# Patient Record
Sex: FEMALE | Race: WHITE | NOT HISPANIC OR LATINO | Employment: FULL TIME | URBAN - METROPOLITAN AREA
[De-identification: names, ages, dates, MRNs, and addresses within clinical notes are randomized per-mention and may not be internally consistent; named-entity substitution may affect disease eponyms.]

---

## 2017-01-04 ENCOUNTER — TELEPHONE (OUTPATIENT)
Dept: FAMILY MEDICINE | Facility: CLINIC | Age: 47
End: 2017-01-04

## 2017-01-04 DIAGNOSIS — E11.9 DIABETES MELLITUS WITHOUT COMPLICATION: Primary | ICD-10-CM

## 2017-01-04 DIAGNOSIS — Z79.4 LONG TERM CURRENT USE OF INSULIN: ICD-10-CM

## 2017-01-04 NOTE — TELEPHONE ENCOUNTER
----- Message from Maxine Milton sent at 1/4/2017  8:55 AM CST -----  Doctor put lab orders in for patient    She has lab center at her job     Needs lab orders faxed to her    Fax is   [phone]

## 2017-01-09 ENCOUNTER — OFFICE VISIT (OUTPATIENT)
Dept: FAMILY MEDICINE | Facility: CLINIC | Age: 47
End: 2017-01-09
Payer: COMMERCIAL

## 2017-01-09 VITALS
DIASTOLIC BLOOD PRESSURE: 74 MMHG | BODY MASS INDEX: 21.02 KG/M2 | WEIGHT: 114.19 LBS | HEIGHT: 62 IN | OXYGEN SATURATION: 99 % | SYSTOLIC BLOOD PRESSURE: 120 MMHG | HEART RATE: 74 BPM

## 2017-01-09 DIAGNOSIS — Z79.4 TYPE 2 DIABETES MELLITUS WITH HYPERGLYCEMIA, WITH LONG-TERM CURRENT USE OF INSULIN: Primary | ICD-10-CM

## 2017-01-09 DIAGNOSIS — R74.01 ELEVATED TRANSAMINASE LEVEL: ICD-10-CM

## 2017-01-09 DIAGNOSIS — E11.65 TYPE 2 DIABETES MELLITUS WITH HYPERGLYCEMIA, WITH LONG-TERM CURRENT USE OF INSULIN: Primary | ICD-10-CM

## 2017-01-09 PROCEDURE — 2022F DILAT RTA XM EVC RTNOPTHY: CPT | Mod: S$GLB,,,

## 2017-01-09 PROCEDURE — 1159F MED LIST DOCD IN RCRD: CPT | Mod: S$GLB,,,

## 2017-01-09 PROCEDURE — 3078F DIAST BP <80 MM HG: CPT | Mod: S$GLB,,,

## 2017-01-09 PROCEDURE — 3074F SYST BP LT 130 MM HG: CPT | Mod: S$GLB,,,

## 2017-01-09 PROCEDURE — 99214 OFFICE O/P EST MOD 30 MIN: CPT | Mod: S$GLB,,,

## 2017-01-09 PROCEDURE — 3046F HEMOGLOBIN A1C LEVEL >9.0%: CPT | Mod: S$GLB,,,

## 2017-01-09 PROCEDURE — 99999 PR PBB SHADOW E&M-EST. PATIENT-LVL III: CPT | Mod: PBBFAC,,,

## 2017-01-09 PROCEDURE — 4010F ACE/ARB THERAPY RXD/TAKEN: CPT | Mod: S$GLB,,,

## 2017-01-09 RX ORDER — INSULIN GLARGINE 100 [IU]/ML
15 INJECTION, SOLUTION SUBCUTANEOUS NIGHTLY
Qty: 1 BOX | Refills: 3 | Status: SHIPPED | OUTPATIENT
Start: 2017-01-09 | End: 2018-01-23 | Stop reason: SDUPTHER

## 2017-01-09 NOTE — PROGRESS NOTES
Subjective:       Patient ID: Leida Hoyos is a 46 y.o. female.    Chief Complaint: Diabetes    HPI The patient presents for medical management of her chronic medical problems including diabetes mellitus and autoimmune hepatitis. Her Hgb A1C is 9.0. She is not sure if she has been evaluated for hepatitis or iron toxicity. She is compliant with her diet and exercises three times a week or more. Her morning glucoses are in the 140 range.     Review of Systems   Constitutional: Negative.    Respiratory: Negative.  Negative for shortness of breath.    Cardiovascular: Negative for chest pain.   Psychiatric/Behavioral: Negative.    All other systems reviewed and are negative.      Objective:      Vitals:    01/09/17 0832   BP: 120/74   Pulse: 74     Physical Exam   Constitutional: She is oriented to person, place, and time. She appears well-developed and well-nourished. She is active.  Non-toxic appearance. She does not have a sickly appearance. She does not appear ill. No distress.   HENT:   Head: Normocephalic and atraumatic.   Right Ear: External ear normal.   Left Ear: External ear normal.   Nose: Nose normal.   Hearing normal.    Eyes: Conjunctivae are normal. Pupils are equal, round, and reactive to light.   Neck: Normal range of motion. Neck supple. No thyroid mass and no thyromegaly present.   Cardiovascular: Normal rate, regular rhythm, normal heart sounds and intact distal pulses.  Exam reveals no gallop and no friction rub.    No murmur heard.  Pulses:       Dorsalis pedis pulses are 2+ on the right side, and 2+ on the left side.        Posterior tibial pulses are 2+ on the right side, and 2+ on the left side.   Pulmonary/Chest: Effort normal and breath sounds normal. No respiratory distress. She exhibits no tenderness.   Musculoskeletal: Normal range of motion. She exhibits no edema.   Lymphadenopathy:     She has no cervical adenopathy.   Neurological: She is alert and oriented to person, place, and  time. She has normal strength. Coordination and gait normal.   Skin: Skin is warm and dry. She is not diaphoretic. No pallor.   Psychiatric: She has a normal mood and affect. Her speech is normal and behavior is normal. Judgment and thought content normal. Cognition and memory are normal.   Vitals reviewed.      Assessment:       1. Type 2 diabetes mellitus with hyperglycemia, with long-term current use of insulin    2. Elevated transaminase level        Plan:       Type 2 diabetes mellitus with hyperglycemia, with long-term current use of insulin  -     Ambulatory referral to Endocrinology    Elevated transaminase level  -     Hepatitis C antibody; Future; Expected date: 1/9/17  -     Hepatitis B surface antigen; Future; Expected date: 1/9/17  -     Iron and TIBC; Future; Expected date: 1/9/17  -     Ferritin; Future; Expected date: 1/9/17  -     Ferritin; Future; Expected date: 1/9/17  -     Iron and TIBC; Future; Expected date: 1/9/17  -     Hepatitis C antibody; Future; Expected date: 1/9/17  -     HEPATITIS B SURFACE ANTIGEN; Future; Expected date: 1/9/17    Other orders  -     insulin glargine (LANTUS SOLOSTAR) 100 unit/mL (3 mL) InPn pen; Inject 15 Units into the skin every evening.  Dispense: 1 Box; Refill: 3      Return in about 3 months (around 4/9/2017).

## 2017-01-09 NOTE — MR AVS SNAPSHOT
[place] [address] [address]  Phone: [phone]  Fax: [phone]                  Leida Hoyos   2017 8:20 AM   Office Visit    Description:  Female : 1970   Provider:  Gabriel Berry Jr., MD   Department:  [facility]           Reason for Visit     Diabetes           Diagnoses this Visit        Comments    Type 2 diabetes mellitus without complication, with long-term current use of insulin    -  Primary     Elevated transaminase level                To Do List           Goals (5 Years of Data)     None      Follow-Up and Disposition     Return in about 3 months (around 2017).    Follow-up and Disposition History       These Medications        Disp Refills Start End    insulin glargine (LANTUS SOLOSTAR) 100 unit/mL (3 mL) InPn pen 1 Box 3 2017     Inject 15 Units into the skin every evening. - Subcutaneous    Pharmacy: [facility] - [address] #: [phone]         [facility] On Call     [facility] On Call Nurse Care Line -  Assistance  Registered nurses in the [facility] On Call Center provide clinical advisement, health education, appointment booking, and other advisory services.  Call for this free service at [phone].             Medications           Message regarding Medications     Verify the changes and/or additions to your medication regime listed below are the same as discussed with your clinician today.  If any of these changes or additions are incorrect, please notify your healthcare provider.        CHANGE how you are taking these medications     Start Taking Instead of    insulin glargine (LANTUS SOLOSTAR) 100 unit/mL (3 mL) InPn pen insulin glargine (LANTUS SOLOSTAR) 100 unit/mL (3 mL) InPn pen    Dosage:  Inject 15 Units into the skin every evening. Dosage:  Inject 9 Units into the skin every evening.    Reason for Change:  Reorder            Verify  "that the below list of medications is an accurate representation of the medications you are currently taking.  If none reported, the list may be blank. If incorrect, please contact your healthcare provider. Carry this list with you in case of emergency.           Current Medications     aspirin (ECOTRIN) 81 MG EC tablet Take 81 mg by mouth once daily.    insulin glargine (LANTUS SOLOSTAR) 100 unit/mL (3 mL) InPn pen Inject 15 Units into the skin every evening.    lisinopril (PRINIVIL,ZESTRIL) 5 MG tablet Take 1 tablet (5 mg total) by mouth once daily.    mercaptopurine (PURINETHOL) 50 mg tablet Take 50 mg by mouth once daily.    metformin (GLUCOPHAGE) 1000 MG tablet Take 1 tablet (1,000 mg total) by mouth 2 (two) times daily with meals.    multivitamin (ONE DAILY MULTIVITAMIN) per tablet Take 1 tablet by mouth once daily.    pen needle, diabetic 32 gauge x 5/32" Ndle 1 each by Misc.(Non-Drug; Combo Route) route once daily.    pioglitazone (ACTOS) 30 MG tablet Take 1 tablet (30 mg total) by mouth once daily.           Clinical Reference Information           Vital Signs - Last Recorded  Most recent update: 1/9/2017  8:34 AM by Wendy Hill MA    BP Pulse Ht Wt SpO2 BMI    120/74 (BP Location: Right arm, Patient Position: Sitting, BP Method: Manual) 74 5' 2" (1.575 m) 51.8 kg (114 lb 3.2 oz) 99% 20.89 kg/m2      Blood Pressure          Most Recent Value    BP  120/74      Allergies as of 1/9/2017     No Known Allergies      Immunizations Administered on Date of Encounter - 1/9/2017     None      Orders Placed During Today's Visit      Normal Orders This Visit    Ambulatory referral to Endocrinology     Ferritin     HEPATITIS B SURFACE ANTIGEN     Hepatitis C antibody     Iron and TIBC     Future Labs/Procedures Expected by Expires    Ferritin  1/9/2017 1/9/2018    Ferritin  1/9/2017 1/9/2018    Hepatitis B surface antigen  1/9/2017 3/10/2018    HEPATITIS B SURFACE ANTIGEN  1/9/2017 3/10/2018    Hepatitis C antibody "  1/9/2017 3/10/2018    Hepatitis C antibody  1/9/2017 3/10/2018    Iron and TIBC  1/9/2017 1/9/2018    Iron and TIBC  1/9/2017 1/9/2018

## 2017-01-10 ENCOUNTER — TELEPHONE (OUTPATIENT)
Dept: ENDOCRINOLOGY | Facility: CLINIC | Age: 47
End: 2017-01-10

## 2017-01-10 ENCOUNTER — OFFICE VISIT (OUTPATIENT)
Dept: ENDOCRINOLOGY | Facility: CLINIC | Age: 47
End: 2017-01-10
Payer: COMMERCIAL

## 2017-01-10 VITALS
HEART RATE: 74 BPM | SYSTOLIC BLOOD PRESSURE: 122 MMHG | BODY MASS INDEX: 21.06 KG/M2 | WEIGHT: 114.44 LBS | DIASTOLIC BLOOD PRESSURE: 68 MMHG | HEIGHT: 62 IN

## 2017-01-10 DIAGNOSIS — E83.52 HYPERCALCEMIA: Primary | ICD-10-CM

## 2017-01-10 DIAGNOSIS — K75.4 AUTOIMMUNE HEPATITIS: ICD-10-CM

## 2017-01-10 PROCEDURE — 99999 PR PBB SHADOW E&M-EST. PATIENT-LVL III: CPT | Mod: PBBFAC,,, | Performed by: INTERNAL MEDICINE

## 2017-01-10 PROCEDURE — 99244 OFF/OP CNSLTJ NEW/EST MOD 40: CPT | Mod: S$GLB,,, | Performed by: INTERNAL MEDICINE

## 2017-01-10 NOTE — TELEPHONE ENCOUNTER
The following lab tests are abnormal:  The calcium is high normal and phos low normal  Could be early primary hyperparathyroidism  To schedule labs and urine in 3 months and will arrange for external lab

## 2017-01-10 NOTE — MR AVS SNAPSHOT
[facility] End[facility]/Diab/Metab  [address]  [facility] [address]  Phone: [phone]  Fax: [phone]                  Leida Hoyos   1/10/2017 11:00 AM   Office Visit    Description:  Female : 1970   Provider:  Guy Dean MD   Department:  [facility]/Diab/Metab           Reason for Visit     Diabetes Mellitus           Diagnoses this Visit        Comments    Uncontrolled type 2 diabetes mellitus without complication, with long-term current use of insulin    -  Primary     Autoimmune hepatitis                To Do List           Future Appointments        Provider Department Dept Phone    1/10/2017 12:45 PM LAB, APPOINTMENT [place] [facility] [phone]      Goals (5 Years of Data)     None      Follow-Up and Disposition     Follow-up and Disposition History      [facility]     [facility] Nurse Care Line -  Assistance  Registered nurses in the [facility] On Call Center provide clinical advisement, health education, appointment booking, and other advisory services.  Call for this free service at [phone].             Medications           Message regarding Medications     Verify the changes and/or additions to your medication regime listed below are the same as discussed with your clinician today.  If any of these changes or additions are incorrect, please notify your healthcare provider.             Verify that the below list of medications is an accurate representation of the medications you are currently taking.  If none reported, the list may be blank. If incorrect, please contact your healthcare provider. Carry this list with you in case of emergency.           Current Medications     aspirin (ECOTRIN) 81 MG EC tablet Take 81 mg by mouth once daily.    insulin glargine (LANTUS SOLOSTAR) 100 unit/mL (3 mL) InPn pen Inject 15 Units into the skin every evening.    lisinopril (PRINIVIL,ZESTRIL) 5 MG tablet Take 1 tablet (5 mg  "total) by mouth once daily.    mercaptopurine (PURINETHOL) 50 mg tablet Take 50 mg by mouth once daily.    metformin (GLUCOPHAGE) 1000 MG tablet Take 1 tablet (1,000 mg total) by mouth 2 (two) times daily with meals.    multivitamin (ONE DAILY MULTIVITAMIN) per tablet Take 1 tablet by mouth once daily.    pen needle, diabetic 32 gauge x 5/32" Ndle 1 each by Misc.(Non-Drug; Combo Route) route once daily.    pioglitazone (ACTOS) 30 MG tablet Take 1 tablet (30 mg total) by mouth once daily.           Clinical Reference Information           Vital Signs - Last Recorded  Most recent update: 1/10/2017 11:43 AM by Ansley Hodges RN    BP Pulse Ht Wt BMI    122/68 74 5' 2" (1.575 m) 51.9 kg (114 lb 6.7 oz) 20.93 kg/m2      Blood Pressure          Most Recent Value    BP  122/68      Allergies as of 1/10/2017     No Known Allergies      Immunizations Administered on Date of Encounter - 1/10/2017     None      Orders Placed During Today's Visit     Future Labs/Procedures Expected by Expires    C-peptide  1/10/2017 1/10/2018    GLUTAMIC ACID DECARBOXYLASE  1/10/2017 3/11/2018    Renal function panel  1/10/2017 1/10/2018      Instructions    Diabetes question if Type 1 or 2  C peptide and GAD65    If Type 1 will need insulin with meals and stop other agents    If Type 2 may benefit from DPP$    Autoimmune hepatitis    Note gestational diabetes, slim, no family hx of diabetes or autoimmune disease    Triplets 14 y/o          "

## 2017-01-10 NOTE — LETTER
January 10, 2017      Gabriel Berry Jr., MD  [address] [place] [address]           [facility] End[facility]/Diab/Metab  [address]  [facility] [address]  Phone: [phone]  Fax: [phone]          Patient: Leida Hoyos   MR Number: [id number]   YOB: 1970   Date of Visit: 1/10/2017       Dear Dr. Gabriel Berry Jr.:    Thank you for referring Leida Hoyos to me for evaluation. Attached you will find relevant portions of my assessment and plan of care.    If you have questions, please do not hesitate to call me. I look forward to following Leida Hoyos along with you.    Sincerely,    Guy Dean MD    Enclosure  CC:  No Recipients    If you would like to receive this communication electronically, please contact [email or web address] or [phone] to request more information on [employer] Link access.    For providers and/or their staff who would like to refer a patient to [facility], please contact us through our one-stop-shop provider referral line, [facility], at [phone].    If you feel you have received this communication in error or would no longer like to receive these types of communications, please e-mail [email or web address]

## 2017-01-10 NOTE — PATIENT INSTRUCTIONS
Diabetes question if Type 1 or 2  C peptide and GAD65    If Type 1 will need insulin with meals and stop other agents    If Type 2 may benefit from DPP$    Autoimmune hepatitis    Note gestational diabetes, slim, no family hx of diabetes or autoimmune disease    Triplets 14 y/o

## 2017-01-11 ENCOUNTER — PATIENT MESSAGE (OUTPATIENT)
Dept: ADMINISTRATIVE | Facility: OTHER | Age: 47
End: 2017-01-11

## 2017-01-13 ENCOUNTER — PATIENT MESSAGE (OUTPATIENT)
Dept: ENDOCRINOLOGY | Facility: CLINIC | Age: 47
End: 2017-01-13

## 2017-06-26 DIAGNOSIS — E11.9 TYPE 2 DIABETES MELLITUS WITHOUT COMPLICATION: ICD-10-CM

## 2017-06-26 RX ORDER — LISINOPRIL 5 MG/1
TABLET ORAL
Qty: 90 TABLET | Refills: 2 | Status: SHIPPED | OUTPATIENT
Start: 2017-06-26 | End: 2018-02-20 | Stop reason: SDUPTHER

## 2017-10-13 ENCOUNTER — PATIENT MESSAGE (OUTPATIENT)
Dept: ADMINISTRATIVE | Facility: HOSPITAL | Age: 47
End: 2017-10-13

## 2018-01-23 ENCOUNTER — OFFICE VISIT (OUTPATIENT)
Dept: ENDOCRINOLOGY | Facility: CLINIC | Age: 48
End: 2018-01-23
Payer: COMMERCIAL

## 2018-01-23 VITALS
WEIGHT: 106.94 LBS | BODY MASS INDEX: 19.68 KG/M2 | HEIGHT: 62 IN | DIASTOLIC BLOOD PRESSURE: 80 MMHG | HEART RATE: 94 BPM | SYSTOLIC BLOOD PRESSURE: 138 MMHG

## 2018-01-23 DIAGNOSIS — R73.9 HYPERGLYCEMIA: ICD-10-CM

## 2018-01-23 DIAGNOSIS — E13.9 LADA (LATENT AUTOIMMUNE DIABETES IN ADULTS), MANAGED AS TYPE 1: Primary | ICD-10-CM

## 2018-01-23 PROCEDURE — 99999 PR PBB SHADOW E&M-EST. PATIENT-LVL III: CPT | Mod: PBBFAC,,, | Performed by: INTERNAL MEDICINE

## 2018-01-23 PROCEDURE — 99214 OFFICE O/P EST MOD 30 MIN: CPT | Mod: S$GLB,,, | Performed by: INTERNAL MEDICINE

## 2018-01-23 RX ORDER — INSULIN PUMP SYRINGE, 3 ML
EACH MISCELLANEOUS
Qty: 1 EACH | Refills: 0 | Status: SHIPPED | OUTPATIENT
Start: 2018-01-23 | End: 2018-05-24

## 2018-01-23 RX ORDER — INSULIN LISPRO 100 [IU]/ML
4 INJECTION, SOLUTION INTRAVENOUS; SUBCUTANEOUS
Qty: 15 ML | Refills: 11 | Status: SHIPPED | OUTPATIENT
Start: 2018-01-23 | End: 2018-10-10 | Stop reason: SDUPTHER

## 2018-01-23 RX ORDER — INSULIN GLARGINE 100 [IU]/ML
12 INJECTION, SOLUTION SUBCUTANEOUS NIGHTLY
Qty: 3 BOX | Refills: 3 | Status: SHIPPED | OUTPATIENT
Start: 2018-01-23 | End: 2018-12-17 | Stop reason: SDUPTHER

## 2018-01-23 RX ORDER — PEN NEEDLE, DIABETIC 30 GX3/16"
NEEDLE, DISPOSABLE MISCELLANEOUS
Qty: 150 EACH | Refills: 3 | Status: SHIPPED | OUTPATIENT
Start: 2018-01-23 | End: 2018-08-27 | Stop reason: SDUPTHER

## 2018-01-23 RX ORDER — LANCETS
EACH MISCELLANEOUS
Qty: 150 EACH | Refills: 11 | Status: SHIPPED | OUTPATIENT
Start: 2018-01-23 | End: 2019-01-10 | Stop reason: SDUPTHER

## 2018-01-23 NOTE — PROGRESS NOTES
Subjective:     Patient ID: Leida Hoyos is a 47 y.o. female.    Chief Complaint: Diabetes    HPI:   Ms. Hoyos is a 47 y.o. female who is here for a follow-up visit for evaluation of type 1 DM that was suspected last year and recently became complicated by DKA admission.   Admitted last week for DKA received IV fluids, moderate dehydration and IV insulin. Initially suspected of having a stroke however, MRI/MRA were negative. Not discharged on any meal time insulin. Her blood sugars have remained between 180 - 200s since discharge. Stopped taking metformin.     Diagnosed with type 2 diabetes 5 years ago. Initially treated with metformin and after a month started on lantus and metformin.     Regimen:  lantus 20 units at bedtime    Denies levels less than 60. Has had a BG of 67 a few years ago. Denies symptoms of hypoglycemia. Lowest bg has been 144.     Checking blood sugars five times a day:  Before breakfast and dinner and after breakfast and dinner.   Before bedtime    Blood sugar log:   Fastin, 174, 169, 165, 158/104, 133, 238, 188  Before dinner: x, 214, x, 365, 245, 144, 275  Before bedtdime: 241, 220, 210, 281, 224, 216, 297    Reports mild lower extremity swelling since discharge from hospital. Denies chest pain, pressure or shortness of breath. Denies numbness, burning and tingling of feet. Reports worsening presbyopia since discharge from hospital. No dysuria, diarrhea or URI.     Medications:   Baby ASA  Lisinopril  Mercaptopurine  MVI    Past Medical History:   Autoimmune hepatitis     Review of Systems   Constitutional: Negative for chills and fever.   HENT: Negative for congestion and sinus pressure.    Eyes: Negative for visual disturbance.   Respiratory: Negative for chest tightness and shortness of breath.    Cardiovascular: Negative for chest pain, palpitations and leg swelling.   Gastrointestinal: Negative for abdominal pain and vomiting.   Genitourinary: Negative for dysuria.  "  Musculoskeletal: Negative for arthralgias.   Skin: Negative for rash.   Neurological: Negative for weakness.   Hematological: Does not bruise/bleed easily.   Psychiatric/Behavioral: Negative for sleep disturbance.        Objective:     Physical Exam    Vitals:    01/23/18 1026   BP: 138/80   BP Location: Left arm   Patient Position: Sitting   BP Method: Medium (Manual)   Pulse: 94   Weight: 48.5 kg (106 lb 14.8 oz)   Height: 5' 2" (1.575 m)     Results for GIORGIO SHIELDS (MRN [id number]) as of 1/23/2018 10:34   Ref. Range 1/10/2017 12:43   Sodium Latest Ref Range: 136 - 145 mmol/L 135 (L)   Potassium Latest Ref Range: 3.5 - 5.1 mmol/L 4.2   Chloride Latest Ref Range: 95 - 110 mmol/L 100   CO2 Latest Ref Range: 23 - 29 mmol/L 29   Anion Gap Latest Ref Range: 8 - 16 mmol/L 6 (L)   BUN, Bld Latest Ref Range: 6 - 20 mg/dL 12   Creatinine Latest Ref Range: 0.5 - 1.4 mg/dL 0.7   eGFR if non African American Latest Ref Range: >60 mL/min/1.73 m^2 >60.0   eGFR if African American Latest Ref Range: >60 mL/min/1.73 m^2 >60.0   Glucose Latest Ref Range: 70 - 110 mg/dL 154 (H)   Calcium Latest Ref Range: 8.7 - 10.5 mg/dL 10.3   Phosphorus Latest Ref Range: 2.7 - 4.5 mg/dL 2.9   Albumin Latest Ref Range: 3.5 - 5.2 g/dL 3.9   Results for GIORGIO SHIELDS (MRN [id number]) as of 1/23/2018 10:34   Ref. Range 1/10/2017 12:43   C-Peptide Latest Ref Range: 0.9 - 5.5 ng/mL 0.9   Glutamic Acid Decarb Ab Latest Ref Range: <=0.02 nmol/L 0.05 (H)     Assessment/Plan:     1. 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     - Ambulatory Referral to Diabetes Education  - Hemoglobin A1c; Future  - Vitamin D; Future  - Vitamin B12; Future  - Hemoglobin A1c  - Vitamin D  - Vitamin B12    2. Hyperglycemia    - Ambulatory Referral to Diabetes Education  - Hemoglobin A1c; Future  - " Hemoglobin A1c      F/u with me in six weeks.     Prescriptions sent to [facility]

## 2018-01-23 NOTE — PATIENT INSTRUCTIONS
Humalog is at [facility].    Meter and strips are at [facility].    [facility] labs are entered to be done at your convenience.     [facility]    New doses of insulin:  Lantus 12 units at bedtime  Humalog 3 units before breakfast and lunch (10 minutes)   Humalog 4 units before dinner (10 minutes)    Diabetes education.     March 6th at 7:30 AM - follow up visit

## 2018-01-25 ENCOUNTER — CLINICAL SUPPORT (OUTPATIENT)
Dept: DIABETES | Facility: CLINIC | Age: 48
End: 2018-01-25
Payer: COMMERCIAL

## 2018-01-25 DIAGNOSIS — E13.9 LADA (LATENT AUTOIMMUNE DIABETES IN ADULTS), MANAGED AS TYPE 1: ICD-10-CM

## 2018-01-25 PROCEDURE — 99999 PR PBB SHADOW E&M-EST. PATIENT-LVL II: CPT | Mod: PBBFAC,,,

## 2018-01-25 PROCEDURE — G0108 DIAB MANAGE TRN  PER INDIV: HCPCS | Mod: S$GLB,,, | Performed by: INTERNAL MEDICINE

## 2018-01-25 NOTE — PROGRESS NOTES
Diabetes Education  Author: Berta Holbrook RD, CDE  Date: 1/25/2018    Diabetes Education Visit  Diabetes Education Record Assessment/Progress: Initial    Diabetes Type  Diabetes Type : Type I (Recently diagnosed as CHEMA)    Diabetes History  Diabetes Diagnosis: 3-5 years    Nutrition  Meal Planning: 3 meals per day, snacks between meal, water, eats out often (Often on the weekends and 2 times a week may eat out)  What type of beverages do you drink?: other (see comments), water (Coffee w/ cream)    Monitoring   Self Monitoring :  (Checks BG 4 times a day)    Exercise   Exercise Type:  (Typically 4-5 days a week goes to the gym - 45 min-1 hr; not much in the last 2 weeks - recent DKA and has been sick)    Current Diabetes Treatment   Current Treatment: Insulin (Humalog AC and Lantus HS)    Social History  Preferred Learning Method: Face to Face  Primary Support: Self  Smoking Status: Never a Smoker  Alcohol Use: Rarely    Barriers to Change  Barriers to Change: None  Learning Challenges : None    Readiness to Learn   Readiness to Learn : Acceptance    Cultural Influences  Cultural Influences: No    Diabetes Education Assessment/Progress  Diabetes Disease Process (diabetes disease process and treatment options): Instructed, Discussion, Individual Session, Written Materials Provided  Nutrition (Incorporating nutritional management into one's lifestyle): Instructed, Discussion, Individual Session, Written Materials Provided (Instructed patient on CHO counting, label reading and addt'l resources to assist w/ CHO counting)  Physical Activity (incorporating physical activity into one's lifestyle): Not Covered/Deferred  Medications (states correct name, dose, onset, peak, duration, side effects & timing of meds): Instructed, Discussion, Individual Session, Written Materials Provided (Reviewed medication regimen)  Monitoring (monitoring blood glucose/other parameters & using results): Instructed, Discussion, Individual  Session, Written Materials Provided (Reviewed SMBG schedule and BG goals; discussed the use of a sensor or Freestlye Ann Marie as an option for monitoring BG)  Acute Complications (preventing, detecting, and treating acute complications): Instructed, Discussion, Individual Session, Written Materials Provided (Reviewed s/s and treatment of hypoglycemia)  Chronic Complications (preventing, detecting, and treating chronic complications): Not Covered/Deferred  Clinical (diabetes and other pertinent medical history): Instructed, Discussion, Individual Session  Cognitive (knowledge of self-management skills, functional health literacy): Instructed, Discussion, Individual Session  Psychosocial (emotional response to diabetes): Not Covered/Deferred  Diabetes Distress and Support Systems: Not Covered/Deferred  Behavioral (readiness for change, lifestyle practices, self-care behaviors): Instructed, Discussion, Individual Session    Goals  Patient has selected/evaluated goals during today's session: Yes, selected  Healthy Eating: Set (Start reading food labels and using resources to help with CHO counting)    Diabetes Care Plan/Intervention  Education Plan/Intervention: Individual Follow-Up DSMT    Diabetes Meal Plan  Carbohydrate Per Meal: 30-45g  Carbohydrate Per Snack : 7-15g    Education Units of Time   Time Spent: 60 min      Health Maintenance Topics with due status: Not Due       Topic Last Completion Date    Pap Smear with HPV Cotest 01/17/2016    TETANUS VACCINE 03/28/2016     Health Maintenance Due   Topic Date Due    Pneumococcal PPSV23 (Medium Risk) (1) 02/11/1987    Foot Exam  04/04/2017    Lipid Panel  04/12/2017    Hemoglobin A1c  07/06/2017    Influenza Vaccine  08/01/2017    Eye Exam  08/10/2017    Mammogram  10/17/2017

## 2018-01-26 ENCOUNTER — PATIENT MESSAGE (OUTPATIENT)
Dept: ENDOCRINOLOGY | Facility: CLINIC | Age: 48
End: 2018-01-26

## 2018-01-26 ENCOUNTER — TELEPHONE (OUTPATIENT)
Dept: FAMILY MEDICINE | Facility: CLINIC | Age: 48
End: 2018-01-26

## 2018-01-26 DIAGNOSIS — E13.9 LADA (LATENT AUTOIMMUNE DIABETES IN ADULTS), MANAGED AS TYPE 1: Primary | ICD-10-CM

## 2018-01-26 NOTE — TELEPHONE ENCOUNTER
----- Message from Shaniqua [place] sent at 1/26/2018  9:58 AM CST -----  Contact: [phone]/ self   Patient would like orders for lipid panel and A1C faxed to her at [phone]. Please advise.

## 2018-02-01 ENCOUNTER — PATIENT MESSAGE (OUTPATIENT)
Dept: ADMINISTRATIVE | Facility: HOSPITAL | Age: 48
End: 2018-02-01

## 2018-02-05 LAB
HDLC SERPL-MCNC: 76 MG/DL
LDLC SERPL CALC-MCNC: 99 MG/DL
LIPIDS, TOTAL CHOLESTEROL: 186
TRIGL SERPL-MCNC: 55 MG/DL
VLDLC SERPL-MCNC: 11 MG/DL

## 2018-02-07 ENCOUNTER — TELEPHONE (OUTPATIENT)
Dept: ENDOCRINOLOGY | Facility: CLINIC | Age: 48
End: 2018-02-07

## 2018-02-07 LAB
25(OH)D3+25(OH)D2 SERPL-MCNC: 25.8 NG/ML (ref 30–100)
ALBUMIN SERPL-MCNC: 4.2 G/DL (ref 3.5–5.5)
ALBUMIN/GLOB SERPL: 1.8 {RATIO} (ref 1.2–2.2)
ALP SERPL-CCNC: 178 IU/L (ref 39–117)
ALT SERPL-CCNC: 95 IU/L (ref 0–32)
AST SERPL-CCNC: 141 IU/L (ref 0–40)
BILIRUB SERPL-MCNC: 0.3 MG/DL (ref 0–1.2)
BUN SERPL-MCNC: 14 MG/DL (ref 6–24)
BUN/CREAT SERPL: 20 (ref 9–23)
CALCIUM SERPL-MCNC: 11.3 MG/DL (ref 8.7–10.2)
CHLORIDE SERPL-SCNC: 96 MMOL/L (ref 96–106)
CO2 SERPL-SCNC: 23 MMOL/L (ref 18–29)
CREAT SERPL-MCNC: 0.71 MG/DL (ref 0.57–1)
FOLATE SERPL-MCNC: >20 NG/ML
GFR SERPLBLD CREATININE-BSD FMLA CKD-EPI: 101 ML/MIN/1.73
GFR SERPLBLD CREATININE-BSD FMLA CKD-EPI: 116 ML/MIN/1.73
GLOBULIN SER CALC-MCNC: 2.4 G/DL (ref 1.5–4.5)
GLUCOSE SERPL-MCNC: 90 MG/DL (ref 65–99)
HBA1C MFR BLD: 10.9 % (ref 4.8–5.6)
POTASSIUM SERPL-SCNC: 4.5 MMOL/L (ref 3.5–5.2)
PROT SERPL-MCNC: 6.6 G/DL (ref 6–8.5)
SODIUM SERPL-SCNC: 140 MMOL/L (ref 134–144)
VIT B12 SERPL-MCNC: 536 PG/ML (ref 232–1245)

## 2018-02-07 NOTE — TELEPHONE ENCOUNTER
Reviewed labs.     [facility]imen at [facility]   humalog 3/3/4    Started insulin last visit  Dm education ordered   Has f/u appt with me in three weeks.   Have sent message. Would like to review log to adjust    Results for orders placed or performed in visit on 02/05/18   Comprehensive metabolic panel   Result Value Ref Range    Glucose 90 65 - 99 mg/dL    BUN, Bld 14 6 - 24 mg/dL    Creatinine 0.71 0.57 - 1.00 mg/dL    eGFR if non African American 101 >59 mL/min/1.73    eGFR if African American 116 >59 mL/min/1.73    BUN/Creatinine Ratio 20 9 - 23    Sodium 140 134 - 144 mmol/L    Potassium 4.5 3.5 - 5.2 mmol/L    Chloride 96 96 - 106 mmol/L    CO2 23 18 - 29 mmol/L    Calcium 11.3 (H) 8.7 - 10.2 mg/dL    Total Protein 6.6 6.0 - 8.5 g/dL    Albumin 4.2 3.5 - 5.5 g/dL    Globulin, Total 2.4 1.5 - 4.5 g/dL    Albumin/Globulin Ratio 1.8 1.2 - 2.2    Total Bilirubin 0.3 0.0 - 1.2 mg/dL    Alkaline Phosphatase 178 (H) 39 - 117 IU/L     (H) 0 - 40 IU/L    ALT 95 (H) 0 - 32 IU/L   Vitamin B12/folate, serum panel   Result Value Ref Range    Vitamin B-12 536 232 - 1,245 pg/mL    Folate >20.0 >3.0 ng/mL   Hemoglobin A1c   Result Value Ref Range    Hemoglobin A1C 10.9 (H) 4.8 - 5.6 %   Vitamin D   Result Value Ref Range    Vit D, 25-Hydroxy 25.8 (L) 30.0 - 100.0 ng/mL

## 2018-02-15 ENCOUNTER — TELEPHONE (OUTPATIENT)
Dept: FAMILY MEDICINE | Facility: CLINIC | Age: 48
End: 2018-02-15

## 2018-02-20 ENCOUNTER — OFFICE VISIT (OUTPATIENT)
Dept: FAMILY MEDICINE | Facility: CLINIC | Age: 48
End: 2018-02-20
Payer: COMMERCIAL

## 2018-02-20 ENCOUNTER — PATIENT MESSAGE (OUTPATIENT)
Dept: ENDOCRINOLOGY | Facility: CLINIC | Age: 48
End: 2018-02-20

## 2018-02-20 VITALS
WEIGHT: 107.13 LBS | SYSTOLIC BLOOD PRESSURE: 120 MMHG | BODY MASS INDEX: 19.71 KG/M2 | HEIGHT: 62 IN | OXYGEN SATURATION: 98 % | DIASTOLIC BLOOD PRESSURE: 70 MMHG | HEART RATE: 76 BPM

## 2018-02-20 DIAGNOSIS — Z00.00 PREVENTATIVE HEALTH CARE: Primary | ICD-10-CM

## 2018-02-20 DIAGNOSIS — E11.9 TYPE 2 DIABETES MELLITUS WITHOUT COMPLICATION, WITH LONG-TERM CURRENT USE OF INSULIN: ICD-10-CM

## 2018-02-20 DIAGNOSIS — K75.4 AUTOIMMUNE HEPATITIS: ICD-10-CM

## 2018-02-20 DIAGNOSIS — E13.9 LADA (LATENT AUTOIMMUNE DIABETES IN ADULTS), MANAGED AS TYPE 1: ICD-10-CM

## 2018-02-20 DIAGNOSIS — Z79.4 TYPE 2 DIABETES MELLITUS WITHOUT COMPLICATION, WITH LONG-TERM CURRENT USE OF INSULIN: ICD-10-CM

## 2018-02-20 DIAGNOSIS — Z23 NEED FOR 23-POLYVALENT PNEUMOCOCCAL POLYSACCHARIDE VACCINE: ICD-10-CM

## 2018-02-20 PROCEDURE — 99999 PR PBB SHADOW E&M-EST. PATIENT-LVL IV: CPT | Mod: PBBFAC,,,

## 2018-02-20 PROCEDURE — 99396 PREV VISIT EST AGE 40-64: CPT | Mod: 25,S$GLB,,

## 2018-02-20 PROCEDURE — 90471 IMMUNIZATION ADMIN: CPT | Mod: S$GLB,,,

## 2018-02-20 PROCEDURE — 90732 PPSV23 VACC 2 YRS+ SUBQ/IM: CPT | Mod: S$GLB,,,

## 2018-02-20 RX ORDER — CALCIUM CARBONATE 600 MG
600 TABLET ORAL ONCE
COMMUNITY
End: 2022-09-28

## 2018-02-20 RX ORDER — LISINOPRIL 5 MG/1
5 TABLET ORAL DAILY
Qty: 90 TABLET | Refills: 3 | Status: SHIPPED | OUTPATIENT
Start: 2018-02-20 | End: 2019-01-10

## 2018-02-20 RX ORDER — PRAVASTATIN SODIUM 10 MG/1
10 TABLET ORAL DAILY
Qty: 90 TABLET | Refills: 3 | Status: SHIPPED | OUTPATIENT
Start: 2018-02-20 | End: 2018-05-24 | Stop reason: SDUPTHER

## 2018-02-20 NOTE — PROGRESS NOTES
Subjective:       Patient ID: Leida Hoyos is a 48 y.o. female.    Chief Complaint: Annual Exam    HPI Data obtained from Agency for [employer] and Research Quality (AHRQ):    GRADE A -The USPSTF recommends the service. There is high certainty that the net benefit is substantial. Offer or provide this service.    GRADE B - The USPSTF recommends the service. There is high certainty that the net benefit is moderate or there is moderate certainty that the net benefit is moderate to substantial. Offer or provide this service.    View All   20 - Recommended (A, B)   Grade Title Risk Info. Details   Due  Cervical Cancer: Screening -- Women 21 to 65 (Pap Smear) or 30-65 (in combo with HPV testing)     N/A  Folic Acid for the Prevention of Neural Tube Defects: Preventive Medication --Women who are planning or capable of pregnancy     Low  HIV: Screening - Adolescents and Adults     120/70 High Blood Pressure: Screening and Home Monitoring -- Adults     Low  Syphilis: Screening --Asymptomatic, nonpregnant adults and adolescents who are at increased risk for syphilis infection     Denies  Alcohol Misuse: Screening and Behavioral Counseling Interventions in Primary Care -- Adults     Low  BRCA-Related Cancer: Risk Assessment, Genetic Counseling, & Genetic Testing -- Women at Increased Risk     Low  Breast Cancer: Preventive Medications -- Women At Increased Risk     N/A  Breastfeeding: Primary Care Preventions --Pregnant women, new mothers, and their children     Low  Chlamydia: Screening -- Sexually Active Women     Denies  Depression: Screening -- General adult population, including pregnant and postpartum women     Uncontrolled  Diabetes Mellitus (Type 2) andAbnormal Blood Glucose: Screening -- Adults aged 40 to 70 years who are overweight or obese     Low  Gonorrhea: Screening -- Sexually Active Women     Yes  Healthful Diet and Physical Activity for CVD Disease Prevention: Counseling -- Adults with CVD Risk  Factors     Low  Hepatitis B: Screening -- Nonpregnant Adolescents and Adults At High Risk     Low  Hepatitis C Virus Infection: Screening--Adults at High Risk and Adults born between 1945 and 1965     Low  Latent Tuberculosis Infection: Screening -- Asymptomatic adults at increased risk for infection     Never  Obesity: Screening for and Management of-- All Adults       Low  Sexually Transmitted Infections: Behavioral Counseling -- Sexually Active Adolescents and Adults     Candidate  Statin Use for the Primary Prevention of CVD: Preventive Medicine -- Adults age 40 to 75 years with no history of CVD, 1 or more CVD risk factors, and a calculated 10-year CVD event risk of 10% or greater.         Review of Systems   Constitutional: Positive for fatigue.   HENT: Negative.    Eyes: Positive for visual disturbance.   Respiratory: Negative.  Negative for shortness of breath.    Cardiovascular: Negative.  Negative for chest pain.   Gastrointestinal: Negative.    Endocrine: Positive for polydipsia and polyuria. Negative for polyphagia.   Genitourinary: Negative.    Musculoskeletal: Negative.    Skin: Negative.  Negative for pallor.   Allergic/Immunologic: Negative.    Neurological: Negative.  Negative for dizziness, tremors, seizures, speech difficulty, weakness and headaches.   Hematological: Negative.    Psychiatric/Behavioral: Negative.  Negative for confusion. The patient is not nervous/anxious.    All other systems reviewed and are negative.      Objective:      Vitals:    02/20/18 0828   BP: 120/70   Pulse: 76     Physical Exam   Constitutional: She is oriented to person, place, and time. She appears well-developed and well-nourished. She is active.  Non-toxic appearance. She does not have a sickly appearance. She does not appear ill. No distress.   HENT:   Head: Normocephalic and atraumatic.   Right Ear: External ear normal.   Left Ear: External ear normal.   Nose: Nose normal.   Hearing normal.    Eyes:  Conjunctivae are normal. Pupils are equal, round, and reactive to light.   Neck: Normal range of motion. Neck supple. No thyroid mass and no thyromegaly present.   Cardiovascular: Normal rate, regular rhythm, normal heart sounds and intact distal pulses.  Exam reveals no gallop and no friction rub.    No murmur heard.  Pulses:       Dorsalis pedis pulses are 2+ on the right side, and 2+ on the left side.        Posterior tibial pulses are 2+ on the right side, and 2+ on the left side.   Pulmonary/Chest: Effort normal and breath sounds normal. No respiratory distress. She exhibits no tenderness.   Musculoskeletal: Normal range of motion. She exhibits no edema.   Lymphadenopathy:     She has no cervical adenopathy.   Neurological: She is alert and oriented to person, place, and time. She has normal strength. Coordination and gait normal.   Skin: Skin is warm and dry. She is not diaphoretic. No pallor.   Psychiatric: She has a normal mood and affect. Her speech is normal and behavior is normal. Judgment and thought content normal. Cognition and memory are normal.   Vitals reviewed.      Assessment:       1. Preventative health care    2. Need for 23-polyvalent pneumococcal polysaccharide vaccine    3. 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), managed as type 1    4. Autoimmune hepatitis    5. Type 2 diabetes mellitus without complication, with long-term current use of insulin        Plan:       Preventative health care  -     (In Office Administered) Pneumococcal Polysaccharide Vaccine (23 Valent) (SQ/IM)    Need for 23-polyvalent pneumococcal polysaccharide vaccine  -     (In Office Administered) Pneumococcal Polysaccharide Vaccine (23 Valent) (SQ/IM)    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), managed as type 1  -     Ambulatory referral to Ophthalmology  -     Microalbumin/creatinine urine ratio; Future; Expected date: 02/20/2018    Autoimmune hepatitis  -     Ambulatory Referral to Hepatology    Type 2  diabetes mellitus without complication, with long-term current use of insulin  -     lisinopril (PRINIVIL,ZESTRIL) 5 MG tablet; Take 1 tablet (5 mg total) by mouth once daily.  Dispense: 90 tablet; Refill: 3    Other orders  -     pravastatin (PRAVACHOL) 10 MG tablet; Take 1 tablet (10 mg total) by mouth once daily.  Dispense: 90 tablet; Refill: 3      Follow-up in about 3 months (around 5/20/2018).

## 2018-02-21 ENCOUNTER — DOCUMENTATION ONLY (OUTPATIENT)
Dept: TRANSPLANT | Facility: CLINIC | Age: 48
End: 2018-02-21

## 2018-02-21 NOTE — LETTER
February 21, 2018    Leida Hoyos  [address] [place] [id number]      Dear Leida Hoyos:    Your doctor has referred you to the [facility]. You will be contacted by our office and an initial appointment will then be scheduled for you.    We look forward to seeing you soon. If you have any further questions, please contact us at [phone].       Sincerely,        [facility]   [address] [place] [id number]  [phone]

## 2018-02-21 NOTE — NURSING
Pt records reviewed.   Pt will be referred to Hepatology.    Initial referral received  from the workque.   Referring Provider/diagnosis   LULU COBB JR   Diagnosis: Autoimmune hepatitis           Referral letter sent to provider and patient.

## 2018-02-23 ENCOUNTER — TELEPHONE (OUTPATIENT)
Dept: TRANSPLANT | Facility: CLINIC | Age: 48
End: 2018-02-23

## 2018-02-23 NOTE — TELEPHONE ENCOUNTER
----- Message from Jarrettkath Espinoza sent at 2/23/2018 10:18 AM CST -----   Pt states that she informed Dr. Berry, that she already have Liver Specialist and will schedule with her Physician.  ----- Message -----  From: Rayne Buzz  Sent: 2/21/2018   9:00 AM  To: Hepatology Scheduling    Pt records reviewed.   Pt will be referred to Hepatology.    Initial referral received  from the workque.   Referring Provider/diagnosis   LULU BERRY JR  Diagnosis: Autoimmune hepatitis          Referral letter sent to provider and patient.

## 2018-03-06 ENCOUNTER — OFFICE VISIT (OUTPATIENT)
Dept: ENDOCRINOLOGY | Facility: CLINIC | Age: 48
End: 2018-03-06
Payer: COMMERCIAL

## 2018-03-06 VITALS
WEIGHT: 107.81 LBS | HEART RATE: 86 BPM | BODY MASS INDEX: 19.84 KG/M2 | HEIGHT: 62 IN | DIASTOLIC BLOOD PRESSURE: 70 MMHG | SYSTOLIC BLOOD PRESSURE: 130 MMHG

## 2018-03-06 DIAGNOSIS — E13.9 LADA (LATENT AUTOIMMUNE DIABETES IN ADULTS), MANAGED AS TYPE 1: Primary | ICD-10-CM

## 2018-03-06 DIAGNOSIS — E11.649 HYPOGLYCEMIA ASSOCIATED WITH DIABETES: ICD-10-CM

## 2018-03-06 PROCEDURE — 3078F DIAST BP <80 MM HG: CPT | Mod: S$GLB,,, | Performed by: INTERNAL MEDICINE

## 2018-03-06 PROCEDURE — 3075F SYST BP GE 130 - 139MM HG: CPT | Mod: S$GLB,,, | Performed by: INTERNAL MEDICINE

## 2018-03-06 PROCEDURE — 99999 PR PBB SHADOW E&M-EST. PATIENT-LVL III: CPT | Mod: PBBFAC,,, | Performed by: INTERNAL MEDICINE

## 2018-03-06 PROCEDURE — 99214 OFFICE O/P EST MOD 30 MIN: CPT | Mod: S$GLB,,, | Performed by: INTERNAL MEDICINE

## 2018-03-06 NOTE — PATIENT INSTRUCTIONS
New regimen to start with diabetes education:   Before meals:   ICHO 1:15  ISF 1:50, starting at 150  Decrease lantus 11 units at bedtime.     Labs one week before visit with Ms. Gil.

## 2018-03-06 NOTE — PROGRESS NOTES
Subjective:     Patient ID: Leida Hoyos is a 48 y.o. female.    Chief Complaint: No chief complaint on file.    HPI:   Ms. Hoyos is a 48 y.o. female who is here for a follow-up visit for evaluation CHEMA that was diagnosed one year ago.   GEETA Ab +.05 and Cpeptide 0.9 with BG of 154.   Patient remained on basal insulin for the most part until two months ago when she developed DKA.    Current regimen:  Lantus 12 units  Humalog 3/3/4  Sliding scale for BG > 200 takes additional unit    Injections: occasionally forgets and takes novolog after meals or during meals (twice a week)  Hypoglycemia: three episodes: two occurred within 1 1.5 hrs after dinner. Symptoms: profuse sweating and tremulousness.   Not seen by DM education.    Last A1C was 10.9%  Noted serum calcium was 11.3 mg/dl, vitamin D 25 ng/ml, with normal eGFR and creat (0.7 mg/dl).    Blood sugar log:  Fastings: 157, 127, 131, 80, 87, 171, 128/106, 103  Before lunch: x, 121, 129, 87, 72/119, 120, x  Before dinner: 153, 128, x, 186, 212, 165, 113  Bedtime: 57/159, 195, 167, 165, 92, 43/104    Breakfast: frozen waffle with peanut butter - 30 gms  Lunch: salad, strawberries and nuts - cottage cheese - 30   Dinner patterns: Tries to eat between 35 - 45 grams of carbs per meal, occasionally higher if going out to dinner.     Review of Systems   Constitutional: Negative for activity change, appetite change, chills and fever.   HENT: Negative for sore throat.         No recent URI   Respiratory: Negative for cough, chest tightness and shortness of breath.    Cardiovascular: Negative for chest pain, palpitations and leg swelling.   Gastrointestinal: Negative for constipation, diarrhea and nausea.   Genitourinary: Negative for dysuria.   Musculoskeletal: Negative for arthralgias.   Skin: Negative for rash.   Neurological: Negative for light-headedness.        Objective:     Physical Exam   Constitutional: She is oriented to person, place, and time. She appears  "well-developed and well-nourished. No distress.   HENT:   Head: Normocephalic and atraumatic.   Mouth/Throat: No oropharyngeal exudate.   Eyes: Conjunctivae and EOM are normal. Pupils are equal, round, and reactive to light. No scleral icterus.   Neck: Normal range of motion. Neck supple. No tracheal deviation present. No thyromegaly present.   Cardiovascular: Normal rate, regular rhythm, normal heart sounds and intact distal pulses.    Pulmonary/Chest: Effort normal and breath sounds normal.   Abdominal:   Sites of insulin administration are normal appearing.   Genitourinary: No breast discharge.   Musculoskeletal: Normal range of motion. She exhibits no edema or tenderness.   Lymphadenopathy:     She has no cervical adenopathy.   Neurological: She is alert and oriented to person, place, and time. She has normal reflexes. No cranial nerve deficit.   Skin: Skin is warm and dry.   FOOT EXAM:  Visual inspection is normal, no abrasions, bruising or calluses.   Microfilament test is intact b/l.   Vibratory sense is intact b/l.   Distal pulses are present b/l.    Psychiatric: She has a normal mood and affect.       Vitals:    03/06/18 0750   BP: 130/70   BP Location: Left arm   Patient Position: Sitting   BP Method: Medium (Manual)   Pulse: 86   Weight: 48.9 kg (107 lb 12.9 oz)   Height: 5' 2" (1.575 m)       Assessment/Plan:     Ms. Hoyos is a 48 year old woman who is here for a follow up visit for management of below diagnosis.   Would like to start using ICHO and ISF to calculate meal time dosing.   Based on current blood sugars and TDD (22 units)  ICHO 1:15  ISF 1:50, starting at 100  Decrease lantus 11 units at bedtime.     1. 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), managed as type 1    - Hemoglobin A1c; Future  - Hemoglobin A1c  - Microalbumin/creatinine urine ratio    2. Hypoglycemia associated with diabetes    - Hemoglobin A1c; Future  - Hemoglobin A1c  - Microalbumin/creatinine urine ratio    F/u in three " and in six months.

## 2018-03-14 ENCOUNTER — CLINICAL SUPPORT (OUTPATIENT)
Dept: DIABETES | Facility: CLINIC | Age: 48
End: 2018-03-14
Payer: COMMERCIAL

## 2018-03-14 DIAGNOSIS — E13.9 LADA (LATENT AUTOIMMUNE DIABETES IN ADULTS), MANAGED AS TYPE 1: ICD-10-CM

## 2018-03-14 PROCEDURE — G0108 DIAB MANAGE TRN  PER INDIV: HCPCS | Mod: S$GLB,,, | Performed by: INTERNAL MEDICINE

## 2018-03-14 PROCEDURE — 99999 PR PBB SHADOW E&M-EST. PATIENT-LVL II: CPT | Mod: PBBFAC,,,

## 2018-03-14 NOTE — PROGRESS NOTES
Diabetes Education  Author: Berta Holbrook RD, CDE  Date: 3/14/2018    Diabetes Education Visit  Diabetes Education Record Assessment/Progress: Comprehensive/Group    Diabetes Type  Diabetes Type : Type I (CHEMA)    Diabetes History  Diabetes Diagnosis: 3-5 years    Nutrition  Meal Planning: 3 meals per day, snacks between meal, water, eats out often  What type of beverages do you drink?: other (see comments) (Coffee w/ cream)    Monitoring   Self Monitoring :  (Checks BG 4 times a day)  Blood Glucose Logs: Yes  In the last month, how often have you had a low blood sugar reaction?: more than once a week (ENDO would like patient to learn advanced CHO counting to help minimize lows)  What are your symptoms of low blood sugar?:  (Shaky, sweaty)  How do you treat low blood sugar?:  (Eat/drink something w/sugar)  Can you tell when your blood sugar is too high?: no  How do you treat high blood sugar?:  (Correction scale)    Exercise   Exercise Type:  (Gym - 4-5 days a week - 1 hour)    Current Diabetes Treatment   Current Treatment: Insulin (Humalog 3-3-4 plus low dose correction scale at 150; Lantus 11 units daily)    Social History  Preferred Learning Method: Face to Face  Primary Support: Self  Smoking Status: Never a Smoker  Alcohol Use: Rarely    Barriers to Change  Barriers to Change: None  Learning Challenges : None    Readiness to Learn   Readiness to Learn : Acceptance    Cultural Influences  Cultural Influences: No    Diabetes Education Assessment/Progress  Diabetes Disease Process (diabetes disease process and treatment options): Instructed, Discussion, Individual Session, Written Materials Provided (Discussed pump/sensor therapy - reviewed different pumps/sensors on the market as well as basic pump/sensor features)  Nutrition (Incorporating nutritional management into one's lifestyle): Instructed, Discussion, Individual Session, Written Materials Provided (Reviewed CHO counting, label reading and addt'l resources  to assist w/ CHO counting; discussed the importance of measuring and weighing out foods for accuracy). Patient feels a ratio of 1:15 will not be enough. She states she only eats 1 waffle (12 grams of CHO per patient) w/ a very small amount of PB spread over it and coffee and gives 3 units and has no lows before lunch. However, patient does eat a snack between breakfast and lunch so that may impact BG readings before lunch. Also, it's possible she may require some insulin with her coffee. Patient will keep a detailed food log for 1 week and send to me for review - will estimate I:CHO ratios from the logs/compare to the ratio prescribed of 1:15/discuss w/ ENDO and determine I:CHO ratio to start with  Physical Activity (incorporating physical activity into one's lifestyle): Demonstrates Understanding/Competency (verbalizes/demonstrates)  Medications (states correct name, dose, onset, peak, duration, side effects & timing of meds): Instructed, Discussion, Individual Session, Written Materials Provided (Reviewed medication regimen; pump therapy discussed - would like to hold off at this time - would possibly be interested in a saline trial to see what it would be like to wear a pump)  Monitoring (monitoring blood glucose/other parameters & using results): Instructed, Discussion, Individual Session, Written Materials Provided (Reviewed SMBG schedule and BG goals; Dexcom and Freestyle [place] discussed - patient would like to hold off at this time)  Acute Complications (preventing, detecting, and treating acute complications): Demonstrates Understanding/Competency (verbalizes/demonstrates)  Chronic Complications (preventing, detecting, and treating chronic complications): Not Covered/Deferred  Clinical (diabetes, other pertinent medical history, and relevant comorbidities reviewed during visit): Demonstrates Understanding/Competency (verbalizes/demonstrates)  Cognitive (knowledge of self-management skills, functional health  literacy): Demonstrates Understanding/Competency (verbalizes/demonstrates)  Psychosocial (emotional response to diabetes): Not Covered/Deferred  Diabetes Distress and Support Systems: Not Covered/Deferred  Behavioral (readiness for change, lifestyle practices, self-care behaviors): Not Covered/Deferred    Goals  Patient has selected/evaluated goals during today's session: Yes, evaluated  Healthy Eating: % Met  Met Percentage : 100%    Diabetes Care Plan/Intervention  Education Plan/Intervention: Individual Follow-Up DSMT (Patient to send logs in 1 week to determine I:CHO ratios)    Education Units of Time   Time Spent: 75 min    Health Maintenance was reviewed today with patient. Discussed with patient importance of routine eye exams, foot exams/foot care, blood work (i.e.: A1c, microalbumin, and lipid), dental visits, yearly flu vaccine, and pneumonia vaccine as indicated by PCP. Patient verbalized understanding.     Health Maintenance Topics with due status: Not Due       Topic Last Completion Date    Pap Smear with HPV Cotest 01/17/2016    TETANUS VACCINE 03/28/2016    Hemoglobin A1c 02/05/2018    Pneumococcal PPSV23 (Medium Risk) 02/20/2018    Foot Exam 02/20/2018    Low Dose Statin 03/06/2018     Health Maintenance Due   Topic Date Due    Lipid Panel  04/12/2017    Eye Exam  08/10/2017

## 2018-03-23 ENCOUNTER — PATIENT MESSAGE (OUTPATIENT)
Dept: DIABETES | Facility: CLINIC | Age: 48
End: 2018-03-23

## 2018-03-28 ENCOUNTER — PATIENT MESSAGE (OUTPATIENT)
Dept: ENDOCRINOLOGY | Facility: CLINIC | Age: 48
End: 2018-03-28

## 2018-03-28 ENCOUNTER — TELEPHONE (OUTPATIENT)
Dept: ENDOCRINOLOGY | Facility: CLINIC | Age: 48
End: 2018-03-28

## 2018-03-28 NOTE — TELEPHONE ENCOUNTER
Left message.   Reviewed blood sugar log.     PLAN:  Continue lantus 12 units at bedtime for now, looks reasonable.  Currently using ICHO of 1:5.6  Has a few times when she drops after breakfast (148 --> 104, 136 --> 97)   Would recommend ICHO 1:8   Send a log in a few weeks.   Has appt with Ms. Morgan in a few weeks.

## 2018-04-04 ENCOUNTER — CLINICAL SUPPORT (OUTPATIENT)
Dept: DIABETES | Facility: CLINIC | Age: 48
End: 2018-04-04
Payer: COMMERCIAL

## 2018-04-04 DIAGNOSIS — E13.9 LADA (LATENT AUTOIMMUNE DIABETES IN ADULTS), MANAGED AS TYPE 1: ICD-10-CM

## 2018-04-04 PROCEDURE — 99999 PR PBB SHADOW E&M-EST. PATIENT-LVL II: CPT | Mod: PBBFAC,,,

## 2018-04-04 PROCEDURE — G0108 DIAB MANAGE TRN  PER INDIV: HCPCS | Mod: S$GLB,,, | Performed by: INTERNAL MEDICINE

## 2018-04-04 NOTE — PROGRESS NOTES
Diabetes Education  Author: Berta Holbrook RD, CDE  Date: 4/4/2018    Diabetes Education Visit  Diabetes Education Record Assessment/Progress: Comprehensive/Group    Diabetes Type  Diabetes Type : Type I    Diabetes Education Assessment/Progress  Diabetes Disease Process (diabetes disease process and treatment options): Instructed, Discussion, Individual Session  Nutrition (Incorporating nutritional management into one's lifestyle): Instructed, Discussion, Individual Session  Medications (states correct name, dose, onset, peak, duration, side effects & timing of meds): Instructed, Discussion, Individual Session  Monitoring (monitoring blood glucose/other parameters & using results): Instructed, Discussion, Individual Session    Patient returns for review of BG/Food log to help determine I:CHO ratios. Dr. Kurtz had reviewed the logs earlier and is recommending to start with a ratio of 1:8. Based on log review, patient was using ~ a ratio of 1:5 to 1:8. She will start with the 1:8 ratio and will send me readings in a few days for possible adjustment. Patient is also interested in the Dexcom and would like to begin the paperwork process.    Diabetes Care Plan/Intervention  Education Plan/Intervention: Individual Follow-Up DSMT - Patient to call for Dexcom start once she receives the Dexcom.    Education Units of Time   Time Spent: 45 min    Health Maintenance was reviewed today with patient. Discussed with patient importance of routine eye exams, foot exams/foot care, blood work (i.e.: A1c, microalbumin, and lipid), dental visits, yearly flu vaccine, and pneumonia vaccine as indicated by PCP. Patient verbalized understanding.     Health Maintenance Topics with due status: Not Due       Topic Last Completion Date    Pap Smear with HPV Cotest 01/17/2016    TETANUS VACCINE 03/28/2016    Hemoglobin A1c 02/05/2018    Pneumococcal PPSV23 (Medium Risk) 02/20/2018    Foot Exam 02/20/2018    Low Dose Statin 03/06/2018     Health  Maintenance Due   Topic Date Due    Lipid Panel  04/12/2017    Eye Exam  08/10/2017

## 2018-04-10 ENCOUNTER — TELEPHONE (OUTPATIENT)
Dept: ADMINISTRATIVE | Facility: HOSPITAL | Age: 48
End: 2018-04-10

## 2018-04-10 ENCOUNTER — PATIENT MESSAGE (OUTPATIENT)
Dept: ADMINISTRATIVE | Facility: HOSPITAL | Age: 48
End: 2018-04-10

## 2018-05-16 ENCOUNTER — PATIENT MESSAGE (OUTPATIENT)
Dept: DIABETES | Facility: CLINIC | Age: 48
End: 2018-05-16

## 2018-05-17 ENCOUNTER — TELEPHONE (OUTPATIENT)
Dept: ADMINISTRATIVE | Facility: HOSPITAL | Age: 48
End: 2018-05-17

## 2018-05-22 ENCOUNTER — CLINICAL SUPPORT (OUTPATIENT)
Dept: DIABETES | Facility: CLINIC | Age: 48
End: 2018-05-22
Payer: COMMERCIAL

## 2018-05-22 DIAGNOSIS — E13.9 LADA (LATENT AUTOIMMUNE DIABETES IN ADULTS), MANAGED AS TYPE 1: Primary | ICD-10-CM

## 2018-05-22 DIAGNOSIS — E13.9 LADA (LATENT AUTOIMMUNE DIABETES IN ADULTS), MANAGED AS TYPE 1: ICD-10-CM

## 2018-05-22 PROCEDURE — G0108 DIAB MANAGE TRN  PER INDIV: HCPCS | Mod: S$GLB,,, | Performed by: INTERNAL MEDICINE

## 2018-05-22 PROCEDURE — 99999 PR PBB SHADOW E&M-EST. PATIENT-LVL II: CPT | Mod: PBBFAC,,,

## 2018-05-22 NOTE — PROGRESS NOTES
Diabetes Education  Author: Berta Holbrook RD, CDE  Date: 5/22/2018    Diabetes Education Visit  Diabetes Education Record Assessment/Progress: Comprehensive/Group (Patient is here for Dexcom Start)    Diabetes Type  Diabetes Type : Type I    Diabetes Education Assessment/Progress  Monitoring (monitoring blood glucose/other parameters & using results): Instructed, Discussion, Demonstration, Return Demonstration, Individual Session  Patient is here in clinic today for initial start of Dexcom continuous glucose monitoring system (CGMS). Reviewed with patient how to enter two blood sugars on phone in two hours. Patient inserted sensor on right abdomen and inserted transmitter. Hypoglycemia trigger set for 85 and hyperglycemia set for 220. Patient aware that blood glucose must be entered every 12 hours for calibration. Questions addressed. Initialization finished. No futher questions.    Diabetes Distress and Support Systems:  (Distress Scale Score  - 1.94)    Diabetes Care Plan/Intervention  Education Plan/Intervention: Individual Follow-Up DSMT    Education Units of Time   Time Spent: 60 min    Health Maintenance was reviewed today with patient. Discussed with patient importance of routine eye exams, foot exams/foot care, blood work (i.e.: A1c, microalbumin, and lipid), dental visits, yearly flu vaccine, and pneumonia vaccine as indicated by PCP. Patient verbalized understanding.     Health Maintenance Topics with due status: Not Due       Topic Last Completion Date    Pap Smear with HPV Cotest 01/17/2016    TETANUS VACCINE 03/28/2016    Influenza Vaccine 10/18/2017    Lipid Panel 02/05/2018    Hemoglobin A1c 02/05/2018    Pneumococcal PPSV23 (Medium Risk) 02/20/2018    Foot Exam 02/20/2018    Low Dose Statin 03/06/2018    Eye Exam 04/09/2018     There are no preventive care reminders to display for this patient.

## 2018-05-24 ENCOUNTER — OFFICE VISIT (OUTPATIENT)
Dept: FAMILY MEDICINE | Facility: CLINIC | Age: 48
End: 2018-05-24
Payer: COMMERCIAL

## 2018-05-24 VITALS
RESPIRATION RATE: 18 BRPM | SYSTOLIC BLOOD PRESSURE: 128 MMHG | OXYGEN SATURATION: 99 % | BODY MASS INDEX: 20.71 KG/M2 | HEIGHT: 62 IN | TEMPERATURE: 98 F | HEART RATE: 73 BPM | WEIGHT: 112.56 LBS | DIASTOLIC BLOOD PRESSURE: 68 MMHG

## 2018-05-24 DIAGNOSIS — K75.4 AUTOIMMUNE HEPATITIS: ICD-10-CM

## 2018-05-24 DIAGNOSIS — E13.9 LADA (LATENT AUTOIMMUNE DIABETES IN ADULTS), MANAGED AS TYPE 1: Primary | ICD-10-CM

## 2018-05-24 PROCEDURE — 99214 OFFICE O/P EST MOD 30 MIN: CPT | Mod: S$GLB,,, | Performed by: FAMILY MEDICINE

## 2018-05-24 PROCEDURE — 3046F HEMOGLOBIN A1C LEVEL >9.0%: CPT | Mod: CPTII,S$GLB,, | Performed by: FAMILY MEDICINE

## 2018-05-24 PROCEDURE — 3074F SYST BP LT 130 MM HG: CPT | Mod: CPTII,S$GLB,, | Performed by: FAMILY MEDICINE

## 2018-05-24 PROCEDURE — 3008F BODY MASS INDEX DOCD: CPT | Mod: CPTII,S$GLB,, | Performed by: FAMILY MEDICINE

## 2018-05-24 PROCEDURE — 99999 PR PBB SHADOW E&M-EST. PATIENT-LVL III: CPT | Mod: PBBFAC,,, | Performed by: FAMILY MEDICINE

## 2018-05-24 PROCEDURE — 3078F DIAST BP <80 MM HG: CPT | Mod: CPTII,S$GLB,, | Performed by: FAMILY MEDICINE

## 2018-05-24 RX ORDER — PRAVASTATIN SODIUM 10 MG/1
TABLET ORAL
COMMUNITY
End: 2018-05-24

## 2018-05-24 RX ORDER — LISINOPRIL 5 MG/1
TABLET ORAL
COMMUNITY
End: 2018-05-24

## 2018-05-24 RX ORDER — INSULIN LISPRO 100 [IU]/ML
INJECTION, SOLUTION INTRAVENOUS; SUBCUTANEOUS
COMMUNITY
End: 2018-05-24

## 2018-05-24 RX ORDER — BLOOD-GLUCOSE CONTROL, NORMAL
EACH MISCELLANEOUS
COMMUNITY
End: 2018-05-24

## 2018-05-24 RX ORDER — INSULIN GLARGINE 100 [IU]/ML
INJECTION, SOLUTION SUBCUTANEOUS
COMMUNITY
End: 2018-05-24

## 2018-05-24 RX ORDER — PREDNISOLONE ACETATE 10 MG/ML
SUSPENSION/ DROPS OPHTHALMIC
COMMUNITY
End: 2018-05-24

## 2018-05-24 RX ORDER — PRAVASTATIN SODIUM 10 MG/1
10 TABLET ORAL DAILY
Qty: 90 TABLET | Refills: 3 | Status: SHIPPED | OUTPATIENT
Start: 2018-05-24 | End: 2019-01-10 | Stop reason: SDUPTHER

## 2018-05-24 RX ORDER — MERCAPTOPURINE 50 MG/1
TABLET ORAL
COMMUNITY
End: 2018-05-24

## 2018-05-24 RX ORDER — ONDANSETRON 4 MG/1
TABLET, ORALLY DISINTEGRATING ORAL
COMMUNITY
End: 2018-05-24

## 2018-05-24 NOTE — PROGRESS NOTES
HPI:  Leida Hoyos is a 48 y.o. year old female that  Presents to become established as a patient . She was a previous pt of Dr. Berry. She has recently had an continous blood glucose monitor placed. She is seeing her Endocrinologist and will f/u in September.   Chief Complaint   Patient presents with    Follow-up     last labs done in February    Establish Care   .     HPI      Past Medical History:   Diagnosis Date    Autoimmune hepatitis     Diabetes mellitus type II     Hypertension      Social History     Social History    Marital status:      Spouse name: N/A    Number of children: 3    Years of education: N/A     Occupational History    Not on file.     Social History Main Topics    Smoking status: Never Smoker    Smokeless tobacco: Never Used    Alcohol use Yes      Comment: social    Drug use: No    Sexual activity: Yes     Partners: Male     Other Topics Concern    Not on file     Social History Narrative    Lives in [place] with her  and triplets. They are 15. 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 Grew up in [place].      Past Surgical History:   Procedure Laterality Date     SECTION, CLASSIC      FRACTURE SURGERY Left     leg     MOUTH SURGERY       Family History   Problem Relation Age of Onset    Arthritis Mother     Ulcerative colitis Mother     Hypertension Father     Hyperlipidemia Father     Hypertension Sister     No Known Problems Daughter     No Known Problems Son     No Known Problems Sister     No Known Problems Daughter     Diabetes Maternal Grandmother     Cancer Neg Hx            Review of Systems  General ROS: negative for chills, fever or weight loss  ENT ROS: negative for epistaxis, sore throat or rhinorrhea  Respiratory ROS: no cough, shortness of breath, or wheezing  Cardiovascular ROS: no chest pain or dyspnea on exertion  Gastrointestinal ROS: no abdominal pain, change in bowel habits, or black/ bloody stools    Physical  "Exam:  /68 (BP Location: Right arm, Patient Position: Sitting, BP Method: Medium (Manual))   Pulse 73   Temp 98.2 °F (36.8 °C) (Oral)   Resp 18   Ht 5' 2" (1.575 m)   Wt 51.1 kg (112 lb 8.7 oz)   LMP 04/25/2018 (Exact Date)   SpO2 99%   BMI 20.58 kg/m²   General appearance: alert, cooperative, no distress  Constitutional:Oriented to person, place, and time.appears well-developed and well-nourished.  HEENT: Normocephalic, atraumatic, neck symmetrical, no nasal discharge, TM- clear bilaterally  Lungs: clear to auscultation bilaterally, no dullness to percussion bilaterally  Heart: regular rate and rhythm without rub; no displacement of the PMI , S1&S2 present  Abdomen: soft, non-tender; bowel sounds normoactive; no organomegaly  Physical Exam    LABS:    Complete Blood Count  Lab Results   Component Value Date    RBC 3.60 (L) 04/30/2013    HGB 10.7 (L) 04/30/2013    HCT 33.6 (L) 04/30/2013    MCV 93.3 04/30/2013    MCH 29.7 04/30/2013    MCHC 31.8 (L) 04/30/2013    RDW 13.3 04/30/2013     04/30/2013    MPV 8.5 (L) 04/30/2013    GRAN 7.5 04/30/2013    GRAN 73.7 (H) 04/30/2013    LYMPH 13.4 (L) 04/30/2013    LYMPH 1.4 04/30/2013    MONO 11.6 (H) 04/30/2013    MONO 1.2 (H) 04/30/2013    EOS 0.1 04/30/2013    BASO 0.0 04/30/2013    EOSINOPHIL 1.1 04/30/2013    BASOPHIL 0.2 04/30/2013       Comprehensive Metabolic Panel  Lab Results   Component Value Date    GLU 90 02/05/2018    BUN 14 02/05/2018    CREATININE 0.71 02/05/2018     02/05/2018    K 4.5 02/05/2018    CL 96 02/05/2018    PROT 6.6 02/05/2018    ALBUMIN 4.2 02/05/2018    BILITOT 0.3 02/05/2018     (H) 02/05/2018    ALKPHOS 178 (H) 02/05/2018    CO2 23 02/05/2018    ALT 95 (H) 02/05/2018    ANIONGAP 6 (L) 01/10/2017    EGFRNONAA 101 02/05/2018    ESTGFRAFRICA >60.0 01/10/2017       LIPID  Lab Results   Component Value Date    HDL 76 02/05/2018         TSH  No results found for: TSH    Current Outpatient Prescriptions   Medication " "Sig Dispense Refill    aspirin (ECOTRIN) 81 MG EC tablet Take 81 mg by mouth once daily.      blood-glucose meter (ONETOUCH VERIO IQ METER MISC) OneTouch Verio IQ Meter      calcium carbonate (OS-EUSEBIA) 600 mg calcium (1,500 mg) Tab Take 600 mg by mouth once.      insulin glargine (LANTUS SOLOSTAR) 100 unit/mL (3 mL) InPn pen Inject 12 Units into the skin every evening. 3 Box 3    lancets Misc To check BG 4 times daily, to use with insurance preferred meter 150 each 11    lisinopril (PRINIVIL,ZESTRIL) 5 MG tablet Take 1 tablet (5 mg total) by mouth once daily. 90 tablet 3    multivitamin (ONE DAILY MULTIVITAMIN) per tablet Take 1 tablet by mouth once daily.      pen needle, diabetic (BD ULTRA-FINE NICK PEN NEEDLES) 32 gauge x 5/32" Ndle For four times daily insulin injections. 150 each 3    pravastatin (PRAVACHOL) 10 MG tablet Take 1 tablet (10 mg total) by mouth once daily. 90 tablet 3    blood sugar diagnostic Strp To check BG 4 times daily, to use with insurance preferred meter 150 each 11    insulin lispro (HUMALOG KWIKPEN INSULIN) 100 unit/mL InPn pen Inject 4 Units into the skin 3 (three) times daily before meals. 15 mL 11     No current facility-administered medications for this visit.        Assessment:    ICD-10-CM ICD-9-CM    1. 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 Autoimmune hepatitis K75.4 571.42          Plan:  Pt will fill out a release of information form  And we will get the most recent labs drawn from her ENDO.  Follow-up in 3 months (on 8/24/2018).          America Gant MD  Answers for HPI/ROS submitted by the patient on 5/21/2018   activity change: Yes  unexpected weight change: No  neck pain: No  hearing loss: No  rhinorrhea: No  trouble swallowing: No  eye discharge: No  visual disturbance: No  chest tightness: No  wheezing: No  chest pain: No  palpitations: No  blood in stool: No  constipation: No  vomiting: " No  diarrhea: No  polydipsia: No  polyuria: No  difficulty urinating: No  hematuria: No  menstrual problem: No  dysuria: No  joint swelling: No  arthralgias: No  headaches: No  weakness: No  confusion: No  dysphoric mood: No

## 2018-05-28 ENCOUNTER — OFFICE VISIT (OUTPATIENT)
Dept: PODIATRY | Facility: CLINIC | Age: 48
End: 2018-05-28
Payer: COMMERCIAL

## 2018-05-28 VITALS
BODY MASS INDEX: 20.43 KG/M2 | WEIGHT: 111 LBS | SYSTOLIC BLOOD PRESSURE: 128 MMHG | DIASTOLIC BLOOD PRESSURE: 74 MMHG | HEIGHT: 62 IN | HEART RATE: 84 BPM

## 2018-05-28 DIAGNOSIS — E13.9 LADA (LATENT AUTOIMMUNE DIABETES IN ADULTS), MANAGED AS TYPE 1: Primary | ICD-10-CM

## 2018-05-28 DIAGNOSIS — L85.1 PLANTAR POROKERATOSIS, ACQUIRED: ICD-10-CM

## 2018-05-28 PROCEDURE — 3074F SYST BP LT 130 MM HG: CPT | Mod: CPTII,S$GLB,, | Performed by: PODIATRIST

## 2018-05-28 PROCEDURE — 3008F BODY MASS INDEX DOCD: CPT | Mod: CPTII,S$GLB,, | Performed by: PODIATRIST

## 2018-05-28 PROCEDURE — 3078F DIAST BP <80 MM HG: CPT | Mod: CPTII,S$GLB,, | Performed by: PODIATRIST

## 2018-05-28 PROCEDURE — 99204 OFFICE O/P NEW MOD 45 MIN: CPT | Mod: S$GLB,,, | Performed by: PODIATRIST

## 2018-05-28 PROCEDURE — 99999 PR PBB SHADOW E&M-EST. PATIENT-LVL III: CPT | Mod: PBBFAC,,, | Performed by: PODIATRIST

## 2018-05-28 PROCEDURE — 3046F HEMOGLOBIN A1C LEVEL >9.0%: CPT | Mod: CPTII,S$GLB,, | Performed by: PODIATRIST

## 2018-05-28 NOTE — LETTER
May 28, 2018      Nini Kurtz MD  [address]  [facility] [address]           [facility]  [address]  [facility] [address]  Phone: [phone]          Patient: Leida Hoyos   MR Number: [id number]   YOB: 1970   Date of Visit: 5/28/2018       Dear Dr. Nini Kurtz:    Thank you for referring Leida Hoyos to me for evaluation. Attached you will find relevant portions of my assessment and plan of care.    If you have questions, please do not hesitate to call me. I look forward to following Leida Hoyos along with you.    Sincerely,    Erika Sanderson DPM    Enclosure  CC:  No Recipients    If you would like to receive this communication electronically, please contact [email or web address] or [phone] to request more information on [employer] access.    For providers and/or their staff who would like to refer a patient to [facility], please contact us through our one-stop-shop provider referral line, [facility], at [phone].    If you feel you have received this communication in error or would no longer like to receive these types of communications, please e-mail [email or web address]

## 2018-05-28 NOTE — PATIENT INSTRUCTIONS
Your A1c:    Hemoglobin A1C   Date Value Ref Range Status   02/05/2018 10.9 (H) 4.8 - 5.6 % Final     Comment:              Pre-diabetes: 5.7 - 6.4           Diabetes: >6.4           Glycemic control for adults with diabetes: <7.0         How to Check Your Feet    Below are tips to help you look for foot problems. Try to check your feet at the same time each day, such as when you get out of bed in the morning.    · Check the top of each foot. The tops of toes, back of the heel, and outer edge of the foot can get a lot of rubbing from poor-fitting shoes.    · Check the bottom of each foot. Daily wear and tear often leads to problems at pressure spots.    · Check the toes and nails. Fungal infections often occur between toes. Toenail problems can also be a sign of fungal infections or lead to breaks in the skin.    · Check your shoes, too. Loose objects inside a shoe can injure the foot. Use your hand to feel inside your shoes for things like nikhil, loose stitching, or rough areas that could irritate your skin.        Diabetic Foot Care    Diabetes can lead to a number of different foot complications. Fortunately, most of these complications can be prevented with a little extra foot care. If diabetes is not well controlled, the high blood sugar can cause damage to blood vessels and result in poor circulation to the foot. When the skin does not get enough blood flow, it becomes prone to pressure sores and ulcers, which heal slowly.  High blood sugar can also damage nerves, interfering with the ability to feel pain and pressure. When you cant feel your foot normally, it is easy to injure your skin, bones and joints without knowing it. For these reasons diabetes increases the risk of fungal infections, bunions and ulcers. Deep ulcers can lead to bone infection. Gangrene is the most serious foot complication of diabetes. It usually occurs on the tips of the toes as blacked areas of skin. The black area is dead tissue.  In severe cases, gangrene spreads to involve the entire toe, other toes and the entire foot. Foot or toe amputation may be required. Good foot care and blood sugar control can prevent this.    Home Care  1. Wear comfortable, proper fitting shoes.  2. Wash your feet daily with warm water and mild soap.  3. After drying, apply a moisturizing cream or lotion.  4. Check your feet daily for skin breaks, blisters, swelling, or redness. Look between your toes also.  5. Wear cotton socks and change them every day.  6. Trim toe nails carefully and do not cut your cuticles.  7. Strive to keep your blood sugar under control with a combination of medicines, diet and activity.  8. If you smoke and have diabetes, it is very important that you stop. Smoking reduces blood flow to your foot.  9. Avoid activities that increase your risk of foot injury:  · Do not walk barefoot.  · Do not use heating pads or hot water bottles on your feet.  · Do not put your foot in a hot tub without first checking the temperature with your hand.  10) Schedule yearly foot exams.    Follow Up  with your doctor or as advised by our staff. Report any cut, puncture, scrape, other injury, blister, ingrown toenail or ulcer on your foot.    Get Prompt Medical Attention  if any of the following occur:  -- Open ulcer with pus draining from the wound  -- Increasing foot or leg pain  -- New areas of redness or swelling or tender areas of the foot    © 9602-7875 [employer]. [address] [place] [address], [place] [address]. All rights reserved. This information is not intended as a substitute for professional medical care. Always follow your healthcare professional's instructions.

## 2018-05-28 NOTE — PROGRESS NOTES
"Chief Complaint   Patient presents with    Diabetes Mellitus     bilateral pcp Dr Gant 5/24/18    Diabetic Foot Exam    Toe Pain     no pain sore on bottom of the rt great toe              HPI:   The patient is a 48 y.o.  female  who presents for a diabetic foot exam.     Patient reports no presence of abnormal sensation to the feet .    History of diabetic foot ulcers: none   History of foot surgery: none.     Shoes worn today:  Sandals.  complains of "sore" on the bottom of the right great toe for at least a month. No acute trauma noted.  No open wounds.   She gets routine pedicures.          Primary care doctor is: America Gant MD  Chief Complaint   Patient presents with    Diabetes Mellitus     bilateral pcp Dr Gant 5/24/18    Diabetic Foot Exam    Toe Pain     no pain sore on bottom of the rt great toe          Past Medical History:   Diagnosis Date    Autoimmune hepatitis     Diabetes mellitus type II     Hypertension            Current Outpatient Prescriptions on File Prior to Visit   Medication Sig Dispense Refill    aspirin (ECOTRIN) 81 MG EC tablet Take 81 mg by mouth once daily.      blood sugar diagnostic Strp To check BG 4 times daily, to use with insurance preferred meter 150 each 11    blood-glucose meter (ONETOUCH VERIO IQ METER MISC) OneTouch Verio IQ Meter      calcium carbonate (OS-EUSEBIA) 600 mg calcium (1,500 mg) Tab Take 600 mg by mouth once.      insulin glargine (LANTUS SOLOSTAR) 100 unit/mL (3 mL) InPn pen Inject 12 Units into the skin every evening. 3 Box 3    insulin lispro (HUMALOG KWIKPEN INSULIN) 100 unit/mL InPn pen Inject 4 Units into the skin 3 (three) times daily before meals. 15 mL 11    lancets Misc To check BG 4 times daily, to use with insurance preferred meter 150 each 11    lisinopril (PRINIVIL,ZESTRIL) 5 MG tablet Take 1 tablet (5 mg total) by mouth once daily. 90 tablet 3    multivitamin (ONE DAILY MULTIVITAMIN) per tablet Take 1 tablet by " "mouth once daily.      pen needle, diabetic (BD ULTRA-FINE NICK PEN NEEDLES) 32 gauge x 5/32" Ndle For four times daily insulin injections. 150 each 3    pravastatin (PRAVACHOL) 10 MG tablet Take 1 tablet (10 mg total) by mouth once daily. 90 tablet 3     No current facility-administered medications on file prior to visit.            Review of patient's allergies indicates:  No Known Allergies        Social History     Social History    Marital status:      Spouse name: N/A    Number of children: 3    Years of education: N/A     Occupational History    Not on file.     Social History Main Topics    Smoking status: Never Smoker    Smokeless tobacco: Never Used    Alcohol use Yes      Comment: social    Drug use: No    Sexual activity: Yes     Partners: Male     Other Topics Concern    Not on file     Social History Narrative    Lives in [place] with her  and triplets. They are 15. 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            ROS:  General ROS: negative  Respiratory ROS: no cough, shortness of breath, or wheezing  Cardiovascular ROS: no chest pain or dyspnea on exertion  Musculoskeletal ROS: negative  Neurological ROS:   negative for - impaired coordination/balance or numbness/tingling  Dermatological ROS: negative      LAST HbA1c:   Hemoglobin A1C   Date Value Ref Range Status   02/05/2018 10.9 (H) 4.8 - 5.6 % Final     Comment:              Pre-diabetes: 5.7 - 6.4           Diabetes: >6.4           Glycemic control for adults with diabetes: <7.0             EXAM:   Vitals:    05/28/18 1430   BP: 128/74   Pulse: 84   Weight: 50.3 kg (111 lb)   Height: 5' 2" (1.575 m)       General: alert, no distress, cooperative      Vasc:  PT and DP pulses:  2/4.   Capillary refill 3 secs.   Edema none    Neurological:  Light touch, proprioception, and sharp/dull sensation are all intact bilaterally. Protective threshold with the [place]-Wienstein monofilament is intact bilaterally.  "     Musculoskeletal:  Muscle strength is 5/5 in all groups bilaterally.  Metatarsophalangeal, subtalar, and ankle range of motion are within normal limits without crepitus bilaterally.    There is no gross foot deformity     Dermatological:  There is intact skin tone, turgor, and temperature bilaterally.  The toenails are normal appearing.   There is a porokeratosis sub right hallux.  No underlying wound.  No redness or drainage.  Does not appear verrucous, no petechiae.             ASSESSMENT/PLAN:      I counseled the patient on the patient's conditions, their implications and medical management.       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 Diabetic Foot Education. Patient reminded of the importance of good nutrition and blood sugar control to help prevent podiatric complications of diabetes. Patient instructed on proper foot hygiene. We discussed wearing proper shoe gear, daily foot inspections, never walking without protective shoe gear, never putting sharp instruments to feet.    Porokeratosis trimmed as a courtesy.  Apply urea 10% cream daily to the area.     Annual follow up foot exam.           Erika Sanderson DPM  NPI: [id number]

## 2018-05-30 ENCOUNTER — CLINICAL SUPPORT (OUTPATIENT)
Dept: DIABETES | Facility: CLINIC | Age: 48
End: 2018-05-30
Payer: COMMERCIAL

## 2018-05-30 DIAGNOSIS — E13.9 LADA (LATENT AUTOIMMUNE DIABETES IN ADULTS), MANAGED AS TYPE 1: ICD-10-CM

## 2018-05-30 PROCEDURE — G0108 DIAB MANAGE TRN  PER INDIV: HCPCS | Mod: S$GLB,,, | Performed by: INTERNAL MEDICINE

## 2018-05-30 PROCEDURE — 99999 PR PBB SHADOW E&M-EST. PATIENT-LVL II: CPT | Mod: PBBFAC,,,

## 2018-05-30 NOTE — PROGRESS NOTES
Diabetes Education  Author: Berta Holbrook RD, CDE  Date: 5/30/2018    Diabetes Education Visit  Diabetes Education Record Assessment/Progress: Comprehensive/Group (Patient is here for Dexcom follow up/sensor change)    Diabetes Type  Diabetes Type : Type I    Diabetes Education Assessment/Progress  Monitoring (monitoring blood glucose/other parameters & using results): Instructed, Discussion, Individual Session (Patient changed sensor - inserted new sensor on left abdomen and 2 hour warm up initiated)    Goals  Patient has selected/evaluated goals during today's session: No    Diabetes Care Plan/Intervention  Education Plan/Intervention: Individual Follow-Up DSMT    Education Units of Time   Time Spent: 30 min    Health Maintenance was reviewed today with patient. Discussed with patient importance of routine eye exams, foot exams/foot care, blood work (i.e.: A1c, microalbumin, and lipid), dental visits, yearly flu vaccine, and pneumonia vaccine as indicated by PCP. Patient verbalized understanding.     Health Maintenance Topics with due status: Not Due       Topic Last Completion Date    Pap Smear with HPV Cotest 01/17/2016    TETANUS VACCINE 03/28/2016    Influenza Vaccine 10/18/2017    Lipid Panel 02/05/2018    Hemoglobin A1c 02/05/2018    Pneumococcal PPSV23 (Medium Risk) 02/20/2018    Foot Exam 02/20/2018    Eye Exam 04/09/2018    Low Dose Statin 05/28/2018     There are no preventive care reminders to display for this patient.

## 2018-06-22 LAB — HBA1C MFR BLD: 6.7 % (ref 4.8–5.6)

## 2018-06-27 ENCOUNTER — OFFICE VISIT (OUTPATIENT)
Dept: ENDOCRINOLOGY | Facility: CLINIC | Age: 48
End: 2018-06-27
Payer: COMMERCIAL

## 2018-06-27 VITALS
DIASTOLIC BLOOD PRESSURE: 74 MMHG | BODY MASS INDEX: 21.3 KG/M2 | SYSTOLIC BLOOD PRESSURE: 122 MMHG | HEART RATE: 64 BPM | WEIGHT: 115.75 LBS | RESPIRATION RATE: 16 BRPM | HEIGHT: 62 IN

## 2018-06-27 DIAGNOSIS — E11.649 HYPOGLYCEMIA ASSOCIATED WITH DIABETES: ICD-10-CM

## 2018-06-27 DIAGNOSIS — E13.9 LADA (LATENT AUTOIMMUNE DIABETES IN ADULTS), MANAGED AS TYPE 1: Primary | ICD-10-CM

## 2018-06-27 DIAGNOSIS — Z71.9 HEALTH COUNSELING: ICD-10-CM

## 2018-06-27 PROCEDURE — 99999 PR PBB SHADOW E&M-EST. PATIENT-LVL IV: CPT | Mod: PBBFAC,,, | Performed by: NURSE PRACTITIONER

## 2018-06-27 PROCEDURE — 99215 OFFICE O/P EST HI 40 MIN: CPT | Mod: 25,S$GLB,, | Performed by: NURSE PRACTITIONER

## 2018-06-27 PROCEDURE — 95251 CONT GLUC MNTR ANALYSIS I&R: CPT | Mod: S$GLB,,, | Performed by: NURSE PRACTITIONER

## 2018-06-27 NOTE — PROGRESS NOTES
Subjective:     Patient ID: Leida Hoyos is a 48 y.o. female.    Chief Complaint: Diabetes    HPI:   Ms. Hoyos is a 48 y.o. female who is here for a follow-up visit for evaluation CHEMA that was diagnosed one year ago.   GEETA Ab +.05 and Cpeptide 0.9 with BG of 154.   Patient remained on basal insulin until 1/2018 when she developed DKA.  Seen by DM Education by ROSEANNE Holbrook RD 1/2018.   Seen by Dr. Kurtz 3/2018 who started Humalog with meals.   Converted to personal CGM, Dexcom G5 5/2018, seen by DM Education 5/2018 for sensor training.  She is new to me today.  Had labs done at [facility]. A1c pending.     Current regimen:  Lantus 12 units PM  Humalog 1:8 ICR     Injections: occasionally forgets and takes novolog after meals or during meals   Hypoglycemia: yes but less often with personal CGM    Feels s/s of hypoglycemia when BG <70, tx with glucose tabs since DM edu appt    Dexcom downloaded for past 2 weeks - see attached reports.   Alerts set 80/330    Diet:3 meals  Breakfast: frozen waffle with peanut butter - 30 gms  Lunch: salad, strawberries and nuts - cottage cheese - 30   Dinner patterns: Tries to eat between 35 - 45 grams of carbs per meal, occasionally higher if going out to dinner.     Review of Systems   Constitutional: Negative for activity change, appetite change, chills and fever.   HENT: Negative for sore throat.    Respiratory: Negative for cough, chest tightness and shortness of breath.    Cardiovascular: Negative for chest pain, palpitations and leg swelling.   Gastrointestinal: Negative for constipation, diarrhea and nausea.   Genitourinary: Negative for dysuria.   Musculoskeletal: Negative for arthralgias.   Skin: Negative for rash.        C/o scalp itching   Neurological: Negative for light-headedness.        Objective:     Physical Exam   Constitutional: She is oriented to person, place, and time. She appears well-developed and well-nourished. No distress.   HENT:   Head: Normocephalic and  "atraumatic.   Mouth/Throat: No oropharyngeal exudate.   Eyes: Conjunctivae and EOM are normal. Pupils are equal, round, and reactive to light. No scleral icterus.   Neck: Normal range of motion. Neck supple. No tracheal deviation present. No thyromegaly present.   Cardiovascular: Normal rate, regular rhythm, normal heart sounds and intact distal pulses.    Pulmonary/Chest: Effort normal and breath sounds normal.   Abdominal:   Sites of insulin administration are normal appearing.   Genitourinary: No breast discharge.   Musculoskeletal: Normal range of motion. She exhibits no edema or tenderness.   Lymphadenopathy:     She has no cervical adenopathy.   Neurological: She is alert and oriented to person, place, and time. She has normal reflexes. No cranial nerve deficit.   Skin: Skin is warm and dry.   3/2018 FOOT EXAM:  Visual inspection is normal, no abrasions, bruising or calluses.   Microfilament test is intact b/l.   Vibratory sense is intact b/l.   Distal pulses are present b/l.    Psychiatric: She has a normal mood and affect.       Vitals:    06/27/18 0857   BP: 122/74   BP Location: Left arm   Patient Position: Sitting   BP Method: Medium (Manual)   Pulse: 64   Resp: 16   Weight: 52.5 kg (115 lb 11.9 oz)   Height: 5' 2" (1.575 m)       Assessment/Plan:     1. 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  Reviewed A1c and BG goals.   Recalculated based on weight.   Add ISF 1:65, BG goal 100  Change ICR 1:10 - instructed to bolus BEFORE meals  - Discussed insulins'onset, peak, duration, dosing, administration, site rotation.   - Reviewed hypoglycemia, s/s and appropriate tx options.   - Instructed to monitor BG to calibrate sensor and verify hypoglycemia.   - Discussed pain, stress, infection, medications, diet and exercise and impact on DM control. " Discussed DM diet, counting carbs, portion size.   - bring logs/ meter to clinic visits.   - takes ASA, ACEi statin for protection  - eye exam done 5/18    2. Hypoglycemia associated with diabetes -   Wearing personal CGM, plans to upgrade to G6 when available, discussed clarity reports, share lynn feature with . Monitor BG closely during upcoming vacation.       Orders Placed This Encounter   Procedures    Hemoglobin A1c   outside labs    RTC in 3 months    Time spent : 45 minutes >50% in counseling as above

## 2018-06-27 NOTE — Clinical Note
Last seen by you 3/2018. I saw first time last week. A1c pending.  Now on dexcom, appears to be doing better.  She is inquiring why her scalp itches.  Any ideas? I asked about changes in shampoo/ soap/ detergent - nope.   Thanks, Janine

## 2018-06-29 ENCOUNTER — PATIENT MESSAGE (OUTPATIENT)
Dept: ENDOCRINOLOGY | Facility: CLINIC | Age: 48
End: 2018-06-29

## 2018-07-03 ENCOUNTER — PATIENT MESSAGE (OUTPATIENT)
Dept: ENDOCRINOLOGY | Facility: CLINIC | Age: 48
End: 2018-07-03

## 2018-08-20 NOTE — TELEPHONE ENCOUNTER
Copy:    Chief Complaint   Patient presents with   • Physical       Preferred method of results communication:   Home Phone: [phone] (home)  Okay to leave a message containing results? Yes    HISTORY OF PRESENT ILLNESS:  Rickey Dove, is a 58 year old male, who is here for a complete physical exam.  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, without long-term current use of insulin (CMS/[facility])    • Diabetic retinopathy (CMS/[facility])     mild background retinopathy   • Diverticulosis    • History of colonic polyps    • History of kidney stones    • Hyperlipidemia    • Hypertension    • Morbid obesity (CMS/[facility])    • Obstructive sleep apnea    • Primary osteoarthritis of both knees 1/18/2018   • Tubular adenoma      Medications  Current Outpatient Prescriptions   Medication Sig   • metformin (GLUCOPHAGE-XR) 500 MG 24 hr tablet TAKE 2 TABLETS BY MOUTH EVERY MORNING WITH BREAKFAST   • atorvastatin (LIPITOR) 20 MG tablet TAKE 1 TABLET BY MOUTH DAILY   • fexofenadine-pseudoephedrine (ALLEGRA-D ALLERGY & CONGESTION) 180-240 MG per 24 hr tablet Take 1 tablet by mouth daily.   • mometasone (NASONEX) 50 MCG/ACT nasal spray Spray 2 sprays in each nostril daily.   • amLODIPine (NORVASC) 10 MG tablet Take 1 tablet by mouth daily.   • FREESTYLE LITE strip TEST2 TO 3 TIMES DAILY AS DIRECTED     No current facility-administered medications for this visit.      Past Surgical History  Past Surgical History:   Procedure Laterality Date   • Anes arthroscopy knee surg; synovectomy  02/25/2010   • Colonoscopy  10/24/2008   • Colonoscopy  09/15/2017    Repeat in 3 years   • Colonoscopy w/ polypectomy  06/06/2011    Dr. Lynch-repeat q 5 years-due in 2016   •  Your cbc was normal. Your chemistry profile was normal. Your Hgb A1C or diabetes test of 11.0 is way above the goal of less than 7.0. Your lipid profile was normal. Your liver test and calcium levels were elevated.    Colonoscopy w/ polypectomy  06/12/2014    Dr. Keyes-repeat in 3 years-due 2017   • Cystourethroscopy  07/03/2012   • Knee surgery      left knee arthroscopy   • Lithotripsy - gen'l class  07/2012    right ureteral stones     Allergies  ALLERGIES:  No Known Allergies  Family history    Family History   Problem Relation Age of Onset   • Hypertension Father    • Cancer Father 78   • Diabetes Mother      Social history  Social History     Social History   • Marital status:      Spouse name: N/A   • Number of children: N/A   • Years of education: N/A     Social History Main Topics   • Smoking status: Never Smoker   • Smokeless tobacco: Never Used   • Alcohol use None   • Drug use: Unknown   • Sexual activity: Not Asked     Other Topics Concern   • None     Social History Narrative   • None     REVIEW OF SYSTEMS  All systems were reviewed and were found to be negative except for pertinent positives and negatives as documented in the history of present illness.  Physical Examination  Visit Vitals  /86   Pulse 76   Resp 16   Ht 5' 7\" (1.702 m)   Wt 135.6 kg   BMI 46.83 kg/m²     General:  Well developed, well nourished male. In no apparent distress.    Eyes:  PERRL, EOMI (Pupils equal, round, reactive to light, extraocular movements intact). Conjunctivae pink. Sclerae anicteric.    HENT:  Normocephalic, atraumatic. Bilateral external ears are normal. Mucosal membranes moist. External nose is normal. Oropharynx is clear.    Neck:  Supple. Nontender. Normal range of motion. No masses. No thyromegaly.  Trachea midline, no lymphadenopathy.  Respiratory:  Normal respiratory effort. No chest wall tenderness. Lungs clear to auscultation bilaterally. Symmetrical chest expansion.    Cardiovascular:  Regular rate and rhythm. No murmurs, rubs, or gallops. Normal S1 and S2. No S3 or S4. No JVD (jugular venous distention). No carotid bruits. Good dorsalis pedis pulses bilaterally. No peripheral edema.  Abdomen:  Soft.  Nontender. Non-distended. Normal bowel sounds. No pulsatile or other abdominal masses. No hepatosplenomegaly.No abdominal wall defects.      Extremities:  No clubbing or cyanosis. Full range of motion in all 4 extremities proximal and distal. No joint swelling or tenderness in right and left shoulders, elbows, wrists and fingers. No joint swelling or tenderness in left and right knees, ankles and toes.    Neurologic:  Alert and oriented times 3. Gait is normal. Normal sensory function. No motor deficits in all 4 extremities. Cranial nerves II-XII intact. Symmetrical bilateral knee DTR’s (deep tendon reflexes).  Negative Babinski.    Integumentary:  Warm. Dry. Pink. No rashes or lesions. No wounds.    Lymphatic:  No lymphadenopathy in submental, submandibular or cervical chain. No supraclavicular or infraclavicular lymphadenopathy. No axillary or inguinal/groin lymphadenopathy.    Psychiatric: Mood and affect normal. Memory intact.    Assessment and Plan  · 58-year-old male physical exam done  · Obstructive sleep apnea. Continues to use CPAP successfully  · Type 2 diabetes. A1c 7.9. Patient will restart his diabetic diet and activity level and recheck A1c in 3 months. If the A1c does not improve we will initiate workup and treatment plan  · Hyperlipidemia. Well controlled with Lipitor. Continue present management  · Patient is no longer taking his Diovan. Blood pressure is stable. If the A1c does not improve we will reinitiate Atacand. He is currently taking Norvasc  · Recent CBC revealed platelets of 124,000. Patient states that he donates platelets once or twice a month  · Counseling and risk reduction and  weight loss all discussed    The ASCVD Risk score (Plover AYLIN Jr., et al., 2013) failed to calculate for the following reasons:    The valid total cholesterol range is 130 to 320 mg/dL    Orders Placed This Encounter   • Glycohemoglobin

## 2018-08-27 ENCOUNTER — PATIENT MESSAGE (OUTPATIENT)
Dept: ENDOCRINOLOGY | Facility: CLINIC | Age: 48
End: 2018-08-27

## 2018-08-27 DIAGNOSIS — E13.9 LADA (LATENT AUTOIMMUNE DIABETES IN ADULTS), MANAGED AS TYPE 1: Primary | ICD-10-CM

## 2018-08-27 RX ORDER — PEN NEEDLE, DIABETIC 31 GX5/16"
NEEDLE, DISPOSABLE MISCELLANEOUS
Refills: 3 | Status: CANCELLED | OUTPATIENT
Start: 2018-08-27

## 2018-08-28 RX ORDER — PEN NEEDLE, DIABETIC 30 GX3/16"
NEEDLE, DISPOSABLE MISCELLANEOUS
Qty: 450 EACH | Refills: 3 | Status: SHIPPED | OUTPATIENT
Start: 2018-08-28 | End: 2019-12-11 | Stop reason: SDUPTHER

## 2018-10-01 ENCOUNTER — PATIENT MESSAGE (OUTPATIENT)
Dept: ENDOCRINOLOGY | Facility: CLINIC | Age: 48
End: 2018-10-01

## 2018-10-10 ENCOUNTER — OFFICE VISIT (OUTPATIENT)
Dept: ENDOCRINOLOGY | Facility: CLINIC | Age: 48
End: 2018-10-10
Payer: COMMERCIAL

## 2018-10-10 VITALS
HEIGHT: 62 IN | HEART RATE: 76 BPM | SYSTOLIC BLOOD PRESSURE: 122 MMHG | WEIGHT: 116.63 LBS | DIASTOLIC BLOOD PRESSURE: 68 MMHG | BODY MASS INDEX: 21.46 KG/M2

## 2018-10-10 DIAGNOSIS — E11.649 HYPOGLYCEMIA ASSOCIATED WITH DIABETES: ICD-10-CM

## 2018-10-10 DIAGNOSIS — E13.9 LADA (LATENT AUTOIMMUNE DIABETES IN ADULTS), MANAGED AS TYPE 1: Primary | ICD-10-CM

## 2018-10-10 PROCEDURE — 99215 OFFICE O/P EST HI 40 MIN: CPT | Mod: 25,S$GLB,, | Performed by: NURSE PRACTITIONER

## 2018-10-10 PROCEDURE — 99999 PR PBB SHADOW E&M-EST. PATIENT-LVL IV: CPT | Mod: PBBFAC,,, | Performed by: NURSE PRACTITIONER

## 2018-10-10 PROCEDURE — 95251 CONT GLUC MNTR ANALYSIS I&R: CPT | Mod: S$GLB,,, | Performed by: NURSE PRACTITIONER

## 2018-10-10 RX ORDER — INSULIN LISPRO 100 [IU]/ML
4 INJECTION, SOLUTION INTRAVENOUS; SUBCUTANEOUS
Qty: 15 ML | Refills: 11 | Status: SHIPPED | OUTPATIENT
Start: 2018-10-10 | End: 2019-04-24 | Stop reason: SDUPTHER

## 2018-10-10 NOTE — PROGRESS NOTES
Subjective:     Patient ID: Leida Hoyos is a 48 y.o. female.    Chief Complaint: Diabetes    HPI:   Ms. Hoyos is a 48 y.o. female who is here for a follow-up visit for evaluation CHEMA that was diagnosed one year ago. Has cousin who is T1DM since age 17 on insulin pump.   GEETA Ab +.05 and Cpeptide 0.9 with BG of 154.   Patient remained on basal insulin until 1/2018 when she developed DKA.  Seen by DM Education by ROSEANNE Holbrook, KINGSLEY 1/2018.   Seen by Dr. Kurtz 3/2018 who started Humalog with meals.   Converted to personal CGM, Dexcom G5 5/2018, seen by DM Education 5/2018 for sensor training.  Last seen by me 6/18 - added ISF. BG goal 100  [facility]. A1c 6.7%.      Current regimen:  Lantus 12 units PM  Humalog 1:10 ICR, 1:65 ISF BG goal 100    Injections: occasionally forgets and takes novolog after meals or during meals   Hypoglycemia: yes but less often with personal CGM    Feels s/s of hypoglycemia when BG <70, tx with glucose tabs since DM edu appt    Dexcom downloaded for past 2 weeks - see attached reports.   BG average 162 +/- 52, 63% time in target, calibrating 2x daily  Alerts set 70/250    Diet:3 meals  Breakfast: frozen waffle with peanut butter - 30 gms  Lunch: salad, strawberries and nuts - cottage cheese - 30   Dinner patterns: Tries to eat between 35 - 45 grams of carbs per meal, occasionally higher if going out to dinner.     Review of Systems   Constitutional: Negative for activity change, appetite change, chills and fever.   HENT: Negative for sore throat.    Respiratory: Negative for cough, chest tightness and shortness of breath.    Cardiovascular: Negative for chest pain, palpitations and leg swelling.   Gastrointestinal: Negative for constipation, diarrhea and nausea.   Genitourinary: Negative for dysuria.   Musculoskeletal: Negative for arthralgias.   Skin: Negative for rash.        C/o scalp itching   Neurological: Negative for light-headedness.        Objective:     Physical  "Exam   Constitutional: She is oriented to person, place, and time. She appears well-developed and well-nourished. No distress.   HENT:   Head: Normocephalic and atraumatic.   Mouth/Throat: No oropharyngeal exudate.   Eyes: Conjunctivae and EOM are normal. Pupils are equal, round, and reactive to light. No scleral icterus.   Neck: Normal range of motion. Neck supple. No tracheal deviation present. No thyromegaly present.   Cardiovascular: Normal rate, regular rhythm, normal heart sounds and intact distal pulses.   Pulmonary/Chest: Effort normal and breath sounds normal.   Abdominal:   Sites of insulin administration are normal appearing.   Genitourinary: No breast discharge.   Musculoskeletal: Normal range of motion. She exhibits no edema or tenderness.   Lymphadenopathy:     She has no cervical adenopathy.   Neurological: She is alert and oriented to person, place, and time. She has normal reflexes. No cranial nerve deficit.   Skin: Skin is warm and dry.   3/2018 FOOT EXAM:  Visual inspection is normal, no abrasions, bruising or calluses.   Microfilament test is intact b/l.   Vibratory sense is intact b/l.   Distal pulses are present b/l.    Psychiatric: She has a normal mood and affect.       Vitals:    10/10/18 0716   BP: 122/68   Pulse: 76   Weight: 52.9 kg (116 lb 10 oz)   Height: 5' 2" (1.575 m)       Assessment/Plan:     1. 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  Reviewed A1c and BG goals.   - Discussed insulins' onset, peak, duration, dosing, administration, site rotation.  - Inject 10-15 minutes BEFORE meals.   - Reviewed hypoglycemia, s/s and appropriate tx options. May try different tx - candies  - Instructed to monitor BG to calibrate sensor and verify hypoglycemia.   - Discussed pain, stress, infection, medications, diet and exercise and impact on " DM control. Discussed DM diet, counting carbs, portion size.   - takes ASA, ACEi statin for protection  - eye exam done 5/18    2. Hypoglycemia associated with diabetes -   Wearing personal CGM, plans to upgrade to G6 when available, discussed clarity reports, share lynn feature with .       Orders Placed This Encounter   Procedures    Hemoglobin A1c    Comprehensive metabolic panel    Lipid panel    Microalbumin/creatinine urine ratio   outside labs    RTC in 6 months with Dr. Kurtz  Obtain PCP      Time spent : 45 minutes >50% in counseling as above and sensor download

## 2018-11-02 ENCOUNTER — PATIENT MESSAGE (OUTPATIENT)
Dept: ENDOCRINOLOGY | Facility: CLINIC | Age: 48
End: 2018-11-02

## 2018-12-18 RX ORDER — INSULIN GLARGINE 100 [IU]/ML
INJECTION, SOLUTION SUBCUTANEOUS
Qty: 45 ML | Refills: 3 | Status: SHIPPED | OUTPATIENT
Start: 2018-12-18 | End: 2020-10-19 | Stop reason: SDUPTHER

## 2019-01-10 ENCOUNTER — OFFICE VISIT (OUTPATIENT)
Dept: INTERNAL MEDICINE | Facility: CLINIC | Age: 49
End: 2019-01-10
Payer: COMMERCIAL

## 2019-01-10 VITALS
BODY MASS INDEX: 21.9 KG/M2 | SYSTOLIC BLOOD PRESSURE: 140 MMHG | WEIGHT: 119 LBS | DIASTOLIC BLOOD PRESSURE: 72 MMHG | HEIGHT: 62 IN

## 2019-01-10 DIAGNOSIS — E13.9 LADA (LATENT AUTOIMMUNE DIABETES IN ADULTS), MANAGED AS TYPE 1: ICD-10-CM

## 2019-01-10 DIAGNOSIS — K75.4 AUTOIMMUNE HEPATITIS: ICD-10-CM

## 2019-01-10 DIAGNOSIS — Z00.00 HEALTH CARE MAINTENANCE: Primary | ICD-10-CM

## 2019-01-10 DIAGNOSIS — I10 ESSENTIAL HYPERTENSION: ICD-10-CM

## 2019-01-10 DIAGNOSIS — E78.00 PURE HYPERCHOLESTEROLEMIA: ICD-10-CM

## 2019-01-10 PROCEDURE — 99999 PR PBB SHADOW E&M-EST. PATIENT-LVL III: ICD-10-PCS | Mod: PBBFAC,,, | Performed by: INTERNAL MEDICINE

## 2019-01-10 PROCEDURE — 3074F PR MOST RECENT SYSTOLIC BLOOD PRESSURE < 130 MM HG: ICD-10-PCS | Mod: CPTII,S$GLB,, | Performed by: INTERNAL MEDICINE

## 2019-01-10 PROCEDURE — 3044F PR MOST RECENT HEMOGLOBIN A1C LEVEL <7.0%: ICD-10-PCS | Mod: CPTII,S$GLB,, | Performed by: INTERNAL MEDICINE

## 2019-01-10 PROCEDURE — 99396 PR PREVENTIVE VISIT,EST,40-64: ICD-10-PCS | Mod: S$GLB,,, | Performed by: INTERNAL MEDICINE

## 2019-01-10 PROCEDURE — 3078F PR MOST RECENT DIASTOLIC BLOOD PRESSURE < 80 MM HG: ICD-10-PCS | Mod: CPTII,S$GLB,, | Performed by: INTERNAL MEDICINE

## 2019-01-10 PROCEDURE — 3044F HG A1C LEVEL LT 7.0%: CPT | Mod: CPTII,S$GLB,, | Performed by: INTERNAL MEDICINE

## 2019-01-10 PROCEDURE — 3074F SYST BP LT 130 MM HG: CPT | Mod: CPTII,S$GLB,, | Performed by: INTERNAL MEDICINE

## 2019-01-10 PROCEDURE — 99999 PR PBB SHADOW E&M-EST. PATIENT-LVL III: CPT | Mod: PBBFAC,,, | Performed by: INTERNAL MEDICINE

## 2019-01-10 PROCEDURE — 3078F DIAST BP <80 MM HG: CPT | Mod: CPTII,S$GLB,, | Performed by: INTERNAL MEDICINE

## 2019-01-10 PROCEDURE — 99396 PREV VISIT EST AGE 40-64: CPT | Mod: S$GLB,,, | Performed by: INTERNAL MEDICINE

## 2019-01-10 RX ORDER — LANCETS
EACH MISCELLANEOUS
Qty: 150 EACH | Refills: 11 | Status: SHIPPED | OUTPATIENT
Start: 2019-01-10 | End: 2019-01-10

## 2019-01-10 RX ORDER — LANCETS
EACH MISCELLANEOUS
Qty: 400 EACH | Refills: 3 | Status: SHIPPED | OUTPATIENT
Start: 2019-01-10 | End: 2022-04-22

## 2019-01-10 RX ORDER — PRAVASTATIN SODIUM 10 MG/1
10 TABLET ORAL DAILY
Qty: 90 TABLET | Refills: 3 | Status: SHIPPED | OUTPATIENT
Start: 2019-01-10 | End: 2020-07-28 | Stop reason: SDUPTHER

## 2019-01-10 RX ORDER — LOSARTAN POTASSIUM 25 MG/1
25 TABLET ORAL DAILY
Qty: 90 TABLET | Refills: 3 | Status: SHIPPED | OUTPATIENT
Start: 2019-01-10 | End: 2020-06-30 | Stop reason: SDUPTHER

## 2019-01-10 RX ORDER — MERCAPTOPURINE 50 MG/1
100 TABLET ORAL DAILY
Qty: 60 TABLET | Refills: 11
Start: 2019-01-10 | End: 2021-07-27

## 2019-01-10 NOTE — PROGRESS NOTES
Subjective:       Patient ID: Leida Hoyos is a 48 y.o. female.    Chief Complaint: Establish Care (est care as a new patient. )    Diabetes   She has type 1 diabetes mellitus. No MedicAlert identification noted. The initial diagnosis of diabetes was made 5 years ago. Pertinent negatives for hypoglycemia include no confusion, dizziness, headaches, hunger, mood changes, nervousness/anxiousness, pallor, seizures, sleepiness, speech difficulty, sweats or tremors. Pertinent negatives for diabetes include no blurred vision, no chest pain, no fatigue, no foot paresthesias, no foot ulcerations, no polydipsia, no polyphagia, no polyuria, no visual change, no weakness and no weight loss. Pertinent negatives for hypoglycemia complications include no blackouts, no hospitalization, no nocturnal hypoglycemia, no required assistance and no required glucagon injection. Symptoms are stable. Pertinent negatives for diabetic complications include no autonomic neuropathy, CVA, heart disease, impotence, nephropathy, peripheral neuropathy, PVD or retinopathy. Risk factors for coronary artery disease include family history and stress. Current diabetic treatment includes insulin injections and oral agent (dual therapy). She is compliant with treatment all of the time. She is currently taking insulin pre-breakfast, pre-lunch, pre-dinner and at bedtime. Insulin injections are given by patient. Rotation sites for injection include the abdominal wall. Her weight is stable. She is following a generally healthy diet. Meal planning includes carbohydrate counting. She has not had a previous visit with a dietitian. She participates in exercise daily. She monitors blood glucose at home 3-4 x per day. Blood glucose monitoring compliance is good. She does not see a podiatrist.Eye exam is current.      Leida Hoyos is a 48 y.o. female here to establish care and have yearly preventative healthcare visit.     CHEMA with hx of DKA  humalog  "4 u ac tid  lantus 12 u qhs  Lisinopril 5mg  prava 10mg     Autoimmune hepatitis  Dr. Roger Russell   Mercaptopurine    Previously saw Dr. Berry.     Gynecologist - Laurie Menjivar.    On Dexcom CGM.    Review of Systems   Constitutional: Negative for fatigue and weight loss.   Eyes: Negative for blurred vision.   Cardiovascular: Negative for chest pain.   Endocrine: Negative for polydipsia, polyphagia and polyuria.   Genitourinary: Negative for impotence.   Skin: Negative for pallor.   Neurological: Negative for dizziness, tremors, seizures, speech difficulty, weakness and headaches.   Psychiatric/Behavioral: Negative for confusion. The patient is not nervous/anxious.        Objective:   BP (!) 140/72   Ht 5' 2" (1.575 m)   Wt 54 kg (119 lb)   BMI 21.77 kg/m²      Physical Exam   Constitutional: She is oriented to person, place, and time. She appears well-developed and well-nourished.   HENT:   Head: Normocephalic and atraumatic.   Eyes: Conjunctivae and EOM are normal. Pupils are equal, round, and reactive to light.   Neck: Neck supple. No thyromegaly present.   Cardiovascular: Normal rate, regular rhythm and normal heart sounds.   No murmur heard.  Pulmonary/Chest: Effort normal and breath sounds normal. No respiratory distress. She has no wheezes.   Abdominal: Soft. Bowel sounds are normal. She exhibits no distension. There is no tenderness.   Musculoskeletal: Normal range of motion.   Neurological: She is alert and oriented to person, place, and time.   Skin: Skin is warm and dry. No rash noted.   Psychiatric: She has a normal mood and affect. Judgment and thought content normal.   Vitals reviewed.      Protective Sensation (w/ 10 gram monofilament):  Right: Intact  Left: Intact    Visual Inspection:  Normal -  Bilateral    Pedal Pulses:   Right: Present  Left: Present    Posterior tibialis:   Right:Present  Left: Present    Assessment:       1. Health care maintenance    2. CHEMA (latent autoimmune " diabetes in adults), managed as type 1    3. Autoimmune hepatitis    4. Essential hypertension    5. Pure hypercholesterolemia        Plan:       Leida Gupta was seen today for establish care.    Diagnoses and all orders for this visit:    Health care maintenance  -     CBC auto differential; Future  -     Hemoglobin A1c; Future  -     Comprehensive metabolic panel; Future  -     Lipid panel; Future  -     TSH; Future  -     Vitamin D; Future   Labs at [facility]    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), managed as type 1  -     Hemoglobin A1c; Future  -     Comprehensive metabolic panel; Future  -     Hemoglobin A1c  -     Comprehensive metabolic panel  -     losartan (COZAAR) 25 MG tablet; Take 1 tablet (25 mg total) by mouth once daily.  -     blood sugar diagnostic Strp; To check BG 4 times daily, to use with insurance preferred meter  -     lancets Misc; To check BG 4 times daily, to use with insurance preferred meter    Autoimmune hepatitis  -     mercaptopurine (PURINETHOL) 50 mg tablet; Take 2 tablets (100 mg total) by mouth once daily managed by Dr. Russell.    Essential hypertension  -     losartan (COZAAR) 25 MG tablet; Take 1 tablet (25 mg total) by mouth once daily.    Pure hypercholesterolemia  -     pravastatin (PRAVACHOL) 10 MG tablet; Take 1 tablet (10 mg total) by mouth once daily.

## 2019-02-15 DIAGNOSIS — Z12.11 COLON CANCER SCREENING: ICD-10-CM

## 2019-04-02 ENCOUNTER — PATIENT MESSAGE (OUTPATIENT)
Dept: INTERNAL MEDICINE | Facility: CLINIC | Age: 49
End: 2019-04-02

## 2019-04-02 ENCOUNTER — PATIENT MESSAGE (OUTPATIENT)
Dept: DIABETES | Facility: CLINIC | Age: 49
End: 2019-04-02

## 2019-04-02 NOTE — TELEPHONE ENCOUNTER
Done faxed lab orders to [phone] per the pt request. Message sent informing pt via my chart msg.

## 2019-04-02 NOTE — TELEPHONE ENCOUNTER
It seems like they're all external. You can reprint them under labs and fax to whichever lab she's going to.

## 2019-04-03 ENCOUNTER — PATIENT MESSAGE (OUTPATIENT)
Dept: INTERNAL MEDICINE | Facility: CLINIC | Age: 49
End: 2019-04-03

## 2019-04-03 DIAGNOSIS — Z00.00 ANNUAL PHYSICAL EXAM: Primary | ICD-10-CM

## 2019-04-04 NOTE — TELEPHONE ENCOUNTER
Spoke to patient. Patient stated that she normally gets her labs drawn at the lab at her job but their phlebotomist is currently on maternity leave. Patient states that she will get labs from Jan. (fasting) here at [facility]. Can you place orders for labs , I will contact pt today to help get scheduled.     Please advise. Thanks.

## 2019-04-04 NOTE — TELEPHONE ENCOUNTER
Please clarify: did she want labs done at [facility] or at outside facility? Dr. Bundy ordered them all as external in Jan. If it's outside facility, you can print out her orders and fax it to the lab of her choice. If it's at [facility], let me know and I can reorder it for internal.

## 2019-04-04 NOTE — TELEPHONE ENCOUNTER
Spoke to patient. Patient was scheduled for fasting labs on Mon 04/08 at 7:10 am here in primary care.

## 2019-04-08 ENCOUNTER — LAB VISIT (OUTPATIENT)
Dept: LAB | Facility: HOSPITAL | Age: 49
End: 2019-04-08
Attending: INTERNAL MEDICINE
Payer: COMMERCIAL

## 2019-04-08 DIAGNOSIS — Z00.00 ANNUAL PHYSICAL EXAM: ICD-10-CM

## 2019-04-08 LAB
25(OH)D3+25(OH)D2 SERPL-MCNC: 24 NG/ML (ref 30–96)
ALBUMIN SERPL BCP-MCNC: 3.8 G/DL (ref 3.5–5.2)
ALP SERPL-CCNC: 98 U/L (ref 55–135)
ALT SERPL W/O P-5'-P-CCNC: 40 U/L (ref 10–44)
ANION GAP SERPL CALC-SCNC: 4 MMOL/L (ref 8–16)
AST SERPL-CCNC: 32 U/L (ref 10–40)
BASOPHILS # BLD AUTO: 0.02 K/UL (ref 0–0.2)
BASOPHILS NFR BLD: 0.4 % (ref 0–1.9)
BILIRUB SERPL-MCNC: 0.5 MG/DL (ref 0.1–1)
BUN SERPL-MCNC: 11 MG/DL (ref 6–20)
CALCIUM SERPL-MCNC: 10.1 MG/DL (ref 8.7–10.5)
CHLORIDE SERPL-SCNC: 103 MMOL/L (ref 95–110)
CHOLEST SERPL-MCNC: 150 MG/DL (ref 120–199)
CHOLEST/HDLC SERPL: 2.1 {RATIO} (ref 2–5)
CO2 SERPL-SCNC: 28 MMOL/L (ref 23–29)
CREAT SERPL-MCNC: 0.7 MG/DL (ref 0.5–1.4)
DIFFERENTIAL METHOD: ABNORMAL
EOSINOPHIL # BLD AUTO: 0.2 K/UL (ref 0–0.5)
EOSINOPHIL NFR BLD: 4.3 % (ref 0–8)
ERYTHROCYTE [DISTWIDTH] IN BLOOD BY AUTOMATED COUNT: 13.2 % (ref 11.5–14.5)
EST. GFR  (AFRICAN AMERICAN): >60 ML/MIN/1.73 M^2
EST. GFR  (NON AFRICAN AMERICAN): >60 ML/MIN/1.73 M^2
ESTIMATED AVG GLUCOSE: 157 MG/DL (ref 68–131)
GLUCOSE SERPL-MCNC: 141 MG/DL (ref 70–110)
HBA1C MFR BLD HPLC: 7.1 % (ref 4–5.6)
HCT VFR BLD AUTO: 38.6 % (ref 37–48.5)
HDLC SERPL-MCNC: 72 MG/DL (ref 40–75)
HDLC SERPL: 48 % (ref 20–50)
HGB BLD-MCNC: 12.4 G/DL (ref 12–16)
LDLC SERPL CALC-MCNC: 66.6 MG/DL (ref 63–159)
LYMPHOCYTES # BLD AUTO: 1.2 K/UL (ref 1–4.8)
LYMPHOCYTES NFR BLD: 24.3 % (ref 18–48)
MCH RBC QN AUTO: 31.4 PG (ref 27–31)
MCHC RBC AUTO-ENTMCNC: 32.1 G/DL (ref 32–36)
MCV RBC AUTO: 98 FL (ref 82–98)
MONOCYTES # BLD AUTO: 0.6 K/UL (ref 0.3–1)
MONOCYTES NFR BLD: 11.1 % (ref 4–15)
NEUTROPHILS # BLD AUTO: 3 K/UL (ref 1.8–7.7)
NEUTROPHILS NFR BLD: 59.9 % (ref 38–73)
NONHDLC SERPL-MCNC: 78 MG/DL
PLATELET # BLD AUTO: 204 K/UL (ref 150–350)
PMV BLD AUTO: 9 FL (ref 9.2–12.9)
POTASSIUM SERPL-SCNC: 4.5 MMOL/L (ref 3.5–5.1)
PROT SERPL-MCNC: 6.8 G/DL (ref 6–8.4)
RBC # BLD AUTO: 3.95 M/UL (ref 4–5.4)
SODIUM SERPL-SCNC: 135 MMOL/L (ref 136–145)
TRIGL SERPL-MCNC: 57 MG/DL (ref 30–150)
TSH SERPL DL<=0.005 MIU/L-ACNC: 2.06 UIU/ML (ref 0.4–4)
WBC # BLD AUTO: 4.94 K/UL (ref 3.9–12.7)

## 2019-04-08 PROCEDURE — 82306 VITAMIN D 25 HYDROXY: CPT

## 2019-04-08 PROCEDURE — 80061 LIPID PANEL: CPT

## 2019-04-08 PROCEDURE — 80053 COMPREHEN METABOLIC PANEL: CPT

## 2019-04-08 PROCEDURE — 84443 ASSAY THYROID STIM HORMONE: CPT

## 2019-04-08 PROCEDURE — 36415 COLL VENOUS BLD VENIPUNCTURE: CPT

## 2019-04-08 PROCEDURE — 85025 COMPLETE CBC W/AUTO DIFF WBC: CPT

## 2019-04-08 PROCEDURE — 83036 HEMOGLOBIN GLYCOSYLATED A1C: CPT

## 2019-04-10 ENCOUNTER — OFFICE VISIT (OUTPATIENT)
Dept: INTERNAL MEDICINE | Facility: CLINIC | Age: 49
End: 2019-04-10
Payer: COMMERCIAL

## 2019-04-10 VITALS
HEIGHT: 62 IN | BODY MASS INDEX: 22.26 KG/M2 | SYSTOLIC BLOOD PRESSURE: 134 MMHG | DIASTOLIC BLOOD PRESSURE: 84 MMHG | WEIGHT: 121 LBS

## 2019-04-10 DIAGNOSIS — E13.9 LADA (LATENT AUTOIMMUNE DIABETES IN ADULTS), MANAGED AS TYPE 1: Primary | ICD-10-CM

## 2019-04-10 DIAGNOSIS — Z12.11 SCREEN FOR COLON CANCER: ICD-10-CM

## 2019-04-10 DIAGNOSIS — E55.9 VITAMIN D INSUFFICIENCY: ICD-10-CM

## 2019-04-10 PROCEDURE — 3075F PR MOST RECENT SYSTOLIC BLOOD PRESS GE 130-139MM HG: ICD-10-PCS | Mod: CPTII,S$GLB,, | Performed by: INTERNAL MEDICINE

## 2019-04-10 PROCEDURE — 3075F SYST BP GE 130 - 139MM HG: CPT | Mod: CPTII,S$GLB,, | Performed by: INTERNAL MEDICINE

## 2019-04-10 PROCEDURE — 3045F PR MOST RECENT HEMOGLOBIN A1C LEVEL 7.0-9.0%: CPT | Mod: CPTII,S$GLB,, | Performed by: INTERNAL MEDICINE

## 2019-04-10 PROCEDURE — 3008F BODY MASS INDEX DOCD: CPT | Mod: CPTII,S$GLB,, | Performed by: INTERNAL MEDICINE

## 2019-04-10 PROCEDURE — 3079F PR MOST RECENT DIASTOLIC BLOOD PRESSURE 80-89 MM HG: ICD-10-PCS | Mod: CPTII,S$GLB,, | Performed by: INTERNAL MEDICINE

## 2019-04-10 PROCEDURE — 99999 PR PBB SHADOW E&M-EST. PATIENT-LVL IV: ICD-10-PCS | Mod: PBBFAC,,, | Performed by: INTERNAL MEDICINE

## 2019-04-10 PROCEDURE — 99999 PR PBB SHADOW E&M-EST. PATIENT-LVL IV: CPT | Mod: PBBFAC,,, | Performed by: INTERNAL MEDICINE

## 2019-04-10 PROCEDURE — 99214 PR OFFICE/OUTPT VISIT, EST, LEVL IV, 30-39 MIN: ICD-10-PCS | Mod: S$GLB,,, | Performed by: INTERNAL MEDICINE

## 2019-04-10 PROCEDURE — 3079F DIAST BP 80-89 MM HG: CPT | Mod: CPTII,S$GLB,, | Performed by: INTERNAL MEDICINE

## 2019-04-10 PROCEDURE — 99214 OFFICE O/P EST MOD 30 MIN: CPT | Mod: S$GLB,,, | Performed by: INTERNAL MEDICINE

## 2019-04-10 PROCEDURE — 3045F PR MOST RECENT HEMOGLOBIN A1C LEVEL 7.0-9.0%: ICD-10-PCS | Mod: CPTII,S$GLB,, | Performed by: INTERNAL MEDICINE

## 2019-04-10 PROCEDURE — 3008F PR BODY MASS INDEX (BMI) DOCUMENTED: ICD-10-PCS | Mod: CPTII,S$GLB,, | Performed by: INTERNAL MEDICINE

## 2019-04-10 RX ORDER — VIT C/E/ZN/COPPR/LUTEIN/ZEAXAN 250MG-90MG
1000 CAPSULE ORAL DAILY
Refills: 0 | COMMUNITY
Start: 2019-04-10

## 2019-04-10 NOTE — PROGRESS NOTES
Subjective:       Patient ID: Leida Hoyos is a 49 y.o. female.    Chief Complaint: Follow-up (routine follow up.) and Diabetes    Diabetes   She has type 1 diabetes mellitus. No MedicAlert identification noted. The initial diagnosis of diabetes was made 6 years ago. Pertinent negatives for hypoglycemia include no confusion, dizziness, headaches, hunger, mood changes, nervousness/anxiousness, pallor, seizures, sleepiness, speech difficulty, sweats or tremors. Associated symptoms include fatigue. Pertinent negatives for diabetes include no blurred vision, no chest pain, no foot paresthesias, no foot ulcerations, no polydipsia, no polyphagia, no polyuria, no visual change, no weakness and no weight loss. Pertinent negatives for hypoglycemia complications include no blackouts, no hospitalization, no nocturnal hypoglycemia, no required assistance and no required glucagon injection. Symptoms are stable. Pertinent negatives for diabetic complications include no autonomic neuropathy, CVA, heart disease, impotence, nephropathy, peripheral neuropathy, PVD or retinopathy. Risk factors for coronary artery disease include family history. Current diabetic treatment includes diet and insulin injections. She is compliant with treatment all of the time. She is currently taking insulin pre-breakfast, pre-lunch, pre-dinner and at bedtime. Insulin injections are given by patient. Rotation sites for injection include the abdominal wall. Her weight is stable. She is following a generally healthy diet. Meal planning includes carbohydrate counting. She has had a previous visit with a dietitian. She participates in exercise daily. She monitors blood glucose at home 5+ x per day. Blood glucose monitoring compliance is good. She does not see a podiatrist.Eye exam is current.        a1c 7.1%  vid d 24    Gave up sweets for Lent.     Home readings 125/70     Due for pap and mmg this month. Will schedule.   Laurie Menjivar - will have  "her office fax us a copy.     Review of Systems   Constitutional: Positive for fatigue. Negative for weight loss.   Eyes: Negative for blurred vision.   Cardiovascular: Negative for chest pain.   Endocrine: Negative for polydipsia, polyphagia and polyuria.   Genitourinary: Negative for impotence.   Skin: Negative for pallor.   Neurological: Negative for dizziness, tremors, seizures, speech difficulty, weakness and headaches.   Psychiatric/Behavioral: Negative for confusion. The patient is not nervous/anxious.        Objective:   /84   Ht 5' 2" (1.575 m)   Wt 54.9 kg (121 lb)   BMI 22.13 kg/m²      Physical Exam   Constitutional: She is oriented to person, place, and time. She appears well-developed and well-nourished. No distress.   HENT:   Head: Normocephalic and atraumatic.   Cardiovascular: Normal rate and regular rhythm.   Pulmonary/Chest: Effort normal. No respiratory distress. She has no wheezes. She has no rales.   Neurological: She is alert and oriented to person, place, and time.   Skin: Skin is warm and dry. She is not diaphoretic.   Psychiatric: She has a normal mood and affect. Her behavior is normal.       Assessment:       1. 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 Screen for colon cancer    3. Vitamin D insufficiency        Plan:       Leida Gupta was seen today for follow-up and diabetes.    Diagnoses and all orders for this visit:    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), managed as type 1  Slightly above goal, working on diet, to see endocrine soon  Continue current meds    Screen for colon cancer  -     Case request GI: COLONOSCOPY    Vitamin D insufficiency  -     cholecalciferol, vitamin D3, (VITAMIN D3) 1,000 unit capsule; Take 1 capsule (1,000 Units total) by mouth once daily.          add vit d 1000 IU over the counter    Will send 2 weeks of BP readings. If above goal increase losartan to 50mg.     "

## 2019-04-12 DIAGNOSIS — Z12.39 BREAST CANCER SCREENING: ICD-10-CM

## 2019-04-24 ENCOUNTER — OFFICE VISIT (OUTPATIENT)
Dept: ENDOCRINOLOGY | Facility: CLINIC | Age: 49
End: 2019-04-24
Payer: COMMERCIAL

## 2019-04-24 VITALS
BODY MASS INDEX: 22.5 KG/M2 | WEIGHT: 122.25 LBS | DIASTOLIC BLOOD PRESSURE: 64 MMHG | HEART RATE: 70 BPM | SYSTOLIC BLOOD PRESSURE: 136 MMHG | HEIGHT: 62 IN

## 2019-04-24 DIAGNOSIS — E78.00 PURE HYPERCHOLESTEROLEMIA: ICD-10-CM

## 2019-04-24 DIAGNOSIS — E13.9 LADA (LATENT AUTOIMMUNE DIABETES IN ADULTS), MANAGED AS TYPE 1: ICD-10-CM

## 2019-04-24 DIAGNOSIS — I10 ESSENTIAL HYPERTENSION: ICD-10-CM

## 2019-04-24 PROCEDURE — 99999 PR PBB SHADOW E&M-EST. PATIENT-LVL IV: ICD-10-PCS | Mod: PBBFAC,,, | Performed by: INTERNAL MEDICINE

## 2019-04-24 PROCEDURE — 3008F BODY MASS INDEX DOCD: CPT | Mod: CPTII,S$GLB,, | Performed by: INTERNAL MEDICINE

## 2019-04-24 PROCEDURE — 99213 PR OFFICE/OUTPT VISIT, EST, LEVL III, 20-29 MIN: ICD-10-PCS | Mod: S$GLB,,, | Performed by: INTERNAL MEDICINE

## 2019-04-24 PROCEDURE — 3045F PR MOST RECENT HEMOGLOBIN A1C LEVEL 7.0-9.0%: ICD-10-PCS | Mod: CPTII,S$GLB,, | Performed by: INTERNAL MEDICINE

## 2019-04-24 PROCEDURE — 99999 PR PBB SHADOW E&M-EST. PATIENT-LVL IV: CPT | Mod: PBBFAC,,, | Performed by: INTERNAL MEDICINE

## 2019-04-24 PROCEDURE — 3075F PR MOST RECENT SYSTOLIC BLOOD PRESS GE 130-139MM HG: ICD-10-PCS | Mod: CPTII,S$GLB,, | Performed by: INTERNAL MEDICINE

## 2019-04-24 PROCEDURE — 99213 OFFICE O/P EST LOW 20 MIN: CPT | Mod: S$GLB,,, | Performed by: INTERNAL MEDICINE

## 2019-04-24 PROCEDURE — 3008F PR BODY MASS INDEX (BMI) DOCUMENTED: ICD-10-PCS | Mod: CPTII,S$GLB,, | Performed by: INTERNAL MEDICINE

## 2019-04-24 PROCEDURE — 3078F DIAST BP <80 MM HG: CPT | Mod: CPTII,S$GLB,, | Performed by: INTERNAL MEDICINE

## 2019-04-24 PROCEDURE — 3045F PR MOST RECENT HEMOGLOBIN A1C LEVEL 7.0-9.0%: CPT | Mod: CPTII,S$GLB,, | Performed by: INTERNAL MEDICINE

## 2019-04-24 PROCEDURE — 3075F SYST BP GE 130 - 139MM HG: CPT | Mod: CPTII,S$GLB,, | Performed by: INTERNAL MEDICINE

## 2019-04-24 PROCEDURE — 3078F PR MOST RECENT DIASTOLIC BLOOD PRESSURE < 80 MM HG: ICD-10-PCS | Mod: CPTII,S$GLB,, | Performed by: INTERNAL MEDICINE

## 2019-04-24 RX ORDER — INSULIN LISPRO 100 [IU]/ML
INJECTION, SOLUTION INTRAVENOUS; SUBCUTANEOUS
Qty: 15 ML | Refills: 11 | Status: SHIPPED | OUTPATIENT
Start: 2019-04-24 | End: 2020-06-25 | Stop reason: SDUPTHER

## 2019-04-24 NOTE — PATIENT INSTRUCTIONS
Lantus 12 units at bedtime  Humalog 1:10 ICR, 1:65 ISF    Diabetes education.     Two weeks send message so we can review clarity

## 2019-04-24 NOTE — PROGRESS NOTES
"Subjective:     Patient ID: Leida Hoyos is a 49 y.o. female.    Chief Complaint: No chief complaint on file.    HPI:   Ms. Hoyos is a 49 y.o. female who is here for a follow-up visit for evaluation of CHEMA diagnosed two years ago, managed with MDI and DEXCOM G5.    Since last visit, she reports good compliance with injections but not with her diet.   Hypoglycemia occurs   Symptoms include   Treats with     Denies difficulties with injection sites.     Diagnostic labs:  GEETA Ab+, Cpeptide 0.9, BG of 154 mg/dl  Single episode of DKA soon after diagnosis    Current regimen:  Lantus 12 units at bedtime  Humalog 1:10 ICR, 1:65 ISF  A1C 7.1%    Complications include:    Review of Systems   No recent illness    Objective:     Physical Exam    Vitals:    04/24/19 0831   BP: 136/64   Pulse: 70   Weight: 55.4 kg (122 lb 3.9 oz)   Height: 5' 2" (1.575 m)       Hemoglobin A1C   Date Value Ref Range Status   04/08/2019 7.1 (H) 4.0 - 5.6 % Final     Comment:     ADA Screening Guidelines:  5.7-6.4%  Consistent with prediabetes  >or=6.5%  Consistent with diabetes  High levels of fetal hemoglobin interfere with the HbA1C  assay. Heterozygous hemoglobin variants (HbS, HgC, etc)do  not significantly interfere with this assay.   However, presence of multiple variants may affect accuracy.     06/21/2018 6.7 (H) 4.8 - 5.6 % Final     Comment:              Pre-diabetes: 5.7 - 6.4           Diabetes: >6.4           Glycemic control for adults with diabetes: <7.0     02/05/2018 10.9 (H) 4.8 - 5.6 % Final     Comment:              Pre-diabetes: 5.7 - 6.4           Diabetes: >6.4           Glycemic control for adults with diabetes: <7.0           Assessment/Plan:     1. 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), managed as type 1    - Ambulatory Referral to Diabetes Education  - insulin lispro (HUMALOG KWIKPEN INSULIN) 100 unit/mL pen; Use 1:10 ICR and 1:65 ISF   Uses about 18 units daily  Dispense: 15 mL; Refill: 11  - f/u in six " months    2. Pure hypercholesterolemia      3. Essential hypertension

## 2019-05-31 ENCOUNTER — PATIENT MESSAGE (OUTPATIENT)
Dept: INTERNAL MEDICINE | Facility: CLINIC | Age: 49
End: 2019-05-31

## 2019-06-12 ENCOUNTER — PATIENT MESSAGE (OUTPATIENT)
Dept: INTERNAL MEDICINE | Facility: CLINIC | Age: 49
End: 2019-06-12

## 2019-06-12 DIAGNOSIS — Z12.11 SPECIAL SCREENING FOR MALIGNANT NEOPLASMS, COLON: Primary | ICD-10-CM

## 2019-06-12 RX ORDER — POLYETHYLENE GLYCOL 3350, SODIUM SULFATE ANHYDROUS, SODIUM BICARBONATE, SODIUM CHLORIDE, POTASSIUM CHLORIDE 236; 22.74; 6.74; 5.86; 2.97 G/4L; G/4L; G/4L; G/4L; G/4L
4 POWDER, FOR SOLUTION ORAL ONCE
Qty: 4000 ML | Refills: 0 | Status: SHIPPED | OUTPATIENT
Start: 2019-06-12 | End: 2019-06-12

## 2019-07-18 ENCOUNTER — HOSPITAL ENCOUNTER (OUTPATIENT)
Facility: HOSPITAL | Age: 49
Discharge: HOME | End: 2019-07-18
Attending: COLON & RECTAL SURGERY | Admitting: COLON & RECTAL SURGERY
Payer: COMMERCIAL

## 2019-07-18 ENCOUNTER — ANESTHESIA EVENT (OUTPATIENT)
Dept: ENDOSCOPY | Facility: HOSPITAL | Age: 49
End: 2019-07-18
Payer: COMMERCIAL

## 2019-07-18 ENCOUNTER — ANESTHESIA (OUTPATIENT)
Dept: ENDOSCOPY | Facility: HOSPITAL | Age: 49
End: 2019-07-18
Payer: COMMERCIAL

## 2019-07-18 VITALS
DIASTOLIC BLOOD PRESSURE: 65 MMHG | HEIGHT: 62 IN | HEART RATE: 63 BPM | SYSTOLIC BLOOD PRESSURE: 137 MMHG | BODY MASS INDEX: 21.16 KG/M2 | TEMPERATURE: 98 F | RESPIRATION RATE: 16 BRPM | OXYGEN SATURATION: 100 % | WEIGHT: 115 LBS

## 2019-07-18 DIAGNOSIS — Z12.11 COLON CANCER SCREENING: Primary | ICD-10-CM

## 2019-07-18 LAB
B-HCG UR QL: NEGATIVE
CTP QC/QA: YES
GLUCOSE SERPL-MCNC: 80 MG/DL (ref 70–110)
POCT GLUCOSE: 80 MG/DL (ref 70–110)

## 2019-07-18 PROCEDURE — 81025 URINE PREGNANCY TEST: CPT | Performed by: COLON & RECTAL SURGERY

## 2019-07-18 PROCEDURE — E9220 PRA ENDO ANESTHESIA: ICD-10-PCS | Mod: ,,, | Performed by: NURSE ANESTHETIST, CERTIFIED REGISTERED

## 2019-07-18 PROCEDURE — 63600175 PHARM REV CODE 636 W HCPCS: Performed by: NURSE ANESTHETIST, CERTIFIED REGISTERED

## 2019-07-18 PROCEDURE — 82962 GLUCOSE BLOOD TEST: CPT | Performed by: COLON & RECTAL SURGERY

## 2019-07-18 PROCEDURE — G0121 COLON CA SCRN NOT HI RSK IND: HCPCS | Mod: ,,, | Performed by: COLON & RECTAL SURGERY

## 2019-07-18 PROCEDURE — G0121 COLON CA SCRN NOT HI RSK IND: HCPCS | Performed by: COLON & RECTAL SURGERY

## 2019-07-18 PROCEDURE — E9220 PRA ENDO ANESTHESIA: HCPCS | Mod: ,,, | Performed by: NURSE ANESTHETIST, CERTIFIED REGISTERED

## 2019-07-18 PROCEDURE — G0121 COLON CA SCRN NOT HI RSK IND: ICD-10-PCS | Mod: ,,, | Performed by: COLON & RECTAL SURGERY

## 2019-07-18 PROCEDURE — 37000009 HC ANESTHESIA EA ADD 15 MINS: Performed by: COLON & RECTAL SURGERY

## 2019-07-18 PROCEDURE — 37000008 HC ANESTHESIA 1ST 15 MINUTES: Performed by: COLON & RECTAL SURGERY

## 2019-07-18 PROCEDURE — 25000003 PHARM REV CODE 250: Performed by: COLON & RECTAL SURGERY

## 2019-07-18 RX ORDER — LIDOCAINE HCL/PF 100 MG/5ML
SYRINGE (ML) INTRAVENOUS
Status: DISCONTINUED | OUTPATIENT
Start: 2019-07-18 | End: 2019-07-18

## 2019-07-18 RX ORDER — PROPOFOL 10 MG/ML
VIAL (ML) INTRAVENOUS
Status: DISCONTINUED | OUTPATIENT
Start: 2019-07-18 | End: 2019-07-18

## 2019-07-18 RX ORDER — SODIUM CHLORIDE 9 MG/ML
INJECTION, SOLUTION INTRAVENOUS CONTINUOUS
Status: DISCONTINUED | OUTPATIENT
Start: 2019-07-18 | End: 2019-07-18 | Stop reason: HOSPADM

## 2019-07-18 RX ADMIN — PROPOFOL 50 MG: 10 INJECTION, EMULSION INTRAVENOUS at 09:07

## 2019-07-18 RX ADMIN — PROPOFOL 100 MG: 10 INJECTION, EMULSION INTRAVENOUS at 09:07

## 2019-07-18 RX ADMIN — LIDOCAINE HYDROCHLORIDE 100 MG: 20 INJECTION, SOLUTION INTRAVENOUS at 09:07

## 2019-07-18 RX ADMIN — SODIUM CHLORIDE: 0.9 INJECTION, SOLUTION INTRAVENOUS at 08:07

## 2019-07-18 NOTE — ANESTHESIA PREPROCEDURE EVALUATION
2019  Leida Hoyos is a 49 y.o., female.  Pre-operative evaluation for COLONOSCOPY (N/A)    Chief Complaint:screening colonoscopy  PMH:latent autoimmune DM, some episodes of hypoglycemia.  While on prep, glucose as low as 59 and below 100.  HTN    Past Surgical History:   Procedure Laterality Date     SECTION, CLASSIC  2001    FRACTURE SURGERY Left 2013    leg     MOUTH SURGERY      ORIF, FRACTURE, TIBIA, PLATEAU Left 2013    Performed by Ronak Lai MD at [facility] OR         Vital Signs Range (Last 24H):         CBC:   No results for input(s): WBC, RBC, HGB, HCT, PLT, MCV, MCH, MCHC in the last 720 hours.    CMP: No results for input(s): NA, K, CL, CO2, BUN, CREATININE, GLU, MG, PHOS, CALCIUM, ALBUMIN, PROT, ALKPHOS, ALT, AST, BILITOT in the last 720 hours.    INR:  No results for input(s): PT, INR, PROTIME, APTT in the last 720 hours.      Diagnostic Studies:      EKD Echo:  Anesthesia Evaluation         Review of Systems      Physical Exam  General:  Well nourished    Airway/Jaw/Neck:  Airway Findings: Mouth Opening: Normal Tongue: Normal  General Airway Assessment: Good  Mallampati: II  Improves to II with phonation.  TM Distance: Normal, at least 6 cm  Jaw/Neck Findings:  Neck ROM: Normal ROM      Dental:  Dental Findings: In tact   Chest/Lungs:  Chest/Lungs Findings: Normal Respiratory Rate     Heart/Vascular:  Heart Findings:       Mental Status:  Mental Status Findings:  Cooperative, Alert and Oriented         Anesthesia Plan  Type of Anesthesia, risks & benefits discussed:  Anesthesia Type:  general  Patient's Preference:   Intra-op Monitoring Plan: standard ASA monitors  Intra-op Monitoring Plan Comments:   Post Op Pain Control Plan: per primary service following discharge from PACU  Post Op Pain Control Plan Comments:   Induction:   IV  Beta Blocker:   Patient is not currently on a Beta-Blocker (No further documentation required).       Informed Consent: Patient understands risks and agrees with Anesthesia plan.  Questions answered. Anesthesia consent signed with patient.  ASA Score: 2     Day of Surgery Review of History & Physical:    H&P update referred to the surgeon.         Ready For Surgery From Anesthesia Perspective.

## 2019-07-18 NOTE — TRANSFER OF CARE
"Anesthesia Transfer of Care Note    Patient: Leida Hoyos    Procedure(s) Performed: Procedure(s) (LRB):  COLONOSCOPY (N/A)    Patient location: GI    Anesthesia Type: general    Transport from OR: Transported from OR on room air with adequate spontaneous ventilation    Post pain: adequate analgesia    Post assessment: no apparent anesthetic complications and tolerated procedure well    Post vital signs: stable    Level of consciousness: awake, alert and oriented    Nausea/Vomiting: no nausea/vomiting    Complications: none    Transfer of care protocol was followed      Last vitals:   Visit Vitals  BP (!) 156/57 (BP Location: Left arm, Patient Position: Lying)   Pulse 77   Temp 36.6 °C (97.9 °F) (Temporal)   Resp 14   Ht 5' 2" (1.575 m)   Wt 52.2 kg (115 lb)   SpO2 100%   Breastfeeding? No   BMI 21.03 kg/m²     "

## 2019-07-18 NOTE — DISCHARGE INSTRUCTIONS
Colonoscopy     A camera attached to a flexible tube with a viewing lens is used to take video pictures.     Colonoscopy is a test to view the inside of your lower digestive tract (colon and rectum). Sometimes it can show the last part of the small intestine (ileum). During the test, small pieces of tissue may be removed for testing. This is called a biopsy. Small growths, such as polyps, may also be removed.   Why is colonoscopy done?  The test is done to help look for colon cancer. And it can help find the source of abdominal pain, bleeding, and changes in bowel habits. It may be needed once a year, depending on factors such as your:  · Age  · Health history  · Family health history  · Symptoms  · Results from any prior colonoscopy  Risks and possible complications  These include:  · Bleeding               · A puncture or tear in the colon   · Risks of anesthesia  · A cancer lesion not being seen  Getting ready   To prepare for the test:  · Talk with your healthcare provider about the risks of the test (see below). Also ask your healthcare provider about alternatives to the test.  · Tell your healthcare provider about any medicines you take. Also tell him or her about any health conditions you may have.  · Make sure your rectum and colon are empty for the test. Follow the diet and bowel prep instructions exactly. If you dont, the test may need to be rescheduled.  · Plan for a friend or family member to drive you home after the test.     Colonoscopy provides an inside view of the entire colon.     You may discuss the results with your doctor right away or at a future visit.  During the test   The test is usually done in the hospital on an outpatient basis. This means you go home the same day. The procedure takes about 30 minutes. During that time:  · You are given relaxing (sedating) medicine through an IV line. You may be drowsy, or fully asleep.  · The healthcare provider will first give you a physical exam to  check for anal and rectal problems.  · Then the anus is lubricated and the scope inserted.  · If you are awake, you may have a feeling similar to needing to have a bowel movement. You may also feel pressure as air is pumped into the colon. Its OK to pass gas during the procedure.  · Biopsy, polyp removal, or other treatments may be done during the test.  After the test   You may have gas right after the test. It can help to try to pass it to help prevent later bloating. Your healthcare provider may discuss the results with you right away. Or you may need to schedule a follow-up visit to talk about the results. After the test, you can go back to your normal eating and other activities. You may be tired from the sedation and need to rest for a few hours.  Date Last Reviewed: 11/1/2016 © 2000-2017 [employer]. [address] [place] [address], [place] [address]. All rights reserved. This information is not intended as a substitute for professional medical care. Always follow your healthcare professional's instructions.

## 2019-07-18 NOTE — [employer] PATIENT INSTRUCTIONS
Discharge Summary/Instructions after an Endoscopic Procedure  Patient Name: Leida Hoyos  Patient MRN: [id number]  Patient YOB: 1970 Thursday, July 18, 2019  Dipesh Victoria MD  RESTRICTIONS:  During your procedure today, you received medications for sedation.  These   medications may affect your judgment, balance and coordination.  Therefore,   for 24 hours, you have the following restrictions:   - DO NOT drive a car, operate machinery, make legal/financial decisions,   sign important papers or drink alcohol.    ACTIVITY:  Today: no heavy lifting, straining or running due to procedural   sedation/anesthesia.  The following day: return to full activity including work.  DIET:  Eat and drink normally unless instructed otherwise.     TREATMENT FOR COMMON SIDE EFFECTS:  - Mild abdominal pain, nausea, belching, bloating or excessive gas:  rest,   eat lightly and use a heating pad.  - Sore Throat: treat with throat lozenges and/or gargle with warm salt   water.  - Because air was used during the procedure, expelling large amounts of air   from your rectum or belching is normal.  - If a bowel prep was taken, you may not have a bowel movement for 1-3 days.    This is normal.  SYMPTOMS TO WATCH FOR AND REPORT TO YOUR PHYSICIAN:  1. Abdominal pain or bloating, other than gas cramps.  2. Chest pain.  3. Back pain.  4. Signs of infection such as: chills or fever occurring within 24 hours   after the procedure.  5. Rectal bleeding, which would show as bright red, maroon, or black stools.   (A tablespoon of blood from the rectum is not serious, especially if   hemorrhoids are present.)  6. Vomiting.  7. Weakness or dizziness.  GO DIRECTLY TO THE NEAREST EMERGENCY ROOM IF YOU HAVE ANY OF THE FOLLOWING:      Difficulty breathing              Chills and/or fever over 101 F   Persistent vomiting and/or vomiting blood   Severe abdominal pain   Severe chest pain   Black, tarry stools   Bleeding- more than one  tablespoon   Any other symptom or condition that you feel may need urgent attention  Your doctor recommends these additional instructions:  If any biopsies were taken, your doctors clinic will contact you in 1 to 2   weeks with any results.  - Discharge patient to home (ambulatory).   - Resume previous diet.   - Continue present medications.   - Repeat colonoscopy in 10 years for screening purposes.  For questions, problems or results please call your physician - Dipesh Victoria MD at Work:  [phone].  [facility], EMERGENCY ROOM PHONE NUMBER: [phone]  IF A COMPLICATION OR EMERGENCY SITUATION ARISES AND YOU ARE UNABLE TO REACH   YOUR PHYSICIAN - GO DIRECTLY TO THE EMERGENCY ROOM.  Dipesh Victoria MD  7/18/2019 9:39:29 AM  This report has been verified and signed electronically.  [employer]

## 2019-07-18 NOTE — H&P
Colonoscopy History and Physical      Procedure : Colonoscopy    Indications:  asymptomatic screening exam    Family Hx of CRC: denies    Last Colonoscopy:  denies    Hx of sedation problems: none  FHX of sedation problems: none    Past Medical History:   Diagnosis Date    Autoimmune hepatitis     managed by Dr. Russell on mercaptopurine    Diabetes mellitus type II     Hypertension        Past Surgical History:   Procedure Laterality Date     SECTION, CLASSIC      FRACTURE SURGERY Left 2013    leg     MOUTH SURGERY      ORIF, FRACTURE, TIBIA, PLATEAU Left 2013    Performed by Ronak Lai MD at [facility], [place], operated by [facility] OR       Review of patient's allergies indicates:  No Known Allergies    No current facility-administered medications on file prior to encounter.      Current Outpatient Medications on File Prior to Encounter   Medication Sig Dispense Refill    aspirin (ECOTRIN) 81 MG EC tablet Take 81 mg by mouth once daily.      blood sugar diagnostic Strp To check BG 4 times daily, to use with insurance preferred meter 400 each 3    blood-glucose meter (ONETOUCH VERIO IQ METER MISC) OneTouch Verio IQ Meter      calcium carbonate (OS-EUSEBIA) 600 mg calcium (1,500 mg) Tab Take 600 mg by mouth once.      cholecalciferol, vitamin D3, (VITAMIN D3) 1,000 unit capsule Take 1 capsule (1,000 Units total) by mouth once daily.  0    lancets Misc To check BG 4 times daily, to use with insurance preferred meter 400 each 3    LANTUS SOLOSTAR U-100 INSULIN glargine 100 units/mL (3mL) SubQ pen INJECT 12 UNITS UNDER THE SKIN EVERY EVENING 45 mL 3    losartan (COZAAR) 25 MG tablet Take 1 tablet (25 mg total) by mouth once daily. 90 tablet 3    mercaptopurine (PURINETHOL) 50 mg tablet Take 2 tablets (100 mg total) by mouth once daily Dr. Russell. 60 tablet 11    multivitamin (ONE DAILY MULTIVITAMIN) per tablet Take 1 tablet by mouth once daily.      pen needle, diabetic (BD ULTRA-FINE NICK PEN NEEDLE) 32 gauge x  "5/32" Ndle For four times daily insulin injections. 450 each 3    pravastatin (PRAVACHOL) 10 MG tablet Take 1 tablet (10 mg total) by mouth once daily. 90 tablet 3       Family History   Problem Relation Age of Onset    Arthritis Mother     Ulcerative colitis Mother     Hypertension Father     Hyperlipidemia Father     Heart attack Father 60    Hypertension Sister     No Known Problems Daughter     No Known Problems Son     No Known Problems Sister     No Known Problems Daughter     Diabetes Maternal Grandfather         type 2    Diabetes Paternal Grandfather         type 2    Cancer Neg Hx        Social History     Socioeconomic History    Marital status:      Spouse name: Not on file    Number of children: 3    Years of education: Not on file    Highest education level: Not on file   Occupational History    Not on file   Social Needs    Financial resource strain: Not on file    Food insecurity:     Worry: Not on file     Inability: Not on file    Transportation needs:     Medical: Not on file     Non-medical: Not on file   Tobacco Use    Smoking status: Never Smoker    Smokeless tobacco: Never Used   Substance and Sexual Activity    Alcohol use: Yes     Frequency: 2-4 times a month     Drinks per session: 1 or 2     Comment: social    Drug use: No    Sexual activity: Yes     Partners: Male   Lifestyle    Physical activity:     Days per week: Not on file     Minutes per session: Not on file    Stress: Not on file   Relationships    Social connections:     Talks on phone: Not on file     Gets together: Not on file     Attends Holiness service: Not on file     Active member of club or organization: Not on file     Attends meetings of clubs or organizations: Not on file     Relationship status: Not on file   Other Topics Concern    Not on file   Social History Narrative    Lives in [place] with her  and triplets. They are 17. 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 [place]        Review of Systems -   Respiratory ROS: negative  Cardiovascular ROS: negative  Gastrointestinal ROS: negative  Musculoskeletal ROS: negative  Neurological ROS: negative    Physical Exam:  General: no distress  Head: normocephalic  Oropharynx clear, Mallampati   Lungs:  normal respiratory effort  Heart: regular rate  Abdomen: soft,  Non-tender  Extremities: warm and well perfused  Neuro awake and alert    ASA: II    Patient cleared for Anesthesia:  MAC    Anesthesia/Surgery risks, benefits, and alternative options discussed and understood by patient/family.

## 2019-07-18 NOTE — ANESTHESIA POSTPROCEDURE EVALUATION
Anesthesia Post Evaluation    Patient: Leida Hoyos    Procedure(s) Performed: Procedure(s) (LRB):  COLONOSCOPY (N/A)    Final Anesthesia Type: general  Patient location during evaluation: PACU  Patient participation: Yes- Able to Participate  Level of consciousness: awake and alert and oriented  Post-procedure vital signs: reviewed and stable  Pain management: adequate  Airway patency: patent  PONV status at discharge: No PONV  Anesthetic complications: no      Cardiovascular status: hemodynamically stable  Respiratory status: unassisted, spontaneous ventilation and room air  Hydration status: euvolemic  Follow-up not needed.          Vitals Value Taken Time   /65 7/18/2019 10:10 AM   Temp 36.6 °C (97.9 °F) 7/18/2019  9:40 AM   Pulse 63 7/18/2019 10:10 AM   Resp 16 7/18/2019 10:10 AM   SpO2 100 % 7/18/2019 10:10 AM         Event Time     Out of Recovery 10:17:52          Pain/Yin Score: Yin Score: 10 (7/18/2019 10:10 AM)

## 2019-07-25 ENCOUNTER — TELEPHONE (OUTPATIENT)
Dept: ENDOSCOPY | Facility: HOSPITAL | Age: 49
End: 2019-07-25

## 2019-09-30 LAB
LEFT EYE DM RETINOPATHY: NEGATIVE
RIGHT EYE DM RETINOPATHY: NEGATIVE

## 2019-12-11 DIAGNOSIS — E13.9 LADA (LATENT AUTOIMMUNE DIABETES IN ADULTS), MANAGED AS TYPE 1: ICD-10-CM

## 2019-12-12 RX ORDER — PEN NEEDLE, DIABETIC 30 GX3/16"
NEEDLE, DISPOSABLE MISCELLANEOUS
Qty: 360 EACH | Refills: 4 | Status: SHIPPED | OUTPATIENT
Start: 2019-12-12 | End: 2022-04-22 | Stop reason: SDUPTHER

## 2020-01-28 ENCOUNTER — PATIENT OUTREACH (OUTPATIENT)
Dept: ADMINISTRATIVE | Facility: OTHER | Age: 50
End: 2020-01-28

## 2020-01-29 ENCOUNTER — OFFICE VISIT (OUTPATIENT)
Dept: ENDOCRINOLOGY | Facility: CLINIC | Age: 50
End: 2020-01-29
Payer: COMMERCIAL

## 2020-01-29 ENCOUNTER — LAB VISIT (OUTPATIENT)
Dept: LAB | Facility: HOSPITAL | Age: 50
End: 2020-01-29
Attending: INTERNAL MEDICINE
Payer: COMMERCIAL

## 2020-01-29 VITALS
WEIGHT: 121.38 LBS | BODY MASS INDEX: 22.34 KG/M2 | DIASTOLIC BLOOD PRESSURE: 64 MMHG | SYSTOLIC BLOOD PRESSURE: 102 MMHG | HEIGHT: 62 IN

## 2020-01-29 DIAGNOSIS — E13.9 LADA (LATENT AUTOIMMUNE DIABETES IN ADULTS), MANAGED AS TYPE 1: ICD-10-CM

## 2020-01-29 DIAGNOSIS — E11.649 HYPOGLYCEMIA ASSOCIATED WITH DIABETES: ICD-10-CM

## 2020-01-29 LAB
ALBUMIN/CREAT UR: 9.6 UG/MG (ref 0–30)
CREAT UR-MCNC: 114 MG/DL (ref 15–325)
MICROALBUMIN UR DL<=1MG/L-MCNC: 11 UG/ML

## 2020-01-29 PROCEDURE — 99999 PR PBB SHADOW E&M-EST. PATIENT-LVL IV: CPT | Mod: PBBFAC,,, | Performed by: INTERNAL MEDICINE

## 2020-01-29 PROCEDURE — 3074F PR MOST RECENT SYSTOLIC BLOOD PRESSURE < 130 MM HG: ICD-10-PCS | Mod: CPTII,S$GLB,, | Performed by: INTERNAL MEDICINE

## 2020-01-29 PROCEDURE — 95251 CONT GLUC MNTR ANALYSIS I&R: CPT | Mod: S$GLB,,, | Performed by: INTERNAL MEDICINE

## 2020-01-29 PROCEDURE — 99214 PR OFFICE/OUTPT VISIT, EST, LEVL IV, 30-39 MIN: ICD-10-PCS | Mod: 25,S$GLB,, | Performed by: INTERNAL MEDICINE

## 2020-01-29 PROCEDURE — 3078F DIAST BP <80 MM HG: CPT | Mod: CPTII,S$GLB,, | Performed by: INTERNAL MEDICINE

## 2020-01-29 PROCEDURE — 3078F PR MOST RECENT DIASTOLIC BLOOD PRESSURE < 80 MM HG: ICD-10-PCS | Mod: CPTII,S$GLB,, | Performed by: INTERNAL MEDICINE

## 2020-01-29 PROCEDURE — 3074F SYST BP LT 130 MM HG: CPT | Mod: CPTII,S$GLB,, | Performed by: INTERNAL MEDICINE

## 2020-01-29 PROCEDURE — 99999 PR PBB SHADOW E&M-EST. PATIENT-LVL IV: ICD-10-PCS | Mod: PBBFAC,,, | Performed by: INTERNAL MEDICINE

## 2020-01-29 PROCEDURE — 3008F BODY MASS INDEX DOCD: CPT | Mod: CPTII,S$GLB,, | Performed by: INTERNAL MEDICINE

## 2020-01-29 PROCEDURE — 95251 PR GLUCOSE MONITOR, 72 HOUR, PHYS INTERP: ICD-10-PCS | Mod: S$GLB,,, | Performed by: INTERNAL MEDICINE

## 2020-01-29 PROCEDURE — 3008F PR BODY MASS INDEX (BMI) DOCUMENTED: ICD-10-PCS | Mod: CPTII,S$GLB,, | Performed by: INTERNAL MEDICINE

## 2020-01-29 PROCEDURE — 99214 OFFICE O/P EST MOD 30 MIN: CPT | Mod: 25,S$GLB,, | Performed by: INTERNAL MEDICINE

## 2020-01-29 PROCEDURE — 82043 UR ALBUMIN QUANTITATIVE: CPT

## 2020-01-29 NOTE — PROGRESS NOTES
Subjective:      Patient ID: Leida Hoyos is a 49 y.o. female.    Chief Complaint:  Diabetes      History of Present Illness  Ms. Hoyos is a 49 y.o. female who is here for a follow-up visit for evaluation of CHEMA diagnosed three years ago, managed with MDI and DEXCOM G6. Stopped using dexcom for a few months due to expense, but blood sugars increased and was having a hard time controlling. Restarted dexcom and is much better.      Since last visit, she reports good compliance with injections. Holidays were very hard.    Hypoglycemia occurs infrequently, last episode last night. BG 60, started feeling sweaty, lightheaded and very hot. Checked her blood sugar on dexcom (did not hear alarms due to being at a basketball game).   Treats with several peanut butter crackers.     Denies difficulties with injection sites.      Diagnostic labs:  GEETA Ab+, Cpeptide 0.9, BG of 154 mg/dl  Single episode of DKA soon after diagnosis     Current regimen:  Lantus 12 units at bedtime  Humalog 1:10 ICR, 1:65 ISF  A1C 7.1%     Complications include:  Episode of DKA two years ago, started meal time insulin following episode of DKA    ADA STANDARDS of CARE:        ACE inhibitor of angiotensin II receptor blocker:  cozaae 25 mg daily         Statin drug:  Pravastatin 10 mg         Low dose ASA: 81 mg      Eye exam within last year:         Dental exam:         Flu shot:        Pneumonia vaccine:        Microalbumin:     1/19/2020 - 1/29/2020  dexcom G6  [id number]    Average  mg/dl  Standard deviation: 64  Coefficient of variation: 34.6%  In target range 55%  > 180 44.1%  54: 0%    Just started re-wearing CGM sensor 1/20/2020    Most days doing well however dinner time blood sugars fluctuate, most likely as she is not in charge of dinner meals.   Will pay more attention     High BG yesterday, had cake and did not use insulin.   Overcorrected 357 with four units instead of subtracting goal of 100 and correcting for 257 (3  "units).      Review of Systems   Constitutional: Negative for fever.   HENT: Negative for congestion.    Eyes: Negative for visual disturbance.   Respiratory: Negative for shortness of breath.    Cardiovascular: Negative for chest pain.   Gastrointestinal: Negative for abdominal pain.   Genitourinary: Negative for dysuria.   Musculoskeletal: Negative for arthralgias.   Skin: Negative for rash.   Neurological: Negative for weakness.   Hematological: Does not bruise/bleed easily.   Psychiatric/Behavioral: Negative for sleep disturbance.       Objective:   Physical Exam  Vitals:    01/29/20 1104   BP: 102/64   Weight: 55 kg (121 lb 5.8 oz)   Height: 5' 2" (1.575 m)       BP Readings from Last 3 Encounters:   01/29/20 102/64   07/18/19 137/65   04/24/19 136/64     Wt Readings from Last 1 Encounters:   01/29/20 1104 55 kg (121 lb 5.8 oz)         Body mass index is 22.2 kg/m².    Lab Review:   Lab Results   Component Value Date    HGBA1C 8.0 (H) 01/29/2020     Lab Results   Component Value Date    CHOL 178 01/29/2020    HDL 83 (H) 01/29/2020    LDLCALC 83.4 01/29/2020    TRIG 58 01/29/2020    CHOLHDL 46.6 01/29/2020     Lab Results   Component Value Date     01/29/2020    K 4.7 01/29/2020     01/29/2020    CO2 29 01/29/2020     (H) 01/29/2020    BUN 20 01/29/2020    CREATININE 0.8 01/29/2020    CALCIUM 10.3 01/29/2020    PROT 7.4 01/29/2020    ALBUMIN 3.9 01/29/2020    BILITOT 0.3 01/29/2020    ALKPHOS 92 01/29/2020    AST 29 01/29/2020    ALT 27 01/29/2020    ANIONGAP 4 (L) 01/29/2020    ESTGFRAFRICA >60.0 01/29/2020    EGFRNONAA >60.0 01/29/2020    TSH 2.060 04/08/2019     Results for GIORGIO SHIELDS (MRN [id number]) as of 1/29/2020 10:54   Ref. 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 Will be more mindful of carbohydrate intake   Knows BG will be higher due to not wearing sensor for a few months   Reviewed calculation of correction doses of insulin to avoid hypoglycemia   Discussed importance of using simple calculations and available date (looking up carb amounts in different foods)    The CGM reports was printed and placed for scanning.           Hypoglycemia associated with diabetes  Post correction   Reviewed tung    Does very well when she wears sensor

## 2020-01-29 NOTE — ASSESSMENT & PLAN NOTE
Lantus 12 units   Continue ICHO 1:10 and correction 1:65    Recommend to patient the following changes:  May need to adjust meal time insulin and correction with dinner time. Will be more mindful of carbohydrate intake   Knows BG will be higher due to not wearing sensor for a few months   Reviewed calculation of correction doses of insulin to avoid hypoglycemia   Discussed importance of using simple calculations and available date (looking up carb amounts in different foods)    The CGM reports was printed and placed for scanning.

## 2020-02-10 ENCOUNTER — PATIENT MESSAGE (OUTPATIENT)
Dept: ENDOCRINOLOGY | Facility: CLINIC | Age: 50
End: 2020-02-10

## 2020-02-10 DIAGNOSIS — E83.52 HYPERCALCEMIA: ICD-10-CM

## 2020-02-10 DIAGNOSIS — E13.9 LADA (LATENT AUTOIMMUNE DIABETES IN ADULTS), MANAGED AS TYPE 1: Primary | ICD-10-CM

## 2020-02-12 NOTE — TELEPHONE ENCOUNTER
Dexcom data reviewed today.   Dates include 1/30 to 2/12.   Significant for average glucose of 165 mg/dl.   Patterns of highs afternoon and evening, generally between 9 - 11 PM - snacking, later dinner  Patterns of lows post correction    Coefficient of variation (goal is < 33%): 28.3%  SD (Goal is < 50) : 47  Time in range (Goal is > 75%): 58%  Time spent < 54 mg/dl: 0%    Recommend to patient the following changes:  Snack time coverage in the evening 1:15    Also noted to have ;mildly elevated serum calcium levels, 10.1 - 10.3 mg/dl  Check labs at next visit

## 2020-06-12 DIAGNOSIS — Z12.39 BREAST CANCER SCREENING: ICD-10-CM

## 2020-06-22 ENCOUNTER — LAB VISIT (OUTPATIENT)
Dept: LAB | Facility: HOSPITAL | Age: 50
End: 2020-06-22
Attending: INTERNAL MEDICINE
Payer: COMMERCIAL

## 2020-06-22 DIAGNOSIS — E83.52 HYPERCALCEMIA: ICD-10-CM

## 2020-06-22 DIAGNOSIS — E13.9 LADA (LATENT AUTOIMMUNE DIABETES IN ADULTS), MANAGED AS TYPE 1: ICD-10-CM

## 2020-06-22 LAB
25(OH)D3+25(OH)D2 SERPL-MCNC: 19 NG/ML (ref 30–96)
ALBUMIN SERPL BCP-MCNC: 3.6 G/DL (ref 3.5–5.2)
ANION GAP SERPL CALC-SCNC: 7 MMOL/L (ref 8–16)
BUN SERPL-MCNC: 15 MG/DL (ref 6–20)
CALCIUM SERPL-MCNC: 9.5 MG/DL (ref 8.7–10.5)
CHLORIDE SERPL-SCNC: 103 MMOL/L (ref 95–110)
CO2 SERPL-SCNC: 28 MMOL/L (ref 23–29)
CREAT SERPL-MCNC: 0.8 MG/DL (ref 0.5–1.4)
EST. GFR  (AFRICAN AMERICAN): >60 ML/MIN/1.73 M^2
EST. GFR  (NON AFRICAN AMERICAN): >60 ML/MIN/1.73 M^2
ESTIMATED AVG GLUCOSE: 166 MG/DL (ref 68–131)
GLUCOSE SERPL-MCNC: 198 MG/DL (ref 70–110)
HBA1C MFR BLD HPLC: 7.4 % (ref 4–5.6)
PHOSPHATE SERPL-MCNC: 2.9 MG/DL (ref 2.7–4.5)
POTASSIUM SERPL-SCNC: 4.9 MMOL/L (ref 3.5–5.1)
PTH-INTACT SERPL-MCNC: 120 PG/ML (ref 9–77)
SODIUM SERPL-SCNC: 138 MMOL/L (ref 136–145)

## 2020-06-22 PROCEDURE — 83970 ASSAY OF PARATHORMONE: CPT

## 2020-06-22 PROCEDURE — 82306 VITAMIN D 25 HYDROXY: CPT

## 2020-06-22 PROCEDURE — 83036 HEMOGLOBIN GLYCOSYLATED A1C: CPT

## 2020-06-22 PROCEDURE — 80069 RENAL FUNCTION PANEL: CPT

## 2020-06-22 PROCEDURE — 36415 COLL VENOUS BLD VENIPUNCTURE: CPT

## 2020-06-25 DIAGNOSIS — E13.9 LADA (LATENT AUTOIMMUNE DIABETES IN ADULTS), MANAGED AS TYPE 1: ICD-10-CM

## 2020-06-25 RX ORDER — INSULIN LISPRO 100 [IU]/ML
INJECTION, SOLUTION INTRAVENOUS; SUBCUTANEOUS
Qty: 15 ML | Refills: 11 | Status: SHIPPED | OUTPATIENT
Start: 2020-06-25 | End: 2021-07-14

## 2020-06-29 ENCOUNTER — PATIENT OUTREACH (OUTPATIENT)
Dept: ADMINISTRATIVE | Facility: OTHER | Age: 50
End: 2020-06-29

## 2020-06-29 DIAGNOSIS — E11.649 HYPOGLYCEMIA ASSOCIATED WITH DIABETES: Primary | ICD-10-CM

## 2020-06-29 NOTE — PROGRESS NOTES
Subjective:      Patient ID: Leida Hoyos is a 50 y.o. female.    Chief Complaint:  Diabetes      History of Present Illness  Ms. Hoyos is a 50 y.o. female who is here for a follow-up visit for evaluation of CHEMA diagnosed three years ago (episode of DKA), managed with MDI and DEXCOM G6. Initially treated as type 2 DM four years prior to re-diagnosis with T1DM.  Restarted dexcom a few days later and is much better.     Current regimen:  Lantus 12 units at bedtime  Humalog 1:10 ICR, 1:65 ISF  A1C 7.4% --> previous A1C is 8%.     Since last visit, she reports good compliance with injections.    Hypoglycemia occurs infrequently, can't recall last episode.   Symptoms include: tremulousness, lightheaded and heat sensation.  Treats with juice.      Denies difficulties with injection sites. none  Glucagon emergency kit: no, discussed at length. No severe or significant hypoglycemia. Uses sensor although recommended daily use.   Medic alert bracelet: does not have, explained rationale    DEXCOM G6 data reviewed today.   Share code: [email or web address]  Dates:6/23/2020-6/30/2020  No issues with sensor wear/allergic reactions to adhesive.   Ran out of supplies for a few days, but restarted about one week ago.     Significant for average glucose of  179  GMI:   Patterns of highs: two days with generalized hyperglycemia but had restarted wearing sensor.   3 - 6 PM   Patterns of lows: none    Coefficient of variation (goal is < 33%):32.4  SD (Goal is < 50) : 58  Time in range (Goal is > 75%): 50%  Time spent < 54 mg/dl:0%    Review of Systems   Constitutional: Negative for fever.   HENT: Negative for congestion.    Eyes: Negative for visual disturbance.   Respiratory: Negative for shortness of breath.    Cardiovascular: Negative for chest pain.   Gastrointestinal: Negative for abdominal pain.   Genitourinary: Negative for dysuria.   Musculoskeletal: Negative for arthralgias.   Skin: Negative for rash.   Neurological: Negative  "for weakness.   Hematological: Does not bruise/bleed easily.   Psychiatric/Behavioral: Negative for sleep disturbance.       Objective:   Physical Exam  Constitutional:       General: She is not in acute distress.     Appearance: She is well-developed.   HENT:      Head: Normocephalic and atraumatic.      Mouth/Throat:      Pharynx: No oropharyngeal exudate.   Eyes:      General: No scleral icterus.     Conjunctiva/sclera: Conjunctivae normal.      Pupils: Pupils are equal, round, and reactive to light.   Neck:      Musculoskeletal: Normal range of motion and neck supple.      Thyroid: No thyromegaly.      Trachea: No tracheal deviation.   Cardiovascular:      Rate and Rhythm: Normal rate and regular rhythm.      Heart sounds: Normal heart sounds.   Pulmonary:      Effort: Pulmonary effort is normal.      Breath sounds: Normal breath sounds.   Abdominal:      General: Bowel sounds are normal. There is no distension.      Palpations: Abdomen is soft.      Tenderness: There is no abdominal tenderness.      Comments: Sites of insulin administration are normal appearing.   Musculoskeletal: Normal range of motion.         General: No tenderness.   Lymphadenopathy:      Cervical: No cervical adenopathy.   Skin:     General: Skin is warm and dry.      Comments: FOOT EXAM:  Visual inspection is normal, no abrasions, bruising or calluses.   Microfilament test is intact b/l.   Vibratory sense is intact b/l.   Distal pulses are present b/l.    Neurological:      Mental Status: She is alert and oriented to person, place, and time.      Cranial Nerves: No cranial nerve deficit.      Deep Tendon Reflexes: Reflexes are normal and symmetric.       Vitals:    06/30/20 1405   BP: 110/62   Pulse: 82   Weight: 54.9 kg (120 lb 14.8 oz)   Height: 5' 2" (1.575 m)       BP Readings from Last 3 Encounters:   06/30/20 110/62   01/29/20 102/64   07/18/19 137/65     Wt Readings from Last 1 Encounters:   06/30/20 1405 54.9 kg (120 lb 14.8 oz) "         Body mass index is 22.12 kg/m².    Lab Review:   Lab Results   Component Value Date    HGBA1C 7.4 (H) 06/22/2020     Lab Results   Component Value Date    CHOL 178 01/29/2020    HDL 83 (H) 01/29/2020    LDLCALC 83.4 01/29/2020    TRIG 58 01/29/2020    CHOLHDL 46.6 01/29/2020     Lab Results   Component Value Date     06/22/2020    K 4.9 06/22/2020     06/22/2020    CO2 28 06/22/2020     (H) 06/22/2020    BUN 15 06/22/2020    CREATININE 0.8 06/22/2020    CALCIUM 9.5 06/22/2020    PROT 7.4 01/29/2020    ALBUMIN 3.6 06/22/2020    BILITOT 0.3 01/29/2020    ALKPHOS 92 01/29/2020    AST 29 01/29/2020    ALT 27 01/29/2020    ANIONGAP 7 (L) 06/22/2020    ESTGFRAFRICA >60.0 06/22/2020    EGFRNONAA >60.0 06/22/2020    TSH 2.060 04/08/2019         Assessment and Plan     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), managed as type 1  No changes to current regimen   A1C improving   No hypoglycemia    Medic alert tag  Repeat labs in six months.     Vitamin D deficiency  Vitamin D levels of 19   Goal is 30 - 32  Start prescription dose ergo 50,000 units once a week for 3 months   Daily dose 1400 - 2000 IUs daily       Elevated PTH with normal calcium and phosphorus   In the past has had higher calcium levels  Will repeat with normal vitamin D     Will refill losartan 25 mg   Needs f/u with PCP

## 2020-06-30 ENCOUNTER — OFFICE VISIT (OUTPATIENT)
Dept: ENDOCRINOLOGY | Facility: CLINIC | Age: 50
End: 2020-06-30
Payer: COMMERCIAL

## 2020-06-30 VITALS
SYSTOLIC BLOOD PRESSURE: 110 MMHG | DIASTOLIC BLOOD PRESSURE: 62 MMHG | HEIGHT: 62 IN | BODY MASS INDEX: 22.26 KG/M2 | WEIGHT: 120.94 LBS | HEART RATE: 82 BPM

## 2020-06-30 DIAGNOSIS — E11.59 HYPERTENSION ASSOCIATED WITH DIABETES: ICD-10-CM

## 2020-06-30 DIAGNOSIS — E55.9 VITAMIN D DEFICIENCY: ICD-10-CM

## 2020-06-30 DIAGNOSIS — I15.2 HYPERTENSION ASSOCIATED WITH DIABETES: ICD-10-CM

## 2020-06-30 DIAGNOSIS — E13.9 LADA (LATENT AUTOIMMUNE DIABETES IN ADULTS), MANAGED AS TYPE 1: Primary | ICD-10-CM

## 2020-06-30 DIAGNOSIS — I10 ESSENTIAL HYPERTENSION: ICD-10-CM

## 2020-06-30 PROCEDURE — 3078F DIAST BP <80 MM HG: CPT | Mod: CPTII,S$GLB,, | Performed by: INTERNAL MEDICINE

## 2020-06-30 PROCEDURE — 3051F HG A1C>EQUAL 7.0%<8.0%: CPT | Mod: CPTII,S$GLB,, | Performed by: INTERNAL MEDICINE

## 2020-06-30 PROCEDURE — 3078F PR MOST RECENT DIASTOLIC BLOOD PRESSURE < 80 MM HG: ICD-10-PCS | Mod: CPTII,S$GLB,, | Performed by: INTERNAL MEDICINE

## 2020-06-30 PROCEDURE — 99999 PR PBB SHADOW E&M-EST. PATIENT-LVL III: CPT | Mod: PBBFAC,,, | Performed by: INTERNAL MEDICINE

## 2020-06-30 PROCEDURE — 99214 OFFICE O/P EST MOD 30 MIN: CPT | Mod: S$GLB,,, | Performed by: INTERNAL MEDICINE

## 2020-06-30 PROCEDURE — 99999 PR PBB SHADOW E&M-EST. PATIENT-LVL III: ICD-10-PCS | Mod: PBBFAC,,, | Performed by: INTERNAL MEDICINE

## 2020-06-30 PROCEDURE — 3008F PR BODY MASS INDEX (BMI) DOCUMENTED: ICD-10-PCS | Mod: CPTII,S$GLB,, | Performed by: INTERNAL MEDICINE

## 2020-06-30 PROCEDURE — 3074F PR MOST RECENT SYSTOLIC BLOOD PRESSURE < 130 MM HG: ICD-10-PCS | Mod: CPTII,S$GLB,, | Performed by: INTERNAL MEDICINE

## 2020-06-30 PROCEDURE — 3008F BODY MASS INDEX DOCD: CPT | Mod: CPTII,S$GLB,, | Performed by: INTERNAL MEDICINE

## 2020-06-30 PROCEDURE — 3074F SYST BP LT 130 MM HG: CPT | Mod: CPTII,S$GLB,, | Performed by: INTERNAL MEDICINE

## 2020-06-30 PROCEDURE — 99214 PR OFFICE/OUTPT VISIT, EST, LEVL IV, 30-39 MIN: ICD-10-PCS | Mod: S$GLB,,, | Performed by: INTERNAL MEDICINE

## 2020-06-30 PROCEDURE — 3051F PR MOST RECENT HEMOGLOBIN A1C LEVEL 7.0 - < 8.0%: ICD-10-PCS | Mod: CPTII,S$GLB,, | Performed by: INTERNAL MEDICINE

## 2020-06-30 RX ORDER — ERGOCALCIFEROL 1.25 MG/1
50000 CAPSULE ORAL
Qty: 12 CAPSULE | Refills: 0 | Status: SHIPPED | OUTPATIENT
Start: 2020-06-30 | End: 2021-01-26

## 2020-06-30 RX ORDER — LOSARTAN POTASSIUM 25 MG/1
25 TABLET ORAL DAILY
Qty: 90 TABLET | Refills: 3 | Status: SHIPPED | OUTPATIENT
Start: 2020-06-30 | End: 2022-01-27 | Stop reason: SDUPTHER

## 2020-06-30 NOTE — PROGRESS NOTES
Requested updates within Care Everywhere.  Patient's chart was reviewed for overdue NED topics.  Immunizations reconciled.    Orders placed: DM eye photo

## 2020-06-30 NOTE — ASSESSMENT & PLAN NOTE
No changes to current regimen   A1C improving   No hypoglycemia    Medic alert tag  Repeat labs in six months.

## 2020-06-30 NOTE — ASSESSMENT & PLAN NOTE
Vitamin D levels of 19   Goal is 30 - 32  Start prescription dose ergo 50,000 units once a week for 3 months   Daily dose 1400 - 2000 IUs daily       Elevated PTH with normal calcium and phosphorus   In the past has had higher calcium levels  Will repeat with normal vitamin D

## 2020-07-14 ENCOUNTER — TELEPHONE (OUTPATIENT)
Dept: ADMINISTRATIVE | Facility: HOSPITAL | Age: 50
End: 2020-07-14

## 2020-07-14 ENCOUNTER — PATIENT OUTREACH (OUTPATIENT)
Dept: ADMINISTRATIVE | Facility: HOSPITAL | Age: 50
End: 2020-07-14

## 2020-07-28 ENCOUNTER — OFFICE VISIT (OUTPATIENT)
Dept: FAMILY MEDICINE | Facility: CLINIC | Age: 50
End: 2020-07-28
Payer: COMMERCIAL

## 2020-07-28 VITALS
HEART RATE: 82 BPM | BODY MASS INDEX: 23.22 KG/M2 | OXYGEN SATURATION: 98 % | DIASTOLIC BLOOD PRESSURE: 72 MMHG | HEIGHT: 62 IN | SYSTOLIC BLOOD PRESSURE: 126 MMHG | TEMPERATURE: 98 F | WEIGHT: 126.19 LBS

## 2020-07-28 DIAGNOSIS — E13.9 LADA (LATENT AUTOIMMUNE DIABETES IN ADULTS), MANAGED AS TYPE 1: Primary | ICD-10-CM

## 2020-07-28 DIAGNOSIS — K75.4 AUTOIMMUNE HEPATITIS: ICD-10-CM

## 2020-07-28 DIAGNOSIS — E11.59 HYPERTENSION ASSOCIATED WITH DIABETES: ICD-10-CM

## 2020-07-28 DIAGNOSIS — E78.5 HYPERLIPIDEMIA DUE TO TYPE 1 DIABETES MELLITUS: ICD-10-CM

## 2020-07-28 DIAGNOSIS — Z11.59 SCREENING FOR VIRAL DISEASE: ICD-10-CM

## 2020-07-28 DIAGNOSIS — Z11.4 SCREENING FOR HIV (HUMAN IMMUNODEFICIENCY VIRUS): ICD-10-CM

## 2020-07-28 DIAGNOSIS — E11.649 HYPOGLYCEMIA ASSOCIATED WITH DIABETES: ICD-10-CM

## 2020-07-28 DIAGNOSIS — I15.2 HYPERTENSION ASSOCIATED WITH DIABETES: ICD-10-CM

## 2020-07-28 DIAGNOSIS — E10.69 HYPERLIPIDEMIA DUE TO TYPE 1 DIABETES MELLITUS: ICD-10-CM

## 2020-07-28 DIAGNOSIS — E55.9 VITAMIN D DEFICIENCY: ICD-10-CM

## 2020-07-28 PROBLEM — Z53.09 CONTRAINDICATION TO STATIN MEDICATION: Status: RESOLVED | Noted: 2020-07-28 | Resolved: 2020-07-28

## 2020-07-28 PROBLEM — Z53.09 CONTRAINDICATION TO STATIN MEDICATION: Status: ACTIVE | Noted: 2020-07-28

## 2020-07-28 PROCEDURE — 99999 PR PBB SHADOW E&M-EST. PATIENT-LVL IV: CPT | Mod: PBBFAC,,, | Performed by: INTERNAL MEDICINE

## 2020-07-28 PROCEDURE — 99999 PR PBB SHADOW E&M-EST. PATIENT-LVL IV: ICD-10-PCS | Mod: PBBFAC,,, | Performed by: INTERNAL MEDICINE

## 2020-07-28 PROCEDURE — 3051F HG A1C>EQUAL 7.0%<8.0%: CPT | Mod: CPTII,S$GLB,, | Performed by: INTERNAL MEDICINE

## 2020-07-28 PROCEDURE — 3074F PR MOST RECENT SYSTOLIC BLOOD PRESSURE < 130 MM HG: ICD-10-PCS | Mod: CPTII,S$GLB,, | Performed by: INTERNAL MEDICINE

## 2020-07-28 PROCEDURE — 3051F PR MOST RECENT HEMOGLOBIN A1C LEVEL 7.0 - < 8.0%: ICD-10-PCS | Mod: CPTII,S$GLB,, | Performed by: INTERNAL MEDICINE

## 2020-07-28 PROCEDURE — 3008F PR BODY MASS INDEX (BMI) DOCUMENTED: ICD-10-PCS | Mod: CPTII,S$GLB,, | Performed by: INTERNAL MEDICINE

## 2020-07-28 PROCEDURE — 3078F PR MOST RECENT DIASTOLIC BLOOD PRESSURE < 80 MM HG: ICD-10-PCS | Mod: CPTII,S$GLB,, | Performed by: INTERNAL MEDICINE

## 2020-07-28 PROCEDURE — 3074F SYST BP LT 130 MM HG: CPT | Mod: CPTII,S$GLB,, | Performed by: INTERNAL MEDICINE

## 2020-07-28 PROCEDURE — 3078F DIAST BP <80 MM HG: CPT | Mod: CPTII,S$GLB,, | Performed by: INTERNAL MEDICINE

## 2020-07-28 PROCEDURE — 99214 OFFICE O/P EST MOD 30 MIN: CPT | Mod: S$GLB,,, | Performed by: INTERNAL MEDICINE

## 2020-07-28 PROCEDURE — 3008F BODY MASS INDEX DOCD: CPT | Mod: CPTII,S$GLB,, | Performed by: INTERNAL MEDICINE

## 2020-07-28 PROCEDURE — 99214 PR OFFICE/OUTPT VISIT, EST, LEVL IV, 30-39 MIN: ICD-10-PCS | Mod: S$GLB,,, | Performed by: INTERNAL MEDICINE

## 2020-07-28 RX ORDER — PRAVASTATIN SODIUM 10 MG/1
10 TABLET ORAL DAILY
Qty: 90 TABLET | Refills: 3 | Status: SHIPPED | OUTPATIENT
Start: 2020-07-28 | End: 2022-01-27 | Stop reason: SDUPTHER

## 2020-07-28 NOTE — ASSESSMENT & PLAN NOTE
Sees Dr. Russell, takes mercaptopurine, no issues with side effects.  Drinks very rarely, 1-2 times per month.

## 2020-07-28 NOTE — ASSESSMENT & PLAN NOTE
Dx'ed in 2013.  Sees Dr. Kurtz, A1C 7.4 in 6/2020.  On Lantus 12 units at night with Humalog with meals.

## 2020-07-28 NOTE — PROGRESS NOTES
[facility] Note    Chief Complaint      Chief Complaint   Patient presents with    Establish Care     pt here to est care with a new pcp      History of Present Illness      Leida Hoyos is a 50 y.o. female who presents today to establish care for AI hepatitis, HTN.  Patient comes to appointment alone.   GYN: Laurie Menjivar, Endo: Dr. Kurtz, GI: Dr. Russell, Optho: Dr. Ferguson    Problem List Items Addressed This Visit     Autoimmune hepatitis    Current Assessment & Plan     Sees Dr. Russell, takes mercaptopurine, no issues with side effects.  Drinks very rarely, 1-2 times per month.         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  Sees Dr. Kurtz, A1C 7.4 in 6/2020.  On Lantus 12 units at night with Humalog with meals.         Hypoglycemia associated with diabetes    Current Assessment & Plan     No issues with hypoglycemia recently.  Has Dexcom CGM.         Vitamin D deficiency    Current Assessment & Plan     Level 19 in 6/2020, on ergo for replacement.         Hypertension associated with diabetes    Current Assessment & Plan     BP controlled on losartan 25 mg daily.  No CP/SOB/HA.           Other Visit Diagnoses     Hyperlipidemia due to type 1 diabetes mellitus        Relevant Medications    pravastatin (PRAVACHOL) 10 MG tablet    Screening for viral disease        Relevant Orders    Hepatitis C Antibody    Screening for HIV (human immunodeficiency virus)        Relevant Orders    HIV 1/2 Ag/Ab (4th Gen)          Health Maintenance   Topic Date Due    Hepatitis C Screening  1970    Eye Exam  09/30/2020    Hemoglobin A1c  12/22/2020    Lipid Panel  01/29/2021    Low Dose Statin  07/28/2021    Foot Exam  07/28/2021    Mammogram  09/17/2021    TETANUS VACCINE  03/28/2026    Pneumococcal Vaccine (Medium Risk)  Completed       Past Medical History:   Diagnosis Date    Autoimmune  hepatitis     managed by Dr. uRssell on mercaptopurine    Diabetes mellitus type II     Hypertension        Past Surgical History:   Procedure Laterality Date     SECTION, CLASSIC      COLONOSCOPY N/A 2019    Procedure: COLONOSCOPY;  Surgeon: Dipesh Victoria MD;  Location: [facility] ([address] [place] [address]);  Service: Endoscopy;  Laterality: N/A;    FRACTURE SURGERY Left     leg     MOUTH SURGERY         family history includes Arthritis in her mother; Diabetes in her maternal grandfather and paternal grandfather; Heart attack (age of onset: 60) in her father; Hyperlipidemia in her father; Hypertension in her father and sister; No Known Problems in her daughter, daughter, sister, and son; Ulcerative colitis in her mother.    Social History     Tobacco Use    Smoking status: Never Smoker    Smokeless tobacco: Never Used   Substance Use Topics    Alcohol use: Yes     Frequency: 2-4 times a month     Drinks per session: 1 or 2     Binge frequency: Never     Comment: social    Drug use: No       Review of Systems   Constitutional: Negative for chills and fever.   HENT: Negative for congestion, hearing loss and sore throat.    Eyes: Negative for blurred vision and discharge.   Respiratory: Negative for cough, shortness of breath and wheezing.    Cardiovascular: Negative for chest pain and palpitations.   Gastrointestinal: Negative for blood in stool, constipation, diarrhea, nausea and vomiting.   Genitourinary: Negative for dysuria and hematuria.   Musculoskeletal: Negative for falls, myalgias and neck pain.   Skin: Negative for itching and rash.   Neurological: Negative for dizziness, weakness and headaches.   Endo/Heme/Allergies: Negative for polydipsia.        Outpatient Encounter Medications as of 2020   Medication Sig Dispense Refill    aspirin (ECOTRIN) 81 MG EC tablet Take 81 mg by mouth once daily.      calcium carbonate (OS-EUSEBIA) 600 mg calcium (1,500 mg) Tab Take 600 mg by mouth once.  "     cholecalciferol, vitamin D3, (VITAMIN D3) 1,000 unit capsule Take 1 capsule (1,000 Units total) by mouth once daily.  0    ergocalciferol (ERGOCALCIFEROL) 50,000 unit Cap Take 1 capsule (50,000 Units total) by mouth every 7 days. 12 capsule 0    insulin lispro (HUMALOG KWIKPEN INSULIN) 100 unit/mL pen Use 1:10 ICR and 1:65 ISF   Uses about 18 units daily 15 mL 11    lancets Misc To check BG 4 times daily, to use with insurance preferred meter 400 each 3    LANTUS SOLOSTAR U-100 INSULIN glargine 100 units/mL (3mL) SubQ pen INJECT 12 UNITS UNDER THE SKIN EVERY EVENING 45 mL 3    losartan (COZAAR) 25 MG tablet Take 1 tablet (25 mg total) by mouth once daily. 90 tablet 3    multivitamin (ONE DAILY MULTIVITAMIN) per tablet Take 1 tablet by mouth once daily.      pen needle, diabetic (BD ULTRA-FINE NICK PEN NEEDLE) 32 gauge x 5/32" Ndle USE FOUR TIMES A DAY FOR INSULIN INJECTIONS 360 each 4    blood-glucose meter (ONETOUCH VERIO IQ METER MISC) OneTouch Verio IQ Meter      mercaptopurine (PURINETHOL) 50 mg tablet Take 2 tablets (100 mg total) by mouth once daily Dr. Russell. 60 tablet 11    pravastatin (PRAVACHOL) 10 MG tablet Take 1 tablet (10 mg total) by mouth once daily. 90 tablet 3    [DISCONTINUED] blood sugar diagnostic Strp To check BG 4 times daily, to use with insurance preferred meter (Patient not taking: Reported on 7/28/2020) 400 each 3    [DISCONTINUED] pravastatin (PRAVACHOL) 10 MG tablet Take 1 tablet (10 mg total) by mouth once daily. 90 tablet 3     No facility-administered encounter medications on file as of 7/28/2020.         Review of patient's allergies indicates:  No Known Allergies    Physical Exam      Vital Signs  Temp: 97.9 °F (36.6 °C)  Temp src: Oral  Pulse: 82  SpO2: 98 %  BP: 126/72  BP Location: Left arm  Patient Position: Sitting  Pain Score: 0-No pain  Height and Weight  Height: 5' 2" (157.5 cm)  Weight: 57.2 kg (126 lb 3.4 oz)  BSA (Calculated - sq m): 1.58 sq " meters  BMI (Calculated): 23.1  Weight in (lb) to have BMI = 25: 136.4]    Physical Exam  Constitutional:       Appearance: She is well-developed.   HENT:      Head: Normocephalic and atraumatic.      Right Ear: External ear normal.      Left Ear: External ear normal.   Eyes:      General:         Right eye: No discharge.         Left eye: No discharge.   Neck:      Musculoskeletal: Normal range of motion.      Thyroid: No thyromegaly.   Cardiovascular:      Rate and Rhythm: Normal rate and regular rhythm.      Pulses:           Dorsalis pedis pulses are 2+ on the right side and 2+ on the left side.      Heart sounds: Normal heart sounds. No murmur.   Pulmonary:      Effort: Pulmonary effort is normal. No respiratory distress.      Breath sounds: Normal breath sounds.   Abdominal:      General: Bowel sounds are normal. There is no distension.      Palpations: Abdomen is soft.      Tenderness: There is no abdominal tenderness.   Musculoskeletal: Normal range of motion.         General: No deformity.      Right foot: Normal range of motion. No deformity.      Left foot: Normal range of motion. No deformity.   Feet:      Right foot:      Protective Sensation: 5 sites tested. 5 sites sensed.      Skin integrity: No ulcer or blister.      Left foot:      Protective Sensation: 5 sites tested. 5 sites sensed.      Skin integrity: No ulcer or blister.   Skin:     General: Skin is warm and dry.      Findings: No rash.   Neurological:      Mental Status: She is alert and oriented to person, place, and time.   Psychiatric:         Behavior: Behavior normal.          Laboratory:  CBC:  No results for input(s): WBC, RBC, HGB, HCT, PLT, MCV, MCH, MCHC in the last 2160 hours.  CMP:  Recent Labs   Lab Result Units 06/22/20  1010   Glucose mg/dL 198*   Calcium mg/dL 9.5   Albumin g/dL 3.6   Sodium mmol/L 138   Potassium mmol/L 4.9   CO2 mmol/L 28   Chloride mmol/L 103   BUN, Bld mg/dL 15     URINALYSIS:  No results for input(s):  COLORU, CLARITYU, SPECGRAV, PHUR, PROTEINUA, GLUCOSEU, BILIRUBINCON, BLOODU, WBCU, RBCU, BACTERIA, MUCUS, NITRITE, LEUKOCYTESUR, UROBILINOGEN, HYALINECASTS in the last 2160 hours.   LIPIDS:  No results for input(s): TSH, HDL, CHOL, TRIG, LDLCALC, CHOLHDL, NONHDLCHOL, TOTALCHOLEST in the last 2160 hours.  TSH:  No results for input(s): TSH in the last 2160 hours.  A1C:  Recent Labs   Lab Result Units 06/22/20  1010   Hemoglobin A1C % 7.4*       Radiology:  No new imaging    Assessment/Plan     Leida Hoyos is a 50 y.o.female with:    1. 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 Hypertension associated with diabetes    3. Vitamin D deficiency    4. Autoimmune hepatitis    5. Hypoglycemia associated with diabetes    6. Hyperlipidemia due to type 1 diabetes mellitus  - pravastatin (PRAVACHOL) 10 MG tablet; Take 1 tablet (10 mg total) by mouth once daily.  Dispense: 90 tablet; Refill: 3    7. Screening for viral disease  - Hepatitis C Antibody; Future    8. Screening for HIV (human immunodeficiency virus)  - HIV 1/2 Ag/Ab (4th Gen); Future    -will get Shingrix at pharmacy, counseled on getting flu shot in Fall  -will get MMG with Dr. Menjivar  -Continue current medications and maintain follow up with specialists.  Return to clinic in 6 months.      Frieda Mera MD  [facility]      Answers for HPI/ROS submitted by the patient on 7/27/2020   activity change: No  unexpected weight change: No  rhinorrhea: No  trouble swallowing: No  visual disturbance: No  chest tightness: No  polyuria: No  difficulty urinating: No  menstrual problem: No  joint swelling: No  arthralgias: No  confusion: No  dysphoric mood: No

## 2020-10-05 ENCOUNTER — PATIENT MESSAGE (OUTPATIENT)
Dept: INTERNAL MEDICINE | Facility: CLINIC | Age: 50
End: 2020-10-05

## 2020-10-16 ENCOUNTER — PATIENT MESSAGE (OUTPATIENT)
Dept: ENDOCRINOLOGY | Facility: CLINIC | Age: 50
End: 2020-10-16

## 2020-10-16 ENCOUNTER — CLINICAL SUPPORT (OUTPATIENT)
Dept: OTHER | Facility: CLINIC | Age: 50
End: 2020-10-16
Payer: COMMERCIAL

## 2020-10-16 DIAGNOSIS — Z00.8 ENCOUNTER FOR OTHER GENERAL EXAMINATION: ICD-10-CM

## 2020-10-17 VITALS — HEIGHT: 62 IN | BODY MASS INDEX: 23.08 KG/M2

## 2020-10-17 LAB
HDLC SERPL-MCNC: 81 MG/DL
POC CHOLESTEROL, TOTAL: 159 MG/DL
POC GLUCOSE, FASTING: 94 MG/DL (ref 60–110)
TRIGL SERPL-MCNC: 44 MG/DL

## 2020-10-19 DIAGNOSIS — E11.649 HYPOGLYCEMIA ASSOCIATED WITH DIABETES: Primary | ICD-10-CM

## 2020-10-20 RX ORDER — INSULIN GLARGINE 100 [IU]/ML
INJECTION, SOLUTION SUBCUTANEOUS
Qty: 45 ML | Refills: 3 | Status: SHIPPED | OUTPATIENT
Start: 2020-10-20 | End: 2021-10-16

## 2020-11-03 ENCOUNTER — OCCUPATIONAL HEALTH (OUTPATIENT)
Dept: URGENT CARE | Facility: CLINIC | Age: 50
End: 2020-11-03

## 2020-11-03 VITALS — TEMPERATURE: 98 F

## 2020-11-03 DIAGNOSIS — Z20.822 EXPOSURE TO COVID-19 VIRUS: Primary | ICD-10-CM

## 2020-11-03 LAB
CTP QC/QA: YES
SARS-COV-2 RDRP RESP QL NAA+PROBE: NEGATIVE

## 2020-11-03 PROCEDURE — 99199 UNLISTED SPECIAL SVC PX/RPRT: CPT | Mod: S$GLB,,, | Performed by: PHYSICIAN ASSISTANT

## 2020-11-03 PROCEDURE — U0002: ICD-10-PCS | Mod: QW,S$GLB,, | Performed by: PHYSICIAN ASSISTANT

## 2020-11-03 PROCEDURE — 99199 CV19 SPECIMEN HANDLING FEE / SWAB: ICD-10-PCS | Mod: S$GLB,,, | Performed by: PHYSICIAN ASSISTANT

## 2020-11-03 PROCEDURE — U0002 COVID-19 LAB TEST NON-CDC: HCPCS | Mod: QW,S$GLB,, | Performed by: PHYSICIAN ASSISTANT

## 2020-11-19 ENCOUNTER — PATIENT MESSAGE (OUTPATIENT)
Dept: ENDOCRINOLOGY | Facility: CLINIC | Age: 50
End: 2020-11-19

## 2020-12-14 ENCOUNTER — PATIENT MESSAGE (OUTPATIENT)
Dept: ENDOCRINOLOGY | Facility: CLINIC | Age: 50
End: 2020-12-14

## 2020-12-21 DIAGNOSIS — E55.9 VITAMIN D DEFICIENCY: Primary | ICD-10-CM

## 2020-12-21 DIAGNOSIS — E13.9 LADA (LATENT AUTOIMMUNE DIABETES IN ADULTS), MANAGED AS TYPE 1: ICD-10-CM

## 2020-12-21 LAB
LEFT EYE DM RETINOPATHY: NEGATIVE
RIGHT EYE DM RETINOPATHY: NEGATIVE

## 2020-12-28 ENCOUNTER — PATIENT OUTREACH (OUTPATIENT)
Dept: ADMINISTRATIVE | Facility: OTHER | Age: 50
End: 2020-12-28

## 2020-12-28 NOTE — LETTER
AUTHORIZATION FOR RELEASE OF   CONFIDENTIAL INFORMATION    Dear Gabriel Ferguson MD,    We are seeing Leida Hoyos, date of birth 1970, in the clinic at [facility]. Frieda Mera MD is the patient's PCP. Leida Hoyos has an outstanding lab/procedure at the time we reviewed her chart. In order to help keep her health information updated, she has authorized us to request the following medical record(s):        (  )  MAMMOGRAM                                      (  )  COLONOSCOPY      (  )  PAP SMEAR                                          (  )  OUTSIDE LAB RESULTS     (  )  DEXA SCAN                                          ( x )  EYE EXAM            (  )  FOOT EXAM                                          (  )  ENTIRE RECORD     (  )  OUTSIDE IMMUNIZATIONS                 (  )  _______________         Please fax records to [facility], Jenna C Jordan, MD, [phone]     If you have any questions, please contact Ebony Waldrop at [phone].           Patient Name: Leida Hoyos  : 1970  Patient Phone #: [phone]

## 2020-12-28 NOTE — PROGRESS NOTES
Care Everywhere:   Immunization: updated, links delay   Health Maintenance: updated  Media Review: review for outside eye exam report   Legacy Review:   Order placed:   Upcoming appts:  efax sent to Dr. Ferguson office to obtain eye exam report

## 2020-12-29 NOTE — PROGRESS NOTES
Subjective:      Patient ID: Leida Hoyos is a 50 y.o. female.    Chief Complaint:  Diabetes      History of Present Illness  Ms. Hoyos is a 50 y.o. female who is here for a follow-up visit for evaluation of CHEMA diagnosed three years ago (episode of DKA), managed with MDI and DEXCOM G6. Initially treated as type 2 DM four years prior to re-diagnosis with T1DM.     Current regimen:  Lantus 12 units at bedtime  Humalog 1:10 ICR, 1:65 ISF  Goal: 100, gives one unit for every 60 points above   Has symptoms of low blood sugar at 70.  A1C 6.6% --> previous A1C: 7.4% --> 8%.     Breakfast: small, 1 eggo waffle with peanut butter and coffee   Lunch: varies   Dinner: very late in the evening, varies  More snacks than usual    Since last visit, she reports good compliance with injections.    Hypoglycemia occurs infrequently, can't recall last episode.   Symptoms include: tremulousness, lightheaded and heat sensation.  Treats with juice.      Denies difficulties with injection sites. none  Glucagon emergency kit: no, discussed at length. No severe or significant hypoglycemia. Uses sensor although recommended daily use.   Medic alert bracelet: does not have, explained rationale     DEXCOM G6 data reviewed today.   Share code: [email or web address]  Dates: 12/17-12/30  No issues with sensor wear/allergic reactions to adhesive.   Ran out of supplies for a few days, but restarted about one week ago.      Significant for average glucose of  169  GMI: 7.4%  Patterns of highs: post lunch and dinner   Dinner time is very late, should try and move to earlier in the evening  Has brisk drop after correction in the morning and dose of meal dose   Patterns of lows: none     Coefficient of variation (goal is < 33%): 29  SD (Goal is < 50) : 50  Time in range (Goal is > 75%): 50%  Time spent < 54 mg/dl:0%                   Review of Systems    Objective:   Physical Exam  Vitals:    12/30/20 0834   BP: 122/70   Pulse: 65   SpO2: 99%  "  Weight: 58.4 kg (128 lb 13.7 oz)   Height: 5' 2" (1.575 m)       BP Readings from Last 3 Encounters:   12/30/20 122/70   07/28/20 126/72   06/30/20 110/62     Wt Readings from Last 1 Encounters:   12/30/20 0834 58.4 kg (128 lb 13.7 oz)         Body mass index is 23.57 kg/m².    Lab Review:   Lab Results   Component Value Date    HGBA1C 6.6 (H) 12/15/2020     Lab Results   Component Value Date    CHOL 178 01/29/2020    HDL 83 (H) 01/29/2020    LDLCALC 83.4 01/29/2020    TRIG 58 01/29/2020    CHOLHDL 46.6 01/29/2020     Lab Results   Component Value Date     12/15/2020    K 4.0 12/15/2020     12/15/2020    CO2 33 (H) 12/15/2020     (H) 12/15/2020    BUN 18 (H) 12/15/2020    CREATININE 0.66 12/15/2020    CALCIUM 10.1 12/15/2020    PROT 7.4 01/29/2020    ALBUMIN 4.1 12/15/2020    BILITOT 0.3 01/29/2020    ALKPHOS 92 01/29/2020    AST 29 01/29/2020    ALT 27 01/29/2020    ANIONGAP 6 (L) 12/15/2020    ESTGFRAFRICA >60.0 12/15/2020    EGFRNONAA >60.0 12/15/2020    TSH 3.700 12/15/2020       Results for SIENNA SHIELDSAGUILA HEATH (MRN [id number]) as of 12/30/2020 15:02   Ref. Range 12/15/2020 11:58   Vit D, 25-Hydroxy Latest Ref Range: 30 - 96 ng/mL 29 (L)     Assessment and Plan     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), managed as type 1  Reviewed goals of therapy are to get the best control we can without hypoglycemia    Medication changes: adjust ICHO at breakfast to 1:11 or 1:12 AND lunch 1:9 or 1:8    Reviewed patient's current insulin regimen. Clarified proper insulin dose and timing in relation to meals, etc. Insulin injection sites and proper rotation instructed.      -Advised frequent self blood glucose monitoring.  Patient encouraged to document glucose results and bring them to every clinic visit      -Hypoglycemia precautions discussed. Instructed on precautions before driving.    If your blood sugar is less than 70 but higher than 60, and you are not having any symptoms, please make sure you " check your blood sugar again in 10 - 15 minutes, avoid tasks such as driving. If the repeat value is still in the same range, please drink a 1/4 cup of orange juice or 1 - 2 glucose tablets.     If your blood sugar reading is under 60, whether you are symptomatic or not, please treat. You can do this with 3 oral glucose tablets, juice, milk, other snacks, or a meal. Make sure you check 15 minutes after you treat.    -Close adherence to lifestyle changes recommended.      -Periodic follow ups for eye evaluations, foot care and dental care suggested.    rtc in 6 months with a1c          Vitamin D deficiency  Vitamin D levels of 29   Goal is 30 - 32  Start prescription dose ergo 50,000 units once a week for 3 months -- completed this course.   Daily dose  2000 IUs daily       Elevated PTH with normal calcium and phosphorus. Recent mild calcium elevation may be spurious (BUN also mildly elevated) but has had higher calcium levels in the past.   Will repeat with normal vitamin D, with PHOS and creat/bun in three months.

## 2020-12-30 ENCOUNTER — OFFICE VISIT (OUTPATIENT)
Dept: ENDOCRINOLOGY | Facility: CLINIC | Age: 50
End: 2020-12-30
Payer: COMMERCIAL

## 2020-12-30 VITALS
DIASTOLIC BLOOD PRESSURE: 70 MMHG | SYSTOLIC BLOOD PRESSURE: 122 MMHG | HEART RATE: 65 BPM | HEIGHT: 62 IN | OXYGEN SATURATION: 99 % | WEIGHT: 128.88 LBS | BODY MASS INDEX: 23.72 KG/M2

## 2020-12-30 DIAGNOSIS — E13.9 LADA (LATENT AUTOIMMUNE DIABETES IN ADULTS), MANAGED AS TYPE 1: ICD-10-CM

## 2020-12-30 DIAGNOSIS — E55.9 VITAMIN D DEFICIENCY: ICD-10-CM

## 2020-12-30 PROCEDURE — 99999 PR PBB SHADOW E&M-EST. PATIENT-LVL V: ICD-10-PCS | Mod: PBBFAC,,, | Performed by: INTERNAL MEDICINE

## 2020-12-30 PROCEDURE — 3008F BODY MASS INDEX DOCD: CPT | Mod: CPTII,S$GLB,, | Performed by: INTERNAL MEDICINE

## 2020-12-30 PROCEDURE — 1126F PR PAIN SEVERITY QUANTIFIED, NO PAIN PRESENT: ICD-10-PCS | Mod: S$GLB,,, | Performed by: INTERNAL MEDICINE

## 2020-12-30 PROCEDURE — 3078F PR MOST RECENT DIASTOLIC BLOOD PRESSURE < 80 MM HG: ICD-10-PCS | Mod: CPTII,S$GLB,, | Performed by: INTERNAL MEDICINE

## 2020-12-30 PROCEDURE — 3074F PR MOST RECENT SYSTOLIC BLOOD PRESSURE < 130 MM HG: ICD-10-PCS | Mod: CPTII,S$GLB,, | Performed by: INTERNAL MEDICINE

## 2020-12-30 PROCEDURE — 99214 OFFICE O/P EST MOD 30 MIN: CPT | Mod: S$GLB,,, | Performed by: INTERNAL MEDICINE

## 2020-12-30 PROCEDURE — 1126F AMNT PAIN NOTED NONE PRSNT: CPT | Mod: S$GLB,,, | Performed by: INTERNAL MEDICINE

## 2020-12-30 PROCEDURE — 3008F PR BODY MASS INDEX (BMI) DOCUMENTED: ICD-10-PCS | Mod: CPTII,S$GLB,, | Performed by: INTERNAL MEDICINE

## 2020-12-30 PROCEDURE — 99999 PR PBB SHADOW E&M-EST. PATIENT-LVL V: CPT | Mod: PBBFAC,,, | Performed by: INTERNAL MEDICINE

## 2020-12-30 PROCEDURE — 3074F SYST BP LT 130 MM HG: CPT | Mod: CPTII,S$GLB,, | Performed by: INTERNAL MEDICINE

## 2020-12-30 PROCEDURE — 99214 PR OFFICE/OUTPT VISIT, EST, LEVL IV, 30-39 MIN: ICD-10-PCS | Mod: S$GLB,,, | Performed by: INTERNAL MEDICINE

## 2020-12-30 PROCEDURE — 3044F HG A1C LEVEL LT 7.0%: CPT | Mod: CPTII,S$GLB,, | Performed by: INTERNAL MEDICINE

## 2020-12-30 PROCEDURE — 3044F PR MOST RECENT HEMOGLOBIN A1C LEVEL <7.0%: ICD-10-PCS | Mod: CPTII,S$GLB,, | Performed by: INTERNAL MEDICINE

## 2020-12-30 PROCEDURE — 3078F DIAST BP <80 MM HG: CPT | Mod: CPTII,S$GLB,, | Performed by: INTERNAL MEDICINE

## 2020-12-30 RX ORDER — BLOOD-GLUCOSE METER
EACH MISCELLANEOUS
Qty: 100 STRIP | Refills: 4 | Status: SHIPPED | OUTPATIENT
Start: 2020-12-30 | End: 2021-10-16

## 2020-12-30 NOTE — ASSESSMENT & PLAN NOTE
Reviewed goals of therapy are to get the best control we can without hypoglycemia    Medication changes: adjust ICHO at breakfast to 1:11 or 1:12 AND lunch 1:9 or 1:8    Reviewed patient's current insulin regimen. Clarified proper insulin dose and timing in relation to meals, etc. Insulin injection sites and proper rotation instructed.      -Advised frequent self blood glucose monitoring.  Patient encouraged to document glucose results and bring them to every clinic visit      -Hypoglycemia precautions discussed. Instructed on precautions before driving.    If your blood sugar is less than 70 but higher than 60, and you are not having any symptoms, please make sure you check your blood sugar again in 10 - 15 minutes, avoid tasks such as driving. If the repeat value is still in the same range, please drink a 1/4 cup of orange juice or 1 - 2 glucose tablets.     If your blood sugar reading is under 60, whether you are symptomatic or not, please treat. You can do this with 3 oral glucose tablets, juice, milk, other snacks, or a meal. Make sure you check 15 minutes after you treat.    -Close adherence to lifestyle changes recommended.      -Periodic follow ups for eye evaluations, foot care and dental care suggested.    rtc in 6 months with a1c

## 2021-01-04 ENCOUNTER — PATIENT MESSAGE (OUTPATIENT)
Dept: ADMINISTRATIVE | Facility: HOSPITAL | Age: 51
End: 2021-01-04

## 2021-01-06 ENCOUNTER — PATIENT MESSAGE (OUTPATIENT)
Dept: FAMILY MEDICINE | Facility: CLINIC | Age: 51
End: 2021-01-06

## 2021-01-12 ENCOUNTER — TELEPHONE (OUTPATIENT)
Dept: ADMINISTRATIVE | Facility: HOSPITAL | Age: 51
End: 2021-01-12

## 2021-01-13 ENCOUNTER — PATIENT OUTREACH (OUTPATIENT)
Dept: ADMINISTRATIVE | Facility: HOSPITAL | Age: 51
End: 2021-01-13

## 2021-01-13 ENCOUNTER — TELEPHONE (OUTPATIENT)
Dept: ADMINISTRATIVE | Facility: HOSPITAL | Age: 51
End: 2021-01-13

## 2021-01-26 ENCOUNTER — OFFICE VISIT (OUTPATIENT)
Dept: FAMILY MEDICINE | Facility: CLINIC | Age: 51
End: 2021-01-26
Payer: COMMERCIAL

## 2021-01-26 VITALS
BODY MASS INDEX: 23.87 KG/M2 | SYSTOLIC BLOOD PRESSURE: 130 MMHG | DIASTOLIC BLOOD PRESSURE: 74 MMHG | WEIGHT: 130.5 LBS | TEMPERATURE: 98 F | HEART RATE: 83 BPM | OXYGEN SATURATION: 99 %

## 2021-01-26 DIAGNOSIS — E11.59 HYPERTENSION ASSOCIATED WITH DIABETES: Primary | ICD-10-CM

## 2021-01-26 DIAGNOSIS — E13.9 LADA (LATENT AUTOIMMUNE DIABETES IN ADULTS), MANAGED AS TYPE 1: ICD-10-CM

## 2021-01-26 DIAGNOSIS — I15.2 HYPERTENSION ASSOCIATED WITH DIABETES: Primary | ICD-10-CM

## 2021-01-26 DIAGNOSIS — K75.4 AUTOIMMUNE HEPATITIS: ICD-10-CM

## 2021-01-26 DIAGNOSIS — E11.649 HYPOGLYCEMIA ASSOCIATED WITH DIABETES: ICD-10-CM

## 2021-01-26 PROBLEM — E11.9 TYPE 2 DIABETES MELLITUS WITHOUT COMPLICATION, WITHOUT LONG-TERM CURRENT USE OF INSULIN: Status: ACTIVE | Noted: 2017-01-10

## 2021-01-26 PROBLEM — E11.9 TYPE 2 DIABETES MELLITUS WITHOUT COMPLICATION, WITHOUT LONG-TERM CURRENT USE OF INSULIN: Status: RESOLVED | Noted: 2017-01-10 | Resolved: 2021-01-26

## 2021-01-26 PROCEDURE — 3078F PR MOST RECENT DIASTOLIC BLOOD PRESSURE < 80 MM HG: ICD-10-PCS | Mod: CPTII,S$GLB,, | Performed by: INTERNAL MEDICINE

## 2021-01-26 PROCEDURE — 3078F DIAST BP <80 MM HG: CPT | Mod: CPTII,S$GLB,, | Performed by: INTERNAL MEDICINE

## 2021-01-26 PROCEDURE — 99999 PR PBB SHADOW E&M-EST. PATIENT-LVL IV: CPT | Mod: PBBFAC,,, | Performed by: INTERNAL MEDICINE

## 2021-01-26 PROCEDURE — 99214 PR OFFICE/OUTPT VISIT, EST, LEVL IV, 30-39 MIN: ICD-10-PCS | Mod: S$GLB,,, | Performed by: INTERNAL MEDICINE

## 2021-01-26 PROCEDURE — 99214 OFFICE O/P EST MOD 30 MIN: CPT | Mod: S$GLB,,, | Performed by: INTERNAL MEDICINE

## 2021-01-26 PROCEDURE — 3044F HG A1C LEVEL LT 7.0%: CPT | Mod: CPTII,S$GLB,, | Performed by: INTERNAL MEDICINE

## 2021-01-26 PROCEDURE — 3008F PR BODY MASS INDEX (BMI) DOCUMENTED: ICD-10-PCS | Mod: CPTII,S$GLB,, | Performed by: INTERNAL MEDICINE

## 2021-01-26 PROCEDURE — 1126F PR PAIN SEVERITY QUANTIFIED, NO PAIN PRESENT: ICD-10-PCS | Mod: S$GLB,,, | Performed by: INTERNAL MEDICINE

## 2021-01-26 PROCEDURE — 3075F PR MOST RECENT SYSTOLIC BLOOD PRESS GE 130-139MM HG: ICD-10-PCS | Mod: CPTII,S$GLB,, | Performed by: INTERNAL MEDICINE

## 2021-01-26 PROCEDURE — 3044F PR MOST RECENT HEMOGLOBIN A1C LEVEL <7.0%: ICD-10-PCS | Mod: CPTII,S$GLB,, | Performed by: INTERNAL MEDICINE

## 2021-01-26 PROCEDURE — 3008F BODY MASS INDEX DOCD: CPT | Mod: CPTII,S$GLB,, | Performed by: INTERNAL MEDICINE

## 2021-01-26 PROCEDURE — 3075F SYST BP GE 130 - 139MM HG: CPT | Mod: CPTII,S$GLB,, | Performed by: INTERNAL MEDICINE

## 2021-01-26 PROCEDURE — 99999 PR PBB SHADOW E&M-EST. PATIENT-LVL IV: ICD-10-PCS | Mod: PBBFAC,,, | Performed by: INTERNAL MEDICINE

## 2021-01-26 PROCEDURE — 1126F AMNT PAIN NOTED NONE PRSNT: CPT | Mod: S$GLB,,, | Performed by: INTERNAL MEDICINE

## 2021-03-12 ENCOUNTER — IMMUNIZATION (OUTPATIENT)
Dept: PRIMARY CARE CLINIC | Facility: CLINIC | Age: 51
End: 2021-03-12

## 2021-03-12 DIAGNOSIS — Z23 NEED FOR VACCINATION: Primary | ICD-10-CM

## 2021-03-12 PROCEDURE — 0001A PR IMMUNIZ ADMIN, SARS-COV-2 COVID-19 VACC, 30MCG/0.3ML, 1ST DOSE: CPT | Mod: CV19,S$GLB,, | Performed by: INTERNAL MEDICINE

## 2021-03-12 PROCEDURE — 91300 PR SARS-COV- 2 COVID-19 VACCINE, NO PRSV, 30MCG/0.3ML, IM: CPT | Mod: S$GLB,,, | Performed by: INTERNAL MEDICINE

## 2021-03-12 PROCEDURE — 0001A PR IMMUNIZ ADMIN, SARS-COV-2 COVID-19 VACC, 30MCG/0.3ML, 1ST DOSE: ICD-10-PCS | Mod: CV19,S$GLB,, | Performed by: INTERNAL MEDICINE

## 2021-03-12 PROCEDURE — 91300 PR SARS-COV- 2 COVID-19 VACCINE, NO PRSV, 30MCG/0.3ML, IM: ICD-10-PCS | Mod: S$GLB,,, | Performed by: INTERNAL MEDICINE

## 2021-03-12 RX ADMIN — Medication 0.3 ML: at 09:03

## 2021-03-24 ENCOUNTER — PATIENT MESSAGE (OUTPATIENT)
Dept: FAMILY MEDICINE | Facility: CLINIC | Age: 51
End: 2021-03-24

## 2021-03-24 DIAGNOSIS — E83.52 HYPERCALCEMIA: Primary | ICD-10-CM

## 2021-04-02 ENCOUNTER — IMMUNIZATION (OUTPATIENT)
Dept: PRIMARY CARE CLINIC | Facility: CLINIC | Age: 51
End: 2021-04-02
Payer: COMMERCIAL

## 2021-04-02 DIAGNOSIS — Z23 NEED FOR VACCINATION: Primary | ICD-10-CM

## 2021-04-02 PROCEDURE — 91300 PR SARS-COV- 2 COVID-19 VACCINE, NO PRSV, 30MCG/0.3ML, IM: ICD-10-PCS | Mod: S$GLB,,, | Performed by: INTERNAL MEDICINE

## 2021-04-02 PROCEDURE — 0002A PR IMMUNIZ ADMIN, SARS-COV-2 COVID-19 VACC, 30MCG/0.3ML, 2ND DOSE: ICD-10-PCS | Mod: CV19,S$GLB,, | Performed by: INTERNAL MEDICINE

## 2021-04-02 PROCEDURE — 0002A PR IMMUNIZ ADMIN, SARS-COV-2 COVID-19 VACC, 30MCG/0.3ML, 2ND DOSE: CPT | Mod: CV19,S$GLB,, | Performed by: INTERNAL MEDICINE

## 2021-04-02 PROCEDURE — 91300 PR SARS-COV- 2 COVID-19 VACCINE, NO PRSV, 30MCG/0.3ML, IM: CPT | Mod: S$GLB,,, | Performed by: INTERNAL MEDICINE

## 2021-04-02 RX ADMIN — Medication 0.3 ML: at 10:04

## 2021-06-29 ENCOUNTER — CLINICAL SUPPORT (OUTPATIENT)
Dept: OTHER | Facility: CLINIC | Age: 51
End: 2021-06-29
Payer: COMMERCIAL

## 2021-06-29 DIAGNOSIS — Z00.8 ENCOUNTER FOR OTHER GENERAL EXAMINATION: ICD-10-CM

## 2021-06-30 ENCOUNTER — OFFICE VISIT (OUTPATIENT)
Dept: ENDOCRINOLOGY | Facility: CLINIC | Age: 51
End: 2021-06-30
Payer: COMMERCIAL

## 2021-06-30 VITALS
SYSTOLIC BLOOD PRESSURE: 124 MMHG | BODY MASS INDEX: 24.42 KG/M2 | HEIGHT: 62 IN | DIASTOLIC BLOOD PRESSURE: 76 MMHG | WEIGHT: 132.69 LBS

## 2021-06-30 DIAGNOSIS — E13.9 LADA (LATENT AUTOIMMUNE DIABETES IN ADULTS), MANAGED AS TYPE 1: Primary | ICD-10-CM

## 2021-06-30 DIAGNOSIS — E83.52 HYPERCALCEMIA: ICD-10-CM

## 2021-06-30 DIAGNOSIS — E55.9 VITAMIN D DEFICIENCY: ICD-10-CM

## 2021-06-30 PROCEDURE — 3044F PR MOST RECENT HEMOGLOBIN A1C LEVEL <7.0%: ICD-10-PCS | Mod: CPTII,S$GLB,, | Performed by: INTERNAL MEDICINE

## 2021-06-30 PROCEDURE — 1126F AMNT PAIN NOTED NONE PRSNT: CPT | Mod: S$GLB,,, | Performed by: INTERNAL MEDICINE

## 2021-06-30 PROCEDURE — 99999 PR PBB SHADOW E&M-EST. PATIENT-LVL IV: ICD-10-PCS | Mod: PBBFAC,,, | Performed by: INTERNAL MEDICINE

## 2021-06-30 PROCEDURE — 3044F HG A1C LEVEL LT 7.0%: CPT | Mod: CPTII,S$GLB,, | Performed by: INTERNAL MEDICINE

## 2021-06-30 PROCEDURE — 99999 PR PBB SHADOW E&M-EST. PATIENT-LVL IV: CPT | Mod: PBBFAC,,, | Performed by: INTERNAL MEDICINE

## 2021-06-30 PROCEDURE — 3008F BODY MASS INDEX DOCD: CPT | Mod: CPTII,S$GLB,, | Performed by: INTERNAL MEDICINE

## 2021-06-30 PROCEDURE — 95251 CONT GLUC MNTR ANALYSIS I&R: CPT | Mod: S$GLB,,, | Performed by: INTERNAL MEDICINE

## 2021-06-30 PROCEDURE — 3008F PR BODY MASS INDEX (BMI) DOCUMENTED: ICD-10-PCS | Mod: CPTII,S$GLB,, | Performed by: INTERNAL MEDICINE

## 2021-06-30 PROCEDURE — 95251 PR GLUCOSE MONITOR, 72 HOUR, PHYS INTERP: ICD-10-PCS | Mod: S$GLB,,, | Performed by: INTERNAL MEDICINE

## 2021-06-30 PROCEDURE — 1126F PR PAIN SEVERITY QUANTIFIED, NO PAIN PRESENT: ICD-10-PCS | Mod: S$GLB,,, | Performed by: INTERNAL MEDICINE

## 2021-06-30 PROCEDURE — 99214 PR OFFICE/OUTPT VISIT, EST, LEVL IV, 30-39 MIN: ICD-10-PCS | Mod: 25,S$GLB,, | Performed by: INTERNAL MEDICINE

## 2021-06-30 PROCEDURE — 99214 OFFICE O/P EST MOD 30 MIN: CPT | Mod: 25,S$GLB,, | Performed by: INTERNAL MEDICINE

## 2021-07-01 PROBLEM — E83.52 HYPERCALCEMIA: Status: ACTIVE | Noted: 2021-07-01

## 2021-07-06 VITALS — BODY MASS INDEX: 23.87 KG/M2 | HEIGHT: 62 IN

## 2021-07-06 LAB
HDLC SERPL-MCNC: 78 MG/DL
POC CHOLESTEROL, LDL (DOCK): 68 MG/DL
POC CHOLESTEROL, TOTAL: 157 MG/DL
POC GLUCOSE, FASTING: 84 MG/DL (ref 60–110)
TRIGL SERPL-MCNC: 59 MG/DL

## 2021-07-07 ENCOUNTER — PATIENT MESSAGE (OUTPATIENT)
Dept: ADMINISTRATIVE | Facility: HOSPITAL | Age: 51
End: 2021-07-07

## 2021-07-14 DIAGNOSIS — E13.9 LADA (LATENT AUTOIMMUNE DIABETES IN ADULTS), MANAGED AS TYPE 1: ICD-10-CM

## 2021-07-14 RX ORDER — INSULIN LISPRO 100 [IU]/ML
INJECTION, SOLUTION INTRAVENOUS; SUBCUTANEOUS
Qty: 15 ML | Refills: 8 | Status: SHIPPED | OUTPATIENT
Start: 2021-07-14 | End: 2022-04-22 | Stop reason: SDUPTHER

## 2021-07-27 ENCOUNTER — OFFICE VISIT (OUTPATIENT)
Dept: FAMILY MEDICINE | Facility: CLINIC | Age: 51
End: 2021-07-27
Payer: COMMERCIAL

## 2021-07-27 VITALS
SYSTOLIC BLOOD PRESSURE: 126 MMHG | OXYGEN SATURATION: 98 % | HEART RATE: 82 BPM | TEMPERATURE: 98 F | DIASTOLIC BLOOD PRESSURE: 78 MMHG | BODY MASS INDEX: 24.33 KG/M2 | WEIGHT: 133.06 LBS

## 2021-07-27 DIAGNOSIS — E55.9 VITAMIN D DEFICIENCY: ICD-10-CM

## 2021-07-27 DIAGNOSIS — E11.59 HYPERTENSION ASSOCIATED WITH DIABETES: ICD-10-CM

## 2021-07-27 DIAGNOSIS — K75.4 AUTOIMMUNE HEPATITIS: ICD-10-CM

## 2021-07-27 DIAGNOSIS — I15.2 HYPERTENSION ASSOCIATED WITH DIABETES: ICD-10-CM

## 2021-07-27 DIAGNOSIS — E13.9 LADA (LATENT AUTOIMMUNE DIABETES IN ADULTS), MANAGED AS TYPE 1: ICD-10-CM

## 2021-07-27 DIAGNOSIS — I10 ESSENTIAL HYPERTENSION: ICD-10-CM

## 2021-07-27 DIAGNOSIS — R30.0 DYSURIA: Primary | ICD-10-CM

## 2021-07-27 LAB
BACTERIA #/AREA URNS AUTO: ABNORMAL /HPF
BILIRUB UR QL STRIP: NEGATIVE
CLARITY UR REFRACT.AUTO: ABNORMAL
COLOR UR AUTO: YELLOW
GLUCOSE UR QL STRIP: NEGATIVE
HGB UR QL STRIP: NEGATIVE
KETONES UR QL STRIP: NEGATIVE
LEUKOCYTE ESTERASE UR QL STRIP: ABNORMAL
MICROSCOPIC COMMENT: ABNORMAL
NITRITE UR QL STRIP: NEGATIVE
PH UR STRIP: 7 [PH] (ref 5–8)
PROT UR QL STRIP: NEGATIVE
RBC #/AREA URNS AUTO: 1 /HPF (ref 0–4)
SP GR UR STRIP: 1.02 (ref 1–1.03)
SQUAMOUS #/AREA URNS AUTO: 9 /HPF
URN SPEC COLLECT METH UR: ABNORMAL
WBC #/AREA URNS AUTO: 6 /HPF (ref 0–5)

## 2021-07-27 PROCEDURE — 1159F MED LIST DOCD IN RCRD: CPT | Mod: CPTII,S$GLB,, | Performed by: INTERNAL MEDICINE

## 2021-07-27 PROCEDURE — 3074F PR MOST RECENT SYSTOLIC BLOOD PRESSURE < 130 MM HG: ICD-10-PCS | Mod: CPTII,S$GLB,, | Performed by: INTERNAL MEDICINE

## 2021-07-27 PROCEDURE — 3008F PR BODY MASS INDEX (BMI) DOCUMENTED: ICD-10-PCS | Mod: CPTII,S$GLB,, | Performed by: INTERNAL MEDICINE

## 2021-07-27 PROCEDURE — 1126F PR PAIN SEVERITY QUANTIFIED, NO PAIN PRESENT: ICD-10-PCS | Mod: CPTII,S$GLB,, | Performed by: INTERNAL MEDICINE

## 2021-07-27 PROCEDURE — 99214 OFFICE O/P EST MOD 30 MIN: CPT | Mod: S$GLB,,, | Performed by: INTERNAL MEDICINE

## 2021-07-27 PROCEDURE — 81001 URINALYSIS AUTO W/SCOPE: CPT | Performed by: INTERNAL MEDICINE

## 2021-07-27 PROCEDURE — 1159F PR MEDICATION LIST DOCUMENTED IN MEDICAL RECORD: ICD-10-PCS | Mod: CPTII,S$GLB,, | Performed by: INTERNAL MEDICINE

## 2021-07-27 PROCEDURE — 3008F BODY MASS INDEX DOCD: CPT | Mod: CPTII,S$GLB,, | Performed by: INTERNAL MEDICINE

## 2021-07-27 PROCEDURE — 3074F SYST BP LT 130 MM HG: CPT | Mod: CPTII,S$GLB,, | Performed by: INTERNAL MEDICINE

## 2021-07-27 PROCEDURE — 3044F PR MOST RECENT HEMOGLOBIN A1C LEVEL <7.0%: ICD-10-PCS | Mod: CPTII,S$GLB,, | Performed by: INTERNAL MEDICINE

## 2021-07-27 PROCEDURE — 99999 PR PBB SHADOW E&M-EST. PATIENT-LVL III: CPT | Mod: PBBFAC,,, | Performed by: INTERNAL MEDICINE

## 2021-07-27 PROCEDURE — 99214 PR OFFICE/OUTPT VISIT, EST, LEVL IV, 30-39 MIN: ICD-10-PCS | Mod: S$GLB,,, | Performed by: INTERNAL MEDICINE

## 2021-07-27 PROCEDURE — 1126F AMNT PAIN NOTED NONE PRSNT: CPT | Mod: CPTII,S$GLB,, | Performed by: INTERNAL MEDICINE

## 2021-07-27 PROCEDURE — 3044F HG A1C LEVEL LT 7.0%: CPT | Mod: CPTII,S$GLB,, | Performed by: INTERNAL MEDICINE

## 2021-07-27 PROCEDURE — 3078F PR MOST RECENT DIASTOLIC BLOOD PRESSURE < 80 MM HG: ICD-10-PCS | Mod: CPTII,S$GLB,, | Performed by: INTERNAL MEDICINE

## 2021-07-27 PROCEDURE — 99999 PR PBB SHADOW E&M-EST. PATIENT-LVL III: ICD-10-PCS | Mod: PBBFAC,,, | Performed by: INTERNAL MEDICINE

## 2021-07-27 PROCEDURE — 3078F DIAST BP <80 MM HG: CPT | Mod: CPTII,S$GLB,, | Performed by: INTERNAL MEDICINE

## 2021-07-27 RX ORDER — NITROFURANTOIN 25; 75 MG/1; MG/1
100 CAPSULE ORAL 2 TIMES DAILY
Qty: 10 CAPSULE | Refills: 0 | Status: SHIPPED | OUTPATIENT
Start: 2021-07-27 | End: 2022-01-27

## 2021-07-27 RX ORDER — LOSARTAN POTASSIUM 25 MG/1
25 TABLET ORAL DAILY
Qty: 90 TABLET | Refills: 3 | Status: CANCELLED | OUTPATIENT
Start: 2021-07-27 | End: 2022-07-27

## 2021-07-30 ENCOUNTER — PATIENT MESSAGE (OUTPATIENT)
Dept: FAMILY MEDICINE | Facility: CLINIC | Age: 51
End: 2021-07-30

## 2021-09-24 ENCOUNTER — LAB VISIT (OUTPATIENT)
Dept: LAB | Facility: HOSPITAL | Age: 51
End: 2021-09-24
Attending: INTERNAL MEDICINE
Payer: COMMERCIAL

## 2021-09-24 DIAGNOSIS — E55.9 VITAMIN D DEFICIENCY: ICD-10-CM

## 2021-09-24 DIAGNOSIS — E13.9 LADA (LATENT AUTOIMMUNE DIABETES IN ADULTS), MANAGED AS TYPE 1: ICD-10-CM

## 2021-09-24 LAB
ALBUMIN SERPL BCP-MCNC: 3.7 G/DL (ref 3.5–5.2)
ALBUMIN SERPL BCP-MCNC: 3.7 G/DL (ref 3.5–5.2)
ALP SERPL-CCNC: 94 U/L (ref 55–135)
ALT SERPL W/O P-5'-P-CCNC: 50 U/L (ref 10–44)
ANION GAP SERPL CALC-SCNC: 14 MMOL/L (ref 8–16)
ANION GAP SERPL CALC-SCNC: 14 MMOL/L (ref 8–16)
AST SERPL-CCNC: 37 U/L (ref 10–40)
BILIRUB SERPL-MCNC: 0.6 MG/DL (ref 0.1–1)
BUN SERPL-MCNC: 14 MG/DL (ref 6–20)
BUN SERPL-MCNC: 14 MG/DL (ref 6–20)
CALCIUM SERPL-MCNC: 10 MG/DL (ref 8.7–10.5)
CALCIUM SERPL-MCNC: 10 MG/DL (ref 8.7–10.5)
CHLORIDE SERPL-SCNC: 102 MMOL/L (ref 95–110)
CHLORIDE SERPL-SCNC: 102 MMOL/L (ref 95–110)
CHOLEST SERPL-MCNC: 163 MG/DL (ref 120–199)
CHOLEST/HDLC SERPL: 2.2 {RATIO} (ref 2–5)
CO2 SERPL-SCNC: 19 MMOL/L (ref 23–29)
CO2 SERPL-SCNC: 19 MMOL/L (ref 23–29)
CREAT SERPL-MCNC: 0.8 MG/DL (ref 0.5–1.4)
CREAT SERPL-MCNC: 0.8 MG/DL (ref 0.5–1.4)
EST. GFR  (AFRICAN AMERICAN): >60 ML/MIN/1.73 M^2
EST. GFR  (AFRICAN AMERICAN): >60 ML/MIN/1.73 M^2
EST. GFR  (NON AFRICAN AMERICAN): >60 ML/MIN/1.73 M^2
EST. GFR  (NON AFRICAN AMERICAN): >60 ML/MIN/1.73 M^2
ESTIMATED AVG GLUCOSE: 137 MG/DL (ref 68–131)
GLUCOSE SERPL-MCNC: 168 MG/DL (ref 70–110)
GLUCOSE SERPL-MCNC: 168 MG/DL (ref 70–110)
HBA1C MFR BLD: 6.4 % (ref 4–5.6)
HDLC SERPL-MCNC: 73 MG/DL (ref 40–75)
HDLC SERPL: 44.8 % (ref 20–50)
LDLC SERPL CALC-MCNC: 74.8 MG/DL (ref 63–159)
NONHDLC SERPL-MCNC: 90 MG/DL
PHOSPHATE SERPL-MCNC: 3.3 MG/DL (ref 2.7–4.5)
POTASSIUM SERPL-SCNC: 4.9 MMOL/L (ref 3.5–5.1)
POTASSIUM SERPL-SCNC: 4.9 MMOL/L (ref 3.5–5.1)
PROT SERPL-MCNC: 7.2 G/DL (ref 6–8.4)
PTH-INTACT SERPL-MCNC: 81.1 PG/ML (ref 9–77)
SODIUM SERPL-SCNC: 135 MMOL/L (ref 136–145)
SODIUM SERPL-SCNC: 135 MMOL/L (ref 136–145)
TRIGL SERPL-MCNC: 76 MG/DL (ref 30–150)
TSH SERPL DL<=0.005 MIU/L-ACNC: 3.36 UIU/ML (ref 0.4–4)

## 2021-09-24 PROCEDURE — 83036 HEMOGLOBIN GLYCOSYLATED A1C: CPT | Performed by: INTERNAL MEDICINE

## 2021-09-24 PROCEDURE — 84443 ASSAY THYROID STIM HORMONE: CPT | Performed by: INTERNAL MEDICINE

## 2021-09-24 PROCEDURE — 36415 COLL VENOUS BLD VENIPUNCTURE: CPT | Performed by: INTERNAL MEDICINE

## 2021-09-24 PROCEDURE — 80053 COMPREHEN METABOLIC PANEL: CPT | Performed by: INTERNAL MEDICINE

## 2021-09-24 PROCEDURE — 80061 LIPID PANEL: CPT | Performed by: INTERNAL MEDICINE

## 2021-09-24 PROCEDURE — 83970 ASSAY OF PARATHORMONE: CPT | Performed by: INTERNAL MEDICINE

## 2021-09-24 PROCEDURE — 84100 ASSAY OF PHOSPHORUS: CPT | Performed by: INTERNAL MEDICINE

## 2021-09-30 ENCOUNTER — LAB VISIT (OUTPATIENT)
Dept: LAB | Facility: HOSPITAL | Age: 51
End: 2021-09-30
Attending: INTERNAL MEDICINE
Payer: COMMERCIAL

## 2021-09-30 ENCOUNTER — OFFICE VISIT (OUTPATIENT)
Dept: ENDOCRINOLOGY | Facility: CLINIC | Age: 51
End: 2021-09-30
Payer: COMMERCIAL

## 2021-09-30 VITALS
SYSTOLIC BLOOD PRESSURE: 132 MMHG | DIASTOLIC BLOOD PRESSURE: 80 MMHG | HEART RATE: 80 BPM | WEIGHT: 197.31 LBS | HEIGHT: 62 IN | BODY MASS INDEX: 36.31 KG/M2

## 2021-09-30 DIAGNOSIS — E83.52 HYPERCALCEMIA: ICD-10-CM

## 2021-09-30 DIAGNOSIS — E13.9 LADA (LATENT AUTOIMMUNE DIABETES IN ADULTS), MANAGED AS TYPE 1: ICD-10-CM

## 2021-09-30 DIAGNOSIS — E83.52 HYPERCALCEMIA: Primary | ICD-10-CM

## 2021-09-30 LAB
25(OH)D3+25(OH)D2 SERPL-MCNC: 32 NG/ML (ref 30–96)
ALBUMIN SERPL BCP-MCNC: 4 G/DL (ref 3.5–5.2)
ANION GAP SERPL CALC-SCNC: 10 MMOL/L (ref 8–16)
BUN SERPL-MCNC: 14 MG/DL (ref 6–20)
CALCIUM SERPL-MCNC: 10.7 MG/DL (ref 8.7–10.5)
CHLORIDE SERPL-SCNC: 102 MMOL/L (ref 95–110)
CO2 SERPL-SCNC: 23 MMOL/L (ref 23–29)
CREAT SERPL-MCNC: 0.8 MG/DL (ref 0.5–1.4)
EST. GFR  (AFRICAN AMERICAN): >60 ML/MIN/1.73 M^2
EST. GFR  (NON AFRICAN AMERICAN): >60 ML/MIN/1.73 M^2
GLUCOSE SERPL-MCNC: 114 MG/DL (ref 70–110)
PHOSPHATE SERPL-MCNC: 2.8 MG/DL (ref 2.7–4.5)
POTASSIUM SERPL-SCNC: 4.2 MMOL/L (ref 3.5–5.1)
SODIUM SERPL-SCNC: 135 MMOL/L (ref 136–145)

## 2021-09-30 PROCEDURE — 3061F PR NEG MICROALBUMINURIA RESULT DOCUMENTED/REVIEW: ICD-10-PCS | Mod: CPTII,S$GLB,, | Performed by: INTERNAL MEDICINE

## 2021-09-30 PROCEDURE — 1159F MED LIST DOCD IN RCRD: CPT | Mod: CPTII,S$GLB,, | Performed by: INTERNAL MEDICINE

## 2021-09-30 PROCEDURE — 3075F PR MOST RECENT SYSTOLIC BLOOD PRESS GE 130-139MM HG: ICD-10-PCS | Mod: CPTII,S$GLB,, | Performed by: INTERNAL MEDICINE

## 2021-09-30 PROCEDURE — 99214 PR OFFICE/OUTPT VISIT, EST, LEVL IV, 30-39 MIN: ICD-10-PCS | Mod: 25,S$GLB,, | Performed by: INTERNAL MEDICINE

## 2021-09-30 PROCEDURE — 99214 OFFICE O/P EST MOD 30 MIN: CPT | Mod: 25,S$GLB,, | Performed by: INTERNAL MEDICINE

## 2021-09-30 PROCEDURE — 3079F DIAST BP 80-89 MM HG: CPT | Mod: CPTII,S$GLB,, | Performed by: INTERNAL MEDICINE

## 2021-09-30 PROCEDURE — 4010F ACE/ARB THERAPY RXD/TAKEN: CPT | Mod: CPTII,S$GLB,, | Performed by: INTERNAL MEDICINE

## 2021-09-30 PROCEDURE — 82306 VITAMIN D 25 HYDROXY: CPT | Performed by: INTERNAL MEDICINE

## 2021-09-30 PROCEDURE — 3008F BODY MASS INDEX DOCD: CPT | Mod: CPTII,S$GLB,, | Performed by: INTERNAL MEDICINE

## 2021-09-30 PROCEDURE — 3008F PR BODY MASS INDEX (BMI) DOCUMENTED: ICD-10-PCS | Mod: CPTII,S$GLB,, | Performed by: INTERNAL MEDICINE

## 2021-09-30 PROCEDURE — 3075F SYST BP GE 130 - 139MM HG: CPT | Mod: CPTII,S$GLB,, | Performed by: INTERNAL MEDICINE

## 2021-09-30 PROCEDURE — 95251 PR GLUCOSE MONITOR, 72 HOUR, PHYS INTERP: ICD-10-PCS | Mod: S$GLB,,, | Performed by: INTERNAL MEDICINE

## 2021-09-30 PROCEDURE — 1159F PR MEDICATION LIST DOCUMENTED IN MEDICAL RECORD: ICD-10-PCS | Mod: CPTII,S$GLB,, | Performed by: INTERNAL MEDICINE

## 2021-09-30 PROCEDURE — 1160F RVW MEDS BY RX/DR IN RCRD: CPT | Mod: CPTII,S$GLB,, | Performed by: INTERNAL MEDICINE

## 2021-09-30 PROCEDURE — 3044F HG A1C LEVEL LT 7.0%: CPT | Mod: CPTII,S$GLB,, | Performed by: INTERNAL MEDICINE

## 2021-09-30 PROCEDURE — 3061F NEG MICROALBUMINURIA REV: CPT | Mod: CPTII,S$GLB,, | Performed by: INTERNAL MEDICINE

## 2021-09-30 PROCEDURE — 99999 PR PBB SHADOW E&M-EST. PATIENT-LVL IV: CPT | Mod: PBBFAC,,, | Performed by: INTERNAL MEDICINE

## 2021-09-30 PROCEDURE — 3066F PR DOCUMENTATION OF TREATMENT FOR NEPHROPATHY: ICD-10-PCS | Mod: CPTII,S$GLB,, | Performed by: INTERNAL MEDICINE

## 2021-09-30 PROCEDURE — 1160F PR REVIEW ALL MEDS BY PRESCRIBER/CLIN PHARMACIST DOCUMENTED: ICD-10-PCS | Mod: CPTII,S$GLB,, | Performed by: INTERNAL MEDICINE

## 2021-09-30 PROCEDURE — 36415 COLL VENOUS BLD VENIPUNCTURE: CPT | Performed by: INTERNAL MEDICINE

## 2021-09-30 PROCEDURE — 3079F PR MOST RECENT DIASTOLIC BLOOD PRESSURE 80-89 MM HG: ICD-10-PCS | Mod: CPTII,S$GLB,, | Performed by: INTERNAL MEDICINE

## 2021-09-30 PROCEDURE — 3044F PR MOST RECENT HEMOGLOBIN A1C LEVEL <7.0%: ICD-10-PCS | Mod: CPTII,S$GLB,, | Performed by: INTERNAL MEDICINE

## 2021-09-30 PROCEDURE — 95251 CONT GLUC MNTR ANALYSIS I&R: CPT | Mod: S$GLB,,, | Performed by: INTERNAL MEDICINE

## 2021-09-30 PROCEDURE — 4010F PR ACE/ARB THEARPY RXD/TAKEN: ICD-10-PCS | Mod: CPTII,S$GLB,, | Performed by: INTERNAL MEDICINE

## 2021-09-30 PROCEDURE — 3066F NEPHROPATHY DOC TX: CPT | Mod: CPTII,S$GLB,, | Performed by: INTERNAL MEDICINE

## 2021-09-30 PROCEDURE — 99999 PR PBB SHADOW E&M-EST. PATIENT-LVL IV: ICD-10-PCS | Mod: PBBFAC,,, | Performed by: INTERNAL MEDICINE

## 2021-09-30 PROCEDURE — 80069 RENAL FUNCTION PANEL: CPT | Performed by: INTERNAL MEDICINE

## 2021-10-14 DIAGNOSIS — E11.649 HYPOGLYCEMIA ASSOCIATED WITH DIABETES: ICD-10-CM

## 2021-10-16 RX ORDER — INSULIN GLARGINE 100 [IU]/ML
INJECTION, SOLUTION SUBCUTANEOUS
Qty: 15 ML | Refills: 0 | Status: SHIPPED | OUTPATIENT
Start: 2021-10-16 | End: 2022-04-22 | Stop reason: SDUPTHER

## 2021-10-16 RX ORDER — BLOOD-GLUCOSE SENSOR
EACH MISCELLANEOUS
Qty: 9 EACH | Refills: 8 | Status: SHIPPED | OUTPATIENT
Start: 2021-10-16 | End: 2022-04-22 | Stop reason: SDUPTHER

## 2021-10-16 RX ORDER — BLOOD-GLUCOSE TRANSMITTER
EACH MISCELLANEOUS
Qty: 1 EACH | Refills: 4 | Status: SHIPPED | OUTPATIENT
Start: 2021-10-16 | End: 2022-04-22 | Stop reason: SDUPTHER

## 2021-10-16 RX ORDER — LANCETS 33 GAUGE
EACH MISCELLANEOUS
Qty: 300 EACH | Refills: 8 | Status: SHIPPED | OUTPATIENT
Start: 2021-10-16

## 2021-12-08 DIAGNOSIS — Z12.31 OTHER SCREENING MAMMOGRAM: ICD-10-CM

## 2022-01-05 DIAGNOSIS — E11.9 TYPE 2 DIABETES MELLITUS WITHOUT COMPLICATION, UNSPECIFIED WHETHER LONG TERM INSULIN USE: ICD-10-CM

## 2022-01-10 ENCOUNTER — PATIENT MESSAGE (OUTPATIENT)
Dept: ADMINISTRATIVE | Facility: HOSPITAL | Age: 52
End: 2022-01-10
Payer: COMMERCIAL

## 2022-01-11 ENCOUNTER — LAB VISIT (OUTPATIENT)
Dept: LAB | Facility: HOSPITAL | Age: 52
End: 2022-01-11
Attending: INTERNAL MEDICINE
Payer: COMMERCIAL

## 2022-01-11 DIAGNOSIS — E13.9 LADA (LATENT AUTOIMMUNE DIABETES IN ADULTS), MANAGED AS TYPE 1: ICD-10-CM

## 2022-01-11 LAB
ESTIMATED AVG GLUCOSE: 154 MG/DL (ref 68–131)
HBA1C MFR BLD: 7 % (ref 4–5.6)

## 2022-01-11 PROCEDURE — 36415 COLL VENOUS BLD VENIPUNCTURE: CPT | Performed by: INTERNAL MEDICINE

## 2022-01-11 PROCEDURE — 83036 HEMOGLOBIN GLYCOSYLATED A1C: CPT | Performed by: INTERNAL MEDICINE

## 2022-01-18 ENCOUNTER — OFFICE VISIT (OUTPATIENT)
Dept: ENDOCRINOLOGY | Facility: CLINIC | Age: 52
End: 2022-01-18
Payer: COMMERCIAL

## 2022-01-18 VITALS
OXYGEN SATURATION: 97 % | SYSTOLIC BLOOD PRESSURE: 138 MMHG | HEART RATE: 85 BPM | HEIGHT: 62 IN | BODY MASS INDEX: 24.67 KG/M2 | WEIGHT: 134.06 LBS | DIASTOLIC BLOOD PRESSURE: 84 MMHG

## 2022-01-18 DIAGNOSIS — E83.52 HYPERCALCEMIA: ICD-10-CM

## 2022-01-18 DIAGNOSIS — E55.9 VITAMIN D DEFICIENCY: Primary | ICD-10-CM

## 2022-01-18 DIAGNOSIS — E13.9 LADA (LATENT AUTOIMMUNE DIABETES IN ADULTS), MANAGED AS TYPE 1: ICD-10-CM

## 2022-01-18 PROCEDURE — 1160F PR REVIEW ALL MEDS BY PRESCRIBER/CLIN PHARMACIST DOCUMENTED: ICD-10-PCS | Mod: CPTII,S$GLB,, | Performed by: INTERNAL MEDICINE

## 2022-01-18 PROCEDURE — 99999 PR PBB SHADOW E&M-EST. PATIENT-LVL V: ICD-10-PCS | Mod: PBBFAC,,, | Performed by: INTERNAL MEDICINE

## 2022-01-18 PROCEDURE — 3051F HG A1C>EQUAL 7.0%<8.0%: CPT | Mod: CPTII,S$GLB,, | Performed by: INTERNAL MEDICINE

## 2022-01-18 PROCEDURE — 3075F SYST BP GE 130 - 139MM HG: CPT | Mod: CPTII,S$GLB,, | Performed by: INTERNAL MEDICINE

## 2022-01-18 PROCEDURE — 99214 OFFICE O/P EST MOD 30 MIN: CPT | Mod: S$GLB,,, | Performed by: INTERNAL MEDICINE

## 2022-01-18 PROCEDURE — 3075F PR MOST RECENT SYSTOLIC BLOOD PRESS GE 130-139MM HG: ICD-10-PCS | Mod: CPTII,S$GLB,, | Performed by: INTERNAL MEDICINE

## 2022-01-18 PROCEDURE — 3079F PR MOST RECENT DIASTOLIC BLOOD PRESSURE 80-89 MM HG: ICD-10-PCS | Mod: CPTII,S$GLB,, | Performed by: INTERNAL MEDICINE

## 2022-01-18 PROCEDURE — 3079F DIAST BP 80-89 MM HG: CPT | Mod: CPTII,S$GLB,, | Performed by: INTERNAL MEDICINE

## 2022-01-18 PROCEDURE — 3008F BODY MASS INDEX DOCD: CPT | Mod: CPTII,S$GLB,, | Performed by: INTERNAL MEDICINE

## 2022-01-18 PROCEDURE — 1160F RVW MEDS BY RX/DR IN RCRD: CPT | Mod: CPTII,S$GLB,, | Performed by: INTERNAL MEDICINE

## 2022-01-18 PROCEDURE — 99999 PR PBB SHADOW E&M-EST. PATIENT-LVL V: CPT | Mod: PBBFAC,,, | Performed by: INTERNAL MEDICINE

## 2022-01-18 PROCEDURE — 3008F PR BODY MASS INDEX (BMI) DOCUMENTED: ICD-10-PCS | Mod: CPTII,S$GLB,, | Performed by: INTERNAL MEDICINE

## 2022-01-18 PROCEDURE — 95251 CONT GLUC MNTR ANALYSIS I&R: CPT | Mod: S$GLB,,, | Performed by: INTERNAL MEDICINE

## 2022-01-18 PROCEDURE — 95251 PR GLUCOSE MONITOR, 72 HOUR, PHYS INTERP: ICD-10-PCS | Mod: S$GLB,,, | Performed by: INTERNAL MEDICINE

## 2022-01-18 PROCEDURE — 1159F PR MEDICATION LIST DOCUMENTED IN MEDICAL RECORD: ICD-10-PCS | Mod: CPTII,S$GLB,, | Performed by: INTERNAL MEDICINE

## 2022-01-18 PROCEDURE — 3051F PR MOST RECENT HEMOGLOBIN A1C LEVEL 7.0 - < 8.0%: ICD-10-PCS | Mod: CPTII,S$GLB,, | Performed by: INTERNAL MEDICINE

## 2022-01-18 PROCEDURE — 1159F MED LIST DOCD IN RCRD: CPT | Mod: CPTII,S$GLB,, | Performed by: INTERNAL MEDICINE

## 2022-01-18 PROCEDURE — 99214 PR OFFICE/OUTPT VISIT, EST, LEVL IV, 30-39 MIN: ICD-10-PCS | Mod: S$GLB,,, | Performed by: INTERNAL MEDICINE

## 2022-01-18 NOTE — PROGRESS NOTES
"Subjective:      Patient ID: Leida Hoyos is a 51 y.o. female.    Chief Complaint:  Diabetes      History of Present Illness    Ms. Hoyos is a 51 y.o. female who is here for a follow-up visit for evaluation of CHEMA diagnosed four years ago (episode of DKA), managed with MDI and DEXCOM G6. Initially treated as type 2 DM.      Current regimen:  Lantus 13 units at bedtime  Humalog 5/6/7  ISF 1:60 (may need additional correction)   Threshold 140    Has symptoms of low blood sugar at 70.  A1C 7%     DEXCOM G6 data reviewed today.   Dates: 1/5 - 1/18  No issues with sensor wear/allergic reactions to adhesive.      Significant for average glucose of  191  GMI: 7.9%  Patterns of highs: post dinner high blood sugars, snacks without using insulin in the evening.   Patterns of lows: sporadic, 60s (correction, activity (moving from temp housing to other temp housing)     Coefficient of variation (goal is < 33%): 35.1%  SD (Goal is < 50) : 67  Time in range (Goal is > 75%): 40%  54 - 70: 2%  Time spent < 54 mg/dl: 0.1%     Successful days include: 9/23  The CGM reports was printed and placed for scanning.            Reports good compliance with injections. Hypoglycemia occurs infrequently.   Symptoms include: tremulousness, lightheaded and heat sensation, over treats due to feeling poorly.    Review of Systems  No recent illness    Objective:   Physical Exam  Vitals:    01/18/22 1126   BP: 138/84   Pulse: 85   SpO2: 97%   Weight: 60.8 kg (134 lb 0.6 oz)   Height: 5' 2" (1.575 m)       BP Readings from Last 3 Encounters:   01/18/22 138/84   09/30/21 132/80   07/27/21 126/78     Wt Readings from Last 1 Encounters:   01/18/22 1126 60.8 kg (134 lb 0.6 oz)         Body mass index is 24.52 kg/m².    Lab Review:   Lab Results   Component Value Date    HGBA1C 7.0 (H) 01/11/2022     Lab Results   Component Value Date    CHOL 163 09/24/2021    HDL 73 09/24/2021    LDLCALC 74.8 09/24/2021    TRIG 76 09/24/2021    CHOLHDL 44.8 " "09/24/2021     Lab Results   Component Value Date     (L) 09/30/2021    K 4.2 09/30/2021     09/30/2021    CO2 23 09/30/2021     (H) 09/30/2021    BUN 14 09/30/2021    CREATININE 0.8 09/30/2021    CALCIUM 10.7 (H) 09/30/2021    PROT 7.2 09/24/2021    ALBUMIN 4.0 09/30/2021    BILITOT 0.6 09/24/2021    ALKPHOS 94 09/24/2021    AST 37 09/24/2021    ALT 50 (H) 09/24/2021    ANIONGAP 10 09/30/2021    ESTGFRAFRICA >60.0 09/30/2021    EGFRNONAA >60.0 09/30/2021    TSH 3.363 09/24/2021     Results for GIORGIO SHIELDS (MRN [id number]) as of 1/18/2022 11:57   Ref. Range 9/24/2021 08:07   Creatinine, Urine Latest Ref Range: 15.0 - 325.0 mg/dL 144.0   Microalbumin, Urine Latest Units: ug/mL 6.0   MICROALB/CREAT RATIO Latest Ref Range: 0.0 - 30.0 ug/mg 4.2     Results for GIORGIO SHIELDS (MRN [id number]) as of 1/18/2022 12:43   Ref. Range 12/15/2020 11:58 3/24/2021 08:52 9/24/2021 07:33   PTH Latest Ref Range: 9.0 - 77.0 pg/mL 85.0 (H) 41.0 81.1 (H)     Assessment and Plan     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), managed as type 1  CHEMA diagnosed five years ago.   Currently on MDI, have discussed that generally "patterns" her meals, and does not carb count regularly. Have offered her DM Education.     Change threshold for correction to 140 (should correct to 80)  decrease ISF to 1:55   lantus 13 units at night   Continue humalog 5/6/8 (increased by one unit based on DEXCOM)    Continue use dexcom G6    microalb done 9/2021 -normal  Blood pressure is normal on losartan   Eye exam due in 2/2022    Diabetes education    Hypercalcemia  Cut back on oral calcium   Continues to take vitamin D (3000 IUs daily?) --> cut back to 2400 IUs daily.   PTH intermittently elevated   Results for GIORGIO SHIELDS (MRN [id number]) as of 1/18/2022 12:43   Ref. Range 12/15/2020 11:58 3/24/2021 08:52 9/24/2021 07:33   PTH Latest Ref Range: 9.0 - 77.0 pg/mL 85.0 (H) 41.0 81.1 (H)     Plan:  Renal function panel and " PTH with upcoming labs  May need 24 hr urine collection

## 2022-01-18 NOTE — PATIENT INSTRUCTIONS
Correction is 1:55  Start correcting 140    Humalog:  Breakfast stay 5 units   Lunch stay at 6 units (increase to 7 units if packing a bigger lunch)  Dinner  Try to eat earlier   Increase to 8 units     Try and take meal time insulin 15 minutes before you eat     Continue lantus 13 units     Over the counter vitamin D3 800 IUs three tablets daily --> cut back to 2 tablets daily

## 2022-01-18 NOTE — ASSESSMENT & PLAN NOTE
Cut back on oral calcium   Continues to take vitamin D (3000 IUs daily?) --> cut back to 2400 IUs daily.   PTH intermittently elevated   Results for GIORGIO SHIELDS (MRN [id number]) as of 1/18/2022 12:43   Ref. Range 12/15/2020 11:58 3/24/2021 08:52 9/24/2021 07:33   PTH Latest Ref Range: 9.0 - 77.0 pg/mL 85.0 (H) 41.0 81.1 (H)     Plan:  Renal function panel and PTH with upcoming labs  May need 24 hr urine collection

## 2022-01-18 NOTE — ASSESSMENT & PLAN NOTE
"CHEMA diagnosed five years ago.   Currently on MDI, have discussed that generally "patterns" her meals, and does not carb count regularly. Have offered her DM Education.     Change threshold for correction to 140 (should correct to 80)  decrease ISF to 1:55   lantus 13 units at night   Continue humalog 5/6/8 (increased by one unit based on DEXCOM)    Continue use dexcom G6    microalb done 9/2021 -normal  Blood pressure is normal on losartan   Eye exam due in 2/2022    Diabetes education  "

## 2022-01-26 ENCOUNTER — PATIENT MESSAGE (OUTPATIENT)
Dept: ENDOCRINOLOGY | Facility: CLINIC | Age: 52
End: 2022-01-26
Payer: COMMERCIAL

## 2022-01-27 ENCOUNTER — OFFICE VISIT (OUTPATIENT)
Dept: FAMILY MEDICINE | Facility: CLINIC | Age: 52
End: 2022-01-27
Payer: COMMERCIAL

## 2022-01-27 ENCOUNTER — TELEPHONE (OUTPATIENT)
Dept: ADMINISTRATIVE | Facility: HOSPITAL | Age: 52
End: 2022-01-27
Payer: COMMERCIAL

## 2022-01-27 VITALS
DIASTOLIC BLOOD PRESSURE: 78 MMHG | HEIGHT: 62 IN | WEIGHT: 135.81 LBS | HEART RATE: 87 BPM | SYSTOLIC BLOOD PRESSURE: 108 MMHG | OXYGEN SATURATION: 98 % | BODY MASS INDEX: 24.99 KG/M2 | TEMPERATURE: 98 F

## 2022-01-27 DIAGNOSIS — E78.5 HYPERLIPIDEMIA DUE TO TYPE 1 DIABETES MELLITUS: ICD-10-CM

## 2022-01-27 DIAGNOSIS — E11.59 HYPERTENSION ASSOCIATED WITH DIABETES: ICD-10-CM

## 2022-01-27 DIAGNOSIS — Z00.00 ANNUAL PHYSICAL EXAM: Primary | ICD-10-CM

## 2022-01-27 DIAGNOSIS — E13.9 LADA (LATENT AUTOIMMUNE DIABETES IN ADULTS), MANAGED AS TYPE 1: ICD-10-CM

## 2022-01-27 DIAGNOSIS — E11.649 HYPOGLYCEMIA ASSOCIATED WITH DIABETES: ICD-10-CM

## 2022-01-27 DIAGNOSIS — I10 ESSENTIAL HYPERTENSION: ICD-10-CM

## 2022-01-27 DIAGNOSIS — I15.2 HYPERTENSION ASSOCIATED WITH DIABETES: ICD-10-CM

## 2022-01-27 DIAGNOSIS — E10.69 HYPERLIPIDEMIA DUE TO TYPE 1 DIABETES MELLITUS: ICD-10-CM

## 2022-01-27 DIAGNOSIS — K75.4 AUTOIMMUNE HEPATITIS: ICD-10-CM

## 2022-01-27 DIAGNOSIS — E55.9 VITAMIN D DEFICIENCY: ICD-10-CM

## 2022-01-27 PROCEDURE — 3074F PR MOST RECENT SYSTOLIC BLOOD PRESSURE < 130 MM HG: ICD-10-PCS | Mod: CPTII,S$GLB,, | Performed by: INTERNAL MEDICINE

## 2022-01-27 PROCEDURE — 3078F DIAST BP <80 MM HG: CPT | Mod: CPTII,S$GLB,, | Performed by: INTERNAL MEDICINE

## 2022-01-27 PROCEDURE — 3074F SYST BP LT 130 MM HG: CPT | Mod: CPTII,S$GLB,, | Performed by: INTERNAL MEDICINE

## 2022-01-27 PROCEDURE — 4010F PR ACE/ARB THEARPY RXD/TAKEN: ICD-10-PCS | Mod: CPTII,S$GLB,, | Performed by: INTERNAL MEDICINE

## 2022-01-27 PROCEDURE — 99214 PR OFFICE/OUTPT VISIT, EST, LEVL IV, 30-39 MIN: ICD-10-PCS | Mod: S$GLB,,, | Performed by: INTERNAL MEDICINE

## 2022-01-27 PROCEDURE — 1159F MED LIST DOCD IN RCRD: CPT | Mod: CPTII,S$GLB,, | Performed by: INTERNAL MEDICINE

## 2022-01-27 PROCEDURE — 3051F HG A1C>EQUAL 7.0%<8.0%: CPT | Mod: CPTII,S$GLB,, | Performed by: INTERNAL MEDICINE

## 2022-01-27 PROCEDURE — 3008F PR BODY MASS INDEX (BMI) DOCUMENTED: ICD-10-PCS | Mod: CPTII,S$GLB,, | Performed by: INTERNAL MEDICINE

## 2022-01-27 PROCEDURE — 3008F BODY MASS INDEX DOCD: CPT | Mod: CPTII,S$GLB,, | Performed by: INTERNAL MEDICINE

## 2022-01-27 PROCEDURE — 99214 OFFICE O/P EST MOD 30 MIN: CPT | Mod: S$GLB,,, | Performed by: INTERNAL MEDICINE

## 2022-01-27 PROCEDURE — 99999 PR PBB SHADOW E&M-EST. PATIENT-LVL IV: ICD-10-PCS | Mod: PBBFAC,,, | Performed by: INTERNAL MEDICINE

## 2022-01-27 PROCEDURE — 4010F ACE/ARB THERAPY RXD/TAKEN: CPT | Mod: CPTII,S$GLB,, | Performed by: INTERNAL MEDICINE

## 2022-01-27 PROCEDURE — 3051F PR MOST RECENT HEMOGLOBIN A1C LEVEL 7.0 - < 8.0%: ICD-10-PCS | Mod: CPTII,S$GLB,, | Performed by: INTERNAL MEDICINE

## 2022-01-27 PROCEDURE — 3078F PR MOST RECENT DIASTOLIC BLOOD PRESSURE < 80 MM HG: ICD-10-PCS | Mod: CPTII,S$GLB,, | Performed by: INTERNAL MEDICINE

## 2022-01-27 PROCEDURE — 99999 PR PBB SHADOW E&M-EST. PATIENT-LVL IV: CPT | Mod: PBBFAC,,, | Performed by: INTERNAL MEDICINE

## 2022-01-27 PROCEDURE — 1159F PR MEDICATION LIST DOCUMENTED IN MEDICAL RECORD: ICD-10-PCS | Mod: CPTII,S$GLB,, | Performed by: INTERNAL MEDICINE

## 2022-01-27 RX ORDER — LOSARTAN POTASSIUM 25 MG/1
25 TABLET ORAL DAILY
Qty: 90 TABLET | Refills: 3 | Status: ON HOLD | OUTPATIENT
Start: 2022-01-27 | End: 2023-02-10 | Stop reason: HOSPADM

## 2022-01-27 RX ORDER — PRAVASTATIN SODIUM 10 MG/1
10 TABLET ORAL DAILY
Qty: 90 TABLET | Refills: 3 | Status: SHIPPED | OUTPATIENT
Start: 2022-01-27 | End: 2023-06-20 | Stop reason: SDUPTHER

## 2022-01-27 RX ORDER — PRAVASTATIN SODIUM 10 MG/1
10 TABLET ORAL DAILY
Qty: 90 TABLET | Refills: 3 | Status: SHIPPED | OUTPATIENT
Start: 2022-01-27 | End: 2022-01-27

## 2022-01-27 RX ORDER — LOSARTAN POTASSIUM 25 MG/1
25 TABLET ORAL DAILY
Qty: 90 TABLET | Refills: 3 | Status: SHIPPED | OUTPATIENT
Start: 2022-01-27 | End: 2022-01-27

## 2022-01-27 NOTE — PROGRESS NOTES
[facility] Note    Chief Complaint      Chief Complaint   Patient presents with    Follow-up     Pt here for 6 month f/u     History of Present Illness      Leida Hoyos is a 51 y.o. female who presents today for follow up of CHEMA, HTN.  Patient comes to appointment alone.  GYN: Laurie Menjivar, Endo: Dr. Kurtz, GI: Dr. Russell, Optho: Dr. Ferguson, Derm: Paddock    Problem List Items Addressed This Visit     Autoimmune hepatitis    Current Assessment & Plan     Sees Dr. Russell.  Was on mercaptopurine in past, no issues with side effects.  Drinks very rarely, 1-2 times per month. ALT 50 on 9/2021.         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  Sees Dr. Kurtz, A1C 7 in 1/2022, settings changed by Dr. Kurtz. On Lantus 13 units at night with Humalog with meals.   Still exercises but not as much as she used to.         Relevant Medications    losartan (COZAAR) 25 MG tablet    Other Relevant Orders    Microalbumin/Creatinine Ratio, Urine    Hemoglobin A1C    Hypoglycemia associated with diabetes    Current Assessment & Plan     No issues with hypoglycemia recently.  Has Dexcom CGM.         Vitamin D deficiency    Current Assessment & Plan     Level 32 in 9/2021, on ergo for replacement.         Hypertension associated with diabetes    Current Assessment & Plan     BP controlled on losartan 25 mg daily.  No CP/SOB/HA.  Doesn't check at home.           Other Visit Diagnoses     Annual physical exam    -  Primary    Relevant Orders    CBC Auto Differential    Comprehensive Metabolic Panel    Lipid Panel    Hyperlipidemia due to type 1 diabetes mellitus        Relevant Medications    pravastatin (PRAVACHOL) 10 MG tablet    Essential hypertension        Relevant Medications    losartan (COZAAR) 25 MG tablet          Health Maintenance   Topic Date Due    Mammogram  11/23/2021    Eye Exam  12/21/2021    Foot Exam  06/30/2022     Hemoglobin A1c  2022    Lipid Panel  2022    Low Dose Statin  2023    TETANUS VACCINE  2026    Hepatitis C Screening  Completed       Past Medical History:   Diagnosis Date    Autoimmune hepatitis     managed by Dr. Russell on mercaptopurine    Diabetes mellitus type II     Hypertension        Past Surgical History:   Procedure Laterality Date     SECTION, CLASSIC      COLONOSCOPY N/A 2019    Procedure: COLONOSCOPY;  Surgeon: Dipesh Victoria MD;  Location: [address]);  Service: Endoscopy;  Laterality: N/A;    FRACTURE SURGERY Left     leg     MOUTH SURGERY         family history includes Arthritis in her mother; Diabetes in her maternal grandfather and paternal grandfather; Heart attack (age of onset: 60) in her father; Heart disease in her father; Hyperlipidemia in her father; Hypertension in her father and sister; Learning disabilities in her daughter; No Known Problems in her daughter, sister, and son; Ulcerative colitis in her mother.    Social History     Tobacco Use    Smoking status: Never Smoker    Smokeless tobacco: Never Used   Substance Use Topics    Alcohol use: Yes     Alcohol/week: 2.0 standard drinks     Types: 2 Glasses of wine per week     Comment: social    Drug use: No       Review of Systems   Constitutional: Negative for chills and fever.   HENT: Negative for sore throat.    Respiratory: Negative for cough and shortness of breath.    Cardiovascular: Negative for chest pain and palpitations.   Gastrointestinal: Negative for constipation, diarrhea, nausea and vomiting.   Genitourinary: Negative for dysuria and hematuria.   Musculoskeletal: Negative for falls.   Neurological: Negative for headaches.        Outpatient Encounter Medications as of 2022   Medication Sig Dispense Refill    aspirin (ECOTRIN) 81 MG EC tablet Take 81 mg by mouth once daily.      blood sugar diagnostic Strp For two to three times daily checking  "100 strip 4    blood-glucose meter (ONETOUCH VERIO IQ METER MISC) OneTouch Verio IQ Meter      calcium carbonate (OS-EUSEBIA) 600 mg calcium (1,500 mg) Tab Take 600 mg by mouth once.      cholecalciferol, vitamin D3, (VITAMIN D3) 1,000 unit capsule Take 1 capsule (1,000 Units total) by mouth once daily.  0    DEXCOM G6 SENSOR Melissa USE AS DIRECTED TO TEST BLOOD GLUCOSE 9 each 8    DEXCOM G6 TRANSMITTER Melissa USE AS DIRECTED TO TEST BLOOD GLUCOSE. 1 each 4    insulin (LANTUS SOLOSTAR U-100 INSULIN) glargine 100 units/mL (3mL) SubQ pen INJECT 13 UNITS UNDER THE SKIN EVERY EVENING 15 mL 0    insulin lispro (HUMALOG KWIKPEN INSULIN) 100 unit/mL pen INJECT 18 UNITS SUBCUTANEOUSLY DAILY AS DIRECTED 15 mL 8    lancets Misc To check BG 4 times daily, to use with insurance preferred meter 400 each 3    multivitamin (THERAGRAN) per tablet Take 1 tablet by mouth once daily.      ONETOUCH DELICA PLUS LANCET 33 gauge Misc USE AS DIRECTED TO TEST BLOOD GLUCOSE. 300 each 8    pen needle, diabetic (BD ULTRA-FINE NICK PEN NEEDLE) 32 gauge x 5/32" Ndle USE FOUR TIMES A DAY FOR INSULIN INJECTIONS 360 each 4    [DISCONTINUED] nitrofurantoin, macrocrystal-monohydrate, (MACROBID) 100 MG capsule Take 1 capsule (100 mg total) by mouth 2 (two) times daily. 10 capsule 0    losartan (COZAAR) 25 MG tablet Take 1 tablet (25 mg total) by mouth once daily. 90 tablet 3    pravastatin (PRAVACHOL) 10 MG tablet Take 1 tablet (10 mg total) by mouth once daily. 90 tablet 3    [DISCONTINUED] blood sugar diagnostic (ONETOUCH ULTRA TEST) Strp TEST AS DIRECTED UP TO 3 TIMES DAILY 300 strip 8    [DISCONTINUED] losartan (COZAAR) 25 MG tablet Take 1 tablet (25 mg total) by mouth once daily. 90 tablet 3    [DISCONTINUED] losartan (COZAAR) 25 MG tablet Take 1 tablet (25 mg total) by mouth once daily. 90 tablet 3    [DISCONTINUED] pravastatin (PRAVACHOL) 10 MG tablet Take 1 tablet (10 mg total) by mouth once daily. 90 tablet 3    [DISCONTINUED] " "pravastatin (PRAVACHOL) 10 MG tablet Take 1 tablet (10 mg total) by mouth once daily. 90 tablet 3     No facility-administered encounter medications on file as of 1/27/2022.        Review of patient's allergies indicates:  No Known Allergies    Physical Exam      Vital Signs  Temp: 97.7 °F (36.5 °C)  Temp src: Temporal  Pulse: 87  SpO2: 98 %  BP: 108/78  BP Location: Right arm  Patient Position: Sitting  Pain Score: 0-No pain  Height and Weight  Height: 5' 2" (157.5 cm)  Weight: 61.6 kg (135 lb 12.9 oz)  BSA (Calculated - sq m): 1.64 sq meters  BMI (Calculated): 24.8  Weight in (lb) to have BMI = 25: 136.4]    Physical Exam  Constitutional:       Appearance: She is well-developed.   HENT:      Head: Normocephalic and atraumatic.      Right Ear: External ear normal.      Left Ear: External ear normal.   Eyes:      General:         Right eye: No discharge.         Left eye: No discharge.   Cardiovascular:      Rate and Rhythm: Normal rate and regular rhythm.      Heart sounds: Normal heart sounds. No murmur heard.      Pulmonary:      Effort: Pulmonary effort is normal. No respiratory distress.      Breath sounds: Normal breath sounds.   Abdominal:      General: There is no distension.      Palpations: Abdomen is soft.      Tenderness: There is no abdominal tenderness. There is no guarding.   Musculoskeletal:         General: Normal range of motion.      Cervical back: Normal range of motion.   Skin:     General: Skin is warm and dry.   Neurological:      Mental Status: She is alert and oriented to person, place, and time.   Psychiatric:         Behavior: Behavior normal.          Laboratory:  CBC:  No results for input(s): WBC, RBC, HGB, HCT, PLT, MCV, MCH, MCHC in the last 2160 hours.  CMP:  No results for input(s): GLU, CALCIUM, ALBUMIN, PROT, NA, K, CO2, CL, BUN, ALKPHOS, ALT, AST, BILITOT in the last 2160 hours.    Invalid input(s): CREATININ  URINALYSIS:  No results for input(s): COLORU, CLARITYU, SPECGRAV, " PHUR, PROTEINUA, GLUCOSEU, BILIRUBINCON, BLOODU, WBCU, RBCU, BACTERIA, MUCUS, NITRITE, LEUKOCYTESUR, UROBILINOGEN, HYALINECASTS in the last 2160 hours.   LIPIDS:  No results for input(s): TSH, HDL, CHOL, TRIG, LDLCALC, CHOLHDL, NONHDLCHOL, TOTALCHOLEST in the last 2160 hours.  TSH:  No results for input(s): TSH in the last 2160 hours.  A1C:  Recent Labs   Lab Result Units 01/11/22  0713   Hemoglobin A1C % 7.0*       Radiology:  No results found in the last 30 days.     Assessment/Plan     Leida Hoyos is a 51 y.o.female with:    1. 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), managed as type 1  - Microalbumin/Creatinine Ratio, Urine; Future  - Hemoglobin A1C; Future  - losartan (COZAAR) 25 MG tablet; Take 1 tablet (25 mg total) by mouth once daily.  Dispense: 90 tablet; Refill: 3    2. Vitamin D deficiency    3. Hypertension associated with diabetes    4. Hypoglycemia associated with diabetes    5. Autoimmune hepatitis    6. Hyperlipidemia due to type 1 diabetes mellitus  - pravastatin (PRAVACHOL) 10 MG tablet; Take 1 tablet (10 mg total) by mouth once daily.  Dispense: 90 tablet; Refill: 3    7. Essential hypertension  - losartan (COZAAR) 25 MG tablet; Take 1 tablet (25 mg total) by mouth once daily.  Dispense: 90 tablet; Refill: 3    8. Annual physical exam  - CBC Auto Differential; Future  - Comprehensive Metabolic Panel; Future  - Lipid Panel; Future    -counseled on increasing exercise, working on diet.  Glucoses looking better since adjusting insulin doses  -will schedule eye exam ASA, her eye doctor retired.  Will schedule MMG soon, has orders from GYN  -Continue current medications and maintain follow up with specialists.    -Follow up in about 8 months (around 9/27/2022) for Annual Visit.       Frieda Mera MD  [facility]      Answers for HPI/ROS submitted by the patient on 1/25/2022  activity change: No  unexpected weight change: No  rhinorrhea: No  trouble swallowing: No  visual  disturbance: No  chest tightness: No  polyuria: No  difficulty urinating: No  menstrual problem: No  joint swelling: No  arthralgias: No  confusion: No  dysphoric mood: No

## 2022-01-27 NOTE — ASSESSMENT & PLAN NOTE
Sees Dr. Russell.  Was on mercaptopurine in past, no issues with side effects.  Drinks very rarely, 1-2 times per month. ALT 50 on 9/2021.

## 2022-01-27 NOTE — ASSESSMENT & PLAN NOTE
Dx'ed in 2013.  Sees Dr. Kurtz, A1C 7 in 1/2022, settings changed by Dr. Kurtz. On Lantus 13 units at night with Humalog with meals.   Still exercises but not as much as she used to.

## 2022-02-02 ENCOUNTER — TELEPHONE (OUTPATIENT)
Dept: ADMINISTRATIVE | Facility: HOSPITAL | Age: 52
End: 2022-02-02
Payer: COMMERCIAL

## 2022-02-02 ENCOUNTER — PATIENT OUTREACH (OUTPATIENT)
Dept: ADMINISTRATIVE | Facility: HOSPITAL | Age: 52
End: 2022-02-02
Payer: COMMERCIAL

## 2022-02-11 ENCOUNTER — CLINICAL SUPPORT (OUTPATIENT)
Dept: OTHER | Facility: CLINIC | Age: 52
End: 2022-02-11
Payer: COMMERCIAL

## 2022-02-11 DIAGNOSIS — Z00.8 ENCOUNTER FOR OTHER GENERAL EXAMINATION: ICD-10-CM

## 2022-02-12 VITALS — BODY MASS INDEX: 24.84 KG/M2 | HEIGHT: 62 IN

## 2022-02-12 LAB
HDLC SERPL-MCNC: 74 MG/DL
POC CHOLESTEROL, TOTAL: 173 MG/DL
POC GLUCOSE, FASTING: 149 MG/DL (ref 60–110)
TRIGL SERPL-MCNC: 44 MG/DL

## 2022-02-23 DIAGNOSIS — D84.9 IMMUNOSUPPRESSED STATUS: ICD-10-CM

## 2022-02-25 ENCOUNTER — PATIENT MESSAGE (OUTPATIENT)
Dept: ENDOCRINOLOGY | Facility: CLINIC | Age: 52
End: 2022-02-25
Payer: COMMERCIAL

## 2022-03-07 ENCOUNTER — PATIENT MESSAGE (OUTPATIENT)
Dept: INTERNAL MEDICINE | Facility: CLINIC | Age: 52
End: 2022-03-07
Payer: COMMERCIAL

## 2022-03-07 DIAGNOSIS — R30.0 DYSURIA: Primary | ICD-10-CM

## 2022-03-08 ENCOUNTER — LAB VISIT (OUTPATIENT)
Dept: LAB | Facility: HOSPITAL | Age: 52
End: 2022-03-08
Attending: INTERNAL MEDICINE
Payer: COMMERCIAL

## 2022-03-08 ENCOUNTER — PATIENT MESSAGE (OUTPATIENT)
Dept: ENDOCRINOLOGY | Facility: CLINIC | Age: 52
End: 2022-03-08
Payer: COMMERCIAL

## 2022-03-08 DIAGNOSIS — R30.0 DYSURIA: ICD-10-CM

## 2022-03-08 LAB
BACTERIA #/AREA URNS AUTO: ABNORMAL /HPF
BILIRUB UR QL STRIP: NEGATIVE
CLARITY UR REFRACT.AUTO: ABNORMAL
COLOR UR AUTO: YELLOW
GLUCOSE UR QL STRIP: NEGATIVE
HGB UR QL STRIP: NEGATIVE
KETONES UR QL STRIP: NEGATIVE
LEUKOCYTE ESTERASE UR QL STRIP: ABNORMAL
MICROSCOPIC COMMENT: ABNORMAL
NITRITE UR QL STRIP: NEGATIVE
PH UR STRIP: 5 [PH] (ref 5–8)
PROT UR QL STRIP: NEGATIVE
RBC #/AREA URNS AUTO: 3 /HPF (ref 0–4)
SP GR UR STRIP: 1.02 (ref 1–1.03)
SQUAMOUS #/AREA URNS AUTO: 8 /HPF
URN SPEC COLLECT METH UR: ABNORMAL
WBC #/AREA URNS AUTO: 11 /HPF (ref 0–5)

## 2022-03-08 PROCEDURE — 81001 URINALYSIS AUTO W/SCOPE: CPT | Performed by: INTERNAL MEDICINE

## 2022-03-08 PROCEDURE — 87086 URINE CULTURE/COLONY COUNT: CPT | Performed by: INTERNAL MEDICINE

## 2022-03-08 RX ORDER — NITROFURANTOIN 25; 75 MG/1; MG/1
100 CAPSULE ORAL 2 TIMES DAILY
Qty: 10 CAPSULE | Refills: 0 | Status: SHIPPED | OUTPATIENT
Start: 2022-03-08 | End: 2022-06-29 | Stop reason: SDUPTHER

## 2022-03-09 LAB — BACTERIA UR CULT: NORMAL

## 2022-04-08 ENCOUNTER — PATIENT MESSAGE (OUTPATIENT)
Dept: ENDOCRINOLOGY | Facility: CLINIC | Age: 52
End: 2022-04-08
Payer: COMMERCIAL

## 2022-04-14 ENCOUNTER — LAB VISIT (OUTPATIENT)
Dept: LAB | Facility: HOSPITAL | Age: 52
End: 2022-04-14
Attending: INTERNAL MEDICINE
Payer: COMMERCIAL

## 2022-04-14 DIAGNOSIS — E13.9 LADA (LATENT AUTOIMMUNE DIABETES IN ADULTS), MANAGED AS TYPE 1: ICD-10-CM

## 2022-04-14 DIAGNOSIS — E55.9 VITAMIN D DEFICIENCY: ICD-10-CM

## 2022-04-14 LAB
ALBUMIN SERPL BCP-MCNC: 3.6 G/DL (ref 3.5–5.2)
ANION GAP SERPL CALC-SCNC: 6 MMOL/L (ref 8–16)
BUN SERPL-MCNC: 17 MG/DL (ref 6–20)
CALCIUM SERPL-MCNC: 9.7 MG/DL (ref 8.7–10.5)
CHLORIDE SERPL-SCNC: 101 MMOL/L (ref 95–110)
CO2 SERPL-SCNC: 26 MMOL/L (ref 23–29)
CREAT SERPL-MCNC: 0.8 MG/DL (ref 0.5–1.4)
EST. GFR  (AFRICAN AMERICAN): >60 ML/MIN/1.73 M^2
EST. GFR  (NON AFRICAN AMERICAN): >60 ML/MIN/1.73 M^2
ESTIMATED AVG GLUCOSE: 160 MG/DL (ref 68–131)
GLUCOSE SERPL-MCNC: 209 MG/DL (ref 70–110)
HBA1C MFR BLD: 7.2 % (ref 4–5.6)
PHOSPHATE SERPL-MCNC: 2.9 MG/DL (ref 2.7–4.5)
POTASSIUM SERPL-SCNC: 4.5 MMOL/L (ref 3.5–5.1)
PTH-INTACT SERPL-MCNC: 75.8 PG/ML (ref 9–77)
SODIUM SERPL-SCNC: 133 MMOL/L (ref 136–145)

## 2022-04-14 PROCEDURE — 36415 COLL VENOUS BLD VENIPUNCTURE: CPT | Performed by: INTERNAL MEDICINE

## 2022-04-14 PROCEDURE — 80069 RENAL FUNCTION PANEL: CPT | Performed by: INTERNAL MEDICINE

## 2022-04-14 PROCEDURE — 83970 ASSAY OF PARATHORMONE: CPT | Performed by: INTERNAL MEDICINE

## 2022-04-14 PROCEDURE — 83036 HEMOGLOBIN GLYCOSYLATED A1C: CPT | Performed by: INTERNAL MEDICINE

## 2022-04-21 ENCOUNTER — PATIENT OUTREACH (OUTPATIENT)
Dept: ADMINISTRATIVE | Facility: OTHER | Age: 52
End: 2022-04-21
Payer: COMMERCIAL

## 2022-04-21 NOTE — PROGRESS NOTES
Health Maintenance Due   Topic Date Due    Mammogram  11/23/2021    Eye Exam  12/21/2021     Updates were requested from care everywhere.  Chart was reviewed for overdue Proactive [facility] En[facility]ters (NED) topics (CRS, Breast Cancer Screening, Eye exam)  Health Maintenance has been updated.  [place] immunization registry triggered.  Immunizations were reconciled.

## 2022-04-22 ENCOUNTER — OFFICE VISIT (OUTPATIENT)
Dept: ENDOCRINOLOGY | Facility: CLINIC | Age: 52
End: 2022-04-22
Payer: COMMERCIAL

## 2022-04-22 VITALS
HEART RATE: 70 BPM | BODY MASS INDEX: 24.69 KG/M2 | DIASTOLIC BLOOD PRESSURE: 78 MMHG | OXYGEN SATURATION: 99 % | WEIGHT: 135 LBS | RESPIRATION RATE: 16 BRPM | SYSTOLIC BLOOD PRESSURE: 130 MMHG

## 2022-04-22 DIAGNOSIS — E55.9 VITAMIN D DEFICIENCY: ICD-10-CM

## 2022-04-22 DIAGNOSIS — E11.649 HYPOGLYCEMIA ASSOCIATED WITH DIABETES: ICD-10-CM

## 2022-04-22 DIAGNOSIS — E13.9 LADA (LATENT AUTOIMMUNE DIABETES IN ADULTS), MANAGED AS TYPE 1: Primary | ICD-10-CM

## 2022-04-22 PROCEDURE — 3078F PR MOST RECENT DIASTOLIC BLOOD PRESSURE < 80 MM HG: ICD-10-PCS | Mod: CPTII,S$GLB,, | Performed by: INTERNAL MEDICINE

## 2022-04-22 PROCEDURE — 99999 PR PBB SHADOW E&M-EST. PATIENT-LVL III: ICD-10-PCS | Mod: PBBFAC,,, | Performed by: INTERNAL MEDICINE

## 2022-04-22 PROCEDURE — 4010F PR ACE/ARB THEARPY RXD/TAKEN: ICD-10-PCS | Mod: CPTII,S$GLB,, | Performed by: INTERNAL MEDICINE

## 2022-04-22 PROCEDURE — 3078F DIAST BP <80 MM HG: CPT | Mod: CPTII,S$GLB,, | Performed by: INTERNAL MEDICINE

## 2022-04-22 PROCEDURE — 3008F BODY MASS INDEX DOCD: CPT | Mod: CPTII,S$GLB,, | Performed by: INTERNAL MEDICINE

## 2022-04-22 PROCEDURE — 99999 PR PBB SHADOW E&M-EST. PATIENT-LVL III: CPT | Mod: PBBFAC,,, | Performed by: INTERNAL MEDICINE

## 2022-04-22 PROCEDURE — 99214 PR OFFICE/OUTPT VISIT, EST, LEVL IV, 30-39 MIN: ICD-10-PCS | Mod: 25,S$GLB,, | Performed by: INTERNAL MEDICINE

## 2022-04-22 PROCEDURE — 95251 CONT GLUC MNTR ANALYSIS I&R: CPT | Mod: S$GLB,,, | Performed by: INTERNAL MEDICINE

## 2022-04-22 PROCEDURE — 3051F HG A1C>EQUAL 7.0%<8.0%: CPT | Mod: CPTII,S$GLB,, | Performed by: INTERNAL MEDICINE

## 2022-04-22 PROCEDURE — 3051F PR MOST RECENT HEMOGLOBIN A1C LEVEL 7.0 - < 8.0%: ICD-10-PCS | Mod: CPTII,S$GLB,, | Performed by: INTERNAL MEDICINE

## 2022-04-22 PROCEDURE — 3075F SYST BP GE 130 - 139MM HG: CPT | Mod: CPTII,S$GLB,, | Performed by: INTERNAL MEDICINE

## 2022-04-22 PROCEDURE — 99214 OFFICE O/P EST MOD 30 MIN: CPT | Mod: 25,S$GLB,, | Performed by: INTERNAL MEDICINE

## 2022-04-22 PROCEDURE — 95251 PR GLUCOSE MONITOR, 72 HOUR, PHYS INTERP: ICD-10-PCS | Mod: S$GLB,,, | Performed by: INTERNAL MEDICINE

## 2022-04-22 PROCEDURE — 4010F ACE/ARB THERAPY RXD/TAKEN: CPT | Mod: CPTII,S$GLB,, | Performed by: INTERNAL MEDICINE

## 2022-04-22 PROCEDURE — 3075F PR MOST RECENT SYSTOLIC BLOOD PRESS GE 130-139MM HG: ICD-10-PCS | Mod: CPTII,S$GLB,, | Performed by: INTERNAL MEDICINE

## 2022-04-22 PROCEDURE — 3008F PR BODY MASS INDEX (BMI) DOCUMENTED: ICD-10-PCS | Mod: CPTII,S$GLB,, | Performed by: INTERNAL MEDICINE

## 2022-04-22 RX ORDER — PEN NEEDLE, DIABETIC 30 GX3/16"
NEEDLE, DISPOSABLE MISCELLANEOUS
Qty: 360 EACH | Refills: 4 | Status: SHIPPED | OUTPATIENT
Start: 2022-04-22

## 2022-04-22 RX ORDER — INSULIN GLARGINE 100 [IU]/ML
INJECTION, SOLUTION SUBCUTANEOUS
Qty: 45 ML | Refills: 3 | Status: SHIPPED | OUTPATIENT
Start: 2022-04-22 | End: 2023-03-20

## 2022-04-22 RX ORDER — INSULIN LISPRO 100 [IU]/ML
INJECTION, SOLUTION INTRAVENOUS; SUBCUTANEOUS
Qty: 45 ML | Refills: 3 | Status: SHIPPED | OUTPATIENT
Start: 2022-04-22 | End: 2023-11-14

## 2022-04-22 RX ORDER — BLOOD-GLUCOSE SENSOR
EACH MISCELLANEOUS
Qty: 9 EACH | Refills: 3 | Status: SHIPPED | OUTPATIENT
Start: 2022-04-22 | End: 2023-06-20

## 2022-04-22 RX ORDER — BLOOD-GLUCOSE TRANSMITTER
EACH MISCELLANEOUS
Qty: 1 EACH | Refills: 4 | Status: SHIPPED | OUTPATIENT
Start: 2022-04-22 | End: 2023-06-20

## 2022-04-22 NOTE — PATIENT INSTRUCTIONS
Lantus 12 units   Humalog 4 - 5 units before breakfast / 6 - 7 units before lunch  / 9 units before dinner     Estimated Calcium Content of Foods:  Produce  Serving Size Estimated Calcium*    Robe greens, frozen 8 oz 360 mg   Broccoli nomi 8 oz 200 mg   Kale, frozen 8 oz 180 mg   Soy Beans, green, boiled 8 oz 175 mg   Bok Michelle, cooked, boiled 8 oz 160 mg   Figs, dried 2 figs 65 mg   Broccoli, fresh, cooked 8 oz 60 mg   Oranges 1 whole 55 mg   Seafood Serving Size Estimated Calcium*    Sardines, canned with bones 3 oz 325 mg   [place], canned with bones 3 oz 180 mg   Shrimp, canned 3 oz 125 mg   Dairy Serving Size Estimated Calcium*    Ricotta, part-skim 4 oz 335 mg   Yogurt, plain, low-fat 6 oz 310 mg   Milk, skim, low-fat, whole 8 oz 300 mg   Yogurt with fruit, low-fat 6 oz 260 mg   Mozzarella, part-skim 1 oz 210 mg   Cheddar 1 oz 205 mg   Yogurt, Greek 6 oz 200 mg   American Cheese 1 oz 195 mg   Feta Cheese 4 oz 140 mg   Cottage Cheese, 2% 4 oz 105 mg   Frozen yogurt, vanilla 8 oz 105 mg   Ice Cream, vanilla 8 oz 85 mg   Parmesan 1 tbsp 55 mg   Fortified Food Serving Size Estimated Calcium*   [place] milk, rice milk or soy milk, fortified 8 oz 300 mg   Orange juice and other fruit juices, fortified 8 oz 300 mg   Tofu, prepared with calcium 4 oz 205 mg   Waffle, frozen, fortified 2 pieces 200 mg   Oatmeal, fortified 1 packet 140 mg   English muffin, fortified 1 muffin 100 mg   Cereal, fortified 8 oz 100-1,000 mg   Other Serving Size Estimated Calcium*   Mac & cheese, frozen 1 package 325 mg   Pizza, cheese, frozen 1 serving 115 mg   Pudding, chocolate, prepared with 2% milk 4 oz 160 mg   Beans, baked, canned 4 oz 160 mg   *The calcium content listed for most foods is estimated and can vary due to multiple factors. Check the food label to determine how much calcium is in a particular product.  If you read the nutrition label for a food source, it lists the % calcium in that food.  For an 8 oz glass of milk, for  example, the label states calcium 30%.  This is equivalent to 300 mg of calcium (multiply the listed number by 10).   **Table from the [employer]
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
## 2022-04-22 NOTE — ASSESSMENT & PLAN NOTE
Vitamin D levels of 29   Goal is 30 - 32  Daily dose  3000 IUs daily --> plan to repeat in 9/2022 with labs     Recent PTH normal, with normal calcium and phos --> continue to monitor     May need DXA scan

## 2022-04-22 NOTE — ASSESSMENT & PLAN NOTE
Reviewed dexcom sensor  Scanned into medical chart   All numbers improved since last visit (TIR, SD, average and GMI)    Plan to increase lunch and dinner time humalog insulin dose by one unit   Lunch is tricky as patient does not inject before meals due to work  Lower lantus by 1 units   Correction generally works well.   Noted to have low blood sugar after hyperglycemia.     Will send send message in 2 weeks to review dexcom

## 2022-05-30 ENCOUNTER — PATIENT MESSAGE (OUTPATIENT)
Dept: ADMINISTRATIVE | Facility: HOSPITAL | Age: 52
End: 2022-05-30
Payer: COMMERCIAL

## 2022-06-28 ENCOUNTER — PATIENT MESSAGE (OUTPATIENT)
Dept: INTERNAL MEDICINE | Facility: CLINIC | Age: 52
End: 2022-06-28
Payer: COMMERCIAL

## 2022-06-28 DIAGNOSIS — N30.00 ACUTE CYSTITIS WITHOUT HEMATURIA: Primary | ICD-10-CM

## 2022-06-29 RX ORDER — NITROFURANTOIN 25; 75 MG/1; MG/1
100 CAPSULE ORAL 2 TIMES DAILY
Qty: 10 CAPSULE | Refills: 0 | Status: SHIPPED | OUTPATIENT
Start: 2022-06-29 | End: 2022-09-28

## 2022-06-29 NOTE — ADDENDUM NOTE
Addended by: KELLY IQBAL on: 6/29/2022 11:26 AM     Modules accepted: Orders     Quality 226: Preventive Care And Screening: Tobacco Use: Screening And Cessation Intervention: Patient screened for tobacco use and is an ex/non-smoker Quality 130: Documentation Of Current Medications In The Medical Record: Current Medications Documented Detail Level: Detailed Quality 110: Preventive Care And Screening: Influenza Immunization: Influenza Immunization Administered during Influenza season Quality 431: Preventive Care And Screening: Unhealthy Alcohol Use - Screening: Patient screened for unhealthy alcohol use using a single question and scores less than 2 times per year

## 2022-07-11 ENCOUNTER — PATIENT MESSAGE (OUTPATIENT)
Dept: ADMINISTRATIVE | Facility: HOSPITAL | Age: 52
End: 2022-07-11
Payer: COMMERCIAL

## 2022-08-24 ENCOUNTER — PATIENT MESSAGE (OUTPATIENT)
Dept: ADMINISTRATIVE | Facility: HOSPITAL | Age: 52
End: 2022-08-24
Payer: COMMERCIAL

## 2022-09-12 ENCOUNTER — PATIENT MESSAGE (OUTPATIENT)
Dept: INTERNAL MEDICINE | Facility: CLINIC | Age: 52
End: 2022-09-12
Payer: COMMERCIAL

## 2022-09-28 ENCOUNTER — OFFICE VISIT (OUTPATIENT)
Dept: INTERNAL MEDICINE | Facility: CLINIC | Age: 52
End: 2022-09-28
Payer: COMMERCIAL

## 2022-09-28 DIAGNOSIS — E11.59 HYPERTENSION ASSOCIATED WITH DIABETES: ICD-10-CM

## 2022-09-28 DIAGNOSIS — E13.9 LADA (LATENT AUTOIMMUNE DIABETES IN ADULTS), MANAGED AS TYPE 1: ICD-10-CM

## 2022-09-28 DIAGNOSIS — K75.4 AUTOIMMUNE HEPATITIS: ICD-10-CM

## 2022-09-28 DIAGNOSIS — I15.2 HYPERTENSION ASSOCIATED WITH DIABETES: ICD-10-CM

## 2022-09-28 DIAGNOSIS — Z00.00 ANNUAL PHYSICAL EXAM: ICD-10-CM

## 2022-09-28 DIAGNOSIS — E21.3 HYPERPARATHYROIDISM: Primary | ICD-10-CM

## 2022-09-28 DIAGNOSIS — E66.3 OVERWEIGHT: ICD-10-CM

## 2022-09-28 DIAGNOSIS — E11.649 HYPOGLYCEMIA ASSOCIATED WITH DIABETES: ICD-10-CM

## 2022-09-28 PROBLEM — E83.52 HYPERCALCEMIA: Status: RESOLVED | Noted: 2021-07-01 | Resolved: 2022-09-28

## 2022-09-28 PROCEDURE — 3066F NEPHROPATHY DOC TX: CPT | Mod: CPTII,S$GLB,, | Performed by: INTERNAL MEDICINE

## 2022-09-28 PROCEDURE — 99214 OFFICE O/P EST MOD 30 MIN: CPT | Mod: S$GLB,,, | Performed by: INTERNAL MEDICINE

## 2022-09-28 PROCEDURE — 4010F ACE/ARB THERAPY RXD/TAKEN: CPT | Mod: CPTII,S$GLB,, | Performed by: INTERNAL MEDICINE

## 2022-09-28 PROCEDURE — 4010F PR ACE/ARB THEARPY RXD/TAKEN: ICD-10-PCS | Mod: CPTII,S$GLB,, | Performed by: INTERNAL MEDICINE

## 2022-09-28 PROCEDURE — 3061F NEG MICROALBUMINURIA REV: CPT | Mod: CPTII,S$GLB,, | Performed by: INTERNAL MEDICINE

## 2022-09-28 PROCEDURE — 1159F MED LIST DOCD IN RCRD: CPT | Mod: CPTII,S$GLB,, | Performed by: INTERNAL MEDICINE

## 2022-09-28 PROCEDURE — 99999 PR PBB SHADOW E&M-EST. PATIENT-LVL IV: ICD-10-PCS | Mod: PBBFAC,,, | Performed by: INTERNAL MEDICINE

## 2022-09-28 PROCEDURE — 1159F PR MEDICATION LIST DOCUMENTED IN MEDICAL RECORD: ICD-10-PCS | Mod: CPTII,S$GLB,, | Performed by: INTERNAL MEDICINE

## 2022-09-28 PROCEDURE — 99999 PR PBB SHADOW E&M-EST. PATIENT-LVL IV: CPT | Mod: PBBFAC,,, | Performed by: INTERNAL MEDICINE

## 2022-09-28 PROCEDURE — 3044F PR MOST RECENT HEMOGLOBIN A1C LEVEL <7.0%: ICD-10-PCS | Mod: CPTII,S$GLB,, | Performed by: INTERNAL MEDICINE

## 2022-09-28 PROCEDURE — 99214 PR OFFICE/OUTPT VISIT, EST, LEVL IV, 30-39 MIN: ICD-10-PCS | Mod: S$GLB,,, | Performed by: INTERNAL MEDICINE

## 2022-09-28 PROCEDURE — 3066F PR DOCUMENTATION OF TREATMENT FOR NEPHROPATHY: ICD-10-PCS | Mod: CPTII,S$GLB,, | Performed by: INTERNAL MEDICINE

## 2022-09-28 PROCEDURE — 3061F PR NEG MICROALBUMINURIA RESULT DOCUMENTED/REVIEW: ICD-10-PCS | Mod: CPTII,S$GLB,, | Performed by: INTERNAL MEDICINE

## 2022-09-28 PROCEDURE — 3044F HG A1C LEVEL LT 7.0%: CPT | Mod: CPTII,S$GLB,, | Performed by: INTERNAL MEDICINE
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
## 2022-09-28 NOTE — ASSESSMENT & PLAN NOTE
Dx'ed in 2013.  Sees Dr. Kurtz, A1C 6.6 in 9/2022.  On Lantus 13 units at night with Humalog with meals.   Still exercises but not as much as she used to.

## 2022-09-28 NOTE — PROGRESS NOTES
[facility] Note    Chief Complaint      Chief Complaint   Patient presents with    Follow-up     History of Present Illness      Leida Hoyos is a 52 y.o. female who presents today for follow up of HTN.  Patient comes to appointment alone.  GYN: Laurie Menjivar, Endo: Dr. Kurtz, GI: Dr. Russell, Optho: Dr. Ferguson, Derm: Paddock    Problem List Items Addressed This Visit       Autoimmune hepatitis    Current Assessment & Plan     Sees Dr. Russell.  Was on mercaptopurine in past, no issues with side effects.  Drinks very rarely, 1-2 times per month. LFT's normal 2022.         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  Sees Dr. Kurtz, A1C 6.6 in 2022.  On Lantus 13 units at night with Humalog with meals.   Still exercises but not as much as she used to.         Hypoglycemia associated with diabetes    Current Assessment & Plan     No issues with hypoglycemia recently.  Has Dexcom CGM.         Hypertension associated with diabetes    Current Assessment & Plan     BP controlled on losartan 25 mg daily.  No CP/SOB/HA.  Doesn't check at home.            Health Maintenance   Topic Date Due    Mammogram  2021    Eye Exam  2021    Low Dose Statin  2023    Hemoglobin A1c  2023    Lipid Panel  2023    Foot Exam  2023    TETANUS VACCINE  2026    Hepatitis C Screening  Completed       Past Medical History:   Diagnosis Date    Autoimmune hepatitis     managed by Dr. Russell on mercaptopurine    Diabetes mellitus type II     Hypertension        Past Surgical History:   Procedure Laterality Date     SECTION, CLASSIC      COLONOSCOPY N/A 2019    Procedure: COLONOSCOPY;  Surgeon: Dipesh Victoria MD;  Location: [facility] ([address] [place] [address]);  Service: Endoscopy;  Laterality: N/A;    FRACTURE SURGERY Left     leg     MOUTH SURGERY         family history includes Alcohol abuse in her  maternal grandfather and maternal grandmother; Arthritis in her mother; Asthma in her father; Diabetes in her maternal grandfather and paternal grandfather; Heart attack (age of onset: 60) in her father; Heart disease in her father; Hyperlipidemia in her father; Hypertension in her father and sister; Learning disabilities in her daughter and son; No Known Problems in her daughter, sister, and son; Ulcerative colitis in her mother.    Social History     Tobacco Use    Smoking status: Never    Smokeless tobacco: Never   Substance Use Topics    Alcohol use: Yes     Alcohol/week: 2.0 standard drinks     Types: 2 Glasses of wine per week     Comment: social    Drug use: Never       Review of Systems   Constitutional:  Negative for chills and fever.   HENT:  Negative for hearing loss and sore throat.    Eyes:  Negative for discharge.   Respiratory:  Negative for cough, shortness of breath and wheezing.    Cardiovascular:  Negative for chest pain and palpitations.   Gastrointestinal:  Positive for constipation. Negative for blood in stool, diarrhea, nausea and vomiting.   Genitourinary:  Negative for dysuria and hematuria.   Musculoskeletal:  Negative for falls and neck pain.   Neurological:  Negative for weakness and headaches.   Endo/Heme/Allergies:  Negative for polydipsia.      Outpatient Encounter Medications as of 9/28/2022   Medication Sig Dispense Refill    aspirin (ECOTRIN) 81 MG EC tablet Take 81 mg by mouth once daily.      blood sugar diagnostic Strp For one touch verio IQ meter 100 each 3    blood-glucose meter (ONETOUCH VERIO IQ METER MISC) OneTouch Verio IQ Meter      blood-glucose sensor (DEXCOM G6 SENSOR) Melissa Monitor blood sugars daily 9 each 3    blood-glucose transmitter (DEXCOM G6 TRANSMITTER) Melissa USE AS DIRECTED for daily use TO TEST BLOOD GLUCOSE. 1 each 4    cholecalciferol, vitamin D3, (VITAMIN D3) 1,000 unit capsule Take 1 capsule (1,000 Units total) by mouth once daily.  0    insulin (LANTUS  "SOLOSTAR U-100 INSULIN) glargine 100 units/mL (3mL) SubQ pen INJECT 13 UNITS UNDER THE SKIN EVERY EVENING 45 mL 3    insulin lispro (HUMALOG KWIKPEN INSULIN) 100 unit/mL pen Takes 5 units/7 units and 9 units before meals, plus correction for a TDD 25 units 45 mL 3    losartan (COZAAR) 25 MG tablet Take 1 tablet (25 mg total) by mouth once daily. 90 tablet 3    multivitamin (THERAGRAN) per tablet Take 1 tablet by mouth once daily.      ONETOUCH DELICA PLUS LANCET 33 gauge Misc USE AS DIRECTED TO TEST BLOOD GLUCOSE. 300 each 8    pen needle, diabetic (BD ULTRA-FINE NICK PEN NEEDLE) 32 gauge x 5/32" Ndle USE FOUR TIMES A DAY FOR INSULIN INJECTIONS 360 each 4    pravastatin (PRAVACHOL) 10 MG tablet Take 1 tablet (10 mg total) by mouth once daily. 90 tablet 3    [DISCONTINUED] calcium carbonate (OS-EUSEBIA) 600 mg calcium (1,500 mg) Tab Take 600 mg by mouth once.      [DISCONTINUED] nitrofurantoin, macrocrystal-monohydrate, (MACROBID) 100 MG capsule Take 1 capsule (100 mg total) by mouth 2 (two) times daily. (Patient not taking: Reported on 9/28/2022) 10 capsule 0     No facility-administered encounter medications on file as of 9/28/2022.        Review of patient's allergies indicates:  No Known Allergies    Physical Exam      Vital Signs  Temp: (P) 97.7 °F (36.5 °C)  Pulse: (P) 81  SpO2: (P) 99 %  BP: (P) 118/74  Pain Score: (P) 0-No pain  Height and Weight  Height: (P) 5' 2" (157.5 cm)  Weight: (P) 63.2 kg (139 lb 5.3 oz)  BSA (Calculated - sq m): (P) 1.66 sq meters  BMI (Calculated): (P) 25.5  Weight in (lb) to have BMI = 25: (P) 136.4]    Physical Exam  Constitutional:       Appearance: She is well-developed.   HENT:      Head: Normocephalic and atraumatic.      Right Ear: External ear normal.      Left Ear: External ear normal.   Eyes:      General:         Right eye: No discharge.         Left eye: No discharge.   Cardiovascular:      Rate and Rhythm: Normal rate and regular rhythm.      Pulses:           Dorsalis " pedis pulses are 2+ on the right side and 2+ on the left side.      Heart sounds: Normal heart sounds. No murmur heard.  Pulmonary:      Effort: Pulmonary effort is normal. No respiratory distress.      Breath sounds: Normal breath sounds.   Abdominal:      General: There is no distension.      Palpations: Abdomen is soft.      Tenderness: There is no abdominal tenderness. There is no guarding.   Musculoskeletal:         General: Normal range of motion.      Cervical back: Normal range of motion.      Right foot: Normal range of motion. No deformity.      Left foot: Normal range of motion. No deformity.   Feet:      Right foot:      Protective Sensation: 5 sites tested.  5 sites sensed.      Skin integrity: No ulcer or blister.      Left foot:      Protective Sensation: 5 sites tested.  5 sites sensed.      Skin integrity: No ulcer or blister.   Skin:     General: Skin is warm and dry.   Neurological:      Mental Status: She is alert and oriented to person, place, and time.   Psychiatric:         Behavior: Behavior normal.        Laboratory:  CBC:  Recent Labs   Lab Result Units 09/20/22  0720   WBC K/uL 5.57   RBC M/uL 4.28   Hemoglobin g/dL 13.2   Hematocrit % 39.7   Platelets K/uL 251   MCV fL 93   MCH pg 30.8   MCHC g/dL 33.2     CMP:  Recent Labs   Lab Result Units 09/20/22  0720   Glucose mg/dL 148*   Calcium mg/dL 9.7   Albumin g/dL 4.4   Total Protein g/dL 7.5   Sodium mmol/L 138   Potassium mmol/L 4.1   CO2 mmol/L 26   Chloride mmol/L 103   BUN mg/dL 14   Alkaline Phosphatase U/L 87   ALT U/L 43   AST U/L 38   Total Bilirubin mg/dL 0.7     URINALYSIS:  No results for input(s): COLORU, CLARITYU, SPECGRAV, PHUR, PROTEINUA, GLUCOSEU, BILIRUBINCON, BLOODU, WBCU, RBCU, BACTERIA, MUCUS, NITRITE, LEUKOCYTESUR, UROBILINOGEN, HYALINECASTS in the last 2160 hours.   LIPIDS:  Recent Labs   Lab Result Units 09/20/22  0720   TSH uIU/mL 3.630   HDL mg/dL 74   Cholesterol mg/dL 176   Triglycerides mg/dL 61   LDL  Cholesterol mg/dL 89.8   HDL/Cholesterol Ratio % 42.0   Non-HDL Cholesterol mg/dL 102   Total Cholesterol/HDL Ratio  2.4     TSH:  Recent Labs   Lab Result Units 09/20/22  0720   TSH uIU/mL 3.630     A1C:  Recent Labs   Lab Result Units 09/20/22  0720   Hemoglobin A1C % 6.6*       Radiology:  No results found in the last 30 days.     Assessment/Plan     Leida Hoyos is a 52 y.o.female with:    1. 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 Hypoglycemia associated with diabetes    3. Hypertension associated with diabetes    4. Autoimmune hepatitis    -getting MMG DIS from Dr. Menjivar, refer eye exam  -Continue current medications and maintain follow up with specialists.    -No follow-ups on file.       Frieda Mera MD  [facility]                  Answers submitted by the patient for this visit:  Review of Systems Questionnaire (Submitted on 9/23/2022)  activity change: No  unexpected weight change: No  rhinorrhea: No  trouble swallowing: No  visual disturbance: No  chest tightness: No  polyuria: No  difficulty urinating: No  menstrual problem: No  joint swelling: No  arthralgias: No  confusion: No  dysphoric mood: No

## 2022-11-01 ENCOUNTER — OFFICE VISIT (OUTPATIENT)
Dept: ENDOCRINOLOGY | Facility: CLINIC | Age: 52
End: 2022-11-01
Payer: COMMERCIAL

## 2022-11-01 VITALS
HEIGHT: 62 IN | OXYGEN SATURATION: 99 % | HEART RATE: 80 BPM | SYSTOLIC BLOOD PRESSURE: 118 MMHG | DIASTOLIC BLOOD PRESSURE: 76 MMHG | BODY MASS INDEX: 25.03 KG/M2 | WEIGHT: 136 LBS

## 2022-11-01 DIAGNOSIS — E13.9 LADA (LATENT AUTOIMMUNE DIABETES IN ADULTS), MANAGED AS TYPE 1: ICD-10-CM

## 2022-11-01 DIAGNOSIS — E55.9 VITAMIN D DEFICIENCY: ICD-10-CM

## 2022-11-01 PROCEDURE — 3061F PR NEG MICROALBUMINURIA RESULT DOCUMENTED/REVIEW: ICD-10-PCS | Mod: CPTII,S$GLB,, | Performed by: INTERNAL MEDICINE

## 2022-11-01 PROCEDURE — 99214 PR OFFICE/OUTPT VISIT, EST, LEVL IV, 30-39 MIN: ICD-10-PCS | Mod: 25,S$GLB,, | Performed by: INTERNAL MEDICINE

## 2022-11-01 PROCEDURE — 95251 PR GLUCOSE MONITOR, 72 HOUR, PHYS INTERP: ICD-10-PCS | Mod: S$GLB,,, | Performed by: INTERNAL MEDICINE

## 2022-11-01 PROCEDURE — 99999 PR PBB SHADOW E&M-EST. PATIENT-LVL IV: CPT | Mod: PBBFAC,,, | Performed by: INTERNAL MEDICINE

## 2022-11-01 PROCEDURE — 3044F HG A1C LEVEL LT 7.0%: CPT | Mod: CPTII,S$GLB,, | Performed by: INTERNAL MEDICINE

## 2022-11-01 PROCEDURE — 3044F PR MOST RECENT HEMOGLOBIN A1C LEVEL <7.0%: ICD-10-PCS | Mod: CPTII,S$GLB,, | Performed by: INTERNAL MEDICINE

## 2022-11-01 PROCEDURE — 4010F ACE/ARB THERAPY RXD/TAKEN: CPT | Mod: CPTII,S$GLB,, | Performed by: INTERNAL MEDICINE

## 2022-11-01 PROCEDURE — 3074F PR MOST RECENT SYSTOLIC BLOOD PRESSURE < 130 MM HG: ICD-10-PCS | Mod: CPTII,S$GLB,, | Performed by: INTERNAL MEDICINE

## 2022-11-01 PROCEDURE — 99214 OFFICE O/P EST MOD 30 MIN: CPT | Mod: 25,S$GLB,, | Performed by: INTERNAL MEDICINE

## 2022-11-01 PROCEDURE — 95251 CONT GLUC MNTR ANALYSIS I&R: CPT | Mod: S$GLB,,, | Performed by: INTERNAL MEDICINE

## 2022-11-01 PROCEDURE — 3074F SYST BP LT 130 MM HG: CPT | Mod: CPTII,S$GLB,, | Performed by: INTERNAL MEDICINE

## 2022-11-01 PROCEDURE — 99999 PR PBB SHADOW E&M-EST. PATIENT-LVL IV: ICD-10-PCS | Mod: PBBFAC,,, | Performed by: INTERNAL MEDICINE

## 2022-11-01 PROCEDURE — 3078F PR MOST RECENT DIASTOLIC BLOOD PRESSURE < 80 MM HG: ICD-10-PCS | Mod: CPTII,S$GLB,, | Performed by: INTERNAL MEDICINE

## 2022-11-01 PROCEDURE — 3066F PR DOCUMENTATION OF TREATMENT FOR NEPHROPATHY: ICD-10-PCS | Mod: CPTII,S$GLB,, | Performed by: INTERNAL MEDICINE

## 2022-11-01 PROCEDURE — 3078F DIAST BP <80 MM HG: CPT | Mod: CPTII,S$GLB,, | Performed by: INTERNAL MEDICINE

## 2022-11-01 PROCEDURE — 3061F NEG MICROALBUMINURIA REV: CPT | Mod: CPTII,S$GLB,, | Performed by: INTERNAL MEDICINE

## 2022-11-01 PROCEDURE — 1159F PR MEDICATION LIST DOCUMENTED IN MEDICAL RECORD: ICD-10-PCS | Mod: CPTII,S$GLB,, | Performed by: INTERNAL MEDICINE

## 2022-11-01 PROCEDURE — 4010F PR ACE/ARB THEARPY RXD/TAKEN: ICD-10-PCS | Mod: CPTII,S$GLB,, | Performed by: INTERNAL MEDICINE

## 2022-11-01 PROCEDURE — 1159F MED LIST DOCD IN RCRD: CPT | Mod: CPTII,S$GLB,, | Performed by: INTERNAL MEDICINE

## 2022-11-01 PROCEDURE — 3066F NEPHROPATHY DOC TX: CPT | Mod: CPTII,S$GLB,, | Performed by: INTERNAL MEDICINE

## 2022-11-01 NOTE — PATIENT INSTRUCTIONS
Humalog -- take 5 to 10 minutes before meals  1:9 with breakfast and lunch   1:8 with dinner     ISF (sensitivity factor) or your correction factor:  1:55   Correct to 100     Lantus 13 units    Diabetes education   Send message in two weeks to review dexcom   Labs in three months   F/u in six months (to schedulei 12/1/29022 for the appointment )

## 2022-11-01 NOTE — ASSESSMENT & PLAN NOTE
Increase lantus to 13 units (up from 12 units)  Wants to review carb counting -- refer to DM education   Take humalog 5 - 10 minutes before meals   Currently using:   ICHO 1:9 with B and L   ICHO 1:8 with dinner     ISF 1:55   Threshold 80 - 100
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
## 2022-11-04 ENCOUNTER — CLINICAL SUPPORT (OUTPATIENT)
Dept: DIABETES | Facility: CLINIC | Age: 52
End: 2022-11-04
Payer: COMMERCIAL

## 2022-11-04 DIAGNOSIS — E13.9 LADA (LATENT AUTOIMMUNE DIABETES IN ADULTS), MANAGED AS TYPE 1: ICD-10-CM

## 2022-11-04 PROCEDURE — G0108 DIAB MANAGE TRN  PER INDIV: HCPCS | Mod: S$GLB,,, | Performed by: INTERNAL MEDICINE

## 2022-11-04 PROCEDURE — 99999 PR PBB SHADOW E&M-EST. PATIENT-LVL I: CPT | Mod: PBBFAC,,,

## 2022-11-04 PROCEDURE — G0108 PR DIAB MANAGE TRN  PER INDIV: ICD-10-PCS | Mod: S$GLB,,, | Performed by: INTERNAL MEDICINE

## 2022-11-04 PROCEDURE — 99999 PR PBB SHADOW E&M-EST. PATIENT-LVL I: ICD-10-PCS | Mod: PBBFAC,,,

## 2022-11-04 NOTE — PROGRESS NOTES
Diabetes Care Specialist Progress Note  Author: Sheeba Ortiz RN, CDE  Date: 11/4/2022    Program Intake  Reason for Diabetes Program Visit:: Initial Diabetes Assessment  Current diabetes risk level:: low  In the last 12 months, have you:: none  Permission to speak with others about care:: no    Lab Results   Component Value Date    HGBA1C 6.6 (H) 09/20/2022       Clinical    Clinical Assessment  Current Diabetes Treatment: Insulin (Lantus 13 units; Humalog using ratios breakfast and lunch 1:9; Dinner 1:8 ISF 55 [employer] 80)  Have you ever experienced hypoglycemia (low blood sugar)?: yes  In the last month, how often have you experienced low blood sugar?: more than once a week  Are you able to tell when your blood sugar is low?: Yes  What symptoms do you experience?: Shaky  Have you ever been hospitalized because your blood sugar was too low?: no  How do you treat hypoglycemia (low blood sugar)?: other (has a tendency to overtreat lows)  Have you ever experienced hyperglycemia (high blood sugar)?: no    Medication Information  How do you obtain your medications?: Patient drives  How many days a week do you miss your medications?: Never      Nutritional Status  Diet: Diabetic diet  Meal Plan 24 Hour Recall: Breakfast, Lunch, Dinner, Snack  Meal Plan 24 Hour Recall - Breakfast: waffle 19 gram with peanut butter 7 g  Meal Plan 24 Hour Recall - Lunch: leftovers  Meal Plan 24 Hour Recall - Dinner: chicken, veggies  Meal Plan 24 Hour Recall - Snack: occasional  Change in appetite?: No  Dentation:: Intact  Recent Changes in Weight: No Recent Weight Change  Current nutritional status an area of need that is impacting patient's ability to self-manage diabetes?: No    Additional Social History    Support  Does anyone support you with your diabetes care?: yes  Who supports you?: spouse  Who takes you to your medical appointments?: self  Does the current support meet the patient's needs?: Yes  Is Support an area impacting  ability to self-manage diabetes?: No    Access to Mass Media & Technology  Does the patient have access to any of the following devices or technologies?: Smart phone, Internet Access  Media or technology needs impacting ability to self-manage diabetes?: No    Cognitive/Behavioral Health  Difficulty Thinking: No  Requires Prompting: No  Requires assistance for routine expression?: No  Cognitive or behavioral barriers impacting ability to self-manage diabetes?: No         Communication  Language preference: English  Hearing Problems: No  Vision Problems: No  Communication needs impacting ability to self-manage diabetes?: No    Health Literacy  Preferred Learning Method: Face to Face, Reading Materials  How often do you need to have someone help you read instructions, pamphlets, or written material from your doctor or pharmacy?: Never  Health literacy needs impacting ability to self-manage diabetes?: No      Diabetes Self-Management Skills Assessment    Diabetes Disease Process/Treatment Options  Patient/caregiver able to state what happens when someone has diabetes.: yes  Patient/caregiver knows what type of diabetes they have.: yes  Diabetes Type : Other (CHEMA)  Diabetes Disease Process/Treatment Options: Skills Assessment Completed: Yes  Assessment indicates:: Adequate understanding  Area of need?: No    Nutrition/Healthy Eating  Challenges to healthy eating:: other (see comments) (does not know how to calculate carbs in foods without labels)  Patient can identify foods that impact blood sugar.: yes  Patient-identified foods:: fruit/fruit juice, starches (bread, pasta, rice, cereal), milk, soda, starchy vegetables (corn, peas, beans), sweets, yogurt  Nutrition/Healthy Eating Skills Assessment Completed:: Yes  Assessment indicates:: Knowledge deficit  Area of need?: Yes    Physical Activity/Exercise  Physical Activity/Exercise Skills Assessment Completed: : No  Deffered due to:: Time    Medications  Patient is able  to describe current diabetes management routine.: yes  Diabetes management routine:: insulin  Patient understands the purpose of the medications taken for diabetes.: yes  Patient reports problems or concerns with current medication regimen.: no  Medication Skills Assessment Completed:: Yes  Assessment indicates:: Instruction Needed  Area of need?: Yes    Home Blood Glucose Monitoring  Patient states that blood sugar is checked at home daily.: yes  Monitoring Method:: personal continuous glucose monitor  Personal CGM type:: Dexcom G6 Clarity code obtained today  CGM Report reviewed?: yes  Home Blood Glucose Monitoring Skills Assessment Completed: : Yes  Area of need?: No    Acute Complications  Patient is able to identify types of acute complications: Yes  Patient Identified:: Hypoglycemia  Acute Complications Skills Assessment Completed: : Yes  Assessment indicates:: Knowledge deficit  Area of need?: Yes    Chronic Complications  Chronic Complications Skills Assessment Completed: : No  Deferred due to:: Time    Psychosocial/Coping  Psychosocial/Coping Skills Assessment Completed: : No  Deffered due to:: Time             Assessment Summary and Plan    Based on today's diabetes care assessment, the following areas of need were identified:      Social 11/4/2022   Support No   Access to Mass Media/Tech No   Cognitive/Behavioral Health No   Communication No   Health Literacy No        Clinical 11/4/2022   Nutritional Status No        Diabetes Self-Management Skills 11/4/2022   Diabetes Disease Process/Treatment Options No   Nutrition/Healthy Eating Yes, see care planning   Medication Yes, we worked on fine tuning doses with ratios   Home Blood Glucose Monitoring No   Acute Complications Yes - Reviewed blood glucose goals, prevention, detection, signs and symptoms, and treatment of hypoglycemia following rule of 15 and hyperglycemia, and when to contact the clinic. Advised to carry a source of fast acting carbs.      "      Today's interventions were provided through individual discussion, instruction, and written materials were provided.      Patient verbalized understanding of instruction and written materials.  Pt was able to return back demonstration of instructions today. Patient understood key points, needs reinforcement and further instruction.     Diabetes Self-Management Care Plan:    Today's Diabetes Self-Management Care Plan was developed with Leida's input. Leida has agreed to work toward the following goal(s) to improve his/her overall diabetes control.      Care Plan: Diabetes Management   Updates made since 10/5/2022 12:00 AM        Problem: Healthy Eating         Goal: Patient will begin Advanced carb counting by measuring  her foods more accurately in order to better utilize her I:C ratios    Start Date: 11/4/2022   Priority: High   Barriers: Knowledge deficit   Note:    Patient seen in clinic today for advanced carb counting.  Has been trying but still struggles with "homemade" foods such as veggies, cassaroles etc.  Reviewed label reading today.  She is not currently using measuring tools, We reviewed these using food models. Suggest keeping carbs at around 2-3 servings per meal.  She has been elevated after breakfast.  Advised to count her coffee as 15 grams.  Also encouraged to test I:C ratios.  Provided guidelines.  We briefly talked about Omnipod 5, she will check with her insurance pharmacy regarding this. Food logs provided as well as suggested apps to use.        Task: Reviewed the sources and role of Carbohydrate, Protein, and Fat and how each nutrient impacts blood sugar. Completed 11/4/2022        Task: Provided visual examples using dry measuring cups, food models, and other familiar objects such as computer mouse, deck or cards, tennis ball etc. to help with visualization of portions. Completed 11/4/2022        Task: Explained how to count carbohydrates using the food label and the use of dry " measuring cups for accurate carb counting. Completed 11/4/2022        Task: Discussed strategies for choosing healthier menu options when dining out. Completed 11/4/2022        Task: Recommended replacing beverages containing high sugar content with noncaloric/sugar free options and/or water. Completed 11/4/2022        Task: Review the importance of balancing carbohydrates with each meal using portion control techniques to count servings of carbohydrate and label reading to identify serving size and amount of total carbs per serving. Completed 11/4/2022        Task: Provided Sample plate method and reviewed the use of the plate to estimate amounts of carbohydrate per meal. Completed 11/4/2022          Follow Up Plan     F/u on 2/6/2023    Today's care plan and follow up schedule was discussed with patient.  Leida verbalized understanding of the care plan, goals, and agrees to follow up plan.        The patient was encouraged to communicate with his/her health care provider/physician and care team regarding his/her condition(s) and treatment.  I provided the patient with my contact information today and encouraged to contact me via phone or [facility] as needed.     Length of Visit   Total Time: 70 Minutes

## 2023-01-18 ENCOUNTER — OFFICE VISIT (OUTPATIENT)
Dept: OPTOMETRY | Facility: CLINIC | Age: 53
End: 2023-01-18
Payer: COMMERCIAL

## 2023-01-18 DIAGNOSIS — H52.4 PRESBYOPIA: ICD-10-CM

## 2023-01-18 DIAGNOSIS — E10.9 TYPE 1 DIABETES MELLITUS WITHOUT RETINOPATHY: ICD-10-CM

## 2023-01-18 DIAGNOSIS — E13.9 LADA (LATENT AUTOIMMUNE DIABETES IN ADULTS), MANAGED AS TYPE 1: Primary | ICD-10-CM

## 2023-01-18 DIAGNOSIS — H40.013 OAG (OPEN ANGLE GLAUCOMA) SUSPECT, LOW RISK, BILATERAL: ICD-10-CM

## 2023-01-18 DIAGNOSIS — H25.13 NUCLEAR SCLEROSIS OF BOTH EYES: ICD-10-CM

## 2023-01-18 PROCEDURE — 99999 PR PBB SHADOW E&M-EST. PATIENT-LVL III: ICD-10-PCS | Mod: PBBFAC,,, | Performed by: OPTOMETRIST

## 2023-01-18 PROCEDURE — 92004 PR EYE EXAM, NEW PATIENT,COMPREHESV: ICD-10-PCS | Mod: S$GLB,,, | Performed by: OPTOMETRIST

## 2023-01-18 PROCEDURE — 1159F PR MEDICATION LIST DOCUMENTED IN MEDICAL RECORD: ICD-10-PCS | Mod: CPTII,S$GLB,, | Performed by: OPTOMETRIST

## 2023-01-18 PROCEDURE — 1159F MED LIST DOCD IN RCRD: CPT | Mod: CPTII,S$GLB,, | Performed by: OPTOMETRIST

## 2023-01-18 PROCEDURE — 99999 PR PBB SHADOW E&M-EST. PATIENT-LVL III: CPT | Mod: PBBFAC,,, | Performed by: OPTOMETRIST

## 2023-01-18 PROCEDURE — 2023F DILAT RTA XM W/O RTNOPTHY: CPT | Mod: CPTII,S$GLB,, | Performed by: OPTOMETRIST

## 2023-01-18 PROCEDURE — 92004 COMPRE OPH EXAM NEW PT 1/>: CPT | Mod: S$GLB,,, | Performed by: OPTOMETRIST

## 2023-01-18 PROCEDURE — 2023F PR DILATED RETINAL EXAM W/O EVID OF RETINOPATHY: ICD-10-PCS | Mod: CPTII,S$GLB,, | Performed by: OPTOMETRIST

## 2023-01-18 NOTE — PROGRESS NOTES
HPI    CC: Pt is here today for a Diabetic eye exam. She states her distance   vision is doing well. She is currently only using over the counter glasses   to read.    MIREYA: 2021    (+) Changes in vision , near  (-) Pain  (-) Irritation   (-) Itching   (-) Flashes  (-) Floaters  (+) Glasses wearer, reading  (-) CL wearer  (-) Uses eye gtts    Does patient want a refraction today? If needed    (-) Eye injury  (-) Eye surgery   (-)POHx  (-)FOHx    (+)DM  Hemoglobin A1C       Date                     Value               Ref Range             Status                09/20/2022               6.6 (H)             4.0 - 5.6 %           Final                  04/14/2022               7.2 (H)             4.0 - 5.6 %           Final                   01/11/2022               7.0 (H)             4.0 - 5.6 %           Final                Last edited by Promise Valentine, OD on 1/18/2023  8:53 AM.            Assessment /Plan     For exam results, see Encounter Report.    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 No retinopathy noted, OU. Continue proper BS control and annual diabetic eye exams. Monitor yearly.      3. Educated pt on findings. C/d asymmetry OD>OS. (-)Fhx. IOP WNL OU. Will order baseline RNFL OCT. Patient to have OCT in 1-2 months. Will call with results. If testing WNL, okay to monitor yearly. Monitor 1-2 months.     4. Educated pt on findings. Not visually significant. No need for removal at this time. Monitor yearly.      5. Continue use of OTC reading glasses prn. Monitor yearly.        RTC in 1-2 months for RNFL OCT or sooner if needed.       Addendum 02/07/2023  Testing completed. OCT WNL OU. Will call patient with results. Okay to  monitor yearly.      RTC in 1 year for annual eye exam or sooner if needed.

## 2023-01-23 ENCOUNTER — CLINICAL SUPPORT (OUTPATIENT)
Dept: OPHTHALMOLOGY | Facility: CLINIC | Age: 53
End: 2023-01-23
Payer: COMMERCIAL

## 2023-01-23 DIAGNOSIS — H40.013 OAG (OPEN ANGLE GLAUCOMA) SUSPECT, LOW RISK, BILATERAL: ICD-10-CM

## 2023-01-27 ENCOUNTER — HOSPITAL ENCOUNTER (INPATIENT)
Facility: HOSPITAL | Age: 53
LOS: 19 days | Discharge: NURSING, REHAB OR LONG-TERM CARE | DRG: 023 | End: 2023-02-15
Attending: EMERGENCY MEDICINE | Admitting: EMERGENCY MEDICINE
Payer: COMMERCIAL

## 2023-01-27 ENCOUNTER — ANESTHESIA EVENT (OUTPATIENT)
Dept: ENDOSCOPY | Facility: HOSPITAL | Age: 53
DRG: 023 | End: 2023-01-27
Payer: COMMERCIAL

## 2023-01-27 ENCOUNTER — ANESTHESIA (OUTPATIENT)
Dept: ENDOSCOPY | Facility: HOSPITAL | Age: 53
DRG: 023 | End: 2023-01-27
Payer: COMMERCIAL

## 2023-01-27 DIAGNOSIS — I10 HYPERTENSION, UNSPECIFIED TYPE: ICD-10-CM

## 2023-01-27 DIAGNOSIS — I60.9 SUBARACHNOID HEMORRHAGE: ICD-10-CM

## 2023-01-27 DIAGNOSIS — I60.9 NONTRAUMATIC SUBARACHNOID HEMORRHAGE: Primary | ICD-10-CM

## 2023-01-27 DIAGNOSIS — R53.81 DEBILITY: ICD-10-CM

## 2023-01-27 DIAGNOSIS — I63.9 STROKE: ICD-10-CM

## 2023-01-27 DIAGNOSIS — R41.82 ALTERED MENTAL STATUS, UNSPECIFIED: ICD-10-CM

## 2023-01-27 DIAGNOSIS — R41.82 ALTERED MENTAL STATUS: ICD-10-CM

## 2023-01-27 DIAGNOSIS — I60.9 SAH (SUBARACHNOID HEMORRHAGE): ICD-10-CM

## 2023-01-27 PROBLEM — G93.6 VASOGENIC CEREBRAL EDEMA: Status: ACTIVE | Noted: 2023-01-27

## 2023-01-27 PROBLEM — I61.5 IVH (INTRAVENTRICULAR HEMORRHAGE): Status: ACTIVE | Noted: 2023-01-27

## 2023-01-27 PROBLEM — Z97.8 ENDOTRACHEALLY INTUBATED: Status: ACTIVE | Noted: 2023-01-27

## 2023-01-27 LAB
ABO + RH BLD: NORMAL
ALBUMIN SERPL BCP-MCNC: 3.9 G/DL (ref 3.5–5.2)
ALLENS TEST: ABNORMAL
ALP SERPL-CCNC: 128 U/L (ref 55–135)
ALT SERPL W/O P-5'-P-CCNC: 65 U/L (ref 10–44)
AMPHET+METHAMPHET UR QL: NEGATIVE
ANION GAP SERPL CALC-SCNC: 13 MMOL/L (ref 8–16)
AST SERPL-CCNC: 44 U/L (ref 10–40)
BACTERIA #/AREA URNS AUTO: NORMAL /HPF
BARBITURATES UR QL SCN>200 NG/ML: NEGATIVE
BASOPHILS # BLD AUTO: 0.02 K/UL (ref 0–0.2)
BASOPHILS NFR BLD: 0.2 % (ref 0–1.9)
BENZODIAZ UR QL SCN>200 NG/ML: NEGATIVE
BILIRUB SERPL-MCNC: 0.6 MG/DL (ref 0.1–1)
BILIRUB UR QL STRIP: NEGATIVE
BLD GP AB SCN CELLS X3 SERPL QL: NORMAL
BUN SERPL-MCNC: 14 MG/DL (ref 6–20)
BZE UR QL SCN: NEGATIVE
CALCIUM SERPL-MCNC: 9.5 MG/DL (ref 8.7–10.5)
CANNABINOIDS UR QL SCN: NEGATIVE
CHLORIDE SERPL-SCNC: 103 MMOL/L (ref 95–110)
CHOLEST SERPL-MCNC: 181 MG/DL (ref 120–199)
CHOLEST/HDLC SERPL: 2.2 {RATIO} (ref 2–5)
CLARITY UR REFRACT.AUTO: CLEAR
CO2 SERPL-SCNC: 19 MMOL/L (ref 23–29)
COLOR UR AUTO: YELLOW
CREAT SERPL-MCNC: 0.6 MG/DL (ref 0.5–1.4)
CREAT SERPL-MCNC: 0.9 MG/DL (ref 0.5–1.4)
CREAT UR-MCNC: 51 MG/DL (ref 15–325)
DELSYS: ABNORMAL
DIFFERENTIAL METHOD: ABNORMAL
EOSINOPHIL # BLD AUTO: 0 K/UL (ref 0–0.5)
EOSINOPHIL NFR BLD: 0 % (ref 0–8)
ERYTHROCYTE [DISTWIDTH] IN BLOOD BY AUTOMATED COUNT: 12.1 % (ref 11.5–14.5)
EST. GFR  (NO RACE VARIABLE): >60 ML/MIN/1.73 M^2
ESTIMATED AVG GLUCOSE: 140 MG/DL (ref 68–131)
GLUCOSE SERPL-MCNC: 284 MG/DL (ref 70–110)
GLUCOSE UR QL STRIP: ABNORMAL
HBA1C MFR BLD: 6.5 % (ref 4–5.6)
HCO3 UR-SCNC: 22.9 MMOL/L (ref 24–28)
HCT VFR BLD AUTO: 41.3 % (ref 37–48.5)
HCV AB SERPL QL IA: NORMAL
HDLC SERPL-MCNC: 82 MG/DL (ref 40–75)
HDLC SERPL: 45.3 % (ref 20–50)
HGB BLD-MCNC: 13.2 G/DL (ref 12–16)
HGB UR QL STRIP: NEGATIVE
HIV 1+2 AB+HIV1 P24 AG SERPL QL IA: NORMAL
HYALINE CASTS UR QL AUTO: 1 /LPF
IMM GRANULOCYTES # BLD AUTO: 0.03 K/UL (ref 0–0.04)
IMM GRANULOCYTES NFR BLD AUTO: 0.3 % (ref 0–0.5)
INR PPP: 1 (ref 0.8–1.2)
KETONES UR QL STRIP: ABNORMAL
LDLC SERPL CALC-MCNC: 89.2 MG/DL (ref 63–159)
LEUKOCYTE ESTERASE UR QL STRIP: NEGATIVE
LYMPHOCYTES # BLD AUTO: 0.9 K/UL (ref 1–4.8)
LYMPHOCYTES NFR BLD: 7.7 % (ref 18–48)
MCH RBC QN AUTO: 30.7 PG (ref 27–31)
MCHC RBC AUTO-ENTMCNC: 32 G/DL (ref 32–36)
MCV RBC AUTO: 96 FL (ref 82–98)
METHADONE UR QL SCN>300 NG/ML: NEGATIVE
MICROSCOPIC COMMENT: NORMAL
MODE: ABNORMAL
MONOCYTES # BLD AUTO: 0.8 K/UL (ref 0.3–1)
MONOCYTES NFR BLD: 7.2 % (ref 4–15)
NEUTROPHILS # BLD AUTO: 9.8 K/UL (ref 1.8–7.7)
NEUTROPHILS NFR BLD: 84.6 % (ref 38–73)
NITRITE UR QL STRIP: NEGATIVE
NONHDLC SERPL-MCNC: 99 MG/DL
NRBC BLD-RTO: 0 /100 WBC
OPIATES UR QL SCN: NEGATIVE
PCO2 BLDA: 40.7 MMHG (ref 35–45)
PCP UR QL SCN>25 NG/ML: NEGATIVE
PH SMN: 7.36 [PH] (ref 7.35–7.45)
PH UR STRIP: 6 [PH] (ref 5–8)
PLATELET # BLD AUTO: 257 K/UL (ref 150–450)
PMV BLD AUTO: 9 FL (ref 9.2–12.9)
PO2 BLDA: 26 MMHG (ref 40–60)
POC BE: -3 MMOL/L
POC PTINR: 1.2 (ref 0.9–1.2)
POC PTWBT: 14.5 SEC (ref 9.7–14.3)
POC SATURATED O2: 45 % (ref 95–100)
POC TCO2: 24 MMOL/L (ref 24–29)
POCT GLUCOSE: 154 MG/DL (ref 70–110)
POCT GLUCOSE: 177 MG/DL (ref 70–110)
POCT GLUCOSE: 202 MG/DL (ref 70–110)
POCT GLUCOSE: 267 MG/DL (ref 70–110)
POCT GLUCOSE: 272 MG/DL (ref 70–110)
POCT GLUCOSE: 279 MG/DL (ref 70–110)
POCT GLUCOSE: 299 MG/DL (ref 70–110)
POCT GLUCOSE: 310 MG/DL (ref 70–110)
POTASSIUM SERPL-SCNC: 4.6 MMOL/L (ref 3.5–5.1)
PROT SERPL-MCNC: 7.6 G/DL (ref 6–8.4)
PROT UR QL STRIP: NEGATIVE
PROTHROMBIN TIME: 10.9 SEC (ref 9–12.5)
RBC # BLD AUTO: 4.3 M/UL (ref 4–5.4)
RBC #/AREA URNS AUTO: 0 /HPF (ref 0–4)
SAMPLE: ABNORMAL
SAMPLE: ABNORMAL
SAMPLE: NORMAL
SITE: ABNORMAL
SODIUM SERPL-SCNC: 135 MMOL/L (ref 136–145)
SP GR UR STRIP: 1.02 (ref 1–1.03)
SQUAMOUS #/AREA URNS AUTO: 1 /HPF
TOXICOLOGY INFORMATION: NORMAL
TRIGL SERPL-MCNC: 49 MG/DL (ref 30–150)
TROPONIN I SERPL DL<=0.01 NG/ML-MCNC: <0.006 NG/ML (ref 0–0.03)
TSH SERPL DL<=0.005 MIU/L-ACNC: 0.97 UIU/ML (ref 0.4–4)
URN SPEC COLLECT METH UR: ABNORMAL
WBC # BLD AUTO: 11.53 K/UL (ref 3.9–12.7)
WBC #/AREA URNS AUTO: 2 /HPF (ref 0–5)
YEAST UR QL AUTO: NORMAL

## 2023-01-27 PROCEDURE — 86803 HEPATITIS C AB TEST: CPT | Performed by: PHYSICIAN ASSISTANT

## 2023-01-27 PROCEDURE — 63600175 PHARM REV CODE 636 W HCPCS

## 2023-01-27 PROCEDURE — 93010 EKG 12-LEAD: ICD-10-PCS | Mod: ,,, | Performed by: INTERNAL MEDICINE

## 2023-01-27 PROCEDURE — 99292 PR CRITICAL CARE, ADDL 30 MIN: ICD-10-PCS | Mod: 25,,, | Performed by: EMERGENCY MEDICINE

## 2023-01-27 PROCEDURE — 51702 INSERT TEMP BLADDER CATH: CPT

## 2023-01-27 PROCEDURE — 25000003 PHARM REV CODE 250: Performed by: EMERGENCY MEDICINE

## 2023-01-27 PROCEDURE — 94002 VENT MGMT INPAT INIT DAY: CPT

## 2023-01-27 PROCEDURE — 25000003 PHARM REV CODE 250

## 2023-01-27 PROCEDURE — 96375 TX/PRO/DX INJ NEW DRUG ADDON: CPT

## 2023-01-27 PROCEDURE — 93005 ELECTROCARDIOGRAM TRACING: CPT

## 2023-01-27 PROCEDURE — 36620 ARTERIAL: ICD-10-PCS | Mod: 59,,, | Performed by: ANESTHESIOLOGY

## 2023-01-27 PROCEDURE — 84295 ASSAY OF SERUM SODIUM: CPT

## 2023-01-27 PROCEDURE — 25000003 PHARM REV CODE 250: Performed by: STUDENT IN AN ORGANIZED HEALTH CARE EDUCATION/TRAINING PROGRAM

## 2023-01-27 PROCEDURE — 96365 THER/PROPH/DIAG IV INF INIT: CPT

## 2023-01-27 PROCEDURE — 36620 INSERTION CATHETER ARTERY: CPT | Mod: 59,,, | Performed by: ANESTHESIOLOGY

## 2023-01-27 PROCEDURE — 61107 TDH PNXR IMPLT VENTR CATH: CPT | Mod: ,,, | Performed by: NEUROLOGICAL SURGERY

## 2023-01-27 PROCEDURE — 85610 PROTHROMBIN TIME: CPT | Performed by: EMERGENCY MEDICINE

## 2023-01-27 PROCEDURE — 27000221 HC OXYGEN, UP TO 24 HOURS

## 2023-01-27 PROCEDURE — 25500020 PHARM REV CODE 255: Performed by: INTERNAL MEDICINE

## 2023-01-27 PROCEDURE — 36415 COLL VENOUS BLD VENIPUNCTURE: CPT | Performed by: INTERNAL MEDICINE

## 2023-01-27 PROCEDURE — 99292 CRITICAL CARE ADDL 30 MIN: CPT | Mod: 25,,, | Performed by: EMERGENCY MEDICINE

## 2023-01-27 PROCEDURE — 76937 US GUIDE VASCULAR ACCESS: CPT | Mod: 26,,, | Performed by: ANESTHESIOLOGY

## 2023-01-27 PROCEDURE — 80307 DRUG TEST PRSMV CHEM ANLYZR: CPT | Performed by: EMERGENCY MEDICINE

## 2023-01-27 PROCEDURE — 83036 HEMOGLOBIN GLYCOSYLATED A1C: CPT

## 2023-01-27 PROCEDURE — 99291 CRITICAL CARE FIRST HOUR: CPT | Mod: 25,,, | Performed by: EMERGENCY MEDICINE

## 2023-01-27 PROCEDURE — 85025 COMPLETE CBC W/AUTO DIFF WBC: CPT | Performed by: EMERGENCY MEDICINE

## 2023-01-27 PROCEDURE — 82330 ASSAY OF CALCIUM: CPT

## 2023-01-27 PROCEDURE — 37000008 HC ANESTHESIA 1ST 15 MINUTES

## 2023-01-27 PROCEDURE — 63600175 PHARM REV CODE 636 W HCPCS: Performed by: STUDENT IN AN ORGANIZED HEALTH CARE EDUCATION/TRAINING PROGRAM

## 2023-01-27 PROCEDURE — 86900 BLOOD TYPING SEROLOGIC ABO: CPT | Performed by: INTERNAL MEDICINE

## 2023-01-27 PROCEDURE — 99291 PR CRITICAL CARE, E/M 30-74 MINUTES: ICD-10-PCS | Mod: 25,,, | Performed by: EMERGENCY MEDICINE

## 2023-01-27 PROCEDURE — 80053 COMPREHEN METABOLIC PANEL: CPT | Performed by: EMERGENCY MEDICINE

## 2023-01-27 PROCEDURE — 37000009 HC ANESTHESIA EA ADD 15 MINS

## 2023-01-27 PROCEDURE — D9220A PRA ANESTHESIA: Mod: CRNA,,, | Performed by: STUDENT IN AN ORGANIZED HEALTH CARE EDUCATION/TRAINING PROGRAM

## 2023-01-27 PROCEDURE — 84443 ASSAY THYROID STIM HORMONE: CPT | Performed by: EMERGENCY MEDICINE

## 2023-01-27 PROCEDURE — 81001 URINALYSIS AUTO W/SCOPE: CPT | Performed by: EMERGENCY MEDICINE

## 2023-01-27 PROCEDURE — 63600175 PHARM REV CODE 636 W HCPCS: Performed by: NURSE PRACTITIONER

## 2023-01-27 PROCEDURE — 76937 ARTERIAL: ICD-10-PCS | Mod: 26,,, | Performed by: ANESTHESIOLOGY

## 2023-01-27 PROCEDURE — 20000000 HC ICU ROOM

## 2023-01-27 PROCEDURE — 99285 EMERGENCY DEPT VISIT HI MDM: CPT | Mod: 25

## 2023-01-27 PROCEDURE — 99223 PR INITIAL HOSPITAL CARE,LEVL III: ICD-10-PCS | Mod: 25,,, | Performed by: NEUROLOGICAL SURGERY

## 2023-01-27 PROCEDURE — 61107 PR TWIST HOLE SKULL,IMPLANT CATH/DEVICE: ICD-10-PCS | Mod: ,,, | Performed by: NEUROLOGICAL SURGERY

## 2023-01-27 PROCEDURE — 99223 PR INITIAL HOSPITAL CARE,LEVL III: ICD-10-PCS | Mod: ,,, | Performed by: STUDENT IN AN ORGANIZED HEALTH CARE EDUCATION/TRAINING PROGRAM

## 2023-01-27 PROCEDURE — 99900035 HC TECH TIME PER 15 MIN (STAT)

## 2023-01-27 PROCEDURE — 61210 BURR HOLE IMPLT VENTR CATH: CPT

## 2023-01-27 PROCEDURE — D9220A PRA ANESTHESIA: Mod: ANES,,, | Performed by: ANESTHESIOLOGY

## 2023-01-27 PROCEDURE — 82800 BLOOD PH: CPT

## 2023-01-27 PROCEDURE — 84484 ASSAY OF TROPONIN QUANT: CPT | Performed by: EMERGENCY MEDICINE

## 2023-01-27 PROCEDURE — 99223 1ST HOSP IP/OBS HIGH 75: CPT | Mod: 25,,, | Performed by: NEUROLOGICAL SURGERY

## 2023-01-27 PROCEDURE — 87389 HIV-1 AG W/HIV-1&-2 AB AG IA: CPT | Performed by: PHYSICIAN ASSISTANT

## 2023-01-27 PROCEDURE — 99291 PR CRITICAL CARE, E/M 30-74 MINUTES: ICD-10-PCS | Mod: ,,, | Performed by: INTERNAL MEDICINE

## 2023-01-27 PROCEDURE — 85610 PROTHROMBIN TIME: CPT

## 2023-01-27 PROCEDURE — 82803 BLOOD GASES ANY COMBINATION: CPT

## 2023-01-27 PROCEDURE — 80061 LIPID PANEL: CPT | Performed by: EMERGENCY MEDICINE

## 2023-01-27 PROCEDURE — 85014 HEMATOCRIT: CPT

## 2023-01-27 PROCEDURE — D9220A PRA ANESTHESIA: ICD-10-PCS | Mod: ANES,,, | Performed by: ANESTHESIOLOGY

## 2023-01-27 PROCEDURE — D9220A PRA ANESTHESIA: ICD-10-PCS | Mod: CRNA,,, | Performed by: STUDENT IN AN ORGANIZED HEALTH CARE EDUCATION/TRAINING PROGRAM

## 2023-01-27 PROCEDURE — 84132 ASSAY OF SERUM POTASSIUM: CPT

## 2023-01-27 PROCEDURE — 31500 PR INSERT, EMERGENCY ENDOTRACH AIRWAY: ICD-10-PCS | Mod: ,,, | Performed by: EMERGENCY MEDICINE

## 2023-01-27 PROCEDURE — 82565 ASSAY OF CREATININE: CPT

## 2023-01-27 PROCEDURE — 27200966 HC CLOSED SUCTION SYSTEM

## 2023-01-27 PROCEDURE — 31500 INSERT EMERGENCY AIRWAY: CPT | Mod: ,,, | Performed by: EMERGENCY MEDICINE

## 2023-01-27 PROCEDURE — 25000003 PHARM REV CODE 250: Performed by: INTERNAL MEDICINE

## 2023-01-27 PROCEDURE — 99223 1ST HOSP IP/OBS HIGH 75: CPT | Mod: ,,, | Performed by: STUDENT IN AN ORGANIZED HEALTH CARE EDUCATION/TRAINING PROGRAM

## 2023-01-27 PROCEDURE — 99291 CRITICAL CARE FIRST HOUR: CPT | Mod: ,,, | Performed by: INTERNAL MEDICINE

## 2023-01-27 PROCEDURE — 99900026 HC AIRWAY MAINTENANCE (STAT)

## 2023-01-27 PROCEDURE — 93010 ELECTROCARDIOGRAM REPORT: CPT | Mod: ,,, | Performed by: INTERNAL MEDICINE

## 2023-01-27 PROCEDURE — 94761 N-INVAS EAR/PLS OXIMETRY MLT: CPT

## 2023-01-27 RX ORDER — PROPOFOL 10 MG/ML
VIAL (ML) INTRAVENOUS
Status: DISCONTINUED | OUTPATIENT
Start: 2023-01-27 | End: 2023-01-27

## 2023-01-27 RX ORDER — LIDOCAINE HYDROCHLORIDE 20 MG/ML
INJECTION INTRAVENOUS
Status: DISCONTINUED | OUTPATIENT
Start: 2023-01-27 | End: 2023-01-27

## 2023-01-27 RX ORDER — PROPOFOL 10 MG/ML
0-50 INJECTION, EMULSION INTRAVENOUS CONTINUOUS
Status: DISCONTINUED | OUTPATIENT
Start: 2023-01-27 | End: 2023-01-28

## 2023-01-27 RX ORDER — SODIUM CHLORIDE 0.9 % (FLUSH) 0.9 %
10 SYRINGE (ML) INJECTION
Status: DISCONTINUED | OUTPATIENT
Start: 2023-01-27 | End: 2023-02-07

## 2023-01-27 RX ORDER — PHENYLEPHRINE HYDROCHLORIDE 10 MG/ML
INJECTION INTRAVENOUS
Status: DISCONTINUED | OUTPATIENT
Start: 2023-01-27 | End: 2023-01-27

## 2023-01-27 RX ORDER — ROCURONIUM BROMIDE 10 MG/ML
INJECTION, SOLUTION INTRAVENOUS
Status: COMPLETED
Start: 2023-01-27 | End: 2023-01-27

## 2023-01-27 RX ORDER — ONDANSETRON 2 MG/ML
4 INJECTION INTRAMUSCULAR; INTRAVENOUS EVERY 6 HOURS PRN
Status: DISCONTINUED | OUTPATIENT
Start: 2023-01-27 | End: 2023-02-15 | Stop reason: HOSPADM

## 2023-01-27 RX ORDER — NIMODIPINE 30 MG/1
60 CAPSULE, LIQUID FILLED ORAL EVERY 4 HOURS
Status: DISCONTINUED | OUTPATIENT
Start: 2023-01-27 | End: 2023-01-27

## 2023-01-27 RX ORDER — ROCURONIUM BROMIDE 10 MG/ML
1 INJECTION, SOLUTION INTRAVENOUS ONCE
Status: COMPLETED | OUTPATIENT
Start: 2023-01-27 | End: 2023-01-27

## 2023-01-27 RX ORDER — PROPOFOL 10 MG/ML
INJECTION, EMULSION INTRAVENOUS
Status: COMPLETED
Start: 2023-01-27 | End: 2023-01-27

## 2023-01-27 RX ORDER — FENTANYL CITRATE 50 UG/ML
50 INJECTION, SOLUTION INTRAMUSCULAR; INTRAVENOUS
Status: DISCONTINUED | OUTPATIENT
Start: 2023-01-27 | End: 2023-01-31

## 2023-01-27 RX ORDER — GLUCAGON 1 MG
1 KIT INJECTION
Status: DISCONTINUED | OUTPATIENT
Start: 2023-01-27 | End: 2023-01-27

## 2023-01-27 RX ORDER — INSULIN ASPART 100 [IU]/ML
0-5 INJECTION, SOLUTION INTRAVENOUS; SUBCUTANEOUS EVERY 6 HOURS PRN
Status: DISCONTINUED | OUTPATIENT
Start: 2023-01-27 | End: 2023-01-27

## 2023-01-27 RX ORDER — MUPIROCIN 20 MG/G
OINTMENT TOPICAL 2 TIMES DAILY
Status: DISPENSED | OUTPATIENT
Start: 2023-01-27 | End: 2023-02-01

## 2023-01-27 RX ORDER — ETOMIDATE 2 MG/ML
20 INJECTION INTRAVENOUS
Status: COMPLETED | OUTPATIENT
Start: 2023-01-27 | End: 2023-01-27

## 2023-01-27 RX ORDER — LABETALOL HCL 20 MG/4 ML
10 SYRINGE (ML) INTRAVENOUS EVERY 4 HOURS PRN
Status: DISCONTINUED | OUTPATIENT
Start: 2023-01-27 | End: 2023-02-04

## 2023-01-27 RX ORDER — ETOMIDATE 2 MG/ML
INJECTION INTRAVENOUS
Status: COMPLETED
Start: 2023-01-27 | End: 2023-01-27

## 2023-01-27 RX ORDER — LEVETIRACETAM 500 MG/5ML
1000 INJECTION, SOLUTION, CONCENTRATE INTRAVENOUS EVERY 12 HOURS
Status: DISCONTINUED | OUTPATIENT
Start: 2023-01-27 | End: 2023-01-28

## 2023-01-27 RX ORDER — ROCURONIUM BROMIDE 10 MG/ML
INJECTION, SOLUTION INTRAVENOUS
Status: DISCONTINUED | OUTPATIENT
Start: 2023-01-27 | End: 2023-01-27

## 2023-01-27 RX ORDER — HYDRALAZINE HYDROCHLORIDE 20 MG/ML
10 INJECTION INTRAMUSCULAR; INTRAVENOUS EVERY 6 HOURS PRN
Status: DISCONTINUED | OUTPATIENT
Start: 2023-01-27 | End: 2023-02-04

## 2023-01-27 RX ORDER — NICARDIPINE HYDROCHLORIDE 0.2 MG/ML
2.5 INJECTION INTRAVENOUS CONTINUOUS
Status: DISCONTINUED | OUTPATIENT
Start: 2023-01-27 | End: 2023-01-29

## 2023-01-27 RX ADMIN — NICARDIPINE HYDROCHLORIDE 2.5 MG/HR: 0.2 INJECTION, SOLUTION INTRAVENOUS at 11:01

## 2023-01-27 RX ADMIN — ROCURONIUM BROMIDE 61 MG: 10 INJECTION INTRAVENOUS at 01:01

## 2023-01-27 RX ADMIN — LEVETIRACETAM 1000 MG: 100 INJECTION, SOLUTION INTRAVENOUS at 10:01

## 2023-01-27 RX ADMIN — MUPIROCIN: 20 OINTMENT TOPICAL at 09:01

## 2023-01-27 RX ADMIN — PROPOFOL 50 MG: 10 INJECTION, EMULSION INTRAVENOUS at 07:01

## 2023-01-27 RX ADMIN — FENTANYL CITRATE 50 MCG: 50 INJECTION INTRAMUSCULAR; INTRAVENOUS at 03:01

## 2023-01-27 RX ADMIN — LEVETIRACETAM 1000 MG: 100 INJECTION, SOLUTION INTRAVENOUS at 12:01

## 2023-01-27 RX ADMIN — IOHEXOL 75 ML: 350 INJECTION, SOLUTION INTRAVENOUS at 01:01

## 2023-01-27 RX ADMIN — ROCURONIUM BROMIDE 61 MG: 10 INJECTION, SOLUTION INTRAVENOUS at 01:01

## 2023-01-27 RX ADMIN — PHENYLEPHRINE HYDROCHLORIDE 100 MCG: 10 INJECTION INTRAVENOUS at 07:01

## 2023-01-27 RX ADMIN — ONDANSETRON 4 MG: 2 INJECTION INTRAMUSCULAR; INTRAVENOUS at 04:01

## 2023-01-27 RX ADMIN — INSULIN HUMAN 1.5 UNITS/HR: 1 INJECTION, SOLUTION INTRAVENOUS at 03:01

## 2023-01-27 RX ADMIN — NICARDIPINE HYDROCHLORIDE 7.5 MG/HR: 0.2 INJECTION, SOLUTION INTRAVENOUS at 05:01

## 2023-01-27 RX ADMIN — SODIUM CHLORIDE 1000 ML: 9 INJECTION, SOLUTION INTRAVENOUS at 10:01

## 2023-01-27 RX ADMIN — CEFAZOLIN 2 G: 2 INJECTION, POWDER, FOR SOLUTION INTRAMUSCULAR; INTRAVENOUS at 04:01

## 2023-01-27 RX ADMIN — PROPOFOL 5 MCG/KG/MIN: 10 INJECTION, EMULSION INTRAVENOUS at 01:01

## 2023-01-27 RX ADMIN — ROCURONIUM BROMIDE 50 MG: 10 INJECTION INTRAVENOUS at 07:01

## 2023-01-27 RX ADMIN — FENTANYL CITRATE 50 MCG: 50 INJECTION INTRAMUSCULAR; INTRAVENOUS at 06:01

## 2023-01-27 RX ADMIN — PROPOFOL 40 MG: 10 INJECTION, EMULSION INTRAVENOUS at 07:01

## 2023-01-27 RX ADMIN — ETOMIDATE 20 MG: 2 INJECTION, SOLUTION INTRAVENOUS at 01:01

## 2023-01-27 RX ADMIN — LIDOCAINE HYDROCHLORIDE 100 MG: 20 INJECTION INTRAVENOUS at 07:01

## 2023-01-27 RX ADMIN — NIMODIPINE 60 MG: 60 SOLUTION ORAL at 10:01

## 2023-01-27 RX ADMIN — SODIUM CHLORIDE, SODIUM GLUCONATE, SODIUM ACETATE, POTASSIUM CHLORIDE, MAGNESIUM CHLORIDE, SODIUM PHOSPHATE, DIBASIC, AND POTASSIUM PHOSPHATE: .53; .5; .37; .037; .03; .012; .00082 INJECTION, SOLUTION INTRAVENOUS at 07:01

## 2023-01-27 RX ADMIN — ETOMIDATE 20 MG: 2 INJECTION INTRAVENOUS at 01:01

## 2023-01-27 NOTE — ASSESSMENT & PLAN NOTE
51 yo F w/ PMH DM1, HTN presented to the ED with AMS after HA and vomiting the night before. Patient was non-verbal, B/l fixed pupils, and only intermittently following commands in B/L UE. GCS 8. MRI shows Acute IVH, SAH w/ obstructive hydrocephalus w/o signs of ischemia and R A2 QUIQUE aneurysm. VN consulted. Patient intubated in ED after concern for airway protection. NSGY placed EVD. IR consulted for possible angiogram. CTA shows diffuse subarachnoid hemorrhage with moderate hydrocephalus. No intracranial aneurysm or AVM is identified. NIHSS 9 on admission.     -Admit to [facility] for HLOC  -Q1H neuro checks and vitals  -NSGY following; appreciate recs  -EVD placed. Open @10- monitor ICPs and output  -Ancef for drain ppx  -IR consulted and will perform angiogram in the morning.  -VN following  -CTA shows diffuse subarachnoid hemorrhage with moderate hydrocephalus. No intracranial aneurysm or AVM is identified.  -CT Head stable after EVD placed  -Will likely start Nimotop in the morning.  -Daily TCDs  -Echo pending.  -CBC, CMP, Mag, Phos now and then daily   -SBP goal <140  -NPO   -Cardene gtt, wean as able   -PRN labetalol, hydralazine  -PT/OT/SLP

## 2023-01-27 NOTE — ASSESSMENT & PLAN NOTE
Vent Mode: A/C  Oxygen Concentration (%):  [40-50] 40  Resp Rate Total:  [18 br/min-27 br/min] 19 br/min  Vt Set:  [400 mL-420 mL] 420 mL  PEEP/CPAP:  [5 cmH20] 5 cmH20  Mean Airway Pressure:  [8.9 cmH20-9.4 cmH20] 9 cmH20     Patient intubated in ED 2/2 concern for airway protection. GCS 7    -Daily ABG  -RT following  -CXR; ABG if respiratory status worsens

## 2023-01-27 NOTE — HPI
59yo female with PMH DM1, HTN, autoimmune hepatitis who presented to the ED with  for c/o AMS. Per  patient was c/o of headache last night and vomited. She went to sleep around 9pm then woke up this morning and he noticed that she was confused. She is a type 1 diabetic patient and blood glucose was 300.  states that last time she was having similar symptoms was a couple of years ago when she was in DKA. On exam very drowsy with difficulty arousing patient, however intermittently follows commands with  no focal weakness. Patient determined to be a candidate for the wake up protocol. MRI IP showed  an acute intraventricular and subarachnoid hemorrhage with early obstructive hydrocephalus and transependymal CSF egress. Also with a noted   21.5-2 mm laterally directed outpouching at the proximal right A2 QUIQUE could represent infundibulum/tortuous origin of a proximal QUIQUE branch, noting that a small aneurysm is difficult to exclude given motion. Patient will be admitted to the Neuro ICU for closer monitoring with a neurosurgery consultation for further recommendations.

## 2023-01-27 NOTE — PROCEDURES
"Leida Hoyos is a 52 y.o. female patient.    Temp: 98.8 °F (37.1 °C) (01/27/23 1150)  Pulse: 95 (01/27/23 1441)  Resp: 19 (01/27/23 1441)  BP: (!) 162/75 (01/27/23 1441)  SpO2: 100 % (01/27/23 1441)  Weight: 61.2 kg (135 lb) (01/27/23 0929)  Height: 5' 2" (157.5 cm) (01/27/23 1441)       Procedures    "Indications, risks, and benefits explained to patient's surrogate decision maker and informed consent obtained. A time-out was completed verifying correct patient, procedure, and site. All pre-operative labs and imaging were reviewed. The scalp was clipped. Patient was prepped with ChloraPrep and draped in a sterile manner. Incisions were infiltrated with 1% Xylocaine with epinephrine [id number] and post-procedural antibiotics were given. An incision was made on the right side of the head at the mid-pupillary line, 11 cm behind the nasion. A self-retaining retractor was placed. A burrhole was drilled with the cranial twist drill and the dura was punctured with the EVD catheter trochar. The ventricular catheter was then passed into the ventricle and brought out through a separate stab wound, the depth of catheter was 7 cm from the outer table of the skull. There was intermittent drainage of CSF after that. The catheter was secured to the scalp with #2-0 silk suture, stapled to the scalp at various places along its length, and the incision was closed with monocryl suture. The patient tolerated the procedure well. No complications. Sponge and needle counts were correct. Blood loss is minimal. Post-operative CTH was ordered for confirmation of catheter placement.    Color of fluid: blood tinged      1/27/2023    "

## 2023-01-27 NOTE — HPI
Mrs. Gupta 59 yo F w/ PMH DM1 (dx 2013), HTN, autoimmune hepatitis presented to the ED for AMS this morning where she was unable to answer questions appropriately after a shower.  states patient had a HA and vomited the night before she went to sleep. LNK was 10 pm. Patient had a similar episode a few years ago, but was admitted for DKA.  states patient has worsened neurologically since they left their house. On exam, patient was non-verbal, fixed pupils, intermittently following commands in B/L UE, but unable in LE. GCS 8. MRI performed in ED showed acute intraventricular and subarachnoid hemorrhage with early obstructive hydrocephalus and transependymal CSF egress. Also with a noted 21.5-2 mm laterally directed outpouching at the proximal right A2 QUIQUE could represent infundibulum/tortuous origin of a proximal QUIQUE branch. NSGY consulted and placed EVD after patient was intubated after concern for airway protection. Started Keppra 1g BID. CTA ordered. IR consulted for possible angiogram. No leukocytosis. H/H stable. Cr 0.9. Glucose 284. NCC will admit patient for HLOC.

## 2023-01-27 NOTE — ASSESSMENT & PLAN NOTE
Area of cerebral edema identified when reviewing brain imaging. We will continue to monitor with q4h neuro checks while on floor or more frequently while in neuro critical care, as any change in the patients clinical exam may signify expansion of the insult and/or the area of the edema. Such changes may require acute interventions to prevent loss of function and/or death.

## 2023-01-27 NOTE — NURSING
Patient arrived to [facility] from ED >> [id number]    Report received from: ED RN,      Type of stroke/diagnosis:  SAH    Current symptoms: Fixed pupils, positive cough/gag/corneal reflexes.  Spontaneous x4, no commands    Skin assessment done: yes  Wounds noted: none  Garcia Completed? Yes-failed    Patient Belongings on Admit: (be specific): patient wearing wedding ring and turtle ring-given to     [facility] notified: MD Anselmo

## 2023-01-27 NOTE — ASSESSMENT & PLAN NOTE
Diagnosed in 2013. BG on arrival 284  Home regimen 13u Lantus; Lispro AC  A1C 6.5     -Started on Insulin gtt  -Hypoglycemic protocol in place  -Accuchecks q1h

## 2023-01-27 NOTE — SUBJECTIVE & OBJECTIVE
Past Medical History:   Diagnosis Date    Autoimmune hepatitis     managed by Dr. Russell on mercaptopurine    Diabetes mellitus type II     Hypertension      Past Surgical History:   Procedure Laterality Date     SECTION, CLASSIC  2001    COLONOSCOPY N/A 2019    Procedure: COLONOSCOPY;  Surgeon: Dipesh Victoria MD;  Location: [facility] ([address] [place] [address]);  Service: Endoscopy;  Laterality: N/A;    FRACTURE SURGERY Left 2013    leg     MOUTH SURGERY       Family History   Problem Relation Age of Onset    Arthritis Mother     Ulcerative colitis Mother     Hypertension Father     Hyperlipidemia Father     Heart attack Father 60    Heart disease Father     Asthma Father     Hypertension Sister     Learning disabilities Daughter         dyslexia    No Known Problems Son     No Known Problems Sister     No Known Problems Daughter     Diabetes Maternal Grandfather         type 2    Alcohol abuse Maternal Grandfather     Diabetes Paternal Grandfather         type 2    Alcohol abuse Maternal Grandmother     Learning disabilities Son         ADHD    Cancer Neg Hx      Social History     Tobacco Use    Smoking status: Never    Smokeless tobacco: Never   Substance Use Topics    Alcohol use: Yes     Alcohol/week: 2.0 standard drinks     Types: 2 Glasses of wine per week     Comment: social    Drug use: Never     Review of patient's allergies indicates:  No Known Allergies    Medications: I have reviewed the current medication administration record.    (Not in a hospital admission)      Review of Systems  Objective:     Vital Signs (Most Recent):  Temp: 97.9 °F (36.6 °C) (23)  Pulse: 60 (23)  Resp: (!) 21 (23)  BP: (!) 153/67 (23)  SpO2: 100 % (23)    Vital Signs Range (Last 24H):  Temp:  [97.9 °F (36.6 °C)]   Pulse:  [60-64]   Resp:  [18-21]   BP: (153)/(67)   SpO2:  [99 %-100 %]     Physical Exam  Constitutional:       General: She is not in acute  distress.  HENT:      Head: Normocephalic.   Eyes:      Extraocular Movements: Extraocular movements intact.   Cardiovascular:      Rate and Rhythm: Normal rate.   Pulmonary:      Effort: Pulmonary effort is normal.   Abdominal:      General: Abdomen is flat.   Musculoskeletal:         General: Normal range of motion.   Skin:     General: Skin is warm and dry.   Neurological:      Mental Status: She is unresponsive.      Cranial Nerves: No facial asymmetry.      Motor: No weakness.   Psychiatric:         Mood and Affect: Mood normal.       Neurological Exam:   LOC: obtunded  Attention Span: poor  Language: Expressive aphasia  Articulation: Mute/Anarthric  Orientation:  Untestable due to severe drowsiness  Visual Fields: Full  EOM (CN III, IV, VI): Full/intact  Facial Movement (CN VII): Symmetric facial expression    Motor: Arm left  Normal 5/5  Leg left  Normal 5/5  Arm right  Normal 5/5  Leg right Normal 5/5        Laboratory:  CMP: No results for input(s): GLUCOSE, CALCIUM, ALBUMIN, PROT, NA, K, CO2, CL, BUN, CREATININE, ALKPHOS, ALT, AST, BILITOT in the last 24 hours.  CBC: No results for input(s): WBC, RBC, HGB, HCT, PLT, MCV, MCH, MCHC in the last 168 hours.  Lipid Panel: No results for input(s): CHOL, LDLCALC, HDL, TRIG in the last 168 hours.  Coagulation:   Recent Labs   Lab 01/27/23  0955   INR 1.2     Hgb A1C: No results for input(s): HGBA1C in the last 168 hours.  TSH: No results for input(s): TSH in the last 168 hours.    Diagnostic Results:      Brain imaging:  MRI IP 1/27/2023

## 2023-01-27 NOTE — ASSESSMENT & PLAN NOTE
Hx of AIH. Previously on mercaptopurine. Not currently taking.   LFTs slightly elevated on arrival.    -Will continue to monitor  -CMP daily  -If LFTs worsen will obtain RUQ US.

## 2023-01-27 NOTE — ASSESSMENT & PLAN NOTE
SBP 160s on arrival.   states patient does not take home Losartan regularly.     -Goal SBP <140  -Cardene gtt; wean as needed  -PRN Labetalol and Hydralazine

## 2023-01-27 NOTE — RESPIRATORY THERAPY
Received patient from the ED with a 7.0 ETT 23 cm at the lip. Patient is on ventilator at documented settings. Will continue to monitor. .

## 2023-01-27 NOTE — CONSULTS
Inpatient consult to Physical Medicine Rehab  Consult performed by: Yasmin Dixon NP  Consult ordered by: Anoop Esquivel DO  Reason for consult: Assess rehab needs    Reviewed patient history and current admission.  Rehab team following.  Full consult to follow.    CORONA Martel, FNP-C  Physical Medicine & Rehabilitation   01/27/2023

## 2023-01-27 NOTE — ANESTHESIA PREPROCEDURE EVALUATION
[facility]  Anesthesia Pre-Operative Evaluation         Patient Name/: Leida Hoyos, 1970  MRN: [id number]    SUBJECTIVE:     Pre-operative evaluation for Procedure(s) (LRB):  ANGIOGRAM-CEREBRAL (N/A)     2023    Leida Hoyos is a 52 y.o. female w/ a significant PMHx of DM1 (dx ), HTN, and autoimmune hepatitis presented with AMS. Found to have SAH. S/p EVD placement. Intubated in unit. Now to  for cerebral angiogram.     Gtt: Insulin/nicardipine/propofol  Vent: 40/5  Access: 20g x2, 18g x1    Patient now presents for the above procedure(s).    ________________________________________  ECHO: No results found for this or any previous visit.    ________________________________________    Prev airway:   Date/Time: 2023 1:40 PM  Location procedure was performed: [facility] EMERGENCY DEPARTMENT  Performed by: Rakesh Perales MD  Authorized by: Rakesh Perales MD   Consent Done: Emergent Situation  Indications: airway protection  Intubation method: fiberoptic oral  Patient status: paralyzed (RSI)  Preoxygenation: mask  Sedatives: etomidate  Paralytic: rocuronium  Laryngoscope size: Glide 3  Tube size: 7.0 mm  Tube type: cuffed  Number of attempts: 1  Cords visualized: yes  Post-procedure assessment: chest rise and ETCO2 monitor  Breath sounds: clear    LDA:        Peripheral IV - Single Lumen 23 0945 20 G Right Antecubital (Active)   Site Assessment Clean;Dry;Intact;No redness;No swelling 23 1501   Extremity Assessment Distal to IV No abnormal discoloration;No swelling;No redness;No warmth 23 1501   Line Status Infusing 23 1501   Dressing Status Clean;Dry;Intact 23 1501   Dressing Intervention Integrity maintained 23 1501   Dressing Change Due 23 1501   Site Change Due 23 1501   Reason Not Rotated Not due 23 1501   Number of days: 0            Peripheral IV - Single Lumen 23 1310 20 G  "Posterior;Right Hand (Active)   Site Assessment Clean;Dry;Intact;No redness;No swelling 01/27/23 1501   Extremity Assessment Distal to IV No abnormal discoloration;No swelling;No redness;No warmth 01/27/23 1501   Line Status Infusing 01/27/23 1501   Dressing Status Clean;Dry;Intact 01/27/23 1501   Dressing Intervention Integrity maintained 01/27/23 1501   Dressing Change Due 01/31/23 01/27/23 1501   Site Change Due 01/31/23 01/27/23 1501   Reason Not Rotated Not due 01/27/23 1501   Number of days: 0            Peripheral IV - Single Lumen 01/27/23 1646 18 G Anterior;Right Upper Arm (Active)   Site Assessment Clean;Dry;Intact;No redness;No swelling 01/27/23 1501   Extremity Assessment Distal to IV No abnormal discoloration;No redness;No swelling;No warmth 01/27/23 1501   Line Status Infusing 01/27/23 1501   Dressing Status Clean;Dry;Intact 01/27/23 1501   Dressing Intervention Integrity maintained 01/27/23 1501   Dressing Change Due 01/31/23 01/27/23 1501   Site Change Due 01/31/23 01/27/23 1501   Reason Not Rotated Not due 01/27/23 1501   Number of days: 0            Urethral Catheter 01/27/23 1509 (Active)   $ Monzon Insertion Bedside Insertion Performed 01/27/23 1601   Site Assessment Clean;Intact 01/27/23 1501   Collection Container Urimeter 01/27/23 1501   Securement Method secured to top of thigh w/ adhesive device 01/27/23 1501   Catheter Care Performed yes 01/27/23 1501   Reason for Continuing Urinary Catheterization Critically ill in ICU and requiring hourly monitoring of intake/output 01/27/23 1501   CAUTI Prevention Bundle Securement Device in place with 1" slack;Intact seal between catheter & drainage tubing;Drainage bag/urimeter off the floor;Sheeting clip in use;No dependent loops or kinks;Drainage bag/urimeter not overfilled (<2/3 full);Drainage bag/urimeter below bladder 01/27/23 1501   Output (mL) 250 mL 01/27/23 1700   Number of days: 0            ICP/Ventriculostomy 01/27/23 1400 (Active)   $ " ICP/Ventriculostomy [facility] Placement Bedside Insertion Performed 01/27/23 1501   Level of Ventriculostomy (cm above) 20 01/27/23 1501   Status Open to drainage 01/27/23 1501   Site Assessment Clean;Dry 01/27/23 1501   Site Drainage No drainage 01/27/23 1501   Waveform normal waveform 01/27/23 1501   Output (mL) 0 mL 01/27/23 1700   CSF Color clear 01/27/23 1501   Dressing Status Clean;Dry;Intact 01/27/23 1501   Interventions HOB degrees;bed controls locked;zeroed;level adjusted per order 01/27/23 1501   Number of days: 0       Drips:   insulin regular 1 units/mL infusion orderable (DKA) 1.5 Units/hr (01/27/23 1700)    niCARdipine 7.5 mg/hr (01/27/23 1734)    propofoL Stopped (01/27/23 1429)       Patient Active Problem List   Diagnosis    Autoimmune hepatitis    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     Current Outpatient Medications on File Prior to Encounter   Medication Sig Dispense Refill    aspirin (ECOTRIN) 81 MG EC tablet Take 81 mg by mouth once daily.      blood sugar diagnostic Strp For one touch verio IQ meter 100 each 3    blood-glucose meter (ONETOUCH VERIO IQ METER MISC) OneTouch Verio IQ Meter      blood-glucose sensor (DEXCOM G6 SENSOR) Melissa Monitor blood sugars daily 9 each 3    blood-glucose transmitter (DEXCOM G6 TRANSMITTER) Melissa USE AS DIRECTED for daily use TO TEST BLOOD GLUCOSE. 1 each 4    cholecalciferol, vitamin  "D3, (VITAMIN D3) 1,000 unit capsule Take 1 capsule (1,000 Units total) by mouth once daily.  0    insulin (LANTUS SOLOSTAR U-100 INSULIN) glargine 100 units/mL (3mL) SubQ pen INJECT 13 UNITS UNDER THE SKIN EVERY EVENING 45 mL 3    insulin lispro (HUMALOG KWIKPEN INSULIN) 100 unit/mL pen Takes 5 units/7 units and 9 units before meals, plus correction for a TDD 25 units 45 mL 3    losartan (COZAAR) 25 MG tablet Take 1 tablet (25 mg total) by mouth once daily. 90 tablet 3    multivitamin (THERAGRAN) per tablet Take 1 tablet by mouth once daily.      ONETOUCH DELICA PLUS LANCET 33 gauge Misc USE AS DIRECTED TO TEST BLOOD GLUCOSE. 300 each 8    pen needle, diabetic (BD ULTRA-FINE NICK PEN NEEDLE) 32 gauge x 5/32" Ndle USE FOUR TIMES A DAY FOR INSULIN INJECTIONS 360 each 4    pravastatin (PRAVACHOL) 10 MG tablet Take 1 tablet (10 mg total) by mouth once daily. 90 tablet 3       Past Surgical History:   Procedure Laterality Date     SECTION, CLASSIC      COLONOSCOPY N/A 2019    Procedure: COLONOSCOPY;  Surgeon: Dipesh Victoria MD;  Location: [address]);  Service: Endoscopy;  Laterality: N/A;    FRACTURE SURGERY Left     leg     MOUTH SURGERY         Social History:  Tobacco Use: Low Risk     Smoking Tobacco Use: Never    Smokeless Tobacco Use: Never    Passive Exposure: Not on file       Alcohol Use: Not At Risk    Frequency of Alcohol Consumption: 2-3 times a week    Average Number of Drinks: 1 or 2    Frequency of Binge Drinking: Never       OBJECTIVE:     Vital Signs Range:  BMI Readings from Last 1 Encounters:   23 24.69 kg/m²       Temp:  [36.6 °C (97.9 °F)-37.1 °C (98.8 °F)]   Pulse:  []   Resp:  [16-35]   BP: (118-191)/(56-82)   SpO2:  [94 %-100 %]        Significant Labs:        Component Value Date/Time    WBC 11.53 2023 0949    HGB 13.2 2023 0949    HCT 41.3 2023 0949     2023 0949     (L) 2023 0949    K 4.6 " 01/27/2023 0949     01/27/2023 0949    CO2 19 (L) 01/27/2023 0949     (H) 01/27/2023 0949    BUN 14 01/27/2023 0949    CREATININE 0.9 01/27/2023 0949    PHOS 2.9 04/14/2022 0720    CALCIUM 9.5 01/27/2023 0949    ALBUMIN 3.9 01/27/2023 0949    PROT 7.6 01/27/2023 0949    ALKPHOS 128 01/27/2023 0949    BILITOT 0.6 01/27/2023 0949    AST 44 (H) 01/27/2023 0949    ALT 65 (H) 01/27/2023 0949    INR 1.2 01/27/2023 0955    INR 1.0 01/27/2023 0949    HGBA1C 6.5 (H) 01/27/2023 1531        Please see Results Review for additional labs.     Diagnostic Studies: No relevant studies.    EKG:   Results for orders placed or performed during the hospital encounter of 01/27/23   ECG 12 lead    Collection Time: 01/27/23  9:49 AM    Narrative    Test Reason : I63.9,    Vent. Rate : 060 BPM     Atrial Rate : 060 BPM     P-R Int : 176 ms          QRS Dur : 070 ms      QT Int : 436 ms       P-R-T Axes : 064 049 031 degrees     QTc Int : 436 ms    Normal sinus rhythm  Normal ECG  No previous ECGs available  Confirmed by Pedro Miller MD ([id number]) on 1/27/2023 1:34:52 PM    Referred By: System System           Confirmed By:Pedro Miller MD       ECHO:  See subjective, if available.      ASSESSMENT/PLAN:           Pre-op Assessment    I have reviewed the Patient Summary Reports.     I have reviewed the Nursing Notes. I have reviewed the NPO Status.   I have reviewed the Medications.     Review of Systems  Anesthesia Hx:  No problems with previous Anesthesia  History of prior surgery of interest to airway management or planning: Denies Family Hx of Anesthesia complications.   Denies Personal Hx of Anesthesia complications.   Social:  Non-Smoker    Hematology/Oncology:  Hematology Normal   Oncology Normal    -- Denies Anemia:  Denies Current/Recent Cancer  --  Denies Cancer in past history:    EENT/Dental:EENT/Dental Normal   Cardiovascular:   Hypertension    Pulmonary:  Pulmonary Normal    Renal/:  Renal/ Normal  Denies Chronic Renal  Disease.     Hepatic/GI:   Denies GERD. Liver Disease, Hepatitis Autoimmune hepatits   Musculoskeletal:  Musculoskeletal Normal    Neurological:   CVA Denies Seizures. IVH, SAH   Endocrine:   Diabetes, type 1    Dermatological:  Skin Normal    Psych:  Psychiatric Normal              Anesthesia Plan  Type of Anesthesia, risks & benefits discussed:    Anesthesia Type: Gen ETT  Intra-op Monitoring Plan: Standard ASA Monitors and Art Line  Post Op Pain Control Plan: IV/PO Opioids PRN and multimodal analgesia  Informed Consent: Informed consent signed with the Patient representative and all parties understand the risks and agree with anesthesia plan.  All questions answered.   ASA Score: 3 Emergent  Day of Surgery Review of History & Physical: H&P Update referred to the surgeon/provider.    Ready For Surgery From Anesthesia Perspective.     .

## 2023-01-27 NOTE — PT/OT/SLP PROGRESS
Speech Language Pathology  Discharge Summary    Leida Hoyos  MRN: [id number]    Patient not seen today secondary to currently intubated.  SLP to discharge current orders

## 2023-01-27 NOTE — H&P
[facility] Neuro Critical Care  Neurocritical Care  History & Physical    Admit Date: 2023  Service Date: 2023  Length of Stay: 0    Subjective:     Chief Complaint: SAH (subarachnoid hemorrhage)    History of Present Illness: Mrs. Gupta 59 yo F w/ PMH DM1 (dx ), HTN, autoimmune hepatitis presented to the ED for AMS this morning where she was unable to answer questions appropriately after a shower.  states patient had a HA and vomited the night before she went to sleep. LNK was 10 pm. Patient had a similar episode a few years ago, but was admitted for DKA.  states patient has worsened neurologically since they left their house. On exam, patient was non-verbal, fixed pupils, intermittently following commands in B/L UE, but unable in LE. GCS 8. MRI performed in ED showed acute intraventricular and subarachnoid hemorrhage with early obstructive hydrocephalus and transependymal CSF egress. Also with a noted 21.5-2 mm laterally directed outpouching at the proximal right A2 QUIQUE could represent infundibulum/tortuous origin of a proximal QUIQUE branch. NSGY consulted and placed EVD after patient was intubated after concern for airway protection. Started Keppra 1g BID. CTA ordered. IR consulted for possible angiogram. No leukocytosis. H/H stable. Cr 0.9. Glucose 284. NCC will admit patient for HLOC.          Past Medical History:   Diagnosis Date    Autoimmune hepatitis     managed by Dr. Russell on mercaptopurine    Diabetes mellitus type II     Hypertension      Past Surgical History:   Procedure Laterality Date     SECTION, CLASSIC      COLONOSCOPY N/A 2019    Procedure: COLONOSCOPY;  Surgeon: Dipesh Victoria MD;  [facility] ([address] [place] [address]);  Service: Endoscopy;  Laterality: N/A;    FRACTURE SURGERY Left     leg     MOUTH SURGERY        No current facility-administered medications on file prior to encounter.     Current Outpatient Medications on File Prior to  "Encounter   Medication Sig Dispense Refill    aspirin (ECOTRIN) 81 MG EC tablet Take 81 mg by mouth once daily.      blood sugar diagnostic Strp For one touch verio IQ meter 100 each 3    blood-glucose meter (ONETOUCH VERIO IQ METER MISC) OneTouch Verio IQ Meter      blood-glucose sensor (DEXCOM G6 SENSOR) Melissa Monitor blood sugars daily 9 each 3    blood-glucose transmitter (DEXCOM G6 TRANSMITTER) Melissa USE AS DIRECTED for daily use TO TEST BLOOD GLUCOSE. 1 each 4    cholecalciferol, vitamin D3, (VITAMIN D3) 1,000 unit capsule Take 1 capsule (1,000 Units total) by mouth once daily.  0    insulin (LANTUS SOLOSTAR U-100 INSULIN) glargine 100 units/mL (3mL) SubQ pen INJECT 13 UNITS UNDER THE SKIN EVERY EVENING 45 mL 3    insulin lispro (HUMALOG KWIKPEN INSULIN) 100 unit/mL pen Takes 5 units/7 units and 9 units before meals, plus correction for a TDD 25 units 45 mL 3    losartan (COZAAR) 25 MG tablet Take 1 tablet (25 mg total) by mouth once daily. 90 tablet 3    multivitamin (THERAGRAN) per tablet Take 1 tablet by mouth once daily.      ONETOUCH DELICA PLUS LANCET 33 gauge Misc USE AS DIRECTED TO TEST BLOOD GLUCOSE. 300 each 8    pen needle, diabetic (BD ULTRA-FINE NICK PEN NEEDLE) 32 gauge x 5/32" Ndle USE FOUR TIMES A DAY FOR INSULIN INJECTIONS 360 each 4    pravastatin (PRAVACHOL) 10 MG tablet Take 1 tablet (10 mg total) by mouth once daily. 90 tablet 3      Allergies: Patient has no known allergies.    Family History   Problem Relation Age of Onset    Arthritis Mother     Ulcerative colitis Mother     Hypertension Father     Hyperlipidemia Father     Heart attack Father 60    Heart disease Father     Asthma Father     Hypertension Sister     Learning disabilities Daughter         dyslexia    No Known Problems Son     No Known Problems Sister     No Known Problems Daughter     Diabetes Maternal Grandfather         type 2    Alcohol abuse Maternal Grandfather     Diabetes Paternal " Grandfather         type 2    Alcohol abuse Maternal Grandmother     Learning disabilities Son         ADHD    Cancer Neg Hx      Social History     Tobacco Use    Smoking status: Never    Smokeless tobacco: Never   Substance Use Topics    Alcohol use: Yes     Alcohol/week: 2.0 standard drinks     Types: 2 Glasses of wine per week     Comment: social    Drug use: Never     Review of Systems   Unable to perform ROS: Acuity of condition   Objective:     Vitals:    Temp: 98.3 °F (36.8 °C)  Pulse: 93  Rhythm: normal sinus rhythm  BP: (!) 127/59  MAP (mmHg): 85  ICP Mean (mmHg): 7 mmHg  Resp: (!) 28  SpO2: 100 %  Oxygen Concentration (%): 40  Vent Mode: A/C  Set Rate: 16 BPM  Vt Set: 420 mL  PEEP/CPAP: 5 cmH20  Peak Airway Pressure: 24 cmH20  Mean Airway Pressure: 9 cmH20  Plateau Pressure: 0 cmH20    Temp  Min: 97.9 °F (36.6 °C)  Max: 98.8 °F (37.1 °C)  Pulse  Min: 60  Max: 122  BP  Min: 127/59  Max: 191/79  MAP (mmHg)  Min: 85  Max: 116  ICP Mean (mmHg)  Min: 7 mmHg  Max: 7 mmHg  Resp  Min: 18  Max: 35  SpO2  Min: 94 %  Max: 100 %  Oxygen Concentration (%)  Min: 40  Max: 50    No intake/output data recorded.           Physical Exam  Vitals and nursing note reviewed.   Constitutional:       Comments: Obtunded   HENT:      Head: Normocephalic.   Eyes:      General: No scleral icterus.        Right eye: No discharge.         Left eye: No discharge.      Conjunctiva/sclera: Conjunctivae normal.      Comments: B/L fixed pupils   Cardiovascular:      Rate and Rhythm: Normal rate and regular rhythm.      Pulses: Normal pulses.      Heart sounds: Normal heart sounds. No murmur heard.  Pulmonary:      Effort: Pulmonary effort is normal. No respiratory distress.      Breath sounds: Normal breath sounds.   Chest:      Chest wall: No tenderness.   Abdominal:      General: Abdomen is flat. There is no distension.      Tenderness: There is no abdominal tenderness.   Musculoskeletal:      Right lower leg: No edema.      Left  lower leg: No edema.   Skin:     General: Skin is warm.      Findings: No bruising or rash.   Neurological:      Comments: E2 V1 M5  GCS 7    Obtunded. Non-verbal. B/L Fixed pupils. Patient intermittently followed commands in B/L UE, but unable to in LE.  No facial droop.   Intubated shortly after with concerns for airway protection.          Unable to test orientation, language, memory, hearing, gait due to level of consciousness.    Today I personally reviewed pertinent medications, imaging, laboratory results, microbiology results, notably:          Assessment/Plan:     Neuro  * SAH (subarachnoid hemorrhage)  53 yo F w/ PMH DM1, HTN presented to the ED with AMS after HA and vomiting the night before. Patient was non-verbal, B/l fixed pupils, and only intermittently following commands in B/L UE. GCS 8. MRI shows Acute IVH, SAH w/ obstructive hydrocephalus w/o signs of ischemia and R A2 QUIQUE aneurysm. VN consulted. Patient intubated in ED after concern for airway protection. NSGY placed EVD. IR consulted for possible angiogram. CTA shows diffuse subarachnoid hemorrhage with moderate hydrocephalus. No intracranial aneurysm or AVM is identified. NIHSS 9 on admission.     -Admit to [facility] for HLOC  -Q1H neuro checks and vitals  -NSGY following; appreciate recs  -EVD placed. Open @10- monitor ICPs and output  -Ancef for drain ppx  -IR consulted and will perform angiogram in the morning.  -VN following  -CTA shows diffuse subarachnoid hemorrhage with moderate hydrocephalus. No intracranial aneurysm or AVM is identified.  -CT Head stable after EVD placed  -Will likely start Nimotop in the morning.  -Daily TCDs  -Echo pending.  -CBC, CMP, Mag, Phos now and then daily   -SBP goal <140  -Hold SQ Heparin in setting of intracranial bleed  -NPO   -Cardene gtt, wean as able   -PRN labetalol, hydralazine  -PT/OT/SLP    IVH (intraventricular hemorrhage)  See SAH    Vasogenic cerebral edema  See  SAH    Cardiac/Vascular  Hypertension  SBP 160s on arrival.   states patient does not take home Losartan regularly.     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 BG on arrival 284  Home regimen 13u Lantus; Lispro AC  A1C 6.5     -Started on Insulin gtt  -Hypoglycemic protocol in place  -Accuchecks q1h    GI  Autoimmune hepatitis  Hx of AIH. Previously on mercaptopurine. Not currently taking.   LFTs slightly elevated on arrival.    -Will continue to monitor  -CMP daily  -If LFTs worsen will obtain RUQ US.     Other  Endotracheally intubated  Vent Mode: A/C  Oxygen Concentration (%):  [40-50] 40  Resp Rate Total:  [18 br/min-27 br/min] 19 br/min  Vt Set:  [400 mL-420 mL] 420 mL  PEEP/CPAP:  [5 cmH20] 5 cmH20  Mean Airway Pressure:  [8.9 cmH20-9.4 cmH20] 9 cmH20     Patient intubated in ED 2/2 concern for airway protection. GCS 7    -Daily ABG  -RT following  -CXR; ABG if respiratory status worsens            The patient is being Prophylaxed for:  Venous Thromboembolism with: Mechanical  Stress Ulcer with: None  Ventilator Pneumonia with: none    Activity Orders          Turn patient starting at 01/27 1400    Elevate HOB starting at 01/27 1221    Diet NPO: NPO starting at 01/27 1221        Full Code    Anoop Esquivel,   Neurocritical Care  [facility]

## 2023-01-27 NOTE — ED TRIAGE NOTES
Per -states pt had a headache since 9pm last night, woke up with slurred speech around 7:30am and then began altered around 8am.   Pt was able to get herself into the shower but then was answering questions inappropriately.

## 2023-01-27 NOTE — CONSULTS
[facility] - Emergency Dept  Vascular Neurology  Com[facility] St[facility]  Consult Note    Inpatient consult to Vascular (Stroke) Neurology  Consult performed by: Lynn Guajardo NP  Consult ordered by: Rakesh Perales MD      Assessment/Plan:     Patient is a 52 y.o. year old female with:    * SAH (subarachnoid hemorrhage)  59yo female with PMH DM1, HTN, autoimmune hepatitis who presented to the ED with  for c/o AMS. Per  patient was c/o of headache last night and vomited. She went to sleep around 9pm then woke up this morning and he noticed that she was confused. She is a type 1 diabetic patient and blood glucose was 300.  states that last time she was having similar symptoms was a couple of years ago when she was in DKA. SBP 150s. On exam very drowsy with difficulty arousing patient, however intermittently follows commands with  no focal weakness. Patient determined to be a candidate for the wake up protocol. MRI IP showed  an acute intraventricular and subarachnoid hemorrhage with early obstructive hydrocephalus and transependymal CSF egress. Also with a noted   21.5-2 mm laterally directed outpouching at the proximal right A2 UQIQUE could represent infundibulum/tortuous origin of a proximal QUIQUE branch, noting that a small aneurysm is difficult to exclude given motion. Patient will be admitted to the Neuro ICU for closer monitoring with a neurosurgery consultation for further recommendations.    Patient remains in NCC, intubated, now off of sedation, EVD in place @20. NIHSS 25, exam limited by intubation and drowsiness. Cerebral angiogram completed yesterday without evidence of abnormalities to account for the SAH; no aneurysm, no AVM, no fistula. Patient will likely need repeat angio in 7 days. MRI W/WO pending, scheduled for this afternoon.       Antithrombotics for secondary stroke prevention: Antiplatelets: None: Hemorrhage any type      Aggressive risk factor modification: HTN     Rehab  efforts: The patient has been evaluated by a stroke team provider and the therapy needs have been fully considered based off the presenting complaints and exam findings. The following therapy evaluations are needed: PT evaluate and treat, OT evaluate and treat, SLP evaluate and treat, PM&R evaluate for appropriate placement    Diagnostics ordered/pending: CTA Head to assess vasculature , CTA Neck/Arch to assess vasculature, TTE to assess cardiac function/status ; MRI W/WO Pending    VTE prophylaxis: Mechanical prophylaxis: Place SCDs  None: Reason for No Pharmacological VTE Prophylaxis: History of systemic or intracranial bleeding    BP parameters: SAH: Unsecured aneurysm, SBP <140        Vasogenic cerebral edema  Area of cerebral edema identified when reviewing brain imaging. We will continue to monitor with q4h neuro checks while on floor or more frequently while in neuro critical care, as any change in the patients clinical exam may signify expansion of the insult and/or the area of the edema. Such changes may require acute interventions to prevent loss of function and/or death.            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), managed as type 1  BG Goal 140-180 while inpatient  Transitioning from Insulin gtt to SQ Insulin today per [facility]         STROKE DOCUMENTATION     Acute Stroke Times   Last Known Normal Date: 01/26/23  Last Known Normal Time: 2100  Unknown Symptom Onset Date: Unknown Date  Unknown Symptom Onset Time: Unknown Time  Stroke Team Called Date: 01/27/23  Stroke Team Called Time: 0942  Stroke Team Arrival Date: 01/27/23  Stroke Team Arrival Time: 0947  MRI Acute Stroke Protocol Interpretation Time: 1030    NIH Scale:       Modified [place] Score: 0  Lexus Coma Scale:    ABCD2 Score:    [id number] Score:   HAS -BLED Score:   ICH Score:   Hunt & Guzman Classification:Grade III      Thrombolysis Candidate?  na, SAH    Delays to  Thrombolysis?  No    Interventional Revascularization Candidate?   Is the patient eligible for mechanical endovascular reperfusion (KRISTY)?  No; at this time symptoms not suggestive of large vessel occlusion    Delays to Thrombectomy? No    Hemorrhagic change of an Ischemic Stroke: Does this patient have an ischemic stroke with hemorrhagic changes? No     Subjective:     History of Present Illness:  57yo female with PMH DM1, HTN, autoimmune hepatitis who presented to the ED with  for c/o AMS. Per  patient was c/o of headache last night and vomited. She went to sleep around 9pm then woke up this morning and he noticed that she was confused. She is a type 1 diabetic patient and blood glucose was 300.  states that last time she was having similar symptoms was a couple of years ago when she was in DKA. On exam very drowsy with difficulty arousing patient, however intermittently follows commands with  no focal weakness. Patient determined to be a candidate for the wake up protocol. MRI IP showed  an acute intraventricular and subarachnoid hemorrhage with early obstructive hydrocephalus and transependymal CSF egress. Also with a noted   21.5-2 mm laterally directed outpouching at the proximal right A2 QUIQUE could represent infundibulum/tortuous origin of a proximal QUIQUE branch, noting that a small aneurysm is difficult to exclude given motion. Patient will be admitted to the Neuro ICU for closer monitoring with a neurosurgery consultation for further recommendations.          Past Medical History:   Diagnosis Date    Autoimmune hepatitis     managed by Dr. Russell on mercaptopurine    Diabetes mellitus type II     Hypertension      Past Surgical History:   Procedure Laterality Date     SECTION, CLASSIC      COLONOSCOPY N/A 2019    Procedure: COLONOSCOPY;  Surgeon: Dipesh Victoria MD;  Location: [facility] ([address] [place] [address]);  Service: Endoscopy;  Laterality: N/A;    FRACTURE SURGERY Left      leg     MOUTH SURGERY       Family History   Problem Relation Age of Onset    Arthritis Mother     Ulcerative colitis Mother     Hypertension Father     Hyperlipidemia Father     Heart attack Father 60    Heart disease Father     Asthma Father     Hypertension Sister     Learning disabilities Daughter         dyslexia    No Known Problems Son     No Known Problems Sister     No Known Problems Daughter     Diabetes Maternal Grandfather         type 2    Alcohol abuse Maternal Grandfather     Diabetes Paternal Grandfather         type 2    Alcohol abuse Maternal Grandmother     Learning disabilities Son         ADHD    Cancer Neg Hx      Social History     Tobacco Use    Smoking status: Never    Smokeless tobacco: Never   Substance Use Topics    Alcohol use: Yes     Alcohol/week: 2.0 standard drinks     Types: 2 Glasses of wine per week     Comment: social    Drug use: Never     Review of patient's allergies indicates:  No Known Allergies    Medications: I have reviewed the current medication administration record.    (Not in a hospital admission)      Review of Systems  Objective:     Vital Signs (Most Recent):  Temp: 97.9 °F (36.6 °C) (01/27/23 0929)  Pulse: 60 (01/27/23 0957)  Resp: (!) 21 (01/27/23 0957)  BP: (!) 153/67 (01/27/23 0929)  SpO2: 100 % (01/27/23 0957)    Vital Signs Range (Last 24H):  Temp:  [97.9 °F (36.6 °C)]   Pulse:  [60-64]   Resp:  [18-21]   BP: (153)/(67)   SpO2:  [99 %-100 %]     Physical Exam  Constitutional:       General: She is not in acute distress.  HENT:      Head: Normocephalic.   Eyes:      Extraocular Movements: Extraocular movements intact.   Cardiovascular:      Rate and Rhythm: Normal rate.   Pulmonary:      Effort: Pulmonary effort is normal.   Abdominal:      General: Abdomen is flat.   Musculoskeletal:         General: Normal range of motion.   Skin:     General: Skin is warm and dry.   Neurological:      Mental Status: She is unresponsive.      Cranial Nerves: No facial  asymmetry.      Motor: No weakness.   Psychiatric:         Mood and Affect: Mood normal.       Neurological Exam:   LOC: obtunded  Attention Span: poor  Language: Expressive aphasia  Articulation: Mute/Anarthric  Orientation:  Untestable due to severe drowsiness  Visual Fields: Full  EOM (CN III, IV, VI): Full/intact  Facial Movement (CN VII): Symmetric facial expression    Motor: Arm left  Normal 5/5  Leg left  Normal 5/5  Arm right  Normal 5/5  Leg right Normal 5/5        Laboratory:  CMP: No results for input(s): GLUCOSE, CALCIUM, ALBUMIN, PROT, NA, K, CO2, CL, BUN, CREATININE, ALKPHOS, ALT, AST, BILITOT in the last 24 hours.  CBC: No results for input(s): WBC, RBC, HGB, HCT, PLT, MCV, MCH, MCHC in the last 168 hours.  Lipid Panel: No results for input(s): CHOL, LDLCALC, HDL, TRIG in the last 168 hours.  Coagulation:   Recent Labs   Lab 01/27/23  0955   INR 1.2     Hgb A1C: No results for input(s): HGBA1C in the last 168 hours.  TSH: No results for input(s): TSH in the last 168 hours.    Diagnostic Results:      Brain imaging:  MRI IP 1/27/2023  1. Acute intraventricular and subarachnoid hemorrhage, as described.  Findings compatible with early obstructive hydrocephalus and transependymal CSF egress.  2. No evidence of acute ischemia or recent infarction.  3. MRA motion compromised. Tiny approximately 1.5-2 mm laterally directed outpouching at the proximal right A2 QUIQUE could represent infundibulum/tortuous origin of a proximal QUIQUE branch, noting that a small aneurysm is difficult to exclude given motion.  Further characterization with CTA could be performed given extensive motion on the current study.      Lynn Guajardo NP  [facility]  Department of Vascular Neurology   [facility] - Emergency Dept

## 2023-01-27 NOTE — ASSESSMENT & PLAN NOTE
59yo female with PMH DM1, HTN, autoimmune hepatitis who presented to the ED with  for c/o AMS. Per  patient was c/o of headache last night and vomited. She went to sleep around 9pm then woke up this morning and he noticed that she was confused. She is a type 1 diabetic patient and blood glucose was 300.  states that last time she was having similar symptoms was a couple of years ago when she was in DKA. SBP 150s. On exam very drowsy with difficulty arousing patient, however intermittently follows commands with  no focal weakness. Patient determined to be a candidate for the wake up protocol. MRI IP showed  an acute intraventricular and subarachnoid hemorrhage with early obstructive hydrocephalus and transependymal CSF egress. Also with a noted   21.5-2 mm laterally directed outpouching at the proximal right A2 QUIQUE could represent infundibulum/tortuous origin of a proximal QUIQUE branch, noting that a small aneurysm is difficult to exclude given motion. Patient will be admitted to the Neuro ICU for closer monitoring with a neurosurgery consultation for further recommendations.    Antithrombotics for secondary stroke prevention: Antiplatelets: None: Hemorrhage any type      Aggressive risk factor modification: HTN     Rehab efforts: The patient has been evaluated by a stroke team provider and the therapy needs have been fully considered based off the presenting complaints and exam findings. The following therapy evaluations are needed: PT evaluate and treat, OT evaluate and treat, SLP evaluate and treat, PM&R evaluate for appropriate placement    Diagnostics ordered/pending: CTA Head to assess vasculature , CTA Neck/Arch to assess vasculature, TTE to assess cardiac function/status     VTE prophylaxis: Mechanical prophylaxis: Place SCDs  None: Reason for No Pharmacological VTE Prophylaxis: History of systemic or intracranial bleeding    BP parameters: SAH: Unsecured aneurysm, SBP <140

## 2023-01-27 NOTE — PLAN OF CARE
POC reviewed with Leida Gupta Romain and family at 1600. Pt unable to verbalized understanding,  verbalized understanding at bedside. Questions and concerns addressed. Will continue to monitor. See below and flowsheets for full assessment and VS info.     -EVD open @ 20, ICPs 6-10, no output so far but will drain when dropped, MD Tatiana notified, OK to still be open @ 20.  -Cardene infusing for SBP <140  -Insulin gtt titrated per nomogram  -PRN fentanyl given x1 for slight agitation  -Zofran given x1 for vomiting, OGT hooked to LIS  -Angio pending for tonight      Neuro:  [place] Coma Scale  Best Eye Response: 1-->(E1) none  Best Motor Response: 5-->(M5) localizes pain  Best Verbal Response: 1-->(V1) none  Lexus Coma Scale Score: 7  Assessment Qualifiers: patient intubated        24 hr Temp:  [97.9 °F (36.6 °C)-98.8 °F (37.1 °C)]     CV:   Rhythm: normal sinus rhythm  BP goals:   SBP < 140  MAP > 65    Resp:      Vent Mode: A/C  Set Rate: 16 BPM  Oxygen Concentration (%): 40  Vt Set: 420 mL  PEEP/CPAP: 5 cmH20    Plan: intubated    GI/:     Diet/Nutrition Received: NPO  Last Bowel Movement: 01/26/23  Voiding Characteristics: urethral catheter (bladder)    Intake/Output Summary (Last 24 hours) at 1/27/2023 1754  Last data filed at 1/27/2023 1700  Gross per 24 hour   Intake 207.76 ml   Output 775 ml   Net -567.24 ml     Unmeasured Output  Stool Occurrence: 0    Labs/Accuchecks:  Recent Labs   Lab 01/27/23  0949   WBC 11.53   RBC 4.30   HGB 13.2   HCT 41.3         Recent Labs   Lab 01/27/23  0949   *   K 4.6   CO2 19*      BUN 14   CREATININE 0.9   ALKPHOS 128   ALT 65*   AST 44*   BILITOT 0.6      Recent Labs   Lab 01/27/23  0955   INR 1.2      Recent Labs   Lab 01/27/23  0949   TROPONINI <0.006       Electrolytes: N/A - electrolytes WDL  Accuchecks: Q1H    Gtts:   insulin regular 1 units/mL infusion orderable (DKA) 1.5 Units/hr (01/27/23 1704)    niCARdipine 7.5 mg/hr (01/27/23 0950)     propofoL Stopped (01/27/23 1429)       LDA/Wounds:  Lines/Drains/Airways       Drain  Duration                  ICP/Ventriculostomy 01/27/23 1400 <1 day         Urethral Catheter 01/27/23 1509 <1 day              Airway  Duration                  Airway - Non-Surgical 01/27/23 1314 <1 day              Epidural Line  Duration                  Perineural Analgesia/Anesthesia Assessment (using dermatomes) Epidural 04/29/13 0852 3560 days              Peripheral Intravenous Line  Duration                  Peripheral IV - Single Lumen 01/27/23 0945 20 G Right Antecubital <1 day         Peripheral IV - Single Lumen 01/27/23 1310 20 G Posterior;Right Hand <1 day         Peripheral IV - Single Lumen 01/27/23 1646 18 G Anterior;Right Upper Arm <1 day                  Wounds: No  Wound care consulted: No

## 2023-01-27 NOTE — ED PROVIDER NOTES
Encounter Date: 2023       History     Chief Complaint   Patient presents with    Multiple complaints     Sugar last night 260 down to 75 then this morning got up took shower was wobbly, answers quest work related , incoherent,  POC GL IN TRIAGE 267     52-year-old female with history of type 1 diabetes and hypertension presents with altered mental status.  She developed a headache and vomiting last night.  She went to bed around 9:00 p.m..  At 7:30 a.m. this morning she woke up with slurred speech.  She was wobbly but was able to walk to the shower.  At 8:00 a.m. her  noticed that she was confused.  She has a monitor for her blood sugar and it was running in the 200 range.  She had a similar episode in [place] and was evaluated for stroke but was found to be in DKA.  She has severe problems with her diabetes and at times it is out of control.    Review of patient's allergies indicates:  No Known Allergies  Past Medical History:   Diagnosis Date    Autoimmune hepatitis     managed by Dr. Russell on mercaptopurine    Diabetes mellitus type II     Hypertension      Past Surgical History:   Procedure Laterality Date     SECTION, CLASSIC      COLONOSCOPY N/A 2019    Procedure: COLONOSCOPY;  Surgeon: Dipesh Victoria MD;  Location: [facility] ([address] [place] [address]);  Service: Endoscopy;  Laterality: N/A;    FRACTURE SURGERY Left 2013    leg     MOUTH SURGERY       Family History   Problem Relation Age of Onset    Arthritis Mother     Ulcerative colitis Mother     Hypertension Father     Hyperlipidemia Father     Heart attack Father 60    Heart disease Father     Asthma Father     Hypertension Sister     Learning disabilities Daughter         dyslexia    No Known Problems Son     No Known Problems Sister     No Known Problems Daughter     Diabetes Maternal Grandfather         type 2    Alcohol abuse Maternal Grandfather     Diabetes Paternal Grandfather         type 2    Alcohol abuse Maternal  Grandmother     Learning disabilities Son         ADHD    Cancer Neg Hx      Social History     Tobacco Use    Smoking status: Never    Smokeless tobacco: Never   Substance Use Topics    Alcohol use: Yes     Alcohol/week: 2.0 standard drinks     Types: 2 Glasses of wine per week     Comment: social    Drug use: Never     Review of Systems    Physical Exam     Initial Vitals [01/27/23 0929]   BP Pulse Resp Temp SpO2   (!) 153/67 64 18 97.9 °F (36.6 °C) 99 %      MAP       --         Physical Exam    Constitutional: She appears lethargic.   Patient appears tachypneic.  She will follow commands but does not open her eyes.  She is hesitant to speak.   HENT:   Head: Normocephalic and atraumatic.   Eyes: Conjunctivae and EOM are normal. Pupils are equal, round, and reactive to light.   Neck: Neck supple. No JVD present.   Normal range of motion.  Cardiovascular:  Normal rate, regular rhythm, normal heart sounds and intact distal pulses.           Pulmonary/Chest: Breath sounds normal. No respiratory distress. She has no wheezes. She has no rhonchi. She has no rales.   Musculoskeletal:         General: Normal range of motion.      Cervical back: Normal range of motion and neck supple.     Neurological: She appears lethargic. GCS eye subscore is 2. GCS verbal subscore is 4. GCS motor subscore is 4.   Patient has tone throughout.  She will hold her knees up when her legs are bent.   Skin: Capillary refill takes less than 2 seconds.       ED Course   Intubation    Date/Time: 1/27/2023 1:40 PM  Location procedure was performed: [facility] EMERGENCY DEPARTMENT  Performed by: Rakesh Perales MD  Authorized by: Rakesh Perales MD   Consent Done: Emergent Situation  Indications: airway protection  Intubation method: fiberoptic oral  Patient status: paralyzed (RSI)  Preoxygenation: mask  Sedatives: etomidate  Paralytic: rocuronium  Laryngoscope size: Glide 3  Tube size: 7.0 mm  Tube type: cuffed  Number of attempts: 1  Cords  visualized: yes  Post-procedure assessment: chest rise and ETCO2 monitor  Breath sounds: clear      Labs Reviewed   CBC W/ AUTO DIFFERENTIAL - Abnormal; Notable for the following components:       Result Value    MPV 9.0 (*)     Gran # (ANC) 9.8 (*)     Lymph # 0.9 (*)     Gran % 84.6 (*)     Lymph % 7.7 (*)     All other components within normal limits    Narrative:     add on trop per dr.rochelle navarro /order#[id number] @ 01/27/2023    10:04       Release to patient->Immediate   COMPREHENSIVE METABOLIC PANEL - Abnormal; Notable for the following components:    Sodium 135 (*)     CO2 19 (*)     Glucose 284 (*)     AST 44 (*)     ALT 65 (*)     All other components within normal limits    Narrative:     add on trop per dr.rochelle navarro /order#[id number] @ 01/27/2023    10:04       Release to patient->Immediate   LIPID PANEL - Abnormal; Notable for the following components:    HDL 82 (*)     All other components within normal limits    Narrative:     add on trop per dr.rochelle navarro /order#[id number] @ 01/27/2023    10:04       Release to patient->Immediate   URINALYSIS, REFLEX TO URINE CULTURE - Abnormal; Notable for the following components:    Glucose, UA 4+ (*)     Ketones, UA 3+ (*)     All other components within normal limits    Narrative:     Specimen Source->Urine   POCT GLUCOSE - Abnormal; Notable for the following components:    POCT Glucose 267 (*)     All other components within normal limits   ISTAT PROCEDURE - Abnormal; Notable for the following components:    POC PTWBT 14.5 (*)     All other components within normal limits   ISTAT PROCEDURE - Abnormal; Notable for the following components:    POC PO2 26 (*)     POC HCO3 22.9 (*)     POC SATURATED O2 45 (*)     All other components within normal limits   HIV 1 / 2 ANTIBODY    Narrative:     add on trop per dr.rochelle navarro /order#[id number] @ 01/27/2023    10:04       Release to patient->Immediate   HEPATITIS C ANTIBODY    Narrative:     add  on trop per dr.rochelle navarro /order#[id number] @ 01/27/2023    10:04       Release to patient->Immediate   PROTIME-INR    Narrative:     add on trop per dr.rochelle navarro /order#[id number] @ 01/27/2023    10:04       Release to patient->Immediate   TSH    Narrative:     add on trop per dr.rochelle navarro /order#[id number] @ 01/27/2023    10:04       Release to patient->Immediate   DRUG SCREEN PANEL, URINE EMERGENCY    Narrative:     Specimen Source->Urine   TROPONIN I   ALCOHOL,MEDICAL (ETHANOL)   TROPONIN I    Narrative:     add on trop per dr.rochelle navarro /order#[id number] @ 01/27/2023    10:04       Release to patient->Immediate   URINALYSIS MICROSCOPIC    Narrative:     Specimen Source->Urine   HEMOGLOBIN A1C   POCT GLUCOSE, HAND-HELD DEVICE   TYPE & SCREEN   ISTAT CREATININE   POCT GLUCOSE MONITORING CONTINUOUS   ISTAT CHEM8   POCT GLUCOSE MONITORING CONTINUOUS          Imaging Results              CTA Head and Neck (xpd) (In process)                      X-Ray Chest 1 View (Final result)  Result time 01/27/23 10:48:15      Final result by Kendell Gilbert MD (01/27/23 10:48:15)                   Impression:      No acute abnormality.      Electronically signed by: Kendell Gilbert MD  Date:    01/27/2023  Time:    10:48               Narrative:    EXAMINATION:  XR CHEST 1 VIEW    CLINICAL HISTORY:  Altered mental status, unspecified    TECHNIQUE:  Single view of the chest were performed.    COMPARISON:  No prior study    FINDINGS:  The trachea and cardiomediastinal silhouette are within normal limits.  There is no evidence of pleural effusions, pneumothoraces or consolidations.  Chronic appearing interstitial markings are identified bilaterally.  Osseous structures demonstrate no evidence for acute fractures or dislocations.                                       MRI Brain Ischemic Inter Pro Incl MRA W/O Con (Final result)  Result time 01/27/23 10:41:00      Final result by Cory Vazquez MD (01/27/23  10:41:00)                   Impression:      1. Acute intraventricular and subarachnoid hemorrhage, as described.  Findings compatible with early obstructive hydrocephalus and transependymal CSF egress.  2. No evidence of acute ischemia or recent infarction.  3. MRA motion compromised. Tiny approximately 1.5-2 mm laterally directed outpouching at the proximal right A2 QUIQUE could represent infundibulum/tortuous origin of a proximal QUIQUE branch, noting that a small aneurysm is difficult to exclude given motion.  Further characterization with CTA could be performed given extensive motion on the current study.  Prelim results called to Dr. Perales at approximately 10:27, 01/27/2023.      Electronically signed by: Cory Vazquez  Date:    01/27/2023  Time:    10:41               Narrative:    EXAMINATION:  MRI BRAIN ISCHEMIC INTERVENTIONAL PROTOCOL INCL MRA W/O CONTRAST    CLINICAL HISTORY:  altered mental status;    TECHNIQUE:  Abbreviated stroke protocol MRI of the brain was performed.  7 [place] is time-of-flight MRA of the head was performed with multiplanar reformats and MIP reconstructions.    COMPARISON:  None    FINDINGS:  MRI brain:    Parenchyma: There is no restricted diffusion to suggest acute or subacute ischemic infarct.Few areas of nonspecific T2/FLAIR hyperintense signal in the frontoparietal white matter could relate to sequela of chronic small vessel ischemic disease.Sub append mole T2/FLAIR hyperintense signal may relate to early transependymal CSF egress.    Additional comments: Subarachnoid hemorrhage is noted about the cerebral hemispheres,, right greater left cerebellum, basilar cisterns, and within the posterior fossa extending to the upper cervical spinal canal.    Ventricles: Suspected early obstructive hydrocephalus related to intraventricular hemorrhage.    MRA:    Motion compromised examination.    Right anterior circulation:No definite flow-limiting stenosis.  Mild atheromatous irregularity of  the ICA suggested .  Flow related signal appears to be present at the proximal ophthalmic artery.Tiny approximately 1.5-2 mm laterally directed outpouching at the proximal right A2 QUIQUE (axial series 13, image 121), could represent infundibulum/tortuous origin of a proximal QUIQUE branch, noting that a small aneurysm is difficult to exclude given motion.    Left anterior circulation: No definite limiting stenosis.  Mild atheromatous irregularity of the ICA suggested.Flow related signal appears to be present at the proximal thumb liza artery.    Posterior circulation: There is no hemodynamically significant vertebrobasilar stenosis. There is no significant PCA stenosis. Posterior inferior cerebellar arteries patent at origin.    Anterior and posterior communicating arteries: The anterior communicating artery appears patent.  Posterior communicating arteries not reliably visualized.                                       Medications   niCARdipine 40 mg/200 mL (0.2 mg/mL) infusion (5 mg/hr Intravenous Rate/Dose Change 1/27/23 1212)   sodium chloride 0.9% flush 10 mL (has no administration in time range)   labetalol 20 mg/4 mL (5 mg/mL) IV syring (has no administration in time range)   levETIRAcetam injection 1,000 mg (1,000 mg Intravenous Given 1/27/23 1238)   hydrALAZINE injection 10 mg (has no administration in time range)   insulin aspart U-100 pen 0-5 Units (has no administration in time range)   glucagon (human recombinant) injection 1 mg (has no administration in time range)   dextrose 10% bolus 125 mL 125 mL (has no administration in time range)   ondansetron injection 4 mg (has no administration in time range)   iohexoL (OMNIPAQUE 350) injection 75 mL (has no administration in time range)   sodium chloride 0.9% bolus 1,000 mL 1,000 mL (0 mLs Intravenous Stopped 1/27/23 1213)     Medical Decision Making:   Initial Assessment:   Type 1 diabetic with altered mental status.  I think DKA is leading on my diagnosis however  I am concerned about the possibility of stroke.  She went to bed with a headache and woke up with slurred speech and ataxia.  I do not find a focality to her neuro exam.  She also appears to be Kussmaul breathing.  Will initiate an evaluation for DKA as well as a stroke code.  I discussed case with stroke.  Will do a MRI ischemic protocol emergently since this would be a wake-up stroke.  Will start IV fluids and check electrolytes for DKA.  She is currently protecting her airway and does not require intubation even though she does have a depressed mental status.  ED Management:  10:38 AM  Patient returned from MRI.  MRI revealed subarachnoid hemorrhage and intraventricular hemorrhage.  Discussed with stroke team.  They recommended consulting Neuro Critical Care.  Current blood pressure is 150/60.  She is much more alert now.  Her eyes are open spontaneously.  She is able to speak and move extremities.  I updated the .  I called Neuro Critical Care and discussed case.  They will admit the patient.    Placed patient on Cardene drip with goal of systolic blood pressure less than 140.  Patient is sent for CTA brain.  Patient will be admitted to neuro critical care.  I did multiple reassessments and counseled  on the patient's condition.    1:38 PM  Patient's mental status decreased and head CT was concerning.  We intubated the patient for airway protection.  Neurosurgery placed a drain pressure monitoring.  Patient will be admitted to neuro critical care in serious condition.          Attending Attestation:         Attending Critical Care:   Critical Care Times:   Direct Patient Care (initial evaluation, reassessments, and time considering the case)................................................................60 minutes.   Additional History from reviewing old medical records or taking additional history from the family, EMS, PCP, etc.......................5 minutes.   Ordering, Reviewing, and  Interpreting Diagnostic Studies...............................................................................................................10 minutes.   Documentation..................................................................................................................................................................................5 minutes.   Consultation with other Physicians. .................................................................................................................................................10 minutes.   ==============================================================  Total Critical Care Time - exclusive of procedural time: 90 minutes.  ==============================================================                     Clinical Impression:   Final diagnoses:  [I63.9] Stroke  [R41.82] Altered mental status  [I60.9] Subarachnoid hemorrhage        ED Disposition Condition    Admit                 Rakesh Perales MD  01/27/23 [id number]       Rakesh Perales MD  01/27/23 9779

## 2023-01-27 NOTE — ED NOTES
Patient identifiers verified and correct for Leida Hoyos  LOC: The patient is groggy.  Awakens with painful stimuli  APPEARANCE: Patient appears comfortable.   patient is clean and well groomed.  SKIN: The skin is warm and dry, color consistent with ethnicity, patient has normal skin turgor and moist mucus membranes, skin intact, no breakdown or bruising noted.   MUSCULOSKELETAL: Patient moving all extremities spontaneously, no swelling noted.  RESPIRATORY: Airway is open and patent, respirations are spontaneous, patient has a normal effort and rate, no accessory muscle.  CARDIAC: Pt placed on cardiac monitor. Patient has a normal rate and regular rhythm, no edema noted, capillary refill < 3 seconds.   GASTRO: Soft and non tender to palpation, no distention noted.  : Pt denies any pain or frequency with urination.  NEURO: Pt opens eyes to painful stimuli.  Able to state name only.  Able to follow simple commands.  Speech is slurred.  bilateral hand grasp equal and even, purposeful motor response noted, normal sensation in all extremities when touched with a finger.

## 2023-01-27 NOTE — SUBJECTIVE & OBJECTIVE
"Past Medical History:   Diagnosis Date    Autoimmune hepatitis     managed by Dr. Russell on mercaptopurine    Diabetes mellitus type II     Hypertension      Past Surgical History:   Procedure Laterality Date     SECTION, CLASSIC  2001    COLONOSCOPY N/A 2019    Procedure: COLONOSCOPY;  Surgeon: Dipesh Victoria MD;  Location: [facility] ([address] [place] [address]);  Service: Endoscopy;  Laterality: N/A;    FRACTURE SURGERY Left 2013    leg     MOUTH SURGERY        No current facility-administered medications on file prior to encounter.     Current Outpatient Medications on File Prior to Encounter   Medication Sig Dispense Refill    aspirin (ECOTRIN) 81 MG EC tablet Take 81 mg by mouth once daily.      blood sugar diagnostic Strp For one touch verio IQ meter 100 each 3    blood-glucose meter (ONETOUCH VERIO IQ METER MISC) OneTouch Verio IQ Meter      blood-glucose sensor (DEXCOM G6 SENSOR) Melissa Monitor blood sugars daily 9 each 3    blood-glucose transmitter (DEXCOM G6 TRANSMITTER) Melissa USE AS DIRECTED for daily use TO TEST BLOOD GLUCOSE. 1 each 4    cholecalciferol, vitamin D3, (VITAMIN D3) 1,000 unit capsule Take 1 capsule (1,000 Units total) by mouth once daily.  0    insulin (LANTUS SOLOSTAR U-100 INSULIN) glargine 100 units/mL (3mL) SubQ pen INJECT 13 UNITS UNDER THE SKIN EVERY EVENING 45 mL 3    insulin lispro (HUMALOG KWIKPEN INSULIN) 100 unit/mL pen Takes 5 units/7 units and 9 units before meals, plus correction for a TDD 25 units 45 mL 3    losartan (COZAAR) 25 MG tablet Take 1 tablet (25 mg total) by mouth once daily. 90 tablet 3    multivitamin (THERAGRAN) per tablet Take 1 tablet by mouth once daily.      ONETOUCH DELICA PLUS LANCET 33 gauge Misc USE AS DIRECTED TO TEST BLOOD GLUCOSE. 300 each 8    pen needle, diabetic (BD ULTRA-FINE NICK PEN NEEDLE) 32 gauge x 5/32" Ndle USE FOUR TIMES A DAY FOR INSULIN INJECTIONS 360 each 4    pravastatin (PRAVACHOL) 10 MG tablet Take 1 tablet (10 mg total) by mouth " once daily. 90 tablet 3      Allergies: Patient has no known allergies.    Family History   Problem Relation Age of Onset    Arthritis Mother     Ulcerative colitis Mother     Hypertension Father     Hyperlipidemia Father     Heart attack Father 60    Heart disease Father     Asthma Father     Hypertension Sister     Learning disabilities Daughter         dyslexia    No Known Problems Son     No Known Problems Sister     No Known Problems Daughter     Diabetes Maternal Grandfather         type 2    Alcohol abuse Maternal Grandfather     Diabetes Paternal Grandfather         type 2    Alcohol abuse Maternal Grandmother     Learning disabilities Son         ADHD    Cancer Neg Hx      Social History     Tobacco Use    Smoking status: Never    Smokeless tobacco: Never   Substance Use Topics    Alcohol use: Yes     Alcohol/week: 2.0 standard drinks     Types: 2 Glasses of wine per week     Comment: social    Drug use: Never     Review of Systems   Unable to perform ROS: Acuity of condition   Objective:     Vitals:    Temp: 98.3 °F (36.8 °C)  Pulse: 93  Rhythm: normal sinus rhythm  BP: (!) 127/59  MAP (mmHg): 85  ICP Mean (mmHg): 7 mmHg  Resp: (!) 28  SpO2: 100 %  Oxygen Concentration (%): 40  Vent Mode: A/C  Set Rate: 16 BPM  Vt Set: 420 mL  PEEP/CPAP: 5 cmH20  Peak Airway Pressure: 24 cmH20  Mean Airway Pressure: 9 cmH20  Plateau Pressure: 0 cmH20    Temp  Min: 97.9 °F (36.6 °C)  Max: 98.8 °F (37.1 °C)  Pulse  Min: 60  Max: 122  BP  Min: 127/59  Max: 191/79  MAP (mmHg)  Min: 85  Max: 116  ICP Mean (mmHg)  Min: 7 mmHg  Max: 7 mmHg  Resp  Min: 18  Max: 35  SpO2  Min: 94 %  Max: 100 %  Oxygen Concentration (%)  Min: 40  Max: 50    No intake/output data recorded.           Physical Exam  Vitals and nursing note reviewed.   Constitutional:       Comments: Obtunded   HENT:      Head: Normocephalic.   Eyes:      General: No scleral icterus.        Right eye: No discharge.         Left eye: No discharge.       Conjunctiva/sclera: Conjunctivae normal.      Comments: B/L fixed pupils   Cardiovascular:      Rate and Rhythm: Normal rate and regular rhythm.      Pulses: Normal pulses.      Heart sounds: Normal heart sounds. No murmur heard.  Pulmonary:      Effort: Pulmonary effort is normal. No respiratory distress.      Breath sounds: Normal breath sounds.   Chest:      Chest wall: No tenderness.   Abdominal:      General: Abdomen is flat. There is no distension.      Tenderness: There is no abdominal tenderness.   Musculoskeletal:      Right lower leg: No edema.      Left lower leg: No edema.   Skin:     General: Skin is warm.      Findings: No bruising or rash.   Neurological:      Comments: E2 V1 M5  GCS 7    Obtunded. Non-verbal. B/L Fixed pupils. Patient intermittently followed commands in B/L UE, but unable to in LE.  No facial droop.   Intubated shortly after with concerns for airway protection.          Unable to test orientation, language, memory, hearing, gait due to level of consciousness.    Today I personally reviewed pertinent medications, imaging, laboratory results, microbiology results, notably:

## 2023-01-28 LAB
ALBUMIN SERPL BCP-MCNC: 3.4 G/DL (ref 3.5–5.2)
ALLENS TEST: ABNORMAL
ALP SERPL-CCNC: 115 U/L (ref 55–135)
ALT SERPL W/O P-5'-P-CCNC: 46 U/L (ref 10–44)
ANION GAP SERPL CALC-SCNC: 12 MMOL/L (ref 8–16)
AST SERPL-CCNC: 27 U/L (ref 10–40)
AV INDEX (PROSTH): 1.28
AV MEAN GRADIENT: 7 MMHG
AV PEAK GRADIENT: 10 MMHG
AV VALVE AREA: 4.11 CM2
AV VELOCITY RATIO: 1.17
BASOPHILS # BLD AUTO: 0.03 K/UL (ref 0–0.2)
BASOPHILS NFR BLD: 0.3 % (ref 0–1.9)
BILIRUB SERPL-MCNC: 0.7 MG/DL (ref 0.1–1)
BSA FOR ECHO PROCEDURE: 1.64 M2
BUN SERPL-MCNC: 10 MG/DL (ref 6–20)
CALCIUM SERPL-MCNC: 8.8 MG/DL (ref 8.7–10.5)
CHLORIDE SERPL-SCNC: 107 MMOL/L (ref 95–110)
CO2 SERPL-SCNC: 18 MMOL/L (ref 23–29)
CREAT SERPL-MCNC: 0.8 MG/DL (ref 0.5–1.4)
CV ECHO LV RWT: 0.33 CM
DELSYS: ABNORMAL
DIFFERENTIAL METHOD: ABNORMAL
DOP CALC AO PEAK VEL: 1.6 M/S
DOP CALC AO VTI: 30.78 CM
DOP CALC LVOT AREA: 3.2 CM2
DOP CALC LVOT DIAMETER: 2.02 CM
DOP CALC LVOT PEAK VEL: 1.87 M/S
DOP CALC LVOT STROKE VOLUME: 126.39 CM3
DOP CALCLVOT PEAK VEL VTI: 39.46 CM
E WAVE DECELERATION TIME: 175.64 MSEC
E/A RATIO: 0.79
E/E' RATIO: 7 M/S
ECHO LV POSTERIOR WALL: 0.67 CM (ref 0.6–1.1)
EJECTION FRACTION: 68 %
EOSINOPHIL # BLD AUTO: 0 K/UL (ref 0–0.5)
EOSINOPHIL NFR BLD: 0 % (ref 0–8)
ERYTHROCYTE [DISTWIDTH] IN BLOOD BY AUTOMATED COUNT: 12.6 % (ref 11.5–14.5)
ERYTHROCYTE [SEDIMENTATION RATE] IN BLOOD BY WESTERGREN METHOD: 16 MM/H
EST. GFR  (NO RACE VARIABLE): >60 ML/MIN/1.73 M^2
FIO2: 40
FRACTIONAL SHORTENING: 40 % (ref 28–44)
GLUCOSE SERPL-MCNC: 233 MG/DL (ref 70–110)
HCO3 UR-SCNC: 25 MMOL/L (ref 24–28)
HCT VFR BLD AUTO: 37.1 % (ref 37–48.5)
HGB BLD-MCNC: 12.3 G/DL (ref 12–16)
IMM GRANULOCYTES # BLD AUTO: 0.02 K/UL (ref 0–0.04)
IMM GRANULOCYTES NFR BLD AUTO: 0.2 % (ref 0–0.5)
INTERVENTRICULAR SEPTUM: 0.69 CM (ref 0.6–1.1)
LA MAJOR: 3.87 CM
LA MINOR: 3.88 CM
LA WIDTH: 3.25 CM
LEFT ATRIUM SIZE: 2.48 CM
LEFT ATRIUM VOLUME INDEX MOD: 20 ML/M2
LEFT ATRIUM VOLUME INDEX: 16.4 ML/M2
LEFT ATRIUM VOLUME MOD: 32.35 CM3
LEFT ATRIUM VOLUME: 26.55 CM3
LEFT INTERNAL DIMENSION IN SYSTOLE: 2.43 CM (ref 2.1–4)
LEFT VENTRICLE DIASTOLIC VOLUME INDEX: 44.21 ML/M2
LEFT VENTRICLE DIASTOLIC VOLUME: 71.62 ML
LEFT VENTRICLE MASS INDEX: 47 G/M2
LEFT VENTRICLE SYSTOLIC VOLUME INDEX: 12.9 ML/M2
LEFT VENTRICLE SYSTOLIC VOLUME: 20.84 ML
LEFT VENTRICULAR INTERNAL DIMENSION IN DIASTOLE: 4.04 CM (ref 3.5–6)
LEFT VENTRICULAR MASS: 76.75 G
LV LATERAL E/E' RATIO: 6.42 M/S
LV SEPTAL E/E' RATIO: 7.7 M/S
LYMPHOCYTES # BLD AUTO: 1.2 K/UL (ref 1–4.8)
LYMPHOCYTES NFR BLD: 11.8 % (ref 18–48)
MAGNESIUM SERPL-MCNC: 2 MG/DL (ref 1.6–2.6)
MCH RBC QN AUTO: 31.2 PG (ref 27–31)
MCHC RBC AUTO-ENTMCNC: 33.2 G/DL (ref 32–36)
MCV RBC AUTO: 94 FL (ref 82–98)
MODE: ABNORMAL
MONOCYTES # BLD AUTO: 1.4 K/UL (ref 0.3–1)
MONOCYTES NFR BLD: 14 % (ref 4–15)
MV PEAK A VEL: 0.97 M/S
MV PEAK E VEL: 0.77 M/S
MV STENOSIS PRESSURE HALF TIME: 50.93 MS
MV VALVE AREA P 1/2 METHOD: 4.32 CM2
NEUTROPHILS # BLD AUTO: 7.4 K/UL (ref 1.8–7.7)
NEUTROPHILS NFR BLD: 73.7 % (ref 38–73)
NRBC BLD-RTO: 0 /100 WBC
PCO2 BLDA: 36.3 MMHG (ref 35–45)
PEEP: 5
PH SMN: 7.45 [PH] (ref 7.35–7.45)
PHOSPHATE SERPL-MCNC: 2.1 MG/DL (ref 2.7–4.5)
PLATELET # BLD AUTO: 217 K/UL (ref 150–450)
PMV BLD AUTO: 9.1 FL (ref 9.2–12.9)
PO2 BLDA: 180 MMHG (ref 80–100)
POC BE: 1 MMOL/L
POC SATURATED O2: 100 % (ref 95–100)
POC TCO2: 26 MMOL/L (ref 23–27)
POCT GLUCOSE: 133 MG/DL (ref 70–110)
POCT GLUCOSE: 142 MG/DL (ref 70–110)
POCT GLUCOSE: 163 MG/DL (ref 70–110)
POCT GLUCOSE: 164 MG/DL (ref 70–110)
POCT GLUCOSE: 165 MG/DL (ref 70–110)
POCT GLUCOSE: 167 MG/DL (ref 70–110)
POCT GLUCOSE: 178 MG/DL (ref 70–110)
POCT GLUCOSE: 184 MG/DL (ref 70–110)
POCT GLUCOSE: 190 MG/DL (ref 70–110)
POCT GLUCOSE: 192 MG/DL (ref 70–110)
POCT GLUCOSE: 208 MG/DL (ref 70–110)
POCT GLUCOSE: 225 MG/DL (ref 70–110)
POCT GLUCOSE: 225 MG/DL (ref 70–110)
POCT GLUCOSE: 234 MG/DL (ref 70–110)
POCT GLUCOSE: 251 MG/DL (ref 70–110)
POTASSIUM SERPL-SCNC: 3.8 MMOL/L (ref 3.5–5.1)
PROT SERPL-MCNC: 6.7 G/DL (ref 6–8.4)
RA MAJOR: 3.43 CM
RA WIDTH: 2.05 CM
RBC # BLD AUTO: 3.94 M/UL (ref 4–5.4)
RIGHT VENTRICULAR END-DIASTOLIC DIMENSION: 2.46 CM
RV TISSUE DOPPLER FREE WALL SYSTOLIC VELOCITY 1 (APICAL 4 CHAMBER VIEW): 21.13 CM/S
SAMPLE: ABNORMAL
SINUS: 2.75 CM
SITE: ABNORMAL
SODIUM SERPL-SCNC: 137 MMOL/L (ref 136–145)
TDI LATERAL: 0.12 M/S
TDI SEPTAL: 0.1 M/S
TDI: 0.11 M/S
TRICUSPID ANNULAR PLANE SYSTOLIC EXCURSION: 2.54 CM
VT: 420
WBC # BLD AUTO: 9.98 K/UL (ref 3.9–12.7)

## 2023-01-28 PROCEDURE — 99900035 HC TECH TIME PER 15 MIN (STAT)

## 2023-01-28 PROCEDURE — 63600175 PHARM REV CODE 636 W HCPCS: Performed by: EMERGENCY MEDICINE

## 2023-01-28 PROCEDURE — A9585 GADOBUTROL INJECTION: HCPCS | Performed by: INTERNAL MEDICINE

## 2023-01-28 PROCEDURE — 82803 BLOOD GASES ANY COMBINATION: CPT

## 2023-01-28 PROCEDURE — 97112 NEUROMUSCULAR REEDUCATION: CPT

## 2023-01-28 PROCEDURE — 27000221 HC OXYGEN, UP TO 24 HOURS

## 2023-01-28 PROCEDURE — 27200966 HC CLOSED SUCTION SYSTEM

## 2023-01-28 PROCEDURE — 99291 PR CRITICAL CARE, E/M 30-74 MINUTES: ICD-10-PCS | Mod: ,,, | Performed by: INTERNAL MEDICINE

## 2023-01-28 PROCEDURE — 94003 VENT MGMT INPAT SUBQ DAY: CPT

## 2023-01-28 PROCEDURE — 25000003 PHARM REV CODE 250: Performed by: STUDENT IN AN ORGANIZED HEALTH CARE EDUCATION/TRAINING PROGRAM

## 2023-01-28 PROCEDURE — 63600175 PHARM REV CODE 636 W HCPCS: Performed by: STUDENT IN AN ORGANIZED HEALTH CARE EDUCATION/TRAINING PROGRAM

## 2023-01-28 PROCEDURE — 94761 N-INVAS EAR/PLS OXIMETRY MLT: CPT

## 2023-01-28 PROCEDURE — 99233 SBSQ HOSP IP/OBS HIGH 50: CPT | Mod: ,,, | Performed by: PSYCHIATRY & NEUROLOGY

## 2023-01-28 PROCEDURE — 25500020 PHARM REV CODE 255: Performed by: INTERNAL MEDICINE

## 2023-01-28 PROCEDURE — 37799 UNLISTED PX VASCULAR SURGERY: CPT

## 2023-01-28 PROCEDURE — 63600175 PHARM REV CODE 636 W HCPCS: Performed by: INTERNAL MEDICINE

## 2023-01-28 PROCEDURE — 83735 ASSAY OF MAGNESIUM: CPT

## 2023-01-28 PROCEDURE — 97167 OT EVAL HIGH COMPLEX 60 MIN: CPT

## 2023-01-28 PROCEDURE — 99291 CRITICAL CARE FIRST HOUR: CPT | Mod: ,,, | Performed by: INTERNAL MEDICINE

## 2023-01-28 PROCEDURE — 63600175 PHARM REV CODE 636 W HCPCS

## 2023-01-28 PROCEDURE — 25000003 PHARM REV CODE 250

## 2023-01-28 PROCEDURE — 80053 COMPREHEN METABOLIC PANEL: CPT

## 2023-01-28 PROCEDURE — 82800 BLOOD PH: CPT

## 2023-01-28 PROCEDURE — 20000000 HC ICU ROOM

## 2023-01-28 PROCEDURE — 25000003 PHARM REV CODE 250: Performed by: INTERNAL MEDICINE

## 2023-01-28 PROCEDURE — 25000003 PHARM REV CODE 250: Performed by: PHYSICIAN ASSISTANT

## 2023-01-28 PROCEDURE — 99900026 HC AIRWAY MAINTENANCE (STAT)

## 2023-01-28 PROCEDURE — 99233 SBSQ HOSP IP/OBS HIGH 50: CPT | Mod: ,,, | Performed by: NEUROLOGICAL SURGERY

## 2023-01-28 PROCEDURE — 84100 ASSAY OF PHOSPHORUS: CPT

## 2023-01-28 PROCEDURE — 85025 COMPLETE CBC W/AUTO DIFF WBC: CPT

## 2023-01-28 PROCEDURE — 99233 PR SUBSEQUENT HOSPITAL CARE,LEVL III: ICD-10-PCS | Mod: ,,, | Performed by: NEUROLOGICAL SURGERY

## 2023-01-28 PROCEDURE — 99233 PR SUBSEQUENT HOSPITAL CARE,LEVL III: ICD-10-PCS | Mod: ,,, | Performed by: PSYCHIATRY & NEUROLOGY

## 2023-01-28 RX ORDER — GLUCAGON 1 MG
1 KIT INJECTION
Status: DISCONTINUED | OUTPATIENT
Start: 2023-01-28 | End: 2023-01-29

## 2023-01-28 RX ORDER — INSULIN ASPART 100 [IU]/ML
1-10 INJECTION, SOLUTION INTRAVENOUS; SUBCUTANEOUS EVERY 6 HOURS PRN
Status: DISCONTINUED | OUTPATIENT
Start: 2023-01-28 | End: 2023-01-29

## 2023-01-28 RX ORDER — LEVETIRACETAM 500 MG/5ML
500 INJECTION, SOLUTION, CONCENTRATE INTRAVENOUS EVERY 12 HOURS
Status: DISCONTINUED | OUTPATIENT
Start: 2023-01-28 | End: 2023-02-03

## 2023-01-28 RX ORDER — DEXMEDETOMIDINE HYDROCHLORIDE 4 UG/ML
0-1.4 INJECTION, SOLUTION INTRAVENOUS CONTINUOUS
Status: DISCONTINUED | OUTPATIENT
Start: 2023-01-28 | End: 2023-01-29

## 2023-01-28 RX ORDER — GADOBUTROL 604.72 MG/ML
6 INJECTION INTRAVENOUS
Status: COMPLETED | OUTPATIENT
Start: 2023-01-28 | End: 2023-01-28

## 2023-01-28 RX ORDER — ACETAMINOPHEN 325 MG/1
650 TABLET ORAL EVERY 6 HOURS PRN
Status: DISCONTINUED | OUTPATIENT
Start: 2023-01-28 | End: 2023-01-30

## 2023-01-28 RX ORDER — SODIUM,POTASSIUM PHOSPHATES 280-250MG
2 POWDER IN PACKET (EA) ORAL EVERY 4 HOURS
Status: COMPLETED | OUTPATIENT
Start: 2023-01-28 | End: 2023-01-28

## 2023-01-28 RX ORDER — HEPARIN SODIUM 5000 [USP'U]/ML
5000 INJECTION, SOLUTION INTRAVENOUS; SUBCUTANEOUS EVERY 8 HOURS
Status: DISCONTINUED | OUTPATIENT
Start: 2023-01-28 | End: 2023-02-15 | Stop reason: HOSPADM

## 2023-01-28 RX ORDER — SODIUM,POTASSIUM PHOSPHATES 280-250MG
2 POWDER IN PACKET (EA) ORAL EVERY 4 HOURS
Status: DISCONTINUED | OUTPATIENT
Start: 2023-01-28 | End: 2023-01-28

## 2023-01-28 RX ADMIN — POTASSIUM & SODIUM PHOSPHATES POWDER PACK 280-160-250 MG 2 PACKET: 280-160-250 PACK at 02:01

## 2023-01-28 RX ADMIN — INSULIN DETEMIR 6 UNITS: 100 INJECTION, SOLUTION SUBCUTANEOUS at 12:01

## 2023-01-28 RX ADMIN — LABETALOL HYDROCHLORIDE 10 MG: 5 INJECTION, SOLUTION INTRAVENOUS at 02:01

## 2023-01-28 RX ADMIN — NIMODIPINE 60 MG: 60 SOLUTION ORAL at 06:01

## 2023-01-28 RX ADMIN — NIMODIPINE 60 MG: 60 SOLUTION ORAL at 05:01

## 2023-01-28 RX ADMIN — NIMODIPINE 60 MG: 60 SOLUTION ORAL at 02:01

## 2023-01-28 RX ADMIN — PROPOFOL 50 MCG/KG/MIN: 10 INJECTION, EMULSION INTRAVENOUS at 12:01

## 2023-01-28 RX ADMIN — LEVETIRACETAM 500 MG: 100 INJECTION, SOLUTION INTRAVENOUS at 10:01

## 2023-01-28 RX ADMIN — MUPIROCIN: 20 OINTMENT TOPICAL at 10:01

## 2023-01-28 RX ADMIN — NIMODIPINE 60 MG: 60 SOLUTION ORAL at 10:01

## 2023-01-28 RX ADMIN — CEFAZOLIN 2 G: 2 INJECTION, POWDER, FOR SOLUTION INTRAMUSCULAR; INTRAVENOUS at 11:01

## 2023-01-28 RX ADMIN — NIMODIPINE 60 MG: 60 SOLUTION ORAL at 09:01

## 2023-01-28 RX ADMIN — GADOBUTROL 6 ML: 604.72 INJECTION INTRAVENOUS at 05:01

## 2023-01-28 RX ADMIN — SODIUM CHLORIDE 1000 ML: 9 INJECTION, SOLUTION INTRAVENOUS at 08:01

## 2023-01-28 RX ADMIN — INSULIN DETEMIR 6 UNITS: 100 INJECTION, SOLUTION SUBCUTANEOUS at 10:01

## 2023-01-28 RX ADMIN — INSULIN ASPART 3 UNITS: 100 INJECTION, SOLUTION INTRAVENOUS; SUBCUTANEOUS at 10:01

## 2023-01-28 RX ADMIN — NIMODIPINE 60 MG: 60 SOLUTION ORAL at 01:01

## 2023-01-28 RX ADMIN — CEFAZOLIN 2 G: 2 INJECTION, POWDER, FOR SOLUTION INTRAMUSCULAR; INTRAVENOUS at 12:01

## 2023-01-28 RX ADMIN — CEFAZOLIN 2 G: 2 INJECTION, POWDER, FOR SOLUTION INTRAMUSCULAR; INTRAVENOUS at 06:01

## 2023-01-28 RX ADMIN — HEPARIN SODIUM 5000 UNITS: 5000 INJECTION INTRAVENOUS; SUBCUTANEOUS at 06:01

## 2023-01-28 RX ADMIN — LEVETIRACETAM 1000 MG: 100 INJECTION, SOLUTION INTRAVENOUS at 09:01

## 2023-01-28 RX ADMIN — POTASSIUM & SODIUM PHOSPHATES POWDER PACK 280-160-250 MG 2 PACKET: 280-160-250 PACK at 10:01

## 2023-01-28 RX ADMIN — MUPIROCIN: 20 OINTMENT TOPICAL at 09:01

## 2023-01-28 RX ADMIN — ACETAMINOPHEN 650 MG: 325 TABLET ORAL at 11:01

## 2023-01-28 RX ADMIN — LABETALOL HYDROCHLORIDE 10 MG: 5 INJECTION, SOLUTION INTRAVENOUS at 04:01

## 2023-01-28 RX ADMIN — PROPOFOL 30 MCG/KG/MIN: 10 INJECTION, EMULSION INTRAVENOUS at 04:01

## 2023-01-28 RX ADMIN — CEFAZOLIN 2 G: 2 INJECTION, POWDER, FOR SOLUTION INTRAMUSCULAR; INTRAVENOUS at 08:01

## 2023-01-28 NOTE — PLAN OF CARE
Recommendations     1. If TF rec's warranted, rec'd Glucerna 1.5 40 ml/hr to provide 1440 kcal, 79 g protein, 729 ml water.      2. If TPN rec's warranted, rec'd goal macronutrients 80 g aa, 150 g dextrose, + IV lipids to provide 1330 kcal, 80 g protein - meets 100% kcal/pro needs. GIR: 1.7 mg/kg/min.      3. RD following.     Goals: Meet % EEN by RD f/u.  Nutrition Goal Status: new  Communication of RD Recs: other (comment) (POC)

## 2023-01-28 NOTE — PLAN OF CARE
Procedure completed. Patient tolerated well; monitoring maintained per CRNA. Hemostasis achieved to right groin via 5 Fr [place] at 2017; patient to remain flat until 2217. Site CDI without hematoma. Report called to Marge LEONARD. Patient to be transported back to Neuro ICU accompanied by this RN and CRNA.

## 2023-01-28 NOTE — ASSESSMENT & PLAN NOTE
Vent Mode: A/C  Oxygen Concentration (%):  [30-50] 30  Resp Rate Total:  [16 br/min-27 br/min] 16 br/min  Vt Set:  [400 mL-420 mL] 420 mL  PEEP/CPAP:  [5 cmH20] 5 cmH20  Mean Airway Pressure:  [8 cmH20-9.4 cmH20] 8.3 cmH20     Patient intubated in ED 2/2 concern for airway protection. GCS 7  Currently on propofol. Will wean propofol and transition to precedex if needed.    -Daily ABG  -RT following  -CXR; ABG if respiratory status worsens

## 2023-01-28 NOTE — ASSESSMENT & PLAN NOTE
51 yo F with PMH of DM1, autoimmune hepatitis, and HTN who presents to ED and was found to have diffuse SAH and hydrocephalus    PBD 2    - admit to [facility]  - q1 neuro checks  - all labs and diagnostics reviewed   - MRI/CTA showed diffuse SAH and small volume IVH with hydrocephalus, no identifiable aneurysm   - CTH repeat with EVD in good position   - CTA without identifiable aneurysm   - DSA 1/27 without any identifiable aneurysm   - MRI brain/C spine and MRI vessel wall imaging pending   - will need repeat DSA on PBD 7  - EVD open at 20, abx while in place  - SAH protocol   - daily TCDs x 14 days   - keppra 500mg x 7 days   - goal euvolemia to 1L positive  - Na >135, hypertonics as needed  - elevate HOB  - SBP <140  - intubated for airway protection, WTE when/if appropriate  - NPO  - ok for SQH    Dispo: ongoing

## 2023-01-28 NOTE — TRANSFER OF CARE
"Anesthesia Transfer of Care Note    Patient: Leida Hoyos    Procedure(s) Performed: Procedure(s) (LRB):  ANGIOGRAM-CEREBRAL (N/A)    Patient location: ICU    Anesthesia Type: general    Transport from OR: Transported from OR intubated on 100% O2 by AMBU with assisted ventilation. Continuous ECG monitoring in transport. Continuous SpO2 monitoring in transport    Post pain: adequate analgesia    Post assessment: no apparent anesthetic complications and tolerated procedure well    Post vital signs: stable    Level of consciousness: sedated    Nausea/Vomiting: no nausea/vomiting    Complications: none    Transfer of care protocol was followed      Last vitals:   Visit Vitals  /64   Pulse 98   Temp 36.8 °C (98.3 °F) (Oral)   Resp 16   Ht 5' 2" (1.575 m)   Wt 61.2 kg (135 lb)   SpO2 100%   Breastfeeding No   BMI 24.69 kg/m²     "

## 2023-01-28 NOTE — PLAN OF CARE
[facility] Care Plan    POC reviewed with Leida Hoyos and family at 0300. Pt's family  verbalized understanding. Questions and concerns addressed. No acute events overnight. Pt progressing toward goals. Will continue to monitor. See below and flowsheets for full assessment and VS info.     Patient went for diagnostic angio, no interventions done.   Pupils were 2 mm and fixed, now brisk. Cough, gag and corneal reflexes now intact.   EVD open @ 20.  ICP 7-18/hr.  CSF output clear 0-11/hr.   Cardene titrated off.   Propofol @ 50.  Insulin gtt titrated.   Labetalol administered x 1 to maintain SBP < 140.      Is this a stroke patient? yes- Stroke booklet reviewed with patient and family, risk factors identified for patient and stroke booklet remains at bedside for ongoing education.     Neuro:  [place] Coma Scale  Best Eye Response: 1-->(E1) none  Best Motor Response: 4-->(M4) withdraws from pain  Best Verbal Response: 1-->(V1) none  [place] Coma Scale Score: 6  Assessment Qualifiers: patient intubated, patient chemically sedated or paralyzed  Pupil PERRLA: yes     24hr Temp:  [97.9 °F (36.6 °C)-98.8 °F (37.1 °C)]     CV:   Rhythm: sinus tachycardia, normal sinus rhythm  BP goals:   SBP < 140  MAP > 65    Resp:      Vent Mode: A/C  Set Rate: 16 BPM  Oxygen Concentration (%): 40  Vt Set: 420 mL  PEEP/CPAP: 5 cmH20    Plan: wean to extubate    GI/:     Diet/Nutrition Received: NPO  Last Bowel Movement: 01/26/23  Voiding Characteristics: urethral catheter (bladder)    Intake/Output Summary (Last 24 hours) at 1/28/2023 0117  Last data filed at 1/28/2023 0112  Gross per 24 hour   Intake 1369.59 ml   Output 1914 ml   Net -544.41 ml     Unmeasured Output  Stool Occurrence: 0    Labs/Accuchecks:  Recent Labs   Lab 01/28/23  0005   WBC 9.98   RBC 3.94*   HGB 12.3   HCT 37.1         Recent Labs   Lab 01/28/23  0005      K 3.8   CO2 18*      BUN 10   CREATININE 0.8   ALKPHOS 115   ALT 46*   AST 27   BILITOT  0.7      Recent Labs   Lab 01/27/23  0955   INR 1.2      Recent Labs   Lab 01/27/23  0949   TROPONINI <0.006       Electrolytes: N/A - electrolytes WDL  Accuchecks: Q1H    Gtts:   insulin regular 1 units/mL infusion orderable (DKA) 1.2 Units/hr (01/28/23 0112)    niCARdipine Stopped (01/27/23 2229)    propofoL 50 mcg/kg/min (01/28/23 0112)       LDA/Wounds:  Lines/Drains/Airways       Drain  Duration                  ICP/Ventriculostomy 01/27/23 1400 <1 day         Urethral Catheter 01/27/23 1509 <1 day              Airway  Duration                  Airway - Non-Surgical 01/27/23 1314 <1 day              Epidural Line  Duration                  Perineural Analgesia/Anesthesia Assessment (using dermatomes) Epidural 04/29/13 0852 3560 days              Arterial Line  Duration             Arterial Line 01/27/23 2000 Right Radial <1 day              Peripheral Intravenous Line  Duration                  Peripheral IV - Single Lumen 01/27/23 0945 20 G Right Antecubital <1 day         Peripheral IV - Single Lumen 01/27/23 1310 20 G Posterior;Right Hand <1 day         Peripheral IV - Single Lumen 01/27/23 1646 18 G Anterior;Right Upper Arm <1 day                  Wounds: No  Wound care consulted: No

## 2023-01-28 NOTE — HOSPITAL COURSE
01/28/2023: IR performed angiogram shows no aneurysm, AVM or AV fistula. EVD open @20. Will wean propofol and transition to precedex. Started tube feeds and SQ heparin. Transitioned from insulin gtt to detemir.   [facility]; TCDs w/o vasospasm; will need repeat angio within a week; MRI brain completed no lesions found; TFS to goal; replete lytes; keep I/Os nearly matched w/ IVF boluses; consider yudy if sbp dips with analgesia and/or sedation;   01/30/2023 Extubated with parameters to NC. Some wheezing post extubation, PRN duonebs and racemic epi x1. IV Dex 4mg q6 hours x 1 day and close airway watch. Cardene to maintain SBP<140. EVD open at 20. Monitoring TCDs daily, no current evidence of vasospasm. Glucose elevated, SSI in place, may require Insulin gtt 2/2 steroids.   1/31/23: possible angio Friday 02/01/2023 No acute events overnight. EVD clamp trial per Neurosurgery, continue to monitor neurologic exam with CTH in AM. TCDs without evidence of vasospasm. Remains hyperglycemia, steroids discontinued, increasing long acting to 12u BID.  02/02/2023 No acute events overnight. CTH with EVD clamped stable, plan to continue clamp trial for one more day. DSA tomorrow on PBD7.   02/02/2023 More sedated on exam and difficult to arouse but will arouse to answer questions (full name, at [facility]) before drifting back to sleep. ICP stable 7-14. CTH stable.   02/03/2023 CTH stable overnight. Ambulating with PT/OT. Exam improved, A&Ox3. DSA without aneurysm, AVM or AV fistula. Elevated ICP during angiogram, EVD open at 20. Post procedure CTH pending.   02/04/2023 CTH post angio with slight increase in ventricle size compared to prior. EVD reopened to 10 and subsequently raised to 15. Plan to repeat clamp trial per Neurosurgery. Exam unchanged. Encourage PO water intake for elevated sodium.   02/05/2023 EVD at 15, plan to keep open at 15 until tomorrow. Glucose better controlled with Detemir 16 BID,  aspart 5. Montior blood glucose as PO intake increases. TCDs without evidence of vasospasm.   02/06/2023 EVD raised to 20 overnight. CTH stable. Pending clamp trial per neurosurgery. CTH in AM. Exam continues to improve. Remains alert and oriented to person, intermittently place. TCDs without evidence of vasospasm.   02/07/2023 CTH stable. Plan for EVD removal per neurosurgery. Discussion ongoing with Neurosurgery concerning discontinuation of TCDs.  02/08/2023 EVD removed, repeat CTH stable. D/C Amantadine and transfer to Neurosurgery.

## 2023-01-28 NOTE — ASSESSMENT & PLAN NOTE
51 yo F with PMH of DM1, autoimmune hepatitis, and HTN who presents to ED and was found to have diffuse SAH and hydrocephalus    - admit to NCC  - q1 neuro checks  - all labs and diagnostics reviewed   - MRI/CTA showed diffuse SAH and small volume IVH with hydrocephalus, no identifiable aneurysm   - CTH repeat with EVD in good position  - EVD open at 20, abx while in place  - IR consulted for angiogram for further workukp/treatment  - SAH protocol   - daily TCDs x 14 days   - keppra 500mg x 7 days   - goal euvolemia to 1L positive  - Na >135, hypertonics as needed  - elevate HOB  - SBP <140  - intubated for airway protection  - NPO  - hold AC/AP at this time    Dispo: ongoing

## 2023-01-28 NOTE — SUBJECTIVE & OBJECTIVE
Neurologic Chief Complaint: SAH    Subjective:     Interval History: Patient is seen for follow-up neurological assessment and treatment recommendations:     Patient remains in [facility], intubated, now off of sedation, EVD in place @20. NIHSS 25, exam limited by intubation and drowsiness. Cerebral angiogram completed yesterday without evidence of abnormalities to account for the SAH; no aneurysm, no AVM, no fistula. Patient will likely need repeat angio in 7 days. MRI W/WO pending, scheduled for this afternoon.     HPI, Past Medical, Family, and Social History remains the same as documented in the initial encounter.     Review of Systems   Unable to perform ROS: Intubated   Scheduled Meds:   ceFAZolin (ANCEF) IVPB  2 g Intravenous Q8H    heparin (porcine)  5,000 Units Subcutaneous Q8H    insulin detemir U-100  6 Units Subcutaneous BID    levetiracetam IV  500 mg Intravenous Q12H    mupirocin   Nasal BID    niMODipine  60 mg Per OG tube Q4H    potassium, sodium phosphates  2 packet Per OG tube Q4H     Continuous Infusions:   dexmedetomidine (PRECEDEX) infusion      niCARdipine Stopped (01/28/23 0506)    propofoL 50 mcg/kg/min (01/28/23 0619)     PRN Meds:dextrose 10%, fentaNYL, glucagon (human recombinant), hydrALAZINE, insulin aspart U-100, labetalol, ondansetron, sodium chloride 0.9%, sodium chloride 0.9%    Objective:     Vital Signs (Most Recent):  Temp: 98.2 °F (36.8 °C) (01/28/23 1105)  Pulse: 81 (01/28/23 1203)  Resp: 16 (01/28/23 1127)  BP: (!) 125/58 (01/28/23 1203)  SpO2: 100 % (01/28/23 1127)  BP Location: Left arm    Vital Signs Range (Last 24H):  Temp:  [98 °F (36.7 °C)-98.3 °F (36.8 °C)]   Pulse:  []   Resp:  [10-30]   BP: (101-164)/(50-75)   SpO2:  [99 %-100 %]   Arterial Line BP: ()/(50-80)   BP Location: Left arm    Physical Exam  Vitals reviewed.   Constitutional:       Appearance: She is normal weight.   HENT:      Head: Normocephalic and atraumatic.   Cardiovascular:      Rate and Rhythm:  Normal rate.   Pulmonary:      Effort: No respiratory distress.      Comments: Intubated  Skin:     General: Skin is warm and dry.   Neurological:      Mental Status: She is unresponsive.      Cranial Nerves: Facial asymmetry present. No dysarthria.      Sensory: Sensory deficit present.      Motor: Weakness present.      Comments: Unable to assess orientation 2/2 intubation   Psychiatric:      Comments: Unable to assess 2/2 intubation       Neurological Exam:   LOC: obtunded  Attention Span: poor  Language: Intubated  Articulation: Untestable due to intubation  Orientation: Untestable due to intubation  Motor: Seen moving all extremities spontaneously; lowers more active than upper extremities; does not follow commands  Sensation: Decreased sensation throughout    Laboratory:  CMP:   Recent Labs   Lab 01/28/23  0005   CALCIUM 8.8   ALBUMIN 3.4*   PROT 6.7      K 3.8   CO2 18*      BUN 10   CREATININE 0.8   ALKPHOS 115   ALT 46*   AST 27   BILITOT 0.7     BMP:   Recent Labs   Lab 01/28/23  0005      K 3.8      CO2 18*   BUN 10   CREATININE 0.8   CALCIUM 8.8     CBC:   Recent Labs   Lab 01/28/23  0005   WBC 9.98   RBC 3.94*   HGB 12.3   HCT 37.1      MCV 94   MCH 31.2*   MCHC 33.2     Lipid Panel:   Recent Labs   Lab 01/27/23  0949   CHOL 181   LDLCALC 89.2   HDL 82*   TRIG 49     Coagulation:   Recent Labs   Lab 01/27/23  0955   INR 1.2     Platelet Aggregation Study: No results for input(s): PLTAGG, PLTAGINTERP, PLTAGREGLACO, ADPPLTAGGREG in the last 168 hours.  Hgb A1C:   Recent Labs   Lab 01/27/23  1531   HGBA1C 6.5*     TSH:   Recent Labs   Lab 01/27/23  0949   TSH 0.972       Diagnostic Results     Brain/Vessel Imaging   IR Carotid Cerebral Angiogram 1/27/23   Preliminary interpretation: 6 vessel angiogram showed no abnormalities to account for SAH. No aneurysm, AVM or AV fistula.  Please see Imaging report for full details.    CTH Without Contrast 1/27/23  Postsurgical changes  status post intraventricular catheter placement with its tip between the frontal horns.  Stable moderate hydrocephalus.    CTA Head and Neck 1/27/23  -Diffuse subarachnoid hemorrhage with moderate hydrocephalus.  -No intracranial aneurysm or AVM is identified.  -Atherosclerotic disease but no advanced stenosis of the carotid or vertebral arteries in the neck with no major branch stenosis/occlusion at the [id number]    MRI Brain Ischemic Protocol 1/27/23  1. Acute intraventricular and subarachnoid hemorrhage, as described.  Findings compatible with early obstructive hydrocephalus and transependymal CSF egress.  2. No evidence of acute ischemia or recent infarction.  3. MRA motion compromised. Tiny approximately 1.5-2 mm laterally directed outpouching at the proximal right A2 QUIQUE could represent infundibulum/tortuous origin of a proximal QUIQUE branch, noting that a small aneurysm is difficult to exclude given motion.  Further characterization with CTA could be performed given extensive motion on the current study.    Cardiac Imaging   None

## 2023-01-28 NOTE — PROGRESS NOTES
[facility] - Neuro Critical Care  Vascular Neurology  [facility]  Progress Note    Assessment/Plan:     * SAH (subarachnoid hemorrhage)  57yo female with PMH DM1, HTN, autoimmune hepatitis who presented to the ED with  for c/o AMS. Per  patient was c/o of headache last night and vomited. She went to sleep around 9pm then woke up this morning and he noticed that she was confused. She is a type 1 diabetic patient and blood glucose was 300.  states that last time she was having similar symptoms was a couple of years ago when she was in DKA. SBP 150s. On exam very drowsy with difficulty arousing patient, however intermittently follows commands with  no focal weakness. Patient determined to be a candidate for the wake up protocol. MRI IP showed  an acute intraventricular and subarachnoid hemorrhage with early obstructive hydrocephalus and transependymal CSF egress. Also with a noted   21.5-2 mm laterally directed outpouching at the proximal right A2 QUIQUE could represent infundibulum/tortuous origin of a proximal QUIQUE branch, noting that a small aneurysm is difficult to exclude given motion. Patient will be admitted to the Neuro ICU for closer monitoring with a neurosurgery consultation for further recommendations.    Patient remains in NCC, intubated, now off of sedation, EVD in place @20. NIHSS 25, exam limited by intubation and drowsiness. Cerebral angiogram completed yesterday without evidence of abnormalities to account for the SAH; no aneurysm, no AVM, no fistula. Patient will likely need repeat angio in 7 days. MRI W/WO pending, scheduled for this afternoon.       Antithrombotics for secondary stroke prevention: Antiplatelets: None: Hemorrhage any type      Aggressive risk factor modification: HTN     Rehab efforts: The patient has been evaluated by a stroke team provider and the therapy needs have been fully considered based off the presenting complaints and exam findings. The  following therapy evaluations are needed: PT evaluate and treat, OT evaluate and treat, SLP evaluate and treat, PM&R evaluate for appropriate placement    Diagnostics ordered/pending: CTA Head to assess vasculature , CTA Neck/Arch to assess vasculature, TTE to assess cardiac function/status ; MRI W/WO Pending    VTE prophylaxis: Mechanical prophylaxis: Place SCDs  None: Reason for No Pharmacological VTE Prophylaxis: History of systemic or intracranial bleeding    BP parameters: SAH: Unsecured aneurysm, SBP <140        Vasogenic cerebral edema  Area of cerebral edema identified when reviewing brain imaging. We will continue to monitor with q4h neuro checks while on floor or more frequently while in neuro critical care, as any change in the patients clinical exam may signify expansion of the insult and/or the area of the edema. Such changes may require acute interventions to prevent loss of function and/or death.            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 NIHSS 25, exam limited by intubation and drowsiness. Cerebral angiogram completed yesterday without evidence of abnormalities to account for the SAH; no aneurysm, no AVM, no fistula. Patient will likely need repeat angio in 7 days. MRI W/WO pending, scheduled for this afternoon.         STROKE DOCUMENTATION   Acute Stroke Times   Last Known Normal Date: 01/26/23  Last Known Normal Time: 2100  Unknown Symptom Onset Date: Unknown Date  Unknown Symptom Onset Time: Unknown Time  Stroke Team Called Date: 01/27/23  Stroke Team Called Time: 0942  Stroke Team Arrival Date: 01/27/23  Stroke Team Arrival Time: 0947  MRI Acute Stroke Protocol Interpretation Time: 1030    NIH Scale:  1a. Level of  Consciousness: 3-->Responds only with reflex motor or autonomic effects or totally unresponsive, flaccid, and areflexic  1b. LOC Questions: 2-->Answers neither question correctly  1c. LOC Commands: 2-->Performs neither task correctly  2. Best Gaze: 0-->Normal  3. Visual: 1-->Partial hemianopia  4. Facial Palsy: 1-->Minor paralysis (flattened nasolabial fold, asymmetry on smiling)  5a. Motor Arm, Left: 3-->No effort against gravity, limb falls  5b. Motor Arm, Right: 3-->No effort against gravity, limb falls  6a. Motor Leg, Left: 2-->Some effort against gravity, leg falls to bed by 5 secs, but has some effort against gravity  6b. Motor Leg, Right: 2-->Some effort against gravity, leg falls to bed by 5 secs, but has some effort against gravity  7. Limb Ataxia: 0-->Absent  8. Sensory: 1-->Mild-to-moderate sensory loss, patient feels pinprick is less sharp or is dull on the affected side, or there is a loss of superficial pain with pinprick, but patient is aware of being touched  9. Best Language: 3-->Mute, global aphasia, no usable speech or auditory comprehension  10. Dysarthria: (UN) Intubated or other physical barrier  11. Extinction and Inattention (formerly Neglect): 2-->Profound minh-inattention/extinction more than 1 modality  Total (NIH Stroke Scale): 25       Modified Churchill Score: 0  [place] Coma Scale:    ABCD2 Score:    [id number] Score:   HAS -BLED Score:   ICH Score:   Hunt & Guzman Classification:      Hemorrhagic change of an Ischemic Stroke: Does this patient have an ischemic stroke with hemorrhagic changes? No     Neurologic Chief Complaint: SAH    Subjective:     Interval History: Patient is seen for follow-up neurological assessment and treatment recommendations:     Patient remains in [facility], intubated, now off of sedation, EVD in place @20. NIHSS 25, exam limited by intubation and drowsiness. Cerebral angiogram completed yesterday without evidence of abnormalities to account for the SAH; no aneurysm,  no AVM, no fistula. Patient will likely need repeat angio in 7 days. MRI W/WO pending, scheduled for this afternoon.     HPI, Past Medical, Family, and Social History remains the same as documented in the initial encounter.     Review of Systems   Unable to perform ROS: Intubated   Scheduled Meds:   ceFAZolin (ANCEF) IVPB  2 g Intravenous Q8H    heparin (porcine)  5,000 Units Subcutaneous Q8H    insulin detemir U-100  6 Units Subcutaneous BID    levetiracetam IV  500 mg Intravenous Q12H    mupirocin   Nasal BID    niMODipine  60 mg Per OG tube Q4H    potassium, sodium phosphates  2 packet Per OG tube Q4H     Continuous Infusions:   dexmedetomidine (PRECEDEX) infusion      niCARdipine Stopped (01/28/23 0506)    propofoL 50 mcg/kg/min (01/28/23 0619)     PRN Meds:dextrose 10%, fentaNYL, glucagon (human recombinant), hydrALAZINE, insulin aspart U-100, labetalol, ondansetron, sodium chloride 0.9%, sodium chloride 0.9%    Objective:     Vital Signs (Most Recent):  Temp: 98.2 °F (36.8 °C) (01/28/23 1105)  Pulse: 81 (01/28/23 1203)  Resp: 16 (01/28/23 1127)  BP: (!) 125/58 (01/28/23 1203)  SpO2: 100 % (01/28/23 1127)  BP Location: Left arm    Vital Signs Range (Last 24H):  Temp:  [98 °F (36.7 °C)-98.3 °F (36.8 °C)]   Pulse:  []   Resp:  [10-30]   BP: (101-164)/(50-75)   SpO2:  [99 %-100 %]   Arterial Line BP: ()/(50-80)   BP Location: Left arm    Physical Exam  Vitals reviewed.   Constitutional:       Appearance: She is normal weight.   HENT:      Head: Normocephalic and atraumatic.   Cardiovascular:      Rate and Rhythm: Normal rate.   Pulmonary:      Effort: No respiratory distress.      Comments: Intubated  Skin:     General: Skin is warm and dry.   Neurological:      Mental Status: She is unresponsive.      Cranial Nerves: Facial asymmetry present. No dysarthria.      Sensory: Sensory deficit present.      Motor: Weakness present.      Comments: Unable to assess orientation 2/2 intubation    Psychiatric:      Comments: Unable to assess 2/2 intubation       Neurological Exam:   LOC: obtunded  Attention Span: poor  Language: Intubated  Articulation: Untestable due to intubation  Orientation: Untestable due to intubation  Motor: Seen moving all extremities spontaneously; lowers more active than upper extremities; does not follow commands  Sensation: Decreased sensation throughout    Laboratory:  CMP:   Recent Labs   Lab 01/28/23  0005   CALCIUM 8.8   ALBUMIN 3.4*   PROT 6.7      K 3.8   CO2 18*      BUN 10   CREATININE 0.8   ALKPHOS 115   ALT 46*   AST 27   BILITOT 0.7     BMP:   Recent Labs   Lab 01/28/23  0005      K 3.8      CO2 18*   BUN 10   CREATININE 0.8   CALCIUM 8.8     CBC:   Recent Labs   Lab 01/28/23  0005   WBC 9.98   RBC 3.94*   HGB 12.3   HCT 37.1      MCV 94   MCH 31.2*   MCHC 33.2     Lipid Panel:   Recent Labs   Lab 01/27/23  0949   CHOL 181   LDLCALC 89.2   HDL 82*   TRIG 49     Coagulation:   Recent Labs   Lab 01/27/23  0955   INR 1.2     Platelet Aggregation Study: No results for input(s): PLTAGG, PLTAGINTERP, PLTAGREGLACO, ADPPLTAGGREG in the last 168 hours.  Hgb A1C:   Recent Labs   Lab 01/27/23  1531   HGBA1C 6.5*     TSH:   Recent Labs   Lab 01/27/23  0949   TSH 0.972       Diagnostic Results     Brain/Vessel Imaging   IR Carotid Cerebral Angiogram 1/27/23   Preliminary interpretation: 6 vessel angiogram showed no abnormalities to account for SAH. No aneurysm, AVM or AV fistula.  Please see Imaging report for full details.    CTH Without Contrast 1/27/23  Postsurgical changes status post intraventricular catheter placement with its tip between the frontal horns.  Stable moderate hydrocephalus.    CTA Head and Neck 1/27/23  -Diffuse subarachnoid hemorrhage with moderate hydrocephalus.  -No intracranial aneurysm or AVM is identified.  -Atherosclerotic disease but no advanced stenosis of the carotid or vertebral arteries in the neck with no major  branch stenosis/occlusion at the [id number]    MRI Brain Ischemic Protocol 1/27/23  1. Acute intraventricular and subarachnoid hemorrhage, as described.  Findings compatible with early obstructive hydrocephalus and transependymal CSF egress.  2. No evidence of acute ischemia or recent infarction.  3. MRA motion compromised. Tiny approximately 1.5-2 mm laterally directed outpouching at the proximal right A2 QUIQUE could represent infundibulum/tortuous origin of a proximal QUIQUE branch, noting that a small aneurysm is difficult to exclude given motion.  Further characterization with CTA could be performed given extensive motion on the current study.    Cardiac Imaging   None      Lien Flanagan PA-C  Com[facility] St[facility]  Department of Vascular Neurology   [facility] - Neuro Critical Care

## 2023-01-28 NOTE — H&P
Inpatient Radiology Pre-procedure Note    History of Present Illness:  Leida Hoyos is a 52 y.o. female with medical history of HTN, DM1, autoimmune hepatitis with admission concerns of SAH. HH3/MFS4. CTA unrevealing for aneurysmal pathology. Hence, plans for cerebral angiogram to investigate for any vascular etiologies for the SAH and characterize rest of the cerebral vasculature +/- embolization.     Admission H&P reviewed.    Past Medical History:   Diagnosis Date    Autoimmune hepatitis     managed by Dr. Russell on mercaptopurine    Diabetes mellitus type II     Hypertension      Past Surgical History:   Procedure Laterality Date     SECTION, CLASSIC      COLONOSCOPY N/A 2019    Procedure: COLONOSCOPY;  Surgeon: Dipesh Victoria MD;  Location: [facility] ([address] [place] [address]);  Service: Endoscopy;  Laterality: N/A;    FRACTURE SURGERY Left     leg     MOUTH SURGERY         Review of Systems:   As documented in primary team H&P    Home Meds:   Prior to Admission medications    Medication Sig Start Date End Date Taking? Authorizing Provider   aspirin (ECOTRIN) 81 MG EC tablet Take 81 mg by mouth once daily.    Historical Provider   blood sugar diagnostic Strp For one touch verio IQ meter 22   Nini Kurtz MD   blood-glucose meter (ONETOUCH VERIO IQ METER MISC) OneTouch Verio IQ Meter    Historical Provider   blood-glucose sensor (DEXCOM G6 SENSOR) Melissa Monitor blood sugars daily 22   Nini Kurtz MD   blood-glucose transmitter (DEXCOM G6 TRANSMITTER) Melissa USE AS DIRECTED for daily use TO TEST BLOOD GLUCOSE. 22   Nini Kurtz MD   cholecalciferol, vitamin D3, (VITAMIN D3) 1,000 unit capsule Take 1 capsule (1,000 Units total) by mouth once daily. 4/10/19   Chelsie Bundy MD   insulin (LANTUS SOLOSTAR U-100 INSULIN) glargine 100 units/mL (3mL) SubQ pen INJECT 13 UNITS UNDER THE SKIN EVERY EVENING 22   Nini Kurtz MD   insulin lispro (HUMALOG KWIKPEN INSULIN)  "100 unit/mL pen Takes 5 units/7 units and 9 units before meals, plus correction for a TDD 25 units 4/22/22   Nini Kurtz MD   losartan (COZAAR) 25 MG tablet Take 1 tablet (25 mg total) by mouth once daily. 1/27/22 1/27/23  Frieda Mera MD   multivitamin (THERAGRAN) per tablet Take 1 tablet by mouth once daily.    Historical Provider   ONEUCH DELICA PLUS LANCET 33 gauge Misc USE AS DIRECTED TO TEST BLOOD GLUCOSE. 10/16/21   Nini Kurtz MD   pen needle, diabetic (BD ULTRA-FINE NICK PEN NEEDLE) 32 gauge x 5/32" Ndle USE FOUR TIMES A DAY FOR INSULIN INJECTIONS 4/22/22   Nini Kurtz MD   pravastatin (PRAVACHOL) 10 MG tablet Take 1 tablet (10 mg total) by mouth once daily. 1/27/22 1/27/23  Frieda Mera MD     Scheduled Meds:    ceFAZolin (ANCEF) IVPB  2 g Intravenous Q8H    levetiracetam IV  1,000 mg Intravenous Q12H    mupirocin   Nasal BID    niMODipine  60 mg Per OG tube Q4H     Continuous Infusions:    insulin regular 1 units/mL infusion orderable (DKA) 2.1 Units/hr (01/27/23 1800)    niCARdipine 7.5 mg/hr (01/27/23 1800)    propofoL Stopped (01/27/23 1429)     PRN Meds:dextrose 10%, fentaNYL, hydrALAZINE, labetalol, ondansetron, sodium chloride 0.9%  Anticoagulants/Antiplatelets: no anticoagulation    Allergies: Review of patient's allergies indicates:  No Known Allergies  Sedation Hx: have not been any systemic reactions    Labs:  Recent Labs   Lab 01/27/23  0955   INR 1.2       Recent Labs   Lab 01/27/23  0949   WBC 11.53   HGB 13.2   HCT 41.3   MCV 96         Recent Labs   Lab 01/27/23  0949   *   *   K 4.6      CO2 19*   BUN 14   CREATININE 0.9   CALCIUM 9.5   ALT 65*   AST 44*   ALBUMIN 3.9   BILITOT 0.6         Vitals:  Temp: 98.3 °F (36.8 °C) (01/27/23 1501)  Pulse: 90 (01/27/23 1801)  Resp: 16 (01/27/23 1801)  BP: (!) 121/58 (01/27/23 1801)  SpO2: 100 % (01/27/23 1801)     Physical Exam:  ASA: 4  Mallampati: Intubated     General: Intubated / sedated   Mental " Status: Intubated / sedated   HEENT: Intubated / sedated   Chest: Intubated / sedated   Heart: regular heart rate  Abdomen: nondistended  Extremity:withdrawals all extremities      Plan:  Sedation Plan:   general anesthesia  Patient will undergo: cerebral angiogram to investigate for any vascular etiologies for the SAH and characterize  the cerebral vasculature +/- embolization.         Lauren Hurt MD     Neuro Endovascular Surgery Fellow   [facility]

## 2023-01-28 NOTE — PROCEDURES
Radiology Post-Procedure Note    Pre Op Diagnosis: SAH    Post Op Diagnosis: same    Procedure: Cerebral angiogram    Procedure performed by: Ezio Puentes MD    Written Informed Consent Obtained: Yes    Specimen Removed: NO    Estimated Blood Loss: less than 50     Procedure report:     A 5F sheath was placed into the right femoral artery and a 5F Rex catheter was advanced into the aortic arch.  The bilateral CCAs, ECAs, ICAs and vertebral arteries were subselected and angiography of the brain was performed after injection into each of these vessels.    Preliminary interpretation: 6 vessel angiogram showed no abnormalities to account for SAH. No aneurysm, AVM or AV fistula.  Please see Imaging report for full details.    A right femoral artery angiogram was performed, the sheath removed and hemostasis achieved using vascade device.  No hematoma was present at the time of hemostasis.    The patient tolerated the procedure well.     Ezio Puentes MD  Department of Radiology

## 2023-01-28 NOTE — CONSULTS
"[facility]  Adult Nutrition  Consult Note    SUMMARY     Recommendations    1. If TF rec's warranted, rec'd Glucerna 1.5 40 ml/hr to provide 1440 kcal, 79 g protein, 729 ml water.     2. If TPN rec's warranted, rec'd goal macronutrients 80 g aa, 150 g dextrose, + IV lipids to provide 1330 kcal, 80 g protein - meets 100% kcal/pro needs. GIR: 1.7 mg/kg/min.     3. RD following.    Goals: Meet % EEN by RD f/u.  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 Patient is NPO at this time, ventilated, and sedated. Vasogenic cerebral edema noted. Pt received cerebral angiogram yesterday. OG-tube in place per chart review. NFPE not appropriate at this time. Propfol noted. I/O's indicated 206 ml propofol given yesterday 1/27. Provides 227 kcal.    Nutrition Risk Screen    Nutrition Risk Screen: tube feeding or parenteral nutrition    Nutrition/Diet History    Spiritual, Cultural Beliefs, Sabianist Practices, Values that Affect Care: no    Anthropometrics    Temp: 98.1 °F (36.7 °C)  Height Method: Stated  Height: 5' 2" (157.5 cm)  Height (inches): 62 in  Weight Method: Bed Scale  Weight: 61.2 kg (135 lb)  Weight (lb): 135 lb  Ideal Body Weight " (IBW), Female: 110 lb  % Ideal Body Weight, Female (lb): 122.73 %  BMI (Calculated): 24.7  BMI Grade: 18.5-24.9 - normal       Lab/Procedures/Meds    Pertinent Labs Reviewed: reviewed  Pertinent Labs Comments: Glu 233, P 2.1, Alb 3.4, ALT 46  Pertinent Medications Reviewed: reviewed  Pertinent Medications Comments: insulin      Estimated/Assessed Needs    Weight Used For Calorie Calculations: 61.2 kg (134 lb 14.7 oz)  Energy Calorie Requirements (kcal): 1323 kcal  Energy Need Method: [facility]  Protein Requirements: 74-92 g (1.2-1.5 g/kg)  Weight Used For Protein Calculations: 61.2 kg (134 lb 14.7 oz)  Fluid Requirements (mL): 1 ml/kcal or per MD     RDA Method (mL): 1323  CHO Requirement: 165      Nutrition Prescription Ordered    Current Diet Order: NPO    Evaluation of Received Nutrient/Fluid Intake    I/O: -0.8L since admit  Energy Calories Required: not meeting needs  Protein Required: not meeting needs  Comments: LBM noted on 1/26  % Intake of Estimated Energy Needs: 0 - 25 %  % Meal Intake: NPO    Nutrition Risk    Level of Risk/Frequency of Follow-up:  (1x/week)       Monitor and Evaluation    Food and Nutrient Intake: energy intake, food and beverage intake, enteral nutrition intake, parenteral nutrition intake  Food and Nutrient Adminstration: diet order, enteral and parenteral nutrition administration  Knowledge/Beliefs/Attitudes: food and nutrition knowledge/skill, beliefs and attitudes  Physical Activity and Function: factors affecting access to physical activity  Anthropometric Measurements: height/length, weight, weight change, body mass index  Biochemical Data, Medical Tests and Procedures: electrolyte and renal panel, gastrointestinal profile, glucose/endocrine profile, inflammatory profile, lipid profile  Nutrition-Focused Physical Findings: overall appearance, extremities, muscles and bones       Nutrition Follow-Up    RD Follow-up?: Yes

## 2023-01-28 NOTE — HOSPITAL COURSE
1/27: PBD 0. patient with worsening exam in ED with diffuse SAH. Intubated and EVD placed. Angio was negative for any aneurysm  1/28: PBD 1. grossly stable on exam on propofol. EVD drained 23cc, ICPs wnl.   1/29: PBD 2, neuro exam improving. On pcdx. EVD put out 100, ICPs wnl.  1/30: PBD 3, exam improving, following commands. R EVD at 20, output 92, ICP 13-22  1/31: PBD 4. Neuro exam improving. Following some commands. EVD at 20, output 91cc. ICPs 6-18  2/1: PBD 5. Neuro exam improving, more alert and talking this morning. EVD at 20, ICP 8-19, clamping today.   2/2: PBD 6. Sleepier this morning on exam. EVD clamped yesterday, ICPs wnl. CTH this am with decrease in ventricular size overall from prior.  2/3: PBD7   repeat angio today to evaluate for aneurysm. Tolerating EVD clamp   2/4: PBD 8, angio yesterday negative. ICP up to 30 intraop. EVD reopened to 10. ICP resolved. CTH post op with mild worsening in ventriculomegaly. Exam stable this morning.   2/5: neuro stable. EVD raised to 20 today  2/6: NAEON, EVD draining at 20, ouptut 102, ICP 0-15. Clamping EVD today  2/7: No acute events, pt tolerated EVD clamp with stable interval imaging. Will discontinue EVD today and re-scan tomorrow morning.   2/8: No acute events, EVD removed without complication and with stable imaging. Pt is disoriented to place again today, otherwise intact, no headaches. EVD site is dry and intact.  2/9: NAEON. AFVSS. Stepped down from NCC to floor under NSGY service. Had some increased agitation/confusion overnight, attempting to get OOB, resolved with dose of Seroquel. Pt sleeping this am but easily arousable, Aox2, cooperative and following commands w/o difficulty.  at bedside. Reports ongoing intermittent HA but no other acut complaints. Participating with therapy, recs for IPR.   2/10: PBD 14. NAEON. AFVSS. Pt's mentation continues to improve, alert & oriented x 3 this am, sitting up in chair. No issues overnight or since  telesitter discontinued yesterday. Requesting to take a shower. Medically stable for rehab pending placement.  2/11: PBD 15. NAEON. J LUISVSS. CTH done yesterday stable. Patient doing well without complaints, awaiting discharge to rehab pending placement.   2/12 naeon, pending IPR  2/13: NAEON. AFVSS. Pt doing well, slept better through the night after getting dose of Seroquel. Awaiting IPR placement, pending insurance authorization.  2/14: NAEON. AFVSS. Pt neurologically stable, ongoing confusion/disorientation, continues to require assistance and frequent reorientation. Headaches improving. LFT's trending up, CTM, will obtain liver US. Participating with PT/OT/SLP. IPR denied by insurance, pending peer to peer.  2/15: NAEON. AFVSS. Pt remains neurologically stable with intermittent headaches and confusion. LFT's trended down slightly today, liver US done with results pending. Medically stable for discharge to IPR today. Discussed discharge instructions with pt and , all questions answered, follow up appointment scheduled. Encouraged to call our office with any questions or concerns.

## 2023-01-28 NOTE — ANESTHESIA PROCEDURE NOTES
Arterial    Diagnosis: Subarachnoid hemorrhage    Patient location during procedure: done in OR  Procedure start time: 1/27/2023 7:25 PM  Timeout: 1/27/2023 7:25 PM  Procedure end time: 1/27/2023 7:30 PM    Staffing  Authorizing Provider: Cuong Negrete MD  Performing Provider: Cuong Negrete MD    Anesthesiologist was present at the time of the procedure.    Preanesthetic Checklist  Completed: patient identified, IV checked, site marked, risks and benefits discussed, surgical consent, monitors and equipment checked, pre-op evaluation, timeout performed and anesthesia consent givenArterial  Skin Prep: chlorhexidine gluconate  Local Infiltration: none  Orientation: right  Location: radial    Catheter Size: 20 G  Catheter placement by Ultrasound guidance. Heme positive aspiration all ports.   Vessel Caliber: small, patent, compressibility normal  Needle advanced into vessel with real time Ultrasound guidance.  Guidewire confirmed in vessel.  Sterile sheath used.  Image recorded and saved.Insertion Attempts: 1  Assessment  Dressing: secured with tape and tegaderm  Patient: Tolerated well

## 2023-01-28 NOTE — HOSPITAL COURSE
01/28/2023 Patient remains in NCC, intubated, now off of sedation, EVD in place @20. NIHSS 25, exam limited by intubation and drowsiness. Cerebral angiogram completed yesterday without evidence of abnormalities to account for the SAH; no aneurysm, no AVM, no fistula. Patient will likely need repeat angio in 7 days. MRI W/WO pending, scheduled for this afternoon.   1/29-02/03/2023 Patient very drowsy following angiogram. Negative DSA today. EVD remains in place. Therapy recs for IPR and mechanical soft diet with thin liquids.   02/04/2023 Remains in NCC, NAEO and neuro exam stable. EVD in place, post-DSA CTH with mild increase in ventricle size and EVD reopened. Daily TCDs remain without vasospasm.   02/05/2023 Remains in NCC, NAEO but reported to be more agitated and restless overnight. EVD still in place and open.   02/06/2023 Patient remains in NCC, neuro exam stable. During exam patient pleasantly sitting up in bed eating lunch. Still with generalized weakness and some confusion. EVD clamped per NSGY today, CTH stable.   2/8/2023 Patient with neuro exam stable. Continues to complain of headaches. EVD removed by NSGY yesterday.

## 2023-01-28 NOTE — ASSESSMENT & PLAN NOTE
Diagnosed in 2013. BG on arrival 284  Home regimen 13u Lantus; Lispro AC  A1C 6.5     -Transitioned from Insulin gtt to Detemir 6u BID  -Hypoglycemic protocol in place  -Accuchecks q1h

## 2023-01-28 NOTE — PROGRESS NOTES
[facility]  Neurocritical Care  Progress Note    Admit Date: 1/27/2023  Service Date: 01/28/2023  Length of Stay: 1    Subjective:     Chief Complaint: SAH (subarachnoid hemorrhage)    History of Present Illness: Mrs. Gupta 59 yo F w/ PMH DM1 (dx 2013), HTN, autoimmune hepatitis presented to the ED for AMS this morning where she was unable to answer questions appropriately after a shower.  states patient had a HA and vomited the night before she went to sleep. LNK was 10 pm. Patient had a similar episode a few years ago, but was admitted for DKA.  states patient has worsened neurologically since they left their house. On exam, patient was non-verbal, fixed pupils, intermittently following commands in B/L UE, but unable in LE. GCS 8. MRI performed in ED showed acute intraventricular and subarachnoid hemorrhage with early obstructive hydrocephalus and transependymal CSF egress. Also with a noted 21.5-2 mm laterally directed outpouching at the proximal right A2 QUIQUE could represent infundibulum/tortuous origin of a proximal QUIQUE branch. NSGY consulted and placed EVD after patient was intubated after concern for airway protection. Started Keppra 1g BID. CTA ordered. IR consulted for possible angiogram. No leukocytosis. H/H stable. Cr 0.9. Glucose 284. NCC will admit patient for HLOC.          Hospital Course: 01/28/2023: IR performed angiogram shows no aneurysm, AVM or AV fistula. EVD open @20. Will wean propofol and transition to precedex. Started tube feeds and SQ heparin. Transitioned from insulin gtt to detemir.       Interval History:  IR performed angiogram shows no aneurysm, AVM or AV fistula. EVD open @20. Will wean propofol and transition to precedex. Started tube feeds and SQ heparin. Transitioned from insulin gtt to detemir.     Review of Systems   Unable to perform ROS: Intubated     Objective:     Vitals:  Temp: 98.2 °F (36.8 °C)  Pulse: 72  Rhythm: normal sinus rhythm  BP:  (!) 115/59  MAP (mmHg): 83  ICP Mean (mmHg): 15 mmHg  Resp: 16  SpO2: 100 %  Oxygen Concentration (%): 30  Vent Mode: A/C  Set Rate: 16 BPM  Vt Set: 420 mL  PEEP/CPAP: 5 cmH20  Peak Airway Pressure: 24 cmH20  Mean Airway Pressure: 8.3 cmH20  Plateau Pressure: 0 cmH20    Temp  Min: 98 °F (36.7 °C)  Max: 98.3 °F (36.8 °C)  Pulse  Min: 72  Max: 122  BP  Min: 101/55  Max: 187/81  MAP (mmHg)  Min: 73  Max: 116  ICP Mean (mmHg)  Min: 4 mmHg  Max: 18 mmHg  Resp  Min: 10  Max: 30  SpO2  Min: 95 %  Max: 100 %  Oxygen Concentration (%)  Min: 30  Max: 50    01/27 0701 - 01/28 0700  In: 1478.4 [I.V.:579.7]  Out: 2308 [Urine:2285; Drains:23]   Unmeasured Output  Stool Occurrence: 0       Physical Exam  Vitals and nursing note reviewed.   Constitutional:       Appearance: She is well-developed.      Comments: intubated   HENT:      Head: Normocephalic.   Eyes:      General: No scleral icterus.        Right eye: No discharge.         Left eye: No discharge.      Conjunctiva/sclera: Conjunctivae normal.   Cardiovascular:      Rate and Rhythm: Normal rate and regular rhythm.      Pulses: Normal pulses.      Heart sounds: Normal heart sounds. No murmur heard.  Pulmonary:      Effort: Pulmonary effort is normal. No respiratory distress.      Breath sounds: Normal breath sounds.   Chest:      Chest wall: No tenderness.   Abdominal:      General: Abdomen is flat. There is no distension.      Palpations: Abdomen is soft.      Tenderness: There is no abdominal tenderness.   Musculoskeletal:      Right lower leg: No edema.      Left lower leg: No edema.   Skin:     General: Skin is warm.      Findings: No bruising or rash.      Comments: R groin site C/D/I without hematoma.    Neurological:      Comments: E2 V1 M5  GCS 7  EVD open @ 20  Intubated. On propofol, Insulin drip. B/L Fixed pupils. Spontaneuously mnoves all extremities. + cough, gag, and corneal reflex. No facial droop.   Intubated shortly after with concerns for airway  protection.        Psychiatric:      Comments: unresponsive     Unable to test orientation, language, memory, hearing, gait due to level of consciousness.    Medications:  Continuousdexmedetomidine (PRECEDEX) infusion  niCARdipine, Last Rate: Stopped (01/28/23 0506)  propofoL, Last Rate: 50 mcg/kg/min (01/28/23 0619)    ScheduledceFAZolin (ANCEF) IVPB, 2 g, Q8H  heparin (porcine), 5,000 Units, Q8H  insulin detemir U-100, 6 Units, BID  levetiracetam IV, 500 mg, Q12H  mupirocin, , BID  niMODipine, 60 mg, Q4H  potassium, sodium phosphates, 2 packet, Q4H    PRNdextrose 10%, 12.5 g, PRN  fentaNYL, 50 mcg, Q2H PRN  glucagon (human recombinant), 1 mg, PRN  hydrALAZINE, 10 mg, Q6H PRN  insulin aspart U-100, 1-10 Units, Q6H PRN  labetalol, 10 mg, Q4H PRN  ondansetron, 4 mg, Q6H PRN  sodium chloride 0.9%, 10 mL, PRN  sodium chloride 0.9%, 10 mL, PRN      Today I personally reviewed pertinent medications, lines/drains/airways, imaging, laboratory results, microbiology results, notably:    Diet  Diet NPO  Diet NPO          Assessment/Plan:     Neuro  * SAH (subarachnoid hemorrhage)  51 yo F w/ PMH DM1, HTN presented to the ED with AMS after HA and vomiting the night before. Patient was non-verbal, B/l fixed pupils, and only intermittently following commands in B/L UE. GCS 8. MRI shows Acute IVH, SAH w/ obstructive hydrocephalus w/o signs of ischemia and R A2 QUIQUE aneurysm. VN consulted. Patient intubated in ED after concern for airway protection. NSGY placed EVD. IR consulted for possible angiogram. CTA shows diffuse subarachnoid hemorrhage with moderate hydrocephalus. No intracranial aneurysm or AVM is identified. NIHSS 9 on admission.     -Admit to [facility] for HLOC  -Q1H neuro checks and vitals  -NSGY following; appreciate recs  -EVD placed. Open @20- monitor ICPs and output  -Ancef for drain ppx  -IR angiogram on 1/27 shows no aneurysm, AVM, or AV fistula.    -Will need repeat angiogram on 2/3; will discuss with IR   -VN  following  -CTA shows diffuse subarachnoid hemorrhage with moderate hydrocephalus. No intracranial aneurysm or AVM is identified.  -CT Head stable after EVD placed  -Nimotop 60 q4h  -Daily TCDs  -Echo pending.  -CBC, CMP, Mag, Phos now and then daily   -SBP goal <140  -TF @40 cc/hr  -Cardene gtt, wean as able   -PRN labetalol, hydralazine  -Started SQ heparin  -PT/OT/SLP    IVH (intraventricular hemorrhage)  See SAH    Vasogenic cerebral edema  See SAH    Cardiac/Vascular  Hypertension  SBP 160s on arrival.   states patient does not take home Losartan regularly.     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 BG on arrival 284  Home regimen 13u Lantus; Lispro AC  A1C 6.5     -Transitioned from Insulin gtt to Detemir 6u BID  -Hypoglycemic protocol in place  -Accuchecks q1h    GI  Autoimmune hepatitis  Hx of AIH. Previously on mercaptopurine. Not currently taking.   LFTs slightly elevated on arrival.    -Will continue to monitor  -CMP daily  -If LFTs worsen will obtain RUQ US.     Other  Endotracheally intubated  Vent Mode: A/C  Oxygen Concentration (%):  [30-50] 30  Resp Rate Total:  [16 br/min-27 br/min] 16 br/min  Vt Set:  [400 mL-420 mL] 420 mL  PEEP/CPAP:  [5 cmH20] 5 cmH20  Mean Airway Pressure:  [8 cmH20-9.4 cmH20] 8.3 cmH20     Patient intubated in ED 2/2 concern for airway protection. GCS 7  Currently on propofol. Will wean propofol and transition to precedex if needed.    -Daily ABG  -RT following  -CXR; ABG if respiratory status worsens            The patient is being Prophylaxed for:  Venous Thromboembolism with: Mechanical or Chemical  Stress Ulcer with: None  Ventilator Pneumonia with: chlorhexidine oral care    Activity Orders          Progressive Mobility Protocol (mobilize patient to their highest level of functioning at least twice daily) starting at 01/28 0800     Elevate HOB Collagen closure or PERCLOSE plug/device - Elevate HOB 30 degrees with limb immobilized for 2 hours after procedure. starting at 01/27 2018    Turn patient starting at 01/27 1400    Elevate HOB starting at 01/27 1221    Diet NPO: NPO starting at 01/27 1221        Full Code    Anoop Esquivel,   Neurocritical Care  [facility]

## 2023-01-28 NOTE — SUBJECTIVE & OBJECTIVE
"Medications Prior to Admission   Medication Sig Dispense Refill Last Dose    aspirin (ECOTRIN) 81 MG EC tablet Take 81 mg by mouth once daily.       blood sugar diagnostic Strp For one touch verio IQ meter 100 each 3     blood-glucose meter (ONETOUCH VERIO IQ METER MISC) OneTouch Verio IQ Meter       blood-glucose sensor (DEXCOM G6 SENSOR) Melissa Monitor blood sugars daily 9 each 3     blood-glucose transmitter (DEXCOM G6 TRANSMITTER) Melissa USE AS DIRECTED for daily use TO TEST BLOOD GLUCOSE. 1 each 4     cholecalciferol, vitamin D3, (VITAMIN D3) 1,000 unit capsule Take 1 capsule (1,000 Units total) by mouth once daily.  0     insulin (LANTUS SOLOSTAR U-100 INSULIN) glargine 100 units/mL (3mL) SubQ pen INJECT 13 UNITS UNDER THE SKIN EVERY EVENING 45 mL 3     insulin lispro (HUMALOG KWIKPEN INSULIN) 100 unit/mL pen Takes 5 units/7 units and 9 units before meals, plus correction for a TDD 25 units 45 mL 3     losartan (COZAAR) 25 MG tablet Take 1 tablet (25 mg total) by mouth once daily. 90 tablet 3     multivitamin (THERAGRAN) per tablet Take 1 tablet by mouth once daily.       ONETOUCH DELICA PLUS LANCET 33 gauge Misc USE AS DIRECTED TO TEST BLOOD GLUCOSE. 300 each 8     pen needle, diabetic (BD ULTRA-FINE NICK PEN NEEDLE) 32 gauge x 5/32" Ndle USE FOUR TIMES A DAY FOR INSULIN INJECTIONS 360 each 4     pravastatin (PRAVACHOL) 10 MG tablet Take 1 tablet (10 mg total) by mouth once daily. 90 tablet 3        Review of patient's allergies indicates:  No Known Allergies    Past Medical History:   Diagnosis Date    Autoimmune hepatitis     managed by Dr. Russell on mercaptopurine    Diabetes mellitus type II     Hypertension      Past Surgical History:   Procedure Laterality Date     SECTION, CLASSIC      COLONOSCOPY N/A 2019    Procedure: COLONOSCOPY;  Surgeon: Dipesh Victoria MD;  Location: [facility] ([address] [place] [address]);  Service: Endoscopy;  Laterality: N/A;    FRACTURE SURGERY Left     leg     MOUTH " SURGERY       Family History       Problem Relation (Age of Onset)    Alcohol abuse Maternal Grandfather, Maternal Grandmother    Arthritis Mother    Asthma Father    Diabetes Maternal Grandfather, Paternal Grandfather    Heart attack Father (60)    Heart disease Father    Hyperlipidemia Father    Hypertension Father, Sister    Learning disabilities Daughter, Son    No Known Problems Son, Sister, Daughter    Ulcerative colitis Mother          Tobacco Use    Smoking status: Never    Smokeless tobacco: Never   Substance and Sexual Activity    Alcohol use: Yes     Alcohol/week: 2.0 standard drinks     Types: 2 Glasses of wine per week     Comment: social    Drug use: Never    Sexual activity: Yes     Partners: Male     Birth control/protection: Partner-Vasectomy     Review of Systems   Unable to perform ROS: Patient unresponsive   Objective:     Weight: 61.2 kg (135 lb)  Body mass index is 24.69 kg/m².  Vital Signs (Most Recent):  Temp: 98.3 °F (36.8 °C) (01/27/23 1501)  Pulse: 94 (01/27/23 1701)  Resp: 16 (01/27/23 1701)  BP: (!) 134/56 (01/27/23 1701)  SpO2: 100 % (01/27/23 1701)   Vital Signs (24h Range):  Temp:  [97.9 °F (36.6 °C)-98.8 °F (37.1 °C)] 98.3 °F (36.8 °C)  Pulse:  [] 94  Resp:  [16-35] 16  SpO2:  [94 %-100 %] 100 %  BP: (118-191)/(56-82) 134/56     Date 01/27/23 0700 - 01/28/23 0659   Shift 8512-0332 8988-4240 3588-4638 24 Hour Total   INTAKE   I.V.(mL/kg)  158.6(2.6)  158.6(2.6)   IV Piggyback  49.2  49.2   Shift Total(mL/kg)  207.8(3.4)  207.8(3.4)   OUTPUT   Urine(mL/kg/hr)  775  775   Drains  0  0   Shift Total(mL/kg)  775(12.7)  775(12.7)   Weight (kg) 61.2 61.2 61.2 61.2              Vent Mode: A/C  Oxygen Concentration (%):  [40-50] 40  Resp Rate Total:  [16 br/min-27 br/min] 16 br/min  Vt Set:  [400 mL-420 mL] 420 mL  PEEP/CPAP:  [5 cmH20] 5 cmH20  Mean Airway Pressure:  [8.9 cmH20-9.4 cmH20] 9 cmH20         Urethral Catheter 01/27/23 1507 (Active)   $ Monzon Insertion Bedside Insertion  "Performed 01/27/23 1601   Site Assessment Clean;Intact 01/27/23 1501   Collection Container Urimeter 01/27/23 1501   Securement Method secured to top of thigh w/ adhesive device 01/27/23 1501   Catheter Care Performed yes 01/27/23 1501   Reason for Continuing Urinary Catheterization Critically ill in ICU and requiring hourly monitoring of intake/output 01/27/23 1501   CAUTI Prevention Bundle Securement Device in place with 1" slack;Intact seal between catheter & drainage tubing;Drainage bag/urimeter off the floor;Sheeting clip in use;No dependent loops or kinks;Drainage bag/urimeter not overfilled (<2/3 full);Drainage bag/urimeter below bladder 01/27/23 1501   Output (mL) 250 mL 01/27/23 1700            ICP/Ventriculostomy 01/27/23 1400 (Active)   $ ICP/Ventriculostomy [place] Placement Bedside Insertion Performed 01/27/23 1501   Level of Ventriculostomy (cm above) 20 01/27/23 1501   Status Open to drainage 01/27/23 1501   Site Assessment Clean;Dry 01/27/23 1501   Site Drainage No drainage 01/27/23 1501   Waveform normal waveform 01/27/23 1501   Output (mL) 0 mL 01/27/23 1700   CSF Color clear 01/27/23 1501   Dressing Status Clean;Dry;Intact 01/27/23 1501   Interventions HOB degrees;bed controls locked;zeroed;level adjusted per order 01/27/23 1501       Physical Exam  Constitutional:       Appearance: She is well-developed and well-nourished.   Eyes:      Conjunctiva/sclera: Conjunctivae normal.   Cardiovascular:      Pulses: Normal pulses.   Abdominal:      Palpations: Abdomen is soft.   Neurological:      Comments: E1V1M5  Pupils 3mm and sluggish  CN difficult to assess given mental status  Localizes x4 to deep stimulation   Psychiatric:      Comments: unresponsive       Physical Exam:    Constitutional: She appears well-developed and well-nourished.     Eyes: Conjunctivae are normal.     Cardiovascular: Normal pulses.     Abdominal: Soft.     Psych/Behavior:   unresponsive     Neurological:   E1V1M5  Pupils 3mm and " sluggish  CN difficult to assess given mental status  Localizes x4 to deep stimulation     Significant Labs:  Recent Labs   Lab 01/27/23  0949   *   *   K 4.6      CO2 19*   BUN 14   CREATININE 0.9   CALCIUM 9.5     Recent Labs   Lab 01/27/23  0949   WBC 11.53   HGB 13.2   HCT 41.3        Recent Labs   Lab 01/27/23  0949 01/27/23  0955   INR 1.0 1.2     Microbiology Results (last 7 days)       ** No results found for the last 168 hours. **          All pertinent labs from the last 24 hours have been reviewed.    Significant Diagnostics:  I have reviewed all pertinent imaging results/findings within the past 24 hours.

## 2023-01-28 NOTE — ASSESSMENT & PLAN NOTE
51 yo F w/ PMH DM1, HTN presented to the ED with AMS after HA and vomiting the night before. Patient was non-verbal, B/l fixed pupils, and only intermittently following commands in B/L UE. GCS 8. MRI shows Acute IVH, SAH w/ obstructive hydrocephalus w/o signs of ischemia and R A2 QUIQUE aneurysm. VN consulted. Patient intubated in ED after concern for airway protection. NSGY placed EVD. IR consulted for possible angiogram. CTA shows diffuse subarachnoid hemorrhage with moderate hydrocephalus. No intracranial aneurysm or AVM is identified. NIHSS 9 on admission.     -Admit to [facility] for H[facility]  -Q1H neuro checks and vitals  -NSGY following; appreciate recs  -EVD placed. Open @20- monitor ICPs and output  -Ancef for drain ppx  -IR angiogram on 1/27 shows no aneurysm, AVM, or AV fistula.    -Will need repeat angiogram on 2/3; will discuss with IR   -VN following  -CTA shows diffuse subarachnoid hemorrhage with moderate hydrocephalus. No intracranial aneurysm or AVM is identified.  -CT Head stable after EVD placed  -Nimotop 60 q4h  -Daily TCDs  -Echo pending.  -CBC, CMP, Mag, Phos now and then daily   -SBP goal <140  -TF @40 cc/hr  -Cardene gtt, wean as able   -PRN labetalol, hydralazine  -Started SQ heparin  -PT/OT/SLP

## 2023-01-28 NOTE — PLAN OF CARE
Pt arrived to IR Room 4 for cerebral angiogram/embolization with anesthesia. Pt oriented to unit and staff. Plan of care reviewed with patient, patient unable to verbalize understanding. Comfort measures utilized. Pt safely transferred from stretcher to procedural table. Fall risk reviewed with patient, fall risk interventions maintained. Safety strap applied, positioner pillows utilized to minimize pressure points. Blankets applied. Pt prepped and draped utilizing standard sterile technique. Patient placed on continuous monitoring, as required by sedation policy. Timeouts completed utilizing standard universal time-out, per department and facility policy. RN to remain at bedside, continuous monitoring maintained per CRNA. Pt resting comfortably. Denies pain/discomfort. Will continue to monitor. See flow sheets for monitoring, medication administration, and updates.

## 2023-01-28 NOTE — PT/OT/SLP EVAL
Occupational Therapy   Evaluation    Name: Leida Hoyos  MRN: [id number]  Admitting Diagnosis: SAH (subarachnoid hemorrhage)  Recent Surgery: Procedure(s) (LRB):  ANGIOGRAM-CEREBRAL (N/A) 1 Day Post-Op    Recommendations:     Discharge Recommendations:  (pending extubation)  Discharge Equipment Recommendations:   (pending extubation)  Barriers to discharge:  None    Assessment:     Leida Hoyos is a 52 y.o. female with a medical diagnosis of SAH (subarachnoid hemorrhage).  She presents with performance deficits affecting function: weakness, abnormal tone, decreased ROM, impaired cognition, impaired endurance, impaired sensation, decreased coordination, impaired coordination, impaired fine motor, decreased upper extremity function, impaired self care skills, impaired functional mobility, decreased lower extremity function, decreased safety awareness, gait instability, impaired balance, impaired cardiopulmonary response to activity.      Rehab Prognosis: Good; patient would benefit from acute skilled OT services to address these deficits and reach maximum level of function.       Plan:     Patient to be seen 3 x/week to address the above listed problems via sensory integration, cognitive retraining, neuromuscular re-education, therapeutic exercises, therapeutic activities, self-care/home management  Plan of Care Expires: 02/25/23  Plan of Care Reviewed with: patient    Subjective     Patient:  Nonverbal; intubated    Occupational Profile:  Patient resides in [place] with her  with her  in one story home with one step to enter.  Patient is right handed.  PTA patient independent with ADLs including driving.  Currently owns no DME.  Works: .  Hobbies: reading, dance, music, ballet.    Pain/Comfort:  Pain Rating 1: 0/10  Pain Rating Post-Intervention 1: 0/10    Patients cultural, spiritual, Hinduism conflicts given the current situation: no    Objective:     Communicated with:  Nurse prior to session.  Patient found supine with bed alarm, ventilator, peña catheter, telemetry, peripheral IV, restraints, pulse ox (continuous), pressure relief boots, external ventricular drain, blood pressure cuff, SCD, arterial line upon OT entry to room.    General Precautions: Standard, aspiration, fall, NPO  Orthopedic Precautions: N/A  Braces: N/A  Respiratory Status:  ventilator    Occupational Performance:    Bed Mobility:    Patient completed Rolling/Turning to Left with  dependent  Patient completed Rolling/Turning to Right with dependent    Functional Mobility/Transfers:  Dependent drawsheet transfers    Activities of Daily Living:  Feeding:  NPO    Grooming: total assistance while supine    Cognitive/Visual Perceptual:  Cognitive/Psychosocial Skills:     -       Oriented to: Daily orientation provided   -       Follows Commands/attention:unable to follow commands  -       Communication: nonverbal; intubated    Physical Exam:  Postural examination/scapula alignment:    -       Rounded shoulders  Skin integrity: Visible skin intact  Edema:  None noted  Upper Extremity Range of Motion:     -       Right Upper Extremity: PROM WNL  -       Left Upper Extremity: PROM WNL    AMPAC 6 Click ADL:  AMPAC Total Score: 6    Treatment & Education:  Patient education provided on role of OT.  Daily orientation provided.   PROM performed bilateral UE/LEs one set x 10 rep in all planes of motion with stretches provided at end range; sustained stretch provided for ankle dorsiflexion.  Patient unable to follow commands.  Positioning provided for midline orientation with bilateral UEs elevated and heels lifted off mattress.  Continued education, patient/ family training recommended.      Patient left supine with all lines intact, call button in reach, bed alarm on, and restraints reapplied at end of session    GOALS:   Multidisciplinary Problems       Occupational Therapy Goals          Problem: Occupational Therapy     Goal Priority Disciplines Outcome Interventions   Occupational Therapy Goal     OT, PT/OT Ongoing, Progressing    Description: Goals set  to be addressed for 14 days with expiration date, :  Patient will increase functional independence with ADLs by performing:    Patient will demonstrate rolling to the right with max assist.  Not met   Patient will demonstrate rolling to the left with max assist.   Not met  Patient will demonstrate supine -sit with max  assist.   Not met  Patient will demonstrate stand pivot transfers with max assist.   Not met  Patient will demonstrate grooming while seated with max assist.   Not met  Patient will demonstrate upper body dressing with max assist while seated EOB.   Not met  Patient will demonstrate lower body dressing with max assist while seated EOB.   Not met  Patient will demonstrate toileting with max assist.   Not met  Patient will demonstrate bathing while seated EOB with max assist.   Not met  Patient's family / caregiver will demonstrate independence and safety with assisting patient with self-care skills and functional mobility.     Not met  Patient's family / caregiver will demonstrate independence with providing ROM and changes in bed positioning.   Not met                             History:     Past Medical History:   Diagnosis Date    Autoimmune hepatitis     managed by Dr. Russell on mercaptopurine    Diabetes mellitus type II     Hypertension          Past Surgical History:   Procedure Laterality Date     SECTION, CLASSIC      COLONOSCOPY N/A 2019    Procedure: COLONOSCOPY;  Surgeon: Dipesh Victoria MD;  Location: [facility] ([address] [place] [address]);  Service: Endoscopy;  Laterality: N/A;    FRACTURE SURGERY Left     leg     MOUTH SURGERY         Time Tracking:     OT Date of Treatment: 23  OT Start Time: 0  OT Stop Time: 0430  OT Total Time (min): 20 min    Billable Minutes:Evaluation 10  Neuromuscular Re-education 10    2023

## 2023-01-28 NOTE — PLAN OF CARE
Goals remain appropriate.  Problem: Occupational Therapy  Goal: Occupational Therapy Goal  Description: Goals set 1/28 to be addressed for 14 days with expiration date, 2/11:  Patient will increase functional independence with ADLs by performing:    Patient will demonstrate rolling to the right with max assist.  Not met   Patient will demonstrate rolling to the left with max assist.   Not met  Patient will demonstrate supine -sit with max  assist.   Not met  Patient will demonstrate stand pivot transfers with max assist.   Not met  Patient will demonstrate grooming while seated with max assist.   Not met  Patient will demonstrate upper body dressing with max assist while seated EOB.   Not met  Patient will demonstrate lower body dressing with max assist while seated EOB.   Not met  Patient will demonstrate toileting with max assist.   Not met  Patient will demonstrate bathing while seated EOB with max assist.   Not met  Patient's family / caregiver will demonstrate independence and safety with assisting patient with self-care skills and functional mobility.     Not met  Patient's family / caregiver will demonstrate independence with providing ROM and changes in bed positioning.   Not met        Outcome: Ongoing, Progressing

## 2023-01-28 NOTE — ANESTHESIA POSTPROCEDURE EVALUATION
Anesthesia Post Evaluation    Patient: Leida Hoyos    Procedure(s) Performed: Procedure(s) (LRB):  ANGIOGRAM-CEREBRAL (N/A)    Final Anesthesia Type: general      Patient location during evaluation: ICU  Patient participation: No - Unable to Participate, Intubation  Level of consciousness: sedated  Post-procedure vital signs: reviewed and stable  Pain management: adequate  Airway patency: patent    PONV status at discharge: No PONV  Anesthetic complications: no      Cardiovascular status: hemodynamically stable  Respiratory status: ETT and ventilator  Hydration status: euvolemic  Follow-up not needed.          Vitals Value Taken Time   /53 01/27/23 2331   Temp 36.8 °C (98.3 °F) 01/27/23 2302   Pulse 92 01/27/23 2333   Resp 16 01/27/23 2333   SpO2 100 % 01/27/23 2333   Vitals shown include unvalidated device data.      No case tracking events are documented in the log.      Pain/Yin Score: No data recorded

## 2023-01-28 NOTE — PROGRESS NOTES
[facility]  Neurosurgery  Progress Note    Subjective:     History of Present Illness: 51 yo F with PMH of DM1, autoimmune hepatitis, and HTN who presents to ED this am after confusion this am. She was LKN last night and became confused after showering this am so  brought to the ED. She quickly decompensated in the ER and was not following commands or waking to stimulation. MRI and CTA showed diffuse thick SAH and hydrocephalus. No identifiable aneurysm seen on CTA.       Post-Op Info:  Procedure(s) (LRB):  ANGIOGRAM-CEREBRAL (N/A)   1 Day Post-Op     Interval History: 1/28: PBD 1. grossly stable on exam on propofol. EVD drained 23cc, ICPs wnl.             Review of Systems   Unable to perform ROS: Patient unresponsive   Objective:     Weight: 61.2 kg (135 lb)  Body mass index is 24.69 kg/m².  Vital Signs (Most Recent):  Temp: 98 °F (36.7 °C) (01/28/23 0302)  Pulse: 77 (01/28/23 0700)  Resp: 16 (01/28/23 0700)  BP: (!) 113/56 (01/28/23 0700)  SpO2: 100 % (01/28/23 0700)   Vital Signs (24h Range):  Temp:  [97.9 °F (36.6 °C)-98.8 °F (37.1 °C)] 98 °F (36.7 °C)  Pulse:  [] 77  Resp:  [10-35] 16  SpO2:  [94 %-100 %] 100 %  BP: (101-191)/(50-82) 113/56  Arterial Line BP: (113-145)/(50-66) 118/50                  Vent Mode: A/C  Oxygen Concentration (%):  [30-50] 30  Resp Rate Total:  [16 br/min-27 br/min] 16 br/min  Vt Set:  [400 mL-420 mL] 420 mL  PEEP/CPAP:  [5 cmH20] 5 cmH20  Mean Airway Pressure:  [8 cmH20-9.4 cmH20] 8 cmH20         Urethral Catheter 01/27/23 1509 (Active)   $ Monzon Insertion Bedside Insertion Performed 01/27/23 1601   Site Assessment Clean;Intact 01/27/23 1501   Collection Container Urimeter 01/27/23 1501   Securement Method secured to top of thigh w/ adhesive device 01/27/23 1501   Catheter Care Performed yes 01/27/23 1501   Reason for Continuing Urinary Catheterization Critically ill in ICU and requiring hourly monitoring of intake/output 01/27/23 1501   CAUTI  "Prevention Bundle Securement Device in place with 1" slack;Intact seal between catheter & drainage tubing;Drainage bag/urimeter off the floor;Sheeting clip in use;No dependent loops or kinks;Drainage bag/urimeter not overfilled (<2/3 full);Drainage bag/urimeter below bladder 01/27/23 1501   Output (mL) 250 mL 01/27/23 1700            ICP/Ventriculostomy 01/27/23 1400 (Active)   $ ICP/Ventriculostomy [place] Placement Bedside Insertion Performed 01/27/23 1501   Level of Ventriculostomy (cm above) 20 01/27/23 1501   Status Open to drainage 01/27/23 1501   Site Assessment Clean;Dry 01/27/23 1501   Site Drainage No drainage 01/27/23 1501   Waveform normal waveform 01/27/23 1501   Output (mL) 0 mL 01/27/23 1700   CSF Color clear 01/27/23 1501   Dressing Status Clean;Dry;Intact 01/27/23 1501   Interventions HOB degrees;bed controls locked;zeroed;level adjusted per order 01/27/23 1501       Physical Exam  Constitutional:       Appearance: She is well-developed and well-nourished.   Eyes:      Conjunctiva/sclera: Conjunctivae normal.   Cardiovascular:      Pulses: Normal pulses.   Abdominal:      Palpations: Abdomen is soft.   Neurological:      Comments: E1VTM5, on propfol  Pupils 3mm and reactive  CN difficult to assess given mental status, non focal  BUE WD minimally  BLE some spontaneous movement, WD to pain    EVD incision cdi   Psychiatric:      Comments: unresponsive           Significant Labs:  Recent Labs   Lab 01/27/23  0949 01/28/23  0005   * 233*   * 137   K 4.6 3.8    107   CO2 19* 18*   BUN 14 10   CREATININE 0.9 0.8   CALCIUM 9.5 8.8   MG  --  2.0       Recent Labs   Lab 01/27/23  0949 01/28/23  0005   WBC 11.53 9.98   HGB 13.2 12.3   HCT 41.3 37.1    217       Recent Labs   Lab 01/27/23  0949 01/27/23  0955   INR 1.0 1.2       Microbiology Results (last 7 days)       ** No results found for the last 168 hours. **          All pertinent labs from the last 24 hours have been " reviewed.    Significant Diagnostics:  I have reviewed all pertinent imaging results/findings within the past 24 hours.    Assessment/Plan:     * SAH (subarachnoid hemorrhage)  51 yo F with PMH of DM1, autoimmune hepatitis, and HTN who presents to ED and was found to have diffuse SAH and hydrocephalus    - admit to [facility]  - q1 neuro checks  - all labs and diagnostics reviewed   - MRI/CTA showed diffuse SAH and small volume IVH with hydrocephalus, no identifiable aneurysm   - CTH repeat with EVD in good position   - CTA without identifiable aneurysm   - DSA 1/27 without any identifiable aneurysm   - MRI brain/C spine and MRI vessel wall imaging pending   - will need repeat DSA on PBD 7  - EVD open at 20, abx while in place  - SAH protocol   - daily TCDs x 14 days   - keppra 500mg x 7 days   - goal euvolemia to 1L positive  - Na >135, hypertonics as needed  - elevate HOB  - SBP <140  - intubated for airway protection, WTE when/if appropriate  - NPO  - ok for SQH    Dispo: ongoing        Tatiana Allison MD  Neurosurgery  [facility]

## 2023-01-28 NOTE — SUBJECTIVE & OBJECTIVE
"Interval History: 1/28: PBD 1. grossly stable on exam on propofol. EVD drained 23cc, ICPs wnl.             Review of Systems   Unable to perform ROS: Patient unresponsive   Objective:     Weight: 61.2 kg (135 lb)  Body mass index is 24.69 kg/m².  Vital Signs (Most Recent):  Temp: 98 °F (36.7 °C) (01/28/23 0302)  Pulse: 77 (01/28/23 0700)  Resp: 16 (01/28/23 0700)  BP: (!) 113/56 (01/28/23 0700)  SpO2: 100 % (01/28/23 0700)   Vital Signs (24h Range):  Temp:  [97.9 °F (36.6 °C)-98.8 °F (37.1 °C)] 98 °F (36.7 °C)  Pulse:  [] 77  Resp:  [10-35] 16  SpO2:  [94 %-100 %] 100 %  BP: (101-191)/(50-82) 113/56  Arterial Line BP: (113-145)/(50-66) 118/50                  Vent Mode: A/C  Oxygen Concentration (%):  [30-50] 30  Resp Rate Total:  [16 br/min-27 br/min] 16 br/min  Vt Set:  [400 mL-420 mL] 420 mL  PEEP/CPAP:  [5 cmH20] 5 cmH20  Mean Airway Pressure:  [8 cmH20-9.4 cmH20] 8 cmH20         Urethral Catheter 01/27/23 1509 (Active)   $ Monzon Insertion Bedside Insertion Performed 01/27/23 1601   Site Assessment Clean;Intact 01/27/23 1501   Collection Container Urimeter 01/27/23 1501   Securement Method secured to top of thigh w/ adhesive device 01/27/23 1501   Catheter Care Performed yes 01/27/23 1501   Reason for Continuing Urinary Catheterization Critically ill in ICU and requiring hourly monitoring of intake/output 01/27/23 1501   CAUTI Prevention Bundle Securement Device in place with 1" slack;Intact seal between catheter & drainage tubing;Drainage bag/urimeter off the floor;Sheeting clip in use;No dependent loops or kinks;Drainage bag/urimeter not overfilled (<2/3 full);Drainage bag/urimeter below bladder 01/27/23 1501   Output (mL) 250 mL 01/27/23 1700            ICP/Ventriculostomy 01/27/23 1400 (Active)   $ ICP/Ventriculostomy [place] Placement Bedside Insertion Performed 01/27/23 1501   Level of Ventriculostomy (cm above) 20 01/27/23 1501   Status Open to drainage 01/27/23 1501   Site Assessment Clean;Dry " 01/27/23 1501   Site Drainage No drainage 01/27/23 1501   Waveform normal waveform 01/27/23 1501   Output (mL) 0 mL 01/27/23 1700   CSF Color clear 01/27/23 1501   Dressing Status Clean;Dry;Intact 01/27/23 1501   Interventions HOB degrees;bed controls locked;zeroed;level adjusted per order 01/27/23 1501       Physical Exam  Constitutional:       Appearance: She is well-developed and well-nourished.   Eyes:      Conjunctiva/sclera: Conjunctivae normal.   Cardiovascular:      Pulses: Normal pulses.   Abdominal:      Palpations: Abdomen is soft.   Neurological:      Comments: E1VTM5, on propfol  Pupils 3mm and reactive  CN difficult to assess given mental status, non focal  BUE WD minimally  BLE some spontaneous movement, WD to pain    EVD incision cdi   Psychiatric:      Comments: unresponsive           Significant Labs:  Recent Labs   Lab 01/27/23  0949 01/28/23  0005   * 233*   * 137   K 4.6 3.8    107   CO2 19* 18*   BUN 14 10   CREATININE 0.9 0.8   CALCIUM 9.5 8.8   MG  --  2.0       Recent Labs   Lab 01/27/23  0949 01/28/23  0005   WBC 11.53 9.98   HGB 13.2 12.3   HCT 41.3 37.1    217       Recent Labs   Lab 01/27/23  0949 01/27/23  0955   INR 1.0 1.2       Microbiology Results (last 7 days)       ** No results found for the last 168 hours. **          All pertinent labs from the last 24 hours have been reviewed.    Significant Diagnostics:  I have reviewed all pertinent imaging results/findings within the past 24 hours.

## 2023-01-28 NOTE — SUBJECTIVE & OBJECTIVE
Interval History:  IR performed angiogram shows no aneurysm, AVM or AV fistula. EVD open @20. Will wean propofol and transition to precedex. Started tube feeds and SQ heparin. Transitioned from insulin gtt to detemir.     Review of Systems   Unable to perform ROS: Intubated     Objective:     Vitals:  Temp: 98.2 °F (36.8 °C)  Pulse: 72  Rhythm: normal sinus rhythm  BP: (!) 115/59  MAP (mmHg): 83  ICP Mean (mmHg): 15 mmHg  Resp: 16  SpO2: 100 %  Oxygen Concentration (%): 30  Vent Mode: A/C  Set Rate: 16 BPM  Vt Set: 420 mL  PEEP/CPAP: 5 cmH20  Peak Airway Pressure: 24 cmH20  Mean Airway Pressure: 8.3 cmH20  Plateau Pressure: 0 cmH20    Temp  Min: 98 °F (36.7 °C)  Max: 98.3 °F (36.8 °C)  Pulse  Min: 72  Max: 122  BP  Min: 101/55  Max: 187/81  MAP (mmHg)  Min: 73  Max: 116  ICP Mean (mmHg)  Min: 4 mmHg  Max: 18 mmHg  Resp  Min: 10  Max: 30  SpO2  Min: 95 %  Max: 100 %  Oxygen Concentration (%)  Min: 30  Max: 50    01/27 0701 - 01/28 0700  In: 1478.4 [I.V.:579.7]  Out: 2308 [Urine:2285; Drains:23]   Unmeasured Output  Stool Occurrence: 0       Physical Exam  Vitals and nursing note reviewed.   Constitutional:       Appearance: She is well-developed.      Comments: intubated   HENT:      Head: Normocephalic.   Eyes:      General: No scleral icterus.        Right eye: No discharge.         Left eye: No discharge.      Conjunctiva/sclera: Conjunctivae normal.   Cardiovascular:      Rate and Rhythm: Normal rate and regular rhythm.      Pulses: Normal pulses.      Heart sounds: Normal heart sounds. No murmur heard.  Pulmonary:      Effort: Pulmonary effort is normal. No respiratory distress.      Breath sounds: Normal breath sounds.   Chest:      Chest wall: No tenderness.   Abdominal:      General: Abdomen is flat. There is no distension.      Palpations: Abdomen is soft.      Tenderness: There is no abdominal tenderness.   Musculoskeletal:      Right lower leg: No edema.      Left lower leg: No edema.   Skin:     General:  Skin is warm.      Findings: No bruising or rash.      Comments: R groin site C/D/I without hematoma.    Neurological:      Comments: E2 V1 M5  GCS 7  EVD open @ 20  Intubated. On propofol, Insulin drip. B/L Fixed pupils. Spontaneuously mnoves all extremities. + cough, gag, and corneal reflex. No facial droop.   Intubated shortly after with concerns for airway protection.        Psychiatric:      Comments: unresponsive     Unable to test orientation, language, memory, hearing, gait due to level of consciousness.    Medications:  Continuousdexmedetomidine (PRECEDEX) infusion  niCARdipine, Last Rate: Stopped (01/28/23 0506)  propofoL, Last Rate: 50 mcg/kg/min (01/28/23 0619)    ScheduledceFAZolin (ANCEF) IVPB, 2 g, Q8H  heparin (porcine), 5,000 Units, Q8H  insulin detemir U-100, 6 Units, BID  levetiracetam IV, 500 mg, Q12H  mupirocin, , BID  niMODipine, 60 mg, Q4H  potassium, sodium phosphates, 2 packet, Q4H    PRNdextrose 10%, 12.5 g, PRN  fentaNYL, 50 mcg, Q2H PRN  glucagon (human recombinant), 1 mg, PRN  hydrALAZINE, 10 mg, Q6H PRN  insulin aspart U-100, 1-10 Units, Q6H PRN  labetalol, 10 mg, Q4H PRN  ondansetron, 4 mg, Q6H PRN  sodium chloride 0.9%, 10 mL, PRN  sodium chloride 0.9%, 10 mL, PRN      Today I personally reviewed pertinent medications, lines/drains/airways, imaging, laboratory results, microbiology results, notably:    Diet  Diet NPO  Diet NPO

## 2023-01-28 NOTE — ASSESSMENT & PLAN NOTE
53 yo F with PMH of DM1, autoimmune hepatitis, and HTN who presents to ED and was found to have diffuse SAH and hydrocephalus    - admit to [facility]CC  - q1 neuro checks  - all labs and diagnostics reviewed   - MRI/CTA showed diffuse SAH and small volume IVH with hydrocephalus, no identifiable aneurysm   - CTH repeat with EVD in good position   - CTA without identifiable aneurysm   - DSA 1/27 without any identifiable aneurysm   - MRI brain/C spine and MRI vessel wall imaging pending   - will need repeat DSA on PBD 7  - EVD open at 20, abx while in place  - SAH protocol   - daily TCDs x 14 days   - keppra 500mg x 7 days   - goal euvolemia to 1L positive  - Na >135, hypertonics as needed  - elevate HOB  - SBP <140  - intubated for airway protection, WTE when/if appropriate  - NPO  - ok for SQH    Dispo: ongoing

## 2023-01-28 NOTE — HPI
53 yo F with PMH of DM1, autoimmune hepatitis, and HTN who presents to ED this am after confusion this am. She was LKN last night and became confused after showering this am so  brought to the ED. She quickly decompensated in the ER and was not following commands or waking to stimulation. MRI and CTA showed diffuse thick SAH and hydrocephalus. No identifiable aneurysm seen on CTA.

## 2023-01-28 NOTE — CONSULTS
"[facility]  Neurosurgery  Consult Note    Inpatient consult to Neurosurgery  Consult performed by: Tatiana Allison MD  Consult ordered by: Anoop Esquivel DO      Subjective:     Chief Complaint/Reason for Admission: confusion/AMS    History of Present Illness: 51 yo F with PMH of DM1, autoimmune hepatitis, and HTN who presents to ED this am after confusion this am. She was LKN last night and became confused after showering this am so  brought to the ED. She quickly decompensated in the ER and was not following commands or waking to stimulation. MRI and CTA showed diffuse thick SAH and hydrocephalus. No identifiable aneurysm seen on CTA.       Medications Prior to Admission   Medication Sig Dispense Refill Last Dose    aspirin (ECOTRIN) 81 MG EC tablet Take 81 mg by mouth once daily.       blood sugar diagnostic Strp For one touch verio IQ meter 100 each 3     blood-glucose meter (ONETOUCH VERIO IQ METER MISC) OneTouch Verio IQ Meter       blood-glucose sensor (DEXCOM G6 SENSOR) Melissa Monitor blood sugars daily 9 each 3     blood-glucose transmitter (DEXCOM G6 TRANSMITTER) Melissa USE AS DIRECTED for daily use TO TEST BLOOD GLUCOSE. 1 each 4     cholecalciferol, vitamin D3, (VITAMIN D3) 1,000 unit capsule Take 1 capsule (1,000 Units total) by mouth once daily.  0     insulin (LANTUS SOLOSTAR U-100 INSULIN) glargine 100 units/mL (3mL) SubQ pen INJECT 13 UNITS UNDER THE SKIN EVERY EVENING 45 mL 3     insulin lispro (HUMALOG KWIKPEN INSULIN) 100 unit/mL pen Takes 5 units/7 units and 9 units before meals, plus correction for a TDD 25 units 45 mL 3     losartan (COZAAR) 25 MG tablet Take 1 tablet (25 mg total) by mouth once daily. 90 tablet 3     multivitamin (THERAGRAN) per tablet Take 1 tablet by mouth once daily.       ONETOUCH DELICA PLUS LANCET 33 gauge Misc USE AS DIRECTED TO TEST BLOOD GLUCOSE. 300 each 8     pen needle, diabetic (BD ULTRA-FINE NICK PEN NEEDLE) 32 gauge x 5/32" Ndle USE " FOUR TIMES A DAY FOR INSULIN INJECTIONS 360 each 4     pravastatin (PRAVACHOL) 10 MG tablet Take 1 tablet (10 mg total) by mouth once daily. 90 tablet 3        Review of patient's allergies indicates:  No Known Allergies    Past Medical History:   Diagnosis Date    Autoimmune hepatitis     managed by Dr. Russell on mercaptopurine    Diabetes mellitus type II     Hypertension      Past Surgical History:   Procedure Laterality Date     SECTION, CLASSIC      COLONOSCOPY N/A 2019    Procedure: COLONOSCOPY;  Surgeon: Dipesh Victoria MD;  Location: [facility] ([address] [place] [address]);  Service: Endoscopy;  Laterality: N/A;    FRACTURE SURGERY Left 2013    leg     MOUTH SURGERY       Family History       Problem Relation (Age of Onset)    Alcohol abuse Maternal Grandfather, Maternal Grandmother    Arthritis Mother    Asthma Father    Diabetes Maternal Grandfather, Paternal Grandfather    Heart attack Father (60)    Heart disease Father    Hyperlipidemia Father    Hypertension Father, Sister    Learning disabilities Daughter, Son    No Known Problems Son, Sister, Daughter    Ulcerative colitis Mother          Tobacco Use    Smoking status: Never    Smokeless tobacco: Never   Substance and Sexual Activity    Alcohol use: Yes     Alcohol/week: 2.0 standard drinks     Types: 2 Glasses of wine per week     Comment: social    Drug use: Never    Sexual activity: Yes     Partners: Male     Birth control/protection: Partner-Vasectomy     Review of Systems   Unable to perform ROS: Patient unresponsive   Objective:     Weight: 61.2 kg (135 lb)  Body mass index is 24.69 kg/m².  Vital Signs (Most Recent):  Temp: 98.3 °F (36.8 °C) (23 1501)  Pulse: 94 (23 1701)  Resp: 16 (23 1701)  BP: (!) 134/56 (23 1701)  SpO2: 100 % (23 1701)   Vital Signs (24h Range):  Temp:  [97.9 °F (36.6 °C)-98.8 °F (37.1 °C)] 98.3 °F (36.8 °C)  Pulse:  [] 94  Resp:  [16-35] 16  SpO2:  [94 %-100 %] 100 %  BP:  "(118-191)/(56-82) 134/56     Date 01/27/23 0700 - 01/28/23 0659   Shift 2923-1511 8229-8411 6589-3227 24 Hour Total   INTAKE   I.V.(mL/kg)  158.6(2.6)  158.6(2.6)   IV Piggyback  49.2  49.2   Shift Total(mL/kg)  207.8(3.4)  207.8(3.4)   OUTPUT   Urine(mL/kg/hr)  775  775   Drains  0  0   Shift Total(mL/kg)  775(12.7)  775(12.7)   Weight (kg) 61.2 61.2 61.2 61.2              Vent Mode: A/C  Oxygen Concentration (%):  [40-50] 40  Resp Rate Total:  [16 br/min-27 br/min] 16 br/min  Vt Set:  [400 mL-420 mL] 420 mL  PEEP/CPAP:  [5 cmH20] 5 cmH20  Mean Airway Pressure:  [8.9 cmH20-9.4 cmH20] 9 cmH20         Urethral Catheter 01/27/23 1509 (Active)   $ Monzon Insertion Bedside Insertion Performed 01/27/23 1601   Site Assessment Clean;Intact 01/27/23 1501   Collection Container Urimeter 01/27/23 1501   Securement Method secured to top of thigh w/ adhesive device 01/27/23 1501   Catheter Care Performed yes 01/27/23 1501   Reason for Continuing Urinary Catheterization Critically ill in ICU and requiring hourly monitoring of intake/output 01/27/23 1501   CAUTI Prevention Bundle Securement Device in place with 1" slack;Intact seal between catheter & drainage tubing;Drainage bag/urimeter off the floor;Sheeting clip in use;No dependent loops or kinks;Drainage bag/urimeter not overfilled (<2/3 full);Drainage bag/urimeter below bladder 01/27/23 1501   Output (mL) 250 mL 01/27/23 1700            ICP/Ventriculostomy 01/27/23 1400 (Active)   $ ICP/Ventriculostomy [place] Placement Bedside Insertion Performed 01/27/23 1501   Level of Ventriculostomy (cm above) 20 01/27/23 1501   Status Open to drainage 01/27/23 1501   Site Assessment Clean;Dry 01/27/23 1501   Site Drainage No drainage 01/27/23 1501   Waveform normal waveform 01/27/23 1501   Output (mL) 0 mL 01/27/23 1700   CSF Color clear 01/27/23 1501   Dressing Status Clean;Dry;Intact 01/27/23 1501   Interventions HOB degrees;bed controls locked;zeroed;level adjusted per order 01/27/23 " 1501       Physical Exam  Constitutional:       Appearance: She is well-developed and well-nourished.   Eyes:      Conjunctiva/sclera: Conjunctivae normal.   Cardiovascular:      Pulses: Normal pulses.   Abdominal:      Palpations: Abdomen is soft.   Neurological:      Comments: E1V1M5  Pupils 3mm and sluggish  CN difficult to assess given mental status  Localizes x4 to deep stimulation   Psychiatric:      Comments: unresponsive       Physical Exam:    Constitutional: She appears well-developed and well-nourished.     Eyes: Conjunctivae are normal.     Cardiovascular: Normal pulses.     Abdominal: Soft.     Psych/Behavior:   unresponsive     Neurological:   E1V1M5  Pupils 3mm and sluggish  CN difficult to assess given mental status  Localizes x4 to deep stimulation     Significant Labs:  Recent Labs   Lab 01/27/23  0949   *   *   K 4.6      CO2 19*   BUN 14   CREATININE 0.9   CALCIUM 9.5     Recent Labs   Lab 01/27/23  0949   WBC 11.53   HGB 13.2   HCT 41.3        Recent Labs   Lab 01/27/23  0949 01/27/23  0955   INR 1.0 1.2     Microbiology Results (last 7 days)       ** No results found for the last 168 hours. **          All pertinent labs from the last 24 hours have been reviewed.    Significant Diagnostics:  I have reviewed all pertinent imaging results/findings within the past 24 hours.    Assessment/Plan:     * SAH (subarachnoid hemorrhage)  51 yo F with PMH of DM1, autoimmune hepatitis, and HTN who presents to ED and was found to have diffuse SAH and hydrocephalus    - admit to NCC  - q1 neuro checks  - all labs and diagnostics reviewed   - MRI/CTA showed diffuse SAH and small volume IVH with hydrocephalus, no identifiable aneurysm   - CTH repeat with EVD in good position  - EVD open at 20, abx while in place  - IR consulted for angiogram for further workukp/treatment  - SAH protocol   - daily TCDs x 14 days   - keppra 500mg x 7 days   - goal euvolemia to 1L positive  - Na >135,  hypertonics as needed  - elevate HOB  - SBP <140  - intubated for airway protection  - NPO  - hold AC/AP at this time    Dispo: ongoing        Thank you for your consult. I will follow-up with patient. Please contact us if you have any additional questions.    Tatiana Allison MD  Neurosurgery  [facility]

## 2023-01-28 NOTE — NURSING
Pt returned from IR to room 9068 Intubated on propofol / cardene/ insulin gtts, R  EVD in place open @ 20. Pupils 2 and fixed, R and L corneal intact, cough intact, no gag. Bilateral upper extremities extensor posturing to pain, Bilateral lower extremities withdrawing to pain / intermittent spontaneous noted. R groin site CDI, soft no hematoma noted, distal pulses 2+.

## 2023-01-29 LAB
ALBUMIN SERPL BCP-MCNC: 3.4 G/DL (ref 3.5–5.2)
ALLENS TEST: ABNORMAL
ALP SERPL-CCNC: 112 U/L (ref 55–135)
ALT SERPL W/O P-5'-P-CCNC: 27 U/L (ref 10–44)
ANION GAP SERPL CALC-SCNC: 13 MMOL/L (ref 8–16)
AST SERPL-CCNC: 23 U/L (ref 10–40)
BASOPHILS # BLD AUTO: 0.03 K/UL (ref 0–0.2)
BASOPHILS NFR BLD: 0.3 % (ref 0–1.9)
BILIRUB SERPL-MCNC: 0.5 MG/DL (ref 0.1–1)
BUN SERPL-MCNC: 10 MG/DL (ref 6–20)
CALCIUM SERPL-MCNC: 8.7 MG/DL (ref 8.7–10.5)
CHLORIDE SERPL-SCNC: 111 MMOL/L (ref 95–110)
CO2 SERPL-SCNC: 16 MMOL/L (ref 23–29)
CREAT SERPL-MCNC: 0.7 MG/DL (ref 0.5–1.4)
DELSYS: ABNORMAL
DIFFERENTIAL METHOD: ABNORMAL
EOSINOPHIL # BLD AUTO: 0 K/UL (ref 0–0.5)
EOSINOPHIL NFR BLD: 0 % (ref 0–8)
ERYTHROCYTE [DISTWIDTH] IN BLOOD BY AUTOMATED COUNT: 12.7 % (ref 11.5–14.5)
ERYTHROCYTE [SEDIMENTATION RATE] IN BLOOD BY WESTERGREN METHOD: 16 MM/H
EST. GFR  (NO RACE VARIABLE): >60 ML/MIN/1.73 M^2
FIO2: 30
GLUCOSE SERPL-MCNC: 265 MG/DL (ref 70–110)
HCO3 UR-SCNC: 24.1 MMOL/L (ref 24–28)
HCT VFR BLD AUTO: 37.3 % (ref 37–48.5)
HGB BLD-MCNC: 11.7 G/DL (ref 12–16)
IMM GRANULOCYTES # BLD AUTO: 0.02 K/UL (ref 0–0.04)
IMM GRANULOCYTES NFR BLD AUTO: 0.2 % (ref 0–0.5)
LYMPHOCYTES # BLD AUTO: 1.3 K/UL (ref 1–4.8)
LYMPHOCYTES NFR BLD: 12.5 % (ref 18–48)
MAGNESIUM SERPL-MCNC: 2 MG/DL (ref 1.6–2.6)
MCH RBC QN AUTO: 30.4 PG (ref 27–31)
MCHC RBC AUTO-ENTMCNC: 31.4 G/DL (ref 32–36)
MCV RBC AUTO: 97 FL (ref 82–98)
MODE: ABNORMAL
MONOCYTES # BLD AUTO: 1.2 K/UL (ref 0.3–1)
MONOCYTES NFR BLD: 11.6 % (ref 4–15)
NEUTROPHILS # BLD AUTO: 7.7 K/UL (ref 1.8–7.7)
NEUTROPHILS NFR BLD: 75.4 % (ref 38–73)
NRBC BLD-RTO: 0 /100 WBC
PCO2 BLDA: 34.2 MMHG (ref 35–45)
PEEP: 5
PH SMN: 7.46 [PH] (ref 7.35–7.45)
PHOSPHATE SERPL-MCNC: 3.1 MG/DL (ref 2.7–4.5)
PLATELET # BLD AUTO: 208 K/UL (ref 150–450)
PMV BLD AUTO: 8.9 FL (ref 9.2–12.9)
PO2 BLDA: 131 MMHG (ref 80–100)
POC BE: 0 MMOL/L
POC SATURATED O2: 99 % (ref 95–100)
POC TCO2: 25 MMOL/L (ref 23–27)
POCT GLUCOSE: 255 MG/DL (ref 70–110)
POCT GLUCOSE: 279 MG/DL (ref 70–110)
POCT GLUCOSE: 301 MG/DL (ref 70–110)
POTASSIUM SERPL-SCNC: 4 MMOL/L (ref 3.5–5.1)
PROT SERPL-MCNC: 6.5 G/DL (ref 6–8.4)
RBC # BLD AUTO: 3.85 M/UL (ref 4–5.4)
SAMPLE: ABNORMAL
SITE: ABNORMAL
SODIUM SERPL-SCNC: 140 MMOL/L (ref 136–145)
VT: 420
WBC # BLD AUTO: 10.19 K/UL (ref 3.9–12.7)

## 2023-01-29 PROCEDURE — 83735 ASSAY OF MAGNESIUM: CPT

## 2023-01-29 PROCEDURE — 99291 PR CRITICAL CARE, E/M 30-74 MINUTES: ICD-10-PCS | Mod: ,,, | Performed by: INTERNAL MEDICINE

## 2023-01-29 PROCEDURE — 63600175 PHARM REV CODE 636 W HCPCS: Performed by: NURSE PRACTITIONER

## 2023-01-29 PROCEDURE — 25000003 PHARM REV CODE 250: Performed by: INTERNAL MEDICINE

## 2023-01-29 PROCEDURE — 25000003 PHARM REV CODE 250: Performed by: STUDENT IN AN ORGANIZED HEALTH CARE EDUCATION/TRAINING PROGRAM

## 2023-01-29 PROCEDURE — 25000003 PHARM REV CODE 250: Performed by: NURSE PRACTITIONER

## 2023-01-29 PROCEDURE — 99900035 HC TECH TIME PER 15 MIN (STAT)

## 2023-01-29 PROCEDURE — 63600175 PHARM REV CODE 636 W HCPCS

## 2023-01-29 PROCEDURE — 94003 VENT MGMT INPAT SUBQ DAY: CPT

## 2023-01-29 PROCEDURE — 25000003 PHARM REV CODE 250: Performed by: PHYSICIAN ASSISTANT

## 2023-01-29 PROCEDURE — 20000000 HC ICU ROOM

## 2023-01-29 PROCEDURE — 63600175 PHARM REV CODE 636 W HCPCS: Performed by: STUDENT IN AN ORGANIZED HEALTH CARE EDUCATION/TRAINING PROGRAM

## 2023-01-29 PROCEDURE — 63600175 PHARM REV CODE 636 W HCPCS: Performed by: INTERNAL MEDICINE

## 2023-01-29 PROCEDURE — 82800 BLOOD PH: CPT

## 2023-01-29 PROCEDURE — 27000221 HC OXYGEN, UP TO 24 HOURS

## 2023-01-29 PROCEDURE — 85025 COMPLETE CBC W/AUTO DIFF WBC: CPT

## 2023-01-29 PROCEDURE — 99291 CRITICAL CARE FIRST HOUR: CPT | Mod: ,,, | Performed by: INTERNAL MEDICINE

## 2023-01-29 PROCEDURE — 80053 COMPREHEN METABOLIC PANEL: CPT

## 2023-01-29 PROCEDURE — 84100 ASSAY OF PHOSPHORUS: CPT

## 2023-01-29 PROCEDURE — 99900026 HC AIRWAY MAINTENANCE (STAT)

## 2023-01-29 PROCEDURE — 36600 WITHDRAWAL OF ARTERIAL BLOOD: CPT

## 2023-01-29 PROCEDURE — 99233 SBSQ HOSP IP/OBS HIGH 50: CPT | Mod: ,,, | Performed by: NEUROLOGICAL SURGERY

## 2023-01-29 PROCEDURE — 94761 N-INVAS EAR/PLS OXIMETRY MLT: CPT

## 2023-01-29 PROCEDURE — 99233 PR SUBSEQUENT HOSPITAL CARE,LEVL III: ICD-10-PCS | Mod: ,,, | Performed by: NEUROLOGICAL SURGERY

## 2023-01-29 PROCEDURE — 82803 BLOOD GASES ANY COMBINATION: CPT

## 2023-01-29 PROCEDURE — 25000003 PHARM REV CODE 250

## 2023-01-29 PROCEDURE — 27200966 HC CLOSED SUCTION SYSTEM

## 2023-01-29 RX ORDER — GLUCAGON 1 MG
1 KIT INJECTION
Status: DISCONTINUED | OUTPATIENT
Start: 2023-01-29 | End: 2023-01-31

## 2023-01-29 RX ORDER — INSULIN ASPART 100 [IU]/ML
1-10 INJECTION, SOLUTION INTRAVENOUS; SUBCUTANEOUS EVERY 4 HOURS PRN
Status: DISCONTINUED | OUTPATIENT
Start: 2023-01-29 | End: 2023-01-31

## 2023-01-29 RX ADMIN — INSULIN ASPART 3 UNITS: 100 INJECTION, SOLUTION INTRAVENOUS; SUBCUTANEOUS at 10:01

## 2023-01-29 RX ADMIN — LABETALOL HYDROCHLORIDE 10 MG: 5 INJECTION, SOLUTION INTRAVENOUS at 05:01

## 2023-01-29 RX ADMIN — FENTANYL CITRATE 50 MCG: 50 INJECTION INTRAMUSCULAR; INTRAVENOUS at 04:01

## 2023-01-29 RX ADMIN — NIMODIPINE 60 MG: 60 SOLUTION ORAL at 02:01

## 2023-01-29 RX ADMIN — CEFAZOLIN 2 G: 2 INJECTION, POWDER, FOR SOLUTION INTRAMUSCULAR; INTRAVENOUS at 08:01

## 2023-01-29 RX ADMIN — ACETAMINOPHEN 650 MG: 325 TABLET ORAL at 05:01

## 2023-01-29 RX ADMIN — HEPARIN SODIUM 5000 UNITS: 5000 INJECTION INTRAVENOUS; SUBCUTANEOUS at 10:01

## 2023-01-29 RX ADMIN — HEPARIN SODIUM 5000 UNITS: 5000 INJECTION INTRAVENOUS; SUBCUTANEOUS at 02:01

## 2023-01-29 RX ADMIN — DEXMEDETOMIDINE HYDROCHLORIDE 0.4 MCG/KG/HR: 4 INJECTION, SOLUTION INTRAVENOUS at 07:01

## 2023-01-29 RX ADMIN — HEPARIN SODIUM 5000 UNITS: 5000 INJECTION INTRAVENOUS; SUBCUTANEOUS at 05:01

## 2023-01-29 RX ADMIN — FENTANYL CITRATE 50 MCG: 50 INJECTION INTRAMUSCULAR; INTRAVENOUS at 09:01

## 2023-01-29 RX ADMIN — INSULIN DETEMIR 4 UNITS: 100 INJECTION, SOLUTION SUBCUTANEOUS at 12:01

## 2023-01-29 RX ADMIN — LEVETIRACETAM 500 MG: 100 INJECTION, SOLUTION INTRAVENOUS at 10:01

## 2023-01-29 RX ADMIN — LEVETIRACETAM 500 MG: 100 INJECTION, SOLUTION INTRAVENOUS at 09:01

## 2023-01-29 RX ADMIN — MUPIROCIN: 20 OINTMENT TOPICAL at 10:01

## 2023-01-29 RX ADMIN — FENTANYL CITRATE 50 MCG: 50 INJECTION INTRAMUSCULAR; INTRAVENOUS at 06:01

## 2023-01-29 RX ADMIN — NIMODIPINE 60 MG: 60 SOLUTION ORAL at 06:01

## 2023-01-29 RX ADMIN — CEFAZOLIN 2 G: 2 INJECTION, POWDER, FOR SOLUTION INTRAMUSCULAR; INTRAVENOUS at 05:01

## 2023-01-29 RX ADMIN — FENTANYL CITRATE 50 MCG: 50 INJECTION INTRAMUSCULAR; INTRAVENOUS at 11:01

## 2023-01-29 RX ADMIN — INSULIN ASPART 3 UNITS: 100 INJECTION, SOLUTION INTRAVENOUS; SUBCUTANEOUS at 03:01

## 2023-01-29 RX ADMIN — INSULIN DETEMIR 6 UNITS: 100 INJECTION, SOLUTION SUBCUTANEOUS at 09:01

## 2023-01-29 RX ADMIN — INSULIN ASPART 8 UNITS: 100 INJECTION, SOLUTION INTRAVENOUS; SUBCUTANEOUS at 11:01

## 2023-01-29 RX ADMIN — SODIUM CHLORIDE 1000 ML: 9 INJECTION, SOLUTION INTRAVENOUS at 11:01

## 2023-01-29 RX ADMIN — NIMODIPINE 60 MG: 60 SOLUTION ORAL at 10:01

## 2023-01-29 RX ADMIN — MUPIROCIN: 20 OINTMENT TOPICAL at 09:01

## 2023-01-29 NOTE — PLAN OF CARE
[facility] Care Plan    POC reviewed with Leida Hoyos and family at 0300. Pt unable to verbalize understanding due to ETT. Questions and concerns addressed with spouse. No acute events overnight. Pt progressing toward goals. Will continue to monitor. See below and flowsheets for full assessment and VS info.     -TCD daily  -1L NS bolus given  -x4 BM  -Repeat angio plan 2/3  -EVD open at 20, ICP 9-17, CSF clear/yellow, output 1-10/hr        Is this a stroke patient? yes- Stroke booklet reviewed with family, risk factors identified for patient and stroke booklet remains at bedside for ongoing education.     Neuro:  [place] Coma Scale  Best Eye Response: 3-->(E3) to speech  Best Motor Response: 5-->(M5) localizes pain  Best Verbal Response: 1-->(V1) none  [place] Coma Scale Score: 9  Assessment Qualifiers: no eye obstruction present, patient intubated  Pupil PERRLA: yes     24hr Temp:  [98 °F (36.7 °C)-100 °F (37.8 °C)]     CV:   Rhythm: normal sinus rhythm  BP goals:   SBP < 140  MAP > 65    Resp:      Vent Mode: A/C  Set Rate: 16 BPM  Oxygen Concentration (%): 30  Vt Set: 420 mL  PEEP/CPAP: 5 cmH20    Plan: wean to extubate    GI/:     Diet/Nutrition Received: tube feeding, NPO  Last Bowel Movement: 01/29/23  Voiding Characteristics: urethral catheter (bladder)    Intake/Output Summary (Last 24 hours) at 1/29/2023 0320  Last data filed at 1/29/2023 0302  Gross per 24 hour   Intake 230.51 ml   Output 1619 ml   Net -1388.49 ml     Unmeasured Output  Stool Occurrence: 2    Labs/Accuchecks:  Recent Labs   Lab 01/29/23  0210   WBC 10.19   RBC 3.85*   HGB 11.7*   HCT 37.3         Recent Labs   Lab 01/29/23  0210      K 4.0   CO2 16*   *   BUN 10   CREATININE 0.7   ALKPHOS 112   ALT 27   AST 23   BILITOT 0.5      Recent Labs   Lab 01/27/23  0955   INR 1.2      Recent Labs   Lab 01/27/23  0949   TROPONINI <0.006       Electrolytes: N/A - electrolytes WDL  Accuchecks: Q6H    Gtts:   dexmedetomidine  (PRECEDEX) infusion Stopped (01/28/23 1926)    niCARdipine Stopped (01/28/23 0506)       LDA/Wounds:  Lines/Drains/Airways       Drain  Duration                  NG/OG Tube 01/27/23 Center mouth 2 days         ICP/Ventriculostomy 01/27/23 1400 1 day         Urethral Catheter 01/27/23 1509 1 day              Airway  Duration                  Airway - Non-Surgical 01/27/23 1314 1 day              Epidural Line  Duration                  Perineural Analgesia/Anesthesia Assessment (using dermatomes) Epidural 04/29/13 0852 3561 days              Peripheral Intravenous Line  Duration                  Peripheral IV - Single Lumen 01/27/23 0945 20 G Right Antecubital 1 day         Peripheral IV - Single Lumen 01/27/23 1646 18 G Anterior;Right Upper Arm 1 day         Peripheral IV - Single Lumen 01/28/23 1400 20 G;1 3/4 in Anterior;Left Forearm <1 day                  Wounds: No  Wound care consulted: No

## 2023-01-29 NOTE — ASSESSMENT & PLAN NOTE
51 yo F w/ PMH DM1, HTN presented to the ED with AMS after HA and vomiting the night before. Patient was non-verbal, B/l fixed pupils, and only intermittently following commands in B/L UE. GCS 8. MRI shows Acute IVH, SAH w/ obstructive hydrocephalus w/o signs of ischemia and R A2 QUIQUE aneurysm. VN consulted. Patient intubated in ED after concern for airway protection. NSGY placed EVD. IR consulted for possible angiogram. CTA shows diffuse subarachnoid hemorrhage with moderate hydrocephalus. No intracranial aneurysm or AVM is identified. NIHSS 9 on admission.     -Admit to [facility] for HLOC  -Q1H neuro checks and vitals  -NSGY following; appreciate recs  -EVD placed. Open @20- monitor ICPs and output  -Ancef for drain ppx  -IR angiogram on 1/27 shows no aneurysm, AVM, or AV fistula.    -Will need repeat angiogram on 2/3; will discuss with IR   -VN following  -CTA shows diffuse subarachnoid hemorrhage with moderate hydrocephalus. No intracranial aneurysm or AVM is identified.  -CT Head stable after EVD placed  -Nimotop 60 q4h  -Daily TCDs  -Echo   -CBC, CMP, Mag, Phos now and then daily   -SBP goal <140  -TF @40 cc/hr  -Cardene gtt, wean as able   -PRN labetalol, hydralazine  - SQ heparin  -PT/OT/SLP

## 2023-01-29 NOTE — ASSESSMENT & PLAN NOTE
Vent Mode: Spont  Oxygen Concentration (%):  [30] 30  Resp Rate Total:  [16 br/min-27 br/min] 23 br/min  Vt Set:  [420 mL] 420 mL  PEEP/CPAP:  [5 cmH20] 5 cmH20  Pressure Support:  [7 cmH20] 7 cmH20  Mean Airway Pressure:  [7.4 aeB87-30 cmH20] 7.7 cmH20     Patient intubated in ED 2/2 concern for airway protection. GCS 7  Currently on propofol. Will wean propofol and transition to precedex if needed.    -Daily ABG  -RT following  -CXR; ABG if respiratory status worsens    Recent Labs     01/29/23  0406   PH 7.456*   PCO2 34.2*   PO2 131*   HCO3 24.1   POCSATURATED 99   BE 0

## 2023-01-29 NOTE — PROGRESS NOTES
[facility]  Neurocritical Care  Progress Note    Admit Date: 1/27/2023  Service Date: 01/29/2023  Length of Stay: 2    Subjective:     Chief Complaint: SAH (subarachnoid hemorrhage)    History of Present Illness: Mrs. Gupta 57 yo F w/ PMH DM1 (dx 2013), HTN, autoimmune hepatitis presented to the ED for AMS this morning where she was unable to answer questions appropriately after a shower.  states patient had a HA and vomited the night before she went to sleep. LNK was 10 pm. Patient had a similar episode a few years ago, but was admitted for DKA.  states patient has worsened neurologically since they left their house. On exam, patient was non-verbal, fixed pupils, intermittently following commands in B/L UE, but unable in LE. GCS 8. MRI performed in ED showed acute intraventricular and subarachnoid hemorrhage with early obstructive hydrocephalus and transependymal CSF egress. Also with a noted 21.5-2 mm laterally directed outpouching at the proximal right A2 QUIQUE could represent infundibulum/tortuous origin of a proximal QUIQUE branch. NSGY consulted and placed EVD after patient was intubated after concern for airway protection. Started Keppra 1g BID. CTA ordered. IR consulted for possible angiogram. No leukocytosis. H/H stable. Cr 0.9. Glucose 284. NCC will admit patient for HLOC.          Hospital Course: 01/28/2023: IR performed angiogram shows no aneurysm, AVM or AV fistula. EVD open @20. Will wean propofol and transition to precedex. Started tube feeds and SQ heparin. Transitioned from insulin gtt to detemir.   01/29/2023 [facility] vent; off sedation; TCDs w/o vasospasm; will need repeat angio within a week; MRI brain completed no lesions found; TFS to goal; replete lytes; keep I/Os nearly matched w/ IVF boluses; consider yudy if sbp dips with analgesia and/or sedation;         Interval History:  as above    Review of Systems   Unable to perform ROS: Intubated     Objective:      Vitals:  Temp: 98.3 °F (36.8 °C)  Pulse: 97  Rhythm: normal sinus rhythm  BP: 134/63  MAP (mmHg): 68  ICP Mean (mmHg): 14 mmHg  Resp: (!) 26  SpO2: 98 %  Oxygen Concentration (%): 30  Vent Mode: Spont  Set Rate: 16 BPM  Vt Set: 420 mL  Pressure Support: 7 cmH20  PEEP/CPAP: 5 cmH20  Peak Airway Pressure: 12 cmH20  Mean Airway Pressure: 7.7 cmH20  Plateau Pressure: 0 cmH20    Temp  Min: 98 °F (36.7 °C)  Max: 100 °F (37.8 °C)  Pulse  Min: 58  Max: 117  BP  Min: 88/53  Max: 169/95  MAP (mmHg)  Min: 68  Max: 123  ICP Mean (mmHg)  Min: 9 mmHg  Max: 24 mmHg  Resp  Min: 13  Max: 26  SpO2  Min: 93 %  Max: 100 %  Oxygen Concentration (%)  Min: 30  Max: 30    01/28 0701 - 01/29 0700  In: 230   Out: 1720 [Urine:1620; Drains:100]   Unmeasured Output  Stool Occurrence: 2       Physical Exam  Vitals and nursing note reviewed.   Constitutional:       General: She is not in acute distress.     Appearance: Normal appearance. She is well-developed and normal weight. She is not toxic-appearing.      Comments: intubated   HENT:      Head: Normocephalic.      Mouth/Throat:      Mouth: Mucous membranes are moist.      Pharynx: Oropharynx is clear. No oropharyngeal exudate.   Eyes:      General: No scleral icterus.        Right eye: No discharge.         Left eye: No discharge.      Conjunctiva/sclera: Conjunctivae normal.   Cardiovascular:      Rate and Rhythm: Normal rate and regular rhythm.      Pulses: Normal pulses.      Heart sounds: Normal heart sounds. No murmur heard.  Pulmonary:      Effort: Pulmonary effort is normal. No respiratory distress.      Breath sounds: Normal breath sounds.   Chest:      Chest wall: No tenderness.   Abdominal:      General: Abdomen is flat. There is no distension.      Palpations: Abdomen is soft.      Tenderness: There is no abdominal tenderness.   Musculoskeletal:      Right lower leg: No edema.      Left lower leg: No edema.   Skin:     General: Skin is warm.      Findings: No bruising or rash.       Comments: R groin site C/D/I without hematoma.    Neurological:      Comments: Off sedation  EVD site c/d/i  Intubated.   Not following commands  B/L sluggish pupils 5-6. Eyes clsoed  + cough, gag, and corneal reflex.   No facial droop.   Spontaneuously moves all extremities.   RUE > LUE  BLE 4/5         Unable to test orientation, language, memory, hearing, gait due to level of consciousness.    Medications:  Continuous   ScheduledceFAZolin (ANCEF) IVPB, 2 g, Q8H  heparin (porcine), 5,000 Units, Q8H  insulin detemir U-100, 10 Units, BID  levetiracetam IV, 500 mg, Q12H  mupirocin, , BID  niMODipine, 60 mg, Q4H  PRNacetaminophen, 650 mg, Q6H PRN  dextrose 10%, 12.5 g, PRN  fentaNYL, 50 mcg, Q2H PRN  glucagon (human recombinant), 1 mg, PRN  hydrALAZINE, 10 mg, Q6H PRN  insulin aspart U-100, 1-10 Units, Q6H PRN  labetalol, 10 mg, Q4H PRN  ondansetron, 4 mg, Q6H PRN  sodium chloride 0.9%, 10 mL, PRN  sodium chloride 0.9%, 10 mL, PRN    Today I personally reviewed pertinent medications, lines/drains/airways, imaging, laboratory results, microbiology results, notably:    Recent Labs     01/29/23  0406   PH 7.456*   PCO2 34.2*   PO2 131*   HCO3 24.1   POCSATURATED 99   BE 0     Recent Labs   Lab 01/29/23  0210   CALCIUM 8.7   PROT 6.5      K 4.0   CO2 16*   *   BUN 10   CREATININE 0.7   ALKPHOS 112   ALT 27   AST 23   BILITOT 0.5     Recent Labs   Lab 01/29/23  0210   WBC 10.19   RBC 3.85*   HGB 11.7*   HCT 37.3      MCV 97   MCH 30.4   MCHC 31.4*         Diet  Diet NPO  Diet NPO          Assessment/Plan:     Neuro  * SAH (subarachnoid hemorrhage)  53 yo F w/ PMH DM1, HTN presented to the ED with AMS after HA and vomiting the night before. Patient was non-verbal, B/l fixed pupils, and only intermittently following commands in B/L UE. GCS 8. MRI shows Acute IVH, SAH w/ obstructive hydrocephalus w/o signs of ischemia and R A2 QUIQUE aneurysm. VN consulted. Patient intubated in ED after concern for airway  protection. NSGY placed EVD. IR consulted for possible angiogram. CTA shows diffuse subarachnoid hemorrhage with moderate hydrocephalus. No intracranial aneurysm or AVM is identified. NIHSS 9 on admission.     -Admit to NCC for HLOC  -Q1H neuro checks and vitals  -NSGY following; appreciate recs  -EVD placed. Open @20- monitor ICPs and output  -Ancef for drain ppx  -IR angiogram on 1/27 shows no aneurysm, AVM, or AV fistula.    -Will need repeat angiogram on 2/3; will discuss with IR   -VN following  -CTA shows diffuse subarachnoid hemorrhage with moderate hydrocephalus. No intracranial aneurysm or AVM is identified.  -CT Head stable after EVD placed  -Nimotop 60 q4h  -Daily TCDs  -Echo   -CBC, CMP, Mag, Phos now and then daily   -SBP goal <140  -TF @40 cc/hr  -Cardene gtt, wean as able   -PRN labetalol, hydralazine  - SQ heparin  -PT/OT/SLP    IVH (intraventricular hemorrhage)  See SAH    EVD care    Vasogenic cerebral edema  See SAH    Cardiac/Vascular  Hypertension  SBP 160s on arrival.   states patient does not take home Losartan regularly.     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 BG on arrival 284  Home regimen 13u Lantus; Lispro AC  A1C 6.5     -Transitioned from Insulin gtt to Detemir 6u BID  -Hypoglycemic protocol in place  -Accuchecks q1h    GI  Autoimmune hepatitis  Hx of AIH. Previously on mercaptopurine. Not currently taking.   LFTs slightly elevated on arrival.    -Will continue to monitor  -CMP daily  -If LFTs worsen will obtain RUQ US.     Other  Endotracheally intubated  Vent Mode: Spont  Oxygen Concentration (%):  [30] 30  Resp Rate Total:  [16 br/min-27 br/min] 23 br/min  Vt Set:  [420 mL] 420 mL  PEEP/CPAP:  [5 cmH20] 5 cmH20  Pressure Support:  [7 cmH20] 7 cmH20  Mean Airway Pressure:  [7.4 oqD40-74 cmH20] 7.7 cmH20     Patient intubated in ED 2/2 concern  for airway protection. GCS 7  Currently on propofol. Will wean propofol and transition to precedex if needed.    -Daily ABG  -RT following  -CXR; ABG if respiratory status worsens    Recent Labs     01/29/23  0406   PH 7.456*   PCO2 34.2*   PO2 131*   HCO3 24.1   POCSATURATED 99   BE 0                 The patient is being Prophylaxed for:  Venous Thromboembolism with: Mechanical or Chemical  Stress Ulcer with: H2B  Ventilator Pneumonia with: chlorhexidine oral care    Activity Orders          Progressive Mobility Protocol (mobilize patient to their highest level of functioning at least twice daily) starting at 01/28 0800    Elevate HOB Collagen closure or PERCLOSE plug/device - Elevate HOB 30 degrees with limb immobilized for 2 hours after procedure. starting at 01/27 2018    Turn patient starting at 01/27 1400    Elevate HOB starting at 01/27 1221    Diet NPO: NPO starting at 01/27 1221        Full Code    Sumit Briones MD  Neurocritical Care  [facility] - Neuro Critical Care    Time spent with this critically ill patient and care team at the bedside: 35min  -There is high probability for acute neurological and/or systemic change leading to clinical and possibly life-threatening deterioration

## 2023-01-29 NOTE — PROGRESS NOTES
[facility]  Neurosurgery  Progress Note    Subjective:     History of Present Illness: 53 yo F with PMH of DM1, autoimmune hepatitis, and HTN who presents to ED this am after confusion this am. She was LKN last night and became confused after showering this am so  brought to the ED. She quickly decompensated in the ER and was not following commands or waking to stimulation. MRI and CTA showed diffuse thick SAH and hydrocephalus. No identifiable aneurysm seen on CTA.       Post-Op Info:  Procedure(s) (LRB):  ANGIOGRAM-CEREBRAL (N/A)   2 Days Post-Op     Interval History: 1/29: PBD 2, neuro exam improving. On pcdx. EVD put out 100, ICPs wnl.            Review of Systems   Unable to perform ROS: Patient unresponsive   Objective:     Weight: 61.2 kg (135 lb)  Body mass index is 24.69 kg/m².  Vital Signs (Most Recent):  Temp: 98.3 °F (36.8 °C) (01/29/23 0705)  Pulse: 65 (01/29/23 0805)  Resp: 16 (01/29/23 0805)  BP: (!) 88/53 (01/29/23 0805)  SpO2: 98 % (01/29/23 0805)   Vital Signs (24h Range):  Temp:  [98 °F (36.7 °C)-100 °F (37.8 °C)] 98.3 °F (36.8 °C)  Pulse:  [] 65  Resp:  [13-26] 16  SpO2:  [93 %-100 %] 98 %  BP: ()/(50-95) 88/53  Arterial Line BP: (83)/(77) 83/77     Date 01/29/23 0700 - 01/30/23 0659   Shift 9491-9476 8927-7282 0646-9536 24 Hour Total   INTAKE   NG/GT 40   40   Shift Total(mL/kg) 40(0.7)   40(0.7)   OUTPUT   Shift Total(mL/kg)       Weight (kg) 61.2 61.2 61.2 61.2                Vent Mode: A/C  Oxygen Concentration (%):  [30] 30  Resp Rate Total:  [16 br/min-27 br/min] 16 br/min  Vt Set:  [420 mL] 420 mL  PEEP/CPAP:  [5 cmH20] 5 cmH20  Mean Airway Pressure:  [7.4 yqW04-18 cmH20] 8.6 cmH20         Urethral Catheter 01/27/23 1509 (Active)   $ [place] Insertion Bedside Insertion Performed 01/27/23 1601   Site Assessment Clean;Intact 01/27/23 1501   Collection Container Urimeter 01/27/23 1501   Securement Method secured to top of thigh w/ adhesive device 01/27/23  "1501   Catheter Care Performed yes 01/27/23 1501   Reason for Continuing Urinary Catheterization Critically ill in ICU and requiring hourly monitoring of intake/output 01/27/23 1501   CAUTI Prevention Bundle Securement Device in place with 1" slack;Intact seal between catheter & drainage tubing;Drainage bag/urimeter off the floor;Sheeting clip in use;No dependent loops or kinks;Drainage bag/urimeter not overfilled (<2/3 full);Drainage bag/urimeter below bladder 01/27/23 1501   Output (mL) 250 mL 01/27/23 1700            ICP/Ventriculostomy 01/27/23 1400 (Active)   $ ICP/Ventriculostomy [place] Placement Bedside Insertion Performed 01/27/23 1501   Level of Ventriculostomy (cm above) 20 01/27/23 1501   Status Open to drainage 01/27/23 1501   Site Assessment Clean;Dry 01/27/23 1501   Site Drainage No drainage 01/27/23 1501   Waveform normal waveform 01/27/23 1501   Output (mL) 0 mL 01/27/23 1700   CSF Color clear 01/27/23 1501   Dressing Status Clean;Dry;Intact 01/27/23 1501   Interventions HOB degrees;bed controls locked;zeroed;level adjusted per order 01/27/23 1501       Physical Exam  Constitutional:       Appearance: She is well-developed.   Eyes:      Conjunctiva/sclera: Conjunctivae normal.   Cardiovascular:      Pulses: Normal pulses.   Abdominal:      Palpations: Abdomen is soft.   Neurological:      Comments: E2VTM6, on precedex  Pupils 3mm and reactive  CN difficult to assess given mental status, non focal  FC x4 thumbs up and wiggling toes    EVD incision cdi   Psychiatric:      Comments: unresponsive           Significant Labs:  Recent Labs   Lab 01/27/23  0949 01/28/23  0005 01/29/23  0210   * 233* 265*   * 137 140   K 4.6 3.8 4.0    107 111*   CO2 19* 18* 16*   BUN 14 10 10   CREATININE 0.9 0.8 0.7   CALCIUM 9.5 8.8 8.7   MG  --  2.0 2.0       Recent Labs   Lab 01/27/23  0949 01/28/23  0005 01/29/23  0210   WBC 11.53 9.98 10.19   HGB 13.2 12.3 11.7*   HCT 41.3 37.1 37.3    217 208 "       Recent Labs   Lab 01/27/23  0949 01/27/23  0955   INR 1.0 1.2       Microbiology Results (last 7 days)       ** No results found for the last 168 hours. **          All pertinent labs from the last 24 hours have been reviewed.    Significant Diagnostics:  I have reviewed all pertinent imaging results/findings within the past 24 hours.  Physical Exam:    Constitutional: She appears well-developed.     Eyes: Conjunctivae are normal.     Cardiovascular: Normal pulses.     Abdominal: Soft.     Psych/Behavior:   unresponsive     Neurological:   E2VTM6, on precedex  Pupils 3mm and reactive  CN difficult to assess given mental status, non focal  FC x4 thumbs up and wiggling toes    EVD incision cdi     Assessment/Plan:     * SAH (subarachnoid hemorrhage)  53 yo F with PMH of DM1, autoimmune hepatitis, and HTN who presents to ED and was found to have diffuse SAH and hydrocephalus    PBD 2    - admit to NCC  - q1 neuro checks  - all labs and diagnostics reviewed   - MRI/CTA showed diffuse SAH and small volume IVH with hydrocephalus, no identifiable aneurysm   - CTH repeat with EVD in good position   - CTA without identifiable aneurysm   - DSA 1/27 without any identifiable aneurysm   - MRI brain/C spine and MRI vessel wall imaging pending   - will need repeat DSA on PBD 7  - EVD open at 20, abx while in place  - SAH protocol   - daily TCDs x 14 days   - keppra 500mg x 7 days   - goal euvolemia to 1L positive  - Na >135, hypertonics as needed  - elevate HOB  - SBP <140  - intubated for airway protection, WTE when/if appropriate  - NPO  - ok for SQH    Dispo: ongoing        Tatiana Allison MD  Neurosurgery  [facility]

## 2023-01-29 NOTE — SUBJECTIVE & OBJECTIVE
Interval History:  as above    Review of Systems   Unable to perform ROS: Intubated     Objective:     Vitals:  Temp: 98.3 °F (36.8 °C)  Pulse: 97  Rhythm: normal sinus rhythm  BP: 134/63  MAP (mmHg): 68  ICP Mean (mmHg): 14 mmHg  Resp: (!) 26  SpO2: 98 %  Oxygen Concentration (%): 30  Vent Mode: Spont  Set Rate: 16 BPM  Vt Set: 420 mL  Pressure Support: 7 cmH20  PEEP/CPAP: 5 cmH20  Peak Airway Pressure: 12 cmH20  Mean Airway Pressure: 7.7 cmH20  Plateau Pressure: 0 cmH20    Temp  Min: 98 °F (36.7 °C)  Max: 100 °F (37.8 °C)  Pulse  Min: 58  Max: 117  BP  Min: 88/53  Max: 169/95  MAP (mmHg)  Min: 68  Max: 123  ICP Mean (mmHg)  Min: 9 mmHg  Max: 24 mmHg  Resp  Min: 13  Max: 26  SpO2  Min: 93 %  Max: 100 %  Oxygen Concentration (%)  Min: 30  Max: 30    01/28 0701 - 01/29 0700  In: 230   Out: 1720 [Urine:1620; Drains:100]   Unmeasured Output  Stool Occurrence: 2       Physical Exam  Vitals and nursing note reviewed.   Constitutional:       General: She is not in acute distress.     Appearance: Normal appearance. She is well-developed and normal weight. She is not toxic-appearing.      Comments: intubated   HENT:      Head: Normocephalic.      Mouth/Throat:      Mouth: Mucous membranes are moist.      Pharynx: Oropharynx is clear. No oropharyngeal exudate.   Eyes:      General: No scleral icterus.        Right eye: No discharge.         Left eye: No discharge.      Conjunctiva/sclera: Conjunctivae normal.   Cardiovascular:      Rate and Rhythm: Normal rate and regular rhythm.      Pulses: Normal pulses.      Heart sounds: Normal heart sounds. No murmur heard.  Pulmonary:      Effort: Pulmonary effort is normal. No respiratory distress.      Breath sounds: Normal breath sounds.   Chest:      Chest wall: No tenderness.   Abdominal:      General: Abdomen is flat. There is no distension.      Palpations: Abdomen is soft.      Tenderness: There is no abdominal tenderness.   Musculoskeletal:      Right lower leg: No edema.       Left lower leg: No edema.   Skin:     General: Skin is warm.      Findings: No bruising or rash.      Comments: R groin site C/D/I without hematoma.    Neurological:      Comments: Off sedation  EVD site c/d/i  Intubated.   Not following commands  B/L sluggish pupils 5-6. Eyes clsoed  + cough, gag, and corneal reflex.   No facial droop.   Spontaneuously moves all extremities.   RUE > LUE  BLE 4/5         Unable to test orientation, language, memory, hearing, gait due to level of consciousness.    Medications:  Continuous   ScheduledceFAZolin (ANCEF) IVPB, 2 g, Q8H  heparin (porcine), 5,000 Units, Q8H  insulin detemir U-100, 10 Units, BID  levetiracetam IV, 500 mg, Q12H  mupirocin, , BID  niMODipine, 60 mg, Q4H  PRNacetaminophen, 650 mg, Q6H PRN  dextrose 10%, 12.5 g, PRN  fentaNYL, 50 mcg, Q2H PRN  glucagon (human recombinant), 1 mg, PRN  hydrALAZINE, 10 mg, Q6H PRN  insulin aspart U-100, 1-10 Units, Q6H PRN  labetalol, 10 mg, Q4H PRN  ondansetron, 4 mg, Q6H PRN  sodium chloride 0.9%, 10 mL, PRN  sodium chloride 0.9%, 10 mL, PRN    Today I personally reviewed pertinent medications, lines/drains/airways, imaging, laboratory results, microbiology results, notably:    Recent Labs     01/29/23  0406   PH 7.456*   PCO2 34.2*   PO2 131*   HCO3 24.1   POCSATURATED 99   BE 0     Recent Labs   Lab 01/29/23  0210   CALCIUM 8.7   PROT 6.5      K 4.0   CO2 16*   *   BUN 10   CREATININE 0.7   ALKPHOS 112   ALT 27   AST 23   BILITOT 0.5     Recent Labs   Lab 01/29/23  0210   WBC 10.19   RBC 3.85*   HGB 11.7*   HCT 37.3      MCV 97   MCH 30.4   MCHC 31.4*         Diet  Diet NPO  Diet NPO

## 2023-01-29 NOTE — SUBJECTIVE & OBJECTIVE
"Interval History: 1/29: PBD 2, neuro exam improving. On pcdx. EVD put out 100, ICPs wnl.            Review of Systems   Unable to perform ROS: Patient unresponsive   Objective:     Weight: 61.2 kg (135 lb)  Body mass index is 24.69 kg/m².  Vital Signs (Most Recent):  Temp: 98.3 °F (36.8 °C) (01/29/23 0705)  Pulse: 65 (01/29/23 0805)  Resp: 16 (01/29/23 0805)  BP: (!) 88/53 (01/29/23 0805)  SpO2: 98 % (01/29/23 0805)   Vital Signs (24h Range):  Temp:  [98 °F (36.7 °C)-100 °F (37.8 °C)] 98.3 °F (36.8 °C)  Pulse:  [] 65  Resp:  [13-26] 16  SpO2:  [93 %-100 %] 98 %  BP: ()/(50-95) 88/53  Arterial Line BP: (83)/(77) 83/77     Date 01/29/23 0700 - 01/30/23 0659   Shift 6352-5828 1592-0298 0795-1507 24 Hour Total   INTAKE   NG/GT 40   40   Shift Total(mL/kg) 40(0.7)   40(0.7)   OUTPUT   Shift Total(mL/kg)       Weight (kg) 61.2 61.2 61.2 61.2                Vent Mode: A/C  Oxygen Concentration (%):  [30] 30  Resp Rate Total:  [16 br/min-27 br/min] 16 br/min  Vt Set:  [420 mL] 420 mL  PEEP/CPAP:  [5 cmH20] 5 cmH20  Mean Airway Pressure:  [7.4 amZ50-50 cmH20] 8.6 cmH20         Urethral Catheter 01/27/23 1509 (Active)   $ Monzon Insertion Bedside Insertion Performed 01/27/23 1601   Site Assessment Clean;Intact 01/27/23 1501   Collection Container Urimeter 01/27/23 1501   Securement Method secured to top of thigh w/ adhesive device 01/27/23 1501   Catheter Care Performed yes 01/27/23 1501   Reason for Continuing Urinary Catheterization Critically ill in ICU and requiring hourly monitoring of intake/output 01/27/23 1501   CAUTI Prevention Bundle Securement Device in place with 1" slack;Intact seal between catheter & drainage tubing;Drainage bag/urimeter off the floor;Sheeting clip in use;No dependent loops or kinks;Drainage bag/urimeter not overfilled (<2/3 full);Drainage bag/urimeter below bladder 01/27/23 1501   Output (mL) 250 mL 01/27/23 1700            ICP/Ventriculostomy 01/27/23 1400 (Active)   $ " ICP/Ventriculostomy [place] Placement Bedside Insertion Performed 01/27/23 1501   Level of Ventriculostomy (cm above) 20 01/27/23 1501   Status Open to drainage 01/27/23 1501   Site Assessment Clean;Dry 01/27/23 1501   Site Drainage No drainage 01/27/23 1501   Waveform normal waveform 01/27/23 1501   Output (mL) 0 mL 01/27/23 1700   CSF Color clear 01/27/23 1501   Dressing Status Clean;Dry;Intact 01/27/23 1501   Interventions HOB degrees;bed controls locked;zeroed;level adjusted per order 01/27/23 1501       Physical Exam  Constitutional:       Appearance: She is well-developed.   Eyes:      Conjunctiva/sclera: Conjunctivae normal.   Cardiovascular:      Pulses: Normal pulses.   Abdominal:      Palpations: Abdomen is soft.   Neurological:      Comments: E2VTM6, on precedex  Pupils 3mm and reactive  CN difficult to assess given mental status, non focal  FC x4 thumbs up and wiggling toes    EVD incision cdi   Psychiatric:      Comments: unresponsive           Significant Labs:  Recent Labs   Lab 01/27/23  0949 01/28/23  0005 01/29/23  0210   * 233* 265*   * 137 140   K 4.6 3.8 4.0    107 111*   CO2 19* 18* 16*   BUN 14 10 10   CREATININE 0.9 0.8 0.7   CALCIUM 9.5 8.8 8.7   MG  --  2.0 2.0       Recent Labs   Lab 01/27/23  0949 01/28/23  0005 01/29/23  0210   WBC 11.53 9.98 10.19   HGB 13.2 12.3 11.7*   HCT 41.3 37.1 37.3    217 208       Recent Labs   Lab 01/27/23  0949 01/27/23  0955   INR 1.0 1.2       Microbiology Results (last 7 days)       ** No results found for the last 168 hours. **          All pertinent labs from the last 24 hours have been reviewed.    Significant Diagnostics:  I have reviewed all pertinent imaging results/findings within the past 24 hours.  Physical Exam:    Constitutional: She appears well-developed.     Eyes: Conjunctivae are normal.     Cardiovascular: Normal pulses.     Abdominal: Soft.     Psych/Behavior:   unresponsive     Neurological:   E2VTM6, on  precedex  Pupils 3mm and reactive  CN difficult to assess given mental status, non focal  FC x4 thumbs up and wiggling toes    EVD incision cdi

## 2023-01-29 NOTE — ASSESSMENT & PLAN NOTE
SBP 160s on arrival.   states patient does not take home Losartan regularly.     -Goal SBP <160  -Cardene gtt; wean as needed  -PRN Labetalol and Hydralazine

## 2023-01-30 LAB
ALBUMIN SERPL BCP-MCNC: 3.3 G/DL (ref 3.5–5.2)
ALP SERPL-CCNC: 131 U/L (ref 55–135)
ALT SERPL W/O P-5'-P-CCNC: 73 U/L (ref 10–44)
ANION GAP SERPL CALC-SCNC: 10 MMOL/L (ref 8–16)
AST SERPL-CCNC: 105 U/L (ref 10–40)
BASOPHILS # BLD AUTO: 0.04 K/UL (ref 0–0.2)
BASOPHILS NFR BLD: 0.4 % (ref 0–1.9)
BILIRUB SERPL-MCNC: 0.4 MG/DL (ref 0.1–1)
BUN SERPL-MCNC: 11 MG/DL (ref 6–20)
CALCIUM SERPL-MCNC: 9 MG/DL (ref 8.7–10.5)
CHLORIDE SERPL-SCNC: 112 MMOL/L (ref 95–110)
CLARITY CSF: ABNORMAL
CO2 SERPL-SCNC: 22 MMOL/L (ref 23–29)
COLOR CSF: ABNORMAL
CREAT SERPL-MCNC: 0.7 MG/DL (ref 0.5–1.4)
CSF TUBE NUMBER: 1
CSF TUBE NUMBER: 1
DIFFERENTIAL METHOD: ABNORMAL
EOSINOPHIL # BLD AUTO: 0 K/UL (ref 0–0.5)
EOSINOPHIL NFR BLD: 0.1 % (ref 0–8)
EOSINOPHIL NFR CSF MANUAL: 2 %
ERYTHROCYTE [DISTWIDTH] IN BLOOD BY AUTOMATED COUNT: 12.5 % (ref 11.5–14.5)
EST. GFR  (NO RACE VARIABLE): >60 ML/MIN/1.73 M^2
GLUCOSE CSF-MCNC: 122 MG/DL (ref 40–70)
GLUCOSE SERPL-MCNC: 227 MG/DL (ref 70–110)
GLUCOSE SERPL-MCNC: 266 MG/DL (ref 70–110)
HCO3 UR-SCNC: 23.3 MMOL/L (ref 24–28)
HCT VFR BLD AUTO: 34.5 % (ref 37–48.5)
HCT VFR BLD CALC: 34 %PCV (ref 36–54)
HGB BLD-MCNC: 11 G/DL (ref 12–16)
IMM GRANULOCYTES # BLD AUTO: 0.02 K/UL (ref 0–0.04)
IMM GRANULOCYTES NFR BLD AUTO: 0.2 % (ref 0–0.5)
LYMPHOCYTES # BLD AUTO: 1.6 K/UL (ref 1–4.8)
LYMPHOCYTES NFR BLD: 17.5 % (ref 18–48)
LYMPHOCYTES NFR CSF MANUAL: 53 % (ref 40–80)
MAGNESIUM SERPL-MCNC: 2 MG/DL (ref 1.6–2.6)
MCH RBC QN AUTO: 30.6 PG (ref 27–31)
MCHC RBC AUTO-ENTMCNC: 31.9 G/DL (ref 32–36)
MCV RBC AUTO: 96 FL (ref 82–98)
MONOCYTES # BLD AUTO: 1 K/UL (ref 0.3–1)
MONOCYTES NFR BLD: 10.5 % (ref 4–15)
MONOS+MACROS NFR CSF MANUAL: 2 % (ref 15–45)
NEUTROPHILS # BLD AUTO: 6.4 K/UL (ref 1.8–7.7)
NEUTROPHILS NFR BLD: 71.3 % (ref 38–73)
NEUTROPHILS NFR CSF MANUAL: 43 % (ref 0–6)
NRBC BLD-RTO: 0 /100 WBC
PCO2 BLDA: 40.6 MMHG (ref 35–45)
PH SMN: 7.37 [PH] (ref 7.35–7.45)
PHOSPHATE SERPL-MCNC: 2.2 MG/DL (ref 2.7–4.5)
PLATELET # BLD AUTO: 182 K/UL (ref 150–450)
PMV BLD AUTO: 8.8 FL (ref 9.2–12.9)
PO2 BLDA: 536 MMHG (ref 80–100)
POC BE: -2 MMOL/L
POC IONIZED CALCIUM: 1.18 MMOL/L (ref 1.06–1.42)
POC SATURATED O2: 100 % (ref 95–100)
POC TCO2: 25 MMOL/L (ref 23–27)
POCT GLUCOSE: 215 MG/DL (ref 70–110)
POCT GLUCOSE: 229 MG/DL (ref 70–110)
POCT GLUCOSE: 240 MG/DL (ref 70–110)
POCT GLUCOSE: 277 MG/DL (ref 70–110)
POCT GLUCOSE: 285 MG/DL (ref 70–110)
POCT GLUCOSE: 331 MG/DL (ref 70–110)
POTASSIUM BLD-SCNC: 4 MMOL/L (ref 3.5–5.1)
POTASSIUM SERPL-SCNC: 3.5 MMOL/L (ref 3.5–5.1)
PROT CSF-MCNC: 45 MG/DL (ref 15–40)
PROT SERPL-MCNC: 6.4 G/DL (ref 6–8.4)
RBC # BLD AUTO: 3.6 M/UL (ref 4–5.4)
RBC # CSF: ABNORMAL /CU MM
SAMPLE: ABNORMAL
SODIUM BLD-SCNC: 139 MMOL/L (ref 136–145)
SODIUM SERPL-SCNC: 144 MMOL/L (ref 136–145)
SPECIMEN VOL CSF: 1.5 ML
WBC # BLD AUTO: 9.02 K/UL (ref 3.9–12.7)
WBC # CSF: 18 /CU MM (ref 0–5)

## 2023-01-30 PROCEDURE — 25000003 PHARM REV CODE 250: Performed by: STUDENT IN AN ORGANIZED HEALTH CARE EDUCATION/TRAINING PROGRAM

## 2023-01-30 PROCEDURE — 99233 SBSQ HOSP IP/OBS HIGH 50: CPT | Mod: ,,, | Performed by: NEUROLOGICAL SURGERY

## 2023-01-30 PROCEDURE — 80053 COMPREHEN METABOLIC PANEL: CPT

## 2023-01-30 PROCEDURE — 63600175 PHARM REV CODE 636 W HCPCS: Performed by: STUDENT IN AN ORGANIZED HEALTH CARE EDUCATION/TRAINING PROGRAM

## 2023-01-30 PROCEDURE — 84100 ASSAY OF PHOSPHORUS: CPT

## 2023-01-30 PROCEDURE — 25000242 PHARM REV CODE 250 ALT 637 W/ HCPCS: Performed by: STUDENT IN AN ORGANIZED HEALTH CARE EDUCATION/TRAINING PROGRAM

## 2023-01-30 PROCEDURE — 27000221 HC OXYGEN, UP TO 24 HOURS

## 2023-01-30 PROCEDURE — 25500020 PHARM REV CODE 255: Performed by: INTERNAL MEDICINE

## 2023-01-30 PROCEDURE — 94003 VENT MGMT INPAT SUBQ DAY: CPT

## 2023-01-30 PROCEDURE — 87205 SMEAR GRAM STAIN: CPT | Performed by: STUDENT IN AN ORGANIZED HEALTH CARE EDUCATION/TRAINING PROGRAM

## 2023-01-30 PROCEDURE — 94640 AIRWAY INHALATION TREATMENT: CPT

## 2023-01-30 PROCEDURE — 20000000 HC ICU ROOM

## 2023-01-30 PROCEDURE — 63600175 PHARM REV CODE 636 W HCPCS

## 2023-01-30 PROCEDURE — 99900026 HC AIRWAY MAINTENANCE (STAT)

## 2023-01-30 PROCEDURE — 83735 ASSAY OF MAGNESIUM: CPT

## 2023-01-30 PROCEDURE — 84157 ASSAY OF PROTEIN OTHER: CPT | Performed by: STUDENT IN AN ORGANIZED HEALTH CARE EDUCATION/TRAINING PROGRAM

## 2023-01-30 PROCEDURE — 99900035 HC TECH TIME PER 15 MIN (STAT)

## 2023-01-30 PROCEDURE — 25000003 PHARM REV CODE 250

## 2023-01-30 PROCEDURE — 94150 VITAL CAPACITY TEST: CPT

## 2023-01-30 PROCEDURE — 87070 CULTURE OTHR SPECIMN AEROBIC: CPT | Performed by: STUDENT IN AN ORGANIZED HEALTH CARE EDUCATION/TRAINING PROGRAM

## 2023-01-30 PROCEDURE — 94761 N-INVAS EAR/PLS OXIMETRY MLT: CPT

## 2023-01-30 PROCEDURE — 89051 BODY FLUID CELL COUNT: CPT | Performed by: STUDENT IN AN ORGANIZED HEALTH CARE EDUCATION/TRAINING PROGRAM

## 2023-01-30 PROCEDURE — 63600175 PHARM REV CODE 636 W HCPCS: Performed by: NURSE PRACTITIONER

## 2023-01-30 PROCEDURE — 85025 COMPLETE CBC W/AUTO DIFF WBC: CPT

## 2023-01-30 PROCEDURE — 25000242 PHARM REV CODE 250 ALT 637 W/ HCPCS: Performed by: NURSE PRACTITIONER

## 2023-01-30 PROCEDURE — 99900017 HC EXTUBATION W/PARAMETERS (STAT)

## 2023-01-30 PROCEDURE — 82945 GLUCOSE OTHER FLUID: CPT | Performed by: STUDENT IN AN ORGANIZED HEALTH CARE EDUCATION/TRAINING PROGRAM

## 2023-01-30 PROCEDURE — 25000003 PHARM REV CODE 250: Performed by: INTERNAL MEDICINE

## 2023-01-30 PROCEDURE — 99233 PR SUBSEQUENT HOSPITAL CARE,LEVL III: ICD-10-PCS | Mod: ,,, | Performed by: NEUROLOGICAL SURGERY

## 2023-01-30 PROCEDURE — 27200966 HC CLOSED SUCTION SYSTEM

## 2023-01-30 RX ORDER — NICARDIPINE HYDROCHLORIDE 0.2 MG/ML
0-15 INJECTION INTRAVENOUS CONTINUOUS
Status: DISCONTINUED | OUTPATIENT
Start: 2023-01-30 | End: 2023-02-01

## 2023-01-30 RX ORDER — DEXAMETHASONE SODIUM PHOSPHATE 4 MG/ML
4 INJECTION, SOLUTION INTRA-ARTICULAR; INTRALESIONAL; INTRAMUSCULAR; INTRAVENOUS; SOFT TISSUE EVERY 6 HOURS
Status: DISCONTINUED | OUTPATIENT
Start: 2023-01-30 | End: 2023-01-31

## 2023-01-30 RX ORDER — BUDESONIDE 0.25 MG/2ML
0.25 INHALANT ORAL DAILY
Status: DISCONTINUED | OUTPATIENT
Start: 2023-01-30 | End: 2023-01-30

## 2023-01-30 RX ORDER — IODIXANOL 320 MG/ML
200 INJECTION, SOLUTION INTRAVASCULAR
Status: COMPLETED | OUTPATIENT
Start: 2023-01-30 | End: 2023-01-30

## 2023-01-30 RX ORDER — IPRATROPIUM BROMIDE AND ALBUTEROL SULFATE 2.5; .5 MG/3ML; MG/3ML
3 SOLUTION RESPIRATORY (INHALATION)
Status: DISCONTINUED | OUTPATIENT
Start: 2023-01-30 | End: 2023-02-07

## 2023-01-30 RX ORDER — HYDRALAZINE HYDROCHLORIDE 20 MG/ML
10 INJECTION INTRAMUSCULAR; INTRAVENOUS ONCE
Status: COMPLETED | OUTPATIENT
Start: 2023-01-30 | End: 2023-01-30

## 2023-01-30 RX ORDER — FAMOTIDINE 20 MG/1
20 TABLET, FILM COATED ORAL 2 TIMES DAILY
Status: DISCONTINUED | OUTPATIENT
Start: 2023-01-30 | End: 2023-01-31

## 2023-01-30 RX ADMIN — NIMODIPINE 60 MG: 60 SOLUTION ORAL at 02:01

## 2023-01-30 RX ADMIN — INSULIN ASPART 3 UNITS: 100 INJECTION, SOLUTION INTRAVENOUS; SUBCUTANEOUS at 11:01

## 2023-01-30 RX ADMIN — FENTANYL CITRATE 50 MCG: 50 INJECTION INTRAMUSCULAR; INTRAVENOUS at 01:01

## 2023-01-30 RX ADMIN — NICARDIPINE HYDROCHLORIDE 2.5 MG/HR: 0.2 INJECTION, SOLUTION INTRAVENOUS at 02:01

## 2023-01-30 RX ADMIN — IPRATROPIUM BROMIDE AND ALBUTEROL SULFATE 3 ML: .5; 3 SOLUTION RESPIRATORY (INHALATION) at 11:01

## 2023-01-30 RX ADMIN — INSULIN ASPART 6 UNITS: 100 INJECTION, SOLUTION INTRAVENOUS; SUBCUTANEOUS at 04:01

## 2023-01-30 RX ADMIN — DEXAMETHASONE SODIUM PHOSPHATE 4 MG: 4 INJECTION INTRA-ARTICULAR; INTRALESIONAL; INTRAMUSCULAR; INTRAVENOUS; SOFT TISSUE at 05:01

## 2023-01-30 RX ADMIN — INSULIN ASPART 4 UNITS: 100 INJECTION, SOLUTION INTRAVENOUS; SUBCUTANEOUS at 11:01

## 2023-01-30 RX ADMIN — INSULIN ASPART 4 UNITS: 100 INJECTION, SOLUTION INTRAVENOUS; SUBCUTANEOUS at 08:01

## 2023-01-30 RX ADMIN — CEFAZOLIN 2 G: 2 INJECTION, POWDER, FOR SOLUTION INTRAMUSCULAR; INTRAVENOUS at 08:01

## 2023-01-30 RX ADMIN — NIMODIPINE 60 MG: 60 SOLUTION ORAL at 05:01

## 2023-01-30 RX ADMIN — HYDRALAZINE HYDROCHLORIDE 10 MG: 20 INJECTION, SOLUTION INTRAMUSCULAR; INTRAVENOUS at 08:01

## 2023-01-30 RX ADMIN — BUDESONIDE 0.25 MG: 0.25 SUSPENSION RESPIRATORY (INHALATION) at 12:01

## 2023-01-30 RX ADMIN — INSULIN ASPART 4 UNITS: 100 INJECTION, SOLUTION INTRAVENOUS; SUBCUTANEOUS at 05:01

## 2023-01-30 RX ADMIN — INSULIN ASPART 4 UNITS: 100 INJECTION, SOLUTION INTRAVENOUS; SUBCUTANEOUS at 12:01

## 2023-01-30 RX ADMIN — HYDRALAZINE HYDROCHLORIDE 10 MG: 20 INJECTION INTRAMUSCULAR; INTRAVENOUS at 11:01

## 2023-01-30 RX ADMIN — MUPIROCIN: 20 OINTMENT TOPICAL at 09:01

## 2023-01-30 RX ADMIN — NIMODIPINE 60 MG: 60 SOLUTION ORAL at 10:01

## 2023-01-30 RX ADMIN — DEXAMETHASONE SODIUM PHOSPHATE 4 MG: 4 INJECTION INTRA-ARTICULAR; INTRALESIONAL; INTRAMUSCULAR; INTRAVENOUS; SOFT TISSUE at 11:01

## 2023-01-30 RX ADMIN — HEPARIN SODIUM 5000 UNITS: 5000 INJECTION INTRAVENOUS; SUBCUTANEOUS at 05:01

## 2023-01-30 RX ADMIN — LEVETIRACETAM 500 MG: 100 INJECTION, SOLUTION INTRAVENOUS at 08:01

## 2023-01-30 RX ADMIN — IODIXANOL 150 ML: 320 INJECTION, SOLUTION INTRAVASCULAR at 06:01

## 2023-01-30 RX ADMIN — IPRATROPIUM BROMIDE AND ALBUTEROL SULFATE 3 ML: .5; 3 SOLUTION RESPIRATORY (INHALATION) at 07:01

## 2023-01-30 RX ADMIN — CEFAZOLIN 2 G: 2 INJECTION, POWDER, FOR SOLUTION INTRAMUSCULAR; INTRAVENOUS at 12:01

## 2023-01-30 RX ADMIN — FENTANYL CITRATE 50 MCG: 50 INJECTION INTRAMUSCULAR; INTRAVENOUS at 04:01

## 2023-01-30 RX ADMIN — RACEPINEPHRINE HYDROCHLORIDE 0.5 ML: 11.25 SOLUTION RESPIRATORY (INHALATION) at 12:01

## 2023-01-30 RX ADMIN — NIMODIPINE 60 MG: 60 SOLUTION ORAL at 06:01

## 2023-01-30 RX ADMIN — INSULIN ASPART 2 UNITS: 100 INJECTION, SOLUTION INTRAVENOUS; SUBCUTANEOUS at 08:01

## 2023-01-30 RX ADMIN — HEPARIN SODIUM 5000 UNITS: 5000 INJECTION INTRAVENOUS; SUBCUTANEOUS at 02:01

## 2023-01-30 RX ADMIN — CEFAZOLIN 2 G: 2 INJECTION, POWDER, FOR SOLUTION INTRAMUSCULAR; INTRAVENOUS at 04:01

## 2023-01-30 RX ADMIN — HEPARIN SODIUM 5000 UNITS: 5000 INJECTION INTRAVENOUS; SUBCUTANEOUS at 10:01

## 2023-01-30 RX ADMIN — FAMOTIDINE 20 MG: 20 TABLET ORAL at 09:01

## 2023-01-30 RX ADMIN — FAMOTIDINE 20 MG: 20 TABLET ORAL at 02:01

## 2023-01-30 NOTE — PLAN OF CARE
[facility] Care Plan    POC reviewed with Leida Hoyos and family at 0300. Pt unable to verbalize understanding due to ETT. Questions and concerns addressed with spouse. No acute events overnight. Pt progressing toward goals. Will continue to monitor. See below and flowsheets for full assessment and VS info.     -TCD daily  -evd open @20, output 1-10cc/hr, clear-pinkish color.          Is this a stroke patient? yes- Stroke booklet reviewed with patient, risk factors identified for patient and stroke booklet remains at bedside for ongoing education.     Neuro:  Lexus Coma Scale  Best Eye Response: 3-->(E3) to speech  Best Motor Response: 5-->(M5) localizes pain  Best Verbal Response: 1-->(V1) none  Lexus Coma Scale Score: 9  Assessment Qualifiers: no eye obstruction present, patient intubated  Pupil PERRLA: yes     24hr Temp:  [98.3 °F (36.8 °C)-100 °F (37.8 °C)]     CV:   Rhythm: sinus bradycardia  BP goals:   SBP < 140  MAP > 65    Resp:      Vent Mode: Spont  Set Rate: 16 BPM  Oxygen Concentration (%): 30  Vt Set: 420 mL  PEEP/CPAP: 5 cmH20  Pressure Support: 7 cmH20    Plan: wean to extubate    GI/:     Diet/Nutrition Received: NPO, tube feeding  Last Bowel Movement: 01/29/23  Voiding Characteristics: urethral catheter (bladder)    Intake/Output Summary (Last 24 hours) at 1/30/2023 0347  Last data filed at 1/30/2023 0302  Gross per 24 hour   Intake 1906.25 ml   Output 1817 ml   Net 89.25 ml     Unmeasured Output  Stool Occurrence: 2  Pad Count: 1    Labs/Accuchecks:  Recent Labs   Lab 01/30/23  0129   WBC 9.02   RBC 3.60*   HGB 11.0*   HCT 34.5*         Recent Labs   Lab 01/30/23  0129      K 3.5   CO2 22*   *   BUN 11   CREATININE 0.7   ALKPHOS 131   ALT 73*   *   BILITOT 0.4      Recent Labs   Lab 01/27/23  0955   INR 1.2      Recent Labs   Lab 01/27/23  0949   TROPONINI <0.006       Electrolytes: No replacement orders  Accuchecks:  Q4H    Gtts:      LDA/Wounds:  Lines/Drains/Airways       Drain  Duration                  NG/OG Tube 01/27/23 Center mouth 3 days         ICP/Ventriculostomy 01/27/23 1400 2 days              Airway  Duration                  Airway - Non-Surgical 01/27/23 1314 2 days              Epidural Line  Duration                  Perineural Analgesia/Anesthesia Assessment (using dermatomes) Epidural 04/29/13 0852 3562 days              Peripheral Intravenous Line  Duration                  Peripheral IV - Single Lumen 01/27/23 0945 20 G Right Antecubital 2 days         Peripheral IV - Single Lumen 01/27/23 1646 18 G Anterior;Right Upper Arm 2 days         Peripheral IV - Single Lumen 01/28/23 1400 20 G;1 3/4 in Anterior;Left Forearm 1 day                  Wounds: No  Wound care consulted: No

## 2023-01-30 NOTE — PROGRESS NOTES
[facility]  Neurocritical Care  Progress Note    Admit Date: 1/27/2023  Service Date: 01/30/2023  Length of Stay: 3    Subjective:     Chief Complaint: SAH (subarachnoid hemorrhage)    History of Present Illness: Mrs. Gupta 57 yo F w/ PMH DM1 (dx 2013), HTN, autoimmune hepatitis presented to the ED for AMS this morning where she was unable to answer questions appropriately after a shower.  states patient had a HA and vomited the night before she went to sleep. LNK was 10 pm. Patient had a similar episode a few years ago, but was admitted for DKA.  states patient has worsened neurologically since they left their house. On exam, patient was non-verbal, fixed pupils, intermittently following commands in B/L UE, but unable in LE. GCS 8. MRI performed in ED showed acute intraventricular and subarachnoid hemorrhage with early obstructive hydrocephalus and transependymal CSF egress. Also with a noted 21.5-2 mm laterally directed outpouching at the proximal right A2 QUIQUE could represent infundibulum/tortuous origin of a proximal QUIQUE branch. NSGY consulted and placed EVD after patient was intubated after concern for airway protection. Started Keppra 1g BID. CTA ordered. IR consulted for possible angiogram. No leukocytosis. H/H stable. Cr 0.9. Glucose 284. NCC will admit patient for HLOC.          Hospital Course: 01/28/2023: IR performed angiogram shows no aneurysm, AVM or AV fistula. EVD open @20. Will wean propofol and transition to precedex. Started tube feeds and SQ heparin. Transitioned from insulin gtt to detemir.   01/29/2023 [facility] vent; off sedation; TCDs w/o vasospasm; will need repeat angio within a week; MRI brain completed no lesions found; TFS to goal; replete lytes; keep I/Os nearly matched w/ IVF boluses; consider yudy if sbp dips with analgesia and/or sedation;   01/30/2023 Extubated with parameters to NC. Some wheezing post extubation, PRN duonebs and racemic epi x1. IV Dex  4mg q6 hours x 1 day and close airway watch. Cardene to maintain SBP<140. EVD open at 20. Monitoring TCDs daily, no current evidence of vasospasm. Glucose elevated, SSI in place, may require Insulin gtt 2/2 steroids.         Subjective:     Interval History:  As above    Review of Systems   Unable to perform ROS: Patient nonverbal     Objective:     Vitals:  Temp: 99.1 °F (37.3 °C)  Pulse: 96  Rhythm: sinus bradycardia  BP: 138/65  MAP (mmHg): 93  ICP Mean (mmHg): 13 mmHg  Resp: (!) 30  SpO2: 96 %  Oxygen Concentration (%): 30  Vent Mode: Spont  Pressure Support: 7 cmH20  PEEP/CPAP: 5 cmH20  Peak Airway Pressure: 13 cmH20  Mean Airway Pressure: 7 cmH20  Plateau Pressure: 0 cmH20    Temp  Min: 98.8 °F (37.1 °C)  Max: 100 °F (37.8 °C)  Pulse  Min: 48  Max: 112  BP  Min: 105/52  Max: 190/79  MAP (mmHg)  Min: 75  Max: 113  ICP Mean (mmHg)  Min: 6 mmHg  Max: 18 mmHg  Resp  Min: 15  Max: 42  SpO2  Min: 94 %  Max: 100 %  Oxygen Concentration (%)  Min: 30  Max: 30    01/29 0701 - 01/30 0700  In: 1896.3 [I.V.:148.1]  Out: 1532 [Urine:1435; Drains:97]   Unmeasured Output  Urine Occurrence: 1  Stool Occurrence: 1  Pad Count: 4       Physical Exam  Vitals and nursing note reviewed.   Constitutional:       General: She is not in acute distress.     Appearance: Normal appearance. She is well-developed and normal weight. She is not toxic-appearing.   HENT:      Head: Normocephalic.      Comments: EVD in place, open at 20     Mouth/Throat:      Mouth: Mucous membranes are moist.      Pharynx: Oropharynx is clear. No oropharyngeal exudate.   Eyes:      General: No scleral icterus.        Right eye: No discharge.         Left eye: No discharge.      Conjunctiva/sclera: Conjunctivae normal.   Cardiovascular:      Rate and Rhythm: Normal rate and regular rhythm.      Pulses: Normal pulses.      Heart sounds: Normal heart sounds. No murmur heard.  Pulmonary:      Effort: Pulmonary effort is normal. No respiratory distress.      Breath  sounds: Normal breath sounds.   Chest:      Chest wall: No tenderness.   Abdominal:      General: Abdomen is flat. There is no distension.      Palpations: Abdomen is soft.      Tenderness: There is no abdominal tenderness.   Musculoskeletal:      Right lower leg: No edema.      Left lower leg: No edema.   Skin:     General: Skin is warm.      Findings: No bruising or rash.      Comments: R groin site C/D/I without hematoma.    Neurological:      Comments:   Off sedation  EVD site c/d/I, open at 20  Not following commands  PERRL. Eyes open spontaneously.   No facial droop.   Spontaneuously moves all extremities.   RUE > LUE  BUE  BLE 4/5         Unable to test orientation, language, memory, hearing, gait due to level of consciousness.    Medications:  ContinuousniCARdipine, Last Rate: 5 mg/hr (01/30/23 1446)  Scheduledalbuterol-ipratropium, 3 mL, Q6H WAKE  ceFAZolin (ANCEF) IVPB, 2 g, Q8H  dexAMETHasone, 4 mg, Q6H  famotidine, 20 mg, BID  heparin (porcine), 5,000 Units, Q8H  insulin detemir U-100, 10 Units, BID  levetiracetam IV, 500 mg, Q12H  mupirocin, , BID  niMODipine, 60 mg, Q4H  PRNdextrose 10%, 12.5 g, PRN  fentaNYL, 50 mcg, Q2H PRN  glucagon (human recombinant), 1 mg, PRN  hydrALAZINE, 10 mg, Q6H PRN  insulin aspart U-100, 1-10 Units, Q4H PRN  labetalol, 10 mg, Q4H PRN  ondansetron, 4 mg, Q6H PRN  sodium chloride 0.9%, 10 mL, PRN  sodium chloride 0.9%, 10 mL, PRN    Today I personally reviewed pertinent medications, lines/drains/airways, imaging, laboratory results, microbiology results, notably:    Recent Labs     01/29/23  0406   PH 7.456*   PCO2 34.2*   PO2 131*   HCO3 24.1   POCSATURATED 99   BE 0       Recent Labs   Lab 01/30/23  0129   CALCIUM 9.0   PROT 6.4      K 3.5   CO2 22*   *   BUN 11   CREATININE 0.7   ALKPHOS 131   ALT 73*   *   BILITOT 0.4       Recent Labs   Lab 01/30/23  0129   WBC 9.02   RBC 3.60*   HGB 11.0*   HCT 34.5*      MCV 96   MCH 30.6   MCHC 31.9*        Diet  Diet NPO  Diet NPO          Assessment/Plan:     Neuro  * SAH (subarachnoid hemorrhage)  53 yo F w/ PMH DM1, HTN presented to the ED with AMS after HA and vomiting the night before. Patient was non-verbal, B/l fixed pupils, and only intermittently following commands in B/L UE. GCS 8. MRI shows Acute IVH, SAH w/ obstructive hydrocephalus w/o signs of ischemia and R A2 QUIQUE aneurysm. VN consulted. Patient intubated in ED after concern for airway protection. NSGY placed EVD. IR consulted for possible angiogram. CTA shows diffuse subarachnoid hemorrhage with moderate hydrocephalus. No intracranial aneurysm or AVM is identified. NIHSS 9 on admission.     - IR angiogram on 1/27 shows no aneurysm, AVM, or AV fistula. Will need repeat angiogram on 2/3; will discuss with IR   - CTA shows diffuse subarachnoid hemorrhage with moderate hydrocephalus. No intracranial aneurysm or AVM is identified.  - CT Head stable after EVD placed    - Continued monitoring in NCC  - Q1H neuro checks and vitals  - NSGY following; appreciate recs  - EVD open @20- monitor ICPs and output  - Ancef for drain ppx  - Nimotop 60 q4h  - Daily TCDs  - Echo   - CBC, CMP, Mag, Phos now and then daily   - SBP goal <140  - TF @40 cc/hr  - Cardene gtt, wean as able   - PRN labetalol, hydralazine  - SQ heparin  - PT/OT/SLP    IVH (intraventricular hemorrhage)  See SAH  EVD care    Vasogenic cerebral edema  See SAH    Cardiac/Vascular  Hypertension  SBP 160s on arrival.   states patient does not take home Losartan regularly.     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 BG on arrival 284  Home regimen 13u Lantus; Lispro AC  A1C 6.5     -Transitioned from Insulin gtt to Detemir 10u BID  -Hypoglycemic protocol in place  -Accuchecks q1h    GI  Autoimmune hepatitis  Hx of AIH. Previously on mercaptopurine. Not  currently taking.   LFTs slightly elevated on arrival.    -Will continue to monitor  -Holding Tylenol   -CMP daily  -If LFTs worsen will obtain RUQ US.     Other  Endotracheally intubated  Vent Mode: Spont  Oxygen Concentration (%):  [30] 30  Resp Rate Total:  [15 br/min-41 br/min] 19 br/min  PEEP/CPAP:  [5 cmH20] 5 cmH20  Pressure Support:  [7 cmH20] 7 cmH20  Mean Airway Pressure:  [7 cmH20-8.1 cmH20] 7 cmH20     Patient intubated in ED 2/2 concern for airway protection. GCS 7  Extubated 1/30 to NC  PRN Duonebs           The patient is being Prophylaxed for:  Venous Thromboembolism with: Chemical  Stress Ulcer with: Not Applicable   Ventilator Pneumonia with: not applicable    Activity Orders          Progressive Mobility Protocol (mobilize patient to their highest level of functioning at least twice daily) starting at 01/28 0800    Elevate HOB Collagen closure or PERCLOSE plug/device - Elevate HOB 30 degrees with limb immobilized for 2 hours after procedure. starting at 01/27 2018    Turn patient starting at 01/27 1400    Elevate HOB starting at 01/27 1221    Diet NPO: NPO starting at 01/27 1221        Full Code    Ronak Pavon MD  Neurocritical Care  [facility]

## 2023-01-30 NOTE — SUBJECTIVE & OBJECTIVE
Subjective:     Interval History:  As above    Review of Systems   Unable to perform ROS: Patient nonverbal     Objective:     Vitals:  Temp: 99.1 °F (37.3 °C)  Pulse: 96  Rhythm: sinus bradycardia  BP: 138/65  MAP (mmHg): 93  ICP Mean (mmHg): 13 mmHg  Resp: (!) 30  SpO2: 96 %  Oxygen Concentration (%): 30  Vent Mode: Spont  Pressure Support: 7 cmH20  PEEP/CPAP: 5 cmH20  Peak Airway Pressure: 13 cmH20  Mean Airway Pressure: 7 cmH20  Plateau Pressure: 0 cmH20    Temp  Min: 98.8 °F (37.1 °C)  Max: 100 °F (37.8 °C)  Pulse  Min: 48  Max: 112  BP  Min: 105/52  Max: 190/79  MAP (mmHg)  Min: 75  Max: 113  ICP Mean (mmHg)  Min: 6 mmHg  Max: 18 mmHg  Resp  Min: 15  Max: 42  SpO2  Min: 94 %  Max: 100 %  Oxygen Concentration (%)  Min: 30  Max: 30    01/29 0701 - 01/30 0700  In: 1896.3 [I.V.:148.1]  Out: 1532 [Urine:1435; Drains:97]   Unmeasured Output  Urine Occurrence: 1  Stool Occurrence: 1  Pad Count: 4       Physical Exam  Vitals and nursing note reviewed.   Constitutional:       General: She is not in acute distress.     Appearance: Normal appearance. She is well-developed and normal weight. She is not toxic-appearing.   HENT:      Head: Normocephalic.      Comments: EVD in place, open at 20     Mouth/Throat:      Mouth: Mucous membranes are moist.      Pharynx: Oropharynx is clear. No oropharyngeal exudate.   Eyes:      General: No scleral icterus.        Right eye: No discharge.         Left eye: No discharge.      Conjunctiva/sclera: Conjunctivae normal.   Cardiovascular:      Rate and Rhythm: Normal rate and regular rhythm.      Pulses: Normal pulses.      Heart sounds: Normal heart sounds. No murmur heard.  Pulmonary:      Effort: Pulmonary effort is normal. No respiratory distress.      Breath sounds: Normal breath sounds.   Chest:      Chest wall: No tenderness.   Abdominal:      General: Abdomen is flat. There is no distension.      Palpations: Abdomen is soft.      Tenderness: There is no abdominal tenderness.    Musculoskeletal:      Right lower leg: No edema.      Left lower leg: No edema.   Skin:     General: Skin is warm.      Findings: No bruising or rash.      Comments: R groin site C/D/I without hematoma.    Neurological:      Comments:   Off sedation  EVD site c/d/I, open at 20  Not following commands  PERRL. Eyes open spontaneously.   No facial droop.   Spontaneuously moves all extremities.   RUE > LUE  BUE  BLE 4/5         Unable to test orientation, language, memory, hearing, gait due to level of consciousness.    Medications:  ContinuousniCARdipine, Last Rate: 5 mg/hr (01/30/23 1446)  Scheduledalbuterol-ipratropium, 3 mL, Q6H WAKE  ceFAZolin (ANCEF) IVPB, 2 g, Q8H  dexAMETHasone, 4 mg, Q6H  famotidine, 20 mg, BID  heparin (porcine), 5,000 Units, Q8H  insulin detemir U-100, 10 Units, BID  levetiracetam IV, 500 mg, Q12H  mupirocin, , BID  niMODipine, 60 mg, Q4H  PRNdextrose 10%, 12.5 g, PRN  fentaNYL, 50 mcg, Q2H PRN  glucagon (human recombinant), 1 mg, PRN  hydrALAZINE, 10 mg, Q6H PRN  insulin aspart U-100, 1-10 Units, Q4H PRN  labetalol, 10 mg, Q4H PRN  ondansetron, 4 mg, Q6H PRN  sodium chloride 0.9%, 10 mL, PRN  sodium chloride 0.9%, 10 mL, PRN    Today I personally reviewed pertinent medications, lines/drains/airways, imaging, laboratory results, microbiology results, notably:    Recent Labs     01/29/23  0406   PH 7.456*   PCO2 34.2*   PO2 131*   HCO3 24.1   POCSATURATED 99   BE 0       Recent Labs   Lab 01/30/23  0129   CALCIUM 9.0   PROT 6.4      K 3.5   CO2 22*   *   BUN 11   CREATININE 0.7   ALKPHOS 131   ALT 73*   *   BILITOT 0.4       Recent Labs   Lab 01/30/23  0129   WBC 9.02   RBC 3.60*   HGB 11.0*   HCT 34.5*      MCV 96   MCH 30.6   MCHC 31.9*       Diet  Diet NPO  Diet NPO

## 2023-01-30 NOTE — PLAN OF CARE
[facility]  Discharge Reassessment    Primary Care Provider: Frieda Mera MD    Expected Discharge Date: 02/08/2023    Patient intubated on vent.  EVD.  Not medically stable for discharge.  Per MD- SAH protocol          - daily TCDs x 14 days    Reassessment (most recent)       Discharge Reassessment - 01/30/23 1015          Discharge Reassessment    Assessment Type Discharge Planning Reassessment     Did the patient's condition or plan change since previous assessment? No     Communicated ISAIAH with patient/caregiver Date not available/Unable to determine     Discharge Plan A Long-term acute care facility (LTAC)     Discharge Plan B Rehab     DME Needed Upon Discharge  other (see comments)   tbd    Discharge Barriers Identified None     Why the patient remains in the hospital Requires continued medical care                   Vanessa Patricia RN, CCRN-K, [facility]  Neuro-Critical Care   [phone]

## 2023-01-30 NOTE — PLAN OF CARE
Problem: Adult Inpatient Plan of Care  Goal: Plan of Care Review  Outcome: Ongoing, Progressing  Goal: Patient-Specific Goal (Individualized)  Description: Admit Date:     Admit Dx:    Past Medical History:  No date: Autoimmune hepatitis      Comment:  managed by Dr. Russell on mercaptopurine  No date: Diabetes mellitus type II  No date: Hypertension    Past Surgical History:  :  SECTION, CLASSIC  2019: COLONOSCOPY; N/A      Comment:  Procedure: COLONOSCOPY;  Surgeon: Dipesh Victoria MD;  Location: [address]);  Service: Endoscopy;                Laterality: N/A;  2013: FRACTURE SURGERY; Left      Comment:  leg   No date: MOUTH SURGERY    Individualization:   1. Lights dim    Restraints: 23 yes-pulling lines         Outcome: Ongoing, Progressing  Goal: Absence of Hospital-Acquired Illness or Injury  Outcome: Ongoing, Progressing  Goal: Optimal Comfort and Wellbeing  Outcome: Ongoing, Progressing  Goal: Readiness for Transition of Care  Outcome: Ongoing, Progressing     Problem: Adjustment to Illness (Stroke, Hemorrhagic)  Goal: Optimal Coping  Outcome: Ongoing, Progressing     Problem: Bowel Elimination Impaired (Stroke, Hemorrhagic)  Goal: Effective Bowel Elimination  Outcome: Ongoing, Progressing     Problem: Cerebral Tissue Perfusion (Stroke, Hemorrhagic)  Goal: Optimal Cerebral Tissue Perfusion  Outcome: Ongoing, Progressing     Problem: Cognitive Impairment (Stroke, Hemorrhagic)  Goal: Optimal Cognitive Function  Outcome: Ongoing, Progressing     Problem: Communication Impairment (Stroke, Hemorrhagic)  Goal: Effective Communication Skills  Outcome: Ongoing, Progressing     Problem: Functional Ability Impaired (Stroke, Hemorrhagic)  Goal: Optimal Functional Ability  Outcome: Ongoing, Progressing     Problem: Pain (Stroke, Hemorrhagic)  Goal: Acceptable Pain Control  Outcome: Ongoing, Progressing     Problem: Respiratory Compromise (Stroke, Hemorrhagic)  Goal:  Effective Oxygenation and Ventilation  Outcome: Ongoing, Progressing     Problem: Sensorimotor Impairment (Stroke, Hemorrhagic)  Goal: Improved Sensorimotor Function  Outcome: Ongoing, Progressing     Problem: Swallowing Impairment (Stroke, Hemorrhagic)  Goal: Optimal Eating and Swallowing Without Aspiration  Outcome: Ongoing, Progressing     Problem: Urinary Elimination Impaired (Stroke, Hemorrhagic)  Goal: Effective Urinary Elimination  Outcome: Ongoing, Progressing     Problem: Diabetes Comorbidity  Goal: Blood Glucose Level Within Targeted Range  Outcome: Ongoing, Progressing     Problem: Infection  Goal: Absence of Infection Signs and Symptoms  Outcome: Ongoing, Progressing     Problem: Skin Injury Risk Increased  Goal: Skin Health and Integrity  Outcome: Ongoing, Progressing     Problem: Fall Injury Risk  Goal: Absence of Fall and Fall-Related Injury  Outcome: Ongoing, Progressing     Problem: Restraint, Nonbehavioral (Nonviolent)  Goal: Absence of Harm or Injury  Outcome: Ongoing, Progressing     Problem: Noninvasive Ventilation Acute  Goal: Effective Unassisted Ventilation and Oxygenation  Outcome: Ongoing, Progressing     Problem: Adjustment to Illness (Delirium)  Goal: Optimal Coping  Outcome: Ongoing, Progressing     Problem: Altered Behavior (Delirium)  Goal: Improved Behavioral Control  Outcome: Ongoing, Progressing     Problem: Attention and Thought Clarity Impairment (Delirium)  Goal: Improved Attention and Thought Clarity  Outcome: Ongoing, Progressing     Problem: Sleep Disturbance (Delirium)  Goal: Improved Sleep  Outcome: Ongoing, Progressing

## 2023-01-30 NOTE — PLAN OF CARE
[facility] Care Plan    POC reviewed with Leida Hoyos and family at 1400. Pt verbalized understanding. Questions and concerns addressed. No acute events today. Pt progressing toward goals. Will continue to monitor. See below and flowsheets for full assessment and VS info.     -Pt extubated with parameters  -Breathing treatments given for stridor  -Pt passed swallow screen for medications  -Speech/swallow consult ordered  -Nicardipine started to keep blood pressure under a systolic of 140. PRNs not effective          Is this a stroke patient? yes- Stroke booklet reviewed with patient and family, risk factors identified for patient and stroke booklet remains at bedside for ongoing education.     Neuro:  Townsend Coma Scale  Best Eye Response: 3-->(E3) to speech  Best Motor Response: 5-->(M5) localizes pain  Best Verbal Response: 2-->(V2) incomprehensible speech  Lexus Coma Scale Score: 10  Assessment Qualifiers: no eye obstruction present, patient intubated  Pupil PERRLA: yes     24 hr Temp:  [98.8 °F (37.1 °C)-100 °F (37.8 °C)]     CV:   Rhythm: sinus bradycardia  BP goals:   SBP < 140  MAP > 65    Resp:      Vent Mode: Spont  Set Rate: 16 BPM  Oxygen Concentration (%): 30  Vt Set: 420 mL  PEEP/CPAP: 5 cmH20  Pressure Support: 7 cmH20    Plan: N/A    GI/:     Diet/Nutrition Received: tube feeding  Last Bowel Movement: 01/30/23  Voiding Characteristics: voids spontaneously without difficulty    Intake/Output Summary (Last 24 hours) at 1/30/2023 1737  Last data filed at 1/30/2023 1734  Gross per 24 hour   Intake 2460.42 ml   Output 537 ml   Net 1923.42 ml     Unmeasured Output  Urine Occurrence: 1  Stool Occurrence: 1  Pad Count: 3    Labs/Accuchecks:  Recent Labs   Lab 01/30/23  0129   WBC 9.02   RBC 3.60*   HGB 11.0*   HCT 34.5*         Recent Labs   Lab 01/30/23  0129      K 3.5   CO2 22*   *   BUN 11   CREATININE 0.7   ALKPHOS 131   ALT 73*   *   BILITOT 0.4      Recent Labs   Lab  01/27/23  0955   INR 1.2      Recent Labs   Lab 01/27/23  0949   TROPONINI <0.006       Electrolytes: N/A - electrolytes WDL  Accuchecks: Q4H    Gtts:   niCARdipine 5 mg/hr (01/30/23 1734)       LDA/Wounds:  Lines/Drains/Airways       Drain  Duration                  ICP/Ventriculostomy 01/27/23 1400 3 days              Peripheral Intravenous Line  Duration                  Peripheral IV - Single Lumen 01/27/23 0945 20 G Right Antecubital 3 days         Peripheral IV - Single Lumen 01/28/23 1400 20 G;1 3/4 in Anterior;Left Forearm 2 days                  Wounds: Yes  Wound care consulted: No

## 2023-01-30 NOTE — PLAN OF CARE
[facility]  Initial Discharge Assessment       Primary Care Provider: Frieda Mera MD    Admission Diagnosis: Subarachnoid hemorrhage [I60.9]  Altered mental status [R41.82]  Stroke [I63.9]  Altered mental status, unspecified [R41.82]    Admission Date: 1/27/2023  Expected Discharge Date:        Payor: [employer] / Plan: BCBS ALL OUT OF STATE / Product Type: PPO /     Extended Emergency Contact Information  Primary Emergency Contact: Syed Hoyos  Address: [address] [place] [address]  Home Phone: [phone]  Mobile Phone: [phone]  Relation: Spouse          [facility] - [place] [id number] [facility]  [address] [facility] [address]  Phone: [phone] Fax: [phone]    [facility] - [facility] [address] [facility]  [address] [facility] [address]  Phone: [phone] Fax: [phone]      Initial Assessment (most recent)       Adult Discharge Assessment - 01/27/23 1700          Discharge Assessment    Assessment Type Discharge Planning Assessment                   CM to patient's room to complete assessment. No family at bedside at present. Spoke with bedside RN, Pablo Rascon, patient is going down to IR for procedure.    CM/SW staff will follow-up at later time to complete.

## 2023-01-30 NOTE — ASSESSMENT & PLAN NOTE
Diagnosed in 2013. BG on arrival 284  Home regimen 13u Lantus; Lispro AC  A1C 6.5     -Transitioned from Insulin gtt to Detemir 10u BID  -Hypoglycemic protocol in place  -Accuchecks q1h

## 2023-01-30 NOTE — ASSESSMENT & PLAN NOTE
Hx of AIH. Previously on mercaptopurine. Not currently taking.   LFTs slightly elevated on arrival.    -Will continue to monitor  -Holding Tylenol   -CMP daily  -If LFTs worsen will obtain RUQ US.

## 2023-01-30 NOTE — ASSESSMENT & PLAN NOTE
Vent Mode: Spont  Oxygen Concentration (%):  [30] 30  Resp Rate Total:  [15 br/min-41 br/min] 19 br/min  PEEP/CPAP:  [5 cmH20] 5 cmH20  Pressure Support:  [7 cmH20] 7 cmH20  Mean Airway Pressure:  [7 cmH20-8.1 cmH20] 7 cmH20     Patient intubated in ED 2/2 concern for airway protection. GCS 7  Extubated 1/30 to NC  PRN Duonebs

## 2023-01-30 NOTE — SUBJECTIVE & OBJECTIVE
"Interval History: 1/30: PBD 3, exam improving, following commands. R EVD at 20, output 92, ICP 13-22        Review of Systems   Unable to perform ROS: Patient unresponsive   Neurological:  Weakness: 4.   Objective:     Weight: 61.2 kg (135 lb)  Body mass index is 24.69 kg/m².  Vital Signs (Most Recent):  Temp: 98.8 °F (37.1 °C) (01/30/23 0302)  Pulse: 63 (01/30/23 0805)  Resp: (!) 21 (01/30/23 0805)  BP: (!) 154/67 (01/30/23 0805)  SpO2: 97 % (01/30/23 0805)   Vital Signs (24h Range):  Temp:  [98.8 °F (37.1 °C)-100 °F (37.8 °C)] 98.8 °F (37.1 °C)  Pulse:  [] 63  Resp:  [15-31] 21  SpO2:  [95 %-99 %] 97 %  BP: (105-162)/(52-90) 154/67     Date 01/30/23 0700 - 01/31/23 0659   Shift 0701-0762 4984-6687 9815-5024 24 Hour Total   INTAKE   Shift Total(mL/kg)       OUTPUT   Drains 10   10   Shift Total(mL/kg) 10(0.2)   10(0.2)   Weight (kg) 61.2 61.2 61.2 61.2                Vent Mode: Spont  Oxygen Concentration (%):  [30] 30  Resp Rate Total:  [15 br/min-41 br/min] 18 br/min  PEEP/CPAP:  [5 cmH20] 5 cmH20  Pressure Support:  [7 cmH20] 7 cmH20  Mean Airway Pressure:  [7 cmH20-8.1 cmH20] 7 cmH20         Urethral Catheter 01/27/23 1509 (Active)   $ Monzon Insertion Bedside Insertion Performed 01/27/23 1601   Site Assessment Clean;Intact 01/27/23 1501   Collection Container Urimeter 01/27/23 1501   Securement Method secured to top of thigh w/ adhesive device 01/27/23 1501   Catheter Care Performed yes 01/27/23 1501   Reason for Continuing Urinary Catheterization Critically ill in ICU and requiring hourly monitoring of intake/output 01/27/23 1501   CAUTI Prevention Bundle Securement Device in place with 1" slack;Intact seal between catheter & drainage tubing;Drainage bag/urimeter off the floor;Sheeting clip in use;No dependent loops or kinks;Drainage bag/urimeter not overfilled (<2/3 full);Drainage bag/urimeter below bladder 01/27/23 1501   Output (mL) 250 mL 01/27/23 1700            ICP/Ventriculostomy 01/27/23 1400 " (Active)   $ ICP/Ventriculostomy [place] Placement Bedside Insertion Performed 01/27/23 1501   Level of Ventriculostomy (cm above) 20 01/27/23 1501   Status Open to drainage 01/27/23 1501   Site Assessment Clean;Dry 01/27/23 1501   Site Drainage No drainage 01/27/23 1501   Waveform normal waveform 01/27/23 1501   Output (mL) 0 mL 01/27/23 1700   CSF Color clear 01/27/23 1501   Dressing Status Clean;Dry;Intact 01/27/23 1501   Interventions HOB degrees;bed controls locked;zeroed;level adjusted per order 01/27/23 1501     Physical Exam:    Constitutional: No distress.     HEENT: atraumatic/normocephalic    Cardiovascular: Regular rhythm.     Pulm: aerating well, saturating well    Abdominal: Soft.     Psych/Behavior: He is alert.     E4VtM6  PERRL  EOMI  Face Symmetric  BUE mimics  BLE FC  AGx4        Significant Labs:  Recent Labs   Lab 01/29/23  0210 01/30/23  0129   * 227*    144   K 4.0 3.5   * 112*   CO2 16* 22*   BUN 10 11   CREATININE 0.7 0.7   CALCIUM 8.7 9.0   MG 2.0 2.0       Recent Labs   Lab 01/29/23  0210 01/30/23  0129   WBC 10.19 9.02   HGB 11.7* 11.0*   HCT 37.3 34.5*    182       No results for input(s): LABPT, INR, APTT in the last 48 hours.    Microbiology Results (last 7 days)       ** No results found for the last 168 hours. **          All pertinent labs from the last 24 hours have been reviewed.    Significant Diagnostics:  I have reviewed all pertinent imaging results/findings within the past 24 hours.

## 2023-01-30 NOTE — ASSESSMENT & PLAN NOTE
51 yo F with PMH of DM1, autoimmune hepatitis, and HTN who presents to ED and was found to have diffuse SAH and hydrocephalus    PBD 3    - admit to NCC  - q1 neuro checks  - all labs and diagnostics reviewed   - MRI/CTA showed diffuse SAH and small volume IVH with hydrocephalus, no identifiable aneurysm   - CTH repeat with EVD in good position   - CTA without identifiable aneurysm   - DSA 1/27 without any identifiable aneurysm   - MRI brain/C spine and MRI vessel wall imaging 1/29: no vascular anomaly seen or vessell wall enhancement   - will need repeat DSA on PBD 7  - EVD open at 20, abx while in place  - SAH protocol   - daily TCDs x 14 days   - keppra 500mg x 7 days   - goal euvolemia to 1L positive  - Na >135, hypertonics as needed  - elevate HOB  - SBP <140  - intubated for airway protection, WTE when/if appropriate  - NPO  - ok for SQH    Dispo: ongoing

## 2023-01-30 NOTE — ASSESSMENT & PLAN NOTE
51 yo F w/ PMH DM1, HTN presented to the ED with AMS after HA and vomiting the night before. Patient was non-verbal, B/l fixed pupils, and only intermittently following commands in B/L UE. GCS 8. MRI shows Acute IVH, SAH w/ obstructive hydrocephalus w/o signs of ischemia and R A2 QUIQUE aneurysm. VN consulted. Patient intubated in ED after concern for airway protection. NSGY placed EVD. IR consulted for possible angiogram. CTA shows diffuse subarachnoid hemorrhage with moderate hydrocephalus. No intracranial aneurysm or AVM is identified. NIHSS 9 on admission.     - IR angiogram on 1/27 shows no aneurysm, AVM, or AV fistula. Will need repeat angiogram on 2/3; will discuss with IR   - CTA shows diffuse subarachnoid hemorrhage with moderate hydrocephalus. No intracranial aneurysm or AVM is identified.  - CT Head stable after EVD placed    - Continued monitoring in NCC  - Q1H neuro checks and vitals  - NSGY following; appreciate recs  - EVD open @20- monitor ICPs and output  - Ancef for drain ppx  - Nimotop 60 q4h  - Daily TCDs  - Echo   - CBC, CMP, Mag, Phos now and then daily   - SBP goal <140  - TF @40 cc/hr  - Cardene gtt, wean as able   - PRN labetalol, hydralazine  - SQ heparin  - PT/OT/SLP

## 2023-01-30 NOTE — PROGRESS NOTES
[facility]  Neurosurgery  Progress Note    Subjective:     History of Present Illness: 53 yo F with PMH of DM1, autoimmune hepatitis, and HTN who presents to ED this am after confusion this am. She was LKN last night and became confused after showering this am so  brought to the ED. She quickly decompensated in the ER and was not following commands or waking to stimulation. MRI and CTA showed diffuse thick SAH and hydrocephalus. No identifiable aneurysm seen on CTA.       Post-Op Info:  Procedure(s) (LRB):  ANGIOGRAM-CEREBRAL (N/A)   3 Days Post-Op     Interval History: 1/30: PBD 3, exam improving, following commands. R EVD at 20, output 92, ICP 13-22        Review of Systems   Unable to perform ROS: Patient unresponsive   Neurological:  Weakness: 4.   Objective:     Weight: 61.2 kg (135 lb)  Body mass index is 24.69 kg/m².  Vital Signs (Most Recent):  Temp: 98.8 °F (37.1 °C) (01/30/23 0302)  Pulse: 63 (01/30/23 0805)  Resp: (!) 21 (01/30/23 0805)  BP: (!) 154/67 (01/30/23 0805)  SpO2: 97 % (01/30/23 0805)   Vital Signs (24h Range):  Temp:  [98.8 °F (37.1 °C)-100 °F (37.8 °C)] 98.8 °F (37.1 °C)  Pulse:  [] 63  Resp:  [15-31] 21  SpO2:  [95 %-99 %] 97 %  BP: (105-162)/(52-90) 154/67     Date 01/30/23 0700 - 01/31/23 0659   Shift 5427-1841 5647-7380 0531-9789 24 Hour Total   INTAKE   Shift Total(mL/kg)       OUTPUT   Drains 10   10   Shift Total(mL/kg) 10(0.2)   10(0.2)   Weight (kg) 61.2 61.2 61.2 61.2                Vent Mode: Spont  Oxygen Concentration (%):  [30] 30  Resp Rate Total:  [15 br/min-41 br/min] 18 br/min  PEEP/CPAP:  [5 cmH20] 5 cmH20  Pressure Support:  [7 cmH20] 7 cmH20  Mean Airway Pressure:  [7 cmH20-8.1 cmH20] 7 cmH20         Urethral Catheter 01/27/23 1509 (Active)   $ Monzon Insertion Bedside Insertion Performed 01/27/23 1601   Site Assessment Clean;Intact 01/27/23 1501   Collection Container Urimeter 01/27/23 1501   Securement Method secured to top of thigh w/  "adhesive device 01/27/23 1501   Catheter Care Performed yes 01/27/23 1501   Reason for Continuing Urinary Catheterization Critically ill in ICU and requiring hourly monitoring of intake/output 01/27/23 1501   CAUTI Prevention Bundle Securement Device in place with 1" slack;Intact seal between catheter & drainage tubing;Drainage bag/urimeter off the floor;Sheeting clip in use;No dependent loops or kinks;Drainage bag/urimeter not overfilled (<2/3 full);Drainage bag/urimeter below bladder 01/27/23 1501   Output (mL) 250 mL 01/27/23 1700            ICP/Ventriculostomy 01/27/23 1400 (Active)   $ ICP/Ventriculostomy [place] Placement Bedside Insertion Performed 01/27/23 1501   Level of Ventriculostomy (cm above) 20 01/27/23 1501   Status Open to drainage 01/27/23 1501   Site Assessment Clean;Dry 01/27/23 1501   Site Drainage No drainage 01/27/23 1501   Waveform normal waveform 01/27/23 1501   Output (mL) 0 mL 01/27/23 1700   CSF Color clear 01/27/23 1501   Dressing Status Clean;Dry;Intact 01/27/23 1501   Interventions HOB degrees;bed controls locked;zeroed;level adjusted per order 01/27/23 1501     Physical Exam:    Constitutional: No distress.     HEENT: atraumatic/normocephalic    Cardiovascular: Regular rhythm.     Pulm: aerating well, saturating well    Abdominal: Soft.     Psych/Behavior: He is alert.     E4VtM6  PERRL  EOMI  Face Symmetric  BUE mimics  BLE FC  AGx4        Significant Labs:  Recent Labs   Lab 01/29/23  0210 01/30/23  0129   * 227*    144   K 4.0 3.5   * 112*   CO2 16* 22*   BUN 10 11   CREATININE 0.7 0.7   CALCIUM 8.7 9.0   MG 2.0 2.0       Recent Labs   Lab 01/29/23  0210 01/30/23  0129   WBC 10.19 9.02   HGB 11.7* 11.0*   HCT 37.3 34.5*    182       No results for input(s): LABPT, INR, APTT in the last 48 hours.    Microbiology Results (last 7 days)       ** No results found for the last 168 hours. **          All pertinent labs from the last 24 hours have been " reviewed.    Significant Diagnostics:  I have reviewed all pertinent imaging results/findings within the past 24 hours.      Assessment/Plan:     * SAH (subarachnoid hemorrhage)  51 yo F with PMH of DM1, autoimmune hepatitis, and HTN who presents to ED and was found to have diffuse SAH and hydrocephalus    PBD 3    - admit to NCC  - q1 neuro checks  - all labs and diagnostics reviewed   - MRI/CTA showed diffuse SAH and small volume IVH with hydrocephalus, no identifiable aneurysm   - CTH repeat with EVD in good position   - CTA without identifiable aneurysm   - DSA 1/27 without any identifiable aneurysm   - MRI brain/C spine and MRI vessel wall imaging 1/29: no vascular anomaly seen or vessell wall enhancement   - will need repeat DSA on PBD 7  - EVD open at 20, abx while in place  - SAH protocol   - daily TCDs x 14 days   - keppra 500mg x 7 days   - goal euvolemia to 1L positive  - Na >135, hypertonics as needed  - elevate HOB  - SBP <140  - intubated for airway protection, WTE when/if appropriate  - NPO  - ok for SQH    Dispo: ongoing        Maren Villanueva MD  Neurosurgery  [facility]

## 2023-01-31 LAB
ABO + RH BLD: NORMAL
ALBUMIN SERPL BCP-MCNC: 3.6 G/DL (ref 3.5–5.2)
ALP SERPL-CCNC: 151 U/L (ref 55–135)
ALT SERPL W/O P-5'-P-CCNC: 58 U/L (ref 10–44)
ANION GAP SERPL CALC-SCNC: 15 MMOL/L (ref 8–16)
AST SERPL-CCNC: 36 U/L (ref 10–40)
BASOPHILS # BLD AUTO: 0.03 K/UL (ref 0–0.2)
BASOPHILS NFR BLD: 0.2 % (ref 0–1.9)
BILIRUB SERPL-MCNC: 0.6 MG/DL (ref 0.1–1)
BLD GP AB SCN CELLS X3 SERPL QL: NORMAL
BUN SERPL-MCNC: 14 MG/DL (ref 6–20)
CALCIUM SERPL-MCNC: 9.9 MG/DL (ref 8.7–10.5)
CHLORIDE SERPL-SCNC: 112 MMOL/L (ref 95–110)
CO2 SERPL-SCNC: 20 MMOL/L (ref 23–29)
CREAT SERPL-MCNC: 0.8 MG/DL (ref 0.5–1.4)
DIFFERENTIAL METHOD: ABNORMAL
EOSINOPHIL # BLD AUTO: 0 K/UL (ref 0–0.5)
EOSINOPHIL NFR BLD: 0 % (ref 0–8)
ERYTHROCYTE [DISTWIDTH] IN BLOOD BY AUTOMATED COUNT: 12.5 % (ref 11.5–14.5)
EST. GFR  (NO RACE VARIABLE): >60 ML/MIN/1.73 M^2
GLUCOSE SERPL-MCNC: 268 MG/DL (ref 70–110)
HCT VFR BLD AUTO: 38.5 % (ref 37–48.5)
HGB BLD-MCNC: 12.2 G/DL (ref 12–16)
IMM GRANULOCYTES # BLD AUTO: 0.06 K/UL (ref 0–0.04)
IMM GRANULOCYTES NFR BLD AUTO: 0.4 % (ref 0–0.5)
LYMPHOCYTES # BLD AUTO: 0.7 K/UL (ref 1–4.8)
LYMPHOCYTES NFR BLD: 4.8 % (ref 18–48)
MAGNESIUM SERPL-MCNC: 2.1 MG/DL (ref 1.6–2.6)
MCH RBC QN AUTO: 30.4 PG (ref 27–31)
MCHC RBC AUTO-ENTMCNC: 31.7 G/DL (ref 32–36)
MCV RBC AUTO: 96 FL (ref 82–98)
MONOCYTES # BLD AUTO: 0.4 K/UL (ref 0.3–1)
MONOCYTES NFR BLD: 2.9 % (ref 4–15)
NEUTROPHILS # BLD AUTO: 12.9 K/UL (ref 1.8–7.7)
NEUTROPHILS NFR BLD: 91.7 % (ref 38–73)
NRBC BLD-RTO: 0 /100 WBC
PHOSPHATE SERPL-MCNC: 2.9 MG/DL (ref 2.7–4.5)
PLATELET # BLD AUTO: 199 K/UL (ref 150–450)
PMV BLD AUTO: 9.2 FL (ref 9.2–12.9)
POCT GLUCOSE: 232 MG/DL (ref 70–110)
POCT GLUCOSE: 233 MG/DL (ref 70–110)
POCT GLUCOSE: 237 MG/DL (ref 70–110)
POCT GLUCOSE: 249 MG/DL (ref 70–110)
POCT GLUCOSE: 261 MG/DL (ref 70–110)
POCT GLUCOSE: 312 MG/DL (ref 70–110)
POTASSIUM SERPL-SCNC: 4.2 MMOL/L (ref 3.5–5.1)
PROT SERPL-MCNC: 7.4 G/DL (ref 6–8.4)
RBC # BLD AUTO: 4.01 M/UL (ref 4–5.4)
SODIUM SERPL-SCNC: 147 MMOL/L (ref 136–145)
WBC # BLD AUTO: 14.03 K/UL (ref 3.9–12.7)

## 2023-01-31 PROCEDURE — 94640 AIRWAY INHALATION TREATMENT: CPT

## 2023-01-31 PROCEDURE — 99291 CRITICAL CARE FIRST HOUR: CPT | Mod: ,,, | Performed by: NURSE PRACTITIONER

## 2023-01-31 PROCEDURE — 84100 ASSAY OF PHOSPHORUS: CPT

## 2023-01-31 PROCEDURE — 83735 ASSAY OF MAGNESIUM: CPT

## 2023-01-31 PROCEDURE — 99900035 HC TECH TIME PER 15 MIN (STAT)

## 2023-01-31 PROCEDURE — 85025 COMPLETE CBC W/AUTO DIFF WBC: CPT

## 2023-01-31 PROCEDURE — 63600175 PHARM REV CODE 636 W HCPCS: Performed by: STUDENT IN AN ORGANIZED HEALTH CARE EDUCATION/TRAINING PROGRAM

## 2023-01-31 PROCEDURE — 92610 EVALUATE SWALLOWING FUNCTION: CPT

## 2023-01-31 PROCEDURE — 99233 PR SUBSEQUENT HOSPITAL CARE,LEVL III: ICD-10-PCS | Mod: ,,, | Performed by: NEUROLOGICAL SURGERY

## 2023-01-31 PROCEDURE — 86900 BLOOD TYPING SEROLOGIC ABO: CPT | Performed by: PHYSICIAN ASSISTANT

## 2023-01-31 PROCEDURE — 20000000 HC ICU ROOM

## 2023-01-31 PROCEDURE — 99233 SBSQ HOSP IP/OBS HIGH 50: CPT | Mod: ,,, | Performed by: NEUROLOGICAL SURGERY

## 2023-01-31 PROCEDURE — 97530 THERAPEUTIC ACTIVITIES: CPT

## 2023-01-31 PROCEDURE — 25000003 PHARM REV CODE 250

## 2023-01-31 PROCEDURE — 25000003 PHARM REV CODE 250: Performed by: NURSE PRACTITIONER

## 2023-01-31 PROCEDURE — 25000242 PHARM REV CODE 250 ALT 637 W/ HCPCS: Performed by: STUDENT IN AN ORGANIZED HEALTH CARE EDUCATION/TRAINING PROGRAM

## 2023-01-31 PROCEDURE — 97535 SELF CARE MNGMENT TRAINING: CPT

## 2023-01-31 PROCEDURE — 94761 N-INVAS EAR/PLS OXIMETRY MLT: CPT

## 2023-01-31 PROCEDURE — 25000003 PHARM REV CODE 250: Performed by: STUDENT IN AN ORGANIZED HEALTH CARE EDUCATION/TRAINING PROGRAM

## 2023-01-31 PROCEDURE — 63600175 PHARM REV CODE 636 W HCPCS: Performed by: NURSE PRACTITIONER

## 2023-01-31 PROCEDURE — 97162 PT EVAL MOD COMPLEX 30 MIN: CPT

## 2023-01-31 PROCEDURE — 99291 PR CRITICAL CARE, E/M 30-74 MINUTES: ICD-10-PCS | Mod: ,,, | Performed by: NURSE PRACTITIONER

## 2023-01-31 PROCEDURE — 97112 NEUROMUSCULAR REEDUCATION: CPT

## 2023-01-31 PROCEDURE — 25000003 PHARM REV CODE 250: Performed by: INTERNAL MEDICINE

## 2023-01-31 PROCEDURE — 80053 COMPREHEN METABOLIC PANEL: CPT

## 2023-01-31 PROCEDURE — 63600175 PHARM REV CODE 636 W HCPCS

## 2023-01-31 PROCEDURE — 25000003 PHARM REV CODE 250: Performed by: PSYCHIATRY & NEUROLOGY

## 2023-01-31 PROCEDURE — 27000221 HC OXYGEN, UP TO 24 HOURS

## 2023-01-31 RX ORDER — INSULIN ASPART 100 [IU]/ML
0-15 INJECTION, SOLUTION INTRAVENOUS; SUBCUTANEOUS
Status: DISCONTINUED | OUTPATIENT
Start: 2023-01-31 | End: 2023-01-31

## 2023-01-31 RX ORDER — IBUPROFEN 200 MG
16 TABLET ORAL
Status: DISCONTINUED | OUTPATIENT
Start: 2023-01-31 | End: 2023-01-31

## 2023-01-31 RX ORDER — IBUPROFEN 200 MG
16 TABLET ORAL
Status: DISCONTINUED | OUTPATIENT
Start: 2023-01-31 | End: 2023-02-15 | Stop reason: HOSPADM

## 2023-01-31 RX ORDER — IBUPROFEN 200 MG
24 TABLET ORAL
Status: DISCONTINUED | OUTPATIENT
Start: 2023-01-31 | End: 2023-02-15 | Stop reason: HOSPADM

## 2023-01-31 RX ORDER — IBUPROFEN 200 MG
24 TABLET ORAL
Status: DISCONTINUED | OUTPATIENT
Start: 2023-01-31 | End: 2023-01-31

## 2023-01-31 RX ORDER — GLUCAGON 1 MG
1 KIT INJECTION
Status: DISCONTINUED | OUTPATIENT
Start: 2023-01-31 | End: 2023-01-31

## 2023-01-31 RX ORDER — INSULIN ASPART 100 [IU]/ML
0-5 INJECTION, SOLUTION INTRAVENOUS; SUBCUTANEOUS
Status: DISCONTINUED | OUTPATIENT
Start: 2023-01-31 | End: 2023-01-31

## 2023-01-31 RX ORDER — INSULIN ASPART 100 [IU]/ML
0-15 INJECTION, SOLUTION INTRAVENOUS; SUBCUTANEOUS
Status: DISCONTINUED | OUTPATIENT
Start: 2023-01-31 | End: 2023-02-15 | Stop reason: HOSPADM

## 2023-01-31 RX ORDER — GLUCAGON 1 MG
1 KIT INJECTION
Status: DISCONTINUED | OUTPATIENT
Start: 2023-01-31 | End: 2023-02-15 | Stop reason: HOSPADM

## 2023-01-31 RX ORDER — AMANTADINE HYDROCHLORIDE 50 MG/5ML
100 SOLUTION ORAL 2 TIMES DAILY
Status: DISCONTINUED | OUTPATIENT
Start: 2023-01-31 | End: 2023-02-06

## 2023-01-31 RX ORDER — INSULIN ASPART 100 [IU]/ML
5 INJECTION, SOLUTION INTRAVENOUS; SUBCUTANEOUS
Status: DISCONTINUED | OUTPATIENT
Start: 2023-01-31 | End: 2023-02-02

## 2023-01-31 RX ORDER — LOSARTAN POTASSIUM 25 MG/1
25 TABLET ORAL DAILY
Status: DISCONTINUED | OUTPATIENT
Start: 2023-01-31 | End: 2023-02-02

## 2023-01-31 RX ADMIN — AMANTADINE HYDROCHLORIDE 100 MG: 50 SOLUTION ORAL at 12:01

## 2023-01-31 RX ADMIN — CEFAZOLIN 2 G: 2 INJECTION, POWDER, FOR SOLUTION INTRAMUSCULAR; INTRAVENOUS at 03:01

## 2023-01-31 RX ADMIN — HEPARIN SODIUM 5000 UNITS: 5000 INJECTION INTRAVENOUS; SUBCUTANEOUS at 10:01

## 2023-01-31 RX ADMIN — INSULIN ASPART 4 UNITS: 100 INJECTION, SOLUTION INTRAVENOUS; SUBCUTANEOUS at 03:01

## 2023-01-31 RX ADMIN — FAMOTIDINE 20 MG: 20 TABLET ORAL at 08:01

## 2023-01-31 RX ADMIN — IPRATROPIUM BROMIDE AND ALBUTEROL SULFATE 3 ML: .5; 3 SOLUTION RESPIRATORY (INHALATION) at 03:01

## 2023-01-31 RX ADMIN — NICARDIPINE HYDROCHLORIDE 5 MG/HR: 0.2 INJECTION, SOLUTION INTRAVENOUS at 01:01

## 2023-01-31 RX ADMIN — LEVETIRACETAM 500 MG: 100 INJECTION, SOLUTION INTRAVENOUS at 09:01

## 2023-01-31 RX ADMIN — INSULIN ASPART 5 UNITS: 100 INJECTION, SOLUTION INTRAVENOUS; SUBCUTANEOUS at 05:01

## 2023-01-31 RX ADMIN — IPRATROPIUM BROMIDE AND ALBUTEROL SULFATE 3 ML: .5; 3 SOLUTION RESPIRATORY (INHALATION) at 08:01

## 2023-01-31 RX ADMIN — MUPIROCIN: 20 OINTMENT TOPICAL at 08:01

## 2023-01-31 RX ADMIN — INSULIN ASPART 4 UNITS: 100 INJECTION, SOLUTION INTRAVENOUS; SUBCUTANEOUS at 11:01

## 2023-01-31 RX ADMIN — NIMODIPINE 60 MG: 60 SOLUTION ORAL at 02:01

## 2023-01-31 RX ADMIN — INSULIN ASPART 6 UNITS: 100 INJECTION, SOLUTION INTRAVENOUS; SUBCUTANEOUS at 07:01

## 2023-01-31 RX ADMIN — HEPARIN SODIUM 5000 UNITS: 5000 INJECTION INTRAVENOUS; SUBCUTANEOUS at 01:01

## 2023-01-31 RX ADMIN — SODIUM CHLORIDE 1000 ML: 9 INJECTION, SOLUTION INTRAVENOUS at 08:01

## 2023-01-31 RX ADMIN — NIMODIPINE 60 MG: 60 SOLUTION ORAL at 06:01

## 2023-01-31 RX ADMIN — LOSARTAN POTASSIUM 25 MG: 25 TABLET, FILM COATED ORAL at 12:01

## 2023-01-31 RX ADMIN — CEFAZOLIN 2 G: 2 INJECTION, POWDER, FOR SOLUTION INTRAMUSCULAR; INTRAVENOUS at 08:01

## 2023-01-31 RX ADMIN — HEPARIN SODIUM 5000 UNITS: 5000 INJECTION INTRAVENOUS; SUBCUTANEOUS at 06:01

## 2023-01-31 RX ADMIN — INSULIN ASPART 6 UNITS: 100 INJECTION, SOLUTION INTRAVENOUS; SUBCUTANEOUS at 05:01

## 2023-01-31 RX ADMIN — HYDRALAZINE HYDROCHLORIDE 10 MG: 20 INJECTION, SOLUTION INTRAMUSCULAR; INTRAVENOUS at 10:01

## 2023-01-31 RX ADMIN — DEXAMETHASONE SODIUM PHOSPHATE 4 MG: 4 INJECTION INTRA-ARTICULAR; INTRALESIONAL; INTRAMUSCULAR; INTRAVENOUS; SOFT TISSUE at 06:01

## 2023-01-31 RX ADMIN — NIMODIPINE 60 MG: 60 SOLUTION ORAL at 10:01

## 2023-01-31 RX ADMIN — NICARDIPINE HYDROCHLORIDE 2.5 MG/HR: 0.2 INJECTION, SOLUTION INTRAVENOUS at 05:01

## 2023-01-31 RX ADMIN — INSULIN ASPART 8 UNITS: 100 INJECTION, SOLUTION INTRAVENOUS; SUBCUTANEOUS at 10:01

## 2023-01-31 RX ADMIN — LEVETIRACETAM 500 MG: 100 INJECTION, SOLUTION INTRAVENOUS at 08:01

## 2023-01-31 RX ADMIN — CEFAZOLIN 2 G: 2 INJECTION, POWDER, FOR SOLUTION INTRAMUSCULAR; INTRAVENOUS at 12:01

## 2023-01-31 RX ADMIN — MUPIROCIN: 20 OINTMENT TOPICAL at 09:01

## 2023-01-31 NOTE — PT/OT/SLP EVAL
Physical Therapy Evaluation    Patient Name:  Leida Hoyos   MRN:  [id number]    Recommendations:     Discharge Recommendations: rehabilitation facility   Discharge Equipment Recommendations:  (TBD as pt progresses)   Barriers to discharge: Inaccessible home and Decreased caregiver support 1 ALIS and requires assist for mobility    Assessment:     Leida Hoyos is a 52 y.o. female admitted with a medical diagnosis of SAH (subarachnoid hemorrhage).  She presents with the following impairments/functional limitations: weakness, impaired balance, impaired cognition, decreased upper extremity function, decreased lower extremity function, gait instability, impaired functional mobility, impaired self care skills, decreased safety awareness . Pt is unsafe with functional mobility at this time due to pt requires total assist for bed mobility, moderate assist for transfers, and max assist for gait due to pt leaning posterior and difficulty clearing her feet to step. Pt is motivated to progress with functional mobility.     Rehab Prognosis: Good; patient would benefit from acute skilled PT services to address these deficits and reach maximum level of function.    Recent Surgery: Procedure(s) (LRB):  ANGIOGRAM-CEREBRAL (N/A) 4 Days Post-Op    Plan:     During this hospitalization, patient to be seen 4 x/week to address the identified rehab impairments via gait training, therapeutic activities, therapeutic exercises, neuromuscular re-education and progress toward the following goals:    Plan of Care Expires:  03/02/23    Subjective   Pt nonverbal during treatment; followed simple commands inconsistently  Pain/Comfort:  Pain Rating 1: 0/10 (pt nonverbal during treatment but did not appear to be in pain)  Pain Rating Post-Intervention 1: 0/10    Patients cultural, spiritual, Sabianist conflicts given the current situation: no    Living Environment:  Pt lives with her  in a 1 story home with 1 ALIS  Prior to  admission, patients level of function was independent, works as an .  Equipment used at home: none. Upon discharge, patient will have assistance from family.    Objective:     Communicated with nurse prior to session.  Patient found HOB elevated with bed alarm, external ventricular drain, telemetry, pulse ox (continuous), blood pressure cuff, peripheral IV, oxygen, restraints  upon PT entry to room.    General Precautions: Standard, aspiration (EVD clamped by nurse prior to coming to sit)  Orthopedic Precautions:N/A   Braces: N/A  Respiratory Status: Nasal cannula, flow 5 L/min    Exams:  Cognitive Exam:  Patient is oriented to pt unable to answer orientation questions, shook her head yes correctly when given a choice of different names  Sensation:    -       Intact  light/touch appears intact but testing unreliable due to pt unable to follow commands of testing  RLE ROM: WFL  RLE Strength: Deficits: hip flex 3+/5; knee flex/ext 4-/5; ankle DF 4-/5 (testing unreliable due to pt inconsistent with following commands)  LLE ROM: WFL  LLE Strength: Deficits: hip flex 3/5; knee ext/flex 4-/5; ankle DF 4/5 (testing unreliable due to pt inconsistent with following commands)    Functional Mobility:  Bed Mobility:     Rolling Left:  total assistance  Supine to Sit: total assistance  Sit to Supine: total assistance  Transfers:     Sit to Stand:  moderate assistance with hand-held assist  Gait: 3 sidesteps to the L with HHA with max assist. Pt with difficulty clearing her feet to step.     AM-PAC 6 CLICK MOBILITY  Total Score:9     Treatment & Education:   pt sat on the EOB ~ 16 min with max assist initially due to pt pushing posterior and leaning to the R; pt repositioned to the EOB and UEs positioned for support and required only CGA for ~ 1 min then had posterior instability requiring max assist to recover. Pt stood x 2 trials with HHA with max assist for ~ 45sec then 15 sec . Pt leans posterior. Pt received  verbal and manual cues for midline orientation and upright posture in sitting and standing.     Patient left HOB elevated with all lines intact, call button in reach, bed alarm on, restraints reapplied at end of session, nurse notified, and parents present.    GOALS:   Multidisciplinary Problems       Physical Therapy Goals          Problem: Physical Therapy    Goal Priority Disciplines Outcome Goal Variances Interventions   Physical Therapy Goal     PT, PT/OT Ongoing, Progressing     Description: PT goals until 2/10/23    1. Pt supine to sit with minimal assist-not met  2. Pt sit to supine with minimal assist-not met  3. Pt sit to stand with LRAD as needed for safety with minimal assist-not met  4. Pt to perform gait 20ft with LRAD as needed for safety with moderate assist.-not met  5. Pt to go up/down curb step with LRAD as needed for safety with moderate assist.-not met  6. Pt to transfer bed to/from bedside chair with LRAD as needed for safety with minimal assist.-not met  7. Pt to perform B LE exs in sitting or supine x 10 reps to strengthen B LE to improve functional mobility.-not met                        History:     Past Medical History:   Diagnosis Date    Autoimmune hepatitis     managed by Dr. Russell on mercaptopurine    Diabetes mellitus type II     Hypertension        Past Surgical History:   Procedure Laterality Date    CEREBRAL ANGIOGRAM N/A 2023    Procedure: ANGIOGRAM-CEREBRAL;  Surgeon: Yue Surgeon;  Location: [facility];  Service: Anesthesiology;  Laterality: N/A;     SECTION, CLASSIC      COLONOSCOPY N/A 2019    Procedure: COLONOSCOPY;  Surgeon: Dipesh Victoria MD;  Location: [facility] ([address] [place] [address]);  Service: Endoscopy;  Laterality: N/A;    FRACTURE SURGERY Left     leg     MOUTH SURGERY         Time Tracking:     PT Received On: 23  PT Start Time: 1144     PT Stop Time: 1207  PT Total Time (min): 23 min     Billable Minutes: Evaluation 13 and Therapeutic  Activity 10      01/31/2023

## 2023-01-31 NOTE — SUBJECTIVE & OBJECTIVE
"Interval History: 1/31: PBD 4. Neuro exam improving. Following some commands. EVD at 20, output 91cc. ICPs 6-18        Review of Systems   Unable to perform ROS: Patient unresponsive   Neurological:  Weakness: 4.   Objective:     Weight: 61.2 kg (135 lb)  Body mass index is 24.69 kg/m².  Vital Signs (Most Recent):  Temp: 98.1 °F (36.7 °C) (01/31/23 1105)  Pulse: 65 (01/31/23 1405)  Resp: (!) 21 (01/31/23 1405)  BP: (!) 144/65 (01/31/23 1405)  SpO2: 96 % (01/31/23 1405)   Vital Signs (24h Range):  Temp:  [97.4 °F (36.3 °C)-100.1 °F (37.8 °C)] 98.1 °F (36.7 °C)  Pulse:  [] 65  Resp:  [16-30] 21  SpO2:  [94 %-99 %] 96 %  BP: (116-153)/(54-94) 144/65     Date 01/31/23 0700 - 02/01/23 0659   Shift 9101-6912 9456-8950 7788-2116 24 Hour Total   INTAKE   P.O. 240   240   Shift Total(mL/kg) 240(3.9)   240(3.9)   OUTPUT   Drains 16   16   Shift Total(mL/kg) 16(0.3)   16(0.3)   Weight (kg) 61.2 61.2 61.2 61.2                Resp Rate Total:  [22 br/min] 22 br/min         Urethral Catheter 01/27/23 1509 (Active)   $ [place] Insertion Bedside Insertion Performed 01/27/23 1601   Site Assessment Clean;Intact 01/27/23 1501   Collection Container Urimeter 01/27/23 1501   Securement Method secured to top of thigh w/ adhesive device 01/27/23 1501   Catheter Care Performed yes 01/27/23 1501   Reason for Continuing Urinary Catheterization Critically ill in ICU and requiring hourly monitoring of intake/output 01/27/23 1501   CAUTI Prevention Bundle Securement Device in place with 1" slack;Intact seal between catheter & drainage tubing;Drainage bag/urimeter off the floor;Sheeting clip in use;No dependent loops or kinks;Drainage bag/urimeter not overfilled (<2/3 full);Drainage bag/urimeter below bladder 01/27/23 1501   Output (mL) 250 mL 01/27/23 1700            ICP/Ventriculostomy 01/27/23 1400 (Active)   $ ICP/Ventriculostomy [place] Placement Bedside Insertion Performed 01/27/23 1501   Level of Ventriculostomy (cm above) 20 01/27/23 " 1501   Status Open to drainage 01/27/23 1501   Site Assessment Clean;Dry 01/27/23 1501   Site Drainage No drainage 01/27/23 1501   Waveform normal waveform 01/27/23 1501   Output (mL) 0 mL 01/27/23 1700   CSF Color clear 01/27/23 1501   Dressing Status Clean;Dry;Intact 01/27/23 1501   Interventions HOB degrees;bed controls locked;zeroed;level adjusted per order 01/27/23 1501     Physical Exam:    Constitutional: No distress.     HEENT: atraumatic/normocephalic    Cardiovascular: Regular rhythm.     Pulm: aerating well, saturating well    Abdominal: Soft.     Psych/Behavior: He is alert.     E4V4M6  AAOx2, slow speech; slow to respond  PERRL  EOMI  Face Symmetric  FC x 4 antigravity, non focal        Significant Labs:  Recent Labs   Lab 01/30/23  0129 01/31/23  0306   * 268*    147*   K 3.5 4.2   * 112*   CO2 22* 20*   BUN 11 14   CREATININE 0.7 0.8   CALCIUM 9.0 9.9   MG 2.0 2.1       Recent Labs   Lab 01/30/23  0129 01/31/23  0306   WBC 9.02 14.03*   HGB 11.0* 12.2   HCT 34.5* 38.5    199       No results for input(s): LABPT, INR, APTT in the last 48 hours.    Microbiology Results (last 7 days)       Procedure Component Value Units Date/Time    CSF culture [[id number]] Collected: 01/30/23 1449    Order Status: Completed Specimen: CSF (Spinal Fluid) from CSF Shunt Updated: 01/31/23 0736     CSF CULTURE No Growth to date     Gram Stain Result Rare WBC's      No organisms seen          All pertinent labs from the last 24 hours have been reviewed.    Significant Diagnostics:  I have reviewed all pertinent imaging results/findings within the past 24 hours.    Physical Exam  Neurosurgery Physical Exam

## 2023-01-31 NOTE — NURSING
[facility] Care Plan    POC reviewed with Leida Hoyos and family at 1400. Pt verbalized understanding. Questions and concerns addressed. No acute events today. Pt progressing toward goals. Will continue to monitor. See below and flowsheets for full assessment and VS info.     -Pt able to stand with physical therapy  -Pt ate 1 bowl of chicken soup. Tolerated well  -EVD open at 20. Pressures below 20 and outputs below 5 ml.        Is this a stroke patient? yes- Stroke booklet reviewed with patient and family, risk factors identified for patient and stroke booklet remains at bedside for ongoing education.     Neuro:  [place] Coma Scale  Best Eye Response: 3-->(E3) to speech  Best Motor Response: 6-->(M6) obeys commands  Best Verbal Response: 4-->(V4) confused  [place] Coma Scale Score: 13  Assessment Qualifiers: no eye obstruction present  Pupil PERRLA: yes     24 hr Temp:  [97.4 °F (36.3 °C)-99.5 °F (37.5 °C)]     CV:   Rhythm: sinus bradycardia  BP goals:   SBP < 140  MAP > 65    Resp:      Vent Mode: Spont  Set Rate: 16 BPM  Oxygen Concentration (%): 36  Vt Set: 420 mL  PEEP/CPAP: 5 cmH20  Pressure Support: 7 cmH20    Plan: N/A    GI/:     Diet/Nutrition Received: NPO  Last Bowel Movement: 01/31/23  Voiding Characteristics: voids spontaneously without difficulty    Intake/Output Summary (Last 24 hours) at 1/31/2023 1737  Last data filed at 1/31/2023 1715  Gross per 24 hour   Intake 1800.27 ml   Output 54 ml   Net 1746.27 ml     Unmeasured Output  Urine Occurrence: 1  Stool Occurrence: 1  Pad Count: 3    Labs/Accuchecks:  Recent Labs   Lab 01/31/23  0306   WBC 14.03*   RBC 4.01   HGB 12.2   HCT 38.5         Recent Labs   Lab 01/31/23  0306   *   K 4.2   CO2 20*   *   BUN 14   CREATININE 0.8   ALKPHOS 151*   ALT 58*   AST 36   BILITOT 0.6      Recent Labs   Lab 01/27/23  0955   INR 1.2      Recent Labs   Lab 01/27/23  0949   TROPONINI <0.006       Electrolytes: N/A - electrolytes  WDL  Accuchecks: Q4H    Gtts:   niCARdipine 2.5 mg/hr (01/31/23 1715)       LDA/Wounds:  Lines/Drains/Airways       Drain  Duration                  ICP/Ventriculostomy 01/27/23 1400 4 days              Peripheral Intravenous Line  Duration                  Peripheral IV - Single Lumen 01/28/23 1400 20 G;1 3/4 in Anterior;Left Forearm 3 days         Peripheral IV - Single Lumen 01/30/23 1700 20 G Anterior;Proximal;Right Forearm 1 day                  Wounds: Yes  Wound care consulted: No

## 2023-01-31 NOTE — SUBJECTIVE & OBJECTIVE
Interval History:  passed swallow study, possible angio 2/3/23, 1 liter bolus given, amantadine started for wakefuness.     Unable to obtain a complete ROS due to level of consciousness.  Objective:     Vitals:  Temp: 98.1 °F (36.7 °C)  Pulse: 82  Rhythm: sinus bradycardia  BP: 135/62  MAP (mmHg): 89  ICP Mean (mmHg): 8 mmHg  Resp: (!) 28  SpO2: 95 %    Temp  Min: 97.4 °F (36.3 °C)  Max: 100.1 °F (37.8 °C)  Pulse  Min: 58  Max: 117  BP  Min: 116/55  Max: 174/70  MAP (mmHg)  Min: 79  Max: 106  ICP Mean (mmHg)  Min: 8 mmHg  Max: 19 mmHg  Resp  Min: 16  Max: 38  SpO2  Min: 94 %  Max: 99 %    01/30 0701 - 01/31 0700  In: 784.2 [P.O.:660; I.V.:74.2]  Out: 93 [Drains:93]   Unmeasured Output  Urine Occurrence: 1  Stool Occurrence: 1  Pad Count: 1       Physical Exam  Vitals and nursing note reviewed.   Constitutional:       Appearance: She is ill-appearing.   HENT:      Head: Normocephalic.      Nose: Nose normal.      Mouth/Throat:      Mouth: Mucous membranes are moist.      Pharynx: Oropharynx is clear.   Cardiovascular:      Rate and Rhythm: Normal rate and regular rhythm.      Pulses: Normal pulses.      Heart sounds: Normal heart sounds.   Pulmonary:      Effort: Pulmonary effort is normal.      Breath sounds: Normal breath sounds.   Abdominal:      General: Bowel sounds are normal.      Palpations: Abdomen is soft.   Musculoskeletal:         General: Normal range of motion.   Skin:     General: Skin is warm and dry.      Capillary Refill: Capillary refill takes 2 to 3 seconds.   Neurological:      Comments: GCS 14  Follows simple commands  PERRL  BRAGG to command and spontaneously  Sensation intact       Unable to test orientation, language, memory, judgment, insight, fund of knowledge, hearing, shoulder shrug, tongue protrusion, coordination, gait due to level of consciousness.    Medications:  ContinuousniCARdipine, Last Rate: 5 mg/hr (01/31/23 0122)    Scheduledalbuterol-ipratropium, 3 mL, Q6H WAKE  amantadine HCL,  100 mg, BID  ceFAZolin (ANCEF) IVPB, 2 g, Q8H  heparin (porcine), 5,000 Units, Q8H  insulin aspart U-100, 5 Units, TIDWM  insulin detemir U-100, 10 Units, BID  levetiracetam IV, 500 mg, Q12H  losartan, 25 mg, Daily  mupirocin, , BID  niMODipine, 60 mg, Q4H    PRNdextrose 10%, 12.5 g, PRN  glucagon (human recombinant), 1 mg, PRN  glucose, 16 g, PRN  glucose, 24 g, PRN  hydrALAZINE, 10 mg, Q6H PRN  insulin aspart U-100, 0-15 Units, QID (AC + HS) PRN  labetalol, 10 mg, Q4H PRN  ondansetron, 4 mg, Q6H PRN  sodium chloride 0.9%, 10 mL, PRN  sodium chloride 0.9%, 10 mL, PRN      Today I personally reviewed pertinent medications, lines/drains/airways, imaging, cardiology results, laboratory results, microbiology results, notably:    Diet  [facility]  Diet full liquid [facility]

## 2023-01-31 NOTE — PLAN OF CARE
Recommend full liquid diet with crushed meds.  ONLY feed pt.when fully alert.   Problem: SLP  Goal: SLP Goal  Description: Goals due 2/7  1.  Pt. Will participate in ongoing assessment of swallow at bedside  2.  Pt. Will participate in speech language cognitive eval  Outcome: Ongoing, Progressing

## 2023-01-31 NOTE — PROGRESS NOTES
[facility]  Neurocritical Care  Progress Note    Admit Date: 1/27/2023  Service Date: 01/31/2023  Length of Stay: 4    Subjective:     Chief Complaint: SAH (subarachnoid hemorrhage)    History of Present Illness: Mrs. Gupta 59 yo F w/ PMH DM1 (dx 2013), HTN, autoimmune hepatitis presented to the ED for AMS this morning where she was unable to answer questions appropriately after a shower.  states patient had a HA and vomited the night before she went to sleep. LNK was 10 pm. Patient had a similar episode a few years ago, but was admitted for DKA.  states patient has worsened neurologically since they left their house. On exam, patient was non-verbal, fixed pupils, intermittently following commands in B/L UE, but unable in LE. GCS 8. MRI performed in ED showed acute intraventricular and subarachnoid hemorrhage with early obstructive hydrocephalus and transependymal CSF egress. Also with a noted 21.5-2 mm laterally directed outpouching at the proximal right A2 QUIQUE could represent infundibulum/tortuous origin of a proximal QUIQUE branch. NSGY consulted and placed EVD after patient was intubated after concern for airway protection. Started Keppra 1g BID. CTA ordered. IR consulted for possible angiogram. No leukocytosis. H/H stable. Cr 0.9. Glucose 284. NCC will admit patient for HLOC.          Hospital Course: 01/28/2023: IR performed angiogram shows no aneurysm, AVM or AV fistula. EVD open @20. Will wean propofol and transition to precedex. Started tube feeds and SQ heparin. Transitioned from insulin gtt to detemir.   01/29/2023 [facility] vent; off sedation; TCDs w/o vasospasm; will need repeat angio within a week; MRI brain completed no lesions found; TFS to goal; replete lytes; keep I/Os nearly matched w/ IVF boluses; consider yudy if sbp dips with analgesia and/or sedation;   01/30/2023 Extubated with parameters to NC. Some wheezing post extubation, PRN duonebs and racemic epi x1. IV Dex  4mg q6 hours x 1 day and close airway watch. Cardene to maintain SBP<140. EVD open at 20. Monitoring TCDs daily, no current evidence of vasospasm. Glucose elevated, SSI in place, may require Insulin gtt 2/2 steroids.   1/31/23: possible angio Friday      Interval History:  passed swallow study, possible angio 2/3/23, 1 liter bolus given, amantadine started for wakefuness.     Unable to obtain a complete ROS due to level of consciousness.  Objective:     Vitals:  Temp: 98.1 °F (36.7 °C)  Pulse: 82  Rhythm: sinus bradycardia  BP: 135/62  MAP (mmHg): 89  ICP Mean (mmHg): 8 mmHg  Resp: (!) 28  SpO2: 95 %    Temp  Min: 97.4 °F (36.3 °C)  Max: 100.1 °F (37.8 °C)  Pulse  Min: 58  Max: 117  BP  Min: 116/55  Max: 174/70  MAP (mmHg)  Min: 79  Max: 106  ICP Mean (mmHg)  Min: 8 mmHg  Max: 19 mmHg  Resp  Min: 16  Max: 38  SpO2  Min: 94 %  Max: 99 %    01/30 0701 - 01/31 0700  In: 784.2 [P.O.:660; I.V.:74.2]  Out: 93 [Drains:93]   Unmeasured Output  Urine Occurrence: 1  Stool Occurrence: 1  Pad Count: 1       Physical Exam  Vitals and nursing note reviewed.   Constitutional:       Appearance: She is ill-appearing.   HENT:      Head: Normocephalic.      Nose: Nose normal.      Mouth/Throat:      Mouth: Mucous membranes are moist.      Pharynx: Oropharynx is clear.   Cardiovascular:      Rate and Rhythm: Normal rate and regular rhythm.      Pulses: Normal pulses.      Heart sounds: Normal heart sounds.   Pulmonary:      Effort: Pulmonary effort is normal.      Breath sounds: Normal breath sounds.   Abdominal:      General: Bowel sounds are normal.      Palpations: Abdomen is soft.   Musculoskeletal:         General: Normal range of motion.   Skin:     General: Skin is warm and dry.      Capillary Refill: Capillary refill takes 2 to 3 seconds.   Neurological:      Comments: GCS 14  Follows simple commands  PERRL  BRAGG to command and spontaneously  Sensation intact       Unable to test orientation, language, memory, judgment, insight,  fund of knowledge, hearing, shoulder shrug, tongue protrusion, coordination, gait due to level of consciousness.    Medications:  ContinuousniCARdipine, Last Rate: 5 mg/hr (01/31/23 0122)    Scheduledalbuterol-ipratropium, 3 mL, Q6H WAKE  amantadine HCL, 100 mg, BID  ceFAZolin (ANCEF) IVPB, 2 g, Q8H  heparin (porcine), 5,000 Units, Q8H  insulin aspart U-100, 5 Units, TIDWM  insulin detemir U-100, 10 Units, BID  levetiracetam IV, 500 mg, Q12H  losartan, 25 mg, Daily  mupirocin, , BID  niMODipine, 60 mg, Q4H    PRNdextrose 10%, 12.5 g, PRN  glucagon (human recombinant), 1 mg, PRN  glucose, 16 g, PRN  glucose, 24 g, PRN  hydrALAZINE, 10 mg, Q6H PRN  insulin aspart U-100, 0-15 Units, QID (AC + HS) PRN  labetalol, 10 mg, Q4H PRN  ondansetron, 4 mg, Q6H PRN  sodium chloride 0.9%, 10 mL, PRN  sodium chloride 0.9%, 10 mL, PRN      Today I personally reviewed pertinent medications, lines/drains/airways, imaging, cardiology results, laboratory results, microbiology results, notably:    Diet  Diet full liquid [facility]  Diet full liquid [facility]          Assessment/Plan:     Neuro  * SAH (subarachnoid hemorrhage)  53 yo F w/ PMH DM1, HTN presented to the ED with AMS after HA and vomiting the night before. Patient was non-verbal, B/l fixed pupils, and only intermittently following commands in B/L UE. GCS 8. MRI shows Acute IVH, SAH w/ obstructive hydrocephalus w/o signs of ischemia and R A2 QUIQUE aneurysm. VN consulted. Patient intubated in ED after concern for airway protection. NSGY placed EVD. IR consulted for possible angiogram. CTA shows diffuse subarachnoid hemorrhage with moderate hydrocephalus. No intracranial aneurysm or AVM is identified. NIHSS 9 on admission.     - IR angiogram on 1/27 shows no aneurysm, AVM, or AV fistula. Will need repeat angiogram on 2/3; will discuss with IR   - CTA shows diffuse subarachnoid hemorrhage with moderate hydrocephalus. No intracranial aneurysm or AVM is identified.  - CT  Head stable after EVD placed    - Continued monitoring in NCC  - Q1H neuro checks and vitals  - NSGY following; appreciate recs  - EVD open @20- monitor ICPs and output  - Ancef for drain ppx  - Nimotop 60 q4h  - Daily TCDs  - Echo   - CBC, CMP, Mag, Phos now and then daily   - SBP goal <140  - TF @40 cc/hr  - Cardene gtt, wean as able   - PRN labetalol, hydralazine  - SQ heparin  - PT/OT/SLP  - Angio possible 2/3/23    IVH (intraventricular hemorrhage)  See SAH  EVD care    Vasogenic cerebral edema  See SAH    Cardiac/Vascular  Hypertension  SBP 160s on arrival.   states patient does not take home Losartan regularly.     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 BG on arrival 284  Home regimen 13u Lantus; Lispro AC  A1C 6.5     -Transitioned from Insulin gtt to Detemir 10u BID  -Hypoglycemic protocol in place  -Accuchecks q1h    GI  Autoimmune hepatitis  Hx of AIH. Previously on mercaptopurine. Not currently taking.   LFTs slightly elevated on arrival.    -Will continue to monitor  -Holding Tylenol   -CMP daily  -If LFTs worsen will obtain RUQ US.           The patient is being Prophylaxed for:  Venous Thromboembolism with: Mechanical or Chemical  Stress Ulcer with: Not Applicable   Ventilator Pneumonia with: not applicable    Activity Orders          Diet full liquid [facility]: Full Liquid starting at 01/31 1131    Progressive Mobility Protocol (mobilize patient to their highest level of functioning at least twice daily) starting at 01/28 0800    Elevate HOB Collagen closure or PERCLOSE plug/device - Elevate HOB 30 degrees with limb immobilized for 2 hours after procedure. starting at 01/27 2018    Turn patient starting at 01/27 1400    Elevate HOB starting at 01/27 1221        Full Code  Critical care time 45 mins  Jenn Weiss NP  Neurocritical  Care  [facility]

## 2023-01-31 NOTE — PLAN OF CARE
Problem: Physical Therapy  Goal: Physical Therapy Goal  Description: PT goals until 2/10/23    1. Pt supine to sit with minimal assist-not met  2. Pt sit to supine with minimal assist-not met  3. Pt sit to stand with LRAD as needed for safety with minimal assist-not met  4. Pt to perform gait 20ft with LRAD as needed for safety with moderate assist.-not met  5. Pt to go up/down curb step with LRAD as needed for safety with moderate assist.-not met  6. Pt to transfer bed to/from bedside chair with LRAD as needed for safety with minimal assist.-not met  7. Pt to perform B LE exs in sitting or supine x 10 reps to strengthen B LE to improve functional mobility.-not met   Outcome: Ongoing, Progressing  Pt's goals set and pt will benefit from skilled PT services to work towards improved functional mobility including: bed mobility, transfers, up/down step, and gait.   1/31/2023

## 2023-01-31 NOTE — ASSESSMENT & PLAN NOTE
53 yo F w/ PMH DM1, HTN presented to the ED with AMS after HA and vomiting the night before. Patient was non-verbal, B/l fixed pupils, and only intermittently following commands in B/L UE. GCS 8. MRI shows Acute IVH, SAH w/ obstructive hydrocephalus w/o signs of ischemia and R A2 QUIQUE aneurysm. VN consulted. Patient intubated in ED after concern for airway protection. NSGY placed EVD. IR consulted for possible angiogram. CTA shows diffuse subarachnoid hemorrhage with moderate hydrocephalus. No intracranial aneurysm or AVM is identified. NIHSS 9 on admission.     - IR angiogram on 1/27 shows no aneurysm, AVM, or AV fistula. Will need repeat angiogram on 2/3; will discuss with IR   - CTA shows diffuse subarachnoid hemorrhage with moderate hydrocephalus. No intracranial aneurysm or AVM is identified.  - CT Head stable after EVD placed    - Continued monitoring in NCC  - Q1H neuro checks and vitals  - NSGY following; appreciate recs  - EVD open @20- monitor ICPs and output  - Ancef for drain ppx  - Nimotop 60 q4h  - Daily TCDs  - Echo   - CBC, CMP, Mag, Phos now and then daily   - SBP goal <140  - TF @40 cc/hr  - Cardene gtt, wean as able   - PRN labetalol, hydralazine  - SQ heparin  - PT/OT/SLP  - Angio possible 2/3/23

## 2023-01-31 NOTE — PLAN OF CARE
Problem: Adult Inpatient Plan of Care  Goal: Plan of Care Review  Outcome: Ongoing, Progressing  Goal: Patient-Specific Goal (Individualized)  Description: Admit Date:     Admit Dx:    Past Medical History:  No date: Autoimmune hepatitis      Comment:  managed by Dr. Russell on mercaptopurine  No date: Diabetes mellitus type II  No date: Hypertension    Past Surgical History:  :  SECTION, CLASSIC  2019: COLONOSCOPY; N/A      Comment:  Procedure: COLONOSCOPY;  Surgeon: Dipesh Victoria MD;  Location: [address]);  Service: Endoscopy;                Laterality: N/A;  2013: FRACTURE SURGERY; Left      Comment:  leg   No date: MOUTH SURGERY    Individualization:   1. Lights dim    Restraints: 23 yes-pulling lines         Outcome: Ongoing, Progressing  Goal: Absence of Hospital-Acquired Illness or Injury  Outcome: Ongoing, Progressing  Goal: Optimal Comfort and Wellbeing  Outcome: Ongoing, Progressing  Goal: Readiness for Transition of Care  Outcome: Ongoing, Progressing     Problem: Adjustment to Illness (Stroke, Hemorrhagic)  Goal: Optimal Coping  Outcome: Ongoing, Progressing     Problem: Bowel Elimination Impaired (Stroke, Hemorrhagic)  Goal: Effective Bowel Elimination  Outcome: Ongoing, Progressing     Problem: Cerebral Tissue Perfusion (Stroke, Hemorrhagic)  Goal: Optimal Cerebral Tissue Perfusion  Outcome: Ongoing, Progressing     Problem: Cognitive Impairment (Stroke, Hemorrhagic)  Goal: Optimal Cognitive Function  Outcome: Ongoing, Progressing     Problem: Communication Impairment (Stroke, Hemorrhagic)  Goal: Effective Communication Skills  Outcome: Ongoing, Progressing     Problem: Functional Ability Impaired (Stroke, Hemorrhagic)  Goal: Optimal Functional Ability  Outcome: Ongoing, Progressing     Problem: Pain (Stroke, Hemorrhagic)  Goal: Acceptable Pain Control  Outcome: Ongoing, Progressing     Problem: Respiratory Compromise (Stroke, Hemorrhagic)  Goal:  Effective Oxygenation and Ventilation  Outcome: Ongoing, Progressing     Problem: Sensorimotor Impairment (Stroke, Hemorrhagic)  Goal: Improved Sensorimotor Function  Outcome: Ongoing, Progressing     Problem: Swallowing Impairment (Stroke, Hemorrhagic)  Goal: Optimal Eating and Swallowing Without Aspiration  Outcome: Ongoing, Progressing     Problem: Urinary Elimination Impaired (Stroke, Hemorrhagic)  Goal: Effective Urinary Elimination  Outcome: Ongoing, Progressing     Problem: Diabetes Comorbidity  Goal: Blood Glucose Level Within Targeted Range  Outcome: Ongoing, Progressing     Problem: Infection  Goal: Absence of Infection Signs and Symptoms  Outcome: Ongoing, Progressing     Problem: Skin Injury Risk Increased  Goal: Skin Health and Integrity  Outcome: Ongoing, Progressing     Problem: Fall Injury Risk  Goal: Absence of Fall and Fall-Related Injury  Outcome: Ongoing, Progressing     Problem: Restraint, Nonbehavioral (Nonviolent)  Goal: Absence of Harm or Injury  Outcome: Ongoing, Progressing     Problem: Noninvasive Ventilation Acute  Goal: Effective Unassisted Ventilation and Oxygenation  Outcome: Ongoing, Progressing     Problem: Adjustment to Illness (Delirium)  Goal: Optimal Coping  Outcome: Ongoing, Progressing     Problem: Altered Behavior (Delirium)  Goal: Improved Behavioral Control  Outcome: Ongoing, Progressing     Problem: Attention and Thought Clarity Impairment (Delirium)  Goal: Improved Attention and Thought Clarity  Outcome: Ongoing, Progressing     Problem: Sleep Disturbance (Delirium)  Goal: Improved Sleep  Outcome: Ongoing, Progressing

## 2023-01-31 NOTE — PT/OT/SLP EVAL
Speech Language Pathology Evaluation  Bedside Swallow    Patient Name:  Leida Hoyos   MRN:  [id number]  Admitting Diagnosis: SAH (subarachnoid hemorrhage)    Recommendations:                 General Recommendations:  Dysphagia therapy and Speech language evaluation  Diet recommendations:   , Full liquids   Aspiration Precautions: Assistance with meals, Eliminate distractions, Feed only when awake/alert, HOB to 90 degrees, Meds crushed in puree, Small bites/sips, and Strict aspiration precautions   General Precautions: Standard, aspiration  Communication strategies:  provide increased time to answer    History:     Past Medical History:   Diagnosis Date    Autoimmune hepatitis     managed by Dr. Russell on mercaptopurine    Diabetes mellitus type II     Hypertension        Past Surgical History:   Procedure Laterality Date    CEREBRAL ANGIOGRAM N/A 2023    Procedure: ANGIOGRAM-CEREBRAL;  Surgeon: Yue Surgeon;  Location: [facility];  Service: Anesthesiology;  Laterality: N/A;     SECTION, CLASSIC      COLONOSCOPY N/A 2019    Procedure: COLONOSCOPY;  Surgeon: Dipesh Victoria MD;  Location: [facility] ([address] [place] [address]);  Service: Endoscopy;  Laterality: N/A;    FRACTURE SURGERY Left 2013    leg     MOUTH SURGERY         Social History: Patient lives with spouse.    Prior Intubation HX:  -      Prior diet: regular.    Occupation/hobbies/homemaking: [employer].    Subjective     Spouse present  Patient goals: [employer]     Pain/Comfort:  Pain Rating 1: 0/10  Pain Rating Post-Intervention 1: 0/10    Respiratory Status: nasal cannula    Objective:     Oral Musculature Evaluation  Oral Musculature: WFL  Dentition: present and adequate  Secretion Management: adequate  Mucosal Quality: good  Mandibular Strength and Mobility: WFL  Oral Labial Strength and Mobility: WFL  Lingual Strength and Mobility: impaired strength  Velar Elevation: WFL  Buccal Strength and Mobility: WFL  Volitional Cough:  fair-stsrong  Volitional Swallow: not elicited  Voice Prior to PO Intake: mild hoarseness    Bedside Swallow Eval:   Consistencies Assessed:  Thin liquids 2 teaspoons then self fed 5 sips of water via cup with a straw  Puree 2 teaspoons  Soft solids 1/4 cracker x2      Oral Phase:   Prolonged mastication    Pharyngeal Phase:   no overt clinical signs/symptoms of aspiration  no overt clinical signs/symptoms of pharyngeal dysphagia    Compensatory Strategies  None    Treatment: Education provided to pt, spouse and nursing re safe swallow strategies and aspiration precautions    Assessment:     Leida Hoyos is a 52 y.o. female with an SLP diagnosis of Dysphagia and Dysphonia.    Goals:   Multidisciplinary Problems       SLP Goals          Problem: SLP    Goal Priority Disciplines Outcome   SLP Goal     SLP Ongoing, Progressing   Description: Goals due 2/7  1.  Pt. Will participate in ongoing assessment of swallow at bedside  2.  Pt. Will participate in speech language cognitive eval                       Plan:     Patient to be seen:  4 x/week   Plan of Care expires:  02/27/23  Plan of Care reviewed with:  patient, spouse   SLP Follow-Up:  Yes       Discharge recommendations:  rehabilitation facility   Barriers to Discharge:  None    Time Tracking:     SLP Treatment Date:   01/31/23  Speech Start Time:  0930  Speech Stop Time:  0946     Speech Total Time (min):  16 min    Billable Minutes: Eval 8  and Self Care/Home Management Training 8    01/31/2023

## 2023-01-31 NOTE — PLAN OF CARE
[facility] Care Plan    POC reviewed with Leida Hoyos and family at 0300. Pt unable to verbalize understanding. Questions and concerns addressed with . No acute events overnight. Pt progressing toward goals. Will continue to monitor. See below and flowsheets for full assessment and VS info.     -cardene gtt  -evd open at 20, ICP 9-16, output 0-6cc/hr. CSF clear-pinkish color.          Is this a stroke patient? yes- Stroke booklet reviewed with family, risk factors identified for patient and stroke booklet remains at bedside for ongoing education.     Neuro:  Lexus Coma Scale  Best Eye Response: 3-->(E3) to speech  Best Motor Response: 5-->(M5) localizes pain  Best Verbal Response: 1-->(V1) none  Bartley Coma Scale Score: 9  Assessment Qualifiers: no eye obstruction present  Pupil PERRLA: yes     24hr Temp:  [99.1 °F (37.3 °C)-100.1 °F (37.8 °C)]     CV:   Rhythm: normal sinus rhythm  BP goals:   SBP < 140  MAP > 65    Resp:      Vent Mode: Spont  Set Rate: 16 BPM  Oxygen Concentration (%): 30  Vt Set: 420 mL  PEEP/CPAP: 5 cmH20  Pressure Support: 7 cmH20    Plan: Post extubation watch    GI/:     Diet/Nutrition Received: NPO  Last Bowel Movement: 01/29/23  Voiding Characteristics: urethral catheter (bladder)    Intake/Output Summary (Last 24 hours) at 1/31/2023 0421  Last data filed at 1/31/2023 0402  Gross per 24 hour   Intake 824.17 ml   Output 96 ml   Net 728.17 ml     Unmeasured Output  Urine Occurrence: 1  Stool Occurrence: 1  Pad Count: 1    Labs/Accuchecks:  Recent Labs   Lab 01/31/23  0306   WBC 14.03*   RBC 4.01   HGB 12.2   HCT 38.5         Recent Labs   Lab 01/30/23  0129      K 3.5   CO2 22*   *   BUN 11   CREATININE 0.7   ALKPHOS 131   ALT 73*   *   BILITOT 0.4      Recent Labs   Lab 01/27/23  0955   INR 1.2      Recent Labs   Lab 01/27/23  0949   TROPONINI <0.006       Electrolytes: N/A - electrolytes WDL  Accuchecks: Q4H    Gtts:   niCARdipine 5 mg/hr (01/31/23  0122)       LDA/Wounds:  Lines/Drains/Airways       Drain  Duration                  ICP/Ventriculostomy 01/27/23 1400 3 days              Peripheral Intravenous Line  Duration                  Peripheral IV - Single Lumen 01/28/23 1400 20 G;1 3/4 in Anterior;Left Forearm 2 days         Peripheral IV - Single Lumen 01/30/23 1700 20 G Anterior;Proximal;Right Forearm <1 day                  Wounds: No  Wound care consulted: No

## 2023-01-31 NOTE — ASSESSMENT & PLAN NOTE
SBP 160s on arrival.   states patient does not take home Losartan regularly.     -Goal SBP <160  -Cardene gtt; wean as needed  -PRN Labetalol and Hydralazine  -resumed home Losartan

## 2023-01-31 NOTE — PT/OT/SLP PROGRESS
Occupational Therapy   Treatment    Name: Leida Hoyos  MRN: [id number]  Admitting Diagnosis:  SAH (subarachnoid hemorrhage)  4 Days Post-Op    Recommendations:     Discharge Recommendations:  (pending OOB activity once EVD can be clamped)  Discharge Equipment Recommendations:   (pending OOB activity once EVD can be clamped)  Barriers to discharge:  None    Assessment:     Leida Hoyos is a 52 y.o. female with a medical diagnosis of SAH (subarachnoid hemorrhage).  She presents with performance deficits affecting function are weakness, decreased upper extremity function, decreased lower extremity function, impaired balance, impaired endurance, impaired sensation, decreased safety awareness, impaired cognition, impaired self care skills, impaired functional mobility, decreased coordination.     Rehab Prognosis:  Good; patient would benefit from acute skilled OT services to address these deficits and reach maximum level of function.       Plan:     Patient to be seen 3 x/week to address the above listed problems via neuromuscular re-education, self-care/home management, community/work re-entry, cognitive retraining, therapeutic activities, therapeutic exercises  Plan of Care Expires: 02/25/23  Plan of Care Reviewed with: patient    Subjective   Patient:  Nonverbal  Pain/Comfort:  Pain Rating 1: 0/10  Pain Rating Post-Intervention 1: 0/10    Objective:     Communicated with: Nurse prior to session.  Patient found supine with bed alarm, ventilator, peña catheter, telemetry, peripheral IV, restraints, pulse ox (continuous), pressure relief boots, external ventricular drain, blood pressure cuff, SCD, arterial line upon OT entry to room.    General Precautions: Standard, aspiration, fall, NPO    Orthopedic Precautions:N/A  Braces: N/A  Respiratory Status: Nasal cannula, flow 6 L/min     Occupational Performance:     Bed Mobility:    Patient completed Rolling/Turning to Left with  total assistance  Patient  completed Rolling/Turning to Right with total assistance     Functional Mobility/Transfers:  Dependent drawsheet transfers    Activities of Daily Living:  Feeding:  NPO    Grooming: total assistance while supine    [facility] 6 Click ADL: 6    Treatment & Education:  Patient education provided on role of OT.  Daily orientation provided.   AAROM performed bilateral UE/LEs one set x 10 rep in all planes of motion with stretches provided at end range; sustained stretch provided for ankle dorsiflexion.   Positioning provided for midline orientation with bilateral UEs elevated and heels lifted off mattress.  Continued education, patient/ family training recommended.      Patient left supine with all lines intact, call button in reach, bed alarm on, and restraints reapplied at end of session    GOALS:   Multidisciplinary Problems       Occupational Therapy Goals          Problem: Occupational Therapy    Goal Priority Disciplines Outcome Interventions   Occupational Therapy Goal     OT, PT/OT Ongoing, Progressing    Description: Goals set 1/28 to be addressed for 14 days with expiration date, 2/11:  Patient will increase functional independence with ADLs by performing:    Patient will demonstrate rolling to the right with max assist.  Not met   Patient will demonstrate rolling to the left with max assist.   Not met  Patient will demonstrate supine -sit with max  assist.   Not met  Patient will demonstrate stand pivot transfers with max assist.   Not met  Patient will demonstrate grooming while seated with max assist.   Not met  Patient will demonstrate upper body dressing with max assist while seated EOB.   Not met  Patient will demonstrate lower body dressing with max assist while seated EOB.   Not met  Patient will demonstrate toileting with max assist.   Not met  Patient will demonstrate bathing while seated EOB with max assist.   Not met  Patient's family / caregiver will demonstrate independence and safety with assisting  patient with self-care skills and functional mobility.     Not met  Patient's family / caregiver will demonstrate independence with providing ROM and changes in bed positioning.   Not met                             Time Tracking:     OT Date of Treatment: 01/31/23  OT Start Time: 0413  OT Stop Time: 0436  OT Total Time (min): 23 min    Billable Minutes:Neuromuscular Re-education 23    OT/ANDRES: OT          1/31/2023

## 2023-01-31 NOTE — PROGRESS NOTES
[facility]  Neurosurgery  Progress Note    Subjective:     History of Present Illness: 51 yo F with PMH of DM1, autoimmune hepatitis, and HTN who presents to ED this am after confusion this am. She was LKN last night and became confused after showering this am so  brought to the ED. She quickly decompensated in the ER and was not following commands or waking to stimulation. MRI and CTA showed diffuse thick SAH and hydrocephalus. No identifiable aneurysm seen on CTA.       Post-Op Info:  Procedure(s) (LRB):  ANGIOGRAM-CEREBRAL (N/A)   4 Days Post-Op     Interval History: 1/31: PBD 4. Neuro exam improving. Following some commands. EVD at 20, output 91cc. ICPs 6-18        Review of Systems   Unable to perform ROS: Patient unresponsive   Neurological:  Weakness: 4.   Objective:     Weight: 61.2 kg (135 lb)  Body mass index is 24.69 kg/m².  Vital Signs (Most Recent):  Temp: 98.1 °F (36.7 °C) (01/31/23 1105)  Pulse: 65 (01/31/23 1405)  Resp: (!) 21 (01/31/23 1405)  BP: (!) 144/65 (01/31/23 1405)  SpO2: 96 % (01/31/23 1405)   Vital Signs (24h Range):  Temp:  [97.4 °F (36.3 °C)-100.1 °F (37.8 °C)] 98.1 °F (36.7 °C)  Pulse:  [] 65  Resp:  [16-30] 21  SpO2:  [94 %-99 %] 96 %  BP: (116-153)/(54-94) 144/65     Date 01/31/23 0700 - 02/01/23 0659   Shift 1962-4221 7325-4246 1938-7794 24 Hour Total   INTAKE   P.O. 240   240   Shift Total(mL/kg) 240(3.9)   240(3.9)   OUTPUT   Drains 16   16   Shift Total(mL/kg) 16(0.3)   16(0.3)   Weight (kg) 61.2 61.2 61.2 61.2                Resp Rate Total:  [22 br/min] 22 br/min         Urethral Catheter 01/27/23 1509 (Active)   $ [place] Insertion Bedside Insertion Performed 01/27/23 1601   Site Assessment Clean;Intact 01/27/23 1501   Collection Container Urimeter 01/27/23 1501   Securement Method secured to top of thigh w/ adhesive device 01/27/23 1501   Catheter Care Performed yes 01/27/23 1501   Reason for Continuing Urinary Catheterization Critically ill in  "ICU and requiring hourly monitoring of intake/output 01/27/23 1501   CAUTI Prevention Bundle Securement Device in place with 1" slack;Intact seal between catheter & drainage tubing;Drainage bag/urimeter off the floor;Sheeting clip in use;No dependent loops or kinks;Drainage bag/urimeter not overfilled (<2/3 full);Drainage bag/urimeter below bladder 01/27/23 1501   Output (mL) 250 mL 01/27/23 1700            ICP/Ventriculostomy 01/27/23 1400 (Active)   $ ICP/Ventriculostomy [place] Placement Bedside Insertion Performed 01/27/23 1501   Level of Ventriculostomy (cm above) 20 01/27/23 1501   Status Open to drainage 01/27/23 1501   Site Assessment Clean;Dry 01/27/23 1501   Site Drainage No drainage 01/27/23 1501   Waveform normal waveform 01/27/23 1501   Output (mL) 0 mL 01/27/23 1700   CSF Color clear 01/27/23 1501   Dressing Status Clean;Dry;Intact 01/27/23 1501   Interventions HOB degrees;bed controls locked;zeroed;level adjusted per order 01/27/23 1501     Physical Exam:    Constitutional: No distress.     HEENT: atraumatic/normocephalic    Cardiovascular: Regular rhythm.     Pulm: aerating well, saturating well    Abdominal: Soft.     Psych/Behavior: He is alert.     E4V4M6  AAOx2, slow speech; slow to respond  PERRL  EOMI  Face Symmetric  FC x 4 antigravity, non focal        Significant Labs:  Recent Labs   Lab 01/30/23  0129 01/31/23  0306   * 268*    147*   K 3.5 4.2   * 112*   CO2 22* 20*   BUN 11 14   CREATININE 0.7 0.8   CALCIUM 9.0 9.9   MG 2.0 2.1       Recent Labs   Lab 01/30/23  0129 01/31/23  0306   WBC 9.02 14.03*   HGB 11.0* 12.2   HCT 34.5* 38.5    199       No results for input(s): LABPT, INR, APTT in the last 48 hours.    Microbiology Results (last 7 days)       Procedure Component Value Units Date/Time    CSF culture [[id number]] Collected: 01/30/23 1449    Order Status: Completed Specimen: CSF (Spinal Fluid) from CSF Shunt Updated: 01/31/23 0736     CSF CULTURE No Growth to " date     Gram Stain Result Rare WBC's      No organisms seen          All pertinent labs from the last 24 hours have been reviewed.    Significant Diagnostics:  I have reviewed all pertinent imaging results/findings within the past 24 hours.    Physical Exam  Neurosurgery Physical Exam    Assessment/Plan:     * SAH (subarachnoid hemorrhage)  53 yo F with PMH of DM1, autoimmune hepatitis, and HTN who presents to ED and was found to have diffuse SAH and hydrocephalus    PBD 4    - admit to [facility]  - q1 neuro checks  - all labs and diagnostics reviewed   - MRI/CTA showed diffuse SAH and small volume IVH with hydrocephalus, no identifiable aneurysm   - CTH repeat with EVD in good position   - CTA without identifiable aneurysm   - DSA 1/27 without any identifiable aneurysm   - MRI brain/C spine and MRI vessel wall imaging 1/29: no vascular anomaly seen or vessell wall enhancement   - will need repeat DSA on PBD 7  - EVD open at 20, abx while in place  - SAH protocol   - daily TCDs x 14 days   - keppra 500mg x 7 days   - goal euvolemia to 1L positive  - Na >135, hypertonics as needed  - elevate HOB  - SBP <140  - extubated 1/30  - ok for diet, SLP  - ok for SQH    Dispo: ongoing        Tatiana Allison MD  Neurosurgery  [facility]

## 2023-01-31 NOTE — ASSESSMENT & PLAN NOTE
53 yo F with PMH of DM1, autoimmune hepatitis, and HTN who presents to ED and was found to have diffuse SAH and hydrocephalus    PBD 4    - admit to [facility]  - q1 neuro checks  - all labs and diagnostics reviewed   - MRI/CTA showed diffuse SAH and small volume IVH with hydrocephalus, no identifiable aneurysm   - CTH repeat with EVD in good position   - CTA without identifiable aneurysm   - DSA 1/27 without any identifiable aneurysm   - MRI brain/C spine and MRI vessel wall imaging 1/29: no vascular anomaly seen or vessell wall enhancement   - will need repeat DSA on PBD 7  - EVD open at 20, abx while in place  - SAH protocol   - daily TCDs x 14 days   - keppra 500mg x 7 days   - goal euvolemia to 1L positive  - Na >135, hypertonics as needed  - elevate HOB  - SBP <140  - extubated 1/30  - ok for diet, SLP  - ok for SQH    Dispo: ongoing

## 2023-02-01 LAB
ALBUMIN SERPL BCP-MCNC: 3.2 G/DL (ref 3.5–5.2)
ALP SERPL-CCNC: 132 U/L (ref 55–135)
ALT SERPL W/O P-5'-P-CCNC: 36 U/L (ref 10–44)
ANION GAP SERPL CALC-SCNC: 12 MMOL/L (ref 8–16)
AST SERPL-CCNC: 23 U/L (ref 10–40)
BASOPHILS # BLD AUTO: 0.02 K/UL (ref 0–0.2)
BASOPHILS NFR BLD: 0.2 % (ref 0–1.9)
BILIRUB SERPL-MCNC: 0.3 MG/DL (ref 0.1–1)
BUN SERPL-MCNC: 18 MG/DL (ref 6–20)
CALCIUM SERPL-MCNC: 10.4 MG/DL (ref 8.7–10.5)
CHLORIDE SERPL-SCNC: 116 MMOL/L (ref 95–110)
CO2 SERPL-SCNC: 21 MMOL/L (ref 23–29)
CREAT SERPL-MCNC: 0.7 MG/DL (ref 0.5–1.4)
DIFFERENTIAL METHOD: ABNORMAL
EOSINOPHIL # BLD AUTO: 0 K/UL (ref 0–0.5)
EOSINOPHIL NFR BLD: 0 % (ref 0–8)
ERYTHROCYTE [DISTWIDTH] IN BLOOD BY AUTOMATED COUNT: 12.5 % (ref 11.5–14.5)
EST. GFR  (NO RACE VARIABLE): >60 ML/MIN/1.73 M^2
GLUCOSE SERPL-MCNC: 169 MG/DL (ref 70–110)
HCT VFR BLD AUTO: 37.2 % (ref 37–48.5)
HGB BLD-MCNC: 11.8 G/DL (ref 12–16)
IMM GRANULOCYTES # BLD AUTO: 0.03 K/UL (ref 0–0.04)
IMM GRANULOCYTES NFR BLD AUTO: 0.2 % (ref 0–0.5)
LYMPHOCYTES # BLD AUTO: 1.7 K/UL (ref 1–4.8)
LYMPHOCYTES NFR BLD: 12.8 % (ref 18–48)
MAGNESIUM SERPL-MCNC: 2 MG/DL (ref 1.6–2.6)
MCH RBC QN AUTO: 31 PG (ref 27–31)
MCHC RBC AUTO-ENTMCNC: 31.7 G/DL (ref 32–36)
MCV RBC AUTO: 98 FL (ref 82–98)
MONOCYTES # BLD AUTO: 1 K/UL (ref 0.3–1)
MONOCYTES NFR BLD: 7.4 % (ref 4–15)
NEUTROPHILS # BLD AUTO: 10.4 K/UL (ref 1.8–7.7)
NEUTROPHILS NFR BLD: 79.4 % (ref 38–73)
NRBC BLD-RTO: 0 /100 WBC
PHOSPHATE SERPL-MCNC: 2.3 MG/DL (ref 2.7–4.5)
PLATELET # BLD AUTO: 230 K/UL (ref 150–450)
PMV BLD AUTO: 9.5 FL (ref 9.2–12.9)
POCT GLUCOSE: 216 MG/DL (ref 70–110)
POCT GLUCOSE: 269 MG/DL (ref 70–110)
POCT GLUCOSE: 270 MG/DL (ref 70–110)
POCT GLUCOSE: 314 MG/DL (ref 70–110)
POTASSIUM SERPL-SCNC: 3.4 MMOL/L (ref 3.5–5.1)
PROT SERPL-MCNC: 7.1 G/DL (ref 6–8.4)
RBC # BLD AUTO: 3.81 M/UL (ref 4–5.4)
SODIUM SERPL-SCNC: 149 MMOL/L (ref 136–145)
WBC # BLD AUTO: 13.08 K/UL (ref 3.9–12.7)

## 2023-02-01 PROCEDURE — 99900035 HC TECH TIME PER 15 MIN (STAT)

## 2023-02-01 PROCEDURE — 84100 ASSAY OF PHOSPHORUS: CPT

## 2023-02-01 PROCEDURE — 97116 GAIT TRAINING THERAPY: CPT

## 2023-02-01 PROCEDURE — 97530 THERAPEUTIC ACTIVITIES: CPT

## 2023-02-01 PROCEDURE — 20000000 HC ICU ROOM

## 2023-02-01 PROCEDURE — 25000003 PHARM REV CODE 250

## 2023-02-01 PROCEDURE — 94640 AIRWAY INHALATION TREATMENT: CPT

## 2023-02-01 PROCEDURE — 85025 COMPLETE CBC W/AUTO DIFF WBC: CPT

## 2023-02-01 PROCEDURE — 27000221 HC OXYGEN, UP TO 24 HOURS

## 2023-02-01 PROCEDURE — 97535 SELF CARE MNGMENT TRAINING: CPT

## 2023-02-01 PROCEDURE — 63600175 PHARM REV CODE 636 W HCPCS: Performed by: STUDENT IN AN ORGANIZED HEALTH CARE EDUCATION/TRAINING PROGRAM

## 2023-02-01 PROCEDURE — 92526 ORAL FUNCTION THERAPY: CPT

## 2023-02-01 PROCEDURE — 25000003 PHARM REV CODE 250: Performed by: NURSE PRACTITIONER

## 2023-02-01 PROCEDURE — 99233 SBSQ HOSP IP/OBS HIGH 50: CPT | Mod: ,,, | Performed by: NEUROLOGICAL SURGERY

## 2023-02-01 PROCEDURE — 80053 COMPREHEN METABOLIC PANEL: CPT

## 2023-02-01 PROCEDURE — 25000003 PHARM REV CODE 250: Performed by: PSYCHIATRY & NEUROLOGY

## 2023-02-01 PROCEDURE — 25000242 PHARM REV CODE 250 ALT 637 W/ HCPCS: Performed by: STUDENT IN AN ORGANIZED HEALTH CARE EDUCATION/TRAINING PROGRAM

## 2023-02-01 PROCEDURE — 25000003 PHARM REV CODE 250: Performed by: STUDENT IN AN ORGANIZED HEALTH CARE EDUCATION/TRAINING PROGRAM

## 2023-02-01 PROCEDURE — 83735 ASSAY OF MAGNESIUM: CPT

## 2023-02-01 PROCEDURE — 94761 N-INVAS EAR/PLS OXIMETRY MLT: CPT

## 2023-02-01 PROCEDURE — 63600175 PHARM REV CODE 636 W HCPCS

## 2023-02-01 PROCEDURE — 99233 PR SUBSEQUENT HOSPITAL CARE,LEVL III: ICD-10-PCS | Mod: ,,, | Performed by: NEUROLOGICAL SURGERY

## 2023-02-01 RX ORDER — SODIUM,POTASSIUM PHOSPHATES 280-250MG
2 POWDER IN PACKET (EA) ORAL
Status: DISCONTINUED | OUTPATIENT
Start: 2023-02-01 | End: 2023-02-09

## 2023-02-01 RX ORDER — LANOLIN ALCOHOL/MO/W.PET/CERES
800 CREAM (GRAM) TOPICAL
Status: DISCONTINUED | OUTPATIENT
Start: 2023-02-01 | End: 2023-02-09

## 2023-02-01 RX ORDER — NICARDIPINE HYDROCHLORIDE 0.2 MG/ML
0-15 INJECTION INTRAVENOUS CONTINUOUS
Status: DISCONTINUED | OUTPATIENT
Start: 2023-02-01 | End: 2023-02-06

## 2023-02-01 RX ADMIN — NIMODIPINE 60 MG: 60 SOLUTION ORAL at 06:02

## 2023-02-01 RX ADMIN — CEFAZOLIN 2 G: 2 INJECTION, POWDER, FOR SOLUTION INTRAMUSCULAR; INTRAVENOUS at 05:02

## 2023-02-01 RX ADMIN — LEVETIRACETAM 500 MG: 100 INJECTION, SOLUTION INTRAVENOUS at 09:02

## 2023-02-01 RX ADMIN — HEPARIN SODIUM 5000 UNITS: 5000 INJECTION INTRAVENOUS; SUBCUTANEOUS at 02:02

## 2023-02-01 RX ADMIN — INSULIN ASPART 5 UNITS: 100 INJECTION, SOLUTION INTRAVENOUS; SUBCUTANEOUS at 05:02

## 2023-02-01 RX ADMIN — HEPARIN SODIUM 5000 UNITS: 5000 INJECTION INTRAVENOUS; SUBCUTANEOUS at 09:02

## 2023-02-01 RX ADMIN — NICARDIPINE HYDROCHLORIDE 7.5 MG/HR: 0.2 INJECTION, SOLUTION INTRAVENOUS at 01:02

## 2023-02-01 RX ADMIN — NICARDIPINE HYDROCHLORIDE 7.5 MG/HR: 0.2 INJECTION, SOLUTION INTRAVENOUS at 06:02

## 2023-02-01 RX ADMIN — NICARDIPINE HYDROCHLORIDE 5 MG/HR: 0.2 INJECTION, SOLUTION INTRAVENOUS at 11:02

## 2023-02-01 RX ADMIN — CEFAZOLIN 2 G: 2 INJECTION, POWDER, FOR SOLUTION INTRAMUSCULAR; INTRAVENOUS at 12:02

## 2023-02-01 RX ADMIN — HEPARIN SODIUM 5000 UNITS: 5000 INJECTION INTRAVENOUS; SUBCUTANEOUS at 05:02

## 2023-02-01 RX ADMIN — MUPIROCIN: 20 OINTMENT TOPICAL at 12:02

## 2023-02-01 RX ADMIN — NIMODIPINE 60 MG: 60 SOLUTION ORAL at 09:02

## 2023-02-01 RX ADMIN — CEFAZOLIN 2 G: 2 INJECTION, POWDER, FOR SOLUTION INTRAMUSCULAR; INTRAVENOUS at 11:02

## 2023-02-01 RX ADMIN — AMANTADINE HYDROCHLORIDE 100 MG: 50 SOLUTION ORAL at 06:02

## 2023-02-01 RX ADMIN — INSULIN ASPART 5 UNITS: 100 INJECTION, SOLUTION INTRAVENOUS; SUBCUTANEOUS at 08:02

## 2023-02-01 RX ADMIN — NIMODIPINE 60 MG: 60 SOLUTION ORAL at 02:02

## 2023-02-01 RX ADMIN — LOSARTAN POTASSIUM 25 MG: 25 TABLET, FILM COATED ORAL at 09:02

## 2023-02-01 RX ADMIN — CEFAZOLIN 2 G: 2 INJECTION, POWDER, FOR SOLUTION INTRAMUSCULAR; INTRAVENOUS at 08:02

## 2023-02-01 RX ADMIN — POTASSIUM BICARBONATE 35 MEQ: 391 TABLET, EFFERVESCENT ORAL at 09:02

## 2023-02-01 RX ADMIN — NIMODIPINE 60 MG: 60 SOLUTION ORAL at 10:02

## 2023-02-01 RX ADMIN — POTASSIUM BICARBONATE 35 MEQ: 391 TABLET, EFFERVESCENT ORAL at 11:02

## 2023-02-01 RX ADMIN — AMANTADINE HYDROCHLORIDE 100 MG: 50 SOLUTION ORAL at 02:02

## 2023-02-01 RX ADMIN — INSULIN ASPART 5 UNITS: 100 INJECTION, SOLUTION INTRAVENOUS; SUBCUTANEOUS at 12:02

## 2023-02-01 RX ADMIN — IPRATROPIUM BROMIDE AND ALBUTEROL SULFATE 3 ML: .5; 3 SOLUTION RESPIRATORY (INHALATION) at 02:02

## 2023-02-01 RX ADMIN — IPRATROPIUM BROMIDE AND ALBUTEROL SULFATE 3 ML: .5; 3 SOLUTION RESPIRATORY (INHALATION) at 08:02

## 2023-02-01 RX ADMIN — POTASSIUM & SODIUM PHOSPHATES POWDER PACK 280-160-250 MG 2 PACKET: 280-160-250 PACK at 09:02

## 2023-02-01 RX ADMIN — INSULIN ASPART 8 UNITS: 100 INJECTION, SOLUTION INTRAVENOUS; SUBCUTANEOUS at 09:02

## 2023-02-01 RX ADMIN — IPRATROPIUM BROMIDE AND ALBUTEROL SULFATE 3 ML: .5; 3 SOLUTION RESPIRATORY (INHALATION) at 07:02

## 2023-02-01 NOTE — ASSESSMENT & PLAN NOTE
53 yo F w/ PMH DM1, HTN presented to the ED with AMS after HA and vomiting the night before. Patient was non-verbal, B/l fixed pupils, and only intermittently following commands in B/L UE. GCS 8. MRI shows Acute IVH, SAH w/ obstructive hydrocephalus w/o signs of ischemia and R A2 QUIQUE aneurysm. VN consulted. Patient intubated in ED after concern for airway protection. NSGY placed EVD. IR consulted for possible angiogram. CTA shows diffuse subarachnoid hemorrhage with moderate hydrocephalus. No intracranial aneurysm or AVM is identified. NIHSS 9 on admission.     - IR angiogram on 1/27 shows no aneurysm, AVM, or AV fistula. Will need repeat angiogram on 2/3; will discuss with IR   - CTA shows diffuse subarachnoid hemorrhage with moderate hydrocephalus. No intracranial aneurysm or AVM is identified.  - CT Head stable after EVD placed, now clamped. CTH in AM    - Continued monitoring in NCC  - Q1H neuro checks and vitals  - NSGY following; appreciate recs  - EVD clamped @20- monitor ICPs and CTH in AM  - Ancef for drain ppx  - Nimotop 60 q4h  - Daily TCDs  - Echo   - CBC, CMP, Mag, Phos now and then daily   - SBP goal <160  - Cardene gtt, wean as able   - PRN labetalol, hydralazine  - SQ heparin  - PT/OT/SLP  - Angio possible 2/3/23

## 2023-02-01 NOTE — PROGRESS NOTES
"[facility]  Neurosurgery  Progress Note    Subjective:     History of Present Illness: 51 yo F with PMH of DM1, autoimmune hepatitis, and HTN who presents to ED this am after confusion this am. She was LKN last night and became confused after showering this am so  brought to the ED. She quickly decompensated in the ER and was not following commands or waking to stimulation. MRI and CTA showed diffuse thick SAH and hydrocephalus. No identifiable aneurysm seen on CTA.       Post-Op Info:  Procedure(s) (LRB):  ANGIOGRAM-CEREBRAL (N/A)   5 Days Post-Op     Interval History: 2/1: PBD 5. Neuro exam improving, more alert and talking this morning. EVD at 20, ICP 8-19, clamping today.         Objective:     Weight: 61.2 kg (135 lb)  Body mass index is 24.69 kg/m².  Vital Signs (Most Recent):  Temp: 98.8 °F (37.1 °C) (02/01/23 0305)  Pulse: 108 (02/01/23 0859)  Resp: 20 (02/01/23 0859)  BP: (!) 153/67 (02/01/23 0912)  SpO2: 95 % (02/01/23 0859)   Vital Signs (24h Range):  Temp:  [98 °F (36.7 °C)-98.8 °F (37.1 °C)] 98.8 °F (37.1 °C)  Pulse:  [] 108  Resp:  [16-28] 20  SpO2:  [92 %-99 %] 95 %  BP: (107-153)/(54-86) 153/67     Date 02/01/23 0700 - 02/02/23 0659   Shift 6126-6767 6837-3392 5314-4362 24 Hour Total   INTAKE   Shift Total(mL/kg)       OUTPUT   Drains 3   3   Shift Total(mL/kg) 3(0)   3(0)   Weight (kg) 61.2 61.2 61.2 61.2                Oxygen Concentration (%):  [36] 36         Urethral Catheter 01/27/23 1509 (Active)   $ [place] Insertion Bedside Insertion Performed 01/27/23 1601   Site Assessment Clean;Intact 01/27/23 1501   Collection Container Urimeter 01/27/23 1501   Securement Method secured to top of thigh w/ adhesive device 01/27/23 1501   Catheter Care Performed yes 01/27/23 1501   Reason for Continuing Urinary Catheterization Critically ill in ICU and requiring hourly monitoring of intake/output 01/27/23 1501   CAUTI Prevention Bundle Securement Device in place with 1" " slack;Intact seal between catheter & drainage tubing;Drainage bag/urimeter off the floor;Sheeting clip in use;No dependent loops or kinks;Drainage bag/urimeter not overfilled (<2/3 full);Drainage bag/urimeter below bladder 01/27/23 1501   Output (mL) 250 mL 01/27/23 1700            ICP/Ventriculostomy 01/27/23 1400 (Active)   $ ICP/Ventriculostomy [place] Placement Bedside Insertion Performed 01/27/23 1501   Level of Ventriculostomy (cm above) 20 01/27/23 1501   Status Open to drainage 01/27/23 1501   Site Assessment Clean;Dry 01/27/23 1501   Site Drainage No drainage 01/27/23 1501   Waveform normal waveform 01/27/23 1501   Output (mL) 0 mL 01/27/23 1700   CSF Color clear 01/27/23 1501   Dressing Status Clean;Dry;Intact 01/27/23 1501   Interventions HOB degrees;bed controls locked;zeroed;level adjusted per order 01/27/23 1501     Physical Exam:    Constitutional: No distress.     HEENT: atraumatic/normocephalic    Cardiovascular: Regular rhythm.     Pulm: aerating well, saturating well    Abdominal: Soft.     Psych/Behavior: He is alert.     E4V4M6  AAOx2,   PERRL  EOMI  Face Symmetric  FC x 4 antigravity, non focal        Significant Labs:  Recent Labs   Lab 01/31/23  0306 02/01/23  0440   * 169*   * 149*   K 4.2 3.4*   * 116*   CO2 20* 21*   BUN 14 18   CREATININE 0.8 0.7   CALCIUM 9.9 10.4   MG 2.1 2.0       Recent Labs   Lab 01/31/23  0306 02/01/23  0440   WBC 14.03* 13.08*   HGB 12.2 11.8*   HCT 38.5 37.2    230       No results for input(s): LABPT, INR, APTT in the last 48 hours.    Microbiology Results (last 7 days)       Procedure Component Value Units Date/Time    CSF culture [[id number]] Collected: 01/30/23 1449    Order Status: Completed Specimen: CSF (Spinal Fluid) from CSF Shunt Updated: 01/31/23 0736     CSF CULTURE No Growth to date     Gram Stain Result Rare WBC's      No organisms seen          All pertinent labs from the last 24 hours have been reviewed.    Significant  Diagnostics:  I have reviewed all pertinent imaging results/findings within the past 24 hours.    Physical Exam  Neurosurgery Physical Exam    Assessment/Plan:     * SAH (subarachnoid hemorrhage)  53 yo F with PMH of DM1, autoimmune hepatitis, and HTN who presents to ED and was found to have diffuse SAH and hydrocephalus    PBD 5    - admit to [facility]  - q1 neuro checks  - all labs and diagnostics reviewed   - MRI/CTA showed diffuse SAH and small volume IVH with hydrocephalus, no identifiable aneurysm   - CTH repeat with EVD in good position   - CTA without identifiable aneurysm   - DSA 1/27 without any identifiable aneurysm   - MRI brain/C spine and MRI vessel wall imaging 1/29: no vascular anomaly seen or vessell wall enhancement   - will need repeat DSA on PBD 7, either Thursday or Friday this week  - EVD clamped, CTH tomorrow  - SAH protocol   - daily TCDs x 14 days   - keppra 500mg x 7 days   - goal euvolemia to 1L positive  - Na >135, hypertonics as needed  - elevate HOB  - SBP <140  - extubated 1/30  - ok for diet, SLP  - ok for SQH    Dispo: ongoing        Maren Villanueva MD  Neurosurgery  [facility]

## 2023-02-01 NOTE — ASSESSMENT & PLAN NOTE
Diagnosed in 2013. BG on arrival 284  Home regimen 13u Lantus; Lispro AC  A1C 6.5     -Transitioned from Insulin gtt to Detemir 10u BID --> increase to 12 BID  -Hypoglycemic protocol in place  -Accuchecks q1h

## 2023-02-01 NOTE — ASSESSMENT & PLAN NOTE
53 yo F with PMH of DM1, autoimmune hepatitis, and HTN who presents to ED and was found to have diffuse SAH and hydrocephalus    PBD 5    - admit to [facility]CC  - q1 neuro checks  - all labs and diagnostics reviewed   - MRI/CTA showed diffuse SAH and small volume IVH with hydrocephalus, no identifiable aneurysm   - CTH repeat with EVD in good position   - CTA without identifiable aneurysm   - DSA 1/27 without any identifiable aneurysm   - MRI brain/C spine and MRI vessel wall imaging 1/29: no vascular anomaly seen or vessell wall enhancement   - will need repeat DSA on PBD 7, either Thursday or Friday this week  - EVD clamped, CTH tomorrow  - SAH protocol   - daily TCDs x 14 days   - keppra 500mg x 7 days   - goal euvolemia to 1L positive  - Na >135, hypertonics as needed  - elevate HOB  - SBP <140  - extubated 1/30  - ok for diet, SLP  - ok for SQH    Dispo: ongoing

## 2023-02-01 NOTE — PLAN OF CARE
[facility] Care Plan    POC reviewed with Leida Hoyos and family at 1400. Pt verbalized understanding. Questions and concerns addressed. No acute events today. Pt progressing toward goals. Will continue to monitor. See below and flowsheets for full assessment and VS info.             Is this a stroke patient? yes- Stroke booklet reviewed with patient and family, risk factors identified for patient and stroke booklet remains at bedside for ongoing education.     Neuro:  [place] Coma Scale  Best Eye Response: 4-->(E4) spontaneous  Best Motor Response: 6-->(M6) obeys commands  Best Verbal Response: 4-->(V4) confused  [place] Coma Scale Score: 14  Assessment Qualifiers: no eye obstruction present  Pupil PERRLA: yes     24 hr Temp:  [98 °F (36.7 °C)-98.8 °F (37.1 °C)]     CV:   Rhythm: sinus tachycardia  BP goals:   SBP < 140  MAP > 65    Resp:      Vent Mode: Spont  Set Rate: 16 BPM  Oxygen Concentration (%): 32  Vt Set: 420 mL  PEEP/CPAP: 5 cmH20  Pressure Support: 7 cmH20    Plan: N/A    GI/:     Diet/Nutrition Received: clear liquid  Last Bowel Movement: 02/01/23  Voiding Characteristics: voids spontaneously without difficulty    Intake/Output Summary (Last 24 hours) at 2/1/2023 1701  Last data filed at 2/1/2023 1345  Gross per 24 hour   Intake 1611.74 ml   Output 25 ml   Net 1586.74 ml     Unmeasured Output  Urine Occurrence: 1  Stool Occurrence: 1  Pad Count: 2    Labs/Accuchecks:  Recent Labs   Lab 02/01/23  0440   WBC 13.08*   RBC 3.81*   HGB 11.8*   HCT 37.2         Recent Labs   Lab 02/01/23  0440   *   K 3.4*   CO2 21*   *   BUN 18   CREATININE 0.7   ALKPHOS 132   ALT 36   AST 23   BILITOT 0.3      Recent Labs   Lab 01/27/23  0955   INR 1.2      Recent Labs   Lab 01/27/23  0949   TROPONINI <0.006       Electrolytes: Electrolytes replaced  Accuchecks: Q6H    Gtts:   niCARdipine 7.5 mg/hr (02/01/23 1345)       LDA/Wounds:  Lines/Drains/Airways       Drain  Duration                   ICP/Ventriculostomy 01/27/23 1400 5 days              Peripheral Intravenous Line  Duration                  Peripheral IV - Single Lumen 01/30/23 1700 20 G Anterior;Proximal;Right Forearm 2 days         Peripheral IV - Single Lumen 02/01/23 0534 20 G Anterior;Right Upper Arm <1 day                  Wounds: No  Wound care consulted: No

## 2023-02-01 NOTE — SUBJECTIVE & OBJECTIVE
Subjective:     Interval History:  As above.    Review of Systems   Constitutional:  Negative for fever.   Eyes:  Negative for visual disturbance.   Gastrointestinal:  Negative for nausea and vomiting.   Neurological:  Positive for speech difficulty.   Unable to obtain a complete ROS due to level of consciousness.    Objective:     Vitals:  Pulse: 101  Rhythm: sinus bradycardia  BP: 130/61  MAP (mmHg): 91  ICP Mean (mmHg): 16 mmHg  Resp: 20  SpO2: 95 %  Oxygen Concentration (%): 32    Temp  Min: 98 °F (36.7 °C)  Max: 98.8 °F (37.1 °C)  Pulse  Min: 66  Max: 108  BP  Min: 107/54  Max: 153/67  MAP (mmHg)  Min: 77  Max: 92  ICP Mean (mmHg)  Min: 9 mmHg  Max: 19 mmHg  Resp  Min: 16  Max: 28  SpO2  Min: 92 %  Max: 99 %  Oxygen Concentration (%)  Min: 32  Max: 32    01/31 0701 - 02/01 0700  In: 1682.4 [P.O.:240; I.V.:361.2]  Out: 39 [Drains:39]   Unmeasured Output  Urine Occurrence: 1  Stool Occurrence: 1  Pad Count: 2       Physical Exam  Vitals and nursing note reviewed.   HENT:      Head: Normocephalic.      Comments: EVD in place     Nose: Nose normal.      Mouth/Throat:      Mouth: Mucous membranes are moist.      Pharynx: Oropharynx is clear.   Cardiovascular:      Rate and Rhythm: Normal rate and regular rhythm.      Pulses: Normal pulses.      Heart sounds: Normal heart sounds.   Pulmonary:      Effort: Pulmonary effort is normal.      Breath sounds: Normal breath sounds.   Abdominal:      General: Bowel sounds are normal.      Palpations: Abdomen is soft.   Musculoskeletal:         General: Normal range of motion.   Skin:     General: Skin is warm and dry.      Capillary Refill: Capillary refill takes 2 to 3 seconds.   Neurological:      Mental Status: She is alert.      Comments:   GCS 14 with delayed response time  Follows simple commands in BUE/BLE  PERRL  BRAGG to command and spontaneously  Sensation intact       Unable to test orientation, language, memory, judgment, insight, fund of knowledge, hearing,  shoulder shrug, tongue protrusion, coordination, gait due to level of consciousness.    Medications:  ContinuousniCARdipine, Last Rate: 7.5 mg/hr (02/01/23 1345)  Scheduledalbuterol-ipratropium, 3 mL, Q6H WAKE  amantadine HCL, 100 mg, BID  ceFAZolin (ANCEF) IVPB, 2 g, Q8H  heparin (porcine), 5,000 Units, Q8H  insulin aspart U-100, 5 Units, TIDWM  insulin detemir U-100, 12 Units, BID  levetiracetam IV, 500 mg, Q12H  losartan, 25 mg, Daily  mupirocin, , BID  niMODipine, 60 mg, Q4H  PRNdextrose 10%, 12.5 g, PRN  glucagon (human recombinant), 1 mg, PRN  glucose, 16 g, PRN  glucose, 24 g, PRN  hydrALAZINE, 10 mg, Q6H PRN  insulin aspart U-100, 0-15 Units, QID (AC + HS) PRN  labetalol, 10 mg, Q4H PRN  magnesium oxide, 800 mg, PRN  magnesium oxide, 800 mg, PRN  ondansetron, 4 mg, Q6H PRN  potassium bicarbonate, 35 mEq, PRN  potassium bicarbonate, 50 mEq, PRN  potassium bicarbonate, 60 mEq, PRN  potassium, sodium phosphates, 2 packet, PRN  potassium, sodium phosphates, 2 packet, PRN  potassium, sodium phosphates, 2 packet, PRN  sodium chloride 0.9%, 10 mL, PRN  sodium chloride 0.9%, 10 mL, PRN    Today I personally reviewed pertinent medications, lines/drains/airways, imaging, cardiology results, laboratory results, microbiology results, notably:    Hyperglycemic, increasing long acting insulin.     Diet  Diet full liquid [facility]  Diet full liquid [facility]

## 2023-02-01 NOTE — SUBJECTIVE & OBJECTIVE
"Interval History: 2/1: PBD 5. Neuro exam improving, more alert and talking this morning. EVD at 20, ICP 8-19, clamping today.         Objective:     Weight: 61.2 kg (135 lb)  Body mass index is 24.69 kg/m².  Vital Signs (Most Recent):  Temp: 98.8 °F (37.1 °C) (02/01/23 0305)  Pulse: 108 (02/01/23 0859)  Resp: 20 (02/01/23 0859)  BP: (!) 153/67 (02/01/23 0912)  SpO2: 95 % (02/01/23 0859)   Vital Signs (24h Range):  Temp:  [98 °F (36.7 °C)-98.8 °F (37.1 °C)] 98.8 °F (37.1 °C)  Pulse:  [] 108  Resp:  [16-28] 20  SpO2:  [92 %-99 %] 95 %  BP: (107-153)/(54-86) 153/67     Date 02/01/23 0700 - 02/02/23 0659   Shift 6832-1955 6368-8903 0459-2634 24 Hour Total   INTAKE   Shift Total(mL/kg)       OUTPUT   Drains 3   3   Shift Total(mL/kg) 3(0)   3(0)   Weight (kg) 61.2 61.2 61.2 61.2                Oxygen Concentration (%):  [36] 36         Urethral Catheter 01/27/23 1509 (Active)   $ [place] Insertion Bedside Insertion Performed 01/27/23 1601   Site Assessment Clean;Intact 01/27/23 1501   Collection Container Urimeter 01/27/23 1501   Securement Method secured to top of thigh w/ adhesive device 01/27/23 1501   Catheter Care Performed yes 01/27/23 1501   Reason for Continuing Urinary Catheterization Critically ill in ICU and requiring hourly monitoring of intake/output 01/27/23 1501   CAUTI Prevention Bundle Securement Device in place with 1" slack;Intact seal between catheter & drainage tubing;Drainage bag/urimeter off the floor;Sheeting clip in use;No dependent loops or kinks;Drainage bag/urimeter not overfilled (<2/3 full);Drainage bag/urimeter below bladder 01/27/23 1501   Output (mL) 250 mL 01/27/23 1700            ICP/Ventriculostomy 01/27/23 1400 (Active)   $ ICP/Ventriculostomy [place] Placement Bedside Insertion Performed 01/27/23 1501   Level of Ventriculostomy (cm above) 20 01/27/23 1501   Status Open to drainage 01/27/23 1501   Site Assessment Clean;Dry 01/27/23 1501   Site Drainage No drainage 01/27/23 1501 "   Waveform normal waveform 01/27/23 1501   Output (mL) 0 mL 01/27/23 1700   CSF Color clear 01/27/23 1501   Dressing Status Clean;Dry;Intact 01/27/23 1501   Interventions HOB degrees;bed controls locked;zeroed;level adjusted per order 01/27/23 1501     Physical Exam:    Constitutional: No distress.     HEENT: atraumatic/normocephalic    Cardiovascular: Regular rhythm.     Pulm: aerating well, saturating well    Abdominal: Soft.     Psych/Behavior: He is alert.     E4V4M6  AAOx2,   PERRL  EOMI  Face Symmetric  FC x 4 antigravity, non focal        Significant Labs:  Recent Labs   Lab 01/31/23  0306 02/01/23  0440   * 169*   * 149*   K 4.2 3.4*   * 116*   CO2 20* 21*   BUN 14 18   CREATININE 0.8 0.7   CALCIUM 9.9 10.4   MG 2.1 2.0       Recent Labs   Lab 01/31/23  0306 02/01/23  0440   WBC 14.03* 13.08*   HGB 12.2 11.8*   HCT 38.5 37.2    230       No results for input(s): LABPT, INR, APTT in the last 48 hours.    Microbiology Results (last 7 days)       Procedure Component Value Units Date/Time    CSF culture [[id number]] Collected: 01/30/23 1449    Order Status: Completed Specimen: CSF (Spinal Fluid) from CSF Shunt Updated: 01/31/23 0736     CSF CULTURE No Growth to date     Gram Stain Result Rare WBC's      No organisms seen          All pertinent labs from the last 24 hours have been reviewed.    Significant Diagnostics:  I have reviewed all pertinent imaging results/findings within the past 24 hours.    Physical Exam  Neurosurgery Physical Exam

## 2023-02-01 NOTE — PT/OT/SLP PROGRESS
Physical Therapy Treatment    Patient Name:  Leida Hoyos   MRN:  [id number]    Recommendations:     Discharge Recommendations: rehabilitation facility  Discharge Equipment Recommendations:  (TBD as pt progresses)  Barriers to discharge: Inaccessible home and Decreased caregiver support 1 ALIS and requires assist for mobility    Assessment:     Leida Hoyos is a 52 y.o. female admitted with a medical diagnosis of SAH (subarachnoid hemorrhage).  She presents with the following impairments/functional limitations: weakness, impaired functional mobility, gait instability, impaired balance, impaired self care skills, decreased lower extremity function, decreased upper extremity function, decreased coordination, impaired cognition, decreased safety awareness . Pt is unsafe with functional mobility at this time due to pt requires max assist for bed mobility, moderate assist for transfers, and moderate assist for gait due to pt with posterior instability, decreased step length, and required manual cues for direction. Pt appears motivated to progress with functional mobility.     Rehab Prognosis: Good; patient would benefit from acute skilled PT services to address these deficits and reach maximum level of function.    Recent Surgery: Procedure(s) (LRB):  ANGIOGRAM-CEREBRAL (N/A) 5 Days Post-Op    Plan:     During this hospitalization, patient to be seen 4 x/week to address the identified rehab impairments via gait training, therapeutic activities, therapeutic exercises, neuromuscular re-education and progress toward the following goals:    Plan of Care Expires:  03/02/23    Subjective   Pt able to answer questions inconsistently  Pain/Comfort:  Pain Rating 1: 0/10  Pain Rating Post-Intervention 1: 0/10      Objective:     Communicated with nurse prior to session.  Patient found HOB elevated with bed alarm, blood pressure cuff, SCD, telemetry, peripheral IV, pulse ox (continuous), restraints, PureWick upon PT  entry to room.     General Precautions: Standard, aspiration, fall  Orthopedic Precautions: N/A  Braces: N/A  Respiratory Status: Nasal cannula, flow 4 L/min   Nurse clamped EVD prior to coming to sit  Functional Mobility:  Bed Mobility:     Rolling Left:  maximal assistance  Supine to Sit: maximal assistance  Sit to Supine: maximal assistance  Transfers:     Sit to Stand:  moderate assistance with hand-held assist  Gait: 8ft x 2 trials with HHA with moderate assist. Pt performed gait with decreased step length, posterior lean, and required manual cues for direction.    AM-PAC 6 CLICK MOBILITY  Turning over in bed (including adjusting bedclothes, sheets and blankets)?: 2  Sitting down on and standing up from a chair with arms (e.g., wheelchair, bedside commode, etc.): 2  Moving from lying on back to sitting on the side of the bed?: 2  Moving to and from a bed to a chair (including a wheelchair)?: 2  Need to walk in hospital room?: 2  Climbing 3-5 steps with a railing?: 1  Basic Mobility Total Score: 11     Treatment & Education:   pt sat on the EOB ~ 7 min with minimal assist initially due to posterior instability and R sided lean. Pt stood x 3 trials with HHA with moderate assist for ~ 15 sec each trial. Pt received verbal and manual cues for midline orientation and upright posture in sitting and standing\.     Patient left HOB elevated with all lines intact, call button in reach, bed alarm on, nurse notified, and mother present..    GOALS:   Multidisciplinary Problems       Physical Therapy Goals          Problem: Physical Therapy    Goal Priority Disciplines Outcome Goal Variances Interventions   Physical Therapy Goal     PT, PT/OT Ongoing, Progressing     Description: PT goals until 2/15/23    1. Pt supine to sit with minimal assist-not met  2. Pt sit to supine with minimal assist-not met  3. Pt sit to stand with LRAD as needed for safety with minimal assist-not met  4. Pt to perform gait 20ft with LRAD as  needed for safety with moderate assist.-not met  5. Pt to go up/down curb step with LRAD as needed for safety with moderate assist.-not met  6. Pt to transfer bed to/from bedside chair with LRAD as needed for safety with minimal assist.-not met  7. Pt to perform B LE exs in sitting or supine x 10 reps to strengthen B LE to improve functional mobility.-not met                        Time Tracking:     PT Received On: 02/01/23  PT Start Time: 0923     PT Stop Time: 0947  PT Total Time (min): 24 min     Billable Minutes: Gait Training 14 and Therapeutic Activity 10    Treatment Type: Treatment  PT/PTA: PT           02/01/2023

## 2023-02-01 NOTE — PLAN OF CARE
Problem: Physical Therapy  Goal: Physical Therapy Goal  Description: PT goals until 2/15/23    1. Pt supine to sit with minimal assist-not met  2. Pt sit to supine with minimal assist-not met  3. Pt sit to stand with LRAD as needed for safety with minimal assist-not met  4. Pt to perform gait 20ft with LRAD as needed for safety with moderate assist.-not met  5. Pt to go up/down curb step with LRAD as needed for safety with moderate assist.-not met  6. Pt to transfer bed to/from bedside chair with LRAD as needed for safety with minimal assist.-not met  7. Pt to perform B LE exs in sitting or supine x 10 reps to strengthen B LE to improve functional mobility.-not met   Outcome: Ongoing, Progressing   Pt's goals remain appropriate and pt will continue to benefit from skilled PT services to work towards improved functional mobility including: bed mobility, transfers, and gait.   2/1/2023

## 2023-02-01 NOTE — PROGRESS NOTES
[facility]  Neurocritical Care  Progress Note    Admit Date: 1/27/2023  Service Date: 02/01/2023  Length of Stay: 5    Subjective:     Chief Complaint: SAH (subarachnoid hemorrhage)    History of Present Illness: Mrs. Gupta 57 yo F w/ PMH DM1 (dx 2013), HTN, autoimmune hepatitis presented to the ED for AMS this morning where she was unable to answer questions appropriately after a shower.  states patient had a HA and vomited the night before she went to sleep. LNK was 10 pm. Patient had a similar episode a few years ago, but was admitted for DKA.  states patient has worsened neurologically since they left their house. On exam, patient was non-verbal, fixed pupils, intermittently following commands in B/L UE, but unable in LE. GCS 8. MRI performed in ED showed acute intraventricular and subarachnoid hemorrhage with early obstructive hydrocephalus and transependymal CSF egress. Also with a noted 21.5-2 mm laterally directed outpouching at the proximal right A2 QUIQUE could represent infundibulum/tortuous origin of a proximal QUIQUE branch. NSGY consulted and placed EVD after patient was intubated after concern for airway protection. Started Keppra 1g BID. CTA ordered. IR consulted for possible angiogram. No leukocytosis. H/H stable. Cr 0.9. Glucose 284. NCC will admit patient for HLOC.          Hospital Course: 01/28/2023: IR performed angiogram shows no aneurysm, AVM or AV fistula. EVD open @20. Will wean propofol and transition to precedex. Started tube feeds and SQ heparin. Transitioned from insulin gtt to detemir.   01/29/2023 [facility] vent; off sedation; TCDs w/o vasospasm; will need repeat angio within a week; MRI brain completed no lesions found; TFS to goal; replete lytes; keep I/Os nearly matched w/ IVF boluses; consider yudy if sbp dips with analgesia and/or sedation;   01/30/2023 Extubated with parameters to NC. Some wheezing post extubation, PRN duonebs and racemic epi x1. IV Dex  4mg q6 hours x 1 day and close airway watch. Cardene to maintain SBP<140. EVD open at 20. Monitoring TCDs daily, no current evidence of vasospasm. Glucose elevated, SSI in place, may require Insulin gtt 2/2 steroids.   1/31/23: possible angio Friday 02/01/2023 No acute events overnight. EVD clamp trial per Neurosurgery, continue to monitor neurologic exam with CTH in AM. TCDs without evidence of vasospasm. Remains hyperglycemia, steroids discontinued, increasing long acting to 12u BID.        Subjective:     Interval History:  As above.    Review of Systems   Constitutional:  Negative for fever.   Eyes:  Negative for visual disturbance.   Gastrointestinal:  Negative for nausea and vomiting.   Neurological:  Positive for speech difficulty.   Unable to obtain a complete ROS due to level of consciousness.    Objective:     Vitals:  Pulse: 101  Rhythm: sinus bradycardia  BP: 130/61  MAP (mmHg): 91  ICP Mean (mmHg): 16 mmHg  Resp: 20  SpO2: 95 %  Oxygen Concentration (%): 32    Temp  Min: 98 °F (36.7 °C)  Max: 98.8 °F (37.1 °C)  Pulse  Min: 66  Max: 108  BP  Min: 107/54  Max: 153/67  MAP (mmHg)  Min: 77  Max: 92  ICP Mean (mmHg)  Min: 9 mmHg  Max: 19 mmHg  Resp  Min: 16  Max: 28  SpO2  Min: 92 %  Max: 99 %  Oxygen Concentration (%)  Min: 32  Max: 32    01/31 0701 - 02/01 0700  In: 1682.4 [P.O.:240; I.V.:361.2]  Out: 39 [Drains:39]   Unmeasured Output  Urine Occurrence: 1  Stool Occurrence: 1  Pad Count: 2       Physical Exam  Vitals and nursing note reviewed.   HENT:      Head: Normocephalic.      Comments: EVD in place     Nose: Nose normal.      Mouth/Throat:      Mouth: Mucous membranes are moist.      Pharynx: Oropharynx is clear.   Cardiovascular:      Rate and Rhythm: Normal rate and regular rhythm.      Pulses: Normal pulses.      Heart sounds: Normal heart sounds.   Pulmonary:      Effort: Pulmonary effort is normal.      Breath sounds: Normal breath sounds.   Abdominal:      General: Bowel sounds are normal.       Palpations: Abdomen is soft.   Musculoskeletal:         General: Normal range of motion.   Skin:     General: Skin is warm and dry.      Capillary Refill: Capillary refill takes 2 to 3 seconds.   Neurological:      Mental Status: She is alert.      Comments:   GCS 14 with delayed response time  Follows simple commands in BUE/BLE  PERRL  BRAGG to command and spontaneously  Sensation intact       Unable to test orientation, language, memory, judgment, insight, fund of knowledge, hearing, shoulder shrug, tongue protrusion, coordination, gait due to level of consciousness.    Medications:  ContinuousniCARdipine, Last Rate: 7.5 mg/hr (02/01/23 1345)  Scheduledalbuterol-ipratropium, 3 mL, Q6H WAKE  amantadine HCL, 100 mg, BID  ceFAZolin (ANCEF) IVPB, 2 g, Q8H  heparin (porcine), 5,000 Units, Q8H  insulin aspart U-100, 5 Units, TIDWM  insulin detemir U-100, 12 Units, BID  levetiracetam IV, 500 mg, Q12H  losartan, 25 mg, Daily  mupirocin, , BID  niMODipine, 60 mg, Q4H  PRNdextrose 10%, 12.5 g, PRN  glucagon (human recombinant), 1 mg, PRN  glucose, 16 g, PRN  glucose, 24 g, PRN  hydrALAZINE, 10 mg, Q6H PRN  insulin aspart U-100, 0-15 Units, QID (AC + HS) PRN  labetalol, 10 mg, Q4H PRN  magnesium oxide, 800 mg, PRN  magnesium oxide, 800 mg, PRN  ondansetron, 4 mg, Q6H PRN  potassium bicarbonate, 35 mEq, PRN  potassium bicarbonate, 50 mEq, PRN  potassium bicarbonate, 60 mEq, PRN  potassium, sodium phosphates, 2 packet, PRN  potassium, sodium phosphates, 2 packet, PRN  potassium, sodium phosphates, 2 packet, PRN  sodium chloride 0.9%, 10 mL, PRN  sodium chloride 0.9%, 10 mL, PRN    Today I personally reviewed pertinent medications, lines/drains/airways, imaging, cardiology results, laboratory results, microbiology results, notably:    Hyperglycemic, increasing long acting insulin.     Diet  Diet full liquid [facility]  Diet full liquid [facility]    Assessment/Plan:     Neuro  * SAH (subarachnoid hemorrhage)  53 yo  F w/ PMH DM1, HTN presented to the ED with AMS after HA and vomiting the night before. Patient was non-verbal, B/l fixed pupils, and only intermittently following commands in B/L UE. GCS 8. MRI shows Acute IVH, SAH w/ obstructive hydrocephalus w/o signs of ischemia and R A2 QUIQUE aneurysm. VN consulted. Patient intubated in ED after concern for airway protection. NSGY placed EVD. IR consulted for possible angiogram. CTA shows diffuse subarachnoid hemorrhage with moderate hydrocephalus. No intracranial aneurysm or AVM is identified. NIHSS 9 on admission.     - IR angiogram on 1/27 shows no aneurysm, AVM, or AV fistula. Will need repeat angiogram on 2/3; will discuss with IR   - CTA shows diffuse subarachnoid hemorrhage with moderate hydrocephalus. No intracranial aneurysm or AVM is identified.  - CT Head stable after EVD placed, now clamped. CTH in AM    - Continued monitoring in NCC  - Q1H neuro checks and vitals  - NSGY following; appreciate recs  - EVD clamped @20- monitor ICPs and CTH in AM  - Ancef for drain ppx  - Nimotop 60 q4h  - Daily TCDs  - Echo   - CBC, CMP, Mag, Phos now and then daily   - SBP goal <160  - Cardene gtt, wean as able   - PRN labetalol, hydralazine  - SQ heparin  - PT/OT/SLP  - Angio possible 2/3/23    IVH (intraventricular hemorrhage)  See SAH  EVD care    Vasogenic cerebral edema  See SAH    Cardiac/Vascular  Hypertension  SBP 160s on arrival.   states patient does not take home Losartan regularly.     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 BG on arrival 284  Home regimen 13u Lantus; Lispro AC  A1C 6.5     -Transitioned from Insulin gtt to Detemir 10u BID --> increase to 12 BID  -Hypoglycemic protocol in place  -Accuchecks q1h    GI  Autoimmune hepatitis  Hx of AIH. Previously on mercaptopurine. Not currently taking.   LFTs  slightly elevated on arrival.    -Will continue to monitor  -Holding Tylenol and hepatotoxic agents  -CMP daily  -If LFTs worsen will obtain RUQ US.     Other  Endotracheally intubated  Patient intubated in ED 2/2 concern for airway protection. GCS 7  Extubated 1/30 to NC  PRN Duonebs           The patient is being Prophylaxed for:  Venous Thromboembolism with: Chemical  Stress Ulcer with: Not Applicable   Ventilator Pneumonia with: not applicable    Activity Orders          Diet full liquid [facility]: Full Liquid starting at 01/31 1131    Progressive Mobility Protocol (mobilize patient to their highest level of functioning at least twice daily) starting at 01/28 0800    Elevate HOB Collagen closure or PERCLOSE plug/device - Elevate HOB 30 degrees with limb immobilized for 2 hours after procedure. starting at 01/27 2018    Turn patient starting at 01/27 1400    Elevate HOB starting at 01/27 1221        Full Code    Ronak Pavon MD  Neurocritical Care  [facility]     Patient alert and orientedx0, confused, unable to obtain information

## 2023-02-01 NOTE — ASSESSMENT & PLAN NOTE
Hx of AIH. Previously on mercaptopurine. Not currently taking.   LFTs slightly elevated on arrival.    -Will continue to monitor  -Holding Tylenol and hepatotoxic agents  -CMP daily  -If LFTs worsen will obtain RUQ US.

## 2023-02-01 NOTE — PLAN OF CARE
Mech soft diet with thin liquids recommended.    Problem: SLP  Goal: SLP Goal  Description: Goals due 2/7  1.  Pt. Will participate in ongoing assessment of swallow at bedside  2.  Pt. Will participate in speech language cognitive eval  3.  Tolerate mech soft diet with thin liquids with no s/s of aspiration  Outcome: Ongoing, Progressing

## 2023-02-01 NOTE — PLAN OF CARE
[facility] Care Plan    POC reviewed with Leida Hoyos and family at 0300. Pt verbalized understanding. Questions and concerns addressed. No acute events overnight. Pt progressing toward goals. Will continue to monitor. See below and flowsheets for full assessment and VS info.     Neuro exam unchanged overnight  EVD at 20. Output around 1-3 cc per hour. ICP's < 20 all shift    Cardene on/off to keep SBP < 140  Multiple BM's     Bath given     Neuro:  [place] Coma Scale  Best Eye Response: 4-->(E4) spontaneous  Best Motor Response: 6-->(M6) obeys commands  Best Verbal Response: 4-->(V4) confused  [place] Coma Scale Score: 14  Assessment Qualifiers: no eye obstruction present  Pupil PERRLA: yes     24hr Temp:  [97.4 °F (36.3 °C)-98.8 °F (37.1 °C)]     CV:   Rhythm: sinus bradycardia  BP goals:   SBP < 140  MAP > 65    Resp:      Vent Mode: Spont  Set Rate: 16 BPM  Oxygen Concentration (%): 36  Vt Set: 420 mL  PEEP/CPAP: 5 cmH20  Pressure Support: 7 cmH20    Plan: N/A    GI/:     Diet/Nutrition Received: NPO  Last Bowel Movement: 01/31/23  Voiding Characteristics: voids spontaneously without difficulty    Intake/Output Summary (Last 24 hours) at 2/1/2023 0533  Last data filed at 2/1/2023 0505  Gross per 24 hour   Intake 1682.38 ml   Output 39 ml   Net 1643.38 ml     Unmeasured Output  Urine Occurrence: 1  Stool Occurrence: 1  Pad Count: 2    Labs/Accuchecks:  Recent Labs   Lab 01/31/23  0306   WBC 14.03*   RBC 4.01   HGB 12.2   HCT 38.5         Recent Labs   Lab 02/01/23  0440   *   K 3.4*   CO2 21*   *   BUN 18   CREATININE 0.7   ALKPHOS 132   ALT 36   AST 23   BILITOT 0.3      Recent Labs   Lab 01/27/23  0955   INR 1.2      Recent Labs   Lab 01/27/23  0949   TROPONINI <0.006       Electrolytes: No replacement orders  Accuchecks: Q6H    Gtts:   niCARdipine Stopped (01/31/23 2213)       LDA/Wounds:  Lines/Drains/Airways       Drain  Duration                  ICP/Ventriculostomy 01/27/23 1400 4  days              Peripheral Intravenous Line  Duration                  Peripheral IV - Single Lumen 01/30/23 1700 20 G Anterior;Proximal;Right Forearm 1 day

## 2023-02-01 NOTE — ASSESSMENT & PLAN NOTE
Patient intubated in ED 2/2 concern for airway protection. GCS 7  Extubated 1/30 to SANDY BESTN Diane

## 2023-02-01 NOTE — PT/OT/SLP PROGRESS
"Speech Language Pathology Treatment    Patient Name:  Leida Hoyos   MRN:  [id number]  Admitting Diagnosis: SAH (subarachnoid hemorrhage)    Recommendations:                 General Recommendations:  Dysphagia therapy and Speech language evaluation  Diet recommendations:  Mechanical soft, Liquid Diet Level: Thin   Aspiration Precautions: Feed only when awake/alert, HOB to 90 degrees, Meds crushed in puree, Small bites/sips, and Strict aspiration precautions   General Precautions: Standard, aspiration, fall  Communication strategies:  none    Subjective     Pt. Seen following physical therapy  Patient goals: rosa     Pain/Comfort:  Pain Rating 1: 0/10  Pain Rating Post-Intervention 1: 0/10    Respiratory Status: Room air    Objective:     Has the patient been evaluated by SLP for swallowing?   Yes  Keep patient NPO? No   Current Respiratory Status:        Pt. Seen at bedside following physical therapy.  Eyes were closed for most of session with min verbal responses elicited.  Recall of personal biographical information was good.  HOB was raised to 90 degree angle and pt. Responded "yes" when asked if she wanted to eat.  Pt. Was fed one container of yogurt via spoon and 1 cracker.  Pt. Readily opened her mouth to receive all bolus'.  Mastication/bolus formation was wfl with no delay in transit or delay in initiation of pharyngeal swallow.  No coughing, throat clearing or change in vocal quality was noted following the swallow.  Education provided to pt's mother re role of slp, communication strategies and safe swallow strategies.       Assessment:     Leida Hoyos is a 52 y.o. female with an SLP diagnosis of Dysphagia and Cognitive-Linguistic Impairment.    Goals:   Multidisciplinary Problems       SLP Goals          Problem: SLP    Goal Priority Disciplines Outcome   SLP Goal     SLP Ongoing, Progressing   Description: Goals due 2/7  1.  Pt. Will participate in ongoing assessment of swallow at bedside  2. "  Pt. Will participate in speech language cognitive eval                       Plan:     Patient to be seen:  4 x/week   Plan of Care expires:  02/27/23  Plan of Care reviewed with:  patient, mother   SLP Follow-Up:  Yes       Discharge recommendations:  rehabilitation facility   Barriers to Discharge:  None    Time Tracking:     SLP Treatment Date:   02/01/23  Speech Start Time:  1010  Speech Stop Time:  1028     Speech Total Time (min):  18 min    Billable Minutes: Treatment Swallowing Dysfunction 10 and Self Care/Home Management Training 8    02/01/2023

## 2023-02-02 LAB
ALBUMIN SERPL BCP-MCNC: 3.1 G/DL (ref 3.5–5.2)
ALP SERPL-CCNC: 148 U/L (ref 55–135)
ALT SERPL W/O P-5'-P-CCNC: 31 U/L (ref 10–44)
ANION GAP SERPL CALC-SCNC: 13 MMOL/L (ref 8–16)
AST SERPL-CCNC: 30 U/L (ref 10–40)
BASOPHILS # BLD AUTO: 0.05 K/UL (ref 0–0.2)
BASOPHILS NFR BLD: 0.4 % (ref 0–1.9)
BILIRUB SERPL-MCNC: 0.4 MG/DL (ref 0.1–1)
BUN SERPL-MCNC: 14 MG/DL (ref 6–20)
CALCIUM SERPL-MCNC: 9.4 MG/DL (ref 8.7–10.5)
CHLORIDE SERPL-SCNC: 114 MMOL/L (ref 95–110)
CLARITY CSF: ABNORMAL
CO2 SERPL-SCNC: 22 MMOL/L (ref 23–29)
COLOR CSF: ABNORMAL
CREAT SERPL-MCNC: 0.8 MG/DL (ref 0.5–1.4)
CSF TUBE NUMBER: 2
CSF TUBE NUMBER: 2
DIFFERENTIAL METHOD: ABNORMAL
EOSINOPHIL # BLD AUTO: 0 K/UL (ref 0–0.5)
EOSINOPHIL NFR BLD: 0 % (ref 0–8)
ERYTHROCYTE [DISTWIDTH] IN BLOOD BY AUTOMATED COUNT: 12.5 % (ref 11.5–14.5)
EST. GFR  (NO RACE VARIABLE): >60 ML/MIN/1.73 M^2
GLUCOSE CSF-MCNC: 133 MG/DL (ref 40–70)
GLUCOSE SERPL-MCNC: 219 MG/DL (ref 70–110)
HCT VFR BLD AUTO: 35.2 % (ref 37–48.5)
HGB BLD-MCNC: 11.2 G/DL (ref 12–16)
IMM GRANULOCYTES # BLD AUTO: 0.05 K/UL (ref 0–0.04)
IMM GRANULOCYTES NFR BLD AUTO: 0.4 % (ref 0–0.5)
LYMPHOCYTES # BLD AUTO: 1.6 K/UL (ref 1–4.8)
LYMPHOCYTES NFR BLD: 12.1 % (ref 18–48)
LYMPHOCYTES NFR CSF MANUAL: 78 % (ref 40–80)
MAGNESIUM SERPL-MCNC: 1.8 MG/DL (ref 1.6–2.6)
MCH RBC QN AUTO: 30.5 PG (ref 27–31)
MCHC RBC AUTO-ENTMCNC: 31.8 G/DL (ref 32–36)
MCV RBC AUTO: 96 FL (ref 82–98)
MONOCYTES # BLD AUTO: 1.1 K/UL (ref 0.3–1)
MONOCYTES NFR BLD: 8.6 % (ref 4–15)
MONOS+MACROS NFR CSF MANUAL: 13 % (ref 15–45)
NEUTROPHILS # BLD AUTO: 10.1 K/UL (ref 1.8–7.7)
NEUTROPHILS NFR BLD: 78.5 % (ref 38–73)
NEUTROPHILS NFR CSF MANUAL: 9 % (ref 0–6)
NRBC BLD-RTO: 0 /100 WBC
PHOSPHATE SERPL-MCNC: 2.5 MG/DL (ref 2.7–4.5)
PLATELET # BLD AUTO: 209 K/UL (ref 150–450)
PMV BLD AUTO: 9.4 FL (ref 9.2–12.9)
POCT GLUCOSE: 116 MG/DL (ref 70–110)
POCT GLUCOSE: 247 MG/DL (ref 70–110)
POCT GLUCOSE: 253 MG/DL (ref 70–110)
POCT GLUCOSE: 289 MG/DL (ref 70–110)
POTASSIUM SERPL-SCNC: 4.2 MMOL/L (ref 3.5–5.1)
PROT CSF-MCNC: 30 MG/DL (ref 15–40)
PROT SERPL-MCNC: 6.8 G/DL (ref 6–8.4)
RBC # BLD AUTO: 3.67 M/UL (ref 4–5.4)
RBC # CSF: ABNORMAL /CU MM
SODIUM SERPL-SCNC: 149 MMOL/L (ref 136–145)
SPECIMEN VOL CSF: 3 ML
WBC # BLD AUTO: 12.85 K/UL (ref 3.9–12.7)
WBC # CSF: 8 /CU MM (ref 0–5)

## 2023-02-02 PROCEDURE — 25000242 PHARM REV CODE 250 ALT 637 W/ HCPCS: Performed by: STUDENT IN AN ORGANIZED HEALTH CARE EDUCATION/TRAINING PROGRAM

## 2023-02-02 PROCEDURE — 25000003 PHARM REV CODE 250: Performed by: NURSE PRACTITIONER

## 2023-02-02 PROCEDURE — 25000003 PHARM REV CODE 250: Performed by: STUDENT IN AN ORGANIZED HEALTH CARE EDUCATION/TRAINING PROGRAM

## 2023-02-02 PROCEDURE — 84157 ASSAY OF PROTEIN OTHER: CPT | Performed by: STUDENT IN AN ORGANIZED HEALTH CARE EDUCATION/TRAINING PROGRAM

## 2023-02-02 PROCEDURE — 99900035 HC TECH TIME PER 15 MIN (STAT)

## 2023-02-02 PROCEDURE — 25000003 PHARM REV CODE 250

## 2023-02-02 PROCEDURE — 20000000 HC ICU ROOM

## 2023-02-02 PROCEDURE — 85025 COMPLETE CBC W/AUTO DIFF WBC: CPT

## 2023-02-02 PROCEDURE — 84100 ASSAY OF PHOSPHORUS: CPT

## 2023-02-02 PROCEDURE — 99233 SBSQ HOSP IP/OBS HIGH 50: CPT | Mod: ,,, | Performed by: NEUROLOGICAL SURGERY

## 2023-02-02 PROCEDURE — 89051 BODY FLUID CELL COUNT: CPT | Performed by: STUDENT IN AN ORGANIZED HEALTH CARE EDUCATION/TRAINING PROGRAM

## 2023-02-02 PROCEDURE — 97535 SELF CARE MNGMENT TRAINING: CPT

## 2023-02-02 PROCEDURE — 82945 GLUCOSE OTHER FLUID: CPT | Performed by: STUDENT IN AN ORGANIZED HEALTH CARE EDUCATION/TRAINING PROGRAM

## 2023-02-02 PROCEDURE — 94761 N-INVAS EAR/PLS OXIMETRY MLT: CPT

## 2023-02-02 PROCEDURE — 87205 SMEAR GRAM STAIN: CPT | Performed by: STUDENT IN AN ORGANIZED HEALTH CARE EDUCATION/TRAINING PROGRAM

## 2023-02-02 PROCEDURE — 97129 THER IVNTJ 1ST 15 MIN: CPT

## 2023-02-02 PROCEDURE — 99233 PR SUBSEQUENT HOSPITAL CARE,LEVL III: ICD-10-PCS | Mod: ,,, | Performed by: NEUROLOGICAL SURGERY

## 2023-02-02 PROCEDURE — 63600175 PHARM REV CODE 636 W HCPCS: Performed by: STUDENT IN AN ORGANIZED HEALTH CARE EDUCATION/TRAINING PROGRAM

## 2023-02-02 PROCEDURE — 83735 ASSAY OF MAGNESIUM: CPT

## 2023-02-02 PROCEDURE — 25000003 PHARM REV CODE 250: Performed by: PSYCHIATRY & NEUROLOGY

## 2023-02-02 PROCEDURE — 94640 AIRWAY INHALATION TREATMENT: CPT

## 2023-02-02 PROCEDURE — 97116 GAIT TRAINING THERAPY: CPT

## 2023-02-02 PROCEDURE — 63600175 PHARM REV CODE 636 W HCPCS

## 2023-02-02 PROCEDURE — 97530 THERAPEUTIC ACTIVITIES: CPT

## 2023-02-02 PROCEDURE — 80053 COMPREHEN METABOLIC PANEL: CPT

## 2023-02-02 PROCEDURE — 97112 NEUROMUSCULAR REEDUCATION: CPT

## 2023-02-02 PROCEDURE — 87205 SMEAR GRAM STAIN: CPT | Mod: 59 | Performed by: STUDENT IN AN ORGANIZED HEALTH CARE EDUCATION/TRAINING PROGRAM

## 2023-02-02 PROCEDURE — 87070 CULTURE OTHR SPECIMN AEROBIC: CPT | Performed by: STUDENT IN AN ORGANIZED HEALTH CARE EDUCATION/TRAINING PROGRAM

## 2023-02-02 RX ORDER — INSULIN ASPART 100 [IU]/ML
7 INJECTION, SOLUTION INTRAVENOUS; SUBCUTANEOUS
Status: DISCONTINUED | OUTPATIENT
Start: 2023-02-02 | End: 2023-02-04

## 2023-02-02 RX ORDER — ACETAMINOPHEN 650 MG/20.3ML
650 LIQUID ORAL EVERY 6 HOURS PRN
Status: DISCONTINUED | OUTPATIENT
Start: 2023-02-02 | End: 2023-02-15 | Stop reason: HOSPADM

## 2023-02-02 RX ADMIN — LEVETIRACETAM 500 MG: 100 INJECTION, SOLUTION INTRAVENOUS at 09:02

## 2023-02-02 RX ADMIN — HEPARIN SODIUM 5000 UNITS: 5000 INJECTION INTRAVENOUS; SUBCUTANEOUS at 09:02

## 2023-02-02 RX ADMIN — INSULIN ASPART 6 UNITS: 100 INJECTION, SOLUTION INTRAVENOUS; SUBCUTANEOUS at 05:02

## 2023-02-02 RX ADMIN — INSULIN DETEMIR 2 UNITS: 100 INJECTION, SOLUTION SUBCUTANEOUS at 12:02

## 2023-02-02 RX ADMIN — AMANTADINE HYDROCHLORIDE 100 MG: 50 SOLUTION ORAL at 06:02

## 2023-02-02 RX ADMIN — AMANTADINE HYDROCHLORIDE 100 MG: 50 SOLUTION ORAL at 04:02

## 2023-02-02 RX ADMIN — INSULIN ASPART 7 UNITS: 100 INJECTION, SOLUTION INTRAVENOUS; SUBCUTANEOUS at 05:02

## 2023-02-02 RX ADMIN — POTASSIUM & SODIUM PHOSPHATES POWDER PACK 280-160-250 MG 2 PACKET: 280-160-250 PACK at 02:02

## 2023-02-02 RX ADMIN — HEPARIN SODIUM 5000 UNITS: 5000 INJECTION INTRAVENOUS; SUBCUTANEOUS at 06:02

## 2023-02-02 RX ADMIN — INSULIN ASPART 9 UNITS: 100 INJECTION, SOLUTION INTRAVENOUS; SUBCUTANEOUS at 08:02

## 2023-02-02 RX ADMIN — INSULIN ASPART 2 UNITS: 100 INJECTION, SOLUTION INTRAVENOUS; SUBCUTANEOUS at 09:02

## 2023-02-02 RX ADMIN — IPRATROPIUM BROMIDE AND ALBUTEROL SULFATE 3 ML: .5; 3 SOLUTION RESPIRATORY (INHALATION) at 08:02

## 2023-02-02 RX ADMIN — CEFAZOLIN 2 G: 2 INJECTION, POWDER, FOR SOLUTION INTRAMUSCULAR; INTRAVENOUS at 04:02

## 2023-02-02 RX ADMIN — NIMODIPINE 60 MG: 60 SOLUTION ORAL at 11:02

## 2023-02-02 RX ADMIN — LOSARTAN POTASSIUM 25 MG: 25 TABLET, FILM COATED ORAL at 11:02

## 2023-02-02 RX ADMIN — NIMODIPINE 60 MG: 60 SOLUTION ORAL at 06:02

## 2023-02-02 RX ADMIN — ACETAMINOPHEN 650 MG: 650 SOLUTION ORAL at 09:02

## 2023-02-02 RX ADMIN — NIMODIPINE 60 MG: 60 SOLUTION ORAL at 02:02

## 2023-02-02 RX ADMIN — CEFAZOLIN 2 G: 2 INJECTION, POWDER, FOR SOLUTION INTRAMUSCULAR; INTRAVENOUS at 10:02

## 2023-02-02 RX ADMIN — NIMODIPINE 60 MG: 60 SOLUTION ORAL at 09:02

## 2023-02-02 RX ADMIN — HEPARIN SODIUM 5000 UNITS: 5000 INJECTION INTRAVENOUS; SUBCUTANEOUS at 04:02

## 2023-02-02 RX ADMIN — NIMODIPINE 60 MG: 60 SOLUTION ORAL at 04:02

## 2023-02-02 NOTE — PLAN OF CARE
Problem: Physical Therapy  Goal: Physical Therapy Goal  Description: PT goals until 2/15/23    1. Pt supine to sit with minimal assist-not met  2. Pt sit to supine with minimal assist-not met  3. Pt sit to stand with LRAD as needed for safety with minimal assist-not met  4. Pt to perform gait 20ft with LRAD as needed for safety with moderate assist.-not met  5. Pt to go up/down curb step with LRAD as needed for safety with moderate assist.-not met  6. Pt to transfer bed to/from bedside chair with LRAD as needed for safety with minimal assist.-not met  7. Pt to perform B LE exs in sitting or supine x 10 reps to strengthen B LE to improve functional mobility.-not met   Outcome: Ongoing, Progressing   Pt's goals remain appropriate and pt will continue to benefit from skilled PT services to work towards improved functional mobility including: bed mobility, transfers, up/down step, and gait.   2/2/2023

## 2023-02-02 NOTE — PT/OT/SLP PROGRESS
Speech Language Pathology Treatment    Patient Name:  Leida Hoyos   MRN:  [id number]  Admitting Diagnosis: SAH (subarachnoid hemorrhage)    Recommendations:                 General Recommendations:  Dysphagia therapy, Speech language evaluation, and Cognitive-linguistic evaluation  Diet recommendations:  Mechanical soft, Liquid Diet Level: Thin   Aspiration Precautions: 1 bite/sip at a time, Alternating bites/sips, Eliminate distractions, Feed only when awake/alert, HOB to 90 degrees, Small bites/sips, and Standard aspiration precautions   General Precautions: Standard, fall  Communication strategies:  provide increased time to answer and go to room if call light pushed    Subjective     Pt lethargic and did not rouse  Patient goals: UTO     Pain/Comfort:  Pain Rating 1:  (in nad but did not respond due to lethargy)  Pain Rating Post-Intervention 1:  (no change)    Respiratory Status: Room air    Objective:     Has the patient been evaluated by SLP for swallowing?   Yes  Keep patient NPO? No   Current Respiratory Status:        Nursing cleared pt for session. Pt seen bedside with mother present. Pt somnolent and did not rouse despite max verbal and tactile cues. nursing reported pt had a busy night with CT scan and therapy coming very early. Pt's diet not changed and she is still receiving full liquid. Mother reported pt doing well and is hungry. Mom reported team agreed to upgrading diet but she had not received it yet. However, pt currently npo for possible surgery. Discussed recommendations to upgrade diet with nursing. Also discussed pt only to be fed if fully awake/alert. Education provided re: swallowing precautions, s/s of aspiration and poc. Mom with good understanding of swallowing precautions.  Pt remained lethargic and unable to participate in session.     Assessment:     Leida Hoyos is a 52 y.o. female with an SLP diagnosis of Dysphagia.  She presents with somnolence.    Goals:    Multidisciplinary Problems       SLP Goals          Problem: SLP    Goal Priority Disciplines Outcome   SLP Goal     SLP Ongoing, Progressing   Description: Goals due 2/7  1.  Pt. Will participate in ongoing assessment of swallow at bedside  2.  Pt. Will participate in speech language cognitive eval                       Plan:     Patient to be seen:  4 x/week   Plan of Care expires:  02/27/23  Plan of Care reviewed with:  patient, mother   SLP Follow-Up:  Yes           Time Tracking:     SLP Treatment Date:   02/02/23  Speech Start Time:  0941  Speech Stop Time:  0950     Speech Total Time (min):  9 min    Billable Minutes: Self Care/Home Management Training 9    02/02/2023

## 2023-02-02 NOTE — ASSESSMENT & PLAN NOTE
51 yo F with PMH of DM1, autoimmune hepatitis, and HTN who presents to ED and was found to have diffuse SAH and hydrocephalus    PBD 6    - admit to [facility]  - q1 neuro checks  - all labs and diagnostics reviewed   - MRI/CTA showed diffuse SAH and small volume IVH with hydrocephalus, no identifiable aneurysm   - CTH repeat with EVD in good position   - CTA without identifiable aneurysm   - DSA 1/27 without any identifiable aneurysm   - MRI brain/C spine and MRI vessel wall imaging 1/29: no vascular anomaly seen or vessell wall enhancement   - CTH 2/2 after EVD clamped the day before with decrease in ventricular caliber from prior, decreasing SAH   - CTH 2/3 pending   - will need repeat DSA on PBD 7, either Thursday or Friday this week  - EVD clamped, CTH tomorrow  - SAH protocol   - daily TCDs x 14 days   - keppra 500mg x 7 days   - goal euvolemia to 1L positive  - Na >135, hypertonics as needed  - elevate HOB  - SBP <140  - extubated 1/30  - ok for diet, SLP  - ok for SQH    Dispo: ongoing

## 2023-02-02 NOTE — PLAN OF CARE
SW met with pt's mother and a friend of the family at bedside and provided a list of rehab facilities to share with patient's spouse. Family will make a decision on rehab on tomorrow.  SW will follow.         MILLICENT Sheppard - [facility]  [phone]

## 2023-02-02 NOTE — SUBJECTIVE & OBJECTIVE
"  Subjective:     Interval History:  As above.    Review of Systems   Constitutional:  Negative for fever.   Eyes:  Negative for visual disturbance.   Gastrointestinal:  Negative for nausea and vomiting.   Neurological:  Positive for speech difficulty.   Unable to obtain a complete ROS due to level of consciousness.    Objective:     Vitals:  Temp: 99.4 °F (37.4 °C)  Pulse: 94  Rhythm: normal sinus rhythm, sinus tachycardia  BP: (!) 128/58  MAP (mmHg): 84  ICP Mean (mmHg): 19 mmHg  Resp: (!) 28  SpO2: 97 %    Temp  Min: 98 °F (36.7 °C)  Max: 100.4 °F (38 °C)  Pulse  Min: 81  Max: 125  BP  Min: 118/59  Max: 148/66  MAP (mmHg)  Min: 80  Max: 98  ICP Mean (mmHg)  Min: 1 mmHg  Max: 19 mmHg  Resp  Min: 12  Max: 50  SpO2  Min: 90 %  Max: 100 %    02/01 0701 - 02/02 0700  In: 1223.1 [P.O.:290; I.V.:734.7]  Out: 421 [Urine:400; Drains:21]   Unmeasured Output  Urine Occurrence: 1  Stool Occurrence: 1  Emesis Occurrence: 0  Pad Count: 2       Physical Exam  Vitals and nursing note reviewed.   HENT:      Head: Normocephalic.      Comments: EVD in place     Nose: Nose normal.      Mouth/Throat:      Mouth: Mucous membranes are moist.      Pharynx: Oropharynx is clear.   Cardiovascular:      Rate and Rhythm: Normal rate and regular rhythm.      Pulses: Normal pulses.      Heart sounds: Normal heart sounds.   Pulmonary:      Effort: Pulmonary effort is normal.      Breath sounds: Normal breath sounds.   Abdominal:      General: Bowel sounds are normal.      Palpations: Abdomen is soft.   Musculoskeletal:         General: Normal range of motion.   Skin:     General: Skin is warm and dry.      Capillary Refill: Capillary refill takes 2 to 3 seconds.   Neurological:      Mental Status: She is alert.      Comments:   GCS 14 with delayed response time (increased from prior)  States full name and "at [facility]"  Follows simple commands in BUE/BLE  PERRL  BRAGG to command and spontaneously  Sensation intact       Unable to test orientation, " language, memory, judgment, insight, fund of knowledge, hearing, shoulder shrug, tongue protrusion, coordination, gait due to level of consciousness.    Medications:  ContinuousniCARdipine, Last Rate: 2.5 mg/hr (02/02/23 1400)  Scheduledalbuterol-ipratropium, 3 mL, Q6H WAKE  amantadine HCL, 100 mg, BID  ceFAZolin (ANCEF) IVPB, 2 g, Q8H  heparin (porcine), 5,000 Units, Q8H  insulin aspart U-100, 7 Units, TIDWM  insulin detemir U-100, 14 Units, BID  levetiracetam IV, 500 mg, Q12H  niMODipine, 60 mg, Q4H  PRNdextrose 10%, 12.5 g, PRN  glucagon (human recombinant), 1 mg, PRN  glucose, 16 g, PRN  glucose, 24 g, PRN  hydrALAZINE, 10 mg, Q6H PRN  insulin aspart U-100, 0-15 Units, QID (AC + HS) PRN  labetalol, 10 mg, Q4H PRN  magnesium oxide, 800 mg, PRN  magnesium oxide, 800 mg, PRN  ondansetron, 4 mg, Q6H PRN  potassium bicarbonate, 35 mEq, PRN  potassium bicarbonate, 50 mEq, PRN  potassium bicarbonate, 60 mEq, PRN  potassium, sodium phosphates, 2 packet, PRN  potassium, sodium phosphates, 2 packet, PRN  potassium, sodium phosphates, 2 packet, PRN  sodium chloride 0.9%, 10 mL, PRN  sodium chloride 0.9%, 10 mL, PRN    Today I personally reviewed pertinent medications, lines/drains/airways, imaging, cardiology results, laboratory results, microbiology results, notably:    Hyperglycemic, increasing long acting insulin and aspart   CTH stable    Diet  Diet NPO Except for: Sips with Medication, Medication  Diet Dysphagia Mechanical Soft (IDDSI Level 5)  Diet NPO Except for: Sips with Medication, Medication  Diet Dysphagia Mechanical Soft (IDDSI Level 5)

## 2023-02-02 NOTE — SUBJECTIVE & OBJECTIVE
"Interval History: 2/2: PBD 6. Sleepier this morning on exam. EVD clamped yesterday, ICPs wnl. CTH this am with decrease in ventricular size overall from prior.        Objective:     Weight: 55.5 kg (122 lb 5.7 oz)  Body mass index is 22.38 kg/m².  Vital Signs (Most Recent):  Temp: 98.8 °F (37.1 °C) (02/02/23 0301)  Pulse: 83 (02/02/23 0600)  Resp: (!) 25 (02/02/23 0600)  BP: 136/63 (02/02/23 0620)  SpO2: 97 % (02/02/23 0600)   Vital Signs (24h Range):  Temp:  [98.8 °F (37.1 °C)-100.4 °F (38 °C)] 98.8 °F (37.1 °C)  Pulse:  [] 83  Resp:  [12-50] 25  SpO2:  [90 %-98 %] 97 %  BP: (121-153)/(57-78) 136/63                  Oxygen Concentration (%):  [32] 32         Urethral Catheter 01/27/23 1509 (Active)   $ Monzon Insertion Bedside Insertion Performed 01/27/23 1601   Site Assessment Clean;Intact 01/27/23 1501   Collection Container Urimeter 01/27/23 1501   Securement Method secured to top of thigh w/ adhesive device 01/27/23 1501   Catheter Care Performed yes 01/27/23 1501   Reason for Continuing Urinary Catheterization Critically ill in ICU and requiring hourly monitoring of intake/output 01/27/23 1501   CAUTI Prevention Bundle Securement Device in place with 1" slack;Intact seal between catheter & drainage tubing;Drainage bag/urimeter off the floor;Sheeting clip in use;No dependent loops or kinks;Drainage bag/urimeter not overfilled (<2/3 full);Drainage bag/urimeter below bladder 01/27/23 1501   Output (mL) 250 mL 01/27/23 1700            ICP/Ventriculostomy 01/27/23 1400 (Active)   $ ICP/Ventriculostomy [place] Placement Bedside Insertion Performed 01/27/23 1501   Level of Ventriculostomy (cm above) 20 01/27/23 1501   Status Open to drainage 01/27/23 1501   Site Assessment Clean;Dry 01/27/23 1501   Site Drainage No drainage 01/27/23 1501   Waveform normal waveform 01/27/23 1501   Output (mL) 0 mL 01/27/23 1700   CSF Color clear 01/27/23 1501   Dressing Status Clean;Dry;Intact 01/27/23 1501   Interventions HOB " degrees;bed controls locked;zeroed;level adjusted per order 01/27/23 1501     Physical Exam:    Constitutional: No distress.     HEENT: atraumatic/normocephalic    Cardiovascular: Regular rhythm.     Pulm: aerating well, saturating well    Abdominal: Soft.     Psych/Behavior: He is alert.     E2V3M5  AAOx2  PERRL  EOMI  Face Symmetric  Spontaneous movement x4, non focal        Significant Labs:  Recent Labs   Lab 02/01/23  0440 02/02/23  0058   * 219*   * 149*   K 3.4* 4.2   * 114*   CO2 21* 22*   BUN 18 14   CREATININE 0.7 0.8   CALCIUM 10.4 9.4   MG 2.0 1.8       Recent Labs   Lab 02/01/23 0440 02/02/23  0058   WBC 13.08* 12.85*   HGB 11.8* 11.2*   HCT 37.2 35.2*    209       No results for input(s): LABPT, INR, APTT in the last 48 hours.    Microbiology Results (last 7 days)       Procedure Component Value Units Date/Time    CSF culture [[id number]] Collected: 01/30/23 1449    Order Status: Completed Specimen: CSF (Spinal Fluid) from CSF Shunt Updated: 02/02/23 0557     CSF CULTURE No Growth to date     Gram Stain Result Rare WBC's      No organisms seen          All pertinent labs from the last 24 hours have been reviewed.    Significant Diagnostics:  I have reviewed all pertinent imaging results/findings within the past 24 hours.    Physical Exam  Neurosurgery Physical Exam  Review of Systems

## 2023-02-02 NOTE — ASSESSMENT & PLAN NOTE
53 yo F w/ PMH DM1, HTN presented to the ED with AMS after HA and vomiting the night before. Patient was non-verbal, B/l fixed pupils, and only intermittently following commands in B/L UE. GCS 8. MRI shows Acute IVH, SAH w/ obstructive hydrocephalus w/o signs of ischemia and R A2 QUIQUE aneurysm. VN consulted. Patient intubated in ED after concern for airway protection. NSGY placed EVD. IR consulted for possible angiogram. CTA shows diffuse subarachnoid hemorrhage with moderate hydrocephalus. No intracranial aneurysm or AVM is identified. NIHSS 9 on admission.     - IR angiogram on 1/27 shows no aneurysm, AVM, or AV fistula. Will need repeat angiogram on 2/3; will discuss with IR   - CTA shows diffuse subarachnoid hemorrhage with moderate hydrocephalus. No intracranial aneurysm or AVM is identified.  - CT Head stable after EVD placed, now clamped. CTH in AM stable.    - Continued monitoring in NCC  - Q1H neuro checks and vitals  - NSGY following; appreciate recs  - EVD clamped @20- monitor ICPs and CTH in AM  - Ancef for drain ppx  - Nimotop 60 q4h  - Daily TCDs  - Echo   - CBC, CMP, Mag, Phos now and then daily   - SBP goal <160  - Cardene gtt, wean as able   - PRN labetalol, hydralazine  - SQ heparin  - PT/OT/SLP  - Angio 2/3/23

## 2023-02-02 NOTE — PT/OT/SLP PROGRESS
Physical Therapy Treatment    Patient Name:  Leida Hoyos   MRN:  [id number]    Recommendations:     Discharge Recommendations: rehabilitation facility  Discharge Equipment Recommendations:  (TBD as pt progresses)  Barriers to discharge: Inaccessible home and Decreased caregiver support 1 ALIS and requires assist for mobility    Assessment:     Leida Hoyos is a 52 y.o. female admitted with a medical diagnosis of SAH (subarachnoid hemorrhage).  She presents with the following impairments/functional limitations: weakness, impaired functional mobility, gait instability, impaired balance, impaired self care skills, decreased lower extremity function, decreased coordination, impaired cognition, decreased safety awareness, impaired cardiopulmonary response to activity . Pt is unsafe with functional mobility at this time due to pt requires moderate assist for bed mobility, moderate assist for transfers, and moderate assist +1 to manage lines for gait due to instability and manual directional cues. Pt is motivated to progress with functional mobility.     Rehab Prognosis: Good; patient would benefit from acute skilled PT services to address these deficits and reach maximum level of function.    Recent Surgery: Procedure(s) (LRB):  ANGIOGRAM-CEREBRAL (N/A) 6 Days Post-Op    Plan:     During this hospitalization, patient to be seen 4 x/week to address the identified rehab impairments via gait training, therapeutic activities, therapeutic exercises, neuromuscular re-education and progress toward the following goals:    Plan of Care Expires:  03/02/23    Subjective   Pt with minimal verbalization during treatment    Pain/Comfort:  Pain Rating 1: 0/10 (pt did not appear to be in pain during treatment; did not respond when asked)  Pain Rating Post-Intervention 1: 0/10      Objective:     Communicated with nurse prior to session.  Patient found HOB elevated with bed alarm, blood pressure cuff, external ventricular  drain, PureWick, peripheral IV, pulse ox (continuous), telemetry upon PT entry to room.     General Precautions: Standard, fall (EVD clamped prior to coming to sit)  Orthopedic Precautions: N/A  Braces: N/A  Respiratory Status: Room air     Functional Mobility:  Bed Mobility:     Supine to Sit: moderate assistance  Sit to Supine: moderate assistance  Transfers:     Sit to Stand:  moderate assistance with hand-held assist  Gait: 10ft x 2 trials with HHA with moderate assist. Pt performed gait with instability and required manual directional cues. Pt performed gait with narrow KEVIN and decreased step length    AM-PAC 6 CLICK MOBILITY  Turning over in bed (including adjusting bedclothes, sheets and blankets)?: 2  Sitting down on and standing up from a chair with arms (e.g., wheelchair, bedside commode, etc.): 2  Moving from lying on back to sitting on the side of the bed?: 2  Moving to and from a bed to a chair (including a wheelchair)?: 2  Need to walk in hospital room?: 2  Climbing 3-5 steps with a railing?: 1  Basic Mobility Total Score: 11     Treatment & Education:  Pt sat on the EOB ~ 4 min with CGA to moderate assist (pt attempted to return to supine). Pt stood x 2 trials from bed and 2 trials from bedside chair with HHA with moderate assist to be cleaned and linens/mesh underwear changed due to soiled with urine and blood (pt on her period). Nurse notified    Patient left HOB elevated with all lines intact, call button in reach, bed alarm on, restraints reapplied at end of session, nurse notified, and mother present..    GOALS:   Multidisciplinary Problems       Physical Therapy Goals          Problem: Physical Therapy    Goal Priority Disciplines Outcome Goal Variances Interventions   Physical Therapy Goal     PT, PT/OT Ongoing, Progressing     Description: PT goals until 2/15/23    1. Pt supine to sit with minimal assist-not met  2. Pt sit to supine with minimal assist-not met  3. Pt sit to stand with LRAD as  needed for safety with minimal assist-not met  4. Pt to perform gait 20ft with LRAD as needed for safety with moderate assist.-not met  5. Pt to go up/down curb step with LRAD as needed for safety with moderate assist.-not met  6. Pt to transfer bed to/from bedside chair with LRAD as needed for safety with minimal assist.-not met  7. Pt to perform B LE exs in sitting or supine x 10 reps to strengthen B LE to improve functional mobility.-not met                        Time Tracking:     PT Received On: 02/02/23  PT Start Time: 0814     PT Stop Time: 0853  PT Total Time (min): 39 min     Billable Minutes: Gait Training 25 and Therapeutic Activity 14    Treatment Type: Treatment  PT/PTA: PT           02/02/2023

## 2023-02-02 NOTE — PLAN OF CARE
[facility]  Discharge Reassessment    Primary Care Provider: Frieda Mera MD    Expected Discharge Date: 2/10/2023    Per MD: : 2/2: PBD 6. Sleepier this morning on exam. EVD clamped yesterday,  will need repeat DSA on PBD 7, either Thursday or Friday this week  Patient intubated on vent.  Not medically stable for discharge.      Reassessment (most recent)       Discharge Reassessment - 02/02/23 1048          Discharge Reassessment    Assessment Type Discharge Planning Reassessment     Did the patient's condition or plan change since previous assessment? No     Communicated ISAIAH with patient/caregiver Date not available/Unable to determine     Discharge Plan A Long-term acute care facility (LTAC)     Discharge Plan B Rehab     DME Needed Upon Discharge  other (see comments)   tbd    Discharge Barriers Identified None     Why the patient remains in the hospital Requires continued medical care                     Vanessa Patricia RN, CCRN-K, [facility]  Neuro-Critical Care   [phone]

## 2023-02-02 NOTE — PROGRESS NOTES
"[facility]  Neurosurgery  Progress Note    Subjective:     History of Present Illness: 53 yo F with PMH of DM1, autoimmune hepatitis, and HTN who presents to ED this am after confusion this am. She was LKN last night and became confused after showering this am so  brought to the ED. She quickly decompensated in the ER and was not following commands or waking to stimulation. MRI and CTA showed diffuse thick SAH and hydrocephalus. No identifiable aneurysm seen on CTA.       Post-Op Info:  Procedure(s) (LRB):  ANGIOGRAM-CEREBRAL (N/A)   6 Days Post-Op     Interval History: 2/2: PBD 6. Sleepier this morning on exam. EVD clamped yesterday, ICPs wnl. CTH this am with decrease in ventricular size overall from prior.        Objective:     Weight: 55.5 kg (122 lb 5.7 oz)  Body mass index is 22.38 kg/m².  Vital Signs (Most Recent):  Temp: 98.8 °F (37.1 °C) (02/02/23 0301)  Pulse: 83 (02/02/23 0600)  Resp: (!) 25 (02/02/23 0600)  BP: 136/63 (02/02/23 0620)  SpO2: 97 % (02/02/23 0600)   Vital Signs (24h Range):  Temp:  [98.8 °F (37.1 °C)-100.4 °F (38 °C)] 98.8 °F (37.1 °C)  Pulse:  [] 83  Resp:  [12-50] 25  SpO2:  [90 %-98 %] 97 %  BP: (121-153)/(57-78) 136/63                  Oxygen Concentration (%):  [32] 32         Urethral Catheter 01/27/23 1509 (Active)   $ Monzon Insertion Bedside Insertion Performed 01/27/23 1601   Site Assessment Clean;Intact 01/27/23 1501   Collection Container Urimeter 01/27/23 1501   Securement Method secured to top of thigh w/ adhesive device 01/27/23 1501   Catheter Care Performed yes 01/27/23 1501   Reason for Continuing Urinary Catheterization Critically ill in ICU and requiring hourly monitoring of intake/output 01/27/23 1501   CAUTI Prevention Bundle Securement Device in place with 1" slack;Intact seal between catheter & drainage tubing;Drainage bag/urimeter off the floor;Sheeting clip in use;No dependent loops or kinks;Drainage bag/urimeter not overfilled (<2/3 " full);Drainage bag/urimeter below bladder 01/27/23 1501   Output (mL) 250 mL 01/27/23 1700            ICP/Ventriculostomy 01/27/23 1400 (Active)   $ ICP/Ventriculostomy [place] Placement Bedside Insertion Performed 01/27/23 1501   Level of Ventriculostomy (cm above) 20 01/27/23 1501   Status Open to drainage 01/27/23 1501   Site Assessment Clean;Dry 01/27/23 1501   Site Drainage No drainage 01/27/23 1501   Waveform normal waveform 01/27/23 1501   Output (mL) 0 mL 01/27/23 1700   CSF Color clear 01/27/23 1501   Dressing Status Clean;Dry;Intact 01/27/23 1501   Interventions HOB degrees;bed controls locked;zeroed;level adjusted per order 01/27/23 1501     Physical Exam:    Constitutional: No distress.     HEENT: atraumatic/normocephalic    Cardiovascular: Regular rhythm.     Pulm: aerating well, saturating well    Abdominal: Soft.     Psych/Behavior: He is alert.     E2V3M5  AAOx2  PERRL  EOMI  Face Symmetric  Spontaneous movement x4, non focal        Significant Labs:  Recent Labs   Lab 02/01/23  0440 02/02/23  0058   * 219*   * 149*   K 3.4* 4.2   * 114*   CO2 21* 22*   BUN 18 14   CREATININE 0.7 0.8   CALCIUM 10.4 9.4   MG 2.0 1.8       Recent Labs   Lab 02/01/23  0440 02/02/23  0058   WBC 13.08* 12.85*   HGB 11.8* 11.2*   HCT 37.2 35.2*    209       No results for input(s): LABPT, INR, APTT in the last 48 hours.    Microbiology Results (last 7 days)       Procedure Component Value Units Date/Time    CSF culture [[id number]] Collected: 01/30/23 1449    Order Status: Completed Specimen: CSF (Spinal Fluid) from CSF Shunt Updated: 02/02/23 0557     CSF CULTURE No Growth to date     Gram Stain Result Rare WBC's      No organisms seen          All pertinent labs from the last 24 hours have been reviewed.    Significant Diagnostics:  I have reviewed all pertinent imaging results/findings within the past 24 hours.    Physical Exam  Neurosurgery Physical Exam  Review of Systems    Assessment/Plan:      * SAH (subarachnoid hemorrhage)  53 yo F with PMH of DM1, autoimmune hepatitis, and HTN who presents to ED and was found to have diffuse SAH and hydrocephalus    PBD 6    - admit to NCC  - q1 neuro checks  - all labs and diagnostics reviewed   - MRI/CTA showed diffuse SAH and small volume IVH with hydrocephalus, no identifiable aneurysm   - CTH repeat with EVD in good position   - CTA without identifiable aneurysm   - DSA 1/27 without any identifiable aneurysm   - MRI brain/C spine and MRI vessel wall imaging 1/29: no vascular anomaly seen or vessell wall enhancement   - CTH 2/2 after EVD clamped the day before with decrease in ventricular caliber from prior, decreasing SAH   - CTH 2/3 pending   - will need repeat DSA on PBD 7, either Thursday or Friday this week  - EVD clamped, CTH tomorrow  - SAH protocol   - daily TCDs x 14 days   - keppra 500mg x 7 days   - goal euvolemia to 1L positive  - Na >135, hypertonics as needed  - elevate HOB  - SBP <140  - extubated 1/30  - ok for diet, SLP  - ok for SQH    Dispo: ongoing        Tatiana Allison MD  Neurosurgery  [facility]

## 2023-02-02 NOTE — PROGRESS NOTES
[facility]  Neurocritical Care  Progress Note    Admit Date: 1/27/2023  Service Date: 02/02/2023  Length of Stay: 6    Subjective:     Chief Complaint: SAH (subarachnoid hemorrhage)    History of Present Illness: Mrs. Gupta 59 yo F w/ PMH DM1 (dx 2013), HTN, autoimmune hepatitis presented to the ED for AMS this morning where she was unable to answer questions appropriately after a shower.  states patient had a HA and vomited the night before she went to sleep. LNK was 10 pm. Patient had a similar episode a few years ago, but was admitted for DKA.  states patient has worsened neurologically since they left their house. On exam, patient was non-verbal, fixed pupils, intermittently following commands in B/L UE, but unable in LE. GCS 8. MRI performed in ED showed acute intraventricular and subarachnoid hemorrhage with early obstructive hydrocephalus and transependymal CSF egress. Also with a noted 21.5-2 mm laterally directed outpouching at the proximal right A2 QUIQUE could represent infundibulum/tortuous origin of a proximal QUIQUE branch. NSGY consulted and placed EVD after patient was intubated after concern for airway protection. Started Keppra 1g BID. CTA ordered. IR consulted for possible angiogram. No leukocytosis. H/H stable. Cr 0.9. Glucose 284. NCC will admit patient for HLOC.          Hospital Course: 01/28/2023: IR performed angiogram shows no aneurysm, AVM or AV fistula. EVD open @20. Will wean propofol and transition to precedex. Started tube feeds and SQ heparin. Transitioned from insulin gtt to detemir.   01/29/2023 [facility] vent; off sedation; TCDs w/o vasospasm; will need repeat angio within a week; MRI brain completed no lesions found; TFS to goal; replete lytes; keep I/Os nearly matched w/ IVF boluses; consider yudy if sbp dips with analgesia and/or sedation;   01/30/2023 Extubated with parameters to NC. Some wheezing post extubation, PRN duonebs and racemic epi x1. IV Dex  4mg q6 hours x 1 day and close airway watch. Cardene to maintain SBP<140. EVD open at 20. Monitoring TCDs daily, no current evidence of vasospasm. Glucose elevated, SSI in place, may require Insulin gtt 2/2 steroids.   1/31/23: possible angio Friday 02/01/2023 No acute events overnight. EVD clamp trial per Neurosurgery, continue to monitor neurologic exam with CTH in AM. TCDs without evidence of vasospasm. Remains hyperglycemia, steroids discontinued, increasing long acting to 12u BID.  02/02/2023 No acute events overnight. CTH with EVD clamped stable, plan to continue clamp trial for one more day. DSA tomorrow on PBD7.   02/02/2023 More sedated on exam and difficult to arouse but will arouse to answer questions (full name, at [facility]) before drifting back to sleep. ICP stable 7-14. CTH stable.         Subjective:     Interval History:  As above.    Review of Systems   Constitutional:  Negative for fever.   Eyes:  Negative for visual disturbance.   Gastrointestinal:  Negative for nausea and vomiting.   Neurological:  Positive for speech difficulty.   Unable to obtain a complete ROS due to level of consciousness.    Objective:     Vitals:  Temp: 99.4 °F (37.4 °C)  Pulse: 94  Rhythm: normal sinus rhythm, sinus tachycardia  BP: (!) 128/58  MAP (mmHg): 84  ICP Mean (mmHg): 19 mmHg  Resp: (!) 28  SpO2: 97 %    Temp  Min: 98 °F (36.7 °C)  Max: 100.4 °F (38 °C)  Pulse  Min: 81  Max: 125  BP  Min: 118/59  Max: 148/66  MAP (mmHg)  Min: 80  Max: 98  ICP Mean (mmHg)  Min: 1 mmHg  Max: 19 mmHg  Resp  Min: 12  Max: 50  SpO2  Min: 90 %  Max: 100 %    02/01 0701 - 02/02 0700  In: 1223.1 [P.O.:290; I.V.:734.7]  Out: 421 [Urine:400; Drains:21]   Unmeasured Output  Urine Occurrence: 1  Stool Occurrence: 1  Emesis Occurrence: 0  Pad Count: 2       Physical Exam  Vitals and nursing note reviewed.   HENT:      Head: Normocephalic.      Comments: EVD in place     Nose: Nose normal.      Mouth/Throat:      Mouth: Mucous membranes are  "moist.      Pharynx: Oropharynx is clear.   Cardiovascular:      Rate and Rhythm: Normal rate and regular rhythm.      Pulses: Normal pulses.      Heart sounds: Normal heart sounds.   Pulmonary:      Effort: Pulmonary effort is normal.      Breath sounds: Normal breath sounds.   Abdominal:      General: Bowel sounds are normal.      Palpations: Abdomen is soft.   Musculoskeletal:         General: Normal range of motion.   Skin:     General: Skin is warm and dry.      Capillary Refill: Capillary refill takes 2 to 3 seconds.   Neurological:      Mental Status: She is alert.      Comments:   GCS 14 with delayed response time (increased from prior)  States full name and "at [facility]"  Follows simple commands in BUE/BLE  PERRL  BRAGG to command and spontaneously  Sensation intact       Unable to test orientation, language, memory, judgment, insight, fund of knowledge, hearing, shoulder shrug, tongue protrusion, coordination, gait due to level of consciousness.    Medications:  ContinuousniCARdipine, Last Rate: 2.5 mg/hr (02/02/23 1400)  Scheduledalbuterol-ipratropium, 3 mL, Q6H WAKE  amantadine HCL, 100 mg, BID  ceFAZolin (ANCEF) IVPB, 2 g, Q8H  heparin (porcine), 5,000 Units, Q8H  insulin aspart U-100, 7 Units, TIDWM  insulin detemir U-100, 14 Units, BID  levetiracetam IV, 500 mg, Q12H  niMODipine, 60 mg, Q4H  PRNdextrose 10%, 12.5 g, PRN  glucagon (human recombinant), 1 mg, PRN  glucose, 16 g, PRN  glucose, 24 g, PRN  hydrALAZINE, 10 mg, Q6H PRN  insulin aspart U-100, 0-15 Units, QID (AC + HS) PRN  labetalol, 10 mg, Q4H PRN  magnesium oxide, 800 mg, PRN  magnesium oxide, 800 mg, PRN  ondansetron, 4 mg, Q6H PRN  potassium bicarbonate, 35 mEq, PRN  potassium bicarbonate, 50 mEq, PRN  potassium bicarbonate, 60 mEq, PRN  potassium, sodium phosphates, 2 packet, PRN  potassium, sodium phosphates, 2 packet, PRN  potassium, sodium phosphates, 2 packet, PRN  sodium chloride 0.9%, 10 mL, PRN  sodium chloride 0.9%, 10 mL, " PRN    Today I personally reviewed pertinent medications, lines/drains/airways, imaging, cardiology results, laboratory results, microbiology results, notably:    Hyperglycemic, increasing long acting insulin and aspart   CTH stable    Diet  Diet NPO Except for: Sips with Medication, Medication  Diet Dysphagia Mechanical Soft (IDDSI Level 5)  Diet NPO Except for: Sips with Medication, Medication  Diet Dysphagia Mechanical Soft (IDDSI Level 5)    Assessment/Plan:     Neuro  * SAH (subarachnoid hemorrhage)  53 yo F w/ PMH DM1, HTN presented to the ED with AMS after HA and vomiting the night before. Patient was non-verbal, B/l fixed pupils, and only intermittently following commands in B/L UE. GCS 8. MRI shows Acute IVH, SAH w/ obstructive hydrocephalus w/o signs of ischemia and R A2 QUIQUE aneurysm. VN consulted. Patient intubated in ED after concern for airway protection. NSGY placed EVD. IR consulted for possible angiogram. CTA shows diffuse subarachnoid hemorrhage with moderate hydrocephalus. No intracranial aneurysm or AVM is identified. NIHSS 9 on admission.     - IR angiogram on 1/27 shows no aneurysm, AVM, or AV fistula. Will need repeat angiogram on 2/3; will discuss with IR   - CTA shows diffuse subarachnoid hemorrhage with moderate hydrocephalus. No intracranial aneurysm or AVM is identified.  - CT Head stable after EVD placed, now clamped. CTH in AM stable.    - Continued monitoring in NCC  - Q1H neuro checks and vitals  - NSGY following; appreciate recs  - EVD clamped @20- monitor ICPs and CTH in AM  - Ancef for drain ppx  - Nimotop 60 q4h  - Daily TCDs  - Echo   - CBC, CMP, Mag, Phos now and then daily   - SBP goal <160  - Cardene gtt, wean as able   - PRN labetalol, hydralazine  - SQ heparin  - PT/OT/SLP  - Angio 2/3/23    IVH (intraventricular hemorrhage)  See SAH  EVD care    Vasogenic cerebral edema  See SAH    Cardiac/Vascular  Hypertension  SBP 160s on arrival.   states patient does not take  home Losartan regularly.     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 BG on arrival 284  Home regimen 13u Lantus; Lispro AC  A1C 6.5     -Transitioned from Insulin gtt to Detemir 12u BID --> increase to 14 BID  -Aspart 7 q4  -Hypoglycemic protocol in place  -Accuchecks q1h    GI  Autoimmune hepatitis  Hx of AIH. Previously on mercaptopurine. Not currently taking.   LFTs slightly elevated on arrival.    -Will continue to monitor  -Holding Tylenol and hepatotoxic agents  -CMP daily  -If LFTs worsen will obtain RUQ US.     Other  Endotracheally intubated  Patient intubated in ED 2/2 concern for airway protection. GCS 7  Extubated 1/30 to NC  PRN Duonebs           The patient is being Prophylaxed for:  Venous Thromboembolism with: Chemical  Stress Ulcer with: Not Applicable   Ventilator Pneumonia with: not applicable    Activity Orders          Diet NPO Except for: Sips with Medication, Medication: NPO starting at 02/03 0001    Diet Dysphagia Mechanical Soft (IDDSI Level 5): Dysphagia 2 (Mechanical Soft Ground) starting at 02/02 1149    Progressive Mobility Protocol (mobilize patient to their highest level of functioning at least twice daily) starting at 01/28 0800    Elevate HOB Collagen closure or PERCLOSE plug/device - Elevate HOB 30 degrees with limb immobilized for 2 hours after procedure. starting at 01/27 2018    Turn patient starting at 01/27 1400    Elevate HOB starting at 01/27 1221        Full Code    Ronak Pavon MD  Neurocritical Care  [facility]

## 2023-02-02 NOTE — PT/OT/SLP PROGRESS
Occupational Therapy   Treatment    Name: Leida Hoyos  MRN: [id number]  Admitting Diagnosis:  SAH (subarachnoid hemorrhage)  6 Days Post-Op    Recommendations:     Discharge Recommendations: rehabilitation facility  Discharge Equipment Recommendations:   (TBD)  Barriers to discharge:  None    Assessment:     Leida Hoyos is a 52 y.o. female with a medical diagnosis of SAH (subarachnoid hemorrhage).  She presents with performance deficits affecting function are weakness, impaired self care skills, impaired functional mobility, impaired balance, decreased safety awareness, decreased coordination, impaired cognition.     Rehab Prognosis:  Good; patient would benefit from acute skilled OT services to address these deficits and reach maximum level of function.       Plan:     Patient to be seen 3 x/week to address the above listed problems via self-care/home management, therapeutic activities, therapeutic exercises, neuromuscular re-education, cognitive retraining  Plan of Care Expires: 02/25/23  Plan of Care Reviewed with: patient    Subjective   Patient:  Intermittently verbalizing during the session.  Pain/Comfort:  Pain Rating 1: 0/10  Pain Rating Post-Intervention 1: 0/10    Objective:     Communicated with: Nurse prior to session.  Patient found supine with bed alarm, blood pressure cuff, SCD, telemetry, pulse ox (continuous), peripheral IV, external ventricular drain upon OT entry to room.    General Precautions: Standard, aspiration, fall; EVD clamped    Orthopedic Precautions:N/A  Braces: N/A  Respiratory Status: Room air     Occupational Performance:     Bed Mobility:    Patient completed Rolling/Turning to Left with  minimum assistance  Patient completed Rolling/Turning to Right with minimum assistance  Patient completed Supine to Sit with minimum assistance  Patient completed Sit to Supine with moderate assistance     Functional Mobility/Transfers:  Moderate assist with stand pivot transfer from  bed to chair    Activities of Daily Living:  Grooming: moderate assistance while seated EOB  Upper Body Dressing: moderate assistance while seated EOB  Lower Body Dressing: maximal assistance while seated EOB    AMPAC 6 Click ADL: 11    Treatment & Education:  Patient alert throughout the session.  Able to follow 4/4 one step commands.  Patient attentive and interactive throughout the session.  Patient able to identify 3/3 body parts.  Able to name 2/3 objects.      Patient left supine with all lines intact, call button in reach, and bed alarm on    GOALS:   Multidisciplinary Problems       Occupational Therapy Goals          Problem: Occupational Therapy    Goal Priority Disciplines Outcome Interventions   Occupational Therapy Goal     OT, PT/OT Ongoing, Progressing    Description: Goals set 1/28 to be addressed for 14 days with expiration date, 2/11:  Patient will increase functional independence with ADLs by performing:    Patient will demonstrate rolling to the right with SBA.  Not met   Patient will demonstrate rolling to the left with SBA.   Not met  Patient will demonstrate supine -sit with SBA.   Not met  Patient will demonstrate stand pivot transfers with min assist.   Not met  Patient will demonstrate grooming while seated with SBA.   Not met  Patient will demonstrate upper body dressing with SBA while seated EOB.   Not met  Patient will demonstrate lower body dressing with min assist while seated EOB.   Not met  Patient will demonstrate toileting with min assist.   Not met  Patient will demonstrate bathing while seated EOB with min assist.   Not met  Patient's family / caregiver will demonstrate independence and safety with assisting patient with self-care skills and functional mobility.     Not met                               Time Tracking:     OT Date of Treatment: 02/02/23  OT Start Time: 0610  OT Stop Time: 0648  OT Total Time (min): 38 min    Billable Minutes:Self Care/Home Management  15  Neuromuscular Re-education 10  Cognitive Retraining 13    OT/ANDRES: OT          2/2/2023

## 2023-02-02 NOTE — PLAN OF CARE
[facility] Care Plan    POC reviewed with Leida Hoyos and family at 0300. Pt verbalized understanding. Questions and concerns addressed. No acute events overnight. Pt progressing toward goals. Will continue to monitor. See below and flowsheets for full assessment and VS info.           Is this a stroke patient? yes- Stroke booklet reviewed with patient and family, risk factors identified for patient and stroke booklet remains at bedside for ongoing education.     Neuro:  [place] Coma Scale  Best Eye Response: 4-->(E4) spontaneous  Best Motor Response: 6-->(M6) obeys commands  Best Verbal Response: 2-->(V2) incomprehensible speech  [place] Coma Scale Score: 12  Assessment Qualifiers: patient not sedated/intubated  Pupil PERRLA: yes     24hr Temp:  [98.8 °F (37.1 °C)-100.4 °F (38 °C)]     CV:   Rhythm: normal sinus rhythm, sinus tachycardia  BP goals:   SBP < 140  MAP > 65    Resp:      Vent Mode: Spont  Set Rate: 16 BPM  Oxygen Concentration (%): 32  Vt Set: 420 mL  PEEP/CPAP: 5 cmH20  Pressure Support: 7 cmH20    Plan: N/A    GI/:     Diet/Nutrition Received: NPO  Last Bowel Movement: 02/02/23  Voiding Characteristics: external catheter, incontinence    Intake/Output Summary (Last 24 hours) at 2/2/2023 0600  Last data filed at 2/2/2023 0501  Gross per 24 hour   Intake 1209.62 ml   Output 409 ml   Net 800.62 ml     Unmeasured Output  Urine Occurrence: 1  Stool Occurrence: 1  Emesis Occurrence: 0  Pad Count: 2    Labs/Accuchecks:  Recent Labs   Lab 02/02/23  0058   WBC 12.85*   RBC 3.67*   HGB 11.2*   HCT 35.2*         Recent Labs   Lab 02/02/23  0058   *   K 4.2   CO2 22*   *   BUN 14   CREATININE 0.8   ALKPHOS 148*   ALT 31   AST 30   BILITOT 0.4      Recent Labs   Lab 01/27/23  0955   INR 1.2      Recent Labs   Lab 01/27/23  0949   TROPONINI <0.006       Electrolytes: Electrolytes replaced  Accuchecks: ACHS    Gtts:   niCARdipine Stopped (02/02/23 7134)        LDA/Wounds:  Lines/Drains/Airways       Drain  Duration                  ICP/Ventriculostomy 01/27/23 1400 5 days              Peripheral Intravenous Line  Duration                  Peripheral IV - Single Lumen 02/01/23 0534 20 G Anterior;Right Upper Arm 1 day         Peripheral IV - Single Lumen 02/02/23 0100 20 G Anterior;Left Forearm <1 day                  Wounds: No  Wound care consulted: No    Problem: Adjustment to Illness (Stroke, Hemorrhagic)  Goal: Optimal Coping  Outcome: Ongoing, Progressing  Intervention: Support Psychosocial Response to Stroke  Flowsheets (Taken 2/2/2023 0559)  Supportive Measures:   active listening utilized   self-care encouraged   verbalization of feelings encouraged     Problem: Cognitive Impairment (Stroke, Hemorrhagic)  Goal: Optimal Cognitive Function  Outcome: Ongoing, Progressing  Intervention: Optimize Cognitive Function  Flowsheets (Taken 2/2/2023 0559)  Environment Familiarity/Consistency: daily routine followed  Reorientation Measures:   calendar in view   clock in view   reorientation provided  Sensory Stimulation Regulation:   care clustered   visual stimulation provided

## 2023-02-02 NOTE — ASSESSMENT & PLAN NOTE
Diagnosed in 2013. BG on arrival 284  Home regimen 13u Lantus; Lispro AC  A1C 6.5     -Transitioned from Insulin gtt to Detemir 12u BID --> increase to 14 BID  -Aspart 7 q4  -Hypoglycemic protocol in place  -Accuchecks q1h

## 2023-02-03 ENCOUNTER — PATIENT OUTREACH (OUTPATIENT)
Dept: ADMINISTRATIVE | Facility: HOSPITAL | Age: 53
End: 2023-02-03
Payer: COMMERCIAL

## 2023-02-03 ENCOUNTER — ANESTHESIA EVENT (OUTPATIENT)
Dept: INTERVENTIONAL RADIOLOGY/VASCULAR | Facility: HOSPITAL | Age: 53
DRG: 023 | End: 2023-02-03
Payer: COMMERCIAL

## 2023-02-03 PROBLEM — R53.81 DEBILITY: Status: ACTIVE | Noted: 2023-02-03

## 2023-02-03 PROBLEM — Z97.8 ENDOTRACHEALLY INTUBATED: Status: RESOLVED | Noted: 2023-01-27 | Resolved: 2023-02-03

## 2023-02-03 LAB
ALBUMIN SERPL BCP-MCNC: 3 G/DL (ref 3.5–5.2)
ALP SERPL-CCNC: 169 U/L (ref 55–135)
ALT SERPL W/O P-5'-P-CCNC: 30 U/L (ref 10–44)
ANION GAP SERPL CALC-SCNC: 14 MMOL/L (ref 8–16)
AST SERPL-CCNC: 30 U/L (ref 10–40)
BASOPHILS # BLD AUTO: 0.05 K/UL (ref 0–0.2)
BASOPHILS NFR BLD: 0.4 % (ref 0–1.9)
BILIRUB SERPL-MCNC: 0.4 MG/DL (ref 0.1–1)
BUN SERPL-MCNC: 13 MG/DL (ref 6–20)
CALCIUM SERPL-MCNC: 9.3 MG/DL (ref 8.7–10.5)
CHLORIDE SERPL-SCNC: 111 MMOL/L (ref 95–110)
CO2 SERPL-SCNC: 22 MMOL/L (ref 23–29)
CREAT SERPL-MCNC: 0.9 MG/DL (ref 0.5–1.4)
DIFFERENTIAL METHOD: ABNORMAL
EOSINOPHIL # BLD AUTO: 0 K/UL (ref 0–0.5)
EOSINOPHIL NFR BLD: 0 % (ref 0–8)
ERYTHROCYTE [DISTWIDTH] IN BLOOD BY AUTOMATED COUNT: 12.5 % (ref 11.5–14.5)
EST. GFR  (NO RACE VARIABLE): >60 ML/MIN/1.73 M^2
GLUCOSE SERPL-MCNC: 253 MG/DL (ref 70–110)
HCG INTACT+B SERPL-ACNC: <2.4 MIU/ML
HCT VFR BLD AUTO: 35.5 % (ref 37–48.5)
HGB BLD-MCNC: 11.2 G/DL (ref 12–16)
IMM GRANULOCYTES # BLD AUTO: 0.06 K/UL (ref 0–0.04)
IMM GRANULOCYTES NFR BLD AUTO: 0.5 % (ref 0–0.5)
LYMPHOCYTES # BLD AUTO: 2.4 K/UL (ref 1–4.8)
LYMPHOCYTES NFR BLD: 18.9 % (ref 18–48)
MAGNESIUM SERPL-MCNC: 2.1 MG/DL (ref 1.6–2.6)
MCH RBC QN AUTO: 30.6 PG (ref 27–31)
MCHC RBC AUTO-ENTMCNC: 31.5 G/DL (ref 32–36)
MCV RBC AUTO: 97 FL (ref 82–98)
MONOCYTES # BLD AUTO: 1 K/UL (ref 0.3–1)
MONOCYTES NFR BLD: 7.9 % (ref 4–15)
NEUTROPHILS # BLD AUTO: 9.2 K/UL (ref 1.8–7.7)
NEUTROPHILS NFR BLD: 72.3 % (ref 38–73)
NRBC BLD-RTO: 0 /100 WBC
PHOSPHATE SERPL-MCNC: 3.4 MG/DL (ref 2.7–4.5)
PLATELET # BLD AUTO: 197 K/UL (ref 150–450)
PMV BLD AUTO: 8.9 FL (ref 9.2–12.9)
POCT GLUCOSE: 182 MG/DL (ref 70–110)
POCT GLUCOSE: 243 MG/DL (ref 70–110)
POCT GLUCOSE: 243 MG/DL (ref 70–110)
POTASSIUM SERPL-SCNC: 3.5 MMOL/L (ref 3.5–5.1)
PROT SERPL-MCNC: 7 G/DL (ref 6–8.4)
RBC # BLD AUTO: 3.66 M/UL (ref 4–5.4)
SODIUM SERPL-SCNC: 147 MMOL/L (ref 136–145)
WBC # BLD AUTO: 12.67 K/UL (ref 3.9–12.7)

## 2023-02-03 PROCEDURE — 94640 AIRWAY INHALATION TREATMENT: CPT

## 2023-02-03 PROCEDURE — 99233 SBSQ HOSP IP/OBS HIGH 50: CPT | Mod: ,,, | Performed by: NEUROLOGICAL SURGERY

## 2023-02-03 PROCEDURE — 63600175 PHARM REV CODE 636 W HCPCS: Performed by: STUDENT IN AN ORGANIZED HEALTH CARE EDUCATION/TRAINING PROGRAM

## 2023-02-03 PROCEDURE — 97116 GAIT TRAINING THERAPY: CPT

## 2023-02-03 PROCEDURE — 92523 SPEECH SOUND LANG COMPREHEN: CPT

## 2023-02-03 PROCEDURE — 99900035 HC TECH TIME PER 15 MIN (STAT)

## 2023-02-03 PROCEDURE — 25000003 PHARM REV CODE 250: Performed by: PSYCHIATRY & NEUROLOGY

## 2023-02-03 PROCEDURE — 83735 ASSAY OF MAGNESIUM: CPT

## 2023-02-03 PROCEDURE — 25000242 PHARM REV CODE 250 ALT 637 W/ HCPCS: Performed by: STUDENT IN AN ORGANIZED HEALTH CARE EDUCATION/TRAINING PROGRAM

## 2023-02-03 PROCEDURE — 99233 PR SUBSEQUENT HOSPITAL CARE,LEVL III: ICD-10-PCS | Mod: ,,, | Performed by: PSYCHIATRY & NEUROLOGY

## 2023-02-03 PROCEDURE — 85025 COMPLETE CBC W/AUTO DIFF WBC: CPT

## 2023-02-03 PROCEDURE — 63600175 PHARM REV CODE 636 W HCPCS

## 2023-02-03 PROCEDURE — 99233 SBSQ HOSP IP/OBS HIGH 50: CPT | Mod: ,,, | Performed by: PSYCHIATRY & NEUROLOGY

## 2023-02-03 PROCEDURE — 80053 COMPREHEN METABOLIC PANEL: CPT

## 2023-02-03 PROCEDURE — 25000003 PHARM REV CODE 250

## 2023-02-03 PROCEDURE — 99233 PR SUBSEQUENT HOSPITAL CARE,LEVL III: ICD-10-PCS | Mod: ,,, | Performed by: NEUROLOGICAL SURGERY

## 2023-02-03 PROCEDURE — D9220A PRA ANESTHESIA: ICD-10-PCS | Mod: CRNA,,, | Performed by: NURSE ANESTHETIST, CERTIFIED REGISTERED

## 2023-02-03 PROCEDURE — 94761 N-INVAS EAR/PLS OXIMETRY MLT: CPT

## 2023-02-03 PROCEDURE — 84702 CHORIONIC GONADOTROPIN TEST: CPT | Performed by: RADIOLOGY

## 2023-02-03 PROCEDURE — D9220A PRA ANESTHESIA: ICD-10-PCS | Mod: ANES,,, | Performed by: ANESTHESIOLOGY

## 2023-02-03 PROCEDURE — 25000003 PHARM REV CODE 250: Performed by: STUDENT IN AN ORGANIZED HEALTH CARE EDUCATION/TRAINING PROGRAM

## 2023-02-03 PROCEDURE — D9220A PRA ANESTHESIA: Mod: ANES,,, | Performed by: ANESTHESIOLOGY

## 2023-02-03 PROCEDURE — 84100 ASSAY OF PHOSPHORUS: CPT

## 2023-02-03 PROCEDURE — 97535 SELF CARE MNGMENT TRAINING: CPT

## 2023-02-03 PROCEDURE — D9220A PRA ANESTHESIA: Mod: CRNA,,, | Performed by: NURSE ANESTHETIST, CERTIFIED REGISTERED

## 2023-02-03 PROCEDURE — 63600175 PHARM REV CODE 636 W HCPCS: Performed by: NURSE ANESTHETIST, CERTIFIED REGISTERED

## 2023-02-03 PROCEDURE — 25500020 PHARM REV CODE 255: Performed by: PSYCHIATRY & NEUROLOGY

## 2023-02-03 PROCEDURE — 20000000 HC ICU ROOM

## 2023-02-03 RX ORDER — FENTANYL CITRATE 50 UG/ML
INJECTION, SOLUTION INTRAMUSCULAR; INTRAVENOUS
Status: DISCONTINUED | OUTPATIENT
Start: 2023-02-03 | End: 2023-02-03

## 2023-02-03 RX ORDER — PROPOFOL 10 MG/ML
VIAL (ML) INTRAVENOUS CONTINUOUS PRN
Status: DISCONTINUED | OUTPATIENT
Start: 2023-02-03 | End: 2023-02-03

## 2023-02-03 RX ORDER — PHENYLEPHRINE HYDROCHLORIDE 10 MG/ML
INJECTION INTRAVENOUS
Status: DISCONTINUED | OUTPATIENT
Start: 2023-02-03 | End: 2023-02-03

## 2023-02-03 RX ORDER — SODIUM CHLORIDE 0.9 % (FLUSH) 0.9 %
10 SYRINGE (ML) INJECTION
Status: DISCONTINUED | OUTPATIENT
Start: 2023-02-03 | End: 2023-02-07

## 2023-02-03 RX ADMIN — HEPARIN SODIUM 5000 UNITS: 5000 INJECTION INTRAVENOUS; SUBCUTANEOUS at 06:02

## 2023-02-03 RX ADMIN — HEPARIN SODIUM 5000 UNITS: 5000 INJECTION INTRAVENOUS; SUBCUTANEOUS at 09:02

## 2023-02-03 RX ADMIN — CEFAZOLIN 2 G: 2 INJECTION, POWDER, FOR SOLUTION INTRAMUSCULAR; INTRAVENOUS at 04:02

## 2023-02-03 RX ADMIN — LABETALOL HYDROCHLORIDE 10 MG: 5 INJECTION, SOLUTION INTRAVENOUS at 10:02

## 2023-02-03 RX ADMIN — INSULIN ASPART 10 UNITS: 100 INJECTION, SOLUTION INTRAVENOUS; SUBCUTANEOUS at 09:02

## 2023-02-03 RX ADMIN — CEFAZOLIN 2 G: 2 INJECTION, POWDER, FOR SOLUTION INTRAMUSCULAR; INTRAVENOUS at 08:02

## 2023-02-03 RX ADMIN — AMANTADINE HYDROCHLORIDE 100 MG: 50 SOLUTION ORAL at 06:02

## 2023-02-03 RX ADMIN — LEVETIRACETAM 500 MG: 100 INJECTION, SOLUTION INTRAVENOUS at 08:02

## 2023-02-03 RX ADMIN — INSULIN DETEMIR 16 UNITS: 100 INJECTION, SOLUTION SUBCUTANEOUS at 09:02

## 2023-02-03 RX ADMIN — NIMODIPINE 60 MG: 60 SOLUTION ORAL at 06:02

## 2023-02-03 RX ADMIN — INSULIN ASPART 7 UNITS: 100 INJECTION, SOLUTION INTRAVENOUS; SUBCUTANEOUS at 04:02

## 2023-02-03 RX ADMIN — PHENYLEPHRINE HYDROCHLORIDE 100 MCG: 10 INJECTION INTRAVENOUS at 12:02

## 2023-02-03 RX ADMIN — PROPOFOL 200 MCG/KG/MIN: 10 INJECTION, EMULSION INTRAVENOUS at 11:02

## 2023-02-03 RX ADMIN — INSULIN ASPART 7 UNITS: 100 INJECTION, SOLUTION INTRAVENOUS; SUBCUTANEOUS at 07:02

## 2023-02-03 RX ADMIN — IOHEXOL 70 ML: 300 INJECTION, SOLUTION INTRAVENOUS at 12:02

## 2023-02-03 RX ADMIN — AMANTADINE HYDROCHLORIDE 100 MG: 50 SOLUTION ORAL at 02:02

## 2023-02-03 RX ADMIN — CEFAZOLIN 2 G: 2 INJECTION, POWDER, FOR SOLUTION INTRAMUSCULAR; INTRAVENOUS at 12:02

## 2023-02-03 RX ADMIN — PHENYLEPHRINE HYDROCHLORIDE 100 MCG: 10 INJECTION INTRAVENOUS at 11:02

## 2023-02-03 RX ADMIN — INSULIN ASPART 6 UNITS: 100 INJECTION, SOLUTION INTRAVENOUS; SUBCUTANEOUS at 04:02

## 2023-02-03 RX ADMIN — NIMODIPINE 60 MG: 60 SOLUTION ORAL at 02:02

## 2023-02-03 RX ADMIN — IPRATROPIUM BROMIDE AND ALBUTEROL SULFATE 3 ML: .5; 3 SOLUTION RESPIRATORY (INHALATION) at 08:02

## 2023-02-03 RX ADMIN — NIMODIPINE 60 MG: 60 SOLUTION ORAL at 09:02

## 2023-02-03 RX ADMIN — FENTANYL CITRATE 25 MCG: 50 INJECTION, SOLUTION INTRAMUSCULAR; INTRAVENOUS at 11:02

## 2023-02-03 RX ADMIN — IPRATROPIUM BROMIDE AND ALBUTEROL SULFATE 3 ML: .5; 3 SOLUTION RESPIRATORY (INHALATION) at 01:02

## 2023-02-03 RX ADMIN — IPRATROPIUM BROMIDE AND ALBUTEROL SULFATE 3 ML: .5; 3 SOLUTION RESPIRATORY (INHALATION) at 07:02

## 2023-02-03 RX ADMIN — POTASSIUM BICARBONATE 50 MEQ: 978 TABLET, EFFERVESCENT ORAL at 02:02

## 2023-02-03 RX ADMIN — HEPARIN SODIUM 5000 UNITS: 5000 INJECTION INTRAVENOUS; SUBCUTANEOUS at 02:02

## 2023-02-03 RX ADMIN — INSULIN ASPART 6 UNITS: 100 INJECTION, SOLUTION INTRAVENOUS; SUBCUTANEOUS at 08:02

## 2023-02-03 NOTE — ASSESSMENT & PLAN NOTE
53 yo F with PMH of DM1, autoimmune hepatitis, and HTN who presents to ED and was found to have diffuse SAH and hydrocephalus    PBD 7    - admit to NCC  - q1 neuro checks  - all labs and diagnostics reviewed   - MRI/CTA showed diffuse SAH and small volume IVH with hydrocephalus, no identifiable aneurysm   - CTH repeat with EVD in good position   - CTA without identifiable aneurysm   - DSA 1/27 without any identifiable aneurysm   - MRI brain/C spine and MRI vessel wall imaging 1/29: no vascular anomaly seen or vessell wall enhancement   - CTH 2/2 after EVD clamped the day before with decrease in ventricular caliber from prior, decreasing SAH   - CTH 2/3 pending   - will need repeat DSA on PBD 7, either Thursday or Friday this week  - EVD clamped, CTH tomorrow  - SAH protocol   - daily TCDs x 14 days   - keppra 500mg x 7 days   - goal euvolemia to 1L positive  - Na >135, hypertonics as needed  - elevate HOB  - SBP <140  - extubated 1/30  - ok for diet, SLP  - ok for SQH    Dispo: ongoing

## 2023-02-03 NOTE — PROGRESS NOTES
[facility]  Neurosurgery  Progress Note    Subjective:     History of Present Illness: 51 yo F with PMH of DM1, autoimmune hepatitis, and HTN who presents to ED this am after confusion this am. She was LKN last night and became confused after showering this am so  brought to the ED. She quickly decompensated in the ER and was not following commands or waking to stimulation. MRI and CTA showed diffuse thick SAH and hydrocephalus. No identifiable aneurysm seen on CTA.       Post-Op Info:  Procedure(s) (LRB):  ANGIOGRAM-CEREBRAL (N/A)   7 Days Post-Op     Interval History:   no acute overnight events. Angio potentially today.     Medications:  Continuous Infusions:   niCARdipine Stopped (02/03/23 0003)     Scheduled Meds:   albuterol-ipratropium  3 mL Nebulization Q6H WAKE    amantadine HCL  100 mg Oral BID    ceFAZolin (ANCEF) IVPB  2 g Intravenous Q8H    heparin (porcine)  5,000 Units Subcutaneous Q8H    insulin aspart U-100  7 Units Subcutaneous TIDWM    insulin detemir U-100  16 Units Subcutaneous BID    levetiracetam IV  500 mg Intravenous Q12H    niMODipine  60 mg Per OG tube Q4H     PRN Meds:acetaminophen, dextrose 10%, glucagon (human recombinant), glucose, glucose, hydrALAZINE, insulin aspart U-100, labetalol, magnesium oxide, magnesium oxide, ondansetron, potassium bicarbonate, potassium bicarbonate, potassium bicarbonate, potassium, sodium phosphates, potassium, sodium phosphates, potassium, sodium phosphates, sodium chloride 0.9%, sodium chloride 0.9%     Review of Systems  Objective:     Weight: 55.5 kg (122 lb 5.7 oz)  Body mass index is 22.38 kg/m².  Vital Signs (Most Recent):  Temp: 98 °F (36.7 °C) (02/03/23 0320)  Pulse: 80 (02/03/23 0820)  Resp: 20 (02/03/23 0820)  BP: 139/64 (02/03/23 0820)  SpO2: 100 % (02/03/23 0820) Vital Signs (24h Range):  Temp:  [97.9 °F (36.6 °C)-100.7 °F (38.2 °C)] 98 °F (36.7 °C)  Pulse:  [] 80  Resp:  [20-30] 20  SpO2:  [95 %-100 %]  100 %  BP: (107-153)/(52-74) 139/64                          ICP/Ventriculostomy 01/27/23 1400 (Active)   $ ICP/Ventriculostomy [place] Placement Bedside Insertion Performed 01/27/23 1501   Level of Ventriculostomy (cm above) 20 02/02/23 0201   Status Clamped 02/03/23 0601   Site Assessment Clean;Dry 02/03/23 0601   Site Drainage No drainage 02/03/23 0601   Waveform normal waveform 02/02/23 2301   Output (mL) 0 mL 02/03/23 0500   CSF Color clear 02/01/23 1500   Dressing Status Clean;Dry;Intact 02/03/23 0601   Interventions HOB degrees 02/03/23 0601       Physical Exam    Neurosurgery Physical Exam       Exam: E4V4M6. AOx3. PERRL. CN non focal. FC x4 FS    Significant Labs:  Recent Labs   Lab 02/02/23  0058 02/03/23  0116   * 253*   * 147*   K 4.2 3.5   * 111*   CO2 22* 22*   BUN 14 13   CREATININE 0.8 0.9   CALCIUM 9.4 9.3   MG 1.8 2.1     Recent Labs   Lab 02/02/23  0058 02/03/23  0116   WBC 12.85* 12.67   HGB 11.2* 11.2*   HCT 35.2* 35.5*    197     No results for input(s): LABPT, INR, APTT in the last 48 hours.  Microbiology Results (last 7 days)       Procedure Component Value Units Date/Time    CSF culture [[id number]] Collected: 02/02/23 1013    Order Status: Completed Specimen: CSF (Spinal Fluid) from CSF Tap, Tube 3 Updated: 02/03/23 0746     CSF CULTURE No Growth to date     Gram Stain Result No WBC's      No organisms seen    CSF culture [[id number]] Collected: 01/30/23 1449    Order Status: Completed Specimen: CSF (Spinal Fluid) from CSF Shunt Updated: 02/03/23 0721     CSF CULTURE No Growth to date     Gram Stain Result Rare WBC's      No organisms seen    Gram stain [[id number]] Collected: 02/02/23 1013    Order Status: Canceled Specimen: CSF (Spinal Fluid) from CSF Tap, Tube 3           All pertinent labs from the last 24 hours have been reviewed.    Significant Diagnostics:  I have reviewed all pertinent imaging results/findings within the past 24 hours.    Assessment/Plan:     *  SAH (subarachnoid hemorrhage)  53 yo F with PMH of DM1, autoimmune hepatitis, and HTN who presents to ED and was found to have diffuse SAH and hydrocephalus    PBD 7    - admit to NCC  - q1 neuro checks  - all labs and diagnostics reviewed   - MRI/CTA showed diffuse SAH and small volume IVH with hydrocephalus, no identifiable aneurysm   - CTH repeat with EVD in good position   - CTA without identifiable aneurysm   - DSA 1/27 without any identifiable aneurysm   - MRI brain/C spine and MRI vessel wall imaging 1/29: no vascular anomaly seen or vessell wall enhancement   - CTH 2/2 after EVD clamped the day before with decrease in ventricular caliber from prior, decreasing SAH   - CTH 2/3 pending   - will need repeat DSA on PBD 7, either Thursday or Friday this week  - EVD clamped, CTH tomorrow  - SAH protocol   - daily TCDs x 14 days   - keppra 500mg x 7 days   - goal euvolemia to 1L positive  - Na >135, hypertonics as needed  - elevate HOB  - SBP <140  - extubated 1/30  - ok for diet, SLP  - ok for SQH    Dispo: ongoing        Miguel Orozco MD  Neurosurgery  [facility]

## 2023-02-03 NOTE — PLAN OF CARE
[facility] Care Plan    POC reviewed with Leida Hoyos and family at 0300. Pt verbalized understanding. Questions and concerns addressed. No acute events overnight. Pt progressing toward goals. Will continue to monitor. See below and flowsheets for full assessment and VS info.           Is this a stroke patient? yes- Stroke booklet reviewed with patient and family, risk factors identified for patient and stroke booklet remains at bedside for ongoing education.     Neuro:  [place] Coma Scale  Best Eye Response: 4-->(E4) spontaneous  Best Motor Response: 6-->(M6) obeys commands  Best Verbal Response: 2-->(V2) incomprehensible speech  [place] Coma Scale Score: 12  Assessment Qualifiers: patient not sedated/intubated  Pupil PERRLA: yes     24hr Temp:  [97.9 °F (36.6 °C)-100.7 °F (38.2 °C)]     CV:   Rhythm: normal sinus rhythm, sinus tachycardia  BP goals:   SBP < 140  MAP > 65    Resp:      Vent Mode: Spont  Set Rate: 16 BPM  Oxygen Concentration (%): 32  Vt Set: 420 mL  PEEP/CPAP: 5 cmH20  Pressure Support: 7 cmH20    Plan: N/A    GI/:     Diet/Nutrition Received: full liquid  Last Bowel Movement: 02/02/23  Voiding Characteristics: external catheter, incontinence    Intake/Output Summary (Last 24 hours) at 2/3/2023 0421  Last data filed at 2/3/2023 0301  Gross per 24 hour   Intake 835.37 ml   Output 605 ml   Net 230.37 ml     Unmeasured Output  Urine Occurrence: 1  Stool Occurrence: 1  Emesis Occurrence: 0  Pad Count: 2    Labs/Accuchecks:  Recent Labs   Lab 02/03/23  0116   WBC 12.67   RBC 3.66*   HGB 11.2*   HCT 35.5*         Recent Labs   Lab 02/03/23  0116   *   K 3.5   CO2 22*   *   BUN 13   CREATININE 0.9   ALKPHOS 169*   ALT 30   AST 30   BILITOT 0.4      Recent Labs   Lab 01/27/23  0955   INR 1.2      Recent Labs   Lab 01/27/23  0949   TROPONINI <0.006       Electrolytes: Electrolytes replaced  Accuchecks: ACHS    Gtts:   niCARdipine Stopped (02/03/23 0003)        LDA/Wounds:  Lines/Drains/Airways       Drain  Duration                  ICP/Ventriculostomy 01/27/23 1400 6 days              Peripheral Intravenous Line  Duration                  Peripheral IV - Single Lumen 02/01/23 0534 20 G Anterior;Right Upper Arm 1 day         Peripheral IV - Single Lumen 02/02/23 0100 20 G Anterior;Left Forearm 1 day                  Wounds: No  Wound care consulted: No    Problem: Adult Inpatient Plan of Care  Goal: Optimal Comfort and Wellbeing  Outcome: Ongoing, Progressing  Intervention: Provide Person-Centered Care  Flowsheets (Taken 2/3/2023 0420)  Trust Relationship/Rapport:   care explained   choices provided   questions encouraged   reassurance provided     Problem: Functional Ability Impaired (Stroke, Hemorrhagic)  Goal: Optimal Functional Ability  Outcome: Ongoing, Progressing  Intervention: Optimize Functional Ability  Flowsheets (Taken 2/3/2023 0420)  Activity Management:   Arm raise - L1   Heel slide - L1     Problem: Urinary Elimination Impaired (Stroke, Hemorrhagic)  Goal: Effective Urinary Elimination  Outcome: Ongoing, Progressing  Intervention: Promote Effective Bladder Elimination  Flowsheets (Taken 2/3/2023 0420)  Urinary Elimination Promotion:   absorbent pad/diaper use encouraged   catheter patency maintained

## 2023-02-03 NOTE — PROGRESS NOTES
Pt transferred to IR @ 1055 in bed by RN x1 on cardiac monitor with EVD clamped. No significant events.

## 2023-02-03 NOTE — PROGRESS NOTES
[facility] Neuro Critical Care  Adult Nutrition  Progress Note    SUMMARY       Recommendations    1. When medically able, resume mechanical soft diet with diabetic restrictions- encourage adequate PO intake.   2. If PO inntake remains 50% or less, add Boost Glucose Control TID.   3. RD following.    Goals: Meet % EEN by RD f/u.  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 Endorses good appetite PTA with 3 meals/day on diabetic diet. States pt was very strict about following DM diet. States she takes insulin before meals and adjusts as needed at night. Currently NPO for angiogram today. States pt received 1 meal yesterday on mechanical soft diet. States pt receptive to eating and ate about 50% of plate. Endorses issues with diarrhea 2/2 to specific medication but no issues with n/v/c. Unsure UBW but endorses no weight loss and states pt appears the same as she did PTA. Noted wt on 11/1/22 was 136# and # - indicates 10.3% x 3 months. Visual NFPE completed 2/3 d/t lack of pt consent - pt appears well-nourished with no s/s of malnutrition at this time. Pt at risk for malnutrition. LBM 2/2.  Nutrition Discharge Planning: Diabetic diet  "with texture per SLP    Nutrition Risk Screen    Nutrition Risk Screen: difficulty chewing/swallowing, unintentional loss of 10 lbs or more in the past 2 months    Nutrition/Diet History    Spiritual, Cultural Beliefs, Presybeterian Practices, Values that Affect Care: no  Food Allergies: NKFA  Factors Affecting Nutritional Intake: NPO, difficulty/impaired swallowing    Anthropometrics    Temp: 98.3 °F (36.8 °C)  Height Method: Stated  Height: 5' 2" (157.5 cm)  Height (inches): 62 in  Weight Method: Bed Scale  Weight: 55.5 kg (122 lb 5.7 oz)  Weight (lb): 122.36 lb  Ideal Body Weight (IBW), Female: 110 lb  % Ideal Body Weight, Female (lb): 111.24 %  BMI (Calculated): 22.4  BMI Grade: 18.5-24.9 - normal    Lab/Procedures/Meds    Pertinent Labs Reviewed: reviewed  Pertinent Labs Comments: Glucose 253, A1C 6.5, Albumin 3.0, Sodium 147, Al Phos 169  Pertinent Medications Reviewed: reviewed  Pertinent Medications Comments: heparin, insulin    Estimated/Assessed Needs    Weight Used For Calorie Calculations: 55.5 kg (122 lb 5.7 oz)  Energy Calorie Requirements (kcal): 1392 kcals  Energy Need Method: [place]-St Jeor (MSJ x 1.25 PAL)  Protein Requirements: 56 g (1.0 g/kg)  Weight Used For Protein Calculations: 55.5 kg (122 lb 5.7 oz)  Fluid Requirements (mL): 1 mL/kcal or fluid per MD  Estimated Fluid Requirement Method: RDA Method  RDA Method (mL): 1392  CHO Requirement: 175 g    Nutrition Prescription Ordered    Current Diet Order: NPO    Evaluation of Received Nutrient/Fluid Intake    I/O: +1.3 L since admit  % Intake of Estimated Energy Needs: 0%  % Meal Intake: NPO    Nutrition Risk    Level of Risk/Frequency of Follow-up:  (1 time/week)     Monitor and Evaluation    Food and Nutrient Intake: energy intake, food and beverage intake  Food and Nutrient Adminstration: diet order  Knowledge/Beliefs/Attitudes: food and nutrition knowledge/skill, beliefs and attitudes  Physical Activity and Function: factors affecting access to " physical activity  Anthropometric Measurements: height/length, weight, weight change, body mass index  Biochemical Data, Medical Tests and Procedures: electrolyte and renal panel, gastrointestinal profile, glucose/endocrine profile, inflammatory profile, lipid profile  Nutrition-Focused Physical Findings: overall appearance, extremities, muscles and bones     Nutrition Follow-Up    RD Follow-up?: Yes    Jo Ann El, Registration Eligible, Provisional LDN

## 2023-02-03 NOTE — PLAN OF CARE
Problem: Physical Therapy  Goal: Physical Therapy Goal  Description: PT goals until 2/15/23    1. Pt supine to sit with minimal assist- not met  2. Pt sit to supine with minimal assist-not met  3. Pt sit to stand with LRAD as needed for safety with minimal assist-not met  4. Pt to perform gait 20ft with LRAD as needed for safety with moderate assist.- met 2/3/23  Revised goal: gait 100ft with LRAD as needed for safety with CGA-not met  5. Pt to go up/down curb step with LRAD as needed for safety with moderate assist.-not met  6. Pt to transfer bed to/from bedside chair with LRAD as needed for safety with minimal assist.-not met  7. Pt to perform B LE exs in sitting or supine x 10 reps to strengthen B LE to improve functional mobility.-not met   Outcome: Ongoing, Progressing   Pt's goals revised as appropriate and pt will continue to benefit from skilled PT services to work towards improved functional mobility including: bed mobility, transfers, up/down step, and gait.   2/3/2023

## 2023-02-03 NOTE — SEDATION DOCUMENTATION
Hemostasis achieved via right groin with use of Vascade closure device at 1224. Pt to lay flat for 2 hours until 1424

## 2023-02-03 NOTE — PROGRESS NOTES
Health Maintenance Due   Topic Date Due    COVID-19 Vaccine (4 - Booster for Pfizer series) 12/21/2021     Chart review done.  updated. Immunizations reviewed & updated. Care Everywhere updated.  Confirmed DM Eye Exam from 1/18/23 in .

## 2023-02-03 NOTE — PROGRESS NOTES
While patient was in IR received a call to verify proper EVD settings. Dr. Aguilar requested the EVD be opened at 20 rather than clamped due to ICP of 30. Opened EVD as requested and followed up with Neurosurgery. A CT was done post IR. EVD was re leveled and clamped at bedside. ICP of 1,  patient moving spontaneously, oriented to self all vital signs stable. CSF total output of 6. [facility] notified

## 2023-02-03 NOTE — PLAN OF CARE
[facility] Care Plan    POC reviewed with Leida Hoyos and family at 1400. Pt verbalized understanding. Questions and concerns addressed. No acute events today. Pt progressing toward goals. Will continue to monitor. See below and flowsheets for full assessment and VS info.       Potassium replaced  Pt went to IR- no intervention and blue sheet completed   CT post angio completes for increased ICP see note  NSGY plans to pull EVD       Is this a stroke patient? yes- Stroke booklet reviewed with patient and family, risk factors identified for patient and stroke booklet remains at bedside for ongoing education.     Neuro:  [place] Coma Scale  Best Eye Response: 4-->(E4) spontaneous  Best Motor Response: 6-->(M6) obeys commands  Best Verbal Response: 4-->(V4) confused  [place] Coma Scale Score: 14  Assessment Qualifiers: patient not sedated/intubated  Pupil PERRLA: no     24 hr Temp:  [97.9 °F (36.6 °C)-100.4 °F (38 °C)]     CV:   Rhythm: normal sinus rhythm, sinus tachycardia  BP goals:   SBP < 140  MAP > 65    Resp:      Vent Mode: Spont  Set Rate: 16 BPM  Oxygen Concentration (%): 32  Vt Set: 420 mL  PEEP/CPAP: 5 cmH20  Pressure Support: 7 cmH20    Plan: N/A    GI/:     Diet/Nutrition Received: NPO  Last Bowel Movement: 02/03/23  Voiding Characteristics: external catheter    Intake/Output Summary (Last 24 hours) at 2/3/2023 1533  Last data filed at 2/3/2023 1501  Gross per 24 hour   Intake 1020.18 ml   Output 850 ml   Net 170.18 ml     Unmeasured Output  Urine Occurrence: 1  Stool Occurrence: 2  Emesis Occurrence: 0  Pad Count: 2    Labs/Accuchecks:  Recent Labs   Lab 02/03/23  0116   WBC 12.67   RBC 3.66*   HGB 11.2*   HCT 35.5*         Recent Labs   Lab 02/03/23  0116   *   K 3.5   CO2 22*   *   BUN 13   CREATININE 0.9   ALKPHOS 169*   ALT 30   AST 30   BILITOT 0.4    No results for input(s): PROTIME, INR, APTT, HEPANTIXA in the last 168 hours. No results for input(s): CPK, CPKMB, TROPONINI,  MB in the last 168 hours.    Electrolytes: Electrolytes replaced  Accuchecks: Q4H    Gtts:   niCARdipine Stopped (02/03/23 0003)       LDA/Wounds:  Lines/Drains/Airways       Drain  Duration                  ICP/Ventriculostomy 01/27/23 1400 7 days              Peripheral Intravenous Line  Duration                  Peripheral IV - Single Lumen 02/01/23 0534 20 G Anterior;Right Upper Arm 2 days         Peripheral IV - Single Lumen 02/02/23 0100 20 G Anterior;Left Forearm 1 day                  Wounds: No  Wound care consulted: No

## 2023-02-03 NOTE — ASSESSMENT & PLAN NOTE
Diagnosed in 2013. BG on arrival 284  Home regimen 13u Lantus; Lispro AC  A1C 6.5     -Transitioned from Insulin gtt to Detemir 14u BID --> increase to 16 BID  -Aspart 7 q4  -Hypoglycemic protocol in place  -Accuchecks q1h

## 2023-02-03 NOTE — PT/OT/SLP EVAL
Speech Language Pathology Evaluation  Cognitive Communication    Patient Name:  Leida Hoyos   MRN:  [id number]  Admitting Diagnosis: SAH (subarachnoid hemorrhage)    Recommendations:     Recommendations:                General Recommendations:  Dysphagia therapy and Cognitive-linguistic therapy  Diet recommendations:  Mechanical soft, Thin   Aspiration Precautions: HOB to 90 degrees, Meds whole 1 at a time, Small bites/sips, and Standard aspiration precautions   General Precautions: Standard, aspiration  Communication strategies:  none    History:     Past Medical History:   Diagnosis Date    Autoimmune hepatitis     managed by Dr. Russell on mercaptopurine    Diabetes mellitus type II     Hypertension        Past Surgical History:   Procedure Laterality Date    CEREBRAL ANGIOGRAM N/A 2023    Procedure: ANGIOGRAM-CEREBRAL;  Surgeon: Yue Surgeon;  Location: [facility];  Service: Anesthesiology;  Laterality: N/A;     SECTION, CLASSIC      COLONOSCOPY N/A 2019    Procedure: COLONOSCOPY;  Surgeon: Dipesh Victoria MD;  Location: [facility] ([address] [place] [address]);  Service: Endoscopy;  Laterality: N/A;    FRACTURE SURGERY Left     leg     MOUTH SURGERY         Social History: Patient lives with spouse.      Prior diet: regular with thin.    Occupation/hobbies/homemaking: .      Subjective       Patient goals: home     Pain/Comfort:  Pain Rating 1: 0/10  Pain Rating Post-Intervention 1: 0/10    Respiratory Status: Room air    Objective:   Cognitive Status:    Pt. was oriented to place and month only but not to year or time of day.  Recall of recent and remote temporal and general information was fair.  Pt. Was easily distracted with occasional repetition needed to elicit response.    Immediate/delayed verbal recall was wfl with pt. Repeating 7 digits and 5 words without difficulty.  Recall of personal biographical information was fair-good.     Responses to hypothetical verbal problem  solving tasks were accurate and complete for simple tasks.   Pt. Compared  objects but when asked to contrast language was tangential.  SHe 1/3 solutions to problems without cues when asked to generate multiple solutions to a problem.  Thought organization and categorization skills were impaired with pt. Naming 4 animals given one minute when 15-20 are wnl.  Pt. Responded to functional math and time calculations with 25 % accuracy.   Receptive Language:   Comprehension:   Pt. Followed one step commands and responded appropriately in conversation however she responded to complex yes/no questions with 50% accuracy    Pragmatics:    Poor eye contact    Expressive Language:  Verbal:    Verbal language skills were wfl to communicate basic wants and needs      Motor Speech:  wfl    Voice:   wfl    Visual-Spatial:  tba    Reading:   tba     Written Expression:   tba    Treatment: Pt. Currently npo for angio therefore not able to check diet tolerance.  Pt's friend present denied coughing/throat clearing during dinner last night and reported good appetite.    Assessment:   Leida Hoyos is a 52 y.o. female with an SLP diagnosis of Cognitive-Linguistic Impairment.   Goals:   Multidisciplinary Problems       SLP Goals          Problem: SLP    Goal Priority Disciplines Outcome   SLP Goal     SLP Ongoing, Progressing   Description: Goals due 2/10  1.  Pt. Will participate in ongoing assessment of swallow at bedside  2.  Tolerate trials of regular diet with thin liquids with no s/s of aspiration  3.  Assess functional reading and writing skills  4.  [place] to time and place  5.  Respond to word finding/categorization tasks with 80% accuracy  6.  Respond to verbal problem solving tasks with 75% accuracy                       Plan:   Patient to be seen:  4 x/week   Plan of Care expires:  02/27/23  Plan of Care reviewed with:  patient   SLP Follow-Up:  Yes       Discharge recommendations:  Discharge Facility/Level of Care  Needs: rehabilitation facility   Barriers to Discharge:  None    Time Tracking:   SLP Treatment Date:   02/03/23  Speech Start Time:  0920  Speech Stop Time:  0942     Speech Total Time (min):  22 min    Billable Minutes: Eval 14  and Self Care/Home Management Training 8    02/03/2023

## 2023-02-03 NOTE — PLAN OF CARE
Recommendations     1. When medically able, resume mechanical soft diet with diabetic restrictions- encourage adequate PO intake.   2. If PO inntake remains 50% or less, add Boost Glucose Control TID.   3. RD following.     Goals: Meet % EEN by RD f/u.  Nutrition Goal Status: progressing towards goal  Communication of RD Recs:  (POC)    Jo Ann El, Registration Eligible, Provisional LDN

## 2023-02-03 NOTE — H&P
Inpatient Radiology Pre-procedure Note    History of Present Illness:  Leida Hoyos is a 52 y.o. female with medical history of HTN, DM1, autoimmune hepatitis with admission concerns of SAH. HH3/MFS4. DSA unrevealing for aneurysmal or vascular etiology . Hence, plans for repeat/delayed cerebral angiogram to investigate for any vascular etiologies for the SAH and characterize rest of the cerebral vasculature.     Admission H&P reviewed.    Past Medical History:   Diagnosis Date    Autoimmune hepatitis     managed by Dr. Russell on mercaptopurine    Diabetes mellitus type II     Hypertension      Past Surgical History:   Procedure Laterality Date    CEREBRAL ANGIOGRAM N/A 2023    Procedure: ANGIOGRAM-CEREBRAL;  Surgeon: Yue Surgeon;  Location: [facility];  Service: Anesthesiology;  Laterality: N/A;     SECTION, CLASSIC      COLONOSCOPY N/A 2019    Procedure: COLONOSCOPY;  Surgeon: Dipesh Victoria MD;  Location: [facility] (4TH FLR);  Service: Endoscopy;  Laterality: N/A;    FRACTURE SURGERY Left     leg     MOUTH SURGERY         Review of Systems:   As documented in primary team H&P    Home Meds:   Prior to Admission medications    Medication Sig Start Date End Date Taking? Authorizing Provider   aspirin (ECOTRIN) 81 MG EC tablet Take 81 mg by mouth once daily.    Historical Provider   blood sugar diagnostic Strp For one touch verio IQ meter 22   Nini Kurtz MD   blood-glucose meter (ONETOUCH VERIO IQ METER MISC) OneTouch Verio IQ Meter    Historical Provider   blood-glucose sensor (DEXCOM G6 SENSOR) Melissa Monitor blood sugars daily 22   Nini Kurtz MD   blood-glucose transmitter (DEXCOM G6 TRANSMITTER) Melissa USE AS DIRECTED for daily use TO TEST BLOOD GLUCOSE. 22   Nini Kurtz MD   cholecalciferol, vitamin D3, (VITAMIN D3) 1,000 unit capsule Take 1 capsule (1,000 Units total) by mouth once daily. 4/10/19   Chelsie Bundy MD   insulin (LANTUS  "SOLOSTAR U-100 INSULIN) glargine 100 units/mL (3mL) SubQ pen INJECT 13 UNITS UNDER THE SKIN EVERY EVENING 4/22/22   Nini Kurtz MD   insulin lispro (HUMALOG KWIKPEN INSULIN) 100 unit/mL pen Takes 5 units/7 units and 9 units before meals, plus correction for a TDD 25 units 4/22/22   Nini Kurtz MD   losartan (COZAAR) 25 MG tablet Take 1 tablet (25 mg total) by mouth once daily. 1/27/22 1/27/23  Frieda Mera MD   multivitamin (THERAGRAN) per tablet Take 1 tablet by mouth once daily.    Historical Provider   ONETOUCH DELICA PLUS LANCET 33 gauge Misc USE AS DIRECTED TO TEST BLOOD GLUCOSE. 10/16/21   Nini Kurtz MD   pen needle, diabetic (BD ULTRA-FINE NICK PEN NEEDLE) 32 gauge x 5/32" Ndle USE FOUR TIMES A DAY FOR INSULIN INJECTIONS 4/22/22   Nini Kurtz MD   pravastatin (PRAVACHOL) 10 MG tablet Take 1 tablet (10 mg total) by mouth once daily. 1/27/22 1/27/23  Frieda Mera MD     Scheduled Meds:    albuterol-ipratropium  3 mL Nebulization Q6H WAKE    amantadine HCL  100 mg Oral BID    ceFAZolin (ANCEF) IVPB  2 g Intravenous Q8H    heparin (porcine)  5,000 Units Subcutaneous Q8H    insulin aspart U-100  7 Units Subcutaneous TIDWM    insulin detemir U-100  14 Units Subcutaneous BID    levetiracetam IV  500 mg Intravenous Q12H    niMODipine  60 mg Per OG tube Q4H     Continuous Infusions:    niCARdipine Stopped (02/03/23 0003)     PRN Meds:acetaminophen, dextrose 10%, glucagon (human recombinant), glucose, glucose, hydrALAZINE, insulin aspart U-100, labetalol, magnesium oxide, magnesium oxide, ondansetron, potassium bicarbonate, potassium bicarbonate, potassium bicarbonate, potassium, sodium phosphates, potassium, sodium phosphates, potassium, sodium phosphates, sodium chloride 0.9%, sodium chloride 0.9%  Anticoagulants/Antiplatelets: no anticoagulation    Allergies: Review of patient's allergies indicates:  No Known Allergies  Sedation Hx: have not been any systemic reactions    Labs:  Recent Labs "   Lab 01/27/23  0955   INR 1.2       Recent Labs   Lab 02/03/23 0116   WBC 12.67   HGB 11.2*   HCT 35.5*   MCV 97         Recent Labs   Lab 02/03/23 0116   *   *   K 3.5   *   CO2 22*   BUN 13   CREATININE 0.9   CALCIUM 9.3   MG 2.1   ALT 30   AST 30   ALBUMIN 3.0*   BILITOT 0.4         Vitals:  Temp: 98 °F (36.7 °C) (02/03/23 0320)  Pulse: 74 (02/03/23 0701)  Resp: (!) 25 (02/03/23 0701)  BP: 127/63 (02/03/23 0701)  SpO2: 97 % (02/03/23 0701)     Physical Exam:  ASA: 4  Mallampati: 2    General: no acute distress  Mental Status: alert and oriented to person, place and time  HEENT: normocephalic, atraumatic  Chest: unlabored breathing  Heart: regular heart rate  Abdomen: nondistended  Extremity: moves all extremities      Plan:  Sedation Plan: as per anesthesia  Patient will undergo: cerebral angiogram to investigate for any vascular etiologies for the SAH and characterize rest of the cerebral vasculature        Lauren Hurt MD     Neuro Endovascular Surgery Fellow   [facility]

## 2023-02-03 NOTE — PT/OT/SLP PROGRESS
Physical Therapy Treatment    Patient Name:  Leida Hoyos   MRN:  [id number]    Recommendations:     Discharge Recommendations: rehabilitation facility  Discharge Equipment Recommendations:  (TBD as pt progresses)  Barriers to discharge: Inaccessible home and Decreased caregiver support 1 ALIS and requires assist for mobility    Assessment:     Leida Hoyos is a 52 y.o. female admitted with a medical diagnosis of SAH (subarachnoid hemorrhage).  She presents with the following impairments/functional limitations: weakness, impaired functional mobility, gait instability, impaired balance, impaired self care skills, decreased safety awareness, impaired coordination . Pt is unsafe with functional mobility at this time due to pt requires moderate assist for bed mobility, minimal assist for transfers, and minimal assist for gait due to instability and weakness. Pt is motivated to progress with functional mobility.     Rehab Prognosis: Good; patient would benefit from acute skilled PT services to address these deficits and reach maximum level of function.    Recent Surgery: Procedure(s) (LRB):  ANGIOGRAM-CEREBRAL (N/A) 7 Days Post-Op    Plan:     During this hospitalization, patient to be seen 4 x/week to address the identified rehab impairments via gait training, therapeutic activities, therapeutic exercises, neuromuscular re-education and progress toward the following goals:    Plan of Care Expires:  03/02/23    Subjective   Pt talking and alert throughout treatment  Pain/Comfort:  Pain Rating 1: 0/10  Pain Rating Post-Intervention 1: 0/10      Objective:     Communicated with nurse prior to session.  Patient found HOB elevated with bed alarm, blood pressure cuff, external ventricular drain, PureWick, peripheral IV, telemetry, pulse ox (continuous) (EVD clamped prior to mobility) upon PT entry to room.     General Precautions: Standard, fall (EVD clamped prior to mobility)  Orthopedic Precautions: N/A  Braces:  N/A  Respiratory Status: Room air     Functional Mobility:  Bed Mobility:     Supine to Sit: moderate assistance  Sit to Supine: minimum assistance  Transfers:     Sit to Stand:  minimum assistance with hand-held assist  Gait: 5ft then 25ft x 3 trials with HHA with minimal assist +1 to manage lines during mobility. Pt rested in sitting between gait trials. Pt with posterior instability at times. Pt performed gait with decreased step length, and at slow pace.    AM-PAC 6 CLICK MOBILITY  Turning over in bed (including adjusting bedclothes, sheets and blankets)?: 2  Sitting down on and standing up from a chair with arms (e.g., wheelchair, bedside commode, etc.): 3  Moving from lying on back to sitting on the side of the bed?: 2  Moving to and from a bed to a chair (including a wheelchair)?: 3  Need to walk in hospital room?: 3  Climbing 3-5 steps with a railing?: 2  Basic Mobility Total Score: 15     Patient left HOB elevated with all lines intact, call button in reach, bed alarm on, restraints reapplied at end of session, nurse notified, and friend present..    GOALS:   Multidisciplinary Problems       Physical Therapy Goals          Problem: Physical Therapy    Goal Priority Disciplines Outcome Goal Variances Interventions   Physical Therapy Goal     PT, PT/OT Ongoing, Progressing     Description: PT goals until 2/15/23    1. Pt supine to sit with minimal assist- not met  2. Pt sit to supine with minimal assist-not met  3. Pt sit to stand with LRAD as needed for safety with minimal assist-not met  4. Pt to perform gait 20ft with LRAD as needed for safety with moderate assist.- met 2/3/23  Revised goal: gait 100ft with LRAD as needed for safety with CGA-not met  5. Pt to go up/down curb step with LRAD as needed for safety with moderate assist.-not met  6. Pt to transfer bed to/from bedside chair with LRAD as needed for safety with minimal assist.-not met  7. Pt to perform B LE exs in sitting or supine x 10 reps to  strengthen B LE to improve functional mobility.-not met                        Time Tracking:     PT Received On: 02/03/23  PT Start Time: 0755     PT Stop Time: 0823  PT Total Time (min): 28 min     Billable Minutes: Gait Training 28    Treatment Type: Treatment  PT/PTA: PT           02/03/2023   no concerns

## 2023-02-03 NOTE — PLAN OF CARE
Problem: SLP  Goal: SLP Goal  Description: Goals due 2/10  1.  Pt. Will participate in ongoing assessment of swallow at bedside  2.  Tolerate trials of regular diet with thin liquids with no s/s of aspiration  3.  Assess functional reading and writing skills  4.  [place] to time and place  5.  Respond to word finding/categorization tasks with 80% accuracy  6.  Respond to verbal problem solving tasks with 75% accuracy  Outcome: Ongoing, Progressing

## 2023-02-03 NOTE — SUBJECTIVE & OBJECTIVE
Interval History:   no acute overnight events. Angio potentially today.     Medications:  Continuous Infusions:   niCARdipine Stopped (02/03/23 0003)     Scheduled Meds:   albuterol-ipratropium  3 mL Nebulization Q6H WAKE    amantadine HCL  100 mg Oral BID    ceFAZolin (ANCEF) IVPB  2 g Intravenous Q8H    heparin (porcine)  5,000 Units Subcutaneous Q8H    insulin aspart U-100  7 Units Subcutaneous TIDWM    insulin detemir U-100  16 Units Subcutaneous BID    levetiracetam IV  500 mg Intravenous Q12H    niMODipine  60 mg Per OG tube Q4H     PRN Meds:acetaminophen, dextrose 10%, glucagon (human recombinant), glucose, glucose, hydrALAZINE, insulin aspart U-100, labetalol, magnesium oxide, magnesium oxide, ondansetron, potassium bicarbonate, potassium bicarbonate, potassium bicarbonate, potassium, sodium phosphates, potassium, sodium phosphates, potassium, sodium phosphates, sodium chloride 0.9%, sodium chloride 0.9%     Review of Systems  Objective:     Weight: 55.5 kg (122 lb 5.7 oz)  Body mass index is 22.38 kg/m².  Vital Signs (Most Recent):  Temp: 98 °F (36.7 °C) (02/03/23 0320)  Pulse: 80 (02/03/23 0820)  Resp: 20 (02/03/23 0820)  BP: 139/64 (02/03/23 0820)  SpO2: 100 % (02/03/23 0820) Vital Signs (24h Range):  Temp:  [97.9 °F (36.6 °C)-100.7 °F (38.2 °C)] 98 °F (36.7 °C)  Pulse:  [] 80  Resp:  [20-30] 20  SpO2:  [95 %-100 %] 100 %  BP: (107-153)/(52-74) 139/64                          ICP/Ventriculostomy 01/27/23 1400 (Active)   $ ICP/Ventriculostomy [place] Placement Bedside Insertion Performed 01/27/23 1501   Level of Ventriculostomy (cm above) 20 02/02/23 0201   Status Clamped 02/03/23 0601   Site Assessment Clean;Dry 02/03/23 0601   Site Drainage No drainage 02/03/23 0601   Waveform normal waveform 02/02/23 2301   Output (mL) 0 mL 02/03/23 0500   CSF Color clear 02/01/23 1500   Dressing Status Clean;Dry;Intact 02/03/23 0601   Interventions HOB degrees 02/03/23 0601       Physical Exam    Neurosurgery  Physical Exam       Exam: E4V4M6. AOx3. PERRL. CN non focal. FC x4 FS    Significant Labs:  Recent Labs   Lab 02/02/23  0058 02/03/23  0116   * 253*   * 147*   K 4.2 3.5   * 111*   CO2 22* 22*   BUN 14 13   CREATININE 0.8 0.9   CALCIUM 9.4 9.3   MG 1.8 2.1     Recent Labs   Lab 02/02/23  0058 02/03/23  0116   WBC 12.85* 12.67   HGB 11.2* 11.2*   HCT 35.2* 35.5*    197     No results for input(s): LABPT, INR, APTT in the last 48 hours.  Microbiology Results (last 7 days)       Procedure Component Value Units Date/Time    CSF culture [[id number]] Collected: 02/02/23 1013    Order Status: Completed Specimen: CSF (Spinal Fluid) from CSF Tap, Tube 3 Updated: 02/03/23 0746     CSF CULTURE No Growth to date     Gram Stain Result No WBC's      No organisms seen    CSF culture [[id number]] Collected: 01/30/23 1449    Order Status: Completed Specimen: CSF (Spinal Fluid) from CSF Shunt Updated: 02/03/23 0721     CSF CULTURE No Growth to date     Gram Stain Result Rare WBC's      No organisms seen    Gram stain [[id number]] Collected: 02/02/23 1013    Order Status: Canceled Specimen: CSF (Spinal Fluid) from CSF Tap, Tube 3           All pertinent labs from the last 24 hours have been reviewed.    Significant Diagnostics:  I have reviewed all pertinent imaging results/findings within the past 24 hours.

## 2023-02-03 NOTE — ANESTHESIA POSTPROCEDURE EVALUATION
Anesthesia Post Evaluation    Patient: Leida Hoyos    Procedure(s) Performed: * No procedures listed *    Final Anesthesia Type: general      Patient location during evaluation: PACU  Patient participation: No - Unable to Participate, Coma/Other Inability to Communicate  Level of consciousness: awake (follows simple commands)  Post-procedure vital signs: reviewed and stable  Pain management: adequate  Airway patency: patent    PONV status at discharge: No PONV  Anesthetic complications: no      Cardiovascular status: stable  Respiratory status: unassisted, spontaneous ventilation and room air  Hydration status: euvolemic  Follow-up not needed.                                              No case tracking events are documented in the log.      Pain/Yin Score: Pain Rating Prior to Med Admin: 0 (2/2/2023  9:28 PM)  Pain Rating Post Med Admin: 0 (2/2/2023 10:28 PM)

## 2023-02-03 NOTE — ASSESSMENT & PLAN NOTE
Area of cerebral edema identified when reviewing brain imaging. We will continue to monitor with q4h neuro checks while on floor or more frequently while in neuro critical care, as any change in the patients clinical exam may signify expansion of the insult and/or the area of the edema. Such changes may require acute interventions to prevent loss of function and/or death.    Remains stable. Will continue to monitor. Repeat CTH PRN for acute neuro exam changes

## 2023-02-03 NOTE — SUBJECTIVE & OBJECTIVE
Neurologic Chief Complaint: HA    Subjective:     Interval History: Patient is seen for follow-up neurological assessment and treatment recommendations:     Patient very drowsy following angiogram. Negative DSA today. EVD remains in place. Therapy recs for IPR and mechanical soft diet with thin liquids.     HPI, Past Medical, Family, and Social History remains the same as documented in the initial encounter.     Review of Systems   Unable to perform ROS: Other (Severe drowsiness)   Constitutional:  Positive for fatigue.   HENT:  Negative for trouble swallowing.    Gastrointestinal:  Negative for vomiting.   Neurological:  Negative for weakness.   Scheduled Meds:   albuterol-ipratropium  3 mL Nebulization Q6H WAKE    amantadine HCL  100 mg Oral BID    ceFAZolin (ANCEF) IVPB  2 g Intravenous Q8H    heparin (porcine)  5,000 Units Subcutaneous Q8H    insulin aspart U-100  7 Units Subcutaneous TIDWM    insulin detemir U-100  16 Units Subcutaneous BID    levetiracetam IV  500 mg Intravenous Q12H    niMODipine  60 mg Per OG tube Q4H     Continuous Infusions:   niCARdipine Stopped (02/03/23 0003)     PRN Meds:acetaminophen, dextrose 10%, glucagon (human recombinant), glucose, glucose, hydrALAZINE, insulin aspart U-100, labetalol, magnesium oxide, magnesium oxide, ondansetron, potassium bicarbonate, potassium bicarbonate, potassium bicarbonate, potassium, sodium phosphates, potassium, sodium phosphates, potassium, sodium phosphates, sodium chloride 0.9%, sodium chloride 0.9%, sodium chloride 0.9%    Objective:     Vital Signs (Most Recent):  Temp: 98.3 °F (36.8 °C) (02/03/23 0701)  Pulse: 86 (02/03/23 1501)  Resp: (!) 22 (02/03/23 1501)  BP: 134/62 (02/03/23 1501)  SpO2: 99 % (02/03/23 1501)  BP Location: Left arm    Vital Signs Range (Last 24H):  Temp:  [97.9 °F (36.6 °C)-100.4 °F (38 °C)]   Pulse:  []   Resp:  [19-30]   BP: (107-149)/(52-80)   SpO2:  [95 %-100 %]   BP Location: Left arm    Physical Exam  Vitals and  nursing note reviewed.   Constitutional:       Comments: Drowsy    HENT:      Head:      Comments: EVD in place      Nose: No rhinorrhea.   Eyes:      General: No scleral icterus.  Cardiovascular:      Rate and Rhythm: Normal rate.   Pulmonary:      Effort: Pulmonary effort is normal. No respiratory distress.   Musculoskeletal:         General: No tenderness.   Skin:     General: Skin is warm and dry.   Neurological:      Sensory: No sensory deficit.      Motor: No weakness.      Comments: Moves all extremities against gravity   Intact to light touch   Follows commands   Limited 2/2 sedation today for procedure        Neurological Exam:   LOC: drowsy  Attention Span: poor   Language: No aphasia  Articulation: No dysarthria  Orientation: Person, Place, Time   EOM (CN III, IV, VI): Full/intact  Facial Movement (CN VII): Symmetric facial expression    Motor: Moves all extremities spontaneously/antigravity   Sensation: Intact to light touch     Laboratory:  CMP:   Recent Labs   Lab 02/03/23  0116   CALCIUM 9.3   ALBUMIN 3.0*   PROT 7.0   *   K 3.5   CO2 22*   *   BUN 13   CREATININE 0.9   ALKPHOS 169*   ALT 30   AST 30   BILITOT 0.4     BMP:   Recent Labs   Lab 02/03/23  0116   *   K 3.5   *   CO2 22*   BUN 13   CREATININE 0.9   CALCIUM 9.3     CBC:   Recent Labs   Lab 02/03/23  0116   WBC 12.67   RBC 3.66*   HGB 11.2*   HCT 35.5*      MCV 97   MCH 30.6   MCHC 31.5*     Lipid Panel: No results for input(s): CHOL, LDLCALC, HDL, TRIG in the last 168 hours.  Coagulation: No results for input(s): PT, INR, APTT in the last 168 hours.  Platelet Aggregation Study: No results for input(s): PLTAGG, PLTAGINTERP, PLTAGREGLACO, ADPPLTAGGREG in the last 168 hours.  Hgb A1C: No results for input(s): HGBA1C in the last 168 hours.  TSH: No results for input(s): TSH in the last 168 hours.    Diagnostic Results     Brain/Vessel Imaging   [facility] 2/3/23  There appears to be mild increased intracranial hemorrhage  however this is thought more likely artifactual due to the presence of vascular contrast from recent arteriogram as discussed above.    IR Angiogram Carotid Cerebral BL 2/3/23  1. Angiogram negative for any vascular etiologies/malformations accounting for the SAH/IVH.  Negative for any vascular malformations - AVMs/AVF/aneurysms.  Negative for any significant vasospasm.    CTH 2/3/23   1. Stable position of the right ventriculostomy catheter and stable prominence of the ventricles.  2. Stable subarachnoid hemorrhage and intraventricular hemorrhage.  3. Stable trace subdural hemorrhage.  4. No new areas of hemorrhage.    CTH 2/2/23  Stable subarachnoid and intraventricular hemorrhage. Ventriculostomy catheter in place with stable mild prominence of ventricles. Stable trace subdural hemorrhage. No new hemorrhage or major vascular distribution infarct    CTH 2/2/23   Stable position of right frontal ventriculostomy catheter with decreased size of the ventricles compared to prior exam compatible with improving hydrocephalus. Decreased volume of multi compartment hemorrhage as described above. No evidence for significant new hemorrhage or evidence for acute major vascular distribution infarct.    MRI Brain W WO Contrast 1/28/23   Brain: Slight improvement in mild to moderate hydrocephalus. Diffuse subarachnoid and intraventricular hemorrhage again noted. No acute infarct. No enhancing intracranial lesion or source of intracranial hemorrhage identified.     MRA: No aneurysm or AVM identified.  No evidence of vasospasm.  No abnormal vessel wall enhancement.    MRA Neck/Brain WO Contrast 1/28/23   There is no significant stenosis at the carotid bifurcations by NASCET criteria.  The vertebral arteries are patent. No evidence of definite prominent vessels within the cervical spinal canal to suggest a cervical vascular malformation.    IR Carotid Cerebral Angiogram 1/27/23   Preliminary interpretation: 6 vessel angiogram  showed no abnormalities to account for SAH. No aneurysm, AVM or AV fistula.  Please see Imaging report for full details.     CTH Without Contrast 1/27/23  Postsurgical changes status post intraventricular catheter placement with its tip between the frontal horns.  Stable moderate hydrocephalus.     CTA Head and Neck 1/27/23  -Diffuse subarachnoid hemorrhage with moderate hydrocephalus.  -No intracranial aneurysm or AVM is identified.  -Atherosclerotic disease but no advanced stenosis of the carotid or vertebral arteries in the neck with no major branch stenosis/occlusion at the [id number]     MRI Brain Ischemic Protocol 1/27/23  1. Acute intraventricular and subarachnoid hemorrhage, as described.  Findings compatible with early obstructive hydrocephalus and transependymal CSF egress.  2. No evidence of acute ischemia or recent infarction.  3. MRA motion compromised. Tiny approximately 1.5-2 mm laterally directed outpouching at the proximal right A2 QUIQUE could represent infundibulum/tortuous origin of a proximal QUIQUE branch, noting that a small aneurysm is difficult to exclude given motion.  Further characterization with CTA could be performed given extensive motion on the current study.     Cardiac Imaging   TTE 1/28/23   The left ventricle is normal in size with normal systolic function.  The estimated ejection fraction is 65-70%.  Normal left ventricular diastolic function.  Normal right ventricular size with normal right ventricular systolic function.  There is no significant tricuspid regurgitation and therefore the pulmonary artery systolic pressure cannot be reported.  Mechanically ventilated; cannot use inferior caval vein diameter to estimate central venous pressure

## 2023-02-03 NOTE — TRANSFER OF CARE
"Anesthesia Transfer of Care Note    Patient: Leida Hoyos    Procedure(s) Performed: * No procedures listed *    Patient location: ICU    Anesthesia Type: general    Transport from OR: Transported from OR on 6-10 L/min O2 by face mask with adequate spontaneous ventilation    Post pain: adequate analgesia    Post assessment: no apparent anesthetic complications    Post vital signs: stable    Level of consciousness: sedated    Nausea/Vomiting: no nausea/vomiting    Complications: none    Transfer of care protocol was followed      Last vitals:   Visit Vitals  /63 (BP Location: Left arm, Patient Position: Lying)   Pulse 94   Temp 36.8 °C (98.3 °F) (Oral)   Resp (!) 23   Ht 5' 2" (1.575 m)   Wt 55.5 kg (122 lb 5.7 oz)   SpO2 100%   Breastfeeding No   BMI 22.38 kg/m²     "

## 2023-02-03 NOTE — ASSESSMENT & PLAN NOTE
57yo female with PMH DM1, HTN, autoimmune hepatitis who presented to the ED with  for c/o AMS. Per  patient was c/o of headache last night and vomited. She went to sleep around 9pm then woke up this morning and he noticed that she was confused. She is a type 1 diabetic patient and blood glucose was 300.  states that last time she was having similar symptoms was a couple of years ago when she was in DKA. SBP 150s. On exam very drowsy with difficulty arousing patient, however intermittently follows commands with  no focal weakness. Patient determined to be a candidate for the wake up protocol. MRI IP showed  an acute intraventricular and subarachnoid hemorrhage with early obstructive hydrocephalus and transependymal CSF egress. Also with a noted 21.5-2 mm laterally directed outpouching at the proximal right A2 QUIQUE could represent infundibulum/tortuous origin of a proximal QUIQUE branch, noting that a small aneurysm is difficult to exclude given motion. Patient will be admitted to the Neuro ICU for closer monitoring with a neurosurgery consultation for further recommendations.    Repeat angio complete, negative. EVD remains in place. Will continue to follow.       Antithrombotics for secondary stroke prevention: Antiplatelets: None: Hemorrhage any type      Aggressive risk factor modification: HTN     Rehab efforts: Mechanical soft/thin liquids; IPR     Diagnostics ordered/pending: None     VTE prophylaxis: Heparin 5000 units SQ every 8 hours  Mechanical prophylaxis: Place SCDs    BP parameters: SAH: Unsecured aneurysm, SBP <140

## 2023-02-03 NOTE — ASSESSMENT & PLAN NOTE
53 yo F w/ PMH DM1, HTN presented to the ED with AMS after HA and vomiting the night before. Patient was non-verbal, B/l fixed pupils, and only intermittently following commands in B/L UE. GCS 8. MRI shows Acute IVH, SAH w/ obstructive hydrocephalus w/o signs of ischemia and R A2 QUIQUE aneurysm. VN consulted. Patient intubated in ED after concern for airway protection. NSGY placed EVD. IR consulted for possible angiogram. CTA shows diffuse subarachnoid hemorrhage with moderate hydrocephalus. No intracranial aneurysm or AVM is identified. NIHSS 9 on admission.     - IR angiogram on 1/27 shows no aneurysm, AVM, or AV fistula. Will need repeat angiogram on 2/3; will discuss with IR   - CTA shows diffuse subarachnoid hemorrhage with moderate hydrocephalus. No intracranial aneurysm or AVM is identified.  - Repeat DSA on PBD 7 without aneurysm, AVM or AV fistula.     - Continued monitoring in NCC  - Q1H neuro checks and vitals  - NSGY following; appreciate recs  - EVD clamped @20, removal per Neurosurgery   - Ancef for drain ppx, d/c after EVD removal   - Nimotop 60 q4h  - Daily TCDs  - Echo   - CBC, CMP, Mag, Phos now and then daily   - SBP goal <160  - Cardene gtt, wean as able   - PRN labetalol, hydralazine  - SQ heparin  - PT/OT/SLP

## 2023-02-03 NOTE — PROCEDURES
Radiology Post-Procedure Note     Pre Op Diagnosis: SAH     Post Op Diagnosis: same     Procedure: Cerebral angiogram     Procedure performed by: Ezio Puentes MD     Written Informed Consent Obtained: Yes     Specimen Removed: NO     Estimated Blood Loss: less than 50      Procedure report:      A 5F sheath was placed into the right femoral artery and a 5F Rex catheter was advanced into the aortic arch.  The bilateral CCAs, ECAs, ICAs and vertebral arteries were subselected and angiography of the brain was performed after injection into each of these vessels.     Preliminary interpretation: 6 vessel angiogram showed no abnormalities to account for SAH. No aneurysm, AVM or AV fistula.  Please see Imaging report for full details.     A right femoral artery angiogram was performed, the sheath removed and hemostasis achieved using vascade device.  No hematoma was present at the time of hemostasis.     The patient tolerated the procedure well.         Lauren Hurt MD     Neuro Endovascular Surgery Fellow   [facility]

## 2023-02-03 NOTE — PROGRESS NOTES
[facility]  Neurocritical Care  Progress Note    Admit Date: 1/27/2023  Service Date: 02/03/2023  Length of Stay: 7    Subjective:     Chief Complaint: SAH (subarachnoid hemorrhage)    History of Present Illness: Mrs. Gupta 59 yo F w/ PMH DM1 (dx 2013), HTN, autoimmune hepatitis presented to the ED for AMS this morning where she was unable to answer questions appropriately after a shower.  states patient had a HA and vomited the night before she went to sleep. LNK was 10 pm. Patient had a similar episode a few years ago, but was admitted for DKA.  states patient has worsened neurologically since they left their house. On exam, patient was non-verbal, fixed pupils, intermittently following commands in B/L UE, but unable in LE. GCS 8. MRI performed in ED showed acute intraventricular and subarachnoid hemorrhage with early obstructive hydrocephalus and transependymal CSF egress. Also with a noted 21.5-2 mm laterally directed outpouching at the proximal right A2 QUIQUE could represent infundibulum/tortuous origin of a proximal QUIQUE branch. NSGY consulted and placed EVD after patient was intubated after concern for airway protection. Started Keppra 1g BID. CTA ordered. IR consulted for possible angiogram. No leukocytosis. H/H stable. Cr 0.9. Glucose 284. NCC will admit patient for HLOC.          Hospital Course: 01/28/2023: IR performed angiogram shows no aneurysm, AVM or AV fistula. EVD open @20. Will wean propofol and transition to precedex. Started tube feeds and SQ heparin. Transitioned from insulin gtt to detemir.   01/29/2023 [facility] vent; off sedation; TCDs w/o vasospasm; will need repeat angio within a week; MRI brain completed no lesions found; TFS to goal; replete lytes; keep I/Os nearly matched w/ IVF boluses; consider yudy if sbp dips with analgesia and/or sedation;   01/30/2023 Extubated with parameters to NC. Some wheezing post extubation, PRN duonebs and racemic epi x1. IV Dex  4mg q6 hours x 1 day and close airway watch. Cardene to maintain SBP<140. EVD open at 20. Monitoring TCDs daily, no current evidence of vasospasm. Glucose elevated, SSI in place, may require Insulin gtt 2/2 steroids.   1/31/23: possible angio Friday 02/01/2023 No acute events overnight. EVD clamp trial per Neurosurgery, continue to monitor neurologic exam with CTH in AM. TCDs without evidence of vasospasm. Remains hyperglycemia, steroids discontinued, increasing long acting to 12u BID.  02/02/2023 No acute events overnight. CTH with EVD clamped stable, plan to continue clamp trial for one more day. DSA tomorrow on PBD7.   02/02/2023 More sedated on exam and difficult to arouse but will arouse to answer questions (full name, at [facility]) before drifting back to sleep. ICP stable 7-14. CTH stable.   02/03/2023 CTH stable overnight. Ambulating with PT/OT. Exam improved, A&Ox3. DSA without aneurysm, AVM or AV fistula. Elevated ICP during angiogram, EVD open at 20. Post procedure CTH pending.         Subjective:     Interval History:  As above.    Review of Systems   Constitutional:  Negative for fatigue and fever.   HENT:  Negative for trouble swallowing.    Eyes:  Negative for photophobia and visual disturbance.   Respiratory:  Negative for chest tightness and shortness of breath.    Cardiovascular:  Negative for chest pain and leg swelling.   Gastrointestinal:  Negative for nausea and vomiting.   Musculoskeletal:  Negative for neck stiffness.   Neurological:  Positive for speech difficulty and headaches. Negative for dizziness.   Psychiatric/Behavioral:  Positive for decreased concentration. Negative for agitation.      Objective:     Vitals:  Temp: 98.3 °F (36.8 °C)  Pulse: 92  Rhythm: normal sinus rhythm, sinus tachycardia  BP: 121/63  MAP (mmHg): 81  ICP Mean (mmHg): 0 mmHg  Resp: (!) 24  ETCO2 (mmHg): 301 mmHg  SpO2: 97 %    Temp  Min: 97.9 °F (36.6 °C)  Max: 100.7 °F (38.2 °C)  Pulse  Min: 69  Max: 114  BP  Min:  107/53  Max: 153/63  MAP (mmHg)  Min: 75  Max: 94  ICP Mean (mmHg)  Min: 0 mmHg  Max: 19 mmHg  Resp  Min: 19  Max: 30  ETCO2 (mmHg)  Min: 301 mmHg  Max: 301 mmHg  SpO2  Min: 95 %  Max: 100 %    02/02 0701 - 02/03 0700  In: 819.2 [P.O.:350; I.V.:323.2]  Out: 743 [Urine:650; Drains:93]   Unmeasured Output  Urine Occurrence: 1  Stool Occurrence: 2  Emesis Occurrence: 0  Pad Count: 2       Physical Exam  Vitals and nursing note reviewed.   HENT:      Head: Normocephalic.      Comments: EVD in place     Nose: Nose normal.      Mouth/Throat:      Mouth: Mucous membranes are moist.      Pharynx: Oropharynx is clear.   Cardiovascular:      Rate and Rhythm: Normal rate and regular rhythm.      Pulses: Normal pulses.      Heart sounds: Normal heart sounds.   Pulmonary:      Effort: Pulmonary effort is normal.      Breath sounds: Normal breath sounds.   Abdominal:      General: Bowel sounds are normal.      Palpations: Abdomen is soft.   Musculoskeletal:         General: Normal range of motion.   Skin:     General: Skin is warm and dry.      Capillary Refill: Capillary refill takes 2 to 3 seconds.   Neurological:      Mental Status: She is alert.      Comments:   GCS 15 with continued delayed response time   Waxing/waning fatigue  States full name, location and month  Follows commands in BUE/BLE  PERRL  BRAGG to command and spontaneously  Sensation grossly intact  Ambulating with assistance        Unable to test orientation, language, memory, judgment, insight, fund of knowledge, hearing, shoulder shrug, tongue protrusion, coordination, gait due to level of consciousness.    Medications:  ContinuousniCARdipine, Last Rate: Stopped (02/03/23 0003)  Scheduledalbuterol-ipratropium, 3 mL, Q6H WAKE  amantadine HCL, 100 mg, BID  ceFAZolin (ANCEF) IVPB, 2 g, Q8H  heparin (porcine), 5,000 Units, Q8H  insulin aspart U-100, 7 Units, TIDWM  insulin detemir U-100, 16 Units, BID  levetiracetam IV, 500 mg, Q12H  niMODipine, 60 mg,  Q4H  PRNacetaminophen, 650 mg, Q6H PRN  dextrose 10%, 12.5 g, PRN  glucagon (human recombinant), 1 mg, PRN  glucose, 16 g, PRN  glucose, 24 g, PRN  hydrALAZINE, 10 mg, Q6H PRN  insulin aspart U-100, 0-15 Units, QID (AC + HS) PRN  labetalol, 10 mg, Q4H PRN  magnesium oxide, 800 mg, PRN  magnesium oxide, 800 mg, PRN  ondansetron, 4 mg, Q6H PRN  potassium bicarbonate, 35 mEq, PRN  potassium bicarbonate, 50 mEq, PRN  potassium bicarbonate, 60 mEq, PRN  potassium, sodium phosphates, 2 packet, PRN  potassium, sodium phosphates, 2 packet, PRN  potassium, sodium phosphates, 2 packet, PRN  sodium chloride 0.9%, 10 mL, PRN  sodium chloride 0.9%, 10 mL, PRN  sodium chloride 0.9%, 10 mL, PRN    Today I personally reviewed pertinent medications, lines/drains/airways, imaging, cardiology results, laboratory results, microbiology results, notably:    Hyperglycemic, increasing long acting insulin and aspart   CTH stable    Diet  Diet NPO Except for: Sips with Medication, Medication  Diet NPO Except for: Sips with Medication, Medication    Assessment/Plan:     Neuro  * SAH (subarachnoid hemorrhage)  53 yo F w/ PMH DM1, HTN presented to the ED with AMS after HA and vomiting the night before. Patient was non-verbal, B/l fixed pupils, and only intermittently following commands in B/L UE. GCS 8. MRI shows Acute IVH, SAH w/ obstructive hydrocephalus w/o signs of ischemia and R A2 QUIQUE aneurysm. VN consulted. Patient intubated in ED after concern for airway protection. NSGY placed EVD. IR consulted for possible angiogram. CTA shows diffuse subarachnoid hemorrhage with moderate hydrocephalus. No intracranial aneurysm or AVM is identified. NIHSS 9 on admission.     - IR angiogram on 1/27 shows no aneurysm, AVM, or AV fistula. Will need repeat angiogram on 2/3; will discuss with IR   - CTA shows diffuse subarachnoid hemorrhage with moderate hydrocephalus. No intracranial aneurysm or AVM is identified.  - Repeat DSA on PBD 7 without aneurysm,  AVM or AV fistula.     - Continued monitoring in NCC  - Q1H neuro checks and vitals  - NSGY following; appreciate recs  - EVD clamped @20, removal per Neurosurgery   - Ancef for drain ppx, d/c after EVD removal   - Nimotop 60 q4h  - Daily TCDs  - Echo   - CBC, CMP, Mag, Phos now and then daily   - SBP goal <160  - Cardene gtt, wean as able   - PRN labetalol, hydralazine  - SQ heparin  - PT/OT/SLP    IVH (intraventricular hemorrhage)  See SAH  EVD care    Vasogenic cerebral edema  See SAH    Cardiac/Vascular  Hypertension  SBP 160s on arrival.   states patient does not take home Losartan regularly.     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 BG on arrival 284  Home regimen 13u Lantus; Lispro AC  A1C 6.5     -Transitioned from Insulin gtt to Detemir 14u BID --> increase to 16 BID  -Aspart 7 q4  -Hypoglycemic protocol in place  -Accuchecks q1h    GI  Autoimmune hepatitis  Hx of AIH. Previously on mercaptopurine. Not currently taking.   LFTs slightly elevated on arrival.    -Will continue to monitor  -Holding Tylenol and hepatotoxic agents  -CMP daily  -If LFTs worsen will obtain RUQ US.     Other  Debility  PT/OT    Endotracheally intubated-resolved as of 2/3/2023  Patient intubated in ED 2/2 concern for airway protection. GCS 7  Extubated 1/30 to NC  PRN Duonebs           The patient is being Prophylaxed for:  Venous Thromboembolism with: Chemical  Stress Ulcer with: Not Applicable   Ventilator Pneumonia with: not applicable    Activity Orders          Progressive Mobility Protocol (mobilize patient to their highest level of functioning at least twice daily) starting at 02/03 2000    Diet NPO Except for: Sips with Medication, Medication: NPO starting at 02/03 0001    Progressive Mobility Protocol (mobilize patient to their highest level of functioning at least twice daily)  starting at 01/28 0800    Elevate HOB Collagen closure or PERCLOSE plug/device - Elevate HOB 30 degrees with limb immobilized for 2 hours after procedure. starting at 01/27 2018    Turn patient starting at 01/27 1400    Elevate HOB starting at 01/27 1221        Full Code    Ronak Pavon MD  Neurocritical Care  [facility]

## 2023-02-03 NOTE — PROGRESS NOTES
[facility]  Vascular Neurology  [facility]  Progress Note    Assessment/Plan:     * SAH (subarachnoid hemorrhage)  59yo female with PMH DM1, HTN, autoimmune hepatitis who presented to the ED with  for c/o AMS. Per  patient was c/o of headache last night and vomited. She went to sleep around 9pm then woke up this morning and he noticed that she was confused. She is a type 1 diabetic patient and blood glucose was 300.  states that last time she was having similar symptoms was a couple of years ago when she was in DKA. SBP 150s. On exam very drowsy with difficulty arousing patient, however intermittently follows commands with  no focal weakness. Patient determined to be a candidate for the wake up protocol. MRI IP showed  an acute intraventricular and subarachnoid hemorrhage with early obstructive hydrocephalus and transependymal CSF egress. Also with a noted 21.5-2 mm laterally directed outpouching at the proximal right A2 QUIQUE could represent infundibulum/tortuous origin of a proximal QUIQUE branch, noting that a small aneurysm is difficult to exclude given motion. Patient will be admitted to the Neuro ICU for closer monitoring with a neurosurgery consultation for further recommendations.    Repeat angio complete, negative. EVD remains in place. Will continue to follow.       Antithrombotics for secondary stroke prevention: Antiplatelets: None: Hemorrhage any type      Aggressive risk factor modification: HTN     Rehab efforts: Mechanical soft/thin liquids; IPR     Diagnostics ordered/pending: None     VTE prophylaxis: Heparin 5000 units SQ every 8 hours  Mechanical prophylaxis: Place SCDs    BP parameters: SAH: Unsecured aneurysm, SBP <140        IVH (intraventricular hemorrhage)  See ICH       Vasogenic cerebral edema  Area of cerebral edema identified when reviewing brain imaging. We will continue to monitor with q4h neuro checks while on floor or more  frequently while in neuro critical care, as any change in the patients clinical exam may signify expansion of the insult and/or the area of the edema. Such changes may require acute interventions to prevent loss of function and/or death.    Remains stable. Will continue to monitor. Repeat CTH PRN for acute neuro exam changes         Hypertension  Previously on cardene gtt but has since been d/c'd   SBP < 140     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), managed as type 1  BG Goal 140-180 while inpatient         01/28/2023 Patient remains in NCC, intubated, now off of sedation, EVD in place @20. NIHSS 25, exam limited by intubation and drowsiness. Cerebral angiogram completed yesterday without evidence of abnormalities to account for the SAH; no aneurysm, no AVM, no fistula. Patient will likely need repeat angio in 7 days. MRI W/WO pending, scheduled for this afternoon.   1/29-02/03/2023 Patient very drowsy following angiogram. Negative DSA today. EVD remains in place. Therapy recs for IPR and mechanical soft diet with thin liquids.       STROKE DOCUMENTATION   Acute Stroke Times   Last Known Normal Date: 01/26/23  Last Known Normal Time: 2100  Unknown Symptom Onset Date: Unknown Date  Unknown Symptom Onset Time: Unknown Time  Stroke Team Called Date: 01/27/23  Stroke Team Called Time: 0942  Stroke Team Arrival Date: 01/27/23  Stroke Team Arrival Time: 0947  MRI Acute Stroke Protocol Interpretation Time: 1030    NIH Scale:  1a. Level of Consciousness: 2-->Not alert, requires repeated stimulation to attend, or is obtunded and requires strong or painful stimulation to make movements (not stereotyped)  1b. LOC Questions: 2-->Answers neither question correctly  1c. LOC Commands: 0-->Performs both tasks correctly  2. Best Gaze: 0-->Normal  3. Visual: 0-->No visual loss  4. Facial Palsy: 0-->Normal symmetrical movements  5a. Motor Arm, Left: 1-->Drift, limb holds 90 (or 45) degrees, but drifts down before full 10  seconds, does not hit bed or other support  5b. Motor Arm, Right: 1-->Drift, limb holds 90 (or 45) degrees, but drifts down before full 10 secs, does not hit bed or other support  6a. Motor Leg, Left: 1-->Drift, leg falls by the end of the 5-sec period but does not hit bed  6b. Motor Leg, Right: 1-->Drift, leg falls by the end of the 5-sec period but does not hit bed  7. Limb Ataxia: 0-->Absent  8. Sensory: 0-->Normal, no sensory loss  9. Best Language: 0-->No aphasia, normal  10. Dysarthria: 0-->Normal  11. Extinction and Inattention (formerly Neglect): 0-->No abnormality  Total (NIH Stroke Scale): 8       Modified Eduardo Score: 0  Lexus Coma Scale:    ABCD2 Score:    [id number] Score:   HAS -BLED Score:   ICH Score:   Hunt & Guzman Classification:Grade III     Hemorrhagic change of an Ischemic Stroke: Does this patient have an ischemic stroke with hemorrhagic changes? No     Neurologic Chief Complaint: HA    Subjective:     Interval History: Patient is seen for follow-up neurological assessment and treatment recommendations:     Patient very drowsy following angiogram. Negative DSA today. EVD remains in place. Therapy recs for IPR and mechanical soft diet with thin liquids.     HPI, Past Medical, Family, and Social History remains the same as documented in the initial encounter.     Review of Systems   Unable to perform ROS: Other (Severe drowsiness)   Constitutional:  Positive for fatigue.   HENT:  Negative for trouble swallowing.    Gastrointestinal:  Negative for vomiting.   Neurological:  Negative for weakness.   Scheduled Meds:   albuterol-ipratropium  3 mL Nebulization Q6H WAKE    amantadine HCL  100 mg Oral BID    ceFAZolin (ANCEF) IVPB  2 g Intravenous Q8H    heparin (porcine)  5,000 Units Subcutaneous Q8H    insulin aspart U-100  7 Units Subcutaneous TIDWM    insulin detemir U-100  16 Units Subcutaneous BID    levetiracetam IV  500 mg Intravenous Q12H    niMODipine  60 mg Per OG tube Q4H      Continuous Infusions:   niCARdipine Stopped (02/03/23 0003)     PRN Meds:acetaminophen, dextrose 10%, glucagon (human recombinant), glucose, glucose, hydrALAZINE, insulin aspart U-100, labetalol, magnesium oxide, magnesium oxide, ondansetron, potassium bicarbonate, potassium bicarbonate, potassium bicarbonate, potassium, sodium phosphates, potassium, sodium phosphates, potassium, sodium phosphates, sodium chloride 0.9%, sodium chloride 0.9%, sodium chloride 0.9%    Objective:     Vital Signs (Most Recent):  Temp: 98.3 °F (36.8 °C) (02/03/23 0701)  Pulse: 86 (02/03/23 1501)  Resp: (!) 22 (02/03/23 1501)  BP: 134/62 (02/03/23 1501)  SpO2: 99 % (02/03/23 1501)  BP Location: Left arm    Vital Signs Range (Last 24H):  Temp:  [97.9 °F (36.6 °C)-100.4 °F (38 °C)]   Pulse:  []   Resp:  [19-30]   BP: (107-149)/(52-80)   SpO2:  [95 %-100 %]   BP Location: Left arm    Physical Exam  Vitals and nursing note reviewed.   Constitutional:       Comments: Drowsy    HENT:      Head:      Comments: EVD in place      Nose: No rhinorrhea.   Eyes:      General: No scleral icterus.  Cardiovascular:      Rate and Rhythm: Normal rate.   Pulmonary:      Effort: Pulmonary effort is normal. No respiratory distress.   Musculoskeletal:         General: No tenderness.   Skin:     General: Skin is warm and dry.   Neurological:      Sensory: No sensory deficit.      Motor: No weakness.      Comments: Moves all extremities against gravity   Intact to light touch   Follows commands   Limited 2/2 sedation today for procedure        Neurological Exam:   LOC: drowsy  Attention Span: poor   Language: No aphasia  Articulation: No dysarthria  Orientation: Person, Place, Time   EOM (CN III, IV, VI): Full/intact  Facial Movement (CN VII): Symmetric facial expression    Motor: Moves all extremities spontaneously/antigravity   Sensation: Intact to light touch     Laboratory:  CMP:   Recent Labs   Lab 02/03/23  0116   CALCIUM 9.3   ALBUMIN 3.0*    PROT 7.0   *   K 3.5   CO2 22*   *   BUN 13   CREATININE 0.9   ALKPHOS 169*   ALT 30   AST 30   BILITOT 0.4     BMP:   Recent Labs   Lab 02/03/23  0116   *   K 3.5   *   CO2 22*   BUN 13   CREATININE 0.9   CALCIUM 9.3     CBC:   Recent Labs   Lab 02/03/23  0116   WBC 12.67   RBC 3.66*   HGB 11.2*   HCT 35.5*      MCV 97   MCH 30.6   MCHC 31.5*     Lipid Panel: No results for input(s): CHOL, LDLCALC, HDL, TRIG in the last 168 hours.  Coagulation: No results for input(s): PT, INR, APTT in the last 168 hours.  Platelet Aggregation Study: No results for input(s): PLTAGG, PLTAGINTERP, PLTAGREGLACO, ADPPLTAGGREG in the last 168 hours.  Hgb A1C: No results for input(s): HGBA1C in the last 168 hours.  TSH: No results for input(s): TSH in the last 168 hours.    Diagnostic Results     Brain/Vessel Imaging   CTH 2/3/23  There appears to be mild increased intracranial hemorrhage however this is thought more likely artifactual due to the presence of vascular contrast from recent arteriogram as discussed above.    IR Angiogram Carotid Cerebral BL 2/3/23  1. Angiogram negative for any vascular etiologies/malformations accounting for the SAH/IVH.  Negative for any vascular malformations - AVMs/AVF/aneurysms.  Negative for any significant vasospasm.    CTH 2/3/23   1. Stable position of the right ventriculostomy catheter and stable prominence of the ventricles.  2. Stable subarachnoid hemorrhage and intraventricular hemorrhage.  3. Stable trace subdural hemorrhage.  4. No new areas of hemorrhage.    CTH 2/2/23  Stable subarachnoid and intraventricular hemorrhage. Ventriculostomy catheter in place with stable mild prominence of ventricles. Stable trace subdural hemorrhage. No new hemorrhage or major vascular distribution infarct    CTH 2/2/23   Stable position of right frontal ventriculostomy catheter with decreased size of the ventricles compared to prior exam compatible with improving hydrocephalus.  Decreased volume of multi compartment hemorrhage as described above. No evidence for significant new hemorrhage or evidence for acute major vascular distribution infarct.    MRI Brain W WO Contrast 1/28/23   Brain: Slight improvement in mild to moderate hydrocephalus. Diffuse subarachnoid and intraventricular hemorrhage again noted. No acute infarct. No enhancing intracranial lesion or source of intracranial hemorrhage identified.     MRA: No aneurysm or AVM identified.  No evidence of vasospasm.  No abnormal vessel wall enhancement.    MRA Neck/Brain WO Contrast 1/28/23   There is no significant stenosis at the carotid bifurcations by NASCET criteria.  The vertebral arteries are patent. No evidence of definite prominent vessels within the cervical spinal canal to suggest a cervical vascular malformation.    IR Carotid Cerebral Angiogram 1/27/23   Preliminary interpretation: 6 vessel angiogram showed no abnormalities to account for SAH. No aneurysm, AVM or AV fistula.  Please see Imaging report for full details.     CTH Without Contrast 1/27/23  Postsurgical changes status post intraventricular catheter placement with its tip between the frontal horns.  Stable moderate hydrocephalus.     CTA Head and Neck 1/27/23  -Diffuse subarachnoid hemorrhage with moderate hydrocephalus.  -No intracranial aneurysm or AVM is identified.  -Atherosclerotic disease but no advanced stenosis of the carotid or vertebral arteries in the neck with no major branch stenosis/occlusion at the [id number]     MRI Brain Ischemic Protocol 1/27/23  1. Acute intraventricular and subarachnoid hemorrhage, as described.  Findings compatible with early obstructive hydrocephalus and transependymal CSF egress.  2. No evidence of acute ischemia or recent infarction.  3. MRA motion compromised. Tiny approximately 1.5-2 mm laterally directed outpouching at the proximal right A2 QUIQUE could represent infundibulum/tortuous origin of a proximal QUIQUE  branch, noting that a small aneurysm is difficult to exclude given motion.  Further characterization with CTA could be performed given extensive motion on the current study.     Cardiac Imaging   TTE 1/28/23    The left ventricle is normal in size with normal systolic function.   The estimated ejection fraction is 65-70%.   Normal left ventricular diastolic function.   Normal right ventricular size with normal right ventricular systolic function.   There is no significant tricuspid regurgitation and therefore the pulmonary artery systolic pressure cannot be reported.   Mechanically ventilated; cannot use inferior caval vein diameter to estimate central venous pressure      Ever Gaviria PA-C  [facility]  Department of Vascular Neurology   [facility]

## 2023-02-03 NOTE — SUBJECTIVE & OBJECTIVE
Subjective:     Interval History:  As above.    Review of Systems   Constitutional:  Negative for fatigue and fever.   HENT:  Negative for trouble swallowing.    Eyes:  Negative for photophobia and visual disturbance.   Respiratory:  Negative for chest tightness and shortness of breath.    Cardiovascular:  Negative for chest pain and leg swelling.   Gastrointestinal:  Negative for nausea and vomiting.   Musculoskeletal:  Negative for neck stiffness.   Neurological:  Positive for speech difficulty and headaches. Negative for dizziness.   Psychiatric/Behavioral:  Positive for decreased concentration. Negative for agitation.      Objective:     Vitals:  Temp: 98.3 °F (36.8 °C)  Pulse: 92  Rhythm: normal sinus rhythm, sinus tachycardia  BP: 121/63  MAP (mmHg): 81  ICP Mean (mmHg): 0 mmHg  Resp: (!) 24  ETCO2 (mmHg): 301 mmHg  SpO2: 97 %    Temp  Min: 97.9 °F (36.6 °C)  Max: 100.7 °F (38.2 °C)  Pulse  Min: 69  Max: 114  BP  Min: 107/53  Max: 153/63  MAP (mmHg)  Min: 75  Max: 94  ICP Mean (mmHg)  Min: 0 mmHg  Max: 19 mmHg  Resp  Min: 19  Max: 30  ETCO2 (mmHg)  Min: 301 mmHg  Max: 301 mmHg  SpO2  Min: 95 %  Max: 100 %    02/02 0701 - 02/03 0700  In: 819.2 [P.O.:350; I.V.:323.2]  Out: 743 [Urine:650; Drains:93]   Unmeasured Output  Urine Occurrence: 1  Stool Occurrence: 2  Emesis Occurrence: 0  Pad Count: 2       Physical Exam  Vitals and nursing note reviewed.   HENT:      Head: Normocephalic.      Comments: EVD in place     Nose: Nose normal.      Mouth/Throat:      Mouth: Mucous membranes are moist.      Pharynx: Oropharynx is clear.   Cardiovascular:      Rate and Rhythm: Normal rate and regular rhythm.      Pulses: Normal pulses.      Heart sounds: Normal heart sounds.   Pulmonary:      Effort: Pulmonary effort is normal.      Breath sounds: Normal breath sounds.   Abdominal:      General: Bowel sounds are normal.      Palpations: Abdomen is soft.   Musculoskeletal:         General: Normal range of motion.   Skin:      General: Skin is warm and dry.      Capillary Refill: Capillary refill takes 2 to 3 seconds.   Neurological:      Mental Status: She is alert.      Comments:   GCS 15 with continued delayed response time   Waxing/waning fatigue  States full name, location and month  Follows commands in BUE/BLE  PERRL  BRAGG to command and spontaneously  Sensation grossly intact  Ambulating with assistance        Unable to test orientation, language, memory, judgment, insight, fund of knowledge, hearing, shoulder shrug, tongue protrusion, coordination, gait due to level of consciousness.    Medications:  ContinuousniCARdipine, Last Rate: Stopped (02/03/23 0003)  Scheduledalbuterol-ipratropium, 3 mL, Q6H WAKE  amantadine HCL, 100 mg, BID  ceFAZolin (ANCEF) IVPB, 2 g, Q8H  heparin (porcine), 5,000 Units, Q8H  insulin aspart U-100, 7 Units, TIDWM  insulin detemir U-100, 16 Units, BID  levetiracetam IV, 500 mg, Q12H  niMODipine, 60 mg, Q4H  PRNacetaminophen, 650 mg, Q6H PRN  dextrose 10%, 12.5 g, PRN  glucagon (human recombinant), 1 mg, PRN  glucose, 16 g, PRN  glucose, 24 g, PRN  hydrALAZINE, 10 mg, Q6H PRN  insulin aspart U-100, 0-15 Units, QID (AC + HS) PRN  labetalol, 10 mg, Q4H PRN  magnesium oxide, 800 mg, PRN  magnesium oxide, 800 mg, PRN  ondansetron, 4 mg, Q6H PRN  potassium bicarbonate, 35 mEq, PRN  potassium bicarbonate, 50 mEq, PRN  potassium bicarbonate, 60 mEq, PRN  potassium, sodium phosphates, 2 packet, PRN  potassium, sodium phosphates, 2 packet, PRN  potassium, sodium phosphates, 2 packet, PRN  sodium chloride 0.9%, 10 mL, PRN  sodium chloride 0.9%, 10 mL, PRN  sodium chloride 0.9%, 10 mL, PRN    Today I personally reviewed pertinent medications, lines/drains/airways, imaging, cardiology results, laboratory results, microbiology results, notably:    Hyperglycemic, increasing long acting insulin and aspart   CTH stable    Diet  Diet NPO Except for: Sips with Medication, Medication  Diet NPO Except for: Sips with  Medication, Medication

## 2023-02-03 NOTE — ANESTHESIA PREPROCEDURE EVALUATION
"                                                                                                             02/03/2023  Leida Hoyos is a 52 y.o., female.      Scheduled providers: Haydee Xavier CRNA; Dipesh Lucas Jr., MD   Procedure: IR ANGIOGRAM CAROTID INTERNAL INC ARCH AND CEREBRAL BILAT     Pre-operative evaluation for * No procedures listed *    @[id number]@@    Encounter Diagnoses   Name Primary?    Stroke     Altered mental status     Subarachnoid hemorrhage     Altered mental status, unspecified        Review of patient's allergies indicates:  No Known Allergies    Medications Prior to Admission   Medication Sig Dispense Refill Last Dose    aspirin (ECOTRIN) 81 MG EC tablet Take 81 mg by mouth once daily.       blood sugar diagnostic Strp For one touch verio IQ meter 100 each 3     blood-glucose meter (ONETOUCH VERIO IQ METER MISC) OneTouch Verio IQ Meter       blood-glucose sensor (DEXCOM G6 SENSOR) Melissa Monitor blood sugars daily 9 each 3     blood-glucose transmitter (DEXCOM G6 TRANSMITTER) Melissa USE AS DIRECTED for daily use TO TEST BLOOD GLUCOSE. 1 each 4     cholecalciferol, vitamin D3, (VITAMIN D3) 1,000 unit capsule Take 1 capsule (1,000 Units total) by mouth once daily.  0     insulin (LANTUS SOLOSTAR U-100 INSULIN) glargine 100 units/mL (3mL) SubQ pen INJECT 13 UNITS UNDER THE SKIN EVERY EVENING 45 mL 3     insulin lispro (HUMALOG KWIKPEN INSULIN) 100 unit/mL pen Takes 5 units/7 units and 9 units before meals, plus correction for a TDD 25 units 45 mL 3     losartan (COZAAR) 25 MG tablet Take 1 tablet (25 mg total) by mouth once daily. 90 tablet 3     multivitamin (THERAGRAN) per tablet Take 1 tablet by mouth once daily.       ONETOUCH DELICA PLUS LANCET 33 gauge Misc USE AS DIRECTED TO TEST BLOOD GLUCOSE. 300 each 8     pen needle, diabetic (BD ULTRA-FINE NICK PEN NEEDLE) 32 gauge x 5/32" Ndle USE FOUR TIMES A DAY FOR INSULIN INJECTIONS 360 each 4     pravastatin " "(PRAVACHOL) 10 MG tablet Take 1 tablet (10 mg total) by mouth once daily. 90 tablet 3          niCARdipine Stopped (02/03/23 0003)       No current facility-administered medications on file prior to encounter.     Current Outpatient Medications on File Prior to Encounter   Medication Sig Dispense Refill    aspirin (ECOTRIN) 81 MG EC tablet Take 81 mg by mouth once daily.      blood sugar diagnostic Strp For one touch verio IQ meter 100 each 3    blood-glucose meter (ONETOUCH VERIO IQ METER MISC) OneTouch Verio IQ Meter      blood-glucose sensor (DEXCOM G6 SENSOR) Melissa Monitor blood sugars daily 9 each 3    blood-glucose transmitter (DEXCOM G6 TRANSMITTER) Melissa USE AS DIRECTED for daily use TO TEST BLOOD GLUCOSE. 1 each 4    cholecalciferol, vitamin D3, (VITAMIN D3) 1,000 unit capsule Take 1 capsule (1,000 Units total) by mouth once daily.  0    insulin (LANTUS SOLOSTAR U-100 INSULIN) glargine 100 units/mL (3mL) SubQ pen INJECT 13 UNITS UNDER THE SKIN EVERY EVENING 45 mL 3    insulin lispro (HUMALOG KWIKPEN INSULIN) 100 unit/mL pen Takes 5 units/7 units and 9 units before meals, plus correction for a TDD 25 units 45 mL 3    losartan (COZAAR) 25 MG tablet Take 1 tablet (25 mg total) by mouth once daily. 90 tablet 3    multivitamin (THERAGRAN) per tablet Take 1 tablet by mouth once daily.      ONETOUCH DELICA PLUS LANCET 33 gauge Misc USE AS DIRECTED TO TEST BLOOD GLUCOSE. 300 each 8    pen needle, diabetic (BD ULTRA-FINE NICK PEN NEEDLE) 32 gauge x 5/32" Ndle USE FOUR TIMES A DAY FOR INSULIN INJECTIONS 360 each 4    pravastatin (PRAVACHOL) 10 MG tablet Take 1 tablet (10 mg total) by mouth once daily. 90 tablet 3       Past Medical History:  No date: Autoimmune hepatitis      Comment:  managed by Dr. Russell on mercaptopurine  No date: Diabetes mellitus type II  No date: Hypertension    Past Surgical History:   Procedure Laterality Date    CEREBRAL ANGIOGRAM N/A 1/27/2023    Procedure: " ANGIOGRAM-CEREBRAL;  Surgeon: Yue Surgeon;  Location: [facility];  Service: Anesthesiology;  Laterality: N/A;     SECTION, CLASSIC      COLONOSCOPY N/A 2019    Procedure: COLONOSCOPY;  Surgeon: Dipesh Victoria MD;  Location: [facility] (4TH FLR);  Service: Endoscopy;  Laterality: N/A;    FRACTURE SURGERY Left 2013    leg     MOUTH SURGERY         Social History     Tobacco Use   Smoking Status Never   Smokeless Tobacco Never       Social History     Substance and Sexual Activity   Alcohol Use Yes    Alcohol/week: 2.0 standard drinks    Types: 2 Glasses of wine per week    Comment: social       Physical Activity: Sufficiently Active    Days of Exercise per Week: 4 days    Minutes of Exercise per Session: 60 min         Recent Labs     23  011   HCT 35.5*     Recent Labs     23  0116        Recent Labs     23  0116   K 3.5     Recent Labs     23  0116   CREATININE 0.9     Recent Labs     23  011   *     No results for input(s): PT in the last 72 hours.                      Pre-op Assessment          Review of Systems  Anesthesia Hx:  No problems with previous Anesthesia    Hematology/Oncology:  Hematology Normal   Oncology Normal     Cardiovascular:   Hypertension, well controlled Denies MI.    Denies Angina.    Pulmonary:  Pulmonary Normal  Denies COPD.  Denies Asthma.  Denies Shortness of breath.    Renal/:   Denies Chronic Renal Disease.     Hepatic/GI:   Denies Liver Disease.    Neurological:   Denies TIA. Denies CVA. Denies Seizures. SAH early Friday morning   Endocrine:   Diabetes, type 1           Anesthesia Plan  Type of Anesthesia, risks & benefits discussed:    Anesthesia Type: Gen ETT  Intra-op Monitoring Plan: Standard ASA Monitors  Post Op Pain Control Plan: multimodal analgesia and IV/PO Opioids PRN  Induction:  IV  Informed Consent: Informed consent signed with the Patient and all parties understand the risks and agree with  anesthesia plan.  All questions answered.   ASA Score: 3  Day of Surgery Review of History & Physical: H&P Update referred to the surgeon/provider.    Ready For Surgery From Anesthesia Perspective.     .    Placement Date: 01/27/23; Placement Time: 1314; Method of Intubation: Glidescope; Inserted by: MD; Staff/Resident Names: Dr. Barton; Airway Device Size: 7.0;  Depth of Insertion: 23; Securment: Teeth; Removal Date: 01/30/23;  Removal Time: 1000; Removal Indication & Assessment: No Longer Indicated      1/28/2023  Summary     The left ventricle is normal in size with normal systolic function.   The estimated ejection fraction is 65-70%.   Normal left ventricular diastolic function.   Normal right ventricular size with normal right ventricular systolic function.   There is no significant tricuspid regurgitation and therefore the pulmonary artery systolic pressure cannot be reported.   Mechanically ventilated; cannot use inferior caval vein diameter to estimate central venous pressure.

## 2023-02-04 LAB
ALBUMIN SERPL BCP-MCNC: 2.9 G/DL (ref 3.5–5.2)
ALP SERPL-CCNC: 177 U/L (ref 55–135)
ALT SERPL W/O P-5'-P-CCNC: 23 U/L (ref 10–44)
ANION GAP SERPL CALC-SCNC: 10 MMOL/L (ref 8–16)
AST SERPL-CCNC: 23 U/L (ref 10–40)
BACTERIA CSF CULT: NO GROWTH
BASOPHILS # BLD AUTO: 0.05 K/UL (ref 0–0.2)
BASOPHILS NFR BLD: 0.4 % (ref 0–1.9)
BILIRUB SERPL-MCNC: 0.3 MG/DL (ref 0.1–1)
BUN SERPL-MCNC: 14 MG/DL (ref 6–20)
CALCIUM SERPL-MCNC: 9.7 MG/DL (ref 8.7–10.5)
CHLORIDE SERPL-SCNC: 113 MMOL/L (ref 95–110)
CO2 SERPL-SCNC: 24 MMOL/L (ref 23–29)
CREAT SERPL-MCNC: 0.7 MG/DL (ref 0.5–1.4)
DIFFERENTIAL METHOD: ABNORMAL
EOSINOPHIL # BLD AUTO: 0.1 K/UL (ref 0–0.5)
EOSINOPHIL NFR BLD: 1.2 % (ref 0–8)
ERYTHROCYTE [DISTWIDTH] IN BLOOD BY AUTOMATED COUNT: 12.4 % (ref 11.5–14.5)
EST. GFR  (NO RACE VARIABLE): >60 ML/MIN/1.73 M^2
GLUCOSE SERPL-MCNC: 199 MG/DL (ref 70–110)
GRAM STN SPEC: NORMAL
GRAM STN SPEC: NORMAL
HCT VFR BLD AUTO: 36.1 % (ref 37–48.5)
HGB BLD-MCNC: 11.4 G/DL (ref 12–16)
IMM GRANULOCYTES # BLD AUTO: 0.06 K/UL (ref 0–0.04)
IMM GRANULOCYTES NFR BLD AUTO: 0.5 % (ref 0–0.5)
LYMPHOCYTES # BLD AUTO: 2.2 K/UL (ref 1–4.8)
LYMPHOCYTES NFR BLD: 18.2 % (ref 18–48)
MAGNESIUM SERPL-MCNC: 2.1 MG/DL (ref 1.6–2.6)
MCH RBC QN AUTO: 30.7 PG (ref 27–31)
MCHC RBC AUTO-ENTMCNC: 31.6 G/DL (ref 32–36)
MCV RBC AUTO: 97 FL (ref 82–98)
MONOCYTES # BLD AUTO: 1 K/UL (ref 0.3–1)
MONOCYTES NFR BLD: 8.4 % (ref 4–15)
NEUTROPHILS # BLD AUTO: 8.5 K/UL (ref 1.8–7.7)
NEUTROPHILS NFR BLD: 71.3 % (ref 38–73)
NRBC BLD-RTO: 0 /100 WBC
PHOSPHATE SERPL-MCNC: 2.8 MG/DL (ref 2.7–4.5)
PLATELET # BLD AUTO: 238 K/UL (ref 150–450)
PMV BLD AUTO: 9.2 FL (ref 9.2–12.9)
POCT GLUCOSE: 117 MG/DL (ref 70–110)
POCT GLUCOSE: 154 MG/DL (ref 70–110)
POCT GLUCOSE: 176 MG/DL (ref 70–110)
POCT GLUCOSE: 193 MG/DL (ref 70–110)
POCT GLUCOSE: 351 MG/DL (ref 70–110)
POTASSIUM SERPL-SCNC: 3.5 MMOL/L (ref 3.5–5.1)
PROT SERPL-MCNC: 6.8 G/DL (ref 6–8.4)
RBC # BLD AUTO: 3.71 M/UL (ref 4–5.4)
SODIUM SERPL-SCNC: 147 MMOL/L (ref 136–145)
WBC # BLD AUTO: 11.84 K/UL (ref 3.9–12.7)

## 2023-02-04 PROCEDURE — 63600175 PHARM REV CODE 636 W HCPCS

## 2023-02-04 PROCEDURE — 25000003 PHARM REV CODE 250: Performed by: PSYCHIATRY & NEUROLOGY

## 2023-02-04 PROCEDURE — 25000003 PHARM REV CODE 250: Performed by: STUDENT IN AN ORGANIZED HEALTH CARE EDUCATION/TRAINING PROGRAM

## 2023-02-04 PROCEDURE — 83735 ASSAY OF MAGNESIUM: CPT

## 2023-02-04 PROCEDURE — 85025 COMPLETE CBC W/AUTO DIFF WBC: CPT

## 2023-02-04 PROCEDURE — 63600175 PHARM REV CODE 636 W HCPCS: Performed by: STUDENT IN AN ORGANIZED HEALTH CARE EDUCATION/TRAINING PROGRAM

## 2023-02-04 PROCEDURE — 80053 COMPREHEN METABOLIC PANEL: CPT

## 2023-02-04 PROCEDURE — 99233 SBSQ HOSP IP/OBS HIGH 50: CPT | Mod: ,,, | Performed by: PSYCHIATRY & NEUROLOGY

## 2023-02-04 PROCEDURE — 25000242 PHARM REV CODE 250 ALT 637 W/ HCPCS: Performed by: STUDENT IN AN ORGANIZED HEALTH CARE EDUCATION/TRAINING PROGRAM

## 2023-02-04 PROCEDURE — 99900026 HC AIRWAY MAINTENANCE (STAT)

## 2023-02-04 PROCEDURE — 84100 ASSAY OF PHOSPHORUS: CPT

## 2023-02-04 PROCEDURE — 94761 N-INVAS EAR/PLS OXIMETRY MLT: CPT

## 2023-02-04 PROCEDURE — 99233 PR SUBSEQUENT HOSPITAL CARE,LEVL III: ICD-10-PCS | Mod: ,,, | Performed by: NEUROLOGICAL SURGERY

## 2023-02-04 PROCEDURE — 94640 AIRWAY INHALATION TREATMENT: CPT

## 2023-02-04 PROCEDURE — 25000003 PHARM REV CODE 250

## 2023-02-04 PROCEDURE — 20000000 HC ICU ROOM

## 2023-02-04 PROCEDURE — 99233 PR SUBSEQUENT HOSPITAL CARE,LEVL III: ICD-10-PCS | Mod: ,,, | Performed by: PSYCHIATRY & NEUROLOGY

## 2023-02-04 PROCEDURE — 99900035 HC TECH TIME PER 15 MIN (STAT)

## 2023-02-04 PROCEDURE — 99233 SBSQ HOSP IP/OBS HIGH 50: CPT | Mod: ,,, | Performed by: NEUROLOGICAL SURGERY

## 2023-02-04 RX ORDER — INSULIN ASPART 100 [IU]/ML
9 INJECTION, SOLUTION INTRAVENOUS; SUBCUTANEOUS
Status: DISCONTINUED | OUTPATIENT
Start: 2023-02-04 | End: 2023-02-04

## 2023-02-04 RX ORDER — TALC
6 POWDER (GRAM) TOPICAL NIGHTLY PRN
Status: DISCONTINUED | OUTPATIENT
Start: 2023-02-04 | End: 2023-02-15 | Stop reason: HOSPADM

## 2023-02-04 RX ORDER — INSULIN ASPART 100 [IU]/ML
7 INJECTION, SOLUTION INTRAVENOUS; SUBCUTANEOUS
Status: DISCONTINUED | OUTPATIENT
Start: 2023-02-04 | End: 2023-02-04

## 2023-02-04 RX ORDER — LABETALOL HCL 20 MG/4 ML
10 SYRINGE (ML) INTRAVENOUS EVERY 4 HOURS PRN
Status: DISCONTINUED | OUTPATIENT
Start: 2023-02-04 | End: 2023-02-15 | Stop reason: HOSPADM

## 2023-02-04 RX ORDER — DIPHENHYDRAMINE HCL 25 MG
50 CAPSULE ORAL ONCE
Status: COMPLETED | OUTPATIENT
Start: 2023-02-04 | End: 2023-02-04

## 2023-02-04 RX ORDER — INSULIN ASPART 100 [IU]/ML
5 INJECTION, SOLUTION INTRAVENOUS; SUBCUTANEOUS
Status: DISCONTINUED | OUTPATIENT
Start: 2023-02-04 | End: 2023-02-06

## 2023-02-04 RX ORDER — HYDRALAZINE HYDROCHLORIDE 20 MG/ML
10 INJECTION INTRAMUSCULAR; INTRAVENOUS EVERY 6 HOURS PRN
Status: DISCONTINUED | OUTPATIENT
Start: 2023-02-04 | End: 2023-02-15 | Stop reason: HOSPADM

## 2023-02-04 RX ADMIN — INSULIN DETEMIR 16 UNITS: 100 INJECTION, SOLUTION SUBCUTANEOUS at 09:02

## 2023-02-04 RX ADMIN — CEFAZOLIN 2 G: 2 INJECTION, POWDER, FOR SOLUTION INTRAMUSCULAR; INTRAVENOUS at 04:02

## 2023-02-04 RX ADMIN — Medication 6 MG: at 10:02

## 2023-02-04 RX ADMIN — AMANTADINE HYDROCHLORIDE 100 MG: 50 SOLUTION ORAL at 01:02

## 2023-02-04 RX ADMIN — POTASSIUM BICARBONATE 50 MEQ: 978 TABLET, EFFERVESCENT ORAL at 08:02

## 2023-02-04 RX ADMIN — IPRATROPIUM BROMIDE AND ALBUTEROL SULFATE 3 ML: .5; 3 SOLUTION RESPIRATORY (INHALATION) at 01:02

## 2023-02-04 RX ADMIN — INSULIN ASPART 5 UNITS: 100 INJECTION, SOLUTION INTRAVENOUS; SUBCUTANEOUS at 04:02

## 2023-02-04 RX ADMIN — HEPARIN SODIUM 5000 UNITS: 5000 INJECTION INTRAVENOUS; SUBCUTANEOUS at 09:02

## 2023-02-04 RX ADMIN — HEPARIN SODIUM 5000 UNITS: 5000 INJECTION INTRAVENOUS; SUBCUTANEOUS at 01:02

## 2023-02-04 RX ADMIN — ACETAMINOPHEN 650 MG: 650 SOLUTION ORAL at 07:02

## 2023-02-04 RX ADMIN — CEFAZOLIN 2 G: 2 INJECTION, POWDER, FOR SOLUTION INTRAMUSCULAR; INTRAVENOUS at 08:02

## 2023-02-04 RX ADMIN — CEFAZOLIN 2 G: 2 INJECTION, POWDER, FOR SOLUTION INTRAMUSCULAR; INTRAVENOUS at 11:02

## 2023-02-04 RX ADMIN — ACETAMINOPHEN 650 MG: 650 SOLUTION ORAL at 02:02

## 2023-02-04 RX ADMIN — INSULIN DETEMIR 16 UNITS: 100 INJECTION, SOLUTION SUBCUTANEOUS at 08:02

## 2023-02-04 RX ADMIN — INSULIN ASPART 7 UNITS: 100 INJECTION, SOLUTION INTRAVENOUS; SUBCUTANEOUS at 07:02

## 2023-02-04 RX ADMIN — IPRATROPIUM BROMIDE AND ALBUTEROL SULFATE 3 ML: .5; 3 SOLUTION RESPIRATORY (INHALATION) at 07:02

## 2023-02-04 RX ADMIN — INSULIN ASPART 9 UNITS: 100 INJECTION, SOLUTION INTRAVENOUS; SUBCUTANEOUS at 10:02

## 2023-02-04 RX ADMIN — INSULIN ASPART 10 UNITS: 100 INJECTION, SOLUTION INTRAVENOUS; SUBCUTANEOUS at 07:02

## 2023-02-04 RX ADMIN — IPRATROPIUM BROMIDE AND ALBUTEROL SULFATE 3 ML: .5; 3 SOLUTION RESPIRATORY (INHALATION) at 09:02

## 2023-02-04 RX ADMIN — HEPARIN SODIUM 5000 UNITS: 5000 INJECTION INTRAVENOUS; SUBCUTANEOUS at 05:02

## 2023-02-04 RX ADMIN — DIPHENHYDRAMINE HYDROCHLORIDE 50 MG: 25 CAPSULE ORAL at 10:02

## 2023-02-04 RX ADMIN — AMANTADINE HYDROCHLORIDE 100 MG: 50 SOLUTION ORAL at 05:02

## 2023-02-04 RX ADMIN — HYDRALAZINE HYDROCHLORIDE 10 MG: 20 INJECTION INTRAMUSCULAR; INTRAVENOUS at 03:02

## 2023-02-04 RX ADMIN — INSULIN ASPART 2 UNITS: 100 INJECTION, SOLUTION INTRAVENOUS; SUBCUTANEOUS at 09:02

## 2023-02-04 RX ADMIN — CEFAZOLIN 2 G: 2 INJECTION, POWDER, FOR SOLUTION INTRAMUSCULAR; INTRAVENOUS at 12:02

## 2023-02-04 NOTE — ASSESSMENT & PLAN NOTE
53 yo F with PMH of DM1, autoimmune hepatitis, and HTN who presents to ED and was found to have diffuse SAH and hydrocephalus    PBD 8    - admit to NCC  - q1 neuro checks  - all labs and diagnostics reviewed   - MRI/CTA showed diffuse SAH and small volume IVH with hydrocephalus, no identifiable aneurysm   - CTH repeat with EVD in good position   - CTA without identifiable aneurysm   - DSA 1/27 without any identifiable aneurysm   - MRI brain/C spine and MRI vessel wall imaging 1/29: no vascular anomaly seen or vessell wall enhancement   - CTH 2/2 after EVD clamped the day before with decrease in ventricular caliber from prior, decreasing SAH   - DSA 2/3: negative   -CTH 2/3 post angio, mild increase in ventricular size  - EVD reopened to 10 yesterday after angio   --raise to 15 today   - SAH protocol   - daily TCDs x 14 days   - keppra 500mg x 7 days   - goal euvolemia to 1L positive  - Na >135, hypertonics as needed  - elevate HOB  - SBP <140  - extubated 1/30  - ok for diet, SLP  - ok for SQH    Dispo: ongoing

## 2023-02-04 NOTE — ASSESSMENT & PLAN NOTE
59yo female with PMH DM1, HTN, autoimmune hepatitis who presented to the ED with  for c/o AMS. Per  patient was c/o of headache last night and vomited. She went to sleep around 9pm then woke up this morning and he noticed that she was confused. She is a type 1 diabetic patient and blood glucose was 300.  states that last time she was having similar symptoms was a couple of years ago when she was in DKA. SBP 150s. On exam very drowsy with difficulty arousing patient, however intermittently follows commands with  no focal weakness. Patient determined to be a candidate for the wake up protocol. MRI IP showed  an acute intraventricular and subarachnoid hemorrhage with early obstructive hydrocephalus and transependymal CSF egress. Also with a noted 21.5-2 mm laterally directed outpouching at the proximal right A2 QUIQUE could represent infundibulum/tortuous origin of a proximal QUIQUE branch, noting that a small aneurysm is difficult to exclude given motion. Patient will be admitted to the Neuro ICU for closer monitoring with a neurosurgery consultation for further recommendations.    Remains in NCC, NAEO and neuro exam stable. EVD in place, post-DSA CTH with mild increase in ventricle size and EVD reopened. Daily TCDs remain without vasospasm.       Antithrombotics for secondary stroke prevention: Antiplatelets: None: Hemorrhage any type    Aggressive risk factor modification: HTN     Rehab efforts: Mechanical soft/thin liquids; IPR     Diagnostics ordered/pending: None     VTE prophylaxis: Heparin 5000 units SQ every 8 hours  Mechanical prophylaxis: Place SCDs    BP parameters: SAH: Unsecured aneurysm, SBP <140

## 2023-02-04 NOTE — ASSESSMENT & PLAN NOTE
Area of vasogenic cerebral edema identified when reviewing brain imaging.   --Admitted to [facility], NSGY consulted and following. EVD currently in place  --Continue frequent neuro checks per floor protocol as any acute changes or worsening neuro status may signify expansion of the insult and/or area of the edema, which may require acute interventions to prevent loss of function and/or death.  --Obtain stat CTH for any new or worsening neuro changes and notify primary team immediately

## 2023-02-04 NOTE — PLAN OF CARE
[facility] Care Plan    POC reviewed with Leida Hoyos  at 0300. Pt unable to verbalized understanding. Questions and concerns addressed. No acute events overnight. Pt progressing toward goals. Will continue to monitor. See below and flowsheets for full assessment and VS info.   -Tylenol given once for Tmax temp of 100.8  -EVD open @10      Is this a stroke patient? yes- Stroke booklet reviewed with patient, risk factors identified for patient and stroke booklet remains at bedside for ongoing education.     Neuro:  Peck Coma Scale  Best Eye Response: 4-->(E4) spontaneous  Best Motor Response: 6-->(M6) obeys commands  Best Verbal Response: 4-->(V4) confused  Peck Coma Scale Score: 14  Assessment Qualifiers: patient not sedated/intubated  Pupil PERRLA: no     24hr Temp:  [98 °F (36.7 °C)-100.8 °F (38.2 °C)]     CV:   Rhythm: normal sinus rhythm  BP goals:   SBP < 160  MAP > 65    Resp:      Vent Mode: Spont  Set Rate: 16 BPM  Oxygen Concentration (%): 32  Vt Set: 420 mL  PEEP/CPAP: 5 cmH20  Pressure Support: 7 cmH20    Plan: N/A    GI/:     Diet/Nutrition Received: mechanical/dental soft  Last Bowel Movement: 02/03/23  Voiding Characteristics: external catheter    Intake/Output Summary (Last 24 hours) at 2/4/2023 0311  Last data filed at 2/4/2023 0303  Gross per 24 hour   Intake 681.56 ml   Output 851 ml   Net -169.44 ml     Unmeasured Output  Urine Occurrence: 1  Stool Occurrence: 2  Emesis Occurrence: 0  Pad Count: 2    Labs/Accuchecks:  Recent Labs   Lab 02/03/23  0116   WBC 12.67   RBC 3.66*   HGB 11.2*   HCT 35.5*         Recent Labs   Lab 02/03/23  0116   *   K 3.5   CO2 22*   *   BUN 13   CREATININE 0.9   ALKPHOS 169*   ALT 30   AST 30   BILITOT 0.4    No results for input(s): PROTIME, INR, APTT, HEPANTIXA in the last 168 hours. No results for input(s): CPK, CPKMB, TROPONINI, MB in the last 168 hours.    Electrolytes: Electrolytes replaced  Accuchecks: ACHS    Gtts:   niCARdipine  Stopped (02/03/23 0003)       LDA/Wounds:  Lines/Drains/Airways       Drain  Duration                  ICP/Ventriculostomy 01/27/23 1400 7 days              Peripheral Intravenous Line  Duration                  Peripheral IV - Single Lumen 02/01/23 0534 20 G Anterior;Right Upper Arm 2 days         Peripheral IV - Single Lumen 02/02/23 0100 20 G Anterior;Left Forearm 2 days                  Wounds: No  Wound care consulted: No

## 2023-02-04 NOTE — SUBJECTIVE & OBJECTIVE
Subjective:     Interval History:  As above.    Review of Systems   Constitutional:  Negative for fatigue and fever.   HENT:  Negative for trouble swallowing.    Eyes:  Negative for photophobia and visual disturbance.   Respiratory:  Negative for chest tightness and shortness of breath.    Cardiovascular:  Negative for chest pain and leg swelling.   Gastrointestinal:  Negative for nausea and vomiting.   Musculoskeletal:  Negative for neck stiffness.   Neurological:  Positive for speech difficulty and headaches. Negative for dizziness.   Psychiatric/Behavioral:  Positive for decreased concentration. Negative for agitation.      Objective:     Vitals:  Temp: 98.9 °F (37.2 °C)  Pulse: 61  Rhythm: normal sinus rhythm  BP: (!) 165/73  MAP (mmHg): 105  ICP Mean (mmHg): 18 mmHg  Resp: (!) 22  SpO2: 98 %    Temp  Min: 97.4 °F (36.3 °C)  Max: 100.8 °F (38.2 °C)  Pulse  Min: 57  Max: 106  BP  Min: 115/54  Max: 175/81  MAP (mmHg)  Min: 78  Max: 117  ICP Mean (mmHg)  Min: 4 mmHg  Max: 22 mmHg  Resp  Min: 17  Max: 26  SpO2  Min: 96 %  Max: 100 %    02/03 0701 - 02/04 0700  In: 681.6 [P.O.:240]  Out: 830 [Urine:700; Drains:130]   Unmeasured Output  Urine Occurrence: 1  Stool Occurrence: 2  Emesis Occurrence: 0  Pad Count: 3       Physical Exam  Vitals and nursing note reviewed.   HENT:      Head: Normocephalic.      Comments: EVD in place     Nose: Nose normal.      Mouth/Throat:      Mouth: Mucous membranes are moist.      Pharynx: Oropharynx is clear.   Cardiovascular:      Rate and Rhythm: Normal rate and regular rhythm.      Pulses: Normal pulses.      Heart sounds: Normal heart sounds.   Pulmonary:      Effort: Pulmonary effort is normal.      Breath sounds: Normal breath sounds.   Abdominal:      General: Bowel sounds are normal.      Palpations: Abdomen is soft.   Musculoskeletal:         General: Normal range of motion.   Skin:     General: Skin is warm and dry.      Capillary Refill: Capillary refill takes 2 to 3  seconds.   Neurological:      Mental Status: She is alert.      Comments:   GCS 15 with improved delayed response time   Waxing/waning fatigue  States full name, location and month  Follows commands in BUE/BLE  PERRL  BRAGG to command and spontaneously  Sensation grossly intact  Ambulating with assistance        Unable to test orientation, language, memory, judgment, insight, fund of knowledge, hearing, shoulder shrug, tongue protrusion, coordination, gait due to level of consciousness.    Medications:  ContinuousniCARdipine, Last Rate: Stopped (02/03/23 0003)  Scheduledalbuterol-ipratropium, 3 mL, Q6H WAKE  amantadine HCL, 100 mg, BID  ceFAZolin (ANCEF) IVPB, 2 g, Q8H  heparin (porcine), 5,000 Units, Q8H  insulin aspart U-100, 7 Units, TIDWM  insulin detemir U-100, 16 Units, BID  PRNacetaminophen, 650 mg, Q6H PRN  dextrose 10%, 12.5 g, PRN  glucagon (human recombinant), 1 mg, PRN  glucose, 16 g, PRN  glucose, 24 g, PRN  hydrALAZINE, 10 mg, Q6H PRN  insulin aspart U-100, 0-15 Units, QID (AC + HS) PRN  labetalol, 10 mg, Q4H PRN  magnesium oxide, 800 mg, PRN  magnesium oxide, 800 mg, PRN  ondansetron, 4 mg, Q6H PRN  potassium bicarbonate, 35 mEq, PRN  potassium bicarbonate, 50 mEq, PRN  potassium bicarbonate, 60 mEq, PRN  potassium, sodium phosphates, 2 packet, PRN  potassium, sodium phosphates, 2 packet, PRN  potassium, sodium phosphates, 2 packet, PRN  sodium chloride 0.9%, 10 mL, PRN  sodium chloride 0.9%, 10 mL, PRN  sodium chloride 0.9%, 10 mL, PRN    Today I personally reviewed pertinent medications, lines/drains/airways, imaging, cardiology results, laboratory results, microbiology results, notably:    Hyperglycemic  CTH with mild dilation of ventricles, EVD open    Diet  Diet diabetic [facility]; 2000 Calorie; Dysphagia Mechanical Soft (IDDSI Level 5)  Diet diabetic [facility]; 2000 Calorie; Dysphagia Mechanical Soft (IDDSI Level 5)

## 2023-02-04 NOTE — NURSING
[facility] Care Plan    POC reviewed with Leida Hoyos and family at 1400. Pt verbalized understanding. Questions and concerns addressed. No acute events today. Pt progressing toward goals. Will continue to monitor. See below and flowsheets for full assessment and VS info.   -Pt hemodynamically intact  -Pt neuro status intact  -EVD open at 15. Patent and draining  -Bed bath and linen change given  -Wrist restraints discontinued. Mittens continued for EVD pulling/scratching           Is this a stroke patient? yes- Stroke booklet reviewed with patient and family, risk factors identified for patient and stroke booklet remains at bedside for ongoing education.     Neuro:  Blackstone Coma Scale  Best Eye Response: 3-->(E3) to speech  Best Motor Response: 6-->(M6) obeys commands  Best Verbal Response: 4-->(V4) confused  Blackstone Coma Scale Score: 13  Assessment Qualifiers: patient not sedated/intubated  Pupil PERRLA: yes     24 hr Temp:  [97.4 °F (36.3 °C)-100.8 °F (38.2 °C)]     CV:   Rhythm: normal sinus rhythm  BP goals:   SBP < 160  MAP > 65    Resp:      Vent Mode: Spont  Set Rate: 16 BPM  Oxygen Concentration (%): 32  Vt Set: 420 mL  PEEP/CPAP: 5 cmH20  Pressure Support: 7 cmH20    Plan: N/A    GI/:     Diet/Nutrition Received: mechanical/dental soft  Last Bowel Movement: 02/03/23  Voiding Characteristics: external catheter    Intake/Output Summary (Last 24 hours) at 2/4/2023 1727  Last data filed at 2/4/2023 1705  Gross per 24 hour   Intake 750.63 ml   Output 788 ml   Net -37.37 ml     Unmeasured Output  Urine Occurrence: 1  Stool Occurrence: 2  Emesis Occurrence: 0  Pad Count: 3    Labs/Accuchecks:  Recent Labs   Lab 02/04/23  0435   WBC 11.84   RBC 3.71*   HGB 11.4*   HCT 36.1*         Recent Labs   Lab 02/04/23  0435   *   K 3.5   CO2 24   *   BUN 14   CREATININE 0.7   ALKPHOS 177*   ALT 23   AST 23   BILITOT 0.3    No results for input(s): PROTIME, INR, APTT, HEPANTIXA in the last 168 hours.  No results for input(s): CPK, CPKMB, TROPONINI, MB in the last 168 hours.    Electrolytes: Electrolytes replaced  Accuchecks: AC/HS    Gtts:   niCARdipine Stopped (02/03/23 0003)       LDA/Wounds:  Lines/Drains/Airways       Drain  Duration                  ICP/Ventriculostomy 01/27/23 1400 8 days    Female External Urinary Catheter 02/04/23 1211 <1 day              Peripheral Intravenous Line  Duration                  Peripheral IV - Single Lumen 02/01/23 0534 20 G Anterior;Right Upper Arm 3 days         Peripheral IV - Single Lumen 02/02/23 0100 20 G Anterior;Left Forearm 2 days                  Wounds: Yes  Wound care consulted: No

## 2023-02-04 NOTE — SUBJECTIVE & OBJECTIVE
Interval History: 2/4: PBD 8, angio yesterday negative. ICP up to 30 intraop. EVD reopened to 10. ICP resolved. CTH post op with mild worsening in ventriculomegaly. Exam stable this morning.     Medications:  Continuous Infusions:   niCARdipine Stopped (02/03/23 0003)     Scheduled Meds:   albuterol-ipratropium  3 mL Nebulization Q6H WAKE    amantadine HCL  100 mg Oral BID    ceFAZolin (ANCEF) IVPB  2 g Intravenous Q8H    heparin (porcine)  5,000 Units Subcutaneous Q8H    insulin aspart U-100  7 Units Subcutaneous TIDWM    insulin detemir U-100  16 Units Subcutaneous BID     PRN Meds:acetaminophen, dextrose 10%, glucagon (human recombinant), glucose, glucose, hydrALAZINE, insulin aspart U-100, labetalol, magnesium oxide, magnesium oxide, ondansetron, potassium bicarbonate, potassium bicarbonate, potassium bicarbonate, potassium, sodium phosphates, potassium, sodium phosphates, potassium, sodium phosphates, sodium chloride 0.9%, sodium chloride 0.9%, sodium chloride 0.9%     Review of Systems  Objective:     Weight: 55.5 kg (122 lb 5.7 oz)  Body mass index is 22.38 kg/m².  Vital Signs (Most Recent):  Temp: (!) 100.8 °F (38.2 °C) (02/04/23 0303)  Pulse: (!) 57 (02/04/23 0603)  Resp: 19 (02/04/23 0603)  BP: (!) 144/67 (02/04/23 0603)  SpO2: 98 % (02/04/23 0603) Vital Signs (24h Range):  Temp:  [98.2 °F (36.8 °C)-100.8 °F (38.2 °C)] 100.8 °F (38.2 °C)  Pulse:  [] 57  Resp:  [19-26] 19  SpO2:  [96 %-100 %] 98 %  BP: (115-154)/(54-80) 144/67                          ICP/Ventriculostomy 01/27/23 1400 (Active)   $ ICP/Ventriculostomy [place] Placement Bedside Insertion Performed 01/27/23 1501   Level of Ventriculostomy (cm above) 20 02/02/23 0201   Status Clamped 02/03/23 0601   Site Assessment Clean;Dry 02/03/23 0601   Site Drainage No drainage 02/03/23 0601   Waveform normal waveform 02/02/23 2301   Output (mL) 0 mL 02/03/23 0500   CSF Color clear 02/01/23 1500   Dressing Status Clean;Dry;Intact 02/03/23 0601    Interventions HOB degrees 02/03/23 0601       Physical Exam    Neurosurgery Physical Exam       Exam: E4V4M6. AOx3. PERRL. CN non focal. FC x4 FS    Significant Labs:  Recent Labs   Lab 02/03/23  0116 02/04/23  0435   * 199*   * 147*   K 3.5 3.5   * 113*   CO2 22* 24   BUN 13 14   CREATININE 0.9 0.7   CALCIUM 9.3 9.7   MG 2.1 2.1       Recent Labs   Lab 02/03/23  0116 02/04/23  0435   WBC 12.67 11.84   HGB 11.2* 11.4*   HCT 35.5* 36.1*    238       No results for input(s): LABPT, INR, APTT in the last 48 hours.  Microbiology Results (last 7 days)       Procedure Component Value Units Date/Time    CSF culture [[id number]] Collected: 02/02/23 1013    Order Status: Completed Specimen: CSF (Spinal Fluid) from CSF Tap, Tube 3 Updated: 02/03/23 0746     CSF CULTURE No Growth to date     Gram Stain Result No WBC's      No organisms seen    CSF culture [[id number]] Collected: 01/30/23 1449    Order Status: Completed Specimen: CSF (Spinal Fluid) from CSF Shunt Updated: 02/03/23 0721     CSF CULTURE No Growth to date     Gram Stain Result Rare WBC's      No organisms seen    Gram stain [[id number]] Collected: 02/02/23 1013    Order Status: Canceled Specimen: CSF (Spinal Fluid) from CSF Tap, Tube 3           All pertinent labs from the last 24 hours have been reviewed.    Significant Diagnostics:  I have reviewed all pertinent imaging results/findings within the past 24 hours.

## 2023-02-04 NOTE — PROGRESS NOTES
[facility]  Neurosurgery  Progress Note    Subjective:     History of Present Illness: 53 yo F with PMH of DM1, autoimmune hepatitis, and HTN who presents to ED this am after confusion this am. She was LKN last night and became confused after showering this am so  brought to the ED. She quickly decompensated in the ER and was not following commands or waking to stimulation. MRI and CTA showed diffuse thick SAH and hydrocephalus. No identifiable aneurysm seen on CTA.       Post-Op Info:  Procedure(s) (LRB):  ANGIOGRAM-CEREBRAL (N/A)   8 Days Post-Op     Interval History: 2/4: PBD 8, angio yesterday negative. ICP up to 30 intraop. EVD reopened to 10. ICP resolved. CTH post op with mild worsening in ventriculomegaly. Exam stable this morning.     Medications:  Continuous Infusions:   niCARdipine Stopped (02/03/23 0003)     Scheduled Meds:   albuterol-ipratropium  3 mL Nebulization Q6H WAKE    amantadine HCL  100 mg Oral BID    ceFAZolin (ANCEF) IVPB  2 g Intravenous Q8H    heparin (porcine)  5,000 Units Subcutaneous Q8H    insulin aspart U-100  7 Units Subcutaneous TIDWM    insulin detemir U-100  16 Units Subcutaneous BID     PRN Meds:acetaminophen, dextrose 10%, glucagon (human recombinant), glucose, glucose, hydrALAZINE, insulin aspart U-100, labetalol, magnesium oxide, magnesium oxide, ondansetron, potassium bicarbonate, potassium bicarbonate, potassium bicarbonate, potassium, sodium phosphates, potassium, sodium phosphates, potassium, sodium phosphates, sodium chloride 0.9%, sodium chloride 0.9%, sodium chloride 0.9%     Review of Systems  Objective:     Weight: 55.5 kg (122 lb 5.7 oz)  Body mass index is 22.38 kg/m².  Vital Signs (Most Recent):  Temp: (!) 100.8 °F (38.2 °C) (02/04/23 0303)  Pulse: (!) 57 (02/04/23 0603)  Resp: 19 (02/04/23 0603)  BP: (!) 144/67 (02/04/23 0603)  SpO2: 98 % (02/04/23 0603) Vital Signs (24h Range):  Temp:  [98.2 °F (36.8 °C)-100.8 °F (38.2 °C)] 100.8  °F (38.2 °C)  Pulse:  [] 57  Resp:  [19-26] 19  SpO2:  [96 %-100 %] 98 %  BP: (115-154)/(54-80) 144/67                          ICP/Ventriculostomy 01/27/23 1400 (Active)   $ ICP/Ventriculostomy [place] Placement Bedside Insertion Performed 01/27/23 1501   Level of Ventriculostomy (cm above) 20 02/02/23 0201   Status Clamped 02/03/23 0601   Site Assessment Clean;Dry 02/03/23 0601   Site Drainage No drainage 02/03/23 0601   Waveform normal waveform 02/02/23 2301   Output (mL) 0 mL 02/03/23 0500   CSF Color clear 02/01/23 1500   Dressing Status Clean;Dry;Intact 02/03/23 0601   Interventions HOB degrees 02/03/23 0601       Physical Exam    Neurosurgery Physical Exam       Exam: E4V4M6. AOx3. PERRL. CN non focal. FC x4 FS    Significant Labs:  Recent Labs   Lab 02/03/23  0116 02/04/23  0435   * 199*   * 147*   K 3.5 3.5   * 113*   CO2 22* 24   BUN 13 14   CREATININE 0.9 0.7   CALCIUM 9.3 9.7   MG 2.1 2.1       Recent Labs   Lab 02/03/23  0116 02/04/23  0435   WBC 12.67 11.84   HGB 11.2* 11.4*   HCT 35.5* 36.1*    238       No results for input(s): LABPT, INR, APTT in the last 48 hours.  Microbiology Results (last 7 days)       Procedure Component Value Units Date/Time    CSF culture [[id number]] Collected: 02/02/23 1013    Order Status: Completed Specimen: CSF (Spinal Fluid) from CSF Tap, Tube 3 Updated: 02/03/23 0746     CSF CULTURE No Growth to date     Gram Stain Result No WBC's      No organisms seen    CSF culture [[id number]] Collected: 01/30/23 1449    Order Status: Completed Specimen: CSF (Spinal Fluid) from CSF Shunt Updated: 02/03/23 0721     CSF CULTURE No Growth to date     Gram Stain Result Rare WBC's      No organisms seen    Gram stain [[id number]] Collected: 02/02/23 1013    Order Status: Canceled Specimen: CSF (Spinal Fluid) from CSF Tap, Tube 3           All pertinent labs from the last 24 hours have been reviewed.    Significant Diagnostics:  I have reviewed all pertinent  imaging results/findings within the past 24 hours.    Assessment/Plan:     * SAH (subarachnoid hemorrhage)  51 yo F with PMH of DM1, autoimmune hepatitis, and HTN who presents to ED and was found to have diffuse SAH and hydrocephalus    PBD 8    - admit to [facility]CC  - q1 neuro checks  - all labs and diagnostics reviewed   - MRI/CTA showed diffuse SAH and small volume IVH with hydrocephalus, no identifiable aneurysm   - CTH repeat with EVD in good position   - CTA without identifiable aneurysm   - DSA 1/27 without any identifiable aneurysm   - MRI brain/C spine and MRI vessel wall imaging 1/29: no vascular anomaly seen or vessell wall enhancement   - CTH 2/2 after EVD clamped the day before with decrease in ventricular caliber from prior, decreasing SAH   - DSA 2/3: negative   -CTH 2/3 post angio, mild increase in ventricular size  - EVD reopened to 10 yesterday after angio   --raise to 15 today   - SAH protocol   - daily TCDs x 14 days   - keppra 500mg x 7 days   - goal euvolemia to 1L positive  - Na >135, hypertonics as needed  - elevate HOB  - SBP <140  - extubated 1/30  - ok for diet, SLP  - ok for SQH    Dispo: ongoing        Maren Villanueva MD  Neurosurgery  [facility]

## 2023-02-04 NOTE — ASSESSMENT & PLAN NOTE
A1c 6.5  BG Goal 140-180 while inpatient  Currently on Detemir 16U BID + Aspart 9U TID WM + SSI PRN

## 2023-02-04 NOTE — SUBJECTIVE & OBJECTIVE
Neurologic Chief Complaint: HA    Subjective:     Interval History: Patient is seen for follow-up neurological assessment and treatment recommendations:   Remains in NCC, NAEO and neuro exam stable. EVD in place, post-DSA CTH with mild increase in ventricle size and EVD reopened. Daily TCDs remain without vasospasm.     HPI, Past Medical, Family, and Social History remains the same as documented in the initial encounter.     Review of Systems   Unable to perform ROS: Other (Drowsiness)   Constitutional:  Positive for fatigue.   HENT:  Negative for trouble swallowing.    Gastrointestinal:  Negative for vomiting.   Neurological:  Negative for weakness.   Scheduled Meds:   albuterol-ipratropium  3 mL Nebulization Q6H WAKE    amantadine HCL  100 mg Oral BID    ceFAZolin (ANCEF) IVPB  2 g Intravenous Q8H    heparin (porcine)  5,000 Units Subcutaneous Q8H    insulin aspart U-100  9 Units Subcutaneous TIDWM    insulin detemir U-100  16 Units Subcutaneous BID     Continuous Infusions:   niCARdipine Stopped (02/03/23 0003)     PRN Meds:acetaminophen, dextrose 10%, glucagon (human recombinant), glucose, glucose, hydrALAZINE, insulin aspart U-100, labetalol, magnesium oxide, magnesium oxide, ondansetron, potassium bicarbonate, potassium bicarbonate, potassium bicarbonate, potassium, sodium phosphates, potassium, sodium phosphates, potassium, sodium phosphates, sodium chloride 0.9%, sodium chloride 0.9%, sodium chloride 0.9%    Objective:     Vital Signs (Most Recent):  Temp: 98.9 °F (37.2 °C) (02/04/23 1105)  Pulse: 73 (02/04/23 1205)  Resp: 18 (02/04/23 1205)  BP: (!) 142/62 (02/04/23 1205)  SpO2: 98 % (02/04/23 1205)  BP Location: Left arm    Vital Signs Range (Last 24H):  Temp:  [97.4 °F (36.3 °C)-100.8 °F (38.2 °C)]   Pulse:  []   Resp:  [18-26]   BP: (115-162)/(54-80)   SpO2:  [96 %-100 %]   BP Location: Left arm    Physical Exam  Vitals and nursing note reviewed.   Constitutional:       General: She is not in acute  distress.     Comments: Drowsy    HENT:      Head: Normocephalic.      Comments: EVD in place      Nose: No rhinorrhea.      Mouth/Throat:      Mouth: Mucous membranes are moist.   Eyes:      General: No scleral icterus.     Extraocular Movements: Extraocular movements intact.      Conjunctiva/sclera: Conjunctivae normal.   Cardiovascular:      Rate and Rhythm: Normal rate.   Pulmonary:      Effort: Pulmonary effort is normal. No respiratory distress.   Musculoskeletal:         General: No tenderness.   Skin:     General: Skin is warm.   Neurological:      Sensory: No sensory deficit.      Motor: No weakness.      Comments: Moves all extremities against gravity   Intact to light touch   Follows commands        Neurological Exam:   LOC: drowsy  Attention Span: poor   Language: No aphasia  Articulation: No dysarthria  Orientation: Person, Place, Time   EOM (CN III, IV, VI): Full/intact  Facial Movement (CN VII): Symmetric facial expression    Motor: Moves all extremities spontaneously/antigravity   Sensation: Intact to light touch     Laboratory:  CMP:   Recent Labs   Lab 02/04/23  0435   CALCIUM 9.7   ALBUMIN 2.9*   PROT 6.8   *   K 3.5   CO2 24   *   BUN 14   CREATININE 0.7   ALKPHOS 177*   ALT 23   AST 23   BILITOT 0.3       CBC:   Recent Labs   Lab 02/04/23  0435   WBC 11.84   RBC 3.71*   HGB 11.4*   HCT 36.1*      MCV 97   MCH 30.7   MCHC 31.6*       Lipid Panel: No results for input(s): CHOL, LDLCALC, HDL, TRIG in the last 168 hours.  Coagulation: No results for input(s): PT, INR, APTT in the last 168 hours.  Platelet Aggregation Study: No results for input(s): PLTAGG, PLTAGINTERP, PLTAGREGLACO, ADPPLTAGGREG in the last 168 hours.  Hgb A1C: No results for input(s): HGBA1C in the last 168 hours.  TSH: No results for input(s): TSH in the last 168 hours.    Diagnostic Results     Brain/Vessel Imaging   CT 2/3/23  There appears to be mild increased intracranial hemorrhage however this is thought  more likely artifactual due to the presence of vascular contrast from recent arteriogram as discussed above.    IR Angiogram Carotid Cerebral BL 2/3/23  1. Angiogram negative for any vascular etiologies/malformations accounting for the SAH/IVH.  Negative for any vascular malformations - AVMs/AVF/aneurysms.  Negative for any significant vasospasm.    CTH 2/3/23   1. Stable position of the right ventriculostomy catheter and stable prominence of the ventricles.  2. Stable subarachnoid hemorrhage and intraventricular hemorrhage.  3. Stable trace subdural hemorrhage.  4. No new areas of hemorrhage.    CTH 2/2/23  Stable subarachnoid and intraventricular hemorrhage. Ventriculostomy catheter in place with stable mild prominence of ventricles. Stable trace subdural hemorrhage. No new hemorrhage or major vascular distribution infarct    CTH 2/2/23   Stable position of right frontal ventriculostomy catheter with decreased size of the ventricles compared to prior exam compatible with improving hydrocephalus. Decreased volume of multi compartment hemorrhage as described above. No evidence for significant new hemorrhage or evidence for acute major vascular distribution infarct.    MRI Brain W WO Contrast 1/28/23   Brain: Slight improvement in mild to moderate hydrocephalus. Diffuse subarachnoid and intraventricular hemorrhage again noted. No acute infarct. No enhancing intracranial lesion or source of intracranial hemorrhage identified.  MRA: No aneurysm or AVM identified.  No evidence of vasospasm.  No abnormal vessel wall enhancement.    MRA Neck/Brain WO Contrast 1/28/23   There is no significant stenosis at the carotid bifurcations by NASCET criteria.  The vertebral arteries are patent. No evidence of definite prominent vessels within the cervical spinal canal to suggest a cervical vascular malformation.    IR Carotid Cerebral Angiogram 1/27/23   Preliminary interpretation: 6 vessel angiogram showed no abnormalities to  account for SAH. No aneurysm, AVM or AV fistula.  Please see Imaging report for full details.     CTH Without Contrast 1/27/23  Postsurgical changes status post intraventricular catheter placement with its tip between the frontal horns.  Stable moderate hydrocephalus.     CTA Head and Neck 1/27/23  -Diffuse subarachnoid hemorrhage with moderate hydrocephalus.  -No intracranial aneurysm or AVM is identified.  -Atherosclerotic disease but no advanced stenosis of the carotid or vertebral arteries in the neck with no major branch stenosis/occlusion at the [id number]     MRI Brain Ischemic Protocol 1/27/23  1. Acute intraventricular and subarachnoid hemorrhage, as described.  Findings compatible with early obstructive hydrocephalus and transependymal CSF egress.  2. No evidence of acute ischemia or recent infarction.  3. MRA motion compromised. Tiny approximately 1.5-2 mm laterally directed outpouching at the proximal right A2 QUIQUE could represent infundibulum/tortuous origin of a proximal QUIQUE branch, noting that a small aneurysm is difficult to exclude given motion.  Further characterization with CTA could be performed given extensive motion on the current study.       Cardiac Imaging   TTE 1/28/23   The left ventricle is normal in size with normal systolic function.  The estimated ejection fraction is 65-70%.  Normal left ventricular diastolic function.  Normal right ventricular size with normal right ventricular systolic function.  There is no significant tricuspid regurgitation and therefore the pulmonary artery systolic pressure cannot be reported.  Mechanically ventilated; cannot use inferior caval vein diameter to estimate central venous pressure

## 2023-02-04 NOTE — ASSESSMENT & PLAN NOTE
Diagnosed in 2013. BG on arrival 284  Home regimen 13u Lantus; Lispro AC  A1C 6.5     -Transitioned from Insulin gtt to Detemir 16 BID  -Aspart 7 q4  -Hypoglycemic protocol in place  -Accuchecks q1h

## 2023-02-04 NOTE — PROGRESS NOTES
[facility]  Neurocritical Care  Progress Note    Admit Date: 1/27/2023  Service Date: 02/04/2023  Length of Stay: 8    Subjective:     Chief Complaint: SAH (subarachnoid hemorrhage)    History of Present Illness: Mrs. Gupta 57 yo F w/ PMH DM1 (dx 2013), HTN, autoimmune hepatitis presented to the ED for AMS this morning where she was unable to answer questions appropriately after a shower.  states patient had a HA and vomited the night before she went to sleep. LNK was 10 pm. Patient had a similar episode a few years ago, but was admitted for DKA.  states patient has worsened neurologically since they left their house. On exam, patient was non-verbal, fixed pupils, intermittently following commands in B/L UE, but unable in LE. GCS 8. MRI performed in ED showed acute intraventricular and subarachnoid hemorrhage with early obstructive hydrocephalus and transependymal CSF egress. Also with a noted 21.5-2 mm laterally directed outpouching at the proximal right A2 QUIQUE could represent infundibulum/tortuous origin of a proximal QUIQUE branch. NSGY consulted and placed EVD after patient was intubated after concern for airway protection. Started Keppra 1g BID. CTA ordered. IR consulted for possible angiogram. No leukocytosis. H/H stable. Cr 0.9. Glucose 284. NCC will admit patient for HLOC.          Hospital Course: 01/28/2023: IR performed angiogram shows no aneurysm, AVM or AV fistula. EVD open @20. Will wean propofol and transition to precedex. Started tube feeds and SQ heparin. Transitioned from insulin gtt to detemir.   01/29/2023 [facility] vent; off sedation; TCDs w/o vasospasm; will need repeat angio within a week; MRI brain completed no lesions found; TFS to goal; replete lytes; keep I/Os nearly matched w/ IVF boluses; consider yudy if sbp dips with analgesia and/or sedation;   01/30/2023 Extubated with parameters to NC. Some wheezing post extubation, PRN duonebs and racemic epi x1. IV Dex  4mg q6 hours x 1 day and close airway watch. Cardene to maintain SBP<140. EVD open at 20. Monitoring TCDs daily, no current evidence of vasospasm. Glucose elevated, SSI in place, may require Insulin gtt 2/2 steroids.   1/31/23: possible angio Friday 02/01/2023 No acute events overnight. EVD clamp trial per Neurosurgery, continue to monitor neurologic exam with CTH in AM. TCDs without evidence of vasospasm. Remains hyperglycemia, steroids discontinued, increasing long acting to 12u BID.  02/02/2023 No acute events overnight. CTH with EVD clamped stable, plan to continue clamp trial for one more day. DSA tomorrow on PBD7.   02/02/2023 More sedated on exam and difficult to arouse but will arouse to answer questions (full name, at [facility]) before drifting back to sleep. ICP stable 7-14. CTH stable.   02/03/2023 CTH stable overnight. Ambulating with PT/OT. Exam improved, A&Ox3. DSA without aneurysm, AVM or AV fistula. Elevated ICP during angiogram, EVD open at 20. Post procedure CTH pending.   02/04/2023 CTH post angio with slight increase in ventricle size compared to prior. EVD reopened to 10 and subsequently raised to 15. Plan to repeat clamp trial per Neurosurgery. Exam unchanged. Encourage PO water intake for elevated sodium.         Subjective:     Interval History:  As above.    Review of Systems   Constitutional:  Negative for fatigue and fever.   HENT:  Negative for trouble swallowing.    Eyes:  Negative for photophobia and visual disturbance.   Respiratory:  Negative for chest tightness and shortness of breath.    Cardiovascular:  Negative for chest pain and leg swelling.   Gastrointestinal:  Negative for nausea and vomiting.   Musculoskeletal:  Negative for neck stiffness.   Neurological:  Positive for speech difficulty and headaches. Negative for dizziness.   Psychiatric/Behavioral:  Positive for decreased concentration. Negative for agitation.      Objective:     Vitals:  Temp: 98.9 °F (37.2 °C)  Pulse:  61  Rhythm: normal sinus rhythm  BP: (!) 165/73  MAP (mmHg): 105  ICP Mean (mmHg): 18 mmHg  Resp: (!) 22  SpO2: 98 %    Temp  Min: 97.4 °F (36.3 °C)  Max: 100.8 °F (38.2 °C)  Pulse  Min: 57  Max: 106  BP  Min: 115/54  Max: 175/81  MAP (mmHg)  Min: 78  Max: 117  ICP Mean (mmHg)  Min: 4 mmHg  Max: 22 mmHg  Resp  Min: 17  Max: 26  SpO2  Min: 96 %  Max: 100 %    02/03 0701 - 02/04 0700  In: 681.6 [P.O.:240]  Out: 830 [Urine:700; Drains:130]   Unmeasured Output  Urine Occurrence: 1  Stool Occurrence: 2  Emesis Occurrence: 0  Pad Count: 3       Physical Exam  Vitals and nursing note reviewed.   HENT:      Head: Normocephalic.      Comments: EVD in place     Nose: Nose normal.      Mouth/Throat:      Mouth: Mucous membranes are moist.      Pharynx: Oropharynx is clear.   Cardiovascular:      Rate and Rhythm: Normal rate and regular rhythm.      Pulses: Normal pulses.      Heart sounds: Normal heart sounds.   Pulmonary:      Effort: Pulmonary effort is normal.      Breath sounds: Normal breath sounds.   Abdominal:      General: Bowel sounds are normal.      Palpations: Abdomen is soft.   Musculoskeletal:         General: Normal range of motion.   Skin:     General: Skin is warm and dry.      Capillary Refill: Capillary refill takes 2 to 3 seconds.   Neurological:      Mental Status: She is alert.      Comments:   GCS 15 with improved delayed response time   Waxing/waning fatigue  States full name, location and month  Follows commands in BUE/BLE  PERRL  BRAGG to command and spontaneously  Sensation grossly intact  Ambulating with assistance        Unable to test orientation, language, memory, judgment, insight, fund of knowledge, hearing, shoulder shrug, tongue protrusion, coordination, gait due to level of consciousness.    Medications:  ContinuousniCARdipine, Last Rate: Stopped (02/03/23 0003)  Scheduledalbuterol-ipratropium, 3 mL, Q6H WAKE  amantadine HCL, 100 mg, BID  ceFAZolin (ANCEF) IVPB, 2 g, Q8H  heparin (porcine),  5,000 Units, Q8H  insulin aspart U-100, 7 Units, TIDWM  insulin detemir U-100, 16 Units, BID  PRNacetaminophen, 650 mg, Q6H PRN  dextrose 10%, 12.5 g, PRN  glucagon (human recombinant), 1 mg, PRN  glucose, 16 g, PRN  glucose, 24 g, PRN  hydrALAZINE, 10 mg, Q6H PRN  insulin aspart U-100, 0-15 Units, QID (AC + HS) PRN  labetalol, 10 mg, Q4H PRN  magnesium oxide, 800 mg, PRN  magnesium oxide, 800 mg, PRN  ondansetron, 4 mg, Q6H PRN  potassium bicarbonate, 35 mEq, PRN  potassium bicarbonate, 50 mEq, PRN  potassium bicarbonate, 60 mEq, PRN  potassium, sodium phosphates, 2 packet, PRN  potassium, sodium phosphates, 2 packet, PRN  potassium, sodium phosphates, 2 packet, PRN  sodium chloride 0.9%, 10 mL, PRN  sodium chloride 0.9%, 10 mL, PRN  sodium chloride 0.9%, 10 mL, PRN    Today I personally reviewed pertinent medications, lines/drains/airways, imaging, cardiology results, laboratory results, microbiology results, notably:    Hyperglycemic  CTH with mild dilation of ventricles, EVD open    Diet  Diet diabetic [facility]; 2000 Calorie; Dysphagia Mechanical Soft (IDDSI Level 5)  Diet diabetic [facility]; 2000 Calorie; Dysphagia Mechanical Soft (IDDSI Level 5)    Assessment/Plan:     Neuro  * SAH (subarachnoid hemorrhage)  53 yo F w/ PMH DM1, HTN presented to the ED with AMS after HA and vomiting the night before. Patient was non-verbal, B/l fixed pupils, and only intermittently following commands in B/L UE. GCS 8. MRI shows Acute IVH, SAH w/ obstructive hydrocephalus w/o signs of ischemia and R A2 QUIQUE aneurysm. VN consulted. Patient intubated in ED after concern for airway protection. NSGY placed EVD. IR consulted for possible angiogram. CTA shows diffuse subarachnoid hemorrhage with moderate hydrocephalus. No intracranial aneurysm or AVM is identified. NIHSS 9 on admission.     - IR angiogram on 1/27 shows no aneurysm, AVM, or AV fistula. Will need repeat angiogram on 2/3; will discuss with IR   - CTA shows  diffuse subarachnoid hemorrhage with moderate hydrocephalus. No intracranial aneurysm or AVM is identified.  - Repeat DSA on PBD 7 without aneurysm, AVM or AV fistula.     - Continued monitoring in NCC  - Q1H neuro checks and vitals  - NSGY following; appreciate recs  - EVD open @15, clamp trial per Neurosurgery   - Ancef for drain ppx, d/c after EVD removal   - Daily TCDs  - Echo reviewed   - CBC, CMP, Mag, Phos daily, replace PRN  - SBP goal <160  - Cardene gtt, wean as able   - PRN labetalol, hydralazine  - SQ heparin  - PT/OT/SLP    IVH (intraventricular hemorrhage)  See SAH  EVD care    Vasogenic cerebral edema  See SAH    Cardiac/Vascular  Hypertension  SBP 160s on arrival.   states patient does not take home Losartan regularly.     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 BG on arrival 284  Home regimen 13u Lantus; Lispro AC  A1C 6.5     -Transitioned from Insulin gtt to Detemir 16 BID  -Aspart 7 q4  -Hypoglycemic protocol in place  -Accuchecks q1h    GI  Autoimmune hepatitis  Hx of AIH. Previously on mercaptopurine. Not currently taking.   LFTs slightly elevated on arrival.    -Will continue to monitor  -Holding Tylenol and hepatotoxic agents  -CMP daily  -If LFTs worsen will obtain RUQ US.     Other  Debility  PT/OT    Endotracheally intubated-resolved as of 2/3/2023  Patient intubated in ED 2/2 concern for airway protection. GCS 7  Extubated 1/30 to NC  PRN Duonebs           The patient is being Prophylaxed for:  Venous Thromboembolism with: Chemical  Stress Ulcer with: Not Applicable   Ventilator Pneumonia with: not applicable    Activity Orders          Progressive Mobility Protocol (mobilize patient to their highest level of functioning at least twice daily) starting at 02/03 2000    Diet diabetic [facility]; 2000 Calorie; Dysphagia Mechanical Soft (IDDSI  Level 5): Diabetic starting at 02/03 1439    Progressive Mobility Protocol (mobilize patient to their highest level of functioning at least twice daily) starting at 01/28 0800    Elevate HOB Collagen closure or PERCLOSE plug/device - Elevate HOB 30 degrees with limb immobilized for 2 hours after procedure. starting at 01/27 2018    Turn patient starting at 01/27 1400    Elevate HOB starting at 01/27 1221        Full Code    Ronak Pavon MD  Neurocritical Care  [facility]

## 2023-02-04 NOTE — ASSESSMENT & PLAN NOTE
53 yo F w/ PMH DM1, HTN presented to the ED with AMS after HA and vomiting the night before. Patient was non-verbal, B/l fixed pupils, and only intermittently following commands in B/L UE. GCS 8. MRI shows Acute IVH, SAH w/ obstructive hydrocephalus w/o signs of ischemia and R A2 QUIQUE aneurysm. VN consulted. Patient intubated in ED after concern for airway protection. NSGY placed EVD. IR consulted for possible angiogram. CTA shows diffuse subarachnoid hemorrhage with moderate hydrocephalus. No intracranial aneurysm or AVM is identified. NIHSS 9 on admission.     - IR angiogram on 1/27 shows no aneurysm, AVM, or AV fistula. Will need repeat angiogram on 2/3; will discuss with IR   - CTA shows diffuse subarachnoid hemorrhage with moderate hydrocephalus. No intracranial aneurysm or AVM is identified.  - Repeat DSA on PBD 7 without aneurysm, AVM or AV fistula.     - Continued monitoring in NCC  - Q1H neuro checks and vitals  - NSGY following; appreciate recs  - EVD open @15, clamp trial per Neurosurgery   - Ancef for drain ppx, d/c after EVD removal   - Daily TCDs  - Echo reviewed   - CBC, CMP, Mag, Phos daily, replace PRN  - SBP goal <160  - Cardene gtt, wean as able   - PRN labetalol, hydralazine  - SQ heparin  - PT/OT/SLP

## 2023-02-04 NOTE — PROGRESS NOTES
[facility]  Vascular Neurology  [facility]  Progress Note    Assessment/Plan:     * SAH (subarachnoid hemorrhage)  57yo female with PMH DM1, HTN, autoimmune hepatitis who presented to the ED with  for c/o AMS. Per  patient was c/o of headache last night and vomited. She went to sleep around 9pm then woke up this morning and he noticed that she was confused. She is a type 1 diabetic patient and blood glucose was 300.  states that last time she was having similar symptoms was a couple of years ago when she was in DKA. SBP 150s. On exam very drowsy with difficulty arousing patient, however intermittently follows commands with  no focal weakness. Patient determined to be a candidate for the wake up protocol. MRI IP showed  an acute intraventricular and subarachnoid hemorrhage with early obstructive hydrocephalus and transependymal CSF egress. Also with a noted 21.5-2 mm laterally directed outpouching at the proximal right A2 QUIQUE could represent infundibulum/tortuous origin of a proximal QUIQUE branch, noting that a small aneurysm is difficult to exclude given motion. Patient will be admitted to the Neuro ICU for closer monitoring with a neurosurgery consultation for further recommendations.    Remains in NCC, NAEO and neuro exam stable. EVD in place, post-DSA CTH with mild increase in ventricle size and EVD reopened. Daily TCDs remain without vasospasm.       Antithrombotics for secondary stroke prevention: Antiplatelets: None: Hemorrhage any type    Aggressive risk factor modification: HTN     Rehab efforts: Mechanical soft/thin liquids; IPR     Diagnostics ordered/pending: None     VTE prophylaxis: Heparin 5000 units SQ every 8 hours  Mechanical prophylaxis: Place SCDs    BP parameters: SAH: Unsecured aneurysm, SBP <140      IVH (intraventricular hemorrhage)  See ICH     Vasogenic cerebral edema  Area of vasogenic cerebral edema identified when reviewing brain  imaging.   --Admitted to [facility], NSGY consulted and following. EVD currently in place  --Continue frequent neuro checks per floor protocol as any acute changes or worsening neuro status may signify expansion of the insult and/or area of the edema, which may require acute interventions to prevent loss of function and/or death.  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 NIHSS 25, exam limited by intubation and drowsiness. Cerebral angiogram completed yesterday without evidence of abnormalities to account for the SAH; no aneurysm, no AVM, no fistula. Patient will likely need repeat angio in 7 days. MRI W/WO pending, scheduled for this afternoon.   1/29-02/03/2023 Patient very drowsy following angiogram. Negative DSA today. EVD remains in place. Therapy recs for IPR and mechanical soft diet with thin liquids.   02/04/2023 Remains in NCC, NAEO and neuro exam stable. EVD in place, post-DSA CTH with mild increase in ventricle size and EVD reopened. Daily TCDs remain without vasospasm.       STROKE DOCUMENTATION   Acute Stroke Times   Last Known Normal Date: 01/26/23  Last Known Normal Time: 2100  Unknown Symptom Onset Date: Unknown Date  Unknown Symptom Onset Time: Unknown Time  Stroke Team Called Date: 01/27/23  Stroke Team Called Time: 0942  Stroke Team Arrival Date: 01/27/23  Stroke Team Arrival Time: 0947  MRI Acute Stroke Protocol Interpretation Time: 1030    NIH Scale:  1a. Level of Consciousness: 1-->Not alert, but arousable by minor stimulation to obey, answer, or respond  1b. LOC  Questions: 1-->Answers one question correctly  1c. LOC Commands: 0-->Performs both tasks correctly  2. Best Gaze: 0-->Normal  3. Visual: 0-->No visual loss  4. Facial Palsy: 0-->Normal symmetrical movements  5a. Motor Arm, Left: 1-->Drift, limb holds 90 (or 45) degrees, but drifts down before full 10 seconds, does not hit bed or other support  5b. Motor Arm, Right: 1-->Drift, limb holds 90 (or 45) degrees, but drifts down before full 10 secs, does not hit bed or other support  6a. Motor Leg, Left: 1-->Drift, leg falls by the end of the 5-sec period but does not hit bed  6b. Motor Leg, Right: 1-->Drift, leg falls by the end of the 5-sec period but does not hit bed  7. Limb Ataxia: 0-->Absent  8. Sensory: 0-->Normal, no sensory loss  9. Best Language: 0-->No aphasia, normal  10. Dysarthria: 0-->Normal  11. Extinction and Inattention (formerly Neglect): 0-->No abnormality  Total (NIH Stroke Scale): 6       Modified Eduardo Score: 0  [place] Coma Scale:    ABCD2 Score:    [id number] Score:   HAS -BLED Score:   ICH Score:   Hunt & Guzman Classification:Grade III     Hemorrhagic change of an Ischemic Stroke: Does this patient have an ischemic stroke with hemorrhagic changes? No     Neurologic Chief Complaint: HA    Subjective:     Interval History: Patient is seen for follow-up neurological assessment and treatment recommendations:   Remains in NCC, NAEO and neuro exam stable. EVD in place, post-DSA CTH with mild increase in ventricle size and EVD reopened. Daily TCDs remain without vasospasm.     HPI, Past Medical, Family, and Social History remains the same as documented in the initial encounter.     Review of Systems   Unable to perform ROS: Other (Drowsiness)   Constitutional:  Positive for fatigue.   HENT:  Negative for trouble swallowing.    Gastrointestinal:  Negative for vomiting.   Neurological:  Negative for weakness.   Scheduled Meds:   albuterol-ipratropium  3 mL Nebulization Q6H WAKE    amantadine HCL  100  mg Oral BID    ceFAZolin (ANCEF) IVPB  2 g Intravenous Q8H    heparin (porcine)  5,000 Units Subcutaneous Q8H    insulin aspart U-100  9 Units Subcutaneous TIDWM    insulin detemir U-100  16 Units Subcutaneous BID     Continuous Infusions:   niCARdipine Stopped (02/03/23 0003)     PRN Meds:acetaminophen, dextrose 10%, glucagon (human recombinant), glucose, glucose, hydrALAZINE, insulin aspart U-100, labetalol, magnesium oxide, magnesium oxide, ondansetron, potassium bicarbonate, potassium bicarbonate, potassium bicarbonate, potassium, sodium phosphates, potassium, sodium phosphates, potassium, sodium phosphates, sodium chloride 0.9%, sodium chloride 0.9%, sodium chloride 0.9%    Objective:     Vital Signs (Most Recent):  Temp: 98.9 °F (37.2 °C) (02/04/23 1105)  Pulse: 73 (02/04/23 1205)  Resp: 18 (02/04/23 1205)  BP: (!) 142/62 (02/04/23 1205)  SpO2: 98 % (02/04/23 1205)  BP Location: Left arm    Vital Signs Range (Last 24H):  Temp:  [97.4 °F (36.3 °C)-100.8 °F (38.2 °C)]   Pulse:  []   Resp:  [18-26]   BP: (115-162)/(54-80)   SpO2:  [96 %-100 %]   BP Location: Left arm    Physical Exam  Vitals and nursing note reviewed.   Constitutional:       General: She is not in acute distress.     Comments: Drowsy    HENT:      Head: Normocephalic.      Comments: EVD in place      Nose: No rhinorrhea.      Mouth/Throat:      Mouth: Mucous membranes are moist.   Eyes:      General: No scleral icterus.     Extraocular Movements: Extraocular movements intact.      Conjunctiva/sclera: Conjunctivae normal.   Cardiovascular:      Rate and Rhythm: Normal rate.   Pulmonary:      Effort: Pulmonary effort is normal. No respiratory distress.   Musculoskeletal:         General: No tenderness.   Skin:     General: Skin is warm.   Neurological:      Sensory: No sensory deficit.      Motor: No weakness.      Comments: Moves all extremities against gravity   Intact to light touch   Follows commands        Neurological Exam:   LOC:  drowsy  Attention Span: poor   Language: No aphasia  Articulation: No dysarthria  Orientation: Person, Place, Time   EOM (CN III, IV, VI): Full/intact  Facial Movement (CN VII): Symmetric facial expression    Motor: Moves all extremities spontaneously/antigravity   Sensation: Intact to light touch     Laboratory:  CMP:   Recent Labs   Lab 02/04/23  0435   CALCIUM 9.7   ALBUMIN 2.9*   PROT 6.8   *   K 3.5   CO2 24   *   BUN 14   CREATININE 0.7   ALKPHOS 177*   ALT 23   AST 23   BILITOT 0.3       CBC:   Recent Labs   Lab 02/04/23  0435   WBC 11.84   RBC 3.71*   HGB 11.4*   HCT 36.1*      MCV 97   MCH 30.7   MCHC 31.6*       Lipid Panel: No results for input(s): CHOL, LDLCALC, HDL, TRIG in the last 168 hours.  Coagulation: No results for input(s): PT, INR, APTT in the last 168 hours.  Platelet Aggregation Study: No results for input(s): PLTAGG, PLTAGINTERP, PLTAGREGLACO, ADPPLTAGGREG in the last 168 hours.  Hgb A1C: No results for input(s): HGBA1C in the last 168 hours.  TSH: No results for input(s): TSH in the last 168 hours.    Diagnostic Results     Brain/Vessel Imaging   CTH 2/3/23  There appears to be mild increased intracranial hemorrhage however this is thought more likely artifactual due to the presence of vascular contrast from recent arteriogram as discussed above.    IR Angiogram Carotid Cerebral BL 2/3/23  1. Angiogram negative for any vascular etiologies/malformations accounting for the SAH/IVH.  Negative for any vascular malformations - AVMs/AVF/aneurysms.  Negative for any significant vasospasm.    CTH 2/3/23   1. Stable position of the right ventriculostomy catheter and stable prominence of the ventricles.  2. Stable subarachnoid hemorrhage and intraventricular hemorrhage.  3. Stable trace subdural hemorrhage.  4. No new areas of hemorrhage.    CTH 2/2/23  Stable subarachnoid and intraventricular hemorrhage. Ventriculostomy catheter in place with stable mild prominence of ventricles.  Stable trace subdural hemorrhage. No new hemorrhage or major vascular distribution infarct    CTH 2/2/23   Stable position of right frontal ventriculostomy catheter with decreased size of the ventricles compared to prior exam compatible with improving hydrocephalus. Decreased volume of multi compartment hemorrhage as described above. No evidence for significant new hemorrhage or evidence for acute major vascular distribution infarct.    MRI Brain W WO Contrast 1/28/23   Brain: Slight improvement in mild to moderate hydrocephalus. Diffuse subarachnoid and intraventricular hemorrhage again noted. No acute infarct. No enhancing intracranial lesion or source of intracranial hemorrhage identified.  MRA: No aneurysm or AVM identified.  No evidence of vasospasm.  No abnormal vessel wall enhancement.    MRA Neck/Brain WO Contrast 1/28/23   There is no significant stenosis at the carotid bifurcations by NASCET criteria.  The vertebral arteries are patent. No evidence of definite prominent vessels within the cervical spinal canal to suggest a cervical vascular malformation.    IR Carotid Cerebral Angiogram 1/27/23   Preliminary interpretation: 6 vessel angiogram showed no abnormalities to account for SAH. No aneurysm, AVM or AV fistula.  Please see Imaging report for full details.     CTH Without Contrast 1/27/23  Postsurgical changes status post intraventricular catheter placement with its tip between the frontal horns.  Stable moderate hydrocephalus.     CTA Head and Neck 1/27/23  -Diffuse subarachnoid hemorrhage with moderate hydrocephalus.  -No intracranial aneurysm or AVM is identified.  -Atherosclerotic disease but no advanced stenosis of the carotid or vertebral arteries in the neck with no major branch stenosis/occlusion at the [id number]     MRI Brain Ischemic Protocol 1/27/23  1. Acute intraventricular and subarachnoid hemorrhage, as described.  Findings compatible with early obstructive hydrocephalus and  transependymal CSF egress.  2. No evidence of acute ischemia or recent infarction.  3. MRA motion compromised. Tiny approximately 1.5-2 mm laterally directed outpouching at the proximal right A2 QUIQUE could represent infundibulum/tortuous origin of a proximal QUIQUE branch, noting that a small aneurysm is difficult to exclude given motion.  Further characterization with CTA could be performed given extensive motion on the current study.       Cardiac Imaging   TTE 1/28/23    The left ventricle is normal in size with normal systolic function.   The estimated ejection fraction is 65-70%.   Normal left ventricular diastolic function.   Normal right ventricular size with normal right ventricular systolic function.   There is no significant tricuspid regurgitation and therefore the pulmonary artery systolic pressure cannot be reported.   Mechanically ventilated; cannot use inferior caval vein diameter to estimate central venous pressure        JEREMY Larios  [facility]  Department of Vascular Neurology   [facility]

## 2023-02-04 NOTE — ASSESSMENT & PLAN NOTE
Stroke risk factor  Goal SBP <140, keep MAP >65  Previously on cardene gtt but dc'd 2/3  PRN labetalol/hydralazine

## 2023-02-05 LAB
ALBUMIN SERPL BCP-MCNC: 2.8 G/DL (ref 3.5–5.2)
ALP SERPL-CCNC: 168 U/L (ref 55–135)
ALT SERPL W/O P-5'-P-CCNC: 16 U/L (ref 10–44)
ANION GAP SERPL CALC-SCNC: 12 MMOL/L (ref 8–16)
AST SERPL-CCNC: 22 U/L (ref 10–40)
BASOPHILS # BLD AUTO: 0.04 K/UL (ref 0–0.2)
BASOPHILS NFR BLD: 0.3 % (ref 0–1.9)
BILIRUB SERPL-MCNC: 0.3 MG/DL (ref 0.1–1)
BUN SERPL-MCNC: 13 MG/DL (ref 6–20)
CALCIUM SERPL-MCNC: 9.8 MG/DL (ref 8.7–10.5)
CHLORIDE SERPL-SCNC: 108 MMOL/L (ref 95–110)
CO2 SERPL-SCNC: 23 MMOL/L (ref 23–29)
CREAT SERPL-MCNC: 0.7 MG/DL (ref 0.5–1.4)
DIFFERENTIAL METHOD: ABNORMAL
EOSINOPHIL # BLD AUTO: 0.4 K/UL (ref 0–0.5)
EOSINOPHIL NFR BLD: 3.3 % (ref 0–8)
ERYTHROCYTE [DISTWIDTH] IN BLOOD BY AUTOMATED COUNT: 12.2 % (ref 11.5–14.5)
EST. GFR  (NO RACE VARIABLE): >60 ML/MIN/1.73 M^2
GLUCOSE SERPL-MCNC: 102 MG/DL (ref 70–110)
HCT VFR BLD AUTO: 34.6 % (ref 37–48.5)
HGB BLD-MCNC: 11.1 G/DL (ref 12–16)
IMM GRANULOCYTES # BLD AUTO: 0.07 K/UL (ref 0–0.04)
IMM GRANULOCYTES NFR BLD AUTO: 0.6 % (ref 0–0.5)
LYMPHOCYTES # BLD AUTO: 2.6 K/UL (ref 1–4.8)
LYMPHOCYTES NFR BLD: 22.7 % (ref 18–48)
MAGNESIUM SERPL-MCNC: 2 MG/DL (ref 1.6–2.6)
MCH RBC QN AUTO: 31.1 PG (ref 27–31)
MCHC RBC AUTO-ENTMCNC: 32.1 G/DL (ref 32–36)
MCV RBC AUTO: 97 FL (ref 82–98)
MONOCYTES # BLD AUTO: 1 K/UL (ref 0.3–1)
MONOCYTES NFR BLD: 9 % (ref 4–15)
NEUTROPHILS # BLD AUTO: 7.4 K/UL (ref 1.8–7.7)
NEUTROPHILS NFR BLD: 64.1 % (ref 38–73)
NRBC BLD-RTO: 0 /100 WBC
PHOSPHATE SERPL-MCNC: 3.1 MG/DL (ref 2.7–4.5)
PLATELET # BLD AUTO: 256 K/UL (ref 150–450)
PMV BLD AUTO: 9.3 FL (ref 9.2–12.9)
POCT GLUCOSE: 132 MG/DL (ref 70–110)
POCT GLUCOSE: 152 MG/DL (ref 70–110)
POCT GLUCOSE: 289 MG/DL (ref 70–110)
POCT GLUCOSE: 99 MG/DL (ref 70–110)
POTASSIUM SERPL-SCNC: 3.3 MMOL/L (ref 3.5–5.1)
POTASSIUM SERPL-SCNC: 3.6 MMOL/L (ref 3.5–5.1)
PROT SERPL-MCNC: 6.5 G/DL (ref 6–8.4)
RBC # BLD AUTO: 3.57 M/UL (ref 4–5.4)
SODIUM SERPL-SCNC: 143 MMOL/L (ref 136–145)
WBC # BLD AUTO: 11.5 K/UL (ref 3.9–12.7)

## 2023-02-05 PROCEDURE — 20000000 HC ICU ROOM

## 2023-02-05 PROCEDURE — 83735 ASSAY OF MAGNESIUM: CPT

## 2023-02-05 PROCEDURE — 25000242 PHARM REV CODE 250 ALT 637 W/ HCPCS: Performed by: STUDENT IN AN ORGANIZED HEALTH CARE EDUCATION/TRAINING PROGRAM

## 2023-02-05 PROCEDURE — 94640 AIRWAY INHALATION TREATMENT: CPT

## 2023-02-05 PROCEDURE — 63600175 PHARM REV CODE 636 W HCPCS: Performed by: STUDENT IN AN ORGANIZED HEALTH CARE EDUCATION/TRAINING PROGRAM

## 2023-02-05 PROCEDURE — 25000003 PHARM REV CODE 250: Performed by: PSYCHIATRY & NEUROLOGY

## 2023-02-05 PROCEDURE — 25000003 PHARM REV CODE 250: Performed by: STUDENT IN AN ORGANIZED HEALTH CARE EDUCATION/TRAINING PROGRAM

## 2023-02-05 PROCEDURE — 99233 SBSQ HOSP IP/OBS HIGH 50: CPT | Mod: ,,, | Performed by: PSYCHIATRY & NEUROLOGY

## 2023-02-05 PROCEDURE — 85025 COMPLETE CBC W/AUTO DIFF WBC: CPT

## 2023-02-05 PROCEDURE — 84100 ASSAY OF PHOSPHORUS: CPT

## 2023-02-05 PROCEDURE — 63600175 PHARM REV CODE 636 W HCPCS

## 2023-02-05 PROCEDURE — 84132 ASSAY OF SERUM POTASSIUM: CPT | Performed by: PSYCHIATRY & NEUROLOGY

## 2023-02-05 PROCEDURE — 25000003 PHARM REV CODE 250

## 2023-02-05 PROCEDURE — 80053 COMPREHEN METABOLIC PANEL: CPT

## 2023-02-05 PROCEDURE — 99233 PR SUBSEQUENT HOSPITAL CARE,LEVL III: ICD-10-PCS | Mod: ,,, | Performed by: PSYCHIATRY & NEUROLOGY

## 2023-02-05 PROCEDURE — 94761 N-INVAS EAR/PLS OXIMETRY MLT: CPT

## 2023-02-05 RX ORDER — HYDROXYZINE HYDROCHLORIDE 25 MG/1
25 TABLET, FILM COATED ORAL 3 TIMES DAILY PRN
Status: DISCONTINUED | OUTPATIENT
Start: 2023-02-05 | End: 2023-02-05

## 2023-02-05 RX ORDER — HYDROXYZINE HYDROCHLORIDE 25 MG/1
25 TABLET, FILM COATED ORAL EVERY 8 HOURS
Status: DISCONTINUED | OUTPATIENT
Start: 2023-02-05 | End: 2023-02-05

## 2023-02-05 RX ADMIN — AMANTADINE HYDROCHLORIDE 100 MG: 50 SOLUTION ORAL at 02:02

## 2023-02-05 RX ADMIN — INSULIN ASPART 6 UNITS: 100 INJECTION, SOLUTION INTRAVENOUS; SUBCUTANEOUS at 09:02

## 2023-02-05 RX ADMIN — POTASSIUM BICARBONATE 35 MEQ: 391 TABLET, EFFERVESCENT ORAL at 06:02

## 2023-02-05 RX ADMIN — CEFAZOLIN 2 G: 2 INJECTION, POWDER, FOR SOLUTION INTRAMUSCULAR; INTRAVENOUS at 11:02

## 2023-02-05 RX ADMIN — INSULIN ASPART 5 UNITS: 100 INJECTION, SOLUTION INTRAVENOUS; SUBCUTANEOUS at 07:02

## 2023-02-05 RX ADMIN — IPRATROPIUM BROMIDE AND ALBUTEROL SULFATE 3 ML: .5; 3 SOLUTION RESPIRATORY (INHALATION) at 07:02

## 2023-02-05 RX ADMIN — Medication 6 MG: at 08:02

## 2023-02-05 RX ADMIN — HEPARIN SODIUM 5000 UNITS: 5000 INJECTION INTRAVENOUS; SUBCUTANEOUS at 09:02

## 2023-02-05 RX ADMIN — CEFAZOLIN 2 G: 2 INJECTION, POWDER, FOR SOLUTION INTRAMUSCULAR; INTRAVENOUS at 04:02

## 2023-02-05 RX ADMIN — CEFAZOLIN 2 G: 2 INJECTION, POWDER, FOR SOLUTION INTRAMUSCULAR; INTRAVENOUS at 08:02

## 2023-02-05 RX ADMIN — INSULIN ASPART 5 UNITS: 100 INJECTION, SOLUTION INTRAVENOUS; SUBCUTANEOUS at 11:02

## 2023-02-05 RX ADMIN — HYDROXYZINE HYDROCHLORIDE 25 MG: 25 TABLET, FILM COATED ORAL at 06:02

## 2023-02-05 RX ADMIN — INSULIN DETEMIR 16 UNITS: 100 INJECTION, SOLUTION SUBCUTANEOUS at 08:02

## 2023-02-05 RX ADMIN — HEPARIN SODIUM 5000 UNITS: 5000 INJECTION INTRAVENOUS; SUBCUTANEOUS at 06:02

## 2023-02-05 RX ADMIN — POTASSIUM BICARBONATE 35 MEQ: 391 TABLET, EFFERVESCENT ORAL at 07:02

## 2023-02-05 RX ADMIN — HEPARIN SODIUM 5000 UNITS: 5000 INJECTION INTRAVENOUS; SUBCUTANEOUS at 02:02

## 2023-02-05 RX ADMIN — AMANTADINE HYDROCHLORIDE 100 MG: 50 SOLUTION ORAL at 06:02

## 2023-02-05 RX ADMIN — IPRATROPIUM BROMIDE AND ALBUTEROL SULFATE 3 ML: .5; 3 SOLUTION RESPIRATORY (INHALATION) at 03:02

## 2023-02-05 RX ADMIN — INSULIN DETEMIR 16 UNITS: 100 INJECTION, SOLUTION SUBCUTANEOUS at 09:02

## 2023-02-05 RX ADMIN — HYDROXYZINE HYDROCHLORIDE 25 MG: 25 TABLET, FILM COATED ORAL at 01:02

## 2023-02-05 NOTE — SUBJECTIVE & OBJECTIVE
Subjective:     Interval History:  As above.    Review of Systems   Constitutional:  Negative for fatigue and fever.   HENT:  Negative for trouble swallowing.    Eyes:  Negative for photophobia and visual disturbance.   Respiratory:  Negative for chest tightness and shortness of breath.    Cardiovascular:  Negative for chest pain and leg swelling.   Gastrointestinal:  Negative for nausea and vomiting.   Musculoskeletal:  Negative for neck stiffness.   Neurological:  Positive for speech difficulty and headaches. Negative for dizziness.   Psychiatric/Behavioral:  Positive for decreased concentration. Negative for agitation.      Objective:     Vitals:  Temp: 98.7 °F (37.1 °C)  Pulse: 80  Rhythm: normal sinus rhythm  BP: 130/62  MAP (mmHg): 89  ICP Mean (mmHg): 7 mmHg  Resp: 20  SpO2: 99 %    Temp  Min: 98.5 °F (36.9 °C)  Max: 100.5 °F (38.1 °C)  Pulse  Min: 61  Max: 84  BP  Min: 114/54  Max: 175/81  MAP (mmHg)  Min: 78  Max: 117  ICP Mean (mmHg)  Min: 2 mmHg  Max: 19 mmHg  Resp  Min: 13  Max: 24  SpO2  Min: 97 %  Max: 100 %    02/04 0701 - 02/05 0700  In: 702.3 [P.O.:598; I.V.:50]  Out: 849 [Urine:700; Drains:149]   Unmeasured Output  Urine Occurrence: 1  Stool Occurrence: 1  Emesis Occurrence: 0  Pad Count: 2       Physical Exam  Vitals and nursing note reviewed.   HENT:      Head: Normocephalic.      Comments: EVD in place, open at 15     Nose: Nose normal.      Mouth/Throat:      Mouth: Mucous membranes are moist.      Pharynx: Oropharynx is clear.   Cardiovascular:      Rate and Rhythm: Normal rate and regular rhythm.      Pulses: Normal pulses.      Heart sounds: Normal heart sounds.   Pulmonary:      Effort: Pulmonary effort is normal.      Breath sounds: Normal breath sounds.   Abdominal:      General: Bowel sounds are normal.      Palpations: Abdomen is soft.   Musculoskeletal:         General: Normal range of motion.   Skin:     General: Skin is warm and dry.      Capillary Refill: Capillary refill takes 2  to 3 seconds.   Neurological:      Mental Status: She is alert.      Comments:   GCS 15 with improved delayed response time   Waxing/waning fatigue  States full name, location and month  Follows commands in BUE/BLE  PERRL  BRAGG to command and spontaneously  Sensation grossly intact  Ambulating with assistance        Unable to test orientation, language, memory, judgment, insight, fund of knowledge, hearing, shoulder shrug, tongue protrusion, coordination, gait due to level of consciousness.    Medications:  ContinuousniCARdipine, Last Rate: Stopped (02/03/23 0003)  Scheduledalbuterol-ipratropium, 3 mL, Q6H WAKE  amantadine HCL, 100 mg, BID  ceFAZolin (ANCEF) IVPB, 2 g, Q8H  heparin (porcine), 5,000 Units, Q8H  insulin aspart U-100, 5 Units, TIDWM  insulin detemir U-100, 16 Units, BID  PRNacetaminophen, 650 mg, Q6H PRN  dextrose 10%, 12.5 g, PRN  glucagon (human recombinant), 1 mg, PRN  glucose, 16 g, PRN  glucose, 24 g, PRN  hydrALAZINE, 10 mg, Q6H PRN  insulin aspart U-100, 0-15 Units, QID (AC + HS) PRN  labetalol, 10 mg, Q4H PRN  magnesium oxide, 800 mg, PRN  magnesium oxide, 800 mg, PRN  melatonin, 6 mg, Nightly PRN  ondansetron, 4 mg, Q6H PRN  potassium bicarbonate, 35 mEq, PRN  potassium bicarbonate, 50 mEq, PRN  potassium bicarbonate, 60 mEq, PRN  potassium, sodium phosphates, 2 packet, PRN  potassium, sodium phosphates, 2 packet, PRN  potassium, sodium phosphates, 2 packet, PRN  sodium chloride 0.9%, 10 mL, PRN  sodium chloride 0.9%, 10 mL, PRN  sodium chloride 0.9%, 10 mL, PRN    Today I personally reviewed pertinent medications, lines/drains/airways, imaging, cardiology results, laboratory results, microbiology results, notably:    Glucose better controlled, monitor PO intake  CTH with mild dilation of ventricles, EVD open at 15    Diet  Diet diabetic [facility]; 2000 Calorie  Diet diabetic [facility]; 2000 Calorie

## 2023-02-05 NOTE — PROGRESS NOTES
[facility]  Neurocritical Care  Progress Note    Admit Date: 1/27/2023  Service Date: 02/05/2023  Length of Stay: 9    Subjective:     Chief Complaint: SAH (subarachnoid hemorrhage)    History of Present Illness: Mrs. Gupta 57 yo F w/ PMH DM1 (dx 2013), HTN, autoimmune hepatitis presented to the ED for AMS this morning where she was unable to answer questions appropriately after a shower.  states patient had a HA and vomited the night before she went to sleep. LNK was 10 pm. Patient had a similar episode a few years ago, but was admitted for DKA.  states patient has worsened neurologically since they left their house. On exam, patient was non-verbal, fixed pupils, intermittently following commands in B/L UE, but unable in LE. GCS 8. MRI performed in ED showed acute intraventricular and subarachnoid hemorrhage with early obstructive hydrocephalus and transependymal CSF egress. Also with a noted 21.5-2 mm laterally directed outpouching at the proximal right A2 QUIQUE could represent infundibulum/tortuous origin of a proximal QUIQUE branch. NSGY consulted and placed EVD after patient was intubated after concern for airway protection. Started Keppra 1g BID. CTA ordered. IR consulted for possible angiogram. No leukocytosis. H/H stable. Cr 0.9. Glucose 284. NCC will admit patient for HLOC.          Hospital Course: 01/28/2023: IR performed angiogram shows no aneurysm, AVM or AV fistula. EVD open @20. Will wean propofol and transition to precedex. Started tube feeds and SQ heparin. Transitioned from insulin gtt to detemir.   01/29/2023 [facility] vent; off sedation; TCDs w/o vasospasm; will need repeat angio within a week; MRI brain completed no lesions found; TFS to goal; replete lytes; keep I/Os nearly matched w/ IVF boluses; consider yudy if sbp dips with analgesia and/or sedation;   01/30/2023 Extubated with parameters to NC. Some wheezing post extubation, PRN duonebs and racemic epi x1. IV Dex  4mg q6 hours x 1 day and close airway watch. Cardene to maintain SBP<140. EVD open at 20. Monitoring TCDs daily, no current evidence of vasospasm. Glucose elevated, SSI in place, may require Insulin gtt 2/2 steroids.   1/31/23: possible angio Friday 02/01/2023 No acute events overnight. EVD clamp trial per Neurosurgery, continue to monitor neurologic exam with CTH in AM. TCDs without evidence of vasospasm. Remains hyperglycemia, steroids discontinued, increasing long acting to 12u BID.  02/02/2023 No acute events overnight. CTH with EVD clamped stable, plan to continue clamp trial for one more day. DSA tomorrow on PBD7.   02/02/2023 More sedated on exam and difficult to arouse but will arouse to answer questions (full name, at [facility]) before drifting back to sleep. ICP stable 7-14. CTH stable.   02/03/2023 CTH stable overnight. Ambulating with PT/OT. Exam improved, A&Ox3. DSA without aneurysm, AVM or AV fistula. Elevated ICP during angiogram, EVD open at 20. Post procedure CTH pending.   02/04/2023 CTH post angio with slight increase in ventricle size compared to prior. EVD reopened to 10 and subsequently raised to 15. Plan to repeat clamp trial per Neurosurgery. Exam unchanged. Encourage PO water intake for elevated sodium.   02/05/2023 EVD at 15, plan to keep open at 15 until tomorrow. Glucose better controlled with Detemir 16 BID, aspart 5. Montior blood glucose as PO intake increases. TCDs without evidence of vasospasm.         Subjective:     Interval History:  As above.    Review of Systems   Constitutional:  Negative for fatigue and fever.   HENT:  Negative for trouble swallowing.    Eyes:  Negative for photophobia and visual disturbance.   Respiratory:  Negative for chest tightness and shortness of breath.    Cardiovascular:  Negative for chest pain and leg swelling.   Gastrointestinal:  Negative for nausea and vomiting.   Musculoskeletal:  Negative for neck stiffness.   Neurological:  Positive for speech  difficulty and headaches. Negative for dizziness.   Psychiatric/Behavioral:  Positive for decreased concentration. Negative for agitation.      Objective:     Vitals:  Temp: 98.7 °F (37.1 °C)  Pulse: 80  Rhythm: normal sinus rhythm  BP: 130/62  MAP (mmHg): 89  ICP Mean (mmHg): 7 mmHg  Resp: 20  SpO2: 99 %    Temp  Min: 98.5 °F (36.9 °C)  Max: 100.5 °F (38.1 °C)  Pulse  Min: 61  Max: 84  BP  Min: 114/54  Max: 175/81  MAP (mmHg)  Min: 78  Max: 117  ICP Mean (mmHg)  Min: 2 mmHg  Max: 19 mmHg  Resp  Min: 13  Max: 24  SpO2  Min: 97 %  Max: 100 %    02/04 0701 - 02/05 0700  In: 702.3 [P.O.:598; I.V.:50]  Out: 849 [Urine:700; Drains:149]   Unmeasured Output  Urine Occurrence: 1  Stool Occurrence: 1  Emesis Occurrence: 0  Pad Count: 2       Physical Exam  Vitals and nursing note reviewed.   HENT:      Head: Normocephalic.      Comments: EVD in place, open at 15     Nose: Nose normal.      Mouth/Throat:      Mouth: Mucous membranes are moist.      Pharynx: Oropharynx is clear.   Cardiovascular:      Rate and Rhythm: Normal rate and regular rhythm.      Pulses: Normal pulses.      Heart sounds: Normal heart sounds.   Pulmonary:      Effort: Pulmonary effort is normal.      Breath sounds: Normal breath sounds.   Abdominal:      General: Bowel sounds are normal.      Palpations: Abdomen is soft.   Musculoskeletal:         General: Normal range of motion.   Skin:     General: Skin is warm and dry.      Capillary Refill: Capillary refill takes 2 to 3 seconds.   Neurological:      Mental Status: She is alert.      Comments:   GCS 15 with improved delayed response time   Waxing/waning fatigue  States full name, location and month  Follows commands in BUE/BLE  PERRL  BRAGG to command and spontaneously  Sensation grossly intact  Ambulating with assistance        Unable to test orientation, language, memory, judgment, insight, fund of knowledge, hearing, shoulder shrug, tongue protrusion, coordination, gait due to level of  consciousness.    Medications:  ContinuousniCARdipine, Last Rate: Stopped (02/03/23 0003)  Scheduledalbuterol-ipratropium, 3 mL, Q6H WAKE  amantadine HCL, 100 mg, BID  ceFAZolin (ANCEF) IVPB, 2 g, Q8H  heparin (porcine), 5,000 Units, Q8H  insulin aspart U-100, 5 Units, TIDWM  insulin detemir U-100, 16 Units, BID  PRNacetaminophen, 650 mg, Q6H PRN  dextrose 10%, 12.5 g, PRN  glucagon (human recombinant), 1 mg, PRN  glucose, 16 g, PRN  glucose, 24 g, PRN  hydrALAZINE, 10 mg, Q6H PRN  insulin aspart U-100, 0-15 Units, QID (AC + HS) PRN  labetalol, 10 mg, Q4H PRN  magnesium oxide, 800 mg, PRN  magnesium oxide, 800 mg, PRN  melatonin, 6 mg, Nightly PRN  ondansetron, 4 mg, Q6H PRN  potassium bicarbonate, 35 mEq, PRN  potassium bicarbonate, 50 mEq, PRN  potassium bicarbonate, 60 mEq, PRN  potassium, sodium phosphates, 2 packet, PRN  potassium, sodium phosphates, 2 packet, PRN  potassium, sodium phosphates, 2 packet, PRN  sodium chloride 0.9%, 10 mL, PRN  sodium chloride 0.9%, 10 mL, PRN  sodium chloride 0.9%, 10 mL, PRN    Today I personally reviewed pertinent medications, lines/drains/airways, imaging, cardiology results, laboratory results, microbiology results, notably:    Glucose better controlled, monitor PO intake  CTH with mild dilation of ventricles, EVD open at 15    Diet  Diet diabetic [facility]; 2000 Calorie  Diet diabetic [facility]; 2000 Calorie    Assessment/Plan:     Neuro  * SAH (subarachnoid hemorrhage)  51 yo F w/ PMH DM1, HTN presented to the ED with AMS after HA and vomiting the night before. Patient was non-verbal, B/l fixed pupils, and only intermittently following commands in B/L UE. GCS 8. MRI shows Acute IVH, SAH w/ obstructive hydrocephalus w/o signs of ischemia and R A2 QUIQUE aneurysm. VN consulted. Patient intubated in ED after concern for airway protection. NSGY placed EVD. IR consulted for possible angiogram. CTA shows diffuse subarachnoid hemorrhage with moderate hydrocephalus. No  intracranial aneurysm or AVM is identified. NIHSS 9 on admission.     - IR angiogram on 1/27 shows no aneurysm, AVM, or AV fistula. Will need repeat angiogram on 2/3; will discuss with IR   - CTA shows diffuse subarachnoid hemorrhage with moderate hydrocephalus. No intracranial aneurysm or AVM is identified.  - Repeat DSA on PBD 7 without aneurysm, AVM or AV fistula.     - Continued monitoring in NCC  - Q1H neuro checks and vitals  - NSGY following; appreciate recs  - EVD open @15, clamp trial per Neurosurgery pending  - Ancef for drain ppx, d/c after EVD removal   - Daily TCDs  - Echo reviewed   - CBC, CMP, Mag, Phos daily, replace PRN  - SBP goal <160  - Cardene gtt, wean as able   - PRN labetalol, hydralazine  - SQ heparin  - PT/OT/SLP    IVH (intraventricular hemorrhage)  See SAH  EVD care    Vasogenic cerebral edema  See SAH    Cardiac/Vascular  Hypertension  SBP 160s on arrival.   states patient does not take home Losartan regularly.     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 BG on arrival 284  Home regimen 13u Lantus; Lispro AC  A1C 6.5     -Transitioned from Insulin gtt to Detemir 16 BID  -Aspart 5 q4  -Hypoglycemic protocol in place  -Accuchecks q1h    GI  Autoimmune hepatitis  Hx of AIH. Previously on mercaptopurine. Not currently taking.   LFTs slightly elevated on arrival.    -Will continue to monitor  -Holding Tylenol and hepatotoxic agents  -CMP daily  -If LFTs worsen will obtain RUQ US.     Other  Debility  PT/OT    Endotracheally intubated-resolved as of 2/3/2023  Patient intubated in ED 2/2 concern for airway protection. GCS 7  Extubated 1/30 to NC  PRN Duonebs           The patient is being Prophylaxed for:  Venous Thromboembolism with: Chemical  Stress Ulcer with: Not Applicable   Ventilator Pneumonia with: not applicable    Activity Orders          Diet  diabetic [facility]; 2000 Calorie: Diabetic starting at 02/05 1116    Progressive Mobility Protocol (mobilize patient to their highest level of functioning at least twice daily) starting at 02/03 2000    Progressive Mobility Protocol (mobilize patient to their highest level of functioning at least twice daily) starting at 01/28 0800    Elevate HOB Collagen closure or PERCLOSE plug/device - Elevate HOB 30 degrees with limb immobilized for 2 hours after procedure. starting at 01/27 2018    Turn patient starting at 01/27 1400    Elevate HOB starting at 01/27 1221        Full Code    Ronak Pavon MD  Neurocritical Care  [facility]

## 2023-02-05 NOTE — PROGRESS NOTES
[facility]  Vascular Neurology  Comprehensive Stroke Center  Progress Note    Assessment/Plan:     * SAH (subarachnoid hemorrhage)  59yo female with PMH DM1, HTN, autoimmune hepatitis who presented to the ED with  for c/o AMS. Per  patient was c/o of headache last night and vomited. She went to sleep around 9pm then woke up this morning and he noticed that she was confused. She is a type 1 diabetic patient and blood glucose was 300.  states that last time she was having similar symptoms was a couple of years ago when she was in DKA. SBP 150s. On exam very drowsy with difficulty arousing patient, however intermittently follows commands with  no focal weakness. Patient determined to be a candidate for the wake up protocol. MRI IP showed  an acute intraventricular and subarachnoid hemorrhage with early obstructive hydrocephalus and transependymal CSF egress. Also with a noted 21.5-2 mm laterally directed outpouching at the proximal right A2 QUIQUE could represent infundibulum/tortuous origin of a proximal QUIQUE branch, noting that a small aneurysm is difficult to exclude given motion. Patient will be admitted to the Neuro ICU for closer monitoring with a neurosurgery consultation for further recommendations.    Remains in [facility], NAE but reported to be more agitated and restless overnight. EVD still in place and open.       Antithrombotics for secondary stroke prevention: Antiplatelets: None: Hemorrhage any type    Aggressive risk factor modification: HTN     Rehab efforts: Mechanical soft/thin liquids; IPR     Diagnostics ordered/pending: None     VTE prophylaxis: Heparin 5000 units SQ every 8 hours  Mechanical prophylaxis: Place SCDs    BP parameters: SAH: Unsecured aneurysm, SBP <140      IVH (intraventricular hemorrhage)  See ICH     Vasogenic cerebral edema  Area of vasogenic cerebral edema identified when reviewing brain imaging.   --Admitted to [facility], NSGY consulted and following.  EVD currently in place  --Continue frequent neuro checks per floor protocol as any acute changes or worsening neuro status may signify expansion of the insult and/or area of the edema, which may require acute interventions to prevent loss of function and/or death.  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 NIHSS 25, exam limited by intubation and drowsiness. Cerebral angiogram completed yesterday without evidence of abnormalities to account for the SAH; no aneurysm, no AVM, no fistula. Patient will likely need repeat angio in 7 days. MRI W/WO pending, scheduled for this afternoon.   1/29-02/03/2023 Patient very drowsy following angiogram. Negative DSA today. EVD remains in place. Therapy recs for IPR and mechanical soft diet with thin liquids.   02/04/2023 Remains in NCC, NAEO and neuro exam stable. EVD in place, post-DSA CTH with mild increase in ventricle size and EVD reopened. Daily TCDs remain without vasospasm.   02/05/2023 Remains in NCC, NAEO but reported to be more agitated and restless overnight. EVD still in place and open.         STROKE DOCUMENTATION   Acute Stroke Times   Last Known Normal Date: 01/26/23  Last Known Normal Time: 2100  Unknown Symptom Onset Date: Unknown Date  Unknown Symptom Onset Time: Unknown Time  Stroke Team Called Date: 01/27/23  Stroke Team Called Time: 0942  Stroke Team Arrival Date: 01/27/23  Stroke Team Arrival Time: 0947  MRI Acute Stroke Protocol Interpretation Time: 1030    NIH Scale:  1a. Level of Consciousness: 0-->Alert, keenly  responsive  1b. LOC Questions: 1-->Answers one question correctly  1c. LOC Commands: 0-->Performs both tasks correctly  2. Best Gaze: 0-->Normal  3. Visual: 0-->No visual loss  4. Facial Palsy: 0-->Normal symmetrical movements  5a. Motor Arm, Left: 1-->Drift, limb holds 90 (or 45) degrees, but drifts down before full 10 seconds, does not hit bed or other support  5b. Motor Arm, Right: 1-->Drift, limb holds 90 (or 45) degrees, but drifts down before full 10 secs, does not hit bed or other support  6a. Motor Leg, Left: 1-->Drift, leg falls by the end of the 5-sec period but does not hit bed  6b. Motor Leg, Right: 1-->Drift, leg falls by the end of the 5-sec period but does not hit bed  7. Limb Ataxia: 0-->Absent  8. Sensory: 0-->Normal, no sensory loss  9. Best Language: 0-->No aphasia, normal  10. Dysarthria: 0-->Normal  11. Extinction and Inattention (formerly Neglect): 0-->No abnormality  Total (NIH Stroke Scale): 5       Modified Kulm Score: 0  Lexus Coma Scale:    ABCD2 Score:    [id number] Score:   HAS -BLED Score:   ICH Score:   Hunt & Guzman Classification:Grade III     Hemorrhagic change of an Ischemic Stroke: Does this patient have an ischemic stroke with hemorrhagic changes? No     Neurologic Chief Complaint: HA    Subjective:     Interval History: Patient is seen for follow-up neurological assessment and treatment recommendations:   Remains in NCC, NAEO but reported to be more agitated and restless overnight. EVD still in place and open.     HPI, Past Medical, Family, and Social History remains the same as documented in the initial encounter.     Review of Systems   Constitutional:  Negative for diaphoresis.   HENT:  Negative for trouble swallowing.    Eyes:  Negative for photophobia.   Respiratory:  Negative for shortness of breath.    Gastrointestinal:  Negative for vomiting.   Musculoskeletal:  Negative for neck stiffness.   Neurological:  Negative for weakness.   Scheduled Meds:    albuterol-ipratropium  3 mL Nebulization Q6H WAKE    amantadine HCL  100 mg Oral BID    ceFAZolin (ANCEF) IVPB  2 g Intravenous Q8H    heparin (porcine)  5,000 Units Subcutaneous Q8H    insulin aspart U-100  5 Units Subcutaneous TIDWM    insulin detemir U-100  16 Units Subcutaneous BID     Continuous Infusions:   niCARdipine Stopped (02/03/23 0003)     PRN Meds:acetaminophen, dextrose 10%, glucagon (human recombinant), glucose, glucose, hydrALAZINE, insulin aspart U-100, labetalol, magnesium oxide, magnesium oxide, melatonin, ondansetron, potassium bicarbonate, potassium bicarbonate, potassium bicarbonate, potassium, sodium phosphates, potassium, sodium phosphates, potassium, sodium phosphates, sodium chloride 0.9%, sodium chloride 0.9%, sodium chloride 0.9%    Objective:     Vital Signs (Most Recent):  Temp: 98.7 °F (37.1 °C) (02/05/23 1105)  Pulse: 80 (02/05/23 1105)  Resp: 20 (02/05/23 1105)  BP: 130/62 (02/05/23 1105)  SpO2: 99 % (02/05/23 1105)  BP Location: Left arm    Vital Signs Range (Last 24H):  Temp:  [98.5 °F (36.9 °C)-100.5 °F (38.1 °C)]   Pulse:  [61-84]   Resp:  [13-24]   BP: (114-175)/(54-94)   SpO2:  [97 %-100 %]   BP Location: Left arm    Physical Exam  Vitals and nursing note reviewed.   Constitutional:       General: She is not in acute distress.  HENT:      Head: Normocephalic.      Comments: EVD in place      Mouth/Throat:      Mouth: Mucous membranes are moist.   Eyes:      General: No scleral icterus.     Extraocular Movements: Extraocular movements intact.      Conjunctiva/sclera: Conjunctivae normal.   Cardiovascular:      Rate and Rhythm: Normal rate.   Pulmonary:      Effort: Pulmonary effort is normal. No respiratory distress.   Musculoskeletal:         General: No tenderness.   Skin:     General: Skin is warm.   Neurological:      Mental Status: She is alert.      Sensory: No sensory deficit.      Motor: No weakness.      Comments: Moves all extremities against gravity   Intact to  light touch   Follows commands        Neurological Exam:   LOC: alert  Attention Span: good  Language: No aphasia  Articulation: No dysarthria  Orientation: Person, Place, Time   EOM (CN III, IV, VI): Full/intact  Facial Movement (CN VII): Symmetric facial expression    Motor: Moves all extremities spontaneously/antigravity   Sensation: Intact to light touch     Laboratory:  CMP:   Recent Labs   Lab 02/05/23  0248   CALCIUM 9.8   ALBUMIN 2.8*   PROT 6.5      K 3.3*   CO2 23      BUN 13   CREATININE 0.7   ALKPHOS 168*   ALT 16   AST 22   BILITOT 0.3       CBC:   Recent Labs   Lab 02/05/23  0248   WBC 11.50   RBC 3.57*   HGB 11.1*   HCT 34.6*      MCV 97   MCH 31.1*   MCHC 32.1       Lipid Panel: No results for input(s): CHOL, LDLCALC, HDL, TRIG in the last 168 hours.  Coagulation: No results for input(s): PT, INR, APTT in the last 168 hours.  Platelet Aggregation Study: No results for input(s): PLTAGG, PLTAGINTERP, PLTAGREGLACO, ADPPLTAGGREG in the last 168 hours.  Hgb A1C: No results for input(s): HGBA1C in the last 168 hours.  TSH: No results for input(s): TSH in the last 168 hours.    Diagnostic Results     Brain/Vessel Imaging   CTH 2/3/23  There appears to be mild increased intracranial hemorrhage however this is thought more likely artifactual due to the presence of vascular contrast from recent arteriogram as discussed above.    IR Angiogram Carotid Cerebral BL 2/3/23  1. Angiogram negative for any vascular etiologies/malformations accounting for the SAH/IVH.  Negative for any vascular malformations - AVMs/AVF/aneurysms.  Negative for any significant vasospasm.    CTH 2/3/23   1. Stable position of the right ventriculostomy catheter and stable prominence of the ventricles.  2. Stable subarachnoid hemorrhage and intraventricular hemorrhage.  3. Stable trace subdural hemorrhage.  4. No new areas of hemorrhage.    CTH 2/2/23  Stable subarachnoid and intraventricular hemorrhage. Ventriculostomy  catheter in place with stable mild prominence of ventricles. Stable trace subdural hemorrhage. No new hemorrhage or major vascular distribution infarct    CTH 2/2/23   Stable position of right frontal ventriculostomy catheter with decreased size of the ventricles compared to prior exam compatible with improving hydrocephalus. Decreased volume of multi compartment hemorrhage as described above. No evidence for significant new hemorrhage or evidence for acute major vascular distribution infarct.    MRI Brain W WO Contrast 1/28/23   Brain: Slight improvement in mild to moderate hydrocephalus. Diffuse subarachnoid and intraventricular hemorrhage again noted. No acute infarct. No enhancing intracranial lesion or source of intracranial hemorrhage identified.  MRA: No aneurysm or AVM identified.  No evidence of vasospasm.  No abnormal vessel wall enhancement.    MRA Neck/Brain WO Contrast 1/28/23   There is no significant stenosis at the carotid bifurcations by NASCET criteria.  The vertebral arteries are patent. No evidence of definite prominent vessels within the cervical spinal canal to suggest a cervical vascular malformation.    IR Carotid Cerebral Angiogram 1/27/23   Preliminary interpretation: 6 vessel angiogram showed no abnormalities to account for SAH. No aneurysm, AVM or AV fistula.  Please see Imaging report for full details.     CTH Without Contrast 1/27/23  Postsurgical changes status post intraventricular catheter placement with its tip between the frontal horns.  Stable moderate hydrocephalus.     CTA Head and Neck 1/27/23  -Diffuse subarachnoid hemorrhage with moderate hydrocephalus.  -No intracranial aneurysm or AVM is identified.  -Atherosclerotic disease but no advanced stenosis of the carotid or vertebral arteries in the neck with no major branch stenosis/occlusion at the [id number]     MRI Brain Ischemic Protocol 1/27/23  1. Acute intraventricular and subarachnoid hemorrhage, as described.   Findings compatible with early obstructive hydrocephalus and transependymal CSF egress.  2. No evidence of acute ischemia or recent infarction.  3. MRA motion compromised. Tiny approximately 1.5-2 mm laterally directed outpouching at the proximal right A2 QUIQUE could represent infundibulum/tortuous origin of a proximal QUIQUE branch, noting that a small aneurysm is difficult to exclude given motion.  Further characterization with CTA could be performed given extensive motion on the current study.       Cardiac Imaging   TTE 1/28/23    The left ventricle is normal in size with normal systolic function.   The estimated ejection fraction is 65-70%.   Normal left ventricular diastolic function.   Normal right ventricular size with normal right ventricular systolic function.   There is no significant tricuspid regurgitation and therefore the pulmonary artery systolic pressure cannot be reported.   Mechanically ventilated; cannot use inferior caval vein diameter to estimate central venous pressure        JEREMY Larios  [facility]  Department of Vascular Neurology   [facility]

## 2023-02-05 NOTE — ASSESSMENT & PLAN NOTE
Diagnosed in 2013. BG on arrival 284  Home regimen 13u Lantus; Lispro AC  A1C 6.5     -Transitioned from Insulin gtt to Detemir 16 BID  -Aspart 5 q4  -Hypoglycemic protocol in place  -Accuchecks q1h

## 2023-02-05 NOTE — PLAN OF CARE
Problem: Adult Inpatient Plan of Care  Goal: Plan of Care Review  Outcome: Ongoing, Progressing  Goal: Patient-Specific Goal (Individualized)  Description: Admit Date:     Admit Dx:    Past Medical History:  No date: Autoimmune hepatitis      Comment:  managed by Dr. Russell on mercaptopurine  No date: Diabetes mellitus type II  No date: Hypertension    Past Surgical History:  :  SECTION, CLASSIC  2019: COLONOSCOPY; N/A      Comment:  Procedure: COLONOSCOPY;  Surgeon: Dipesh Victoria MD;  Location: [address]);  Service: Endoscopy;                Laterality: N/A;  2013: FRACTURE SURGERY; Left      Comment:  leg   No date: MOUTH SURGERY    Individualization:   1. Lights dim    Restraints: 23 yes-pulling lines         Outcome: Ongoing, Progressing  Goal: Absence of Hospital-Acquired Illness or Injury  Outcome: Ongoing, Progressing  Goal: Optimal Comfort and Wellbeing  Outcome: Ongoing, Progressing  Goal: Readiness for Transition of Care  Outcome: Ongoing, Progressing     Problem: Adjustment to Illness (Stroke, Hemorrhagic)  Goal: Optimal Coping  Outcome: Ongoing, Progressing     Problem: Bowel Elimination Impaired (Stroke, Hemorrhagic)  Goal: Effective Bowel Elimination  Outcome: Ongoing, Progressing     Problem: Cerebral Tissue Perfusion (Stroke, Hemorrhagic)  Goal: Optimal Cerebral Tissue Perfusion  Outcome: Ongoing, Progressing     Problem: Cognitive Impairment (Stroke, Hemorrhagic)  Goal: Optimal Cognitive Function  Outcome: Ongoing, Progressing     Problem: Communication Impairment (Stroke, Hemorrhagic)  Goal: Effective Communication Skills  Outcome: Ongoing, Progressing     Problem: Functional Ability Impaired (Stroke, Hemorrhagic)  Goal: Optimal Functional Ability  Outcome: Ongoing, Progressing     Problem: Pain (Stroke, Hemorrhagic)  Goal: Acceptable Pain Control  Outcome: Ongoing, Progressing     Problem: Respiratory Compromise (Stroke, Hemorrhagic)  Goal:  Effective Oxygenation and Ventilation  Outcome: Ongoing, Progressing     Problem: Sensorimotor Impairment (Stroke, Hemorrhagic)  Goal: Improved Sensorimotor Function  Outcome: Ongoing, Progressing     Problem: Swallowing Impairment (Stroke, Hemorrhagic)  Goal: Optimal Eating and Swallowing Without Aspiration  Outcome: Ongoing, Progressing     Problem: Urinary Elimination Impaired (Stroke, Hemorrhagic)  Goal: Effective Urinary Elimination  Outcome: Ongoing, Progressing     Problem: Diabetes Comorbidity  Goal: Blood Glucose Level Within Targeted Range  Outcome: Ongoing, Progressing     Problem: Infection  Goal: Absence of Infection Signs and Symptoms  Outcome: Ongoing, Progressing     Problem: Skin Injury Risk Increased  Goal: Skin Health and Integrity  Outcome: Ongoing, Progressing     Problem: Fall Injury Risk  Goal: Absence of Fall and Fall-Related Injury  Outcome: Ongoing, Progressing     Problem: Restraint, Nonbehavioral (Nonviolent)  Goal: Absence of Harm or Injury  Outcome: Ongoing, Progressing     Problem: Noninvasive Ventilation Acute  Goal: Effective Unassisted Ventilation and Oxygenation  Outcome: Ongoing, Progressing     Problem: Adjustment to Illness (Delirium)  Goal: Optimal Coping  Outcome: Ongoing, Progressing     Problem: Altered Behavior (Delirium)  Goal: Improved Behavioral Control  Outcome: Ongoing, Progressing     Problem: Attention and Thought Clarity Impairment (Delirium)  Goal: Improved Attention and Thought Clarity  Outcome: Ongoing, Progressing     Problem: Sleep Disturbance (Delirium)  Goal: Improved Sleep  Outcome: Ongoing, Progressing

## 2023-02-05 NOTE — PLAN OF CARE
[facility] Care Plan    POC reviewed with Leida Hoyos and family at 1400. Pt verbalized understanding. Questions and concerns addressed. No acute events today. Pt progressing toward goals. Will continue to monitor. See below and flowsheets for full assessment and VS info.   -EVD open at 20  -Pt neurologically intact  -Pt hematologically intact  -Potassium replaced  -Pt eating 75% of meals  -Bed bath and linen change given            Is this a stroke patient? yes- Stroke booklet reviewed with patient and family, risk factors identified for patient and stroke booklet remains at bedside for ongoing education.     Neuro:  Lexus Coma Scale  Best Eye Response: 4-->(E4) spontaneous  Best Motor Response: 6-->(M6) obeys commands  Best Verbal Response: 4-->(V4) confused  [place] Coma Scale Score: 14  Assessment Qualifiers: patient not sedated/intubated  Pupil PERRLA: yes     24 hr Temp:  [98.1 °F (36.7 °C)-100.5 °F (38.1 °C)]     CV:   Rhythm: normal sinus rhythm  BP goals:   SBP < 160  MAP > 65    Resp:      Vent Mode: Spont  Set Rate: 16 BPM  Oxygen Concentration (%): 32  Vt Set: 420 mL  PEEP/CPAP: 5 cmH20  Pressure Support: 7 cmH20    Plan: N/A    GI/:     Diet/Nutrition Received: mechanical/dental soft  Last Bowel Movement: 02/04/23  Voiding Characteristics: external catheter    Intake/Output Summary (Last 24 hours) at 2/5/2023 1551  Last data filed at 2/5/2023 1505  Gross per 24 hour   Intake 702.27 ml   Output 641 ml   Net 61.27 ml     Unmeasured Output  Urine Occurrence: 1  Stool Occurrence: 1  Emesis Occurrence: 0  Pad Count: 2    Labs/Accuchecks:  Recent Labs   Lab 02/05/23  0248   WBC 11.50   RBC 3.57*   HGB 11.1*   HCT 34.6*         Recent Labs   Lab 02/05/23  0248 02/05/23  1248     --    K 3.3* 3.6   CO2 23  --      --    BUN 13  --    CREATININE 0.7  --    ALKPHOS 168*  --    ALT 16  --    AST 22  --    BILITOT 0.3  --     No results for input(s): PROTIME, INR, APTT, HEPANTIXA in the  last 168 hours. No results for input(s): CPK, CPKMB, TROPONINI, MB in the last 168 hours.    Electrolytes: Electrolytes replaced  Accuchecks: ACHS    Gtts:   niCARdipine Stopped (02/03/23 0003)       LDA/Wounds:  Lines/Drains/Airways       Drain  Duration                  ICP/Ventriculostomy 01/27/23 1400 9 days              Peripheral Intravenous Line  Duration                  Peripheral IV - Single Lumen 02/01/23 0534 20 G Anterior;Right Upper Arm 4 days         Peripheral IV - Single Lumen 02/05/23 0957 Anterior;Right Forearm <1 day                  Wounds: Yes  Wound care consulted: No

## 2023-02-05 NOTE — ASSESSMENT & PLAN NOTE
57yo female with PMH DM1, HTN, autoimmune hepatitis who presented to the ED with  for c/o AMS. Per  patient was c/o of headache last night and vomited. She went to sleep around 9pm then woke up this morning and he noticed that she was confused. She is a type 1 diabetic patient and blood glucose was 300.  states that last time she was having similar symptoms was a couple of years ago when she was in DKA. SBP 150s. On exam very drowsy with difficulty arousing patient, however intermittently follows commands with  no focal weakness. Patient determined to be a candidate for the wake up protocol. MRI IP showed  an acute intraventricular and subarachnoid hemorrhage with early obstructive hydrocephalus and transependymal CSF egress. Also with a noted 21.5-2 mm laterally directed outpouching at the proximal right A2 QUIQUE could represent infundibulum/tortuous origin of a proximal QUIQUE branch, noting that a small aneurysm is difficult to exclude given motion. Patient will be admitted to the Neuro ICU for closer monitoring with a neurosurgery consultation for further recommendations.    Remains in NCC, NAEO but reported to be more agitated and restless overnight. EVD still in place and open.       Antithrombotics for secondary stroke prevention: Antiplatelets: None: Hemorrhage any type    Aggressive risk factor modification: HTN     Rehab efforts: Mechanical soft/thin liquids; IPR     Diagnostics ordered/pending: None     VTE prophylaxis: Heparin 5000 units SQ every 8 hours  Mechanical prophylaxis: Place SCDs    BP parameters: SAH: Unsecured aneurysm, SBP <140

## 2023-02-05 NOTE — SUBJECTIVE & OBJECTIVE
Neurologic Chief Complaint: HA    Subjective:     Interval History: Patient is seen for follow-up neurological assessment and treatment recommendations:   Remains in NCC, NAEO but reported to be more agitated and restless overnight. EVD still in place and open.     HPI, Past Medical, Family, and Social History remains the same as documented in the initial encounter.     Review of Systems   Constitutional:  Negative for diaphoresis.   HENT:  Negative for trouble swallowing.    Eyes:  Negative for photophobia.   Respiratory:  Negative for shortness of breath.    Gastrointestinal:  Negative for vomiting.   Musculoskeletal:  Negative for neck stiffness.   Neurological:  Negative for weakness.   Scheduled Meds:   albuterol-ipratropium  3 mL Nebulization Q6H WAKE    amantadine HCL  100 mg Oral BID    ceFAZolin (ANCEF) IVPB  2 g Intravenous Q8H    heparin (porcine)  5,000 Units Subcutaneous Q8H    insulin aspart U-100  5 Units Subcutaneous TIDWM    insulin detemir U-100  16 Units Subcutaneous BID     Continuous Infusions:   niCARdipine Stopped (02/03/23 0003)     PRN Meds:acetaminophen, dextrose 10%, glucagon (human recombinant), glucose, glucose, hydrALAZINE, insulin aspart U-100, labetalol, magnesium oxide, magnesium oxide, melatonin, ondansetron, potassium bicarbonate, potassium bicarbonate, potassium bicarbonate, potassium, sodium phosphates, potassium, sodium phosphates, potassium, sodium phosphates, sodium chloride 0.9%, sodium chloride 0.9%, sodium chloride 0.9%    Objective:     Vital Signs (Most Recent):  Temp: 98.7 °F (37.1 °C) (02/05/23 1105)  Pulse: 80 (02/05/23 1105)  Resp: 20 (02/05/23 1105)  BP: 130/62 (02/05/23 1105)  SpO2: 99 % (02/05/23 1105)  BP Location: Left arm    Vital Signs Range (Last 24H):  Temp:  [98.5 °F (36.9 °C)-100.5 °F (38.1 °C)]   Pulse:  [61-84]   Resp:  [13-24]   BP: (114-175)/(54-94)   SpO2:  [97 %-100 %]   BP Location: Left arm    Physical Exam  Vitals and nursing note reviewed.    Constitutional:       General: She is not in acute distress.  HENT:      Head: Normocephalic.      Comments: EVD in place      Mouth/Throat:      Mouth: Mucous membranes are moist.   Eyes:      General: No scleral icterus.     Extraocular Movements: Extraocular movements intact.      Conjunctiva/sclera: Conjunctivae normal.   Cardiovascular:      Rate and Rhythm: Normal rate.   Pulmonary:      Effort: Pulmonary effort is normal. No respiratory distress.   Musculoskeletal:         General: No tenderness.   Skin:     General: Skin is warm.   Neurological:      Mental Status: She is alert.      Sensory: No sensory deficit.      Motor: No weakness.      Comments: Moves all extremities against gravity   Intact to light touch   Follows commands        Neurological Exam:   LOC: alert  Attention Span: good  Language: No aphasia  Articulation: No dysarthria  Orientation: Person, Place, Time   EOM (CN III, IV, VI): Full/intact  Facial Movement (CN VII): Symmetric facial expression    Motor: Moves all extremities spontaneously/antigravity   Sensation: Intact to light touch     Laboratory:  CMP:   Recent Labs   Lab 02/05/23  0248   CALCIUM 9.8   ALBUMIN 2.8*   PROT 6.5      K 3.3*   CO2 23      BUN 13   CREATININE 0.7   ALKPHOS 168*   ALT 16   AST 22   BILITOT 0.3       CBC:   Recent Labs   Lab 02/05/23  0248   WBC 11.50   RBC 3.57*   HGB 11.1*   HCT 34.6*      MCV 97   MCH 31.1*   MCHC 32.1       Lipid Panel: No results for input(s): CHOL, LDLCALC, HDL, TRIG in the last 168 hours.  Coagulation: No results for input(s): PT, INR, APTT in the last 168 hours.  Platelet Aggregation Study: No results for input(s): PLTAGG, PLTAGINTERP, PLTAGREGLACO, ADPPLTAGGREG in the last 168 hours.  Hgb A1C: No results for input(s): HGBA1C in the last 168 hours.  TSH: No results for input(s): TSH in the last 168 hours.    Diagnostic Results     Brain/Vessel Imaging   CT 2/3/23  There appears to be mild increased intracranial  hemorrhage however this is thought more likely artifactual due to the presence of vascular contrast from recent arteriogram as discussed above.    IR Angiogram Carotid Cerebral BL 2/3/23  1. Angiogram negative for any vascular etiologies/malformations accounting for the SAH/IVH.  Negative for any vascular malformations - AVMs/AVF/aneurysms.  Negative for any significant vasospasm.    CTH 2/3/23   1. Stable position of the right ventriculostomy catheter and stable prominence of the ventricles.  2. Stable subarachnoid hemorrhage and intraventricular hemorrhage.  3. Stable trace subdural hemorrhage.  4. No new areas of hemorrhage.    CTH 2/2/23  Stable subarachnoid and intraventricular hemorrhage. Ventriculostomy catheter in place with stable mild prominence of ventricles. Stable trace subdural hemorrhage. No new hemorrhage or major vascular distribution infarct    CTH 2/2/23   Stable position of right frontal ventriculostomy catheter with decreased size of the ventricles compared to prior exam compatible with improving hydrocephalus. Decreased volume of multi compartment hemorrhage as described above. No evidence for significant new hemorrhage or evidence for acute major vascular distribution infarct.    MRI Brain W WO Contrast 1/28/23   Brain: Slight improvement in mild to moderate hydrocephalus. Diffuse subarachnoid and intraventricular hemorrhage again noted. No acute infarct. No enhancing intracranial lesion or source of intracranial hemorrhage identified.  MRA: No aneurysm or AVM identified.  No evidence of vasospasm.  No abnormal vessel wall enhancement.    MRA Neck/Brain WO Contrast 1/28/23   There is no significant stenosis at the carotid bifurcations by NASCET criteria.  The vertebral arteries are patent. No evidence of definite prominent vessels within the cervical spinal canal to suggest a cervical vascular malformation.    IR Carotid Cerebral Angiogram 1/27/23   Preliminary interpretation: 6 vessel  angiogram showed no abnormalities to account for SAH. No aneurysm, AVM or AV fistula.  Please see Imaging report for full details.     CTH Without Contrast 1/27/23  Postsurgical changes status post intraventricular catheter placement with its tip between the frontal horns.  Stable moderate hydrocephalus.     CTA Head and Neck 1/27/23  -Diffuse subarachnoid hemorrhage with moderate hydrocephalus.  -No intracranial aneurysm or AVM is identified.  -Atherosclerotic disease but no advanced stenosis of the carotid or vertebral arteries in the neck with no major branch stenosis/occlusion at the [id number]     MRI Brain Ischemic Protocol 1/27/23  1. Acute intraventricular and subarachnoid hemorrhage, as described.  Findings compatible with early obstructive hydrocephalus and transependymal CSF egress.  2. No evidence of acute ischemia or recent infarction.  3. MRA motion compromised. Tiny approximately 1.5-2 mm laterally directed outpouching at the proximal right A2 QUIQUE could represent infundibulum/tortuous origin of a proximal QUIQUE branch, noting that a small aneurysm is difficult to exclude given motion.  Further characterization with CTA could be performed given extensive motion on the current study.       Cardiac Imaging   TTE 1/28/23   The left ventricle is normal in size with normal systolic function.  The estimated ejection fraction is 65-70%.  Normal left ventricular diastolic function.  Normal right ventricular size with normal right ventricular systolic function.  There is no significant tricuspid regurgitation and therefore the pulmonary artery systolic pressure cannot be reported.  Mechanically ventilated; cannot use inferior caval vein diameter to estimate central venous pressure

## 2023-02-05 NOTE — PLAN OF CARE
[facility] Care Plan    POC reviewed with Leida Hoyos  at 0300. Pt verbalized understanding. Questions and concerns addressed. No acute events overnight. Pt progressing toward goals. Will continue to monitor. See below and flowsheets for full assessment and VS info.   -Pt a lot more agitated/restless  -Benadryl given once for itching  -EVD open @15        Is this a stroke patient? yes- Stroke booklet reviewed with patient, risk factors identified for patient and stroke booklet remains at bedside for ongoing education.     Neuro:  Lexus Coma Scale  Best Eye Response: 3-->(E3) to speech  Best Motor Response: 6-->(M6) obeys commands  Best Verbal Response: 4-->(V4) confused  [place] Coma Scale Score: 13  Assessment Qualifiers: patient not sedated/intubated  Pupil PERRLA: yes     24hr Temp:  [97.4 °F (36.3 °C)-100.5 °F (38.1 °C)]     CV:   Rhythm: normal sinus rhythm  BP goals:   SBP < 160  MAP > 65    Resp:      Vent Mode: Spont  Set Rate: 16 BPM  Oxygen Concentration (%): 32  Vt Set: 420 mL  PEEP/CPAP: 5 cmH20  Pressure Support: 7 cmH20    Plan: N/A    GI/:     Diet/Nutrition Received: mechanical/dental soft  Last Bowel Movement: 02/04/23  Voiding Characteristics: external catheter    Intake/Output Summary (Last 24 hours) at 2/5/2023 0355  Last data filed at 2/5/2023 0203  Gross per 24 hour   Intake 702.27 ml   Output 754 ml   Net -51.73 ml     Unmeasured Output  Urine Occurrence: 1  Stool Occurrence: 1  Emesis Occurrence: 0  Pad Count: 3    Labs/Accuchecks:  Recent Labs   Lab 02/05/23  0248   WBC 11.50   RBC 3.57*   HGB 11.1*   HCT 34.6*         Recent Labs   Lab 02/05/23  0248      K 3.3*   CO2 23      BUN 13   CREATININE 0.7   ALKPHOS 168*   ALT 16   AST 22   BILITOT 0.3    No results for input(s): PROTIME, INR, APTT, HEPANTIXA in the last 168 hours. No results for input(s): CPK, CPKMB, TROPONINI, MB in the last 168 hours.    Electrolytes: Electrolytes replaced  Accuchecks: ACHS    Gtts:    niCARdipine Stopped (02/03/23 0003)       LDA/Wounds:  Lines/Drains/Airways       Drain  Duration                  ICP/Ventriculostomy 01/27/23 1400 8 days    Female External Urinary Catheter 02/04/23 1211 <1 day              Peripheral Intravenous Line  Duration                  Peripheral IV - Single Lumen 02/01/23 0534 20 G Anterior;Right Upper Arm 3 days         Peripheral IV - Single Lumen 02/02/23 0100 20 G Anterior;Left Forearm 3 days                  Wounds: No  Wound care consulted: No

## 2023-02-05 NOTE — ASSESSMENT & PLAN NOTE
A1c 6.5  BG Goal 140-180 while inpatient  Currently on Detemir 16U BID + Aspart 5U TID WM + SSI PRN

## 2023-02-05 NOTE — ASSESSMENT & PLAN NOTE
53 yo F w/ PMH DM1, HTN presented to the ED with AMS after HA and vomiting the night before. Patient was non-verbal, B/l fixed pupils, and only intermittently following commands in B/L UE. GCS 8. MRI shows Acute IVH, SAH w/ obstructive hydrocephalus w/o signs of ischemia and R A2 QUIQUE aneurysm. VN consulted. Patient intubated in ED after concern for airway protection. NSGY placed EVD. IR consulted for possible angiogram. CTA shows diffuse subarachnoid hemorrhage with moderate hydrocephalus. No intracranial aneurysm or AVM is identified. NIHSS 9 on admission.     - IR angiogram on 1/27 shows no aneurysm, AVM, or AV fistula. Will need repeat angiogram on 2/3; will discuss with IR   - CTA shows diffuse subarachnoid hemorrhage with moderate hydrocephalus. No intracranial aneurysm or AVM is identified.  - Repeat DSA on PBD 7 without aneurysm, AVM or AV fistula.     - Continued monitoring in NCC  - Q1H neuro checks and vitals  - NSGY following; appreciate recs  - EVD open @15, clamp trial per Neurosurgery pending  - Ancef for drain ppx, d/c after EVD removal   - Daily TCDs  - Echo reviewed   - CBC, CMP, Mag, Phos daily, replace PRN  - SBP goal <160  - Cardene gtt, wean as able   - PRN labetalol, hydralazine  - SQ heparin  - PT/OT/SLP

## 2023-02-06 LAB
ALBUMIN SERPL BCP-MCNC: 2.9 G/DL (ref 3.5–5.2)
ALP SERPL-CCNC: 158 U/L (ref 55–135)
ALT SERPL W/O P-5'-P-CCNC: 13 U/L (ref 10–44)
ANION GAP SERPL CALC-SCNC: 9 MMOL/L (ref 8–16)
AST SERPL-CCNC: 21 U/L (ref 10–40)
BASOPHILS # BLD AUTO: 0.04 K/UL (ref 0–0.2)
BASOPHILS NFR BLD: 0.4 % (ref 0–1.9)
BILIRUB SERPL-MCNC: 0.3 MG/DL (ref 0.1–1)
BUN SERPL-MCNC: 9 MG/DL (ref 6–20)
CALCIUM SERPL-MCNC: 9.7 MG/DL (ref 8.7–10.5)
CHLORIDE SERPL-SCNC: 105 MMOL/L (ref 95–110)
CLARITY CSF: ABNORMAL
CO2 SERPL-SCNC: 26 MMOL/L (ref 23–29)
COLOR CSF: ABNORMAL
CREAT SERPL-MCNC: 0.7 MG/DL (ref 0.5–1.4)
CSF TUBE NUMBER: 1
CSF TUBE NUMBER: 1
DIFFERENTIAL METHOD: ABNORMAL
EOSINOPHIL # BLD AUTO: 0.5 K/UL (ref 0–0.5)
EOSINOPHIL NFR BLD: 5.3 % (ref 0–8)
ERYTHROCYTE [DISTWIDTH] IN BLOOD BY AUTOMATED COUNT: 12.4 % (ref 11.5–14.5)
EST. GFR  (NO RACE VARIABLE): >60 ML/MIN/1.73 M^2
GLUCOSE CSF-MCNC: 71 MG/DL (ref 40–70)
GLUCOSE SERPL-MCNC: 107 MG/DL (ref 70–110)
HCT VFR BLD AUTO: 33.6 % (ref 37–48.5)
HGB BLD-MCNC: 10.4 G/DL (ref 12–16)
IMM GRANULOCYTES # BLD AUTO: 0.07 K/UL (ref 0–0.04)
IMM GRANULOCYTES NFR BLD AUTO: 0.8 % (ref 0–0.5)
LYMPHOCYTES # BLD AUTO: 2 K/UL (ref 1–4.8)
LYMPHOCYTES NFR BLD: 22.2 % (ref 18–48)
LYMPHOCYTES NFR CSF MANUAL: 80 % (ref 40–80)
MAGNESIUM SERPL-MCNC: 2.1 MG/DL (ref 1.6–2.6)
MCH RBC QN AUTO: 30.1 PG (ref 27–31)
MCHC RBC AUTO-ENTMCNC: 31 G/DL (ref 32–36)
MCV RBC AUTO: 97 FL (ref 82–98)
MONOCYTES # BLD AUTO: 0.9 K/UL (ref 0.3–1)
MONOCYTES NFR BLD: 9.5 % (ref 4–15)
MONOS+MACROS NFR CSF MANUAL: 13 % (ref 15–45)
NEUTROPHILS # BLD AUTO: 5.5 K/UL (ref 1.8–7.7)
NEUTROPHILS NFR BLD: 61.8 % (ref 38–73)
NEUTROPHILS NFR CSF MANUAL: 7 % (ref 0–6)
NRBC BLD-RTO: 0 /100 WBC
PHOSPHATE SERPL-MCNC: 3.2 MG/DL (ref 2.7–4.5)
PLATELET # BLD AUTO: 298 K/UL (ref 150–450)
PMV BLD AUTO: 9.3 FL (ref 9.2–12.9)
POCT GLUCOSE: 135 MG/DL (ref 70–110)
POCT GLUCOSE: 156 MG/DL (ref 70–110)
POCT GLUCOSE: 161 MG/DL (ref 70–110)
POCT GLUCOSE: 178 MG/DL (ref 70–110)
POCT GLUCOSE: 260 MG/DL (ref 70–110)
POTASSIUM SERPL-SCNC: 3.5 MMOL/L (ref 3.5–5.1)
PROT CSF-MCNC: 27 MG/DL (ref 15–40)
PROT SERPL-MCNC: 6.6 G/DL (ref 6–8.4)
RBC # BLD AUTO: 3.45 M/UL (ref 4–5.4)
RBC # CSF: ABNORMAL /CU MM
SODIUM SERPL-SCNC: 140 MMOL/L (ref 136–145)
SPECIMEN VOL CSF: 2 ML
WBC # BLD AUTO: 8.93 K/UL (ref 3.9–12.7)
WBC # CSF: 18 /CU MM (ref 0–5)

## 2023-02-06 PROCEDURE — 83735 ASSAY OF MAGNESIUM: CPT

## 2023-02-06 PROCEDURE — 25000003 PHARM REV CODE 250: Performed by: STUDENT IN AN ORGANIZED HEALTH CARE EDUCATION/TRAINING PROGRAM

## 2023-02-06 PROCEDURE — 25000003 PHARM REV CODE 250: Performed by: PSYCHIATRY & NEUROLOGY

## 2023-02-06 PROCEDURE — 85025 COMPLETE CBC W/AUTO DIFF WBC: CPT

## 2023-02-06 PROCEDURE — 99233 SBSQ HOSP IP/OBS HIGH 50: CPT | Mod: ,,, | Performed by: PSYCHIATRY & NEUROLOGY

## 2023-02-06 PROCEDURE — 87205 SMEAR GRAM STAIN: CPT | Performed by: STUDENT IN AN ORGANIZED HEALTH CARE EDUCATION/TRAINING PROGRAM

## 2023-02-06 PROCEDURE — 63600175 PHARM REV CODE 636 W HCPCS: Performed by: STUDENT IN AN ORGANIZED HEALTH CARE EDUCATION/TRAINING PROGRAM

## 2023-02-06 PROCEDURE — 82945 GLUCOSE OTHER FLUID: CPT | Performed by: STUDENT IN AN ORGANIZED HEALTH CARE EDUCATION/TRAINING PROGRAM

## 2023-02-06 PROCEDURE — 25000003 PHARM REV CODE 250

## 2023-02-06 PROCEDURE — 97535 SELF CARE MNGMENT TRAINING: CPT

## 2023-02-06 PROCEDURE — 63600175 PHARM REV CODE 636 W HCPCS

## 2023-02-06 PROCEDURE — 99233 PR SUBSEQUENT HOSPITAL CARE,LEVL III: ICD-10-PCS | Mod: ,,, | Performed by: PSYCHIATRY & NEUROLOGY

## 2023-02-06 PROCEDURE — 80053 COMPREHEN METABOLIC PANEL: CPT

## 2023-02-06 PROCEDURE — 99233 PR SUBSEQUENT HOSPITAL CARE,LEVL III: ICD-10-PCS | Mod: ,,, | Performed by: NEUROLOGICAL SURGERY

## 2023-02-06 PROCEDURE — 97530 THERAPEUTIC ACTIVITIES: CPT | Mod: CQ

## 2023-02-06 PROCEDURE — 20000000 HC ICU ROOM

## 2023-02-06 PROCEDURE — 89051 BODY FLUID CELL COUNT: CPT | Performed by: STUDENT IN AN ORGANIZED HEALTH CARE EDUCATION/TRAINING PROGRAM

## 2023-02-06 PROCEDURE — 94761 N-INVAS EAR/PLS OXIMETRY MLT: CPT

## 2023-02-06 PROCEDURE — 97129 THER IVNTJ 1ST 15 MIN: CPT

## 2023-02-06 PROCEDURE — 87070 CULTURE OTHR SPECIMN AEROBIC: CPT | Performed by: STUDENT IN AN ORGANIZED HEALTH CARE EDUCATION/TRAINING PROGRAM

## 2023-02-06 PROCEDURE — 99233 SBSQ HOSP IP/OBS HIGH 50: CPT | Mod: ,,, | Performed by: NEUROLOGICAL SURGERY

## 2023-02-06 PROCEDURE — 97116 GAIT TRAINING THERAPY: CPT | Mod: CQ

## 2023-02-06 PROCEDURE — 25000242 PHARM REV CODE 250 ALT 637 W/ HCPCS: Performed by: STUDENT IN AN ORGANIZED HEALTH CARE EDUCATION/TRAINING PROGRAM

## 2023-02-06 PROCEDURE — 84157 ASSAY OF PROTEIN OTHER: CPT | Performed by: STUDENT IN AN ORGANIZED HEALTH CARE EDUCATION/TRAINING PROGRAM

## 2023-02-06 PROCEDURE — 84100 ASSAY OF PHOSPHORUS: CPT

## 2023-02-06 PROCEDURE — 94640 AIRWAY INHALATION TREATMENT: CPT

## 2023-02-06 RX ORDER — INSULIN ASPART 100 [IU]/ML
6 INJECTION, SOLUTION INTRAVENOUS; SUBCUTANEOUS
Status: DISCONTINUED | OUTPATIENT
Start: 2023-02-06 | End: 2023-02-08

## 2023-02-06 RX ADMIN — CEFAZOLIN 2 G: 2 INJECTION, POWDER, FOR SOLUTION INTRAMUSCULAR; INTRAVENOUS at 11:02

## 2023-02-06 RX ADMIN — IPRATROPIUM BROMIDE AND ALBUTEROL SULFATE 3 ML: .5; 3 SOLUTION RESPIRATORY (INHALATION) at 02:02

## 2023-02-06 RX ADMIN — INSULIN ASPART 6 UNITS: 100 INJECTION, SOLUTION INTRAVENOUS; SUBCUTANEOUS at 04:02

## 2023-02-06 RX ADMIN — INSULIN ASPART 3 UNITS: 100 INJECTION, SOLUTION INTRAVENOUS; SUBCUTANEOUS at 11:02

## 2023-02-06 RX ADMIN — INSULIN DETEMIR 16 UNITS: 100 INJECTION, SOLUTION SUBCUTANEOUS at 08:02

## 2023-02-06 RX ADMIN — HEPARIN SODIUM 5000 UNITS: 5000 INJECTION INTRAVENOUS; SUBCUTANEOUS at 02:02

## 2023-02-06 RX ADMIN — HEPARIN SODIUM 5000 UNITS: 5000 INJECTION INTRAVENOUS; SUBCUTANEOUS at 05:02

## 2023-02-06 RX ADMIN — CEFAZOLIN 2 G: 2 INJECTION, POWDER, FOR SOLUTION INTRAMUSCULAR; INTRAVENOUS at 04:02

## 2023-02-06 RX ADMIN — Medication 6 MG: at 09:02

## 2023-02-06 RX ADMIN — ACETAMINOPHEN 650 MG: 650 SOLUTION ORAL at 05:02

## 2023-02-06 RX ADMIN — INSULIN DETEMIR 16 UNITS: 100 INJECTION, SOLUTION SUBCUTANEOUS at 09:02

## 2023-02-06 RX ADMIN — HEPARIN SODIUM 5000 UNITS: 5000 INJECTION INTRAVENOUS; SUBCUTANEOUS at 09:02

## 2023-02-06 RX ADMIN — IPRATROPIUM BROMIDE AND ALBUTEROL SULFATE 3 ML: .5; 3 SOLUTION RESPIRATORY (INHALATION) at 07:02

## 2023-02-06 RX ADMIN — AMANTADINE HYDROCHLORIDE 100 MG: 50 SOLUTION ORAL at 05:02

## 2023-02-06 RX ADMIN — INSULIN ASPART 6 UNITS: 100 INJECTION, SOLUTION INTRAVENOUS; SUBCUTANEOUS at 11:02

## 2023-02-06 RX ADMIN — INSULIN ASPART 2 UNITS: 100 INJECTION, SOLUTION INTRAVENOUS; SUBCUTANEOUS at 09:02

## 2023-02-06 RX ADMIN — AMANTADINE HYDROCHLORIDE 100 MG: 100 CAPSULE, LIQUID FILLED ORAL at 02:02

## 2023-02-06 RX ADMIN — INSULIN ASPART 5 UNITS: 100 INJECTION, SOLUTION INTRAVENOUS; SUBCUTANEOUS at 07:02

## 2023-02-06 RX ADMIN — POTASSIUM BICARBONATE 50 MEQ: 978 TABLET, EFFERVESCENT ORAL at 07:02

## 2023-02-06 RX ADMIN — CEFAZOLIN 2 G: 2 INJECTION, POWDER, FOR SOLUTION INTRAMUSCULAR; INTRAVENOUS at 08:02

## 2023-02-06 RX ADMIN — LABETALOL HYDROCHLORIDE 10 MG: 5 INJECTION, SOLUTION INTRAVENOUS at 06:02

## 2023-02-06 NOTE — PLAN OF CARE
[facility] - Neuro Critical Care  Discharge Reassessment    Primary Care Provider: Frieda Mera MD    Expected Discharge Date: 2/10/2023    Per MD: - EVD clamped today          --CTH pre clamp and CTH tomorrow for post clamp eval of vents  Therapy is recommending inpatient Rehab.  SW provided rehab list for choices.     Per SW: Preferred Facility: (if more than 1, listed in order of descending preference)  1.[facility] Phone: [phone]    2.[facility] Phone: [phone]    3.[facility] Phone: [phone]      Reassessment (most recent)       Discharge Reassessment - 02/06/23 1013          Discharge Reassessment    Assessment Type Discharge Planning Reassessment     Did the patient's condition or plan change since previous assessment? No     Communicated ISAIAH with patient/caregiver Date not available/Unable to determine     Discharge Plan A Rehab     Discharge Plan B Home with family;Home Health     DME Needed Upon Discharge  none     Discharge Barriers Identified None     Why the patient remains in the hospital Requires continued medical care                     Vanessa Patricia RN, CCRN-K, [facility]  Neuro-Critical Care   [phone]

## 2023-02-06 NOTE — NURSING
[facility] Care Plan    POC reviewed with Leida Hoyos and family at 1400. Pt verbalized understanding. Questions and concerns addressed. No acute events today. Pt progressing toward goals. Will continue to monitor. See below and flowsheets for full assessment and VS info.     -Pt hematologically intact  -Pre clamp CT obtained  -EVD clamped at 1006  - Bed bath and linen change given          Is this a stroke patient? yes- Stroke booklet reviewed with patient and family, risk factors identified for patient and stroke booklet remains at bedside for ongoing education.     Neuro:  Lexus Coma Scale  Best Eye Response: 4-->(E4) spontaneous  Best Motor Response: 6-->(M6) obeys commands  Best Verbal Response: 4-->(V4) confused  [place] Coma Scale Score: 14  Assessment Qualifiers: patient not sedated/intubated  Pupil PERRLA: yes     24 hr Temp:  [98.4 °F (36.9 °C)-99.7 °F (37.6 °C)]     CV:   Rhythm: normal sinus rhythm  BP goals:   SBP < 160  MAP > 65    Resp:      Vent Mode: Spont  Set Rate: 16 BPM  Oxygen Concentration (%): 32  Vt Set: 420 mL  PEEP/CPAP: 5 cmH20  Pressure Support: 7 cmH20    Plan: N/A    GI/:     Diet/Nutrition Received: consistent carb/diabetic diet  Last Bowel Movement: 02/06/23  Voiding Characteristics: external catheter    Intake/Output Summary (Last 24 hours) at 2/6/2023 1705  Last data filed at 2/6/2023 1505  Gross per 24 hour   Intake 797.24 ml   Output 391 ml   Net 406.24 ml     Unmeasured Output  Urine Occurrence: 1  Stool Occurrence: 1  Emesis Occurrence: 0  Pad Count: 1    Labs/Accuchecks:  Recent Labs   Lab 02/06/23  0438   WBC 8.93   RBC 3.45*   HGB 10.4*   HCT 33.6*         Recent Labs   Lab 02/06/23  0438      K 3.5   CO2 26      BUN 9   CREATININE 0.7   ALKPHOS 158*   ALT 13   AST 21   BILITOT 0.3    No results for input(s): PROTIME, INR, APTT, HEPANTIXA in the last 168 hours. No results for input(s): CPK, CPKMB, TROPONINI, MB in the last 168  hours.    Electrolytes: Electrolytes replaced  Accuchecks: ACHS    Gtts:      LDA/Wounds:  Lines/Drains/Airways       Drain  Duration                  ICP/Ventriculostomy 01/27/23 1400 10 days              Peripheral Intravenous Line  Duration                  Peripheral IV - Single Lumen 02/05/23 0957 Anterior;Right Forearm 1 day         Peripheral IV - Single Lumen 02/06/23 1205 18 G Anterior;Left Forearm <1 day                  Wounds: Yes  Wound care consulted: No

## 2023-02-06 NOTE — ASSESSMENT & PLAN NOTE
59yo female with PMH DM1, HTN, autoimmune hepatitis who presented to the ED with  for c/o AMS. Per  patient was c/o of headache last night and vomited. She went to sleep around 9pm then woke up this morning and he noticed that she was confused. She is a type 1 diabetic patient and blood glucose was 300.  states that last time she was having similar symptoms was a couple of years ago when she was in DKA. SBP 150s. On exam very drowsy with difficulty arousing patient, however intermittently follows commands with  no focal weakness. Patient determined to be a candidate for the wake up protocol. MRI IP showed  an acute intraventricular and subarachnoid hemorrhage with early obstructive hydrocephalus and transependymal CSF egress. Also with a noted 21.5-2 mm laterally directed outpouching at the proximal right A2 QUIQUE could represent infundibulum/tortuous origin of a proximal QUIQUE branch, noting that a small aneurysm is difficult to exclude given motion. Patient will be admitted to the Neuro ICU for closer monitoring with a neurosurgery consultation for further recommendations.    Patient remains in NCC, neuro exam stable. During exam patient pleasantly sitting up in bed eating lunch. Still with generalized weakness and some confusion. EVD clamped per NSGY today, CTH stable.     Antithrombotics for secondary stroke prevention: Antiplatelets: None: Hemorrhage any type    Aggressive risk factor modification: HTN     Rehab efforts: Mechanical soft/thin liquids; IPR     Diagnostics ordered/pending: None     VTE prophylaxis: Heparin 5000 units SQ every 8 hours  Mechanical prophylaxis: Place SCDs    BP parameters: SAH: Unsecured aneurysm, SBP <140

## 2023-02-06 NOTE — PLAN OF CARE
Goals remain appropriate.  Problem: Occupational Therapy  Goal: Occupational Therapy Goal  Description: Goals set 1/28 to be addressed for 14 days with expiration date, 2/11:  Patient will increase functional independence with ADLs by performing:    Patient will demonstrate rolling to the right with SBA.  Not met   Patient will demonstrate rolling to the left with SBA.   Not met  Patient will demonstrate supine -sit with SBA.   Not met  Patient will demonstrate stand pivot transfers with min assist.   Not met  Patient will demonstrate grooming while seated with SBA.   Not met  Patient will demonstrate upper body dressing with SBA while seated EOB.   Not met  Patient will demonstrate lower body dressing with min assist while seated EOB.   Not met  Patient will demonstrate toileting with min assist.   Not met  Patient will demonstrate bathing while seated EOB with min assist.   Not met  Patient's family / caregiver will demonstrate independence and safety with assisting patient with self-care skills and functional mobility.     Not met          Outcome: Ongoing, Progressing

## 2023-02-06 NOTE — ASSESSMENT & PLAN NOTE
53 yo F with PMH of DM1, autoimmune hepatitis, and HTN who presents to ED and was found to have diffuse SAH and hydrocephalus    PBD 9    - admit to NCC  - q1 neuro checks  - all labs and diagnostics reviewed   - MRI/CTA showed diffuse SAH and small volume IVH with hydrocephalus, no identifiable aneurysm   - CTH repeat with EVD in good position   - CTA without identifiable aneurysm   - DSA 1/27 without any identifiable aneurysm   - MRI brain/C spine and MRI vessel wall imaging 1/29: no vascular anomaly seen or vessell wall enhancement   - CTH 2/2 after EVD clamped the day before with decrease in ventricular caliber from prior, decreasing SAH   - DSA 2/3: negative   - CTH 2/3 post angio, mild increase in ventricular size  - EVD raised to 20 today  - SAH protocol   - daily TCDs x 14 days   - keppra 500mg x 7 days   - goal euvolemia to 1L positive  - Na >135, hypertonics as needed  - elevate HOB  - SBP <140  - extubated 1/30  - ok for diet, SLP  - ok for SQH    Dispo: ongoing

## 2023-02-06 NOTE — PROGRESS NOTES
[facility]  Vascular Neurology  [facility]  Progress Note    Assessment/Plan:     * SAH (subarachnoid hemorrhage)  59yo female with PMH DM1, HTN, autoimmune hepatitis who presented to the ED with  for c/o AMS. Per  patient was c/o of headache last night and vomited. She went to sleep around 9pm then woke up this morning and he noticed that she was confused. She is a type 1 diabetic patient and blood glucose was 300.  states that last time she was having similar symptoms was a couple of years ago when she was in DKA. SBP 150s. On exam very drowsy with difficulty arousing patient, however intermittently follows commands with  no focal weakness. Patient determined to be a candidate for the wake up protocol. MRI IP showed  an acute intraventricular and subarachnoid hemorrhage with early obstructive hydrocephalus and transependymal CSF egress. Also with a noted 21.5-2 mm laterally directed outpouching at the proximal right A2 QUIQUE could represent infundibulum/tortuous origin of a proximal QUIQUE branch, noting that a small aneurysm is difficult to exclude given motion. Patient will be admitted to the Neuro ICU for closer monitoring with a neurosurgery consultation for further recommendations.    Patient remains in NCC, neuro exam stable. During exam patient pleasantly sitting up in bed eating lunch. Still with generalized weakness and some confusion. EVD clamped per NSGY today, CTH stable.     Antithrombotics for secondary stroke prevention: Antiplatelets: None: Hemorrhage any type    Aggressive risk factor modification: HTN     Rehab efforts: Mechanical soft/thin liquids; IPR     Diagnostics ordered/pending: None     VTE prophylaxis: Heparin 5000 units SQ every 8 hours  Mechanical prophylaxis: Place SCDs    BP parameters: SAH: Unsecured aneurysm, SBP <140      IVH (intraventricular hemorrhage)  See ICH     Vasogenic cerebral edema  Area of vasogenic cerebral edema  identified when reviewing brain imaging.   --Admitted to [facility], NSGY consulted and following. EVD currently in place  --Continue frequent neuro checks per floor protocol as any acute changes or worsening neuro status may signify expansion of the insult and/or area of the edema, which may require acute interventions to prevent loss of function and/or death.  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 NIHSS 25, exam limited by intubation and drowsiness. Cerebral angiogram completed yesterday without evidence of abnormalities to account for the SAH; no aneurysm, no AVM, no fistula. Patient will likely need repeat angio in 7 days. MRI W/WO pending, scheduled for this afternoon.   1/29-02/03/2023 Patient very drowsy following angiogram. Negative DSA today. EVD remains in place. Therapy recs for IPR and mechanical soft diet with thin liquids.   02/04/2023 Remains in NCC, NAEO and neuro exam stable. EVD in place, post-DSA CTH with mild increase in ventricle size and EVD reopened. Daily TCDs remain without vasospasm.   02/05/2023 Remains in NCC, NAEO but reported to be more agitated and restless overnight. EVD still in place and open.   02/06/2023 Patient remains in NCC, neuro exam stable. During exam patient pleasantly sitting up in bed eating lunch. Still with generalized weakness and some confusion. EVD clamped per NSGY today, CTH stable.         STROKE DOCUMENTATION   Acute Stroke Times   Last Known Normal Date: 01/26/23  Last Known Normal Time: 2100  Unknown Symptom Onset Date: Unknown  Date  Unknown Symptom Onset Time: Unknown Time  Stroke Team Called Date: 01/27/23  Stroke Team Called Time: 0942  Stroke Team Arrival Date: 01/27/23  Stroke Team Arrival Time: 0947  MRI Acute Stroke Protocol Interpretation Time: 1030    NIH Scale:  1a. Level of Consciousness: 0-->Alert, keenly responsive  1b. LOC Questions: 1-->Answers one question correctly  1c. LOC Commands: 0-->Performs both tasks correctly  2. Best Gaze: 0-->Normal  3. Visual: 0-->No visual loss  4. Facial Palsy: 0-->Normal symmetrical movements  5a. Motor Arm, Left: 1-->Drift, limb holds 90 (or 45) degrees, but drifts down before full 10 seconds, does not hit bed or other support  5b. Motor Arm, Right: 1-->Drift, limb holds 90 (or 45) degrees, but drifts down before full 10 secs, does not hit bed or other support  6a. Motor Leg, Left: 1-->Drift, leg falls by the end of the 5-sec period but does not hit bed  6b. Motor Leg, Right: 1-->Drift, leg falls by the end of the 5-sec period but does not hit bed  7. Limb Ataxia: 0-->Absent  8. Sensory: 0-->Normal, no sensory loss  9. Best Language: 0-->No aphasia, normal  10. Dysarthria: 0-->Normal  11. Extinction and Inattention (formerly Neglect): 0-->No abnormality  Total (NIH Stroke Scale): 5       Modified [place] Score: 0  [place] Coma Scale:    ABCD2 Score:    [id number] Score:   HAS -BLED Score:   ICH Score:   Hunt & Guzman Classification:Grade III     Hemorrhagic change of an Ischemic Stroke: Does this patient have an ischemic stroke with hemorrhagic changes? No     Neurologic Chief Complaint: HA    Subjective:     Interval History: Patient is seen for follow-up neurological assessment and treatment recommendations:     Patient remains in NCC, neuro exam stable. During exam patient pleasantly sitting up in bed eating lunch. Still with generalized weakness and some confusion. EVD clamped per NSGY today, CTH stable.     HPI, Past Medical, Family, and Social History remains the same as documented in  the initial encounter.     Review of Systems   Constitutional:  Negative for diaphoresis.   HENT:  Negative for trouble swallowing.    Eyes:  Negative for photophobia.   Respiratory:  Negative for shortness of breath.    Gastrointestinal:  Negative for vomiting.   Musculoskeletal:  Negative for neck stiffness.   Neurological:  Negative for weakness.   Scheduled Meds:   albuterol-ipratropium  3 mL Nebulization Q6H WAKE    amantadine HCL  100 mg Oral BID    ceFAZolin (ANCEF) IVPB  2 g Intravenous Q8H    heparin (porcine)  5,000 Units Subcutaneous Q8H    insulin aspart U-100  6 Units Subcutaneous TIDWM    insulin detemir U-100  16 Units Subcutaneous BID     Continuous Infusions:      PRN Meds:acetaminophen, dextrose 10%, glucagon (human recombinant), glucose, glucose, hydrALAZINE, insulin aspart U-100, labetalol, magnesium oxide, magnesium oxide, melatonin, ondansetron, potassium bicarbonate, potassium bicarbonate, potassium bicarbonate, potassium, sodium phosphates, potassium, sodium phosphates, potassium, sodium phosphates, sodium chloride 0.9%, sodium chloride 0.9%, sodium chloride 0.9%    Objective:     Vital Signs (Most Recent):  Temp: 98.4 °F (36.9 °C) (02/06/23 1105)  Pulse: 76 (02/06/23 1234)  Resp: 20 (02/06/23 1234)  BP: (!) 151/65 (02/06/23 1234)  SpO2: 95 % (02/06/23 1234)  BP Location: Left arm    Vital Signs Range (Last 24H):  Temp:  [98.1 °F (36.7 °C)-99.7 °F (37.6 °C)]   Pulse:  []   Resp:  [11-36]   BP: (118-167)/(59-90)   SpO2:  [95 %-100 %]   BP Location: Left arm    Physical Exam  Vitals and nursing note reviewed.   Constitutional:       General: She is not in acute distress.  HENT:      Head: Normocephalic.      Comments: EVD in place      Mouth/Throat:      Mouth: Mucous membranes are moist.   Eyes:      General: No scleral icterus.     Extraocular Movements: Extraocular movements intact.      Conjunctiva/sclera: Conjunctivae normal.   Cardiovascular:      Rate and Rhythm: Normal rate.    Pulmonary:      Effort: Pulmonary effort is normal. No respiratory distress.   Musculoskeletal:         General: No tenderness.   Skin:     General: Skin is warm.   Neurological:      Mental Status: She is alert.      Sensory: No sensory deficit.      Motor: No weakness.      Comments: Moves all extremities against gravity   Intact to light touch   Follows commands        Neurological Exam:   LOC: alert  Attention Span: good  Language: No aphasia  Articulation: No dysarthria  Orientation: Person, Place, Time   EOM (CN III, IV, VI): Full/intact  Facial Movement (CN VII): Symmetric facial expression    Motor: Moves all extremities spontaneously/antigravity   Sensation: Intact to light touch     Laboratory:  CMP:   Recent Labs   Lab 02/06/23  0438   CALCIUM 9.7   ALBUMIN 2.9*   PROT 6.6      K 3.5   CO2 26      BUN 9   CREATININE 0.7   ALKPHOS 158*   ALT 13   AST 21   BILITOT 0.3       CBC:   Recent Labs   Lab 02/06/23  0438   WBC 8.93   RBC 3.45*   HGB 10.4*   HCT 33.6*      MCV 97   MCH 30.1   MCHC 31.0*       Lipid Panel: No results for input(s): CHOL, LDLCALC, HDL, TRIG in the last 168 hours.  Coagulation: No results for input(s): PT, INR, APTT in the last 168 hours.  Platelet Aggregation Study: No results for input(s): PLTAGG, PLTAGINTERP, PLTAGREGLACO, ADPPLTAGGREG in the last 168 hours.  Hgb A1C: No results for input(s): HGBA1C in the last 168 hours.  TSH: No results for input(s): TSH in the last 168 hours.    Diagnostic Results     Brain/Vessel Imaging   CTH 2/6/23  -Ventriculostomy catheter in place with stable mild of the supratentorial prominence of ventricular system.   -Small volume residual subarachnoid, intraventricular, and subdural hemorrhage, improved from priors.   -No new hemorrhage or major vascular distribution infarct.     CTH 2/3/23  There appears to be mild increased intracranial hemorrhage however this is thought more likely artifactual due to the presence of vascular  contrast from recent arteriogram as discussed above.    IR Angiogram Carotid Cerebral BL 2/3/23  1. Angiogram negative for any vascular etiologies/malformations accounting for the SAH/IVH.  Negative for any vascular malformations - AVMs/AVF/aneurysms.  Negative for any significant vasospasm.    CTH 2/3/23   1. Stable position of the right ventriculostomy catheter and stable prominence of the ventricles.  2. Stable subarachnoid hemorrhage and intraventricular hemorrhage.  3. Stable trace subdural hemorrhage.  4. No new areas of hemorrhage.    CTH 2/2/23  Stable subarachnoid and intraventricular hemorrhage. Ventriculostomy catheter in place with stable mild prominence of ventricles. Stable trace subdural hemorrhage. No new hemorrhage or major vascular distribution infarct    CTH 2/2/23   Stable position of right frontal ventriculostomy catheter with decreased size of the ventricles compared to prior exam compatible with improving hydrocephalus. Decreased volume of multi compartment hemorrhage as described above. No evidence for significant new hemorrhage or evidence for acute major vascular distribution infarct.    MRI Brain W WO Contrast 1/28/23   Brain: Slight improvement in mild to moderate hydrocephalus. Diffuse subarachnoid and intraventricular hemorrhage again noted. No acute infarct. No enhancing intracranial lesion or source of intracranial hemorrhage identified.  MRA: No aneurysm or AVM identified.  No evidence of vasospasm.  No abnormal vessel wall enhancement.    MRA Neck/Brain WO Contrast 1/28/23   There is no significant stenosis at the carotid bifurcations by NASCET criteria.  The vertebral arteries are patent. No evidence of definite prominent vessels within the cervical spinal canal to suggest a cervical vascular malformation.    IR Carotid Cerebral Angiogram 1/27/23   Preliminary interpretation: 6 vessel angiogram showed no abnormalities to account for SAH. No aneurysm, AVM or AV fistula.  Please see  Imaging report for full details.     CTH Without Contrast 1/27/23  Postsurgical changes status post intraventricular catheter placement with its tip between the frontal horns.  Stable moderate hydrocephalus.     CTA Head and Neck 1/27/23  -Diffuse subarachnoid hemorrhage with moderate hydrocephalus.  -No intracranial aneurysm or AVM is identified.  -Atherosclerotic disease but no advanced stenosis of the carotid or vertebral arteries in the neck with no major branch stenosis/occlusion at the [id number]     MRI Brain Ischemic Protocol 1/27/23  1. Acute intraventricular and subarachnoid hemorrhage, as described.  Findings compatible with early obstructive hydrocephalus and transependymal CSF egress.  2. No evidence of acute ischemia or recent infarction.  3. MRA motion compromised. Tiny approximately 1.5-2 mm laterally directed outpouching at the proximal right A2 QUIQUE could represent infundibulum/tortuous origin of a proximal QUIQUE branch, noting that a small aneurysm is difficult to exclude given motion.  Further characterization with CTA could be performed given extensive motion on the current study.    Cardiac Imaging   TTE 1/28/23    The left ventricle is normal in size with normal systolic function.   The estimated ejection fraction is 65-70%.   Normal left ventricular diastolic function.   Normal right ventricular size with normal right ventricular systolic function.   There is no significant tricuspid regurgitation and therefore the pulmonary artery systolic pressure cannot be reported.   Mechanically ventilated; cannot use inferior caval vein diameter to estimate central venous pressure        Lien Flanagan PA-C  [facility]  Department of Vascular Neurology   [facility]

## 2023-02-06 NOTE — PT/OT/SLP PROGRESS
Speech Language Pathology      Leida Hoyos  MRN: [id number]    Patient not seen today secondary to Toileting, Nursing care x2 attemps. Will follow-up 2/7.

## 2023-02-06 NOTE — PT/OT/SLP PROGRESS
"Occupational Therapy   Treatment    Name: Leida Hoyos  MRN: [id number]  Admitting Diagnosis:  SAH (subarachnoid hemorrhage)  10 Days Post-Op    Recommendations:     Discharge Recommendations: rehabilitation facility  Discharge Equipment Recommendations:  none  Barriers to discharge:  None    Assessment:     Leida Hoyos is a 52 y.o. female with a medical diagnosis of SAH (subarachnoid hemorrhage).  She presents with performance deficits affecting function are weakness, impaired self care skills, impaired functional mobility, decreased coordination, impaired cognition, impaired endurance, impaired balance.     Rehab Prognosis:  Good; patient would benefit from acute skilled OT services to address these deficits and reach maximum level of function.       Plan:     Patient to be seen 3 x/week to address the above listed problems via self-care/home management, neuromuscular re-education, cognitive retraining, therapeutic exercises, therapeutic activities  Plan of Care Expires: 02/25/23  Plan of Care Reviewed with: patient    Subjective   Patient:  "I am at the gas station; it's like a convenience store."  Pain/Comfort:  Pain Rating 1: 0/10  Pain Rating Post-Intervention 1: 0/10    Objective:     Communicated with: Nurse prior to session.  Patient found supine with bed alarm, blood pressure cuff, external ventricular drain, peripheral IV, telemetry, pulse ox (continuous) upon OT entry to room.    General Precautions: Standard, aspiration, fall    Orthopedic Precautions:N/A  Braces: N/A  Respiratory Status: Room air     Occupational Performance:     Bed Mobility:    Patient completed Rolling/Turning to Left with  supervision  Patient completed Supine to Sit with stand by assistance  Patient completed Sit to Supine with stand by assistance     Functional Mobility/Transfers:  Patient completed Sit <> Stand Transfer with contact guard assistance  with  no assistive device   Patient completed Bed <> Chair " Transfer using Stand Pivot technique with contact guard assistance with no assistive device    Activities of Daily Living:  Grooming: stand by assistance while seated EOB  Upper Body Dressing: stand by assistance while seated EOB  Lower Body Dressing: contact guard assistance        [facility] 6 Click ADL: 18    Treatment & Education:  Patient alert and oriented x 3; able to follow 4/4 one step commands.  Patient attentive and interactive throughout the session.  Patient able to identify 5/5 body parts.  Able to name 3/3 objects.      Patient left supine with all lines intact, call button in reach, and bed alarm on    GOALS:   Multidisciplinary Problems       Occupational Therapy Goals          Problem: Occupational Therapy    Goal Priority Disciplines Outcome Interventions   Occupational Therapy Goal     OT, PT/OT Ongoing, Progressing    Description: Goals set 1/28 to be addressed for 14 days with expiration date, 2/11:  Patient will increase functional independence with ADLs by performing:    Patient will demonstrate rolling to the right with SBA.  Not met   Patient will demonstrate rolling to the left with SBA.   Not met  Patient will demonstrate supine -sit with SBA.   Not met  Patient will demonstrate stand pivot transfers with min assist.   Not met  Patient will demonstrate grooming while seated with SBA.   Not met  Patient will demonstrate upper body dressing with SBA while seated EOB.   Not met  Patient will demonstrate lower body dressing with min assist while seated EOB.   Not met  Patient will demonstrate toileting with min assist.   Not met  Patient will demonstrate bathing while seated EOB with min assist.   Not met  Patient's family / caregiver will demonstrate independence and safety with assisting patient with self-care skills and functional mobility.     Not met                               Time Tracking:     OT Date of Treatment: 02/06/23  OT Start Time: 0511  OT Stop Time: 0536  OT Total Time (min):  25 min    Billable Minutes:Self Care/Home Management 15  Cognitive Retraining 10    OT/ANDRES: OT          2/6/2023

## 2023-02-06 NOTE — PROGRESS NOTES
[facility] Neuro Critical Care  Neurocritical Care  Progress Note    Admit Date: 1/27/2023  Service Date: 02/06/2023  Length of Stay: 10    Subjective:     Chief Complaint: SAH (subarachnoid hemorrhage)    History of Present Illness: Mrs. Gupta 59 yo F w/ PMH DM1 (dx 2013), HTN, autoimmune hepatitis presented to the ED for AMS this morning where she was unable to answer questions appropriately after a shower.  states patient had a HA and vomited the night before she went to sleep. LNK was 10 pm. Patient had a similar episode a few years ago, but was admitted for DKA.  states patient has worsened neurologically since they left their house. On exam, patient was non-verbal, fixed pupils, intermittently following commands in B/L UE, but unable in LE. GCS 8. MRI performed in ED showed acute intraventricular and subarachnoid hemorrhage with early obstructive hydrocephalus and transependymal CSF egress. Also with a noted 21.5-2 mm laterally directed outpouching at the proximal right A2 QUIQUE could represent infundibulum/tortuous origin of a proximal QUIQUE branch. NSGY consulted and placed EVD after patient was intubated after concern for airway protection. Started Keppra 1g BID. CTA ordered. IR consulted for possible angiogram. No leukocytosis. H/H stable. Cr 0.9. Glucose 284. NCC will admit patient for HLOC.          Hospital Course: 01/28/2023: IR performed angiogram shows no aneurysm, AVM or AV fistula. EVD open @20. Will wean propofol and transition to precedex. Started tube feeds and SQ heparin. Transitioned from insulin gtt to detemir.   01/29/2023 [facility] vent; off sedation; TCDs w/o vasospasm; will need repeat angio within a week; MRI brain completed no lesions found; TFS to goal; replete lytes; keep I/Os nearly matched w/ IVF boluses; consider yudy if sbp dips with analgesia and/or sedation;   01/30/2023 Extubated with parameters to NC. Some wheezing post extubation, PRN duonebs and racemic epi x1. IV  Dex 4mg q6 hours x 1 day and close airway watch. Cardene to maintain SBP<140. EVD open at 20. Monitoring TCDs daily, no current evidence of vasospasm. Glucose elevated, SSI in place, may require Insulin gtt 2/2 steroids.   1/31/23: possible angio Friday 02/01/2023 No acute events overnight. EVD clamp trial per Neurosurgery, continue to monitor neurologic exam with CTH in AM. TCDs without evidence of vasospasm. Remains hyperglycemia, steroids discontinued, increasing long acting to 12u BID.  02/02/2023 No acute events overnight. CTH with EVD clamped stable, plan to continue clamp trial for one more day. DSA tomorrow on PBD7.   02/02/2023 More sedated on exam and difficult to arouse but will arouse to answer questions (full name, at [facility]) before drifting back to sleep. ICP stable 7-14. CTH stable.   02/03/2023 CTH stable overnight. Ambulating with PT/OT. Exam improved, A&Ox3. DSA without aneurysm, AVM or AV fistula. Elevated ICP during angiogram, EVD open at 20. Post procedure CTH pending.   02/04/2023 CTH post angio with slight increase in ventricle size compared to prior. EVD reopened to 10 and subsequently raised to 15. Plan to repeat clamp trial per Neurosurgery. Exam unchanged. Encourage PO water intake for elevated sodium.   02/05/2023 EVD at 15, plan to keep open at 15 until tomorrow. Glucose better controlled with Detemir 16 BID, aspart 5. Montior blood glucose as PO intake increases. TCDs without evidence of vasospasm.   02/06/2023 EVD raised to 20 overnight. CTH stable. Pending clamp trial per neurosurgery. CTH in AM. Exam continues to improve. Remains alert and oriented to person, intermittently place. TCDs without evidence of vasospasm.       Subjective:     Interval History:  As above.    Review of Systems   Constitutional:  Negative for fatigue and fever.   HENT:  Negative for trouble swallowing.    Eyes:  Negative for photophobia and visual disturbance.   Respiratory:  Negative for chest tightness  and shortness of breath.    Cardiovascular:  Negative for chest pain and leg swelling.   Gastrointestinal:  Negative for nausea and vomiting.   Musculoskeletal:  Negative for neck stiffness.   Neurological:  Positive for speech difficulty and headaches. Negative for dizziness.   Psychiatric/Behavioral:  Positive for decreased concentration. Negative for agitation.      Objective:     Vitals:  Temp: 98.4 °F (36.9 °C)  Pulse: 76  Rhythm: normal sinus rhythm  BP: (!) 151/65  MAP (mmHg): 93  ICP Mean (mmHg): 13 mmHg  Resp: 20  SpO2: 95 %    Temp  Min: 98.1 °F (36.7 °C)  Max: 99.7 °F (37.6 °C)  Pulse  Min: 60  Max: 101  BP  Min: 118/90  Max: 167/71  MAP (mmHg)  Min: 85  Max: 112  ICP Mean (mmHg)  Min: 5 mmHg  Max: 16 mmHg  Resp  Min: 11  Max: 36  SpO2  Min: 95 %  Max: 100 %    02/05 0701 - 02/06 0700  In: 1142.3 [P.O.:960]  Out: 420 [Urine:300; Drains:120]   Unmeasured Output  Urine Occurrence: 1  Stool Occurrence: 1  Emesis Occurrence: 0  Pad Count: 3       Physical Exam  Vitals and nursing note reviewed.   HENT:      Head: Normocephalic.      Comments: EVD in place, open at 15     Nose: Nose normal.      Mouth/Throat:      Mouth: Mucous membranes are moist.      Pharynx: Oropharynx is clear.   Cardiovascular:      Rate and Rhythm: Normal rate and regular rhythm.      Pulses: Normal pulses.      Heart sounds: Normal heart sounds.   Pulmonary:      Effort: Pulmonary effort is normal.      Breath sounds: Normal breath sounds.   Abdominal:      General: Bowel sounds are normal.      Palpations: Abdomen is soft.   Musculoskeletal:         General: Normal range of motion.   Skin:     General: Skin is warm and dry.      Capillary Refill: Capillary refill takes 2 to 3 seconds.   Neurological:      Mental Status: She is alert.      Comments:   GCS 14 with improved delayed response time   Oriented to self and situation.   Repeats that its January (time of admit) and knows year.  States that she is in [place].   Waxing/waning  fatigue continues, unrelated to EVD  Follows commands in BUE/BLE (improvement in multistep and bilateral commands)  PERRL  Sensation grossly intact       Unable to test orientation, language, memory, judgment, insight, fund of knowledge, hearing, shoulder shrug, tongue protrusion, coordination, gait due to level of consciousness.    Medications:  Continuous   Scheduledalbuterol-ipratropium, 3 mL, Q6H WAKE  amantadine HCL, 100 mg, BID  ceFAZolin (ANCEF) IVPB, 2 g, Q8H  heparin (porcine), 5,000 Units, Q8H  insulin aspart U-100, 6 Units, TIDWM  insulin detemir U-100, 16 Units, BID  PRNacetaminophen, 650 mg, Q6H PRN  dextrose 10%, 12.5 g, PRN  glucagon (human recombinant), 1 mg, PRN  glucose, 16 g, PRN  glucose, 24 g, PRN  hydrALAZINE, 10 mg, Q6H PRN  insulin aspart U-100, 0-15 Units, QID (AC + HS) PRN  labetalol, 10 mg, Q4H PRN  magnesium oxide, 800 mg, PRN  magnesium oxide, 800 mg, PRN  melatonin, 6 mg, Nightly PRN  ondansetron, 4 mg, Q6H PRN  potassium bicarbonate, 35 mEq, PRN  potassium bicarbonate, 50 mEq, PRN  potassium bicarbonate, 60 mEq, PRN  potassium, sodium phosphates, 2 packet, PRN  potassium, sodium phosphates, 2 packet, PRN  potassium, sodium phosphates, 2 packet, PRN  sodium chloride 0.9%, 10 mL, PRN  sodium chloride 0.9%, 10 mL, PRN  sodium chloride 0.9%, 10 mL, PRN    Today I personally reviewed pertinent medications, lines/drains/airways, imaging, cardiology results, laboratory results, microbiology results, notably:    Glucose better controlled, monitor PO intake  CTH stable, EVD clamped at 20    Diet  Diet diabetic [facility]; 2000 Calorie  Diet diabetic [facility]; 2000 Calorie    Assessment/Plan:     Neuro  * SAH (subarachnoid hemorrhage)  53 yo F w/ PMH DM1, HTN presented to the ED with AMS after HA and vomiting the night before. Patient was non-verbal, B/l fixed pupils, and only intermittently following commands in B/L UE. GCS 8. MRI shows Acute IVH, SAH w/ obstructive hydrocephalus  w/o signs of ischemia and R A2 QUIQUE aneurysm. VN consulted. Patient intubated in ED after concern for airway protection. NSGY placed EVD. IR consulted for possible angiogram. CTA shows diffuse subarachnoid hemorrhage with moderate hydrocephalus. No intracranial aneurysm or AVM is identified. NIHSS 9 on admission.     - IR angiogram on 1/27 shows no aneurysm, AVM, or AV fistula. Will need repeat angiogram on 2/3; will discuss with IR   - CTA shows diffuse subarachnoid hemorrhage with moderate hydrocephalus. No intracranial aneurysm or AVM is identified.  - Repeat DSA on PBD 7 without aneurysm, AVM or AV fistula.     - Continued monitoring in NCC  - Q1H neuro checks and vitals  - NSGY following; appreciate recs  - EVD clamp trial per Neurosurgery, CTH in AM  - Ancef for drain ppx, d/c after EVD removal   - Daily TCDs  - Echo reviewed   - CBC, CMP, Mag, Phos daily, replace PRN  - SBP goal <160  - Cardene gtt, wean as able   - PRN labetalol, hydralazine  - SQ heparin  - PT/OT/SLP    IVH (intraventricular hemorrhage)  See SAH  EVD care    Vasogenic cerebral edema  See SAH    Cardiac/Vascular  Hypertension  SBP 160s on arrival.   states patient does not take home Losartan regularly.     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 BG on arrival 284  Home regimen 13u Lantus; Lispro AC  A1C 6.5     -Transitioned from Insulin gtt to Detemir 16 BID  -Aspart 6 TIDWM  -Hypoglycemic protocol in place  -Accuchecks q1h    GI  Autoimmune hepatitis  Hx of AIH. Previously on mercaptopurine. Not currently taking.   LFTs slightly elevated on arrival.    -Will continue to monitor  -Holding Tylenol and hepatotoxic agents  -CMP daily  -If LFTs worsen will obtain RUQ US.     Other  Debility  PT/OT    Endotracheally intubated-resolved as of 2/3/2023  Patient intubated in ED 2/2 concern for airway protection.  GCS 7  Extubated 1/30 to NC  PRN Duonebs           The patient is being Prophylaxed for:  Venous Thromboembolism with: Chemical  Stress Ulcer with: Not Applicable   Ventilator Pneumonia with: not applicable    Activity Orders            Diet diabetic [facility]; 2000 Calorie: Diabetic starting at 02/05 1116    Progressive Mobility Protocol (mobilize patient to their highest level of functioning at least twice daily) starting at 02/03 2000    Progressive Mobility Protocol (mobilize patient to their highest level of functioning at least twice daily) starting at 01/28 0800    Elevate HOB Collagen closure or PERCLOSE plug/device - Elevate HOB 30 degrees with limb immobilized for 2 hours after procedure. starting at 01/27 2018    Turn patient starting at 01/27 1400    Elevate HOB starting at 01/27 1221          Full Code    Ronak Pavon MD  Neurocritical Care  [facility] - Neuro Critical Care

## 2023-02-06 NOTE — ASSESSMENT & PLAN NOTE
51 yo F with PMH of DM1, autoimmune hepatitis, and HTN who presents to ED and was found to have diffuse SAH and hydrocephalus    PBD 10    - admit to NCC  - q1 neuro checks  - all labs and diagnostics reviewed   - MRI/CTA showed diffuse SAH and small volume IVH with hydrocephalus, no identifiable aneurysm   - CTH repeat with EVD in good position   - CTA without identifiable aneurysm   - DSA 1/27 without any identifiable aneurysm   - MRI brain/C spine and MRI vessel wall imaging 1/29: no vascular anomaly seen or vessell wall enhancement   - CTH 2/2 after EVD clamped the day before with decrease in ventricular caliber from prior, decreasing SAH   - DSA 2/3: negative   - CTH 2/3 post angio, mild increase in ventricular size  - EVD clamped today   --CTH pre clamp and CTH tomorrow for post clamp eval of vents  - SAH protocol   - daily TCDs x 14 days   - keppra 500mg x 7 days   - goal euvolemia to 1L positive  - Na >135, hypertonics as needed  - elevate HOB  - SBP <140  - extubated 1/30  - ok for diet, SLP  - ok for SQH    Dispo: ongoing

## 2023-02-06 NOTE — SUBJECTIVE & OBJECTIVE
Interval History: 2/5: neuro stable. EVD raised to 20 today      Medications:  Continuous Infusions:   niCARdipine Stopped (02/05/23 0801)     Scheduled Meds:   albuterol-ipratropium  3 mL Nebulization Q6H WAKE    amantadine HCL  100 mg Oral BID    ceFAZolin (ANCEF) IVPB  2 g Intravenous Q8H    heparin (porcine)  5,000 Units Subcutaneous Q8H    insulin aspart U-100  5 Units Subcutaneous TIDWM    insulin detemir U-100  16 Units Subcutaneous BID     PRN Meds:acetaminophen, dextrose 10%, glucagon (human recombinant), glucose, glucose, hydrALAZINE, insulin aspart U-100, labetalol, magnesium oxide, magnesium oxide, melatonin, ondansetron, potassium bicarbonate, potassium bicarbonate, potassium bicarbonate, potassium, sodium phosphates, potassium, sodium phosphates, potassium, sodium phosphates, sodium chloride 0.9%, sodium chloride 0.9%, sodium chloride 0.9%     Review of Systems  Objective:     Weight: 55.5 kg (122 lb 5.7 oz)  Body mass index is 22.38 kg/m².  Vital Signs (Most Recent):  Temp: 99.4 °F (37.4 °C) (02/05/23 1903)  Pulse: 77 (02/05/23 1909)  Resp: 20 (02/05/23 1909)  BP: (!) 141/67 (02/05/23 1903)  SpO2: 100 % (02/05/23 1909) Vital Signs (24h Range):  Temp:  [98.1 °F (36.7 °C)-99.4 °F (37.4 °C)] 99.4 °F (37.4 °C)  Pulse:  [] 77  Resp:  [13-27] 20  SpO2:  [96 %-100 %] 100 %  BP: (123-167)/(59-94) 141/67     Date 02/05/23 0700 - 02/06/23 0659   Shift 2366-4430 7944-0662 7398-7650 24 Hour Total   INTAKE   P.O. 480 480  960   I.V.(mL/kg)  0(0)  0(0)   IV Piggyback  105  105   Shift Total(mL/kg) 480(8.6) 585(10.5)  1065(19.2)   OUTPUT   Drains 35 20  55   Shift Total(mL/kg) 35(0.6) 20(0.4)  55(1)   Weight (kg) 55.5 55.5 55.5 55.5                        ICP/Ventriculostomy 01/27/23 1400 (Active)   $ ICP/Ventriculostomy [place] Placement Bedside Insertion Performed 01/27/23 1501   Level of Ventriculostomy (cm above) 20 02/02/23 0201   Status Clamped 02/03/23 0601   Site Assessment Clean;Dry 02/03/23 0601   Site  Drainage No drainage 02/03/23 0601   Waveform normal waveform 02/02/23 2301   Output (mL) 0 mL 02/03/23 0500   CSF Color clear 02/01/23 1500   Dressing Status Clean;Dry;Intact 02/03/23 0601   Interventions HOB degrees 02/03/23 0601       Physical Exam    Neurosurgery Physical Exam       [id number].   AOx3.   PERRL.   CN non focal.   FC x4 grossly full strength      Significant Labs:  Recent Labs   Lab 02/04/23  0435 02/05/23  0248 02/05/23  1248   * 102  --    * 143  --    K 3.5 3.3* 3.6   * 108  --    CO2 24 23  --    BUN 14 13  --    CREATININE 0.7 0.7  --    CALCIUM 9.7 9.8  --    MG 2.1 2.0  --        Recent Labs   Lab 02/04/23 0435 02/05/23  0248   WBC 11.84 11.50   HGB 11.4* 11.1*   HCT 36.1* 34.6*    256       No results for input(s): LABPT, INR, APTT in the last 48 hours.  Microbiology Results (last 7 days)       Procedure Component Value Units Date/Time    CSF culture [[id number]] Collected: 02/02/23 1013    Order Status: Completed Specimen: CSF (Spinal Fluid) from CSF Tap, Tube 3 Updated: 02/05/23 0752     CSF CULTURE No Growth to date     Gram Stain Result No WBC's      No organisms seen    CSF culture [[id number]] Collected: 01/30/23 1449    Order Status: Completed Specimen: CSF (Spinal Fluid) from CSF Shunt Updated: 02/04/23 0742     CSF CULTURE No Growth     Gram Stain Result Rare WBC's      No organisms seen    Gram stain [[id number]] Collected: 02/02/23 1013    Order Status: Canceled Specimen: CSF (Spinal Fluid) from CSF Tap, Tube 3           All pertinent labs from the last 24 hours have been reviewed.    Significant Diagnostics:  I have reviewed all pertinent imaging results/findings within the past 24 hours.

## 2023-02-06 NOTE — PLAN OF CARE
Problem: Adult Inpatient Plan of Care  Goal: Plan of Care Review  Outcome: Ongoing, Progressing  Goal: Patient-Specific Goal (Individualized)  Description: Admit Date:     Admit Dx:    Past Medical History:  No date: Autoimmune hepatitis      Comment:  managed by Dr. Russell on mercaptopurine  No date: Diabetes mellitus type II  No date: Hypertension    Past Surgical History:  :  SECTION, CLASSIC  2019: COLONOSCOPY; N/A      Comment:  Procedure: COLONOSCOPY;  Surgeon: Dipesh Victoria MD;  Location: [address]);  Service: Endoscopy;                Laterality: N/A;  2013: FRACTURE SURGERY; Left      Comment:  leg   No date: MOUTH SURGERY    Individualization:   1. Lights dim    Restraints: 23 yes-pulling lines         Outcome: Ongoing, Progressing  Goal: Absence of Hospital-Acquired Illness or Injury  Outcome: Ongoing, Progressing  Goal: Optimal Comfort and Wellbeing  Outcome: Ongoing, Progressing  Goal: Readiness for Transition of Care  Outcome: Ongoing, Progressing     Problem: Adjustment to Illness (Stroke, Hemorrhagic)  Goal: Optimal Coping  Outcome: Ongoing, Progressing     Problem: Bowel Elimination Impaired (Stroke, Hemorrhagic)  Goal: Effective Bowel Elimination  Outcome: Ongoing, Progressing     Problem: Cerebral Tissue Perfusion (Stroke, Hemorrhagic)  Goal: Optimal Cerebral Tissue Perfusion  Outcome: Ongoing, Progressing     Problem: Cognitive Impairment (Stroke, Hemorrhagic)  Goal: Optimal Cognitive Function  Outcome: Ongoing, Progressing     Problem: Communication Impairment (Stroke, Hemorrhagic)  Goal: Effective Communication Skills  Outcome: Ongoing, Progressing     Problem: Functional Ability Impaired (Stroke, Hemorrhagic)  Goal: Optimal Functional Ability  Outcome: Ongoing, Progressing     Problem: Pain (Stroke, Hemorrhagic)  Goal: Acceptable Pain Control  Outcome: Ongoing, Progressing     Problem: Respiratory Compromise (Stroke, Hemorrhagic)  Goal:  Effective Oxygenation and Ventilation  Outcome: Ongoing, Progressing     Problem: Sensorimotor Impairment (Stroke, Hemorrhagic)  Goal: Improved Sensorimotor Function  Outcome: Ongoing, Progressing     Problem: Swallowing Impairment (Stroke, Hemorrhagic)  Goal: Optimal Eating and Swallowing Without Aspiration  Outcome: Ongoing, Progressing     Problem: Urinary Elimination Impaired (Stroke, Hemorrhagic)  Goal: Effective Urinary Elimination  Outcome: Ongoing, Progressing     Problem: Diabetes Comorbidity  Goal: Blood Glucose Level Within Targeted Range  Outcome: Ongoing, Progressing     Problem: Infection  Goal: Absence of Infection Signs and Symptoms  Outcome: Ongoing, Progressing     Problem: Skin Injury Risk Increased  Goal: Skin Health and Integrity  Outcome: Ongoing, Progressing     Problem: Fall Injury Risk  Goal: Absence of Fall and Fall-Related Injury  Outcome: Ongoing, Progressing     Problem: Restraint, Nonbehavioral (Nonviolent)  Goal: Absence of Harm or Injury  Outcome: Ongoing, Progressing     Problem: Adjustment to Illness (Delirium)  Goal: Optimal Coping  Outcome: Ongoing, Progressing     Problem: Altered Behavior (Delirium)  Goal: Improved Behavioral Control  Outcome: Ongoing, Progressing     Problem: Attention and Thought Clarity Impairment (Delirium)  Goal: Improved Attention and Thought Clarity  Outcome: Ongoing, Progressing     Problem: Sleep Disturbance (Delirium)  Goal: Improved Sleep  Outcome: Ongoing, Progressing

## 2023-02-06 NOTE — ASSESSMENT & PLAN NOTE
51 yo F w/ PMH DM1, HTN presented to the ED with AMS after HA and vomiting the night before. Patient was non-verbal, B/l fixed pupils, and only intermittently following commands in B/L UE. GCS 8. MRI shows Acute IVH, SAH w/ obstructive hydrocephalus w/o signs of ischemia and R A2 QUIQUE aneurysm. VN consulted. Patient intubated in ED after concern for airway protection. NSGY placed EVD. IR consulted for possible angiogram. CTA shows diffuse subarachnoid hemorrhage with moderate hydrocephalus. No intracranial aneurysm or AVM is identified. NIHSS 9 on admission.     - IR angiogram on 1/27 shows no aneurysm, AVM, or AV fistula. Will need repeat angiogram on 2/3; will discuss with IR   - CTA shows diffuse subarachnoid hemorrhage with moderate hydrocephalus. No intracranial aneurysm or AVM is identified.  - Repeat DSA on PBD 7 without aneurysm, AVM or AV fistula.     - Continued monitoring in NCC  - Q1H neuro checks and vitals  - NSGY following; appreciate recs  - EVD clamp trial per Neurosurgery, CTH in AM  - Ancef for drain ppx, d/c after EVD removal   - Daily TCDs  - Echo reviewed   - CBC, CMP, Mag, Phos daily, replace PRN  - SBP goal <160  - Cardene gtt, wean as able   - PRN labetalol, hydralazine  - SQ heparin  - PT/OT/SLP

## 2023-02-06 NOTE — PLAN OF CARE
02/06/23 1432   Post-Acute Status   Post-Acute Authorization Placement   Post-Acute Placement Status Pending post-acute provider review/more information requested   Discharge Plan   Discharge Plan A Rehab     [facility] Phone: [phone]  -Interested, but need more information. Dimitris will return tomorrow and follow. Please place PMR consult.  AROLDO will follow.    2:39 PM   SW placed PMR.    4:13 PM   Spouse third choice is currently-[facility] Phone: [phone]  AROLDO faxed referral to [facility] Phone: [phone] via [facility]   for review.  AROLDO will continue to follow patient.        Yasmeen Mohamud LMSW  PRN - [facility]  [id number]

## 2023-02-06 NOTE — SUBJECTIVE & OBJECTIVE
Subjective:     Interval History:  As above.    Review of Systems   Constitutional:  Negative for fatigue and fever.   HENT:  Negative for trouble swallowing.    Eyes:  Negative for photophobia and visual disturbance.   Respiratory:  Negative for chest tightness and shortness of breath.    Cardiovascular:  Negative for chest pain and leg swelling.   Gastrointestinal:  Negative for nausea and vomiting.   Musculoskeletal:  Negative for neck stiffness.   Neurological:  Positive for speech difficulty and headaches. Negative for dizziness.   Psychiatric/Behavioral:  Positive for decreased concentration. Negative for agitation.      Objective:     Vitals:  Temp: 98.4 °F (36.9 °C)  Pulse: 76  Rhythm: normal sinus rhythm  BP: (!) 151/65  MAP (mmHg): 93  ICP Mean (mmHg): 13 mmHg  Resp: 20  SpO2: 95 %    Temp  Min: 98.1 °F (36.7 °C)  Max: 99.7 °F (37.6 °C)  Pulse  Min: 60  Max: 101  BP  Min: 118/90  Max: 167/71  MAP (mmHg)  Min: 85  Max: 112  ICP Mean (mmHg)  Min: 5 mmHg  Max: 16 mmHg  Resp  Min: 11  Max: 36  SpO2  Min: 95 %  Max: 100 %    02/05 0701 - 02/06 0700  In: 1142.3 [P.O.:960]  Out: 420 [Urine:300; Drains:120]   Unmeasured Output  Urine Occurrence: 1  Stool Occurrence: 1  Emesis Occurrence: 0  Pad Count: 3       Physical Exam  Vitals and nursing note reviewed.   HENT:      Head: Normocephalic.      Comments: EVD in place, open at 15     Nose: Nose normal.      Mouth/Throat:      Mouth: Mucous membranes are moist.      Pharynx: Oropharynx is clear.   Cardiovascular:      Rate and Rhythm: Normal rate and regular rhythm.      Pulses: Normal pulses.      Heart sounds: Normal heart sounds.   Pulmonary:      Effort: Pulmonary effort is normal.      Breath sounds: Normal breath sounds.   Abdominal:      General: Bowel sounds are normal.      Palpations: Abdomen is soft.   Musculoskeletal:         General: Normal range of motion.   Skin:     General: Skin is warm and dry.      Capillary Refill: Capillary refill takes 2 to 3  seconds.   Neurological:      Mental Status: She is alert.      Comments:   GCS 14 with improved delayed response time   Oriented to self and situation.   Repeats that its January (time of admit) and knows year.  States that she is in [place].   Waxing/waning fatigue continues, unrelated to EVD  Follows commands in BUE/BLE (improvement in multistep and bilateral commands)  PERRL  Sensation grossly intact       Unable to test orientation, language, memory, judgment, insight, fund of knowledge, hearing, shoulder shrug, tongue protrusion, coordination, gait due to level of consciousness.    Medications:  Continuous   Scheduledalbuterol-ipratropium, 3 mL, Q6H WAKE  amantadine HCL, 100 mg, BID  ceFAZolin (ANCEF) IVPB, 2 g, Q8H  heparin (porcine), 5,000 Units, Q8H  insulin aspart U-100, 6 Units, TIDWM  insulin detemir U-100, 16 Units, BID  PRNacetaminophen, 650 mg, Q6H PRN  dextrose 10%, 12.5 g, PRN  glucagon (human recombinant), 1 mg, PRN  glucose, 16 g, PRN  glucose, 24 g, PRN  hydrALAZINE, 10 mg, Q6H PRN  insulin aspart U-100, 0-15 Units, QID (AC + HS) PRN  labetalol, 10 mg, Q4H PRN  magnesium oxide, 800 mg, PRN  magnesium oxide, 800 mg, PRN  melatonin, 6 mg, Nightly PRN  ondansetron, 4 mg, Q6H PRN  potassium bicarbonate, 35 mEq, PRN  potassium bicarbonate, 50 mEq, PRN  potassium bicarbonate, 60 mEq, PRN  potassium, sodium phosphates, 2 packet, PRN  potassium, sodium phosphates, 2 packet, PRN  potassium, sodium phosphates, 2 packet, PRN  sodium chloride 0.9%, 10 mL, PRN  sodium chloride 0.9%, 10 mL, PRN  sodium chloride 0.9%, 10 mL, PRN    Today I personally reviewed pertinent medications, lines/drains/airways, imaging, cardiology results, laboratory results, microbiology results, notably:    Glucose better controlled, monitor PO intake  CTH stable, EVD clamped at 20    Diet  Diet diabetic [facility]; 2000 Calorie  Diet diabetic [facility]; 2000 Calorie

## 2023-02-06 NOTE — PROGRESS NOTES
[facility]  Neurosurgery  Progress Note    Subjective:     History of Present Illness: 51 yo F with PMH of DM1, autoimmune hepatitis, and HTN who presents to ED this am after confusion this am. She was [facility] last night and became confused after showering this am so  brought to the ED. She quickly decompensated in the ER and was not following commands or waking to stimulation. MRI and CTA showed diffuse thick SAH and hydrocephalus. No identifiable aneurysm seen on CTA.       Post-Op Info:  Procedure(s) (LRB):  ANGIOGRAM-CEREBRAL (N/A)   9 Days Post-Op     Interval History: 2/5: neuro stable. EVD raised to 20 today      Medications:  Continuous Infusions:   niCARdipine Stopped (02/05/23 0801)     Scheduled Meds:   albuterol-ipratropium  3 mL Nebulization Q6H WAKE    amantadine HCL  100 mg Oral BID    ceFAZolin (ANCEF) IVPB  2 g Intravenous Q8H    heparin (porcine)  5,000 Units Subcutaneous Q8H    insulin aspart U-100  5 Units Subcutaneous TIDWM    insulin detemir U-100  16 Units Subcutaneous BID     PRN Meds:acetaminophen, dextrose 10%, glucagon (human recombinant), glucose, glucose, hydrALAZINE, insulin aspart U-100, labetalol, magnesium oxide, magnesium oxide, melatonin, ondansetron, potassium bicarbonate, potassium bicarbonate, potassium bicarbonate, potassium, sodium phosphates, potassium, sodium phosphates, potassium, sodium phosphates, sodium chloride 0.9%, sodium chloride 0.9%, sodium chloride 0.9%     Review of Systems  Objective:     Weight: 55.5 kg (122 lb 5.7 oz)  Body mass index is 22.38 kg/m².  Vital Signs (Most Recent):  Temp: 99.4 °F (37.4 °C) (02/05/23 1903)  Pulse: 77 (02/05/23 1909)  Resp: 20 (02/05/23 1909)  BP: (!) 141/67 (02/05/23 1903)  SpO2: 100 % (02/05/23 1909) Vital Signs (24h Range):  Temp:  [98.1 °F (36.7 °C)-99.4 °F (37.4 °C)] 99.4 °F (37.4 °C)  Pulse:  [] 77  Resp:  [13-27] 20  SpO2:  [96 %-100 %] 100 %  BP: (123-167)/(59-94) 141/67     Date 02/05/23  0700 - 02/06/23 0659   Shift 6273-2587 4877-3773 1379-6266 24 Hour Total   INTAKE   P.O. 480 480  960   I.V.(mL/kg)  0(0)  0(0)   IV Piggyback  105  105   Shift Total(mL/kg) 480(8.6) 585(10.5)  1065(19.2)   OUTPUT   Drains 35 20  55   Shift Total(mL/kg) 35(0.6) 20(0.4)  55(1)   Weight (kg) 55.5 55.5 55.5 55.5                        ICP/Ventriculostomy 01/27/23 1400 (Active)   $ ICP/Ventriculostomy Richardson Placement Bedside Insertion Performed 01/27/23 1501   Level of Ventriculostomy (cm above) 20 02/02/23 0201   Status Clamped 02/03/23 0601   Site Assessment Clean;Dry 02/03/23 0601   Site Drainage No drainage 02/03/23 0601   Waveform normal waveform 02/02/23 2301   Output (mL) 0 mL 02/03/23 0500   CSF Color clear 02/01/23 1500   Dressing Status Clean;Dry;Intact 02/03/23 0601   Interventions HOB degrees 02/03/23 0601       Physical Exam    Neurosurgery Physical Exam       [id number].   AOx3.   PERRL.   CN non focal.   FC x4 grossly full strength      Significant Labs:  Recent Labs   Lab 02/04/23  0435 02/05/23  0248 02/05/23  1248   * 102  --    * 143  --    K 3.5 3.3* 3.6   * 108  --    CO2 24 23  --    BUN 14 13  --    CREATININE 0.7 0.7  --    CALCIUM 9.7 9.8  --    MG 2.1 2.0  --        Recent Labs   Lab 02/04/23  0435 02/05/23  0248   WBC 11.84 11.50   HGB 11.4* 11.1*   HCT 36.1* 34.6*    256       No results for input(s): LABPT, INR, APTT in the last 48 hours.  Microbiology Results (last 7 days)       Procedure Component Value Units Date/Time    CSF culture [[id number]] Collected: 02/02/23 1013    Order Status: Completed Specimen: CSF (Spinal Fluid) from CSF Tap, Tube 3 Updated: 02/05/23 0752     CSF CULTURE No Growth to date     Gram Stain Result No WBC's      No organisms seen    CSF culture [[id number]] Collected: 01/30/23 1449    Order Status: Completed Specimen: CSF (Spinal Fluid) from CSF Shunt Updated: 02/04/23 0742     CSF CULTURE No Growth     Gram Stain Result Rare WBC's      No  organisms seen    Gram stain [[id number]] Collected: 02/02/23 1013    Order Status: Canceled Specimen: CSF (Spinal Fluid) from CSF Tap, Tube 3           All pertinent labs from the last 24 hours have been reviewed.    Significant Diagnostics:  I have reviewed all pertinent imaging results/findings within the past 24 hours.    Assessment/Plan:     * SAH (subarachnoid hemorrhage)  51 yo F with PMH of DM1, autoimmune hepatitis, and HTN who presents to ED and was found to have diffuse SAH and hydrocephalus    PBD 9    - admit to [facility]  - q1 neuro checks  - all labs and diagnostics reviewed   - MRI/CTA showed diffuse SAH and small volume IVH with hydrocephalus, no identifiable aneurysm   - CTH repeat with EVD in good position   - CTA without identifiable aneurysm   - DSA 1/27 without any identifiable aneurysm   - MRI brain/C spine and MRI vessel wall imaging 1/29: no vascular anomaly seen or vessell wall enhancement   - CTH 2/2 after EVD clamped the day before with decrease in ventricular caliber from prior, decreasing SAH   - DSA 2/3: negative   - CTH 2/3 post angio, mild increase in ventricular size  - EVD raised to 20 today  - SAH protocol   - daily TCDs x 14 days   - keppra 500mg x 7 days   - goal euvolemia to 1L positive  - Na >135, hypertonics as needed  - elevate HOB  - SBP <140  - extubated 1/30  - ok for diet, SLP  - ok for SQH    Dispo: ongoing        Tatiana Allison MD  Neurosurgery  [facility]

## 2023-02-06 NOTE — ASSESSMENT & PLAN NOTE
Diagnosed in 2013. BG on arrival 284  Home regimen 13u Lantus; Lispro AC  A1C 6.5     -Transitioned from Insulin gtt to Detemir 16 BID  -Aspart 6 TIDWM  -Hypoglycemic protocol in place  -Accuchecks q1h

## 2023-02-06 NOTE — PT/OT/SLP PROGRESS
"Physical Therapy Treatment    Patient Name:  Leida Hoyos   MRN:  [id number]    Recommendations:     Discharge Recommendations: rehabilitation facility  Discharge Equipment Recommendations:  (TBD)  Barriers to discharge: Inaccessible home and Decreased caregiver support    Assessment:     Leida Hoyos is a 52 y.o. female admitted with a medical diagnosis of SAH (subarachnoid hemorrhage).  She presents with the following impairments/functional limitations: weakness, impaired endurance, impaired self care skills, impaired functional mobility, gait instability, impaired balance, impaired coordination, impaired cognition, decreased safety awareness.    Pt tolerates session well with focus on bed mobility, transfers, and gait training. Pt progressing fairly but remains most limited by impaired cognition, poor safety awareness, and instability with gait. Pt impulsive and unsafe to remain out of bed to chair, Rn requesting pt returned to supine at end of tx. Pt tolerates without issue and safely returned to supine following gait training. Pt will continue to benefit from therapy services to address impairments listed above.     Rehab Prognosis: Good; patient would benefit from acute skilled PT services to address these deficits and reach maximum level of function.    Recent Surgery: Procedure(s) (LRB):  ANGIOGRAM-CEREBRAL (N/A) 10 Days Post-Op    Plan:     During this hospitalization, patient to be seen 4 x/week to address the identified rehab impairments via gait training, therapeutic activities, therapeutic exercises, neuromuscular re-education and progress toward the following goals:    Plan of Care Expires:  03/02/23    Subjective     Chief Complaint: no c/o  Patient/Family Comments/goals: "I'm in [place] at... [facility]  Pain/Comfort:  Pain Rating 1: 0/10  Pain Rating Post-Intervention 1: 0/10      Objective:     Communicated with RN prior to session.  Patient found HOB elevated with " bed alarm, blood pressure cuff, external ventricular drain, PureWick, peripheral IV, telemetry, pulse ox (continuous) upon PTA entry to room.   RN in to bedside and clamps EVD prior to all functional mobility.     General Precautions: Standard, aspiration  Orthopedic Precautions: N/A  Braces: N/A  Respiratory Status: Room air     Functional Mobility:  Bed Mobility:     Supine to Sit: contact guard assistance  Sit to Supine: contact guard assistance  Transfers:     Sit to Stand:  contact guard assistance with rolling walker  Gait: Pt ambulates fwd/bwd 10 ft x 7 trials with 2 standing rest breaks taken. No seated rest taken. Pt requiring Min A with most assist needed for backwards ambulation. Short step length backwards and when cued on increased step length pt with increased instability.        AM-PAC 6 CLICK MOBILITY  Turning over in bed (including adjusting bedclothes, sheets and blankets)?: 2  Sitting down on and standing up from a chair with arms (e.g., wheelchair, bedside commode, etc.): 3  Moving from lying on back to sitting on the side of the bed?: 2  Moving to and from a bed to a chair (including a wheelchair)?: 3  Need to walk in hospital room?: 3  Climbing 3-5 steps with a railing?: 2  Basic Mobility Total Score: 15       Treatment & Education:  Pt provided orientation to situation and location given pts confusion and answers to orientation questions. Pt remains confused and adamant she can't be in [place].   Pt educated on safety with mobility, assistance needed, and reason for PT intervention. Pt will require continued reinforcement as cognition improves.     Patient left HOB elevated with all lines intact, call button in reach, bed alarm on, and RN notified. RN to unclamp EVD.     GOALS:   Multidisciplinary Problems       Physical Therapy Goals          Problem: Physical Therapy    Goal Priority Disciplines Outcome Goal Variances Interventions   Physical Therapy Goal     PT, PT/OT Ongoing,  Progressing     Description: PT goals until 2/15/23    1. Pt supine to sit with minimal assist- not met  2. Pt sit to supine with minimal assist-not met  3. Pt sit to stand with LRAD as needed for safety with minimal assist-not met  4. Pt to perform gait 20ft with LRAD as needed for safety with moderate assist.- met 2/3/23  Revised goal: gait 100ft with LRAD as needed for safety with CGA-not met  5. Pt to go up/down curb step with LRAD as needed for safety with moderate assist.-not met  6. Pt to transfer bed to/from bedside chair with LRAD as needed for safety with minimal assist.-not met  7. Pt to perform B LE exs in sitting or supine x 10 reps to strengthen B LE to improve functional mobility.-not met                        Time Tracking:     PT Received On: 02/06/23  PT Start Time: 0723     PT Stop Time: 0746  PT Total Time (min): 23 min     Billable Minutes: Gait Training 15 and Therapeutic Activity 8    Treatment Type: Treatment  PT/PTA: PTA     PTA Visit Number: 1     02/06/2023

## 2023-02-06 NOTE — SUBJECTIVE & OBJECTIVE
Neurologic Chief Complaint: HA    Subjective:     Interval History: Patient is seen for follow-up neurological assessment and treatment recommendations:     Patient remains in [facility], neuro exam stable. During exam patient pleasantly sitting up in bed eating lunch. Still with generalized weakness and some confusion. EVD clamped per NSGY today, CTH stable.     HPI, Past Medical, Family, and Social History remains the same as documented in the initial encounter.     Review of Systems   Constitutional:  Negative for diaphoresis.   HENT:  Negative for trouble swallowing.    Eyes:  Negative for photophobia.   Respiratory:  Negative for shortness of breath.    Gastrointestinal:  Negative for vomiting.   Musculoskeletal:  Negative for neck stiffness.   Neurological:  Negative for weakness.   Scheduled Meds:   albuterol-ipratropium  3 mL Nebulization Q6H WAKE    amantadine HCL  100 mg Oral BID    ceFAZolin (ANCEF) IVPB  2 g Intravenous Q8H    heparin (porcine)  5,000 Units Subcutaneous Q8H    insulin aspart U-100  6 Units Subcutaneous TIDWM    insulin detemir U-100  16 Units Subcutaneous BID     Continuous Infusions:      PRN Meds:acetaminophen, dextrose 10%, glucagon (human recombinant), glucose, glucose, hydrALAZINE, insulin aspart U-100, labetalol, magnesium oxide, magnesium oxide, melatonin, ondansetron, potassium bicarbonate, potassium bicarbonate, potassium bicarbonate, potassium, sodium phosphates, potassium, sodium phosphates, potassium, sodium phosphates, sodium chloride 0.9%, sodium chloride 0.9%, sodium chloride 0.9%    Objective:     Vital Signs (Most Recent):  Temp: 98.4 °F (36.9 °C) (02/06/23 1105)  Pulse: 76 (02/06/23 1234)  Resp: 20 (02/06/23 1234)  BP: (!) 151/65 (02/06/23 1234)  SpO2: 95 % (02/06/23 1234)  BP Location: Left arm    Vital Signs Range (Last 24H):  Temp:  [98.1 °F (36.7 °C)-99.7 °F (37.6 °C)]   Pulse:  []   Resp:  [11-36]   BP: (118-167)/(59-90)   SpO2:  [95 %-100 %]   BP Location: Left  arm    Physical Exam  Vitals and nursing note reviewed.   Constitutional:       General: She is not in acute distress.  HENT:      Head: Normocephalic.      Comments: EVD in place      Mouth/Throat:      Mouth: Mucous membranes are moist.   Eyes:      General: No scleral icterus.     Extraocular Movements: Extraocular movements intact.      Conjunctiva/sclera: Conjunctivae normal.   Cardiovascular:      Rate and Rhythm: Normal rate.   Pulmonary:      Effort: Pulmonary effort is normal. No respiratory distress.   Musculoskeletal:         General: No tenderness.   Skin:     General: Skin is warm.   Neurological:      Mental Status: She is alert.      Sensory: No sensory deficit.      Motor: No weakness.      Comments: Moves all extremities against gravity   Intact to light touch   Follows commands        Neurological Exam:   LOC: alert  Attention Span: good  Language: No aphasia  Articulation: No dysarthria  Orientation: Person, Place, Time   EOM (CN III, IV, VI): Full/intact  Facial Movement (CN VII): Symmetric facial expression    Motor: Moves all extremities spontaneously/antigravity   Sensation: Intact to light touch     Laboratory:  CMP:   Recent Labs   Lab 02/06/23  0438   CALCIUM 9.7   ALBUMIN 2.9*   PROT 6.6      K 3.5   CO2 26      BUN 9   CREATININE 0.7   ALKPHOS 158*   ALT 13   AST 21   BILITOT 0.3       CBC:   Recent Labs   Lab 02/06/23  0438   WBC 8.93   RBC 3.45*   HGB 10.4*   HCT 33.6*      MCV 97   MCH 30.1   MCHC 31.0*       Lipid Panel: No results for input(s): CHOL, LDLCALC, HDL, TRIG in the last 168 hours.  Coagulation: No results for input(s): PT, INR, APTT in the last 168 hours.  Platelet Aggregation Study: No results for input(s): PLTAGG, PLTAGINTERP, PLTAGREGLACO, ADPPLTAGGREG in the last 168 hours.  Hgb A1C: No results for input(s): HGBA1C in the last 168 hours.  TSH: No results for input(s): TSH in the last 168 hours.    Diagnostic Results     Brain/Vessel Imaging   CT  2/6/23  -Ventriculostomy catheter in place with stable mild of the supratentorial prominence of ventricular system.   -Small volume residual subarachnoid, intraventricular, and subdural hemorrhage, improved from priors.   -No new hemorrhage or major vascular distribution infarct.     CTH 2/3/23  There appears to be mild increased intracranial hemorrhage however this is thought more likely artifactual due to the presence of vascular contrast from recent arteriogram as discussed above.    IR Angiogram Carotid Cerebral BL 2/3/23  1. Angiogram negative for any vascular etiologies/malformations accounting for the SAH/IVH.  Negative for any vascular malformations - AVMs/AVF/aneurysms.  Negative for any significant vasospasm.    CTH 2/3/23   1. Stable position of the right ventriculostomy catheter and stable prominence of the ventricles.  2. Stable subarachnoid hemorrhage and intraventricular hemorrhage.  3. Stable trace subdural hemorrhage.  4. No new areas of hemorrhage.    CTH 2/2/23  Stable subarachnoid and intraventricular hemorrhage. Ventriculostomy catheter in place with stable mild prominence of ventricles. Stable trace subdural hemorrhage. No new hemorrhage or major vascular distribution infarct    CTH 2/2/23   Stable position of right frontal ventriculostomy catheter with decreased size of the ventricles compared to prior exam compatible with improving hydrocephalus. Decreased volume of multi compartment hemorrhage as described above. No evidence for significant new hemorrhage or evidence for acute major vascular distribution infarct.    MRI Brain W WO Contrast 1/28/23   Brain: Slight improvement in mild to moderate hydrocephalus. Diffuse subarachnoid and intraventricular hemorrhage again noted. No acute infarct. No enhancing intracranial lesion or source of intracranial hemorrhage identified.  MRA: No aneurysm or AVM identified.  No evidence of vasospasm.  No abnormal vessel wall enhancement.    MRA Neck/Brain  WO Contrast 1/28/23   There is no significant stenosis at the carotid bifurcations by NASCET criteria.  The vertebral arteries are patent. No evidence of definite prominent vessels within the cervical spinal canal to suggest a cervical vascular malformation.    IR Carotid Cerebral Angiogram 1/27/23   Preliminary interpretation: 6 vessel angiogram showed no abnormalities to account for SAH. No aneurysm, AVM or AV fistula.  Please see Imaging report for full details.     CTH Without Contrast 1/27/23  Postsurgical changes status post intraventricular catheter placement with its tip between the frontal horns.  Stable moderate hydrocephalus.     CTA Head and Neck 1/27/23  -Diffuse subarachnoid hemorrhage with moderate hydrocephalus.  -No intracranial aneurysm or AVM is identified.  -Atherosclerotic disease but no advanced stenosis of the carotid or vertebral arteries in the neck with no major branch stenosis/occlusion at the [id number]     MRI Brain Ischemic Protocol 1/27/23  1. Acute intraventricular and subarachnoid hemorrhage, as described.  Findings compatible with early obstructive hydrocephalus and transependymal CSF egress.  2. No evidence of acute ischemia or recent infarction.  3. MRA motion compromised. Tiny approximately 1.5-2 mm laterally directed outpouching at the proximal right A2 QUIQUE could represent infundibulum/tortuous origin of a proximal QUIQUE branch, noting that a small aneurysm is difficult to exclude given motion.  Further characterization with CTA could be performed given extensive motion on the current study.    Cardiac Imaging   TTE 1/28/23   The left ventricle is normal in size with normal systolic function.  The estimated ejection fraction is 65-70%.  Normal left ventricular diastolic function.  Normal right ventricular size with normal right ventricular systolic function.  There is no significant tricuspid regurgitation and therefore the pulmonary artery systolic pressure cannot be  reported.  Mechanically ventilated; cannot use inferior caval vein diameter to estimate central venous pressure

## 2023-02-06 NOTE — SUBJECTIVE & OBJECTIVE
Interval History: 2/6: NAEON, EVD draining at 20, ouptut 102, ICP 0-15. Clamping EVD today      Medications:  Continuous Infusions:   niCARdipine Stopped (02/05/23 0801)     Scheduled Meds:   albuterol-ipratropium  3 mL Nebulization Q6H WAKE    amantadine HCL  100 mg Oral BID    ceFAZolin (ANCEF) IVPB  2 g Intravenous Q8H    heparin (porcine)  5,000 Units Subcutaneous Q8H    insulin aspart U-100  6 Units Subcutaneous TIDWM    insulin detemir U-100  16 Units Subcutaneous BID     PRN Meds:acetaminophen, dextrose 10%, glucagon (human recombinant), glucose, glucose, hydrALAZINE, insulin aspart U-100, labetalol, magnesium oxide, magnesium oxide, melatonin, ondansetron, potassium bicarbonate, potassium bicarbonate, potassium bicarbonate, potassium, sodium phosphates, potassium, sodium phosphates, potassium, sodium phosphates, sodium chloride 0.9%, sodium chloride 0.9%, sodium chloride 0.9%     Review of Systems  Objective:     Weight: 55.5 kg (122 lb 5.7 oz)  Body mass index is 22.38 kg/m².  Vital Signs (Most Recent):  Temp: 99.2 °F (37.3 °C) (02/06/23 0705)  Pulse: 60 (02/06/23 0757)  Resp: (!) 27 (02/06/23 0757)  BP: (!) 163/72 (02/06/23 0705)  SpO2: 97 % (02/06/23 0757) Vital Signs (24h Range):  Temp:  [98.1 °F (36.7 °C)-99.7 °F (37.6 °C)] 99.2 °F (37.3 °C)  Pulse:  [] 60  Resp:  [11-27] 27  SpO2:  [95 %-100 %] 97 %  BP: (118-167)/(59-90) 163/72     Date 02/06/23 0700 - 02/07/23 0659   Shift 3114-0846 6602-9473 5097-1636 24 Hour Total   INTAKE   Shift Total(mL/kg)       OUTPUT   Drains 4   4   Shift Total(mL/kg) 4(0.1)   4(0.1)   Weight (kg) 55.5 55.5 55.5 55.5                          ICP/Ventriculostomy 01/27/23 1400 (Active)   $ ICP/Ventriculostomy [place] Placement Bedside Insertion Performed 01/27/23 1501   Level of Ventriculostomy (cm above) 20 02/02/23 0201   Status Clamped 02/03/23 0601   Site Assessment Clean;Dry 02/03/23 0601   Site Drainage No drainage 02/03/23 0601   Waveform normal waveform 02/02/23  2301   Output (mL) 0 mL 02/03/23 0500   CSF Color clear 02/01/23 1500   Dressing Status Clean;Dry;Intact 02/03/23 0601   Interventions HOB degrees 02/03/23 0601       Physical Exam    Neurosurgery Physical Exam       E4V4M6.   AOx3.   PERRL.   CN non focal.   FC x4 grossly full strength      Significant Labs:  Recent Labs   Lab 02/05/23  0248 02/05/23  1248 02/06/23  0438     --  107     --  140   K 3.3* 3.6 3.5     --  105   CO2 23  --  26   BUN 13  --  9   CREATININE 0.7  --  0.7   CALCIUM 9.8  --  9.7   MG 2.0  --  2.1       Recent Labs   Lab 02/05/23  0248 02/06/23  0438   WBC 11.50 8.93   HGB 11.1* 10.4*   HCT 34.6* 33.6*    298       No results for input(s): LABPT, INR, APTT in the last 48 hours.  Microbiology Results (last 7 days)       Procedure Component Value Units Date/Time    CSF culture [[id number]] Collected: 02/06/23 0802    Order Status: Sent Specimen: CSF (Spinal Fluid) from CSF Shunt Updated: 02/06/23 0803    CSF culture [[id number]] Collected: 02/02/23 1013    Order Status: Completed Specimen: CSF (Spinal Fluid) from CSF Tap, Tube 3 Updated: 02/06/23 0621     CSF CULTURE No Growth to date     Gram Stain Result No WBC's      No organisms seen    CSF culture [[id number]] Collected: 01/30/23 1449    Order Status: Completed Specimen: CSF (Spinal Fluid) from CSF Shunt Updated: 02/04/23 0742     CSF CULTURE No Growth     Gram Stain Result Rare WBC's      No organisms seen    Gram stain [[id number]] Collected: 02/02/23 1013    Order Status: Canceled Specimen: CSF (Spinal Fluid) from CSF Tap, Tube 3           All pertinent labs from the last 24 hours have been reviewed.    Significant Diagnostics:  I have reviewed all pertinent imaging results/findings within the past 24 hours.

## 2023-02-06 NOTE — PLAN OF CARE
02/06/23 1010   Post-Acute Status   Post-Acute Authorization Placement   Post-Acute Placement Status Referrals Sent   Discharge Plan   Discharge Plan A Rehab     Met with spouse to review discharge recommendation of rehab and is agreeable to plan    Provided list of facilities in-network with patient's payor plan. Notified that blast referral sent to below listed facilities from list based on proximity to home/family support:   1.[facility] Phone: [phone]    2.[facility] Phone: [phone]    3.[facility] Phone: [phone]    4.[facility] Phone: [phone]    5. (can send more than 5)    Spouse instructed to identify preference.    Preferred Facility: (if more than 1, listed in order of descending preference)  1.[facility] Phone: [phone]    2.[facility] Phone: [phone]    3.[facility] Phone: [phone]    4.[facility] Phone: [phone]    If an additional preferred facility not listed above is identified, additional referral to be sent. If above facilities unable to accept, will send additional referrals to in-network providers.     SW will continue to follow.    Yasmeen Mohamud LMSW  PRN - [facility]  [id number]

## 2023-02-06 NOTE — PROGRESS NOTES
[facility]  Neurosurgery  Progress Note    Subjective:     History of Present Illness: 51 yo F with PMH of DM1, autoimmune hepatitis, and HTN who presents to ED this am after confusion this am. She was LKN last night and became confused after showering this am so  brought to the ED. She quickly decompensated in the ER and was not following commands or waking to stimulation. MRI and CTA showed diffuse thick SAH and hydrocephalus. No identifiable aneurysm seen on CTA.       Post-Op Info:  Procedure(s) (LRB):  ANGIOGRAM-CEREBRAL (N/A)   10 Days Post-Op     Interval History: 2/6: NAEON, EVD draining at 20, ouptut 102, ICP 0-15. Clamping EVD today      Medications:  Continuous Infusions:   niCARdipine Stopped (02/05/23 0801)     Scheduled Meds:   albuterol-ipratropium  3 mL Nebulization Q6H WAKE    amantadine HCL  100 mg Oral BID    ceFAZolin (ANCEF) IVPB  2 g Intravenous Q8H    heparin (porcine)  5,000 Units Subcutaneous Q8H    insulin aspart U-100  6 Units Subcutaneous TIDWM    insulin detemir U-100  16 Units Subcutaneous BID     PRN Meds:acetaminophen, dextrose 10%, glucagon (human recombinant), glucose, glucose, hydrALAZINE, insulin aspart U-100, labetalol, magnesium oxide, magnesium oxide, melatonin, ondansetron, potassium bicarbonate, potassium bicarbonate, potassium bicarbonate, potassium, sodium phosphates, potassium, sodium phosphates, potassium, sodium phosphates, sodium chloride 0.9%, sodium chloride 0.9%, sodium chloride 0.9%     Review of Systems  Objective:     Weight: 55.5 kg (122 lb 5.7 oz)  Body mass index is 22.38 kg/m².  Vital Signs (Most Recent):  Temp: 99.2 °F (37.3 °C) (02/06/23 0705)  Pulse: 60 (02/06/23 0757)  Resp: (!) 27 (02/06/23 0757)  BP: (!) 163/72 (02/06/23 0705)  SpO2: 97 % (02/06/23 0757) Vital Signs (24h Range):  Temp:  [98.1 °F (36.7 °C)-99.7 °F (37.6 °C)] 99.2 °F (37.3 °C)  Pulse:  [] 60  Resp:  [11-27] 27  SpO2:  [95 %-100 %] 97 %  BP:  (118-167)/(59-90) 163/72     Date 02/06/23 0700 - 02/07/23 0659   Shift 6961-1367 9509-5293 8611-9427 24 Hour Total   INTAKE   Shift Total(mL/kg)       OUTPUT   Drains 4   4   Shift Total(mL/kg) 4(0.1)   4(0.1)   Weight (kg) 55.5 55.5 55.5 55.5                          ICP/Ventriculostomy 01/27/23 1400 (Active)   $ ICP/Ventriculostomy [place] Placement Bedside Insertion Performed 01/27/23 1501   Level of Ventriculostomy (cm above) 20 02/02/23 0201   Status Clamped 02/03/23 0601   Site Assessment Clean;Dry 02/03/23 0601   Site Drainage No drainage 02/03/23 0601   Waveform normal waveform 02/02/23 2301   Output (mL) 0 mL 02/03/23 0500   CSF Color clear 02/01/23 1500   Dressing Status Clean;Dry;Intact 02/03/23 0601   Interventions HOB degrees 02/03/23 0601       Physical Exam    Neurosurgery Physical Exam       E4V4M6.   AOx3.   PERRL.   CN non focal.   FC x4 grossly full strength      Significant Labs:  Recent Labs   Lab 02/05/23  0248 02/05/23  1248 02/06/23  0438     --  107     --  140   K 3.3* 3.6 3.5     --  105   CO2 23  --  26   BUN 13  --  9   CREATININE 0.7  --  0.7   CALCIUM 9.8  --  9.7   MG 2.0  --  2.1       Recent Labs   Lab 02/05/23  0248 02/06/23  0438   WBC 11.50 8.93   HGB 11.1* 10.4*   HCT 34.6* 33.6*    298       No results for input(s): LABPT, INR, APTT in the last 48 hours.  Microbiology Results (last 7 days)       Procedure Component Value Units Date/Time    CSF culture [[id number]] Collected: 02/06/23 0802    Order Status: Sent Specimen: CSF (Spinal Fluid) from CSF Shunt Updated: 02/06/23 0803    CSF culture [[id number]] Collected: 02/02/23 1013    Order Status: Completed Specimen: CSF (Spinal Fluid) from CSF Tap, Tube 3 Updated: 02/06/23 0621     CSF CULTURE No Growth to date     Gram Stain Result No WBC's      No organisms seen    CSF culture [[id number]] Collected: 01/30/23 1449    Order Status: Completed Specimen: CSF (Spinal Fluid) from CSF Shunt Updated: 02/04/23 0742      CSF CULTURE No Growth     Gram Stain Result Rare WBC's      No organisms seen    Gram stain [[id number]] Collected: 02/02/23 1013    Order Status: Canceled Specimen: CSF (Spinal Fluid) from CSF Tap, Tube 3           All pertinent labs from the last 24 hours have been reviewed.    Significant Diagnostics:  I have reviewed all pertinent imaging results/findings within the past 24 hours.    Assessment/Plan:     * SAH (subarachnoid hemorrhage)  53 yo F with PMH of DM1, autoimmune hepatitis, and HTN who presents to ED and was found to have diffuse SAH and hydrocephalus    PBD 10    - admit to NCC  - q1 neuro checks  - all labs and diagnostics reviewed   - MRI/CTA showed diffuse SAH and small volume IVH with hydrocephalus, no identifiable aneurysm   - CTH repeat with EVD in good position   - CTA without identifiable aneurysm   - DSA 1/27 without any identifiable aneurysm   - MRI brain/C spine and MRI vessel wall imaging 1/29: no vascular anomaly seen or vessell wall enhancement   - CTH 2/2 after EVD clamped the day before with decrease in ventricular caliber from prior, decreasing SAH   - DSA 2/3: negative   - CTH 2/3 post angio, mild increase in ventricular size  - EVD clamped today   --CTH pre clamp and CTH tomorrow for post clamp eval of vents  - SAH protocol   - daily TCDs x 14 days   - keppra 500mg x 7 days   - goal euvolemia to 1L positive  - Na >135, hypertonics as needed  - elevate HOB  - SBP <140  - extubated 1/30  - ok for diet, SLP  - ok for SQH    Dispo: ongoing        Maren Villanueva MD  Neurosurgery  [facility]

## 2023-02-07 ENCOUNTER — PATIENT MESSAGE (OUTPATIENT)
Dept: OPTOMETRY | Facility: CLINIC | Age: 53
End: 2023-02-07
Payer: COMMERCIAL

## 2023-02-07 LAB
ALBUMIN SERPL BCP-MCNC: 3 G/DL (ref 3.5–5.2)
ALP SERPL-CCNC: 155 U/L (ref 55–135)
ALT SERPL W/O P-5'-P-CCNC: 11 U/L (ref 10–44)
ANION GAP SERPL CALC-SCNC: 11 MMOL/L (ref 8–16)
AST SERPL-CCNC: 22 U/L (ref 10–40)
BACTERIA CSF CULT: NO GROWTH
BASOPHILS # BLD AUTO: 0.05 K/UL (ref 0–0.2)
BASOPHILS NFR BLD: 0.5 % (ref 0–1.9)
BILIRUB SERPL-MCNC: 0.3 MG/DL (ref 0.1–1)
BUN SERPL-MCNC: 8 MG/DL (ref 6–20)
CALCIUM SERPL-MCNC: 10.1 MG/DL (ref 8.7–10.5)
CHLORIDE SERPL-SCNC: 106 MMOL/L (ref 95–110)
CO2 SERPL-SCNC: 24 MMOL/L (ref 23–29)
CREAT SERPL-MCNC: 0.8 MG/DL (ref 0.5–1.4)
DIFFERENTIAL METHOD: ABNORMAL
EOSINOPHIL # BLD AUTO: 0.4 K/UL (ref 0–0.5)
EOSINOPHIL NFR BLD: 3.9 % (ref 0–8)
ERYTHROCYTE [DISTWIDTH] IN BLOOD BY AUTOMATED COUNT: 12.7 % (ref 11.5–14.5)
EST. GFR  (NO RACE VARIABLE): >60 ML/MIN/1.73 M^2
GLUCOSE SERPL-MCNC: 113 MG/DL (ref 70–110)
GRAM STN SPEC: NORMAL
GRAM STN SPEC: NORMAL
HCT VFR BLD AUTO: 32.7 % (ref 37–48.5)
HGB BLD-MCNC: 10.6 G/DL (ref 12–16)
IMM GRANULOCYTES # BLD AUTO: 0.04 K/UL (ref 0–0.04)
IMM GRANULOCYTES NFR BLD AUTO: 0.4 % (ref 0–0.5)
LYMPHOCYTES # BLD AUTO: 1.9 K/UL (ref 1–4.8)
LYMPHOCYTES NFR BLD: 21.1 % (ref 18–48)
MAGNESIUM SERPL-MCNC: 2.2 MG/DL (ref 1.6–2.6)
MCH RBC QN AUTO: 30.3 PG (ref 27–31)
MCHC RBC AUTO-ENTMCNC: 32.4 G/DL (ref 32–36)
MCV RBC AUTO: 93 FL (ref 82–98)
MONOCYTES # BLD AUTO: 0.8 K/UL (ref 0.3–1)
MONOCYTES NFR BLD: 9 % (ref 4–15)
NEUTROPHILS # BLD AUTO: 6 K/UL (ref 1.8–7.7)
NEUTROPHILS NFR BLD: 65.1 % (ref 38–73)
NRBC BLD-RTO: 0 /100 WBC
PHOSPHATE SERPL-MCNC: 3.5 MG/DL (ref 2.7–4.5)
PLATELET # BLD AUTO: 306 K/UL (ref 150–450)
PMV BLD AUTO: 9 FL (ref 9.2–12.9)
POCT GLUCOSE: 135 MG/DL (ref 70–110)
POCT GLUCOSE: 216 MG/DL (ref 70–110)
POCT GLUCOSE: 274 MG/DL (ref 70–110)
POCT GLUCOSE: 293 MG/DL (ref 70–110)
POTASSIUM SERPL-SCNC: 3.7 MMOL/L (ref 3.5–5.1)
PROT SERPL-MCNC: 6.9 G/DL (ref 6–8.4)
RBC # BLD AUTO: 3.5 M/UL (ref 4–5.4)
SODIUM SERPL-SCNC: 141 MMOL/L (ref 136–145)
WBC # BLD AUTO: 9.21 K/UL (ref 3.9–12.7)

## 2023-02-07 PROCEDURE — 85025 COMPLETE CBC W/AUTO DIFF WBC: CPT

## 2023-02-07 PROCEDURE — 63600175 PHARM REV CODE 636 W HCPCS

## 2023-02-07 PROCEDURE — 97129 THER IVNTJ 1ST 15 MIN: CPT

## 2023-02-07 PROCEDURE — 25000003 PHARM REV CODE 250: Performed by: STUDENT IN AN ORGANIZED HEALTH CARE EDUCATION/TRAINING PROGRAM

## 2023-02-07 PROCEDURE — 63600175 PHARM REV CODE 636 W HCPCS: Performed by: STUDENT IN AN ORGANIZED HEALTH CARE EDUCATION/TRAINING PROGRAM

## 2023-02-07 PROCEDURE — 92526 ORAL FUNCTION THERAPY: CPT

## 2023-02-07 PROCEDURE — 84100 ASSAY OF PHOSPHORUS: CPT

## 2023-02-07 PROCEDURE — 99233 SBSQ HOSP IP/OBS HIGH 50: CPT | Mod: ,,, | Performed by: NEUROLOGICAL SURGERY

## 2023-02-07 PROCEDURE — 94640 AIRWAY INHALATION TREATMENT: CPT

## 2023-02-07 PROCEDURE — 83735 ASSAY OF MAGNESIUM: CPT

## 2023-02-07 PROCEDURE — 92507 TX SP LANG VOICE COMM INDIV: CPT

## 2023-02-07 PROCEDURE — 97535 SELF CARE MNGMENT TRAINING: CPT

## 2023-02-07 PROCEDURE — 25000242 PHARM REV CODE 250 ALT 637 W/ HCPCS: Performed by: STUDENT IN AN ORGANIZED HEALTH CARE EDUCATION/TRAINING PROGRAM

## 2023-02-07 PROCEDURE — 20000000 HC ICU ROOM

## 2023-02-07 PROCEDURE — 94761 N-INVAS EAR/PLS OXIMETRY MLT: CPT

## 2023-02-07 PROCEDURE — 99233 PR SUBSEQUENT HOSPITAL CARE,LEVL III: ICD-10-PCS | Mod: ,,, | Performed by: NEUROLOGICAL SURGERY

## 2023-02-07 PROCEDURE — 25000003 PHARM REV CODE 250: Performed by: PSYCHIATRY & NEUROLOGY

## 2023-02-07 PROCEDURE — 25000003 PHARM REV CODE 250

## 2023-02-07 PROCEDURE — 80053 COMPREHEN METABOLIC PANEL: CPT

## 2023-02-07 RX ORDER — IPRATROPIUM BROMIDE AND ALBUTEROL SULFATE 2.5; .5 MG/3ML; MG/3ML
3 SOLUTION RESPIRATORY (INHALATION) EVERY 6 HOURS PRN
Status: DISCONTINUED | OUTPATIENT
Start: 2023-02-07 | End: 2023-02-15 | Stop reason: HOSPADM

## 2023-02-07 RX ADMIN — Medication 6 MG: at 10:02

## 2023-02-07 RX ADMIN — CEFAZOLIN 2 G: 2 INJECTION, POWDER, FOR SOLUTION INTRAMUSCULAR; INTRAVENOUS at 08:02

## 2023-02-07 RX ADMIN — ACETAMINOPHEN 650 MG: 650 SOLUTION ORAL at 10:02

## 2023-02-07 RX ADMIN — INSULIN ASPART 9 UNITS: 100 INJECTION, SOLUTION INTRAVENOUS; SUBCUTANEOUS at 11:02

## 2023-02-07 RX ADMIN — CEFAZOLIN 2 G: 2 INJECTION, POWDER, FOR SOLUTION INTRAMUSCULAR; INTRAVENOUS at 05:02

## 2023-02-07 RX ADMIN — HEPARIN SODIUM 5000 UNITS: 5000 INJECTION INTRAVENOUS; SUBCUTANEOUS at 10:02

## 2023-02-07 RX ADMIN — AMANTADINE HYDROCHLORIDE 100 MG: 100 CAPSULE, LIQUID FILLED ORAL at 05:02

## 2023-02-07 RX ADMIN — IPRATROPIUM BROMIDE AND ALBUTEROL SULFATE 3 ML: .5; 3 SOLUTION RESPIRATORY (INHALATION) at 07:02

## 2023-02-07 RX ADMIN — INSULIN ASPART 6 UNITS: 100 INJECTION, SOLUTION INTRAVENOUS; SUBCUTANEOUS at 11:02

## 2023-02-07 RX ADMIN — INSULIN ASPART 6 UNITS: 100 INJECTION, SOLUTION INTRAVENOUS; SUBCUTANEOUS at 05:02

## 2023-02-07 RX ADMIN — INSULIN DETEMIR 16 UNITS: 100 INJECTION, SOLUTION SUBCUTANEOUS at 08:02

## 2023-02-07 RX ADMIN — HEPARIN SODIUM 5000 UNITS: 5000 INJECTION INTRAVENOUS; SUBCUTANEOUS at 05:02

## 2023-02-07 RX ADMIN — HEPARIN SODIUM 5000 UNITS: 5000 INJECTION INTRAVENOUS; SUBCUTANEOUS at 03:02

## 2023-02-07 RX ADMIN — AMANTADINE HYDROCHLORIDE 100 MG: 100 CAPSULE, LIQUID FILLED ORAL at 03:02

## 2023-02-07 RX ADMIN — INSULIN ASPART 6 UNITS: 100 INJECTION, SOLUTION INTRAVENOUS; SUBCUTANEOUS at 08:02

## 2023-02-07 RX ADMIN — INSULIN ASPART 6 UNITS: 100 INJECTION, SOLUTION INTRAVENOUS; SUBCUTANEOUS at 10:02

## 2023-02-07 RX ADMIN — INSULIN DETEMIR 16 UNITS: 100 INJECTION, SOLUTION SUBCUTANEOUS at 09:02

## 2023-02-07 NOTE — ASSESSMENT & PLAN NOTE
53 yo F w/ PMH DM1, HTN presented to the ED with AMS after HA and vomiting the night before. Patient was non-verbal, B/l fixed pupils, and only intermittently following commands in B/L UE. GCS 8. MRI shows Acute IVH, SAH w/ obstructive hydrocephalus w/o signs of ischemia and R A2 QUIQUE aneurysm. VN consulted. Patient intubated in ED after concern for airway protection. NSGY placed EVD. IR consulted for possible angiogram. CTA shows diffuse subarachnoid hemorrhage with moderate hydrocephalus. No intracranial aneurysm or AVM is identified. NIHSS 9 on admission.     - IR angiogram on 1/27 shows no aneurysm, AVM, or AV fistula. Will need repeat angiogram on 2/3; will discuss with IR   - CTA shows diffuse subarachnoid hemorrhage with moderate hydrocephalus. No intracranial aneurysm or AVM is identified.  - Repeat DSA on PBD 7 without aneurysm, AVM or AV fistula.   - Plan for EVD removal 2/7    - Continued monitoring in NCC  - Q1H neuro checks and vitals  - NSGY following; appreciate recs  - EVD removal per Neurosurgery, CTH in AM  - Ancef for drain ppx, d/c after EVD removal   - Daily TCDs, discission on discontinuation   - Echo reviewed   - CBC, CMP, Mag, Phos daily, replace PRN  - SBP goal <160  - Cardene gtt, wean as able   - PRN labetalol, hydralazine  - SQ heparin  - PT/OT/SLP

## 2023-02-07 NOTE — PT/OT/SLP PROGRESS
"Speech Language Pathology Treatment    Patient Name:  Leida Hoyos   MRN:  [id number]  Admitting Diagnosis: SAH (subarachnoid hemorrhage)    Recommendations:                 General Recommendations:  Dysphagia therapy and Cognitive-linguistic therapy  Diet recommendations:  Regular, Liquid Diet Level: Thin   Aspiration Precautions: Strict aspiration precautions   General Precautions: Standard, aspiration, fall  Communication strategies:  provide increased time to answer    Subjective     " Now what am I supposed to be doing?"  Patient goals: to get better     Pain/Comfort:  Pain Rating 1: 0/10  Pain Rating Post-Intervention 1: 0/10    Respiratory Status: Room air    Objective:     Has the patient been evaluated by SLP for swallowing?   Yes  Keep patient NPO? No   Current Respiratory Status:        Pt seen bedside with her  present. Nursing cleared pt for session. Pt awake/alert with good participation in session.  reported pt appears to be improving daily but still has " memory recall" issues. Pt observed drinking water from a cup and eating a cracker. Pt tolerating diet without trouble.  reported pt has been receiving regular diet since yesterday and denied any difficulty with regular consistency. Pt able to complete writing task without difficulty but had trouble with spacing and problem solving when drawing a clock. She read without difficulty but needed cues to answer comprehension questions. She named wrong member in a f=4 with 0% acc and given max cues including repetition of items and directions with 50% acc. Increased time also needed. Pt with decreased working memory noted. Education provided swallowing precautions, cognition and brain games pt can complete independently. Both expressed understanding but pt will need ongoing education.   Assessment:     Leida Hoyos is a 52 y.o. female with an SLP diagnosis of Cognitive-Linguistic Impairment.  She presents with progress " towards goals.    Goals:   Multidisciplinary Problems       SLP Goals          Problem: SLP    Goal Priority Disciplines Outcome   SLP Goal     SLP Ongoing, Progressing   Description: Goals due 2/10  1.  Pt. Will participate in ongoing assessment of swallow at bedside  2.  Tolerate trials of regular diet with thin liquids with no s/s of aspiration  3.  Assess functional reading and writing skills  4.  [place] to time and place  5.  Respond to word finding/categorization tasks with 80% accuracy  6.  Respond to verbal problem solving tasks with 75% accuracy                       Plan:     Patient to be seen:  4 x/week   Plan of Care expires:  02/27/23  Plan of Care reviewed with:  patient, spouse   SLP Follow-Up:  Yes       Discharge recommendations:  rehabilitation facility       Time Tracking:     SLP Treatment Date:   02/07/23  Speech Start Time:  0931  Speech Stop Time:  0957     Speech Total Time (min):  26 min    Billable Minutes: Speech Therapy Individual 10, Treatment Swallowing Dysfunction 8, and Self Care/Home Management Training 8    02/07/2023

## 2023-02-07 NOTE — CONSULTS
Inpatient consult to Physical Medicine Rehab  Consult performed by: Yasmin Dixon NP  Consult ordered by: Mi Garrison MD  Reason for consult: Assess rehab needs    Reviewed patient history and current admission.  Rehab team following.  Full consult to follow.    CORONA Martel, FNP-C  Physical Medicine & Rehabilitation   02/07/2023

## 2023-02-07 NOTE — SUBJECTIVE & OBJECTIVE
Subjective:     Interval History:  As above.    Review of Systems   Constitutional:  Negative for fatigue and fever.   HENT:  Negative for trouble swallowing.    Eyes:  Negative for photophobia and visual disturbance.   Respiratory:  Negative for chest tightness and shortness of breath.    Cardiovascular:  Negative for chest pain and leg swelling.   Gastrointestinal:  Negative for nausea and vomiting.   Musculoskeletal:  Negative for neck stiffness.   Neurological:  Positive for speech difficulty and headaches. Negative for dizziness.   Psychiatric/Behavioral:  Positive for decreased concentration. Negative for agitation.      Objective:     Vitals:  Temp: 98.4 °F (36.9 °C)  Pulse: 82  Rhythm: normal sinus rhythm  BP: 133/71  MAP (mmHg): 96  ICP Mean (mmHg): 12 mmHg  Resp: 16  SpO2: (!) 94 %    Temp  Min: 98.2 °F (36.8 °C)  Max: 99.7 °F (37.6 °C)  Pulse  Min: 65  Max: 88  BP  Min: 125/101  Max: 167/74  MAP (mmHg)  Min: 90  Max: 109  ICP Mean (mmHg)  Min: 3 mmHg  Max: 19 mmHg  Resp  Min: 10  Max: 25  SpO2  Min: 94 %  Max: 98 %    02/06 0701 - 02/07 0700  In: 839.7 [P.O.:720]  Out: 512 [Urine:500; Drains:12]   Unmeasured Output  Urine Occurrence: 1  Stool Occurrence: 0  Emesis Occurrence: 0  Pad Count: 2       Physical Exam  Vitals and nursing note reviewed.   HENT:      Head: Normocephalic.      Comments: EVD in place, open at 15     Nose: Nose normal.      Mouth/Throat:      Mouth: Mucous membranes are moist.      Pharynx: Oropharynx is clear.   Cardiovascular:      Rate and Rhythm: Normal rate and regular rhythm.      Pulses: Normal pulses.      Heart sounds: Normal heart sounds.   Pulmonary:      Effort: Pulmonary effort is normal.      Breath sounds: Normal breath sounds.   Abdominal:      General: Bowel sounds are normal.      Palpations: Abdomen is soft.   Musculoskeletal:         General: Normal range of motion.   Skin:     General: Skin is warm and dry.      Capillary Refill: Capillary refill takes 2 to 3  seconds.   Neurological:      Mental Status: She is alert.      Comments:   GCS 14 with improved delayed response time   Oriented to self and situation. Intermittently to place and time.   Follows commands in BUE/BLE (improvement in multistep and bilateral commands that cross midline)  PERRL  Sensation grossly intact       Unable to test orientation, language, memory, judgment, insight, fund of knowledge, hearing, shoulder shrug, tongue protrusion, coordination, gait due to level of consciousness.    Medications:  Continuous   Scheduledamantadine HCL, 100 mg, BID  ceFAZolin (ANCEF) IVPB, 2 g, Q8H  heparin (porcine), 5,000 Units, Q8H  insulin aspart U-100, 6 Units, TIDWM  insulin detemir U-100, 16 Units, BID  PRNacetaminophen, 650 mg, Q6H PRN  albuterol-ipratropium, 3 mL, Q6H PRN  dextrose 10%, 12.5 g, PRN  glucagon (human recombinant), 1 mg, PRN  glucose, 16 g, PRN  glucose, 24 g, PRN  hydrALAZINE, 10 mg, Q6H PRN  insulin aspart U-100, 0-15 Units, QID (AC + HS) PRN  labetalol, 10 mg, Q4H PRN  magnesium oxide, 800 mg, PRN  magnesium oxide, 800 mg, PRN  melatonin, 6 mg, Nightly PRN  ondansetron, 4 mg, Q6H PRN  potassium bicarbonate, 35 mEq, PRN  potassium bicarbonate, 50 mEq, PRN  potassium bicarbonate, 60 mEq, PRN  potassium, sodium phosphates, 2 packet, PRN  potassium, sodium phosphates, 2 packet, PRN  potassium, sodium phosphates, 2 packet, PRN    Today I personally reviewed pertinent medications, lines/drains/airways, imaging, cardiology results, laboratory results, microbiology results, notably:    Glucose better controlled, monitor PO intake  CTH stable, EVD clamped at 20    Diet  Diet diabetic [facility]; 2000 Calorie  Diet diabetic [facility]; 2000 Calorie

## 2023-02-07 NOTE — PLAN OF CARE
[facility] Care Plan    POC reviewed with Leida Hoyos  at 0300. Pt verbalized understanding. Questions and concerns addressed. No acute events overnight. Pt progressing toward goals. Will continue to monitor. See below and flowsheets for full assessment and VS info.   -CT scan completed         Is this a stroke patient? yes- Stroke booklet reviewed with patient, risk factors identified for patient and stroke booklet remains at bedside for ongoing education.     Neuro:  [place] Coma Scale  Best Eye Response: 4-->(E4) spontaneous  Best Motor Response: 6-->(M6) obeys commands  Best Verbal Response: 4-->(V4) confused  [place] Coma Scale Score: 14  Assessment Qualifiers: patient not sedated/intubated  Pupil PERRLA: yes     24hr Temp:  [98.4 °F (36.9 °C)-99.7 °F (37.6 °C)]     CV:   Rhythm: normal sinus rhythm  BP goals:   SBP < 160  MAP > 65    Resp:      Vent Mode: Spont  Set Rate: 16 BPM  Oxygen Concentration (%): 32  Vt Set: 420 mL  PEEP/CPAP: 5 cmH20  Pressure Support: 7 cmH20    Plan: N/A    GI/:     Diet/Nutrition Received: consistent carb/diabetic diet  Last Bowel Movement: 02/04/23  Voiding Characteristics: voids spontaneously without difficulty    Intake/Output Summary (Last 24 hours) at 2/7/2023 0313  Last data filed at 2/7/2023 0003  Gross per 24 hour   Intake 839.7 ml   Output 525 ml   Net 314.7 ml     Unmeasured Output  Urine Occurrence: 1  Stool Occurrence: 1  Emesis Occurrence: 0  Pad Count: 2    Labs/Accuchecks:  Recent Labs   Lab 02/06/23  0438   WBC 8.93   RBC 3.45*   HGB 10.4*   HCT 33.6*         Recent Labs   Lab 02/06/23  0438      K 3.5   CO2 26      BUN 9   CREATININE 0.7   ALKPHOS 158*   ALT 13   AST 21   BILITOT 0.3    No results for input(s): PROTIME, INR, APTT, HEPANTIXA in the last 168 hours. No results for input(s): CPK, CPKMB, TROPONINI, MB in the last 168 hours.    Electrolytes: Electrolytes replaced  Accuchecks: ACHS    Gtts:      LDA/Wounds:  Lines/Drains/Airways        Drain  Duration                  ICP/Ventriculostomy 01/27/23 1400 10 days              Peripheral Intravenous Line  Duration                  Peripheral IV - Single Lumen 02/05/23 0957 Anterior;Right Forearm 1 day         Peripheral IV - Single Lumen 02/07/23 0030 20 G Left;Posterior Forearm <1 day                  Wounds: No  Wound care consulted: No

## 2023-02-07 NOTE — SUBJECTIVE & OBJECTIVE
Interval History: 2/7: No acute events, pt tolerated EVD clamp with stable interval imaging. Will discontinue EVD today and re-scan tomorrow morning.       Medications:  Continuous Infusions:      Scheduled Meds:   albuterol-ipratropium  3 mL Nebulization Q6H WAKE    amantadine HCL  100 mg Oral BID    ceFAZolin (ANCEF) IVPB  2 g Intravenous Q8H    heparin (porcine)  5,000 Units Subcutaneous Q8H    insulin aspart U-100  6 Units Subcutaneous TIDWM    insulin detemir U-100  16 Units Subcutaneous BID     PRN Meds:acetaminophen, dextrose 10%, glucagon (human recombinant), glucose, glucose, hydrALAZINE, insulin aspart U-100, labetalol, magnesium oxide, magnesium oxide, melatonin, ondansetron, potassium bicarbonate, potassium bicarbonate, potassium bicarbonate, potassium, sodium phosphates, potassium, sodium phosphates, potassium, sodium phosphates     Review of Systems  Objective:     Weight: 55.5 kg (122 lb 5.7 oz)  Body mass index is 22.38 kg/m².  Vital Signs (Most Recent):  Temp: 98.2 °F (36.8 °C) (02/07/23 0701)  Pulse: 68 (02/07/23 0800)  Resp: (!) 22 (02/07/23 0800)  BP: (!) 125/101 (02/07/23 0701)  SpO2: (!) 94 % (02/07/23 0800) Vital Signs (24h Range):  Temp:  [98.2 °F (36.8 °C)-99.7 °F (37.6 °C)] 98.2 °F (36.8 °C)  Pulse:  [64-88] 68  Resp:  [10-36] 22  SpO2:  [92 %-99 %] 94 %  BP: (125-167)/() 125/101                            ICP/Ventriculostomy 01/27/23 1400 (Active)   $ ICP/Ventriculostomy [place] Placement Bedside Insertion Performed 01/27/23 1501   Level of Ventriculostomy (cm above) 20 02/02/23 0201   Status Clamped 02/03/23 0601   Site Assessment Clean;Dry 02/03/23 0601   Site Drainage No drainage 02/03/23 0601   Waveform normal waveform 02/02/23 2301   Output (mL) 0 mL 02/03/23 0500   CSF Color clear 02/01/23 1500   Dressing Status Clean;Dry;Intact 02/03/23 0601   Interventions HOB degrees 02/03/23 0601       Physical Exam    Neurosurgery Physical Exam       [id number].   AOx3.   PERRL.   CN non focal.    FC x4 grossly full strength      Significant Labs:  Recent Labs   Lab 02/05/23  1248 02/06/23  0438 02/07/23  0416   GLU  --  107 113*   NA  --  140 141   K 3.6 3.5 3.7   CL  --  105 106   CO2  --  26 24   BUN  --  9 8   CREATININE  --  0.7 0.8   CALCIUM  --  9.7 10.1   MG  --  2.1 2.2       Recent Labs   Lab 02/06/23  0438 02/07/23  0416   WBC 8.93 9.21   HGB 10.4* 10.6*   HCT 33.6* 32.7*    306       No results for input(s): LABPT, INR, APTT in the last 48 hours.  Microbiology Results (last 7 days)       Procedure Component Value Units Date/Time    CSF culture [[id number]] Collected: 02/06/23 0802    Order Status: Completed Specimen: CSF (Spinal Fluid) from CSF Shunt Updated: 02/07/23 0631     CSF CULTURE No Growth to date     Gram Stain Result Cytospin indicates:      Rare WBC's      No organisms seen    CSF culture [[id number]] Collected: 02/02/23 1013    Order Status: Completed Specimen: CSF (Spinal Fluid) from CSF Tap, Tube 3 Updated: 02/07/23 0623     CSF CULTURE No Growth     Gram Stain Result No WBC's      No organisms seen    CSF culture [[id number]] Collected: 01/30/23 1449    Order Status: Completed Specimen: CSF (Spinal Fluid) from CSF Shunt Updated: 02/04/23 0742     CSF CULTURE No Growth     Gram Stain Result Rare WBC's      No organisms seen    Gram stain [[id number]] Collected: 02/02/23 1013    Order Status: Canceled Specimen: CSF (Spinal Fluid) from CSF Tap, Tube 3           All pertinent labs from the last 24 hours have been reviewed.    Significant Diagnostics:  I have reviewed all pertinent imaging results/findings within the past 24 hours.

## 2023-02-07 NOTE — PLAN OF CARE
[facility] Care Plan    POC reviewed with Leida Hoyos and family at 1400. Pt verbalized understanding. Questions and concerns addressed. No acute events today. Pt progressing toward goals. Will continue to monitor. See below and flowsheets for full assessment and VS info.     - EVD removed per NSGY  - CT in AM    Is this a stroke patient? yes- Stroke booklet reviewed with patient and family, risk factors identified for patient and stroke booklet remains at bedside for ongoing education.     Neuro:  [place] Coma Scale  Best Eye Response: 4-->(E4) spontaneous  Best Motor Response: 6-->(M6) obeys commands  Best Verbal Response: 4-->(V4) confused  [place] Coma Scale Score: 14  Assessment Qualifiers: patient not sedated/intubated, no eye obstruction present  Pupil PERRLA: yes     24 hr Temp:  [98.2 °F (36.8 °C)-99.7 °F (37.6 °C)]     CV:   Rhythm: normal sinus rhythm  BP goals:   SBP < 160  MAP > 65    Resp:      Vent Mode: Spont  Set Rate: 16 BPM  Oxygen Concentration (%): 32  Vt Set: 420 mL  PEEP/CPAP: 5 cmH20  Pressure Support: 7 cmH20    Plan: N/A    GI/:     Diet/Nutrition Received: consistent carb/diabetic diet  Last Bowel Movement: 02/04/23  Voiding Characteristics: voids spontaneously without difficulty    Intake/Output Summary (Last 24 hours) at 2/7/2023 1640  Last data filed at 2/7/2023 1401  Gross per 24 hour   Intake 829.7 ml   Output 1050 ml   Net -220.3 ml     Unmeasured Output  Urine Occurrence: 1  Stool Occurrence: 0  Emesis Occurrence: 0  Pad Count: 2    Labs/Accuchecks:  Recent Labs   Lab 02/07/23  0416   WBC 9.21   RBC 3.50*   HGB 10.6*   HCT 32.7*         Recent Labs   Lab 02/07/23  0416      K 3.7   CO2 24      BUN 8   CREATININE 0.8   ALKPHOS 155*   ALT 11   AST 22   BILITOT 0.3    No results for input(s): PROTIME, INR, APTT, HEPANTIXA in the last 168 hours. No results for input(s): CPK, CPKMB, TROPONINI, MB in the last 168 hours.    Electrolytes: N/A - electrolytes  CHITOL  Accuchecks: ACHS    Gtts:      LDA/Wounds:  Lines/Drains/Airways       Drain  Duration                  ICP/Ventriculostomy 01/27/23 1400 11 days              Peripheral Intravenous Line  Duration                  Peripheral IV - Single Lumen 02/05/23 0957 Anterior;Right Forearm 2 days         Peripheral IV - Single Lumen 02/07/23 0030 20 G Left;Posterior Forearm <1 day                  Wounds: No  Wound care consulted: No

## 2023-02-07 NOTE — PLAN OF CARE
02/07/23 0929   Post-Acute Status   Post-Acute Authorization Placement   Discharge Plan   Discharge Plan A Rehab     [facility] Phone: [phone]  - Referral Received. Comments: Under review. Mansi [phone]     [facility] Phone: [phone]  -Rehab team following.    SW will continue to follow patient.    Yasmeen Mohamud LMSW  PRN - [facility]  [id number]

## 2023-02-07 NOTE — PT/OT/SLP PROGRESS
"Occupational Therapy   Treatment    Name: Leida Hoyos  MRN: [id number]  Admitting Diagnosis:  SAH (subarachnoid hemorrhage)  11 Days Post-Op    Recommendations:     Discharge Recommendations: rehabilitation facility  Discharge Equipment Recommendations:  none  Barriers to discharge:  None    Assessment:     Leida Hoyos is a 52 y.o. female with a medical diagnosis of SAH (subarachnoid hemorrhage).  She presents with performance deficits affecting function are weakness, impaired functional mobility, impaired balance, impaired cognition, impaired self care skills.     Rehab Prognosis:  Good; patient would benefit from acute skilled OT services to address these deficits and reach maximum level of function.       Plan:     Patient to be seen 3 x/week to address the above listed problems via neuromuscular re-education, cognitive retraining, therapeutic exercises, therapeutic activities, self-care/home management  Plan of Care Expires: 02/25/23  Plan of Care Reviewed with: patient    Subjective   Patient: "Hi."  Pain/Comfort:  Pain Rating 1: 0/10  Pain Rating Post-Intervention 1: 0/10    Objective:     Communicated with: Nurse prior to session.  Patient found supine with bed alarm, blood pressure cuff, external ventricular drain, peripheral IV, telemetry, pulse ox (continuous) upon OT entry to room.    General Precautions: Standard, aspiration, fall; EVD clamped prior to the session    Orthopedic Precautions:N/A  Braces: N/A  Respiratory Status: Room air     Occupational Performance:     Bed Mobility:    Patient completed Rolling/Turning to Left with  modified independence  Patient completed Rolling/Turning to Right with modified independence  Patient completed Supine to Sit with supervision  Patient completed Sit to Supine with supervision     Functional Mobility/Transfers:  Patient completed Sit <> Stand Transfer with contact guard assistance  with  no assistive device   CGA with stand pivot " transfers    Activities of Daily Living:  Grooming: stand by assistance while standing   Upper Body Dressing: stand by assistance while seated EOB  Lower Body Dressing: contact guard assistance      [facility] 6 Click ADL: 18    Treatment & Education:  Patient alert and oriented x 3.  Patient attentive and interactive throughout the session.  Patient able to sequence 7/7 days of the week and 12/12 months of the year.  Able to categorize 6 colors, 3 vegetables and 2 fruits each within 30 second time frame.     Patient left supine with all lines intact, call button in reach, and bed alarm on    GOALS:   Multidisciplinary Problems       Occupational Therapy Goals          Problem: Occupational Therapy    Goal Priority Disciplines Outcome Interventions   Occupational Therapy Goal     OT, PT/OT Ongoing, Progressing    Description: Goals set 1/28 to be addressed for 14 days with expiration date, 2/11:  Patient will increase functional independence with ADLs by performing:    Patient will demonstrate rolling to the right with SBA.  Not met   Patient will demonstrate rolling to the left with SBA.   Not met  Patient will demonstrate supine -sit with SBA.   Not met  Patient will demonstrate stand pivot transfers with min assist.   Not met  Patient will demonstrate grooming while seated with SBA.   Not met  Patient will demonstrate upper body dressing with SBA while seated EOB.   Not met  Patient will demonstrate lower body dressing with min assist while seated EOB.   Not met  Patient will demonstrate toileting with min assist.   Not met  Patient will demonstrate bathing while seated EOB with min assist.   Not met  Patient's family / caregiver will demonstrate independence and safety with assisting patient with self-care skills and functional mobility.     Not met                               Time Tracking:     OT Date of Treatment: 02/07/23  OT Start Time: 0522  OT Stop Time: 0546  OT Total Time (min): 24 min    Billable  Minutes:Self Care/Home Management 12  Cognitive Retraining 12    OT/ANDRES: OT          2/7/2023

## 2023-02-07 NOTE — PROGRESS NOTES
[facility]  Neurocritical Care  Progress Note    Admit Date: 1/27/2023  Service Date: 02/07/2023  Length of Stay: 11    Subjective:     Chief Complaint: SAH (subarachnoid hemorrhage)    History of Present Illness: Mrs. Gupta 57 yo F w/ PMH DM1 (dx 2013), HTN, autoimmune hepatitis presented to the ED for AMS this morning where she was unable to answer questions appropriately after a shower.  states patient had a HA and vomited the night before she went to sleep. LNK was 10 pm. Patient had a similar episode a few years ago, but was admitted for DKA.  states patient has worsened neurologically since they left their house. On exam, patient was non-verbal, fixed pupils, intermittently following commands in B/L UE, but unable in LE. GCS 8. MRI performed in ED showed acute intraventricular and subarachnoid hemorrhage with early obstructive hydrocephalus and transependymal CSF egress. Also with a noted 21.5-2 mm laterally directed outpouching at the proximal right A2 QUIQUE could represent infundibulum/tortuous origin of a proximal QUIQUE branch. NSGY consulted and placed EVD after patient was intubated after concern for airway protection. Started Keppra 1g BID. CTA ordered. IR consulted for possible angiogram. No leukocytosis. H/H stable. Cr 0.9. Glucose 284. NCC will admit patient for HLOC.          Hospital Course: 01/28/2023: IR performed angiogram shows no aneurysm, AVM or AV fistula. EVD open @20. Will wean propofol and transition to precedex. Started tube feeds and SQ heparin. Transitioned from insulin gtt to detemir.   01/29/2023 [facility] vent; off sedation; TCDs w/o vasospasm; will need repeat angio within a week; MRI brain completed no lesions found; TFS to goal; replete lytes; keep I/Os nearly matched w/ IVF boluses; consider yudy if sbp dips with analgesia and/or sedation;   01/30/2023 Extubated with parameters to NC. Some wheezing post extubation, PRN duonebs and racemic epi x1. IV  Dex 4mg q6 hours x 1 day and close airway watch. Cardene to maintain SBP<140. EVD open at 20. Monitoring TCDs daily, no current evidence of vasospasm. Glucose elevated, SSI in place, may require Insulin gtt 2/2 steroids.   1/31/23: possible angio Friday 02/01/2023 No acute events overnight. EVD clamp trial per Neurosurgery, continue to monitor neurologic exam with CTH in AM. TCDs without evidence of vasospasm. Remains hyperglycemia, steroids discontinued, increasing long acting to 12u BID.  02/02/2023 No acute events overnight. CTH with EVD clamped stable, plan to continue clamp trial for one more day. DSA tomorrow on PBD7.   02/02/2023 More sedated on exam and difficult to arouse but will arouse to answer questions (full name, at [facility]) before drifting back to sleep. ICP stable 7-14. CTH stable.   02/03/2023 CTH stable overnight. Ambulating with PT/OT. Exam improved, A&Ox3. DSA without aneurysm, AVM or AV fistula. Elevated ICP during angiogram, EVD open at 20. Post procedure CTH pending.   02/04/2023 CTH post angio with slight increase in ventricle size compared to prior. EVD reopened to 10 and subsequently raised to 15. Plan to repeat clamp trial per Neurosurgery. Exam unchanged. Encourage PO water intake for elevated sodium.   02/05/2023 EVD at 15, plan to keep open at 15 until tomorrow. Glucose better controlled with Detemir 16 BID, aspart 5. Montior blood glucose as PO intake increases. TCDs without evidence of vasospasm.   02/06/2023 EVD raised to 20 overnight. CTH stable. Pending clamp trial per neurosurgery. CTH in AM. Exam continues to improve. Remains alert and oriented to person, intermittently place. TCDs without evidence of vasospasm.   02/07/2023 CTH stable. Plan for EVD removal per neurosurgery. Discussion ongoing with Neurosurgery concerning discontinuation of TCDs.      Subjective:     Interval History:  As above.    Review of Systems   Constitutional:  Negative for fatigue and fever.   HENT:   Negative for trouble swallowing.    Eyes:  Negative for photophobia and visual disturbance.   Respiratory:  Negative for chest tightness and shortness of breath.    Cardiovascular:  Negative for chest pain and leg swelling.   Gastrointestinal:  Negative for nausea and vomiting.   Musculoskeletal:  Negative for neck stiffness.   Neurological:  Positive for speech difficulty and headaches. Negative for dizziness.   Psychiatric/Behavioral:  Positive for decreased concentration. Negative for agitation.      Objective:     Vitals:  Temp: 98.4 °F (36.9 °C)  Pulse: 82  Rhythm: normal sinus rhythm  BP: 133/71  MAP (mmHg): 96  ICP Mean (mmHg): 12 mmHg  Resp: 16  SpO2: (!) 94 %    Temp  Min: 98.2 °F (36.8 °C)  Max: 99.7 °F (37.6 °C)  Pulse  Min: 65  Max: 88  BP  Min: 125/101  Max: 167/74  MAP (mmHg)  Min: 90  Max: 109  ICP Mean (mmHg)  Min: 3 mmHg  Max: 19 mmHg  Resp  Min: 10  Max: 25  SpO2  Min: 94 %  Max: 98 %    02/06 0701 - 02/07 0700  In: 839.7 [P.O.:720]  Out: 512 [Urine:500; Drains:12]   Unmeasured Output  Urine Occurrence: 1  Stool Occurrence: 0  Emesis Occurrence: 0  Pad Count: 2       Physical Exam  Vitals and nursing note reviewed.   HENT:      Head: Normocephalic.      Comments: EVD in place, open at 15     Nose: Nose normal.      Mouth/Throat:      Mouth: Mucous membranes are moist.      Pharynx: Oropharynx is clear.   Cardiovascular:      Rate and Rhythm: Normal rate and regular rhythm.      Pulses: Normal pulses.      Heart sounds: Normal heart sounds.   Pulmonary:      Effort: Pulmonary effort is normal.      Breath sounds: Normal breath sounds.   Abdominal:      General: Bowel sounds are normal.      Palpations: Abdomen is soft.   Musculoskeletal:         General: Normal range of motion.   Skin:     General: Skin is warm and dry.      Capillary Refill: Capillary refill takes 2 to 3 seconds.   Neurological:      Mental Status: She is alert.      Comments:   GCS 14 with improved delayed response time   Oriented  to self and situation. Intermittently to place and time.   Follows commands in BUE/BLE (improvement in multistep and bilateral commands that cross midline)  PERRL  Sensation grossly intact       Unable to test orientation, language, memory, judgment, insight, fund of knowledge, hearing, shoulder shrug, tongue protrusion, coordination, gait due to level of consciousness.    Medications:  Continuous   Scheduledamantadine HCL, 100 mg, BID  ceFAZolin (ANCEF) IVPB, 2 g, Q8H  heparin (porcine), 5,000 Units, Q8H  insulin aspart U-100, 6 Units, TIDWM  insulin detemir U-100, 16 Units, BID  PRNacetaminophen, 650 mg, Q6H PRN  albuterol-ipratropium, 3 mL, Q6H PRN  dextrose 10%, 12.5 g, PRN  glucagon (human recombinant), 1 mg, PRN  glucose, 16 g, PRN  glucose, 24 g, PRN  hydrALAZINE, 10 mg, Q6H PRN  insulin aspart U-100, 0-15 Units, QID (AC + HS) PRN  labetalol, 10 mg, Q4H PRN  magnesium oxide, 800 mg, PRN  magnesium oxide, 800 mg, PRN  melatonin, 6 mg, Nightly PRN  ondansetron, 4 mg, Q6H PRN  potassium bicarbonate, 35 mEq, PRN  potassium bicarbonate, 50 mEq, PRN  potassium bicarbonate, 60 mEq, PRN  potassium, sodium phosphates, 2 packet, PRN  potassium, sodium phosphates, 2 packet, PRN  potassium, sodium phosphates, 2 packet, PRN    Today I personally reviewed pertinent medications, lines/drains/airways, imaging, cardiology results, laboratory results, microbiology results, notably:    Glucose better controlled, monitor PO intake  CTH stable, EVD clamped at 20    Diet  Diet diabetic [facility]; 2000 Calorie  Diet diabetic [facility]; 2000 Calorie    Assessment/Plan:     Neuro  * SAH (subarachnoid hemorrhage)  51 yo F w/ PMH DM1, HTN presented to the ED with AMS after HA and vomiting the night before. Patient was non-verbal, B/l fixed pupils, and only intermittently following commands in B/L UE. GCS 8. MRI shows Acute IVH, SAH w/ obstructive hydrocephalus w/o signs of ischemia and R A2 QUIQUE aneurysm. VN consulted.  Patient intubated in ED after concern for airway protection. NSGY placed EVD. IR consulted for possible angiogram. CTA shows diffuse subarachnoid hemorrhage with moderate hydrocephalus. No intracranial aneurysm or AVM is identified. NIHSS 9 on admission.     - IR angiogram on 1/27 shows no aneurysm, AVM, or AV fistula. Will need repeat angiogram on 2/3; will discuss with IR   - CTA shows diffuse subarachnoid hemorrhage with moderate hydrocephalus. No intracranial aneurysm or AVM is identified.  - Repeat DSA on PBD 7 without aneurysm, AVM or AV fistula.   - Plan for EVD removal 2/7    - Continued monitoring in NCC  - Q1H neuro checks and vitals  - NSGY following; appreciate recs  - EVD removal per Neurosurgery, CTH in AM  - Ancef for drain ppx, d/c after EVD removal   - Daily TCDs, discission on discontinuation   - Echo reviewed   - CBC, CMP, Mag, Phos daily, replace PRN  - SBP goal <160  - Cardene gtt, wean as able   - PRN labetalol, hydralazine  - SQ heparin  - PT/OT/SLP    IVH (intraventricular hemorrhage)  See SAH  EVD care    Vasogenic cerebral edema  See SAH    Cardiac/Vascular  Hypertension  SBP 160s on arrival.   states patient does not take home Losartan regularly.     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 BG on arrival 284  Home regimen 13u Lantus; Lispro AC  A1C 6.5     -Transitioned from Insulin gtt to Detemir 16 BID  -Aspart 6 TIDWM  -Hypoglycemic protocol in place  -Accuchecks q1h    GI  Autoimmune hepatitis  Hx of AIH. Previously on mercaptopurine. Not currently taking.   LFTs slightly elevated on arrival.    -Will continue to monitor  -Holding Tylenol and hepatotoxic agents  -CMP daily  -If LFTs worsen will obtain RUQ US.     Other  Debility  PT/OT    Endotracheally intubated-resolved as of 2/3/2023  Patient intubated in ED 2/2 concern for airway protection.  GCS 7  Extubated 1/30 to NC  PRN Duonebs           The patient is being Prophylaxed for:  Venous Thromboembolism with: Chemical  Stress Ulcer with: Not Applicable   Ventilator Pneumonia with: not applicable    Activity Orders          Diet diabetic [facility]; 2000 Calorie: Diabetic starting at 02/05 1116    Progressive Mobility Protocol (mobilize patient to their highest level of functioning at least twice daily) starting at 02/03 2000    Progressive Mobility Protocol (mobilize patient to their highest level of functioning at least twice daily) starting at 01/28 0800    Elevate HOB Collagen closure or PERCLOSE plug/device - Elevate HOB 30 degrees with limb immobilized for 2 hours after procedure. starting at 01/27 2018    Turn patient starting at 01/27 1400    Elevate HOB starting at 01/27 1221        Full Code    Ronak Pavon MD  Neurocritical Care  [facility] Neuro Critical [facility]

## 2023-02-07 NOTE — PROGRESS NOTES
[facility]  Neurosurgery  Progress Note    Subjective:     History of Present Illness: 51 yo F with PMH of DM1, autoimmune hepatitis, and HTN who presents to ED this am after confusion this am. She was LKN last night and became confused after showering this am so  brought to the ED. She quickly decompensated in the ER and was not following commands or waking to stimulation. MRI and CTA showed diffuse thick SAH and hydrocephalus. No identifiable aneurysm seen on CTA.       Post-Op Info:  Procedure(s) (LRB):  ANGIOGRAM-CEREBRAL (N/A)   11 Days Post-Op     Interval History: 2/7: No acute events, pt tolerated EVD clamp with stable interval imaging. Will discontinue EVD today and re-scan tomorrow morning.       Medications:  Continuous Infusions:      Scheduled Meds:   albuterol-ipratropium  3 mL Nebulization Q6H WAKE    amantadine HCL  100 mg Oral BID    ceFAZolin (ANCEF) IVPB  2 g Intravenous Q8H    heparin (porcine)  5,000 Units Subcutaneous Q8H    insulin aspart U-100  6 Units Subcutaneous TIDWM    insulin detemir U-100  16 Units Subcutaneous BID     PRN Meds:acetaminophen, dextrose 10%, glucagon (human recombinant), glucose, glucose, hydrALAZINE, insulin aspart U-100, labetalol, magnesium oxide, magnesium oxide, melatonin, ondansetron, potassium bicarbonate, potassium bicarbonate, potassium bicarbonate, potassium, sodium phosphates, potassium, sodium phosphates, potassium, sodium phosphates     Review of Systems  Objective:     Weight: 55.5 kg (122 lb 5.7 oz)  Body mass index is 22.38 kg/m².  Vital Signs (Most Recent):  Temp: 98.2 °F (36.8 °C) (02/07/23 0701)  Pulse: 68 (02/07/23 0800)  Resp: (!) 22 (02/07/23 0800)  BP: (!) 125/101 (02/07/23 0701)  SpO2: (!) 94 % (02/07/23 0800) Vital Signs (24h Range):  Temp:  [98.2 °F (36.8 °C)-99.7 °F (37.6 °C)] 98.2 °F (36.8 °C)  Pulse:  [64-88] 68  Resp:  [10-36] 22  SpO2:  [92 %-99 %] 94 %  BP: (125-167)/() 125/101                             ICP/Ventriculostomy 01/27/23 1400 (Active)   $ ICP/Ventriculostomy Eunice Placement Bedside Insertion Performed 01/27/23 1501   Level of Ventriculostomy (cm above) 20 02/02/23 0201   Status Clamped 02/03/23 0601   Site Assessment Clean;Dry 02/03/23 0601   Site Drainage No drainage 02/03/23 0601   Waveform normal waveform 02/02/23 2301   Output (mL) 0 mL 02/03/23 0500   CSF Color clear 02/01/23 1500   Dressing Status Clean;Dry;Intact 02/03/23 0601   Interventions HOB degrees 02/03/23 0601       Physical Exam    Neurosurgery Physical Exam       [id number].   AOx3.   PERRL.   CN non focal.   FC x4 grossly full strength      Significant Labs:  Recent Labs   Lab 02/05/23  1248 02/06/23  0438 02/07/23  0416   GLU  --  107 113*   NA  --  140 141   K 3.6 3.5 3.7   CL  --  105 106   CO2  --  26 24   BUN  --  9 8   CREATININE  --  0.7 0.8   CALCIUM  --  9.7 10.1   MG  --  2.1 2.2       Recent Labs   Lab 02/06/23  0438 02/07/23  0416   WBC 8.93 9.21   HGB 10.4* 10.6*   HCT 33.6* 32.7*    306       No results for input(s): LABPT, INR, APTT in the last 48 hours.  Microbiology Results (last 7 days)       Procedure Component Value Units Date/Time    CSF culture [[id number]] Collected: 02/06/23 0802    Order Status: Completed Specimen: CSF (Spinal Fluid) from CSF Shunt Updated: 02/07/23 0631     CSF CULTURE No Growth to date     Gram Stain Result Cytospin indicates:      Rare WBC's      No organisms seen    CSF culture [[id number]] Collected: 02/02/23 1013    Order Status: Completed Specimen: CSF (Spinal Fluid) from CSF Tap, Tube 3 Updated: 02/07/23 0623     CSF CULTURE No Growth     Gram Stain Result No WBC's      No organisms seen    CSF culture [[id number]] Collected: 01/30/23 1449    Order Status: Completed Specimen: CSF (Spinal Fluid) from CSF Shunt Updated: 02/04/23 0742     CSF CULTURE No Growth     Gram Stain Result Rare WBC's      No organisms seen    Gram stain [[id number]] Collected: 02/02/23 1013    Order Status:  Canceled Specimen: CSF (Spinal Fluid) from CSF Tap, Tube 3           All pertinent labs from the last 24 hours have been reviewed.    Significant Diagnostics:  I have reviewed all pertinent imaging results/findings within the past 24 hours.    Assessment/Plan:     * SAH (subarachnoid hemorrhage)  53 yo F with PMH of DM1, autoimmune hepatitis, and HTN who presents to ED and was found to have diffuse SAH and hydrocephalus    PBD 11    EVD clamp intervally tolerated clinically and radiographically. Will discontinue drain today and scan in am.    - admit to [facility]  - q1 neuro checks  - all labs and diagnostics reviewed   - MRI/CTA showed diffuse SAH and small volume IVH with hydrocephalus, no identifiable aneurysm   - CTH repeat with EVD in good position   - CTA without identifiable aneurysm   - DSA 1/27 without any identifiable aneurysm   - MRI brain/C spine and MRI vessel wall imaging 1/29: no vascular anomaly seen or vessell wall enhancement   - CTH 2/2 after EVD clamped the day before with decrease in ventricular caliber from prior, decreasing SAH   - DSA 2/3: negative   - CTH 2/3 post angio, mild increase in ventricular size  - SAH protocol   - daily TCDs x 14 days   - keppra 500mg x 7 days   - goal euvolemia to 1L positive  - Na >135, hypertonics as needed  - elevate HOB  - SBP <140  - extubated 1/30  - ok for diet, SLP  - ok for SQH    Dispo: ongoing        Pablo Gillis MD  Neurosurgery  [facility]

## 2023-02-08 LAB
ALBUMIN SERPL BCP-MCNC: 3 G/DL (ref 3.5–5.2)
ALP SERPL-CCNC: 143 U/L (ref 55–135)
ALT SERPL W/O P-5'-P-CCNC: 8 U/L (ref 10–44)
ANION GAP SERPL CALC-SCNC: 12 MMOL/L (ref 8–16)
AST SERPL-CCNC: 18 U/L (ref 10–40)
BASOPHILS # BLD AUTO: 0.04 K/UL (ref 0–0.2)
BASOPHILS NFR BLD: 0.5 % (ref 0–1.9)
BILIRUB SERPL-MCNC: 0.2 MG/DL (ref 0.1–1)
BUN SERPL-MCNC: 8 MG/DL (ref 6–20)
CALCIUM SERPL-MCNC: 10.1 MG/DL (ref 8.7–10.5)
CHLORIDE SERPL-SCNC: 104 MMOL/L (ref 95–110)
CO2 SERPL-SCNC: 23 MMOL/L (ref 23–29)
CREAT SERPL-MCNC: 0.8 MG/DL (ref 0.5–1.4)
DIFFERENTIAL METHOD: ABNORMAL
EOSINOPHIL # BLD AUTO: 0.2 K/UL (ref 0–0.5)
EOSINOPHIL NFR BLD: 1.7 % (ref 0–8)
ERYTHROCYTE [DISTWIDTH] IN BLOOD BY AUTOMATED COUNT: 12.7 % (ref 11.5–14.5)
EST. GFR  (NO RACE VARIABLE): >60 ML/MIN/1.73 M^2
GLUCOSE SERPL-MCNC: 162 MG/DL (ref 70–110)
HCT VFR BLD AUTO: 32.8 % (ref 37–48.5)
HGB BLD-MCNC: 10.6 G/DL (ref 12–16)
IMM GRANULOCYTES # BLD AUTO: 0.04 K/UL (ref 0–0.04)
IMM GRANULOCYTES NFR BLD AUTO: 0.5 % (ref 0–0.5)
LYMPHOCYTES # BLD AUTO: 1.8 K/UL (ref 1–4.8)
LYMPHOCYTES NFR BLD: 20.7 % (ref 18–48)
MAGNESIUM SERPL-MCNC: 1.9 MG/DL (ref 1.6–2.6)
MCH RBC QN AUTO: 30.7 PG (ref 27–31)
MCHC RBC AUTO-ENTMCNC: 32.3 G/DL (ref 32–36)
MCV RBC AUTO: 95 FL (ref 82–98)
MONOCYTES # BLD AUTO: 0.8 K/UL (ref 0.3–1)
MONOCYTES NFR BLD: 9.7 % (ref 4–15)
NEUTROPHILS # BLD AUTO: 5.8 K/UL (ref 1.8–7.7)
NEUTROPHILS NFR BLD: 66.9 % (ref 38–73)
NRBC BLD-RTO: 0 /100 WBC
PHOSPHATE SERPL-MCNC: 3.3 MG/DL (ref 2.7–4.5)
PLATELET # BLD AUTO: 326 K/UL (ref 150–450)
PMV BLD AUTO: 9.1 FL (ref 9.2–12.9)
POCT GLUCOSE: 154 MG/DL (ref 70–110)
POCT GLUCOSE: 162 MG/DL (ref 70–110)
POCT GLUCOSE: 236 MG/DL (ref 70–110)
POCT GLUCOSE: 243 MG/DL (ref 70–110)
POCT GLUCOSE: 284 MG/DL (ref 70–110)
POTASSIUM SERPL-SCNC: 3.5 MMOL/L (ref 3.5–5.1)
PROT SERPL-MCNC: 6.7 G/DL (ref 6–8.4)
RBC # BLD AUTO: 3.45 M/UL (ref 4–5.4)
SODIUM SERPL-SCNC: 139 MMOL/L (ref 136–145)
WBC # BLD AUTO: 8.68 K/UL (ref 3.9–12.7)

## 2023-02-08 PROCEDURE — 25000003 PHARM REV CODE 250: Performed by: STUDENT IN AN ORGANIZED HEALTH CARE EDUCATION/TRAINING PROGRAM

## 2023-02-08 PROCEDURE — 84100 ASSAY OF PHOSPHORUS: CPT

## 2023-02-08 PROCEDURE — 94761 N-INVAS EAR/PLS OXIMETRY MLT: CPT

## 2023-02-08 PROCEDURE — 63600175 PHARM REV CODE 636 W HCPCS: Performed by: STUDENT IN AN ORGANIZED HEALTH CARE EDUCATION/TRAINING PROGRAM

## 2023-02-08 PROCEDURE — 92507 TX SP LANG VOICE COMM INDIV: CPT

## 2023-02-08 PROCEDURE — 80053 COMPREHEN METABOLIC PANEL: CPT

## 2023-02-08 PROCEDURE — 85025 COMPLETE CBC W/AUTO DIFF WBC: CPT

## 2023-02-08 PROCEDURE — 25000003 PHARM REV CODE 250

## 2023-02-08 PROCEDURE — 83735 ASSAY OF MAGNESIUM: CPT

## 2023-02-08 PROCEDURE — 25000003 PHARM REV CODE 250: Performed by: PHYSICIAN ASSISTANT

## 2023-02-08 PROCEDURE — 97116 GAIT TRAINING THERAPY: CPT

## 2023-02-08 PROCEDURE — 63600175 PHARM REV CODE 636 W HCPCS

## 2023-02-08 PROCEDURE — 99233 SBSQ HOSP IP/OBS HIGH 50: CPT | Mod: ,,, | Performed by: PSYCHIATRY & NEUROLOGY

## 2023-02-08 PROCEDURE — 99233 PR SUBSEQUENT HOSPITAL CARE,LEVL III: ICD-10-PCS | Mod: ,,, | Performed by: PSYCHIATRY & NEUROLOGY

## 2023-02-08 PROCEDURE — 99233 SBSQ HOSP IP/OBS HIGH 50: CPT | Mod: ,,, | Performed by: NEUROLOGICAL SURGERY

## 2023-02-08 PROCEDURE — 25000003 PHARM REV CODE 250: Performed by: PSYCHIATRY & NEUROLOGY

## 2023-02-08 PROCEDURE — 99233 PR SUBSEQUENT HOSPITAL CARE,LEVL III: ICD-10-PCS | Mod: ,,, | Performed by: NEUROLOGICAL SURGERY

## 2023-02-08 PROCEDURE — 97535 SELF CARE MNGMENT TRAINING: CPT

## 2023-02-08 PROCEDURE — 20600001 HC STEP DOWN PRIVATE ROOM

## 2023-02-08 PROCEDURE — 97530 THERAPEUTIC ACTIVITIES: CPT

## 2023-02-08 RX ORDER — INSULIN ASPART 100 [IU]/ML
5 INJECTION, SOLUTION INTRAVENOUS; SUBCUTANEOUS
Status: DISCONTINUED | OUTPATIENT
Start: 2023-02-08 | End: 2023-02-10

## 2023-02-08 RX ORDER — QUETIAPINE FUMARATE 25 MG/1
25 TABLET, FILM COATED ORAL ONCE
Status: COMPLETED | OUTPATIENT
Start: 2023-02-08 | End: 2023-02-08

## 2023-02-08 RX ADMIN — INSULIN ASPART 9 UNITS: 100 INJECTION, SOLUTION INTRAVENOUS; SUBCUTANEOUS at 11:02

## 2023-02-08 RX ADMIN — HEPARIN SODIUM 5000 UNITS: 5000 INJECTION INTRAVENOUS; SUBCUTANEOUS at 02:02

## 2023-02-08 RX ADMIN — INSULIN ASPART 3 UNITS: 100 INJECTION, SOLUTION INTRAVENOUS; SUBCUTANEOUS at 07:02

## 2023-02-08 RX ADMIN — INSULIN ASPART 3 UNITS: 100 INJECTION, SOLUTION INTRAVENOUS; SUBCUTANEOUS at 05:02

## 2023-02-08 RX ADMIN — INSULIN ASPART 6 UNITS: 100 INJECTION, SOLUTION INTRAVENOUS; SUBCUTANEOUS at 07:02

## 2023-02-08 RX ADMIN — QUETIAPINE FUMARATE 25 MG: 25 TABLET ORAL at 09:02

## 2023-02-08 RX ADMIN — INSULIN ASPART 5 UNITS: 100 INJECTION, SOLUTION INTRAVENOUS; SUBCUTANEOUS at 05:02

## 2023-02-08 RX ADMIN — POTASSIUM BICARBONATE 50 MEQ: 978 TABLET, EFFERVESCENT ORAL at 06:02

## 2023-02-08 RX ADMIN — INSULIN DETEMIR 16 UNITS: 100 INJECTION, SOLUTION SUBCUTANEOUS at 09:02

## 2023-02-08 RX ADMIN — AMANTADINE HYDROCHLORIDE 100 MG: 100 CAPSULE, LIQUID FILLED ORAL at 06:02

## 2023-02-08 RX ADMIN — HEPARIN SODIUM 5000 UNITS: 5000 INJECTION INTRAVENOUS; SUBCUTANEOUS at 09:02

## 2023-02-08 RX ADMIN — CEFAZOLIN 2 G: 2 INJECTION, POWDER, FOR SOLUTION INTRAMUSCULAR; INTRAVENOUS at 12:02

## 2023-02-08 RX ADMIN — HEPARIN SODIUM 5000 UNITS: 5000 INJECTION INTRAVENOUS; SUBCUTANEOUS at 06:02

## 2023-02-08 RX ADMIN — INSULIN ASPART 5 UNITS: 100 INJECTION, SOLUTION INTRAVENOUS; SUBCUTANEOUS at 11:02

## 2023-02-08 RX ADMIN — CEFAZOLIN 2 G: 2 INJECTION, POWDER, FOR SOLUTION INTRAMUSCULAR; INTRAVENOUS at 08:02

## 2023-02-08 RX ADMIN — INSULIN ASPART 5 UNITS: 100 INJECTION, SOLUTION INTRAVENOUS; SUBCUTANEOUS at 09:02

## 2023-02-08 NOTE — PLAN OF CARE
[facility]  Discharge Reassessment    Primary Care Provider: Frieda Mera MD    Expected Discharge Date: 2/10/2023    Per MD:  patient to step down to the floor.  [facility] following.     sent Rehab referrals:  Preferred Facility: (if more than 1, listed in order of descending preference)  1.[facility] Phone: [phone]    2.[facility] Phone: [phone]    3.[facility] Phone: [phone]    4.[facility] [place] Phone: [phone]      Reassessment (most recent)       Discharge Reassessment - 02/08/23 1054          Discharge Reassessment    Assessment Type Discharge Planning Reassessment     Did the patient's condition or plan change since previous assessment? No     Communicated ISAIAH with patient/caregiver Date not available/Unable to determine     Discharge Plan A Rehab     Discharge Plan B Home with family;Home Health     DME Needed Upon Discharge  none     Discharge Barriers Identified None     Why the patient remains in the hospital Requires continued medical care                   Vanessa Patricia RN, CCRN-K, [facility]  Neuro-Critical Care   X [phone]

## 2023-02-08 NOTE — PT/OT/SLP PROGRESS
"Speech Language Pathology Treatment    Patient Name:  Leida Hoyos   MRN:  [id number]  Admitting Diagnosis: Nontraumatic subarachnoid hemorrhage    Recommendations:                 General Recommendations:  Cognitive-linguistic therapy  Diet recommendations:  Regular, Liquid Diet Level: Thin   Aspiration Precautions: Standard aspiration precautions   General Precautions: Standard, aspiration, fall  Communication strategies:  none    Subjective     "It's her short term memory" per family re cognition  Patient goals: home     Pain/Comfort:  Pain Rating 1: 0/10  Pain Rating Post-Intervention 1: 0/10    Respiratory Status: Room air    Objective:     Has the patient been evaluated by SLP for swallowing?   Yes  Keep patient NPO? No   Current Respiratory Status:        Pt. Seen at bedside with family present.  Ms. Hoyos was oriented to place,month and time of day. Pt. Verbalized limited insight to deficits/situation.  Recall of remote temporal and general information was fair with delays in responding noted.  Immediate verbal recall was wfl with pt. Repeating 7 digits and 5 words without difficulty.  Ms. Hoyos responded accurately to 5/7 complex yes/no questions and was easily distracted by background noise through out the session.  On thought organization and categorization tasks, she named 3 animals given one minute when 15-20 are wnl.  Responses to hypothetical verbal problem solving tasks were accurate for simple tasks however language was nonspecific and tangential when asked to compare and contrast objects and generate multiple solutions to problems (generated 1/3 solutions).  She responded to functional math and time calculations with 100% accuracy given min cues and increased time with repetition needed.  She recalled and sequenced 4  words presented auditorily on 0/2 trials given max cues.  ONgoing education provided to sister and spouse re communication and memory strategies.        Assessment:     Leida" Alonso Hoyos is a 52 y.o. female with an SLP diagnosis of Cognitive-Linguistic Impairment.    Goals:   Multidisciplinary Problems       SLP Goals          Problem: SLP    Goal Priority Disciplines Outcome   SLP Goal     SLP Ongoing, Progressing   Description: Goals due 2/10  1.  Pt. Will participate in ongoing assessment of swallow at bedside  2.  Tolerate trials of regular diet with thin liquids with no s/s of aspiration  3.  Assess functional reading and writing skills  4.  [place] to time and place  5.  Respond to word finding/categorization tasks with 80% accuracy  6.  Respond to verbal problem solving tasks with 75% accuracy                       Plan:     Patient to be seen:  4 x/week   Plan of Care expires:  02/27/23  Plan of Care reviewed with:  patient   SLP Follow-Up:  Yes       Discharge recommendations:  rehabilitation facility   Barriers to Discharge:  Safety Awareness poor    Time Tracking:     SLP Treatment Date:   02/08/23  Speech Start Time:  0945  Speech Stop Time:  1012     Speech Total Time (min):  27 min    Billable Minutes: Eval 17 self care home management 10    02/08/2023

## 2023-02-08 NOTE — PROGRESS NOTES
[facility]  Neurocritical Care  Progress Note    Admit Date: 1/27/2023  Service Date: 02/08/2023  Length of Stay: 12    Subjective:     Chief Complaint: Nontraumatic subarachnoid hemorrhage    History of Present Illness: Mrs. Gupta 59 yo F w/ PMH DM1 (dx 2013), HTN, autoimmune hepatitis presented to the ED for AMS this morning where she was unable to answer questions appropriately after a shower.  states patient had a HA and vomited the night before she went to sleep. LNK was 10 pm. Patient had a similar episode a few years ago, but was admitted for DKA.  states patient has worsened neurologically since they left their house. On exam, patient was non-verbal, fixed pupils, intermittently following commands in B/L UE, but unable in LE. GCS 8. MRI performed in ED showed acute intraventricular and subarachnoid hemorrhage with early obstructive hydrocephalus and transependymal CSF egress. Also with a noted 21.5-2 mm laterally directed outpouching at the proximal right A2 QUIQUE could represent infundibulum/tortuous origin of a proximal QUIQUE branch. NSGY consulted and placed EVD after patient was intubated after concern for airway protection. Started Keppra 1g BID. CTA ordered. IR consulted for possible angiogram. No leukocytosis. H/H stable. Cr 0.9. Glucose 284. NCC will admit patient for HLOC.          Hospital Course: 01/28/2023: IR performed angiogram shows no aneurysm, AVM or AV fistula. EVD open @20. Will wean propofol and transition to precedex. Started tube feeds and SQ heparin. Transitioned from insulin gtt to detemir.   01/29/2023 [facility] vent; off sedation; TCDs w/o vasospasm; will need repeat angio within a week; MRI brain completed no lesions found; TFS to goal; replete lytes; keep I/Os nearly matched w/ IVF boluses; consider yudy if sbp dips with analgesia and/or sedation;   01/30/2023 Extubated with parameters to NC. Some wheezing post extubation, PRN duonebs and racemic epi  x1. IV Dex 4mg q6 hours x 1 day and close airway watch. Cardene to maintain SBP<140. EVD open at 20. Monitoring TCDs daily, no current evidence of vasospasm. Glucose elevated, SSI in place, may require Insulin gtt 2/2 steroids.   1/31/23: possible angio Friday 02/01/2023 No acute events overnight. EVD clamp trial per Neurosurgery, continue to monitor neurologic exam with CTH in AM. TCDs without evidence of vasospasm. Remains hyperglycemia, steroids discontinued, increasing long acting to 12u BID.  02/02/2023 No acute events overnight. CTH with EVD clamped stable, plan to continue clamp trial for one more day. DSA tomorrow on PBD7.   02/02/2023 More sedated on exam and difficult to arouse but will arouse to answer questions (full name, at [facility]) before drifting back to sleep. ICP stable 7-14. CTH stable.   02/03/2023 CTH stable overnight. Ambulating with PT/OT. Exam improved, A&Ox3. DSA without aneurysm, AVM or AV fistula. Elevated ICP during angiogram, EVD open at 20. Post procedure CTH pending.   02/04/2023 CTH post angio with slight increase in ventricle size compared to prior. EVD reopened to 10 and subsequently raised to 15. Plan to repeat clamp trial per Neurosurgery. Exam unchanged. Encourage PO water intake for elevated sodium.   02/05/2023 EVD at 15, plan to keep open at 15 until tomorrow. Glucose better controlled with Detemir 16 BID, aspart 5. Montior blood glucose as PO intake increases. TCDs without evidence of vasospasm.   02/06/2023 EVD raised to 20 overnight. CTH stable. Pending clamp trial per neurosurgery. CTH in AM. Exam continues to improve. Remains alert and oriented to person, intermittently place. TCDs without evidence of vasospasm.   02/07/2023 CTH stable. Plan for EVD removal per neurosurgery. Discussion ongoing with Neurosurgery concerning discontinuation of TCDs.  02/08/2023 EVD removed, repeat CTH stable. D/C Amantadine and transfer to Neurosurgery.       Subjective:     Interval  History:  As above.    Review of Systems   Constitutional:  Negative for fatigue and fever.   HENT:  Negative for trouble swallowing.    Eyes:  Negative for photophobia and visual disturbance.   Respiratory:  Negative for chest tightness and shortness of breath.    Cardiovascular:  Negative for chest pain and leg swelling.   Gastrointestinal:  Negative for nausea and vomiting.   Musculoskeletal:  Negative for neck stiffness.   Neurological:  Positive for speech difficulty and headaches. Negative for dizziness.   Psychiatric/Behavioral:  Positive for decreased concentration. Negative for agitation.      Objective:     Vitals:  Temp: 98.2 °F (36.8 °C)  Pulse: 81  Rhythm: normal sinus rhythm  BP: 134/87  MAP (mmHg): 106  Resp: 16  SpO2: 96 %    Temp  Min: 97.7 °F (36.5 °C)  Max: 99.7 °F (37.6 °C)  Pulse  Min: 63  Max: 93  BP  Min: 129/63  Max: 161/74  MAP (mmHg)  Min: 91  Max: 129  ICP Mean (mmHg)  Min: 9 mmHg  Max: 13 mmHg  Resp  Min: 15  Max: 33  SpO2  Min: 95 %  Max: 100 %    02/07 0701 - 02/08 0700  In: 564.2 [P.O.:400; I.V.:20]  Out: 550 [Urine:550]   Unmeasured Output  Urine Occurrence: 1  Stool Occurrence: 0  Emesis Occurrence: 0  Pad Count: 2       Physical Exam  Vitals and nursing note reviewed.   HENT:      Head: Normocephalic.      Comments: EVD site clean and dry, without drainage post removal      Nose: Nose normal.      Mouth/Throat:      Mouth: Mucous membranes are moist.      Pharynx: Oropharynx is clear.   Cardiovascular:      Rate and Rhythm: Normal rate and regular rhythm.      Pulses: Normal pulses.      Heart sounds: Normal heart sounds.   Pulmonary:      Effort: Pulmonary effort is normal.      Breath sounds: Normal breath sounds.   Abdominal:      General: Bowel sounds are normal.      Palpations: Abdomen is soft.   Musculoskeletal:         General: Normal range of motion.   Skin:     General: Skin is warm and dry.      Capillary Refill: Capillary refill takes 2 to 3 seconds.   Neurological:       Mental Status: She is alert.      Comments:   GCS 14 with improved delayed response time   Oriented to self and situation. Intermittently to place and time.   Follows commands in BUE/BLE (improvement in multistep and bilateral commands that cross midline)  PERRL  Sensation grossly intact       Unable to test orientation, language, memory, judgment, insight, fund of knowledge, hearing, shoulder shrug, tongue protrusion, coordination, gait due to level of consciousness.    Medications:  Continuous   Scheduledheparin (porcine), 5,000 Units, Q8H  insulin aspart U-100, 5 Units, QID (AC & HS)  insulin detemir U-100, 16 Units, BID  PRNacetaminophen, 650 mg, Q6H PRN  albuterol-ipratropium, 3 mL, Q6H PRN  dextrose 10%, 12.5 g, PRN  glucagon (human recombinant), 1 mg, PRN  glucose, 16 g, PRN  glucose, 24 g, PRN  hydrALAZINE, 10 mg, Q6H PRN  insulin aspart U-100, 0-15 Units, QID (AC + HS) PRN  labetalol, 10 mg, Q4H PRN  magnesium oxide, 800 mg, PRN  magnesium oxide, 800 mg, PRN  melatonin, 6 mg, Nightly PRN  ondansetron, 4 mg, Q6H PRN  potassium bicarbonate, 35 mEq, PRN  potassium bicarbonate, 50 mEq, PRN  potassium bicarbonate, 60 mEq, PRN  potassium, sodium phosphates, 2 packet, PRN  potassium, sodium phosphates, 2 packet, PRN  potassium, sodium phosphates, 2 packet, PRN    Today I personally reviewed pertinent medications, lines/drains/airways, imaging, cardiology results, laboratory results, microbiology results, notably:    Glucose better controlled, monitor PO intake  CTH stable post EVD removal     Diet  Diet diabetic [facility]; 2000 Calorie  Diet diabetic [facility]; 2000 Calorie    Assessment/Plan:     Neuro  * Nontraumatic subarachnoid hemorrhage  53 yo F w/ PMH DM1, HTN presented to the ED with AMS after HA and vomiting the night before. Patient was non-verbal, B/l fixed pupils, and only intermittently following commands in B/L UE. GCS 8. MRI shows Acute IVH, SAH w/ obstructive hydrocephalus w/o signs  of ischemia and R A2 QUIQUE aneurysm. VN consulted. Patient intubated in ED after concern for airway protection. NSGY placed EVD. IR consulted for possible angiogram. CTA shows diffuse subarachnoid hemorrhage with moderate hydrocephalus. No intracranial aneurysm or AVM is identified. NIHSS 9 on admission.     - IR angiogram on 1/27 shows no aneurysm, AVM, or AV fistula. Will need repeat angiogram on 2/3; will discuss with IR   - CTA shows diffuse subarachnoid hemorrhage with moderate hydrocephalus. No intracranial aneurysm or AVM is identified.  - Repeat DSA on PBD 7 without aneurysm, AVM or AV fistula.   - Plan for EVD removal 2/7    - Stable for step down to Neurosurgery   - Q1H neuro checks and vitals  - NSGY following; appreciate recs  - EVD removed, CTH stable  - Ancef for drain ppx, d/c after EVD removal   - Daily TCDs, discission on discontinuation   - Echo reviewed   - CBC, CMP, Mag, Phos daily, replace PRN  - SBP goal <160  - Cardene gtt, wean as able   - PRN labetalol, hydralazine  - SQ heparin  - PT/OT/SLP    IVH (intraventricular hemorrhage)  See SAH  EVD care    Vasogenic cerebral edema  See SAH    Cardiac/Vascular  Hypertension  SBP 160s on arrival.   states patient does not take home Losartan regularly.     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 BG on arrival 284  Home regimen 13u Lantus; Lispro AC  A1C 6.5     -Transitioned from Insulin gtt to Detemir 16 BID  -Aspart 6 TIDWM  -Hypoglycemic protocol in place  -Accuchecks q1h    GI  Autoimmune hepatitis  Hx of AIH. Previously on mercaptopurine. Not currently taking.   LFTs slightly elevated on arrival.    -Will continue to monitor  -Holding Tylenol and hepatotoxic agents  -CMP daily  -If LFTs worsen will obtain RUQ US.     Other  Debility  PT/OT        The patient is being Prophylaxed for:  Venous Thromboembolism  with: Chemical  Stress Ulcer with: Not Applicable   Ventilator Pneumonia with: not applicable    Activity Orders          Diet diabetic [facility]; 2000 Calorie: Diabetic starting at 02/05 1116    Progressive Mobility Protocol (mobilize patient to their highest level of functioning at least twice daily) starting at 02/03 2000    Progressive Mobility Protocol (mobilize patient to their highest level of functioning at least twice daily) starting at 01/28 0800    Elevate HOB Collagen closure or PERCLOSE plug/device - Elevate HOB 30 degrees with limb immobilized for 2 hours after procedure. starting at 01/27 2018    Turn patient starting at 01/27 1400    Elevate HOB starting at 01/27 1221        Full Code    Ronak Pavon MD  Neurocritical Care  [facility]

## 2023-02-08 NOTE — PLAN OF CARE
Problem: Physical Therapy  Goal: Physical Therapy Goal  Description: PT goals until 2/15/23    1. Pt supine to sit with minimal assist-  met 2/8/23  Revised goal: supine to sit with supervision-not met  2. Pt sit to supine with minimal assist-not met  3. Pt sit to stand with LRAD as needed for safety with minimal assist- met 2/8/23  Revised goal: sit to stand with no AD with supervision-not met  4. Pt to perform gait 20ft with LRAD as needed for safety with moderate assist.- met 2/3/23  Revised goal: gait 100ft with LRAD as needed for safety with CGA-not met  5. Pt to go up/down curb step with LRAD as needed for safety with moderate assist.-not met  6. Pt to transfer bed to/from bedside chair with LRAD as needed for safety with minimal assist.- met 2/8/23  7. Pt to perform B LE exs in sitting or supine x 10 reps to strengthen B LE to improve functional mobility.-not met   Outcome: Ongoing, Progressing   Pt's goals revised as appropriate and pt will continue to benefit from skilled PT services to work towards improved functional mobility including: bed mobility, transfers, up/down steps, and gait.   2/8/2023

## 2023-02-08 NOTE — ASSESSMENT & PLAN NOTE
51 yo F with PMH of DM1, autoimmune hepatitis, and HTN who presents to ED and was found to have diffuse non-aneurysmal SAH and hydrocephalus    PBD 12    No acute events, EVD removed without complication and with stable imaging. Pt is disoriented to place again today, otherwise intact, no headaches. EVD site is dry and intact.    --Pt cleared from neurosurgical perspective for stepdown to neurosurgical stepdown unit under neurosurgery service.   - q1 neuro checks  - all labs and diagnostics reviewed   - MRI/CTA showed diffuse SAH and small volume IVH with hydrocephalus, no identifiable aneurysm   - CTH repeat with EVD in good position   - CTA without identifiable aneurysm   - DSA 1/27 without any identifiable aneurysm   - MRI brain/C spine and MRI vessel wall imaging 1/29: no vascular anomaly seen or vessell wall enhancement   - CTH 2/2 after EVD clamped the day before with decrease in ventricular caliber from prior, decreasing SAH   - DSA 2/3: negative   - CTH 2/3 post angio, mild increase in ventricular size  - SAH protocol   - daily TCDs x 14 days   - keppra 500mg x 7 days   - goal euvolemia to 1L positive  - Na >135, hypertonics as needed  - elevate HOB  - SBP <140  - extubated 1/30  - ok for diet, SLP  - ok for SQH    Dispo: ongoing

## 2023-02-08 NOTE — PROGRESS NOTES
[facility]  Vascular Neurology  [facility]  Progress Note    Assessment/Plan:     * Nontraumatic subarachnoid hemorrhage  59 yo female with PMHx of DM1, HTN, autoimmune hepatitis who presented to the ED with  for AMS, headache and vomiting. MRI IP 1/27 showed acute IVH and SAH with early obstructive hydrocephalus and transependymal CSF egress. MRA with 1.5-2 mm laterally directed outpouching at the proximal right A2 QUIQUE, noting that a small aneurysm is difficult to exclude 2/2 motion. Admitted to Neuro ICU for close monitoring with neurosurgery consultation.    -Patient with neuro exam stable. Continues to complain of headaches. EVD removed by NSGY yesterday. CTH stable. Pending SD to NSGY. We will sign off at this time. Recommend vascular neurology follow-up in 4-6 weeks. Please do not hesitate to contact us for any additional questions or concerns.     Antithrombotics for secondary stroke prevention: Antiplatelets: None: Hemorrhage any type    Aggressive risk factor modification: HTN     Rehab efforts: Mechanical soft/thin liquids; IPR     Diagnostics ordered/pending: None     VTE prophylaxis: Heparin 5000 units SQ every 8 hours  Mechanical prophylaxis: Place SCDs    BP parameters:  SBP <140      IVH (intraventricular hemorrhage)  See ICH     Vasogenic cerebral edema  Area of vasogenic cerebral edema identified when reviewing brain imaging.   --Admitted to NCC, NSGY consulted and following. EVD removed 2/7/23  --Continue frequent neuro checks per floor protocol as any acute changes or worsening neuro status may signify expansion of the insult and/or area of the edema, which may require acute interventions to prevent loss of function and/or death.  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 NIHSS 25, exam limited by intubation and drowsiness. Cerebral angiogram completed yesterday without evidence of abnormalities to account for the SAH; no aneurysm, no AVM, no fistula. Patient will likely need repeat angio in 7 days. MRI W/WO pending, scheduled for this afternoon.   1/29-02/03/2023 Patient very drowsy following angiogram. Negative DSA today. EVD remains in place. Therapy recs for IPR and mechanical soft diet with thin liquids.   02/04/2023 Remains in NCC, NAEO and neuro exam stable. EVD in place, post-DSA CTH with mild increase in ventricle size and EVD reopened. Daily TCDs remain without vasospasm.   02/05/2023 Remains in NCC, NAEO but reported to be more agitated and restless overnight. EVD still in place and open.   02/06/2023 Patient remains in NCC, neuro exam stable. During exam patient pleasantly sitting up in bed eating lunch. Still with generalized weakness and some confusion. EVD clamped per NSGY today, CTH stable.   2/8/2023 Patient with neuro exam stable. Continues to complain of headaches. EVD removed by NSGY yesterday.          STROKE DOCUMENTATION   Acute Stroke Times   Last Known Normal Date: 01/26/23  Last Known Normal Time: 2100  Unknown Symptom Onset Date: Unknown Date  Unknown Symptom Onset Time: Unknown Time  Stroke Team Called Date: 01/27/23  Stroke Team Called Time: 0942  Stroke Team Arrival Date: 01/27/23  Stroke Team Arrival Time: 0947  MRI Acute Stroke Protocol Interpretation Time: 1030    NIH Scale:  1a. Level of Consciousness: 0-->Alert, keenly responsive  1b. LOC Questions: 0-->Answers both questions correctly  1c. LOC Commands: 0-->Performs both tasks correctly  2. Best Gaze: 0-->Normal  3.  Visual: 0-->No visual loss  4. Facial Palsy: 0-->Normal symmetrical movements  5a. Motor Arm, Left: 0-->No drift, limb holds 90 (or 45) degrees for full 10 secs  5b. Motor Arm, Right: 0-->No drift, limb holds 90 (or 45) degrees for full 10 secs  6a. Motor Leg, Left: 0-->No drift, leg holds 30 degree position for full 5 secs  6b. Motor Leg, Right: 0-->No drift, leg holds 30 degree position for full 5 secs  7. Limb Ataxia: 0-->Absent  8. Sensory: 0-->Normal, no sensory loss  9. Best Language: 0-->No aphasia, normal  10. Dysarthria: 0-->Normal  11. Extinction and Inattention (formerly Neglect): 0-->No abnormality  Total (NIH Stroke Scale): 0       Modified Callahan Score: 0  Lexus Coma Scale:    ABCD2 Score:    [id number] Score:   HAS -BLED Score:   ICH Score:   Hunt & Guzman Classification:Grade III     Hemorrhagic change of an Ischemic Stroke: Does this patient have an ischemic stroke with hemorrhagic changes? No     Neurologic Chief Complaint: HA    Subjective:     Interval History: Patient is seen for follow-up neurological assessment and treatment recommendations:     Patient with neuro exam stable, delayed responses but able to answer questions appropriately. Continues to complain of headaches. EVD removed by NSGY yesterday.      HPI, Past Medical, Family, and Social History remains the same as documented in the initial encounter.     Review of Systems   Constitutional:  Negative for diaphoresis.   HENT:  Negative for trouble swallowing.    Eyes:  Negative for photophobia.   Respiratory:  Negative for shortness of breath.    Gastrointestinal:  Negative for vomiting.   Musculoskeletal:  Negative for neck stiffness.   Neurological:  Positive for headaches. Negative for weakness.   Scheduled Meds:   amantadine HCL  100 mg Oral BID    ceFAZolin (ANCEF) IVPB  2 g Intravenous Q8H    heparin (porcine)  5,000 Units Subcutaneous Q8H    insulin aspart U-100  6 Units Subcutaneous TIDWM    insulin detemir U-100  16 Units  Subcutaneous BID     Continuous Infusions:      PRN Meds:acetaminophen, albuterol-ipratropium, dextrose 10%, glucagon (human recombinant), glucose, glucose, hydrALAZINE, insulin aspart U-100, labetalol, magnesium oxide, magnesium oxide, melatonin, ondansetron, potassium bicarbonate, potassium bicarbonate, potassium bicarbonate, potassium, sodium phosphates, potassium, sodium phosphates, potassium, sodium phosphates    Objective:     Vital Signs (Most Recent):  Temp: 97.7 °F (36.5 °C) (02/08/23 0305)  Pulse: 93 (02/08/23 0907)  Resp: (!) 33 (02/08/23 0907)  BP: 137/63 (02/08/23 0701)  SpO2: 95 % (02/08/23 0907)  BP Location: Right arm    Vital Signs Range (Last 24H):  Temp:  [97.7 °F (36.5 °C)-99.7 °F (37.6 °C)]   Pulse:  [63-93]   Resp:  [15-33]   BP: (122-163)/()   SpO2:  [94 %-100 %]   BP Location: Right arm    Physical Exam  Vitals and nursing note reviewed.   Constitutional:       General: She is not in acute distress.  HENT:      Head: Normocephalic.      Mouth/Throat:      Mouth: Mucous membranes are moist.   Eyes:      General: No scleral icterus.     Extraocular Movements: Extraocular movements intact.      Conjunctiva/sclera: Conjunctivae normal.   Cardiovascular:      Rate and Rhythm: Normal rate.   Pulmonary:      Effort: Pulmonary effort is normal. No respiratory distress.   Musculoskeletal:         General: No tenderness.   Skin:     General: Skin is warm.   Neurological:      Mental Status: She is alert.      Sensory: No sensory deficit.      Motor: No weakness.      Comments: Moves all extremities against gravity   Intact to light touch   Follows commands    Psychiatric:         Behavior: Behavior normal.       Neurological Exam:   LOC: alert  Attention Span: good  Language: No aphasia  Articulation: No dysarthria  Orientation: Person, Place, Time   EOM (CN III, IV, VI): Full/intact  Facial Movement (CN VII): Symmetric facial expression    Motor: Moves all extremities spontaneously and  antigravity   Sensation: Intact to light touch throughout    Laboratory:  CMP:   Recent Labs   Lab 02/08/23  0216   CALCIUM 10.1   ALBUMIN 3.0*   PROT 6.7      K 3.5   CO2 23      BUN 8   CREATININE 0.8   ALKPHOS 143*   ALT 8*   AST 18   BILITOT 0.2       CBC:   Recent Labs   Lab 02/08/23  0216   WBC 8.68   RBC 3.45*   HGB 10.6*   HCT 32.8*      MCV 95   MCH 30.7   MCHC 32.3       Lipid Panel: No results for input(s): CHOL, LDLCALC, HDL, TRIG in the last 168 hours.  Coagulation: No results for input(s): PT, INR, APTT in the last 168 hours.  Platelet Aggregation Study: No results for input(s): PLTAGG, PLTAGINTERP, PLTAGREGLACO, ADPPLTAGGREG in the last 168 hours.  Hgb A1C: No results for input(s): HGBA1C in the last 168 hours.  TSH: No results for input(s): TSH in the last 168 hours.    Diagnostic Results     Brain/Vessel Imaging   CTH WO 2/8/2023   Interval removal of right ventriculostomy catheter, without apparent intracranial complication.  Otherwise stable exam as above.    CTH 2/7/2023  Ventriculostomy catheter in place, with stable mild prominence of the supratentorial ventricular system.     Stable small volume subarachnoid, intraventricular, and subdural hemorrhage.     No new hemorrhage or major vascular distribution infarct.    CTH 2/6/23  -Ventriculostomy catheter in place with stable mild of the supratentorial prominence of ventricular system.   -Small volume residual subarachnoid, intraventricular, and subdural hemorrhage, improved from priors.   -No new hemorrhage or major vascular distribution infarct.     CTH 2/3/23  There appears to be mild increased intracranial hemorrhage however this is thought more likely artifactual due to the presence of vascular contrast from recent arteriogram as discussed above.    IR Angiogram Carotid Cerebral BL 2/3/23  1. Angiogram negative for any vascular etiologies/malformations accounting for the SAH/IVH.  Negative for any vascular malformations -  AVMs/AVF/aneurysms.  Negative for any significant vasospasm.    CTH 2/3/23   1. Stable position of the right ventriculostomy catheter and stable prominence of the ventricles.  2. Stable subarachnoid hemorrhage and intraventricular hemorrhage.  3. Stable trace subdural hemorrhage.  4. No new areas of hemorrhage.    CTH 2/2/23  Stable subarachnoid and intraventricular hemorrhage. Ventriculostomy catheter in place with stable mild prominence of ventricles. Stable trace subdural hemorrhage. No new hemorrhage or major vascular distribution infarct    CTH 2/2/23   Stable position of right frontal ventriculostomy catheter with decreased size of the ventricles compared to prior exam compatible with improving hydrocephalus. Decreased volume of multi compartment hemorrhage as described above. No evidence for significant new hemorrhage or evidence for acute major vascular distribution infarct.    MRI Brain W WO Contrast 1/28/23   Brain: Slight improvement in mild to moderate hydrocephalus. Diffuse subarachnoid and intraventricular hemorrhage again noted. No acute infarct. No enhancing intracranial lesion or source of intracranial hemorrhage identified.  MRA: No aneurysm or AVM identified.  No evidence of vasospasm.  No abnormal vessel wall enhancement.    MRA Neck/Brain WO Contrast 1/28/23   There is no significant stenosis at the carotid bifurcations by NASCET criteria.  The vertebral arteries are patent. No evidence of definite prominent vessels within the cervical spinal canal to suggest a cervical vascular malformation.    IR Carotid Cerebral Angiogram 1/27/23   Preliminary interpretation: 6 vessel angiogram showed no abnormalities to account for SAH. No aneurysm, AVM or AV fistula.  Please see Imaging report for full details.     CTH Without Contrast 1/27/23  Postsurgical changes status post intraventricular catheter placement with its tip between the frontal horns.  Stable moderate hydrocephalus.     CTA Head and Neck  1/27/23  -Diffuse subarachnoid hemorrhage with moderate hydrocephalus.  -No intracranial aneurysm or AVM is identified.  -Atherosclerotic disease but no advanced stenosis of the carotid or vertebral arteries in the neck with no major branch stenosis/occlusion at the [id number]     MRI Brain Ischemic Protocol 1/27/23  1. Acute intraventricular and subarachnoid hemorrhage, as described.  Findings compatible with early obstructive hydrocephalus and transependymal CSF egress.  2. No evidence of acute ischemia or recent infarction.  3. MRA motion compromised. Tiny approximately 1.5-2 mm laterally directed outpouching at the proximal right A2 QUIQUE could represent infundibulum/tortuous origin of a proximal QUIQUE branch, noting that a small aneurysm is difficult to exclude given motion.  Further characterization with CTA could be performed given extensive motion on the current study.    Cardiac Imaging   TTE 1/28/23   The left ventricle is normal in size with normal systolic function.  The estimated ejection fraction is 65-70%.  Normal left ventricular diastolic function.  Normal right ventricular size with normal right ventricular systolic function.  There is no significant tricuspid regurgitation and therefore the pulmonary artery systolic pressure cannot be reported.  Mechanically ventilated; cannot use inferior caval vein diameter to estimate central venous pressure        Cecilia Clarke PA-C  [facility]  Department of Vascular Neurology   [facility]

## 2023-02-08 NOTE — ASSESSMENT & PLAN NOTE
59 yo female with PMHx of DM1, HTN, autoimmune hepatitis who presented to the ED with  for AMS, headache and vomiting. MRI IP 1/27 showed acute IVH and SAH with early obstructive hydrocephalus and transependymal CSF egress. MRA with 1.5-2 mm laterally directed outpouching at the proximal right A2 QUIQUE, noting that a small aneurysm is difficult to exclude 2/2 motion. Admitted to Neuro ICU for close monitoring with neurosurgery consultation.    -Patient with neuro exam stable. Continues to complain of headaches. EVD removed by NSGY yesterday.  CTH stable.    Antithrombotics for secondary stroke prevention: Antiplatelets: None: Hemorrhage any type    Aggressive risk factor modification: HTN     Rehab efforts: Mechanical soft/thin liquids; IPR     Diagnostics ordered/pending: None     VTE prophylaxis: Heparin 5000 units SQ every 8 hours  Mechanical prophylaxis: Place SCDs    BP parameters:  SBP <140

## 2023-02-08 NOTE — PLAN OF CARE
[facility] Care Plan    POC reviewed with Leida Hoyos at 0200. Pt verbalized understanding. Questions and concerns addressed. No acute events overnight. Pt progressing toward goals. Will continue to monitor. See below and flowsheets for full assessment and VS info.    - CTH completed   - K replaced  - Bath given, linens changed              Is this a stroke patient? yes- Stroke booklet reviewed with patient and family, risk factors identified for patient and stroke booklet remains at bedside for ongoing education.     Neuro:  Lexus Coma Scale  Best Eye Response: 4-->(E4) spontaneous  Best Motor Response: 6-->(M6) obeys commands  Best Verbal Response: 4-->(V4) confused  Lexus Coma Scale Score: 14  Assessment Qualifiers: patient not sedated/intubated  Pupil PERRLA: yes     24hr Temp:  [97.7 °F (36.5 °C)-99.7 °F (37.6 °C)]     CV:   Rhythm: normal sinus rhythm  BP goals:   SBP < 160  MAP > 65    Resp:      Vent Mode: Spont  Set Rate: 16 BPM  Oxygen Concentration (%): 32  Vt Set: 420 mL  PEEP/CPAP: 5 cmH20  Pressure Support: 7 cmH20    Plan: N/A    GI/:     Diet/Nutrition Received: consistent carb/diabetic diet  Last Bowel Movement: 02/04/23  Voiding Characteristics: voids spontaneously without difficulty    Intake/Output Summary (Last 24 hours) at 2/8/2023 0433  Last data filed at 2/8/2023 0105  Gross per 24 hour   Intake 564.21 ml   Output 750 ml   Net -185.79 ml     Unmeasured Output  Urine Occurrence: 1  Stool Occurrence: 0  Emesis Occurrence: 0  Pad Count: 2    Labs/Accuchecks:  Recent Labs   Lab 02/08/23 0216   WBC 8.68   RBC 3.45*   HGB 10.6*   HCT 32.8*         Recent Labs   Lab 02/08/23 0216      K 3.5   CO2 23      BUN 8   CREATININE 0.8   ALKPHOS 143*   ALT 8*   AST 18   BILITOT 0.2    No results for input(s): PROTIME, INR, APTT, HEPANTIXA in the last 168 hours. No results for input(s): CPK, CPKMB, TROPONINI, MB in the last 168 hours.    Electrolytes: Electrolytes  replaced  Accuchecks: ACHS    Gtts:      LDA/Wounds:  Lines/Drains/Airways       Peripheral Intravenous Line  Duration                  Peripheral IV - Single Lumen 02/07/23 0030 20 G Left;Posterior Forearm 1 day                  Wounds: Yes  Wound care consulted: No

## 2023-02-08 NOTE — SUBJECTIVE & OBJECTIVE
Interval History: 2/8: No acute events, EVD removed without complication and with stable imaging. Pt is disoriented to place again today, otherwise intact, no headaches. EVD site is dry and intact.      Medications:  Continuous Infusions:      Scheduled Meds:   amantadine HCL  100 mg Oral BID    ceFAZolin (ANCEF) IVPB  2 g Intravenous Q8H    heparin (porcine)  5,000 Units Subcutaneous Q8H    insulin aspart U-100  6 Units Subcutaneous TIDWM    insulin detemir U-100  16 Units Subcutaneous BID     PRN Meds:acetaminophen, albuterol-ipratropium, dextrose 10%, glucagon (human recombinant), glucose, glucose, hydrALAZINE, insulin aspart U-100, labetalol, magnesium oxide, magnesium oxide, melatonin, ondansetron, potassium bicarbonate, potassium bicarbonate, potassium bicarbonate, potassium, sodium phosphates, potassium, sodium phosphates, potassium, sodium phosphates     Review of Systems  Objective:     Weight: 55.5 kg (122 lb 5.7 oz)  Body mass index is 22.38 kg/m².  Vital Signs (Most Recent):  Temp: 97.7 °F (36.5 °C) (02/08/23 0305)  Pulse: 72 (02/08/23 0605)  Resp: (!) 21 (02/08/23 0605)  BP: 137/63 (02/08/23 0605)  SpO2: 98 % (02/08/23 0605) Vital Signs (24h Range):  Temp:  [97.7 °F (36.5 °C)-99.7 °F (37.6 °C)] 97.7 °F (36.5 °C)  Pulse:  [63-92] 72  Resp:  [15-23] 21  SpO2:  [94 %-100 %] 98 %  BP: (122-163)/() 137/63                            ICP/Ventriculostomy 01/27/23 1400 (Active)   $ ICP/Ventriculostomy Pioneer Placement Bedside Insertion Performed 01/27/23 1501   Level of Ventriculostomy (cm above) 20 02/02/23 0201   Status Clamped 02/03/23 0601   Site Assessment Clean;Dry 02/03/23 0601   Site Drainage No drainage 02/03/23 0601   Waveform normal waveform 02/02/23 2301   Output (mL) 0 mL 02/03/23 0500   CSF Color clear 02/01/23 1500   Dressing Status Clean;Dry;Intact 02/03/23 0601   Interventions HOB degrees 02/03/23 0601       Physical Exam    Neurosurgery Physical Exam       [id number].   AOx3.   PERRL.   CN non  focal.   FC x4 grossly full strength      Significant Labs:  Recent Labs   Lab 02/07/23  0416 02/08/23  0216   * 162*    139   K 3.7 3.5    104   CO2 24 23   BUN 8 8   CREATININE 0.8 0.8   CALCIUM 10.1 10.1   MG 2.2 1.9       Recent Labs   Lab 02/07/23  0416 02/08/23  0216   WBC 9.21 8.68   HGB 10.6* 10.6*   HCT 32.7* 32.8*    326       No results for input(s): LABPT, INR, APTT in the last 48 hours.  Microbiology Results (last 7 days)       Procedure Component Value Units Date/Time    CSF culture [[id number]] Collected: 02/06/23 0802    Order Status: Completed Specimen: CSF (Spinal Fluid) from CSF Shunt Updated: 02/08/23 0611     CSF CULTURE No Growth to date     Gram Stain Result Cytospin indicates:      Rare WBC's      No organisms seen    CSF culture [[id number]] Collected: 02/02/23 1013    Order Status: Completed Specimen: CSF (Spinal Fluid) from CSF Tap, Tube 3 Updated: 02/07/23 0623     CSF CULTURE No Growth     Gram Stain Result No WBC's      No organisms seen    CSF culture [[id number]] Collected: 01/30/23 1449    Order Status: Completed Specimen: CSF (Spinal Fluid) from CSF Shunt Updated: 02/04/23 0742     CSF CULTURE No Growth     Gram Stain Result Rare WBC's      No organisms seen    Gram stain [[id number]] Collected: 02/02/23 1013    Order Status: Canceled Specimen: CSF (Spinal Fluid) from CSF Tap, Tube 3           All pertinent labs from the last 24 hours have been reviewed.    Significant Diagnostics:  I have reviewed all pertinent imaging results/findings within the past 24 hours.

## 2023-02-08 NOTE — ASSESSMENT & PLAN NOTE
Area of vasogenic cerebral edema identified when reviewing brain imaging.   --Admitted to [facility], NSGY consulted and following. EVD removed 2/7/23  --Continue frequent neuro checks per floor protocol as any acute changes or worsening neuro status may signify expansion of the insult and/or area of the edema, which may require acute interventions to prevent loss of function and/or death.  --Obtain stat CTH for any new or worsening neuro changes and notify primary team immediately

## 2023-02-08 NOTE — PLAN OF CARE
[facility] Care Plan    POC reviewed with Leida Hoyos and family at 1400. Pt verbalized understanding. Questions and concerns addressed. No acute events today. Pt progressing toward goals. Will continue to monitor. See below and flowsheets for full assessment and VS info.     - Transfer orders. Pending bed availability.      Is this a stroke patient? yes- Stroke booklet reviewed with patient and family, risk factors identified for patient and stroke booklet remains at bedside for ongoing education.     Neuro:  Lexus Coma Scale  Best Eye Response: 4-->(E4) spontaneous  Best Motor Response: 6-->(M6) obeys commands  Best Verbal Response: 4-->(V4) confused  Lexus Coma Scale Score: 14  Assessment Qualifiers: patient not sedated/intubated, no eye obstruction present  Pupil PERRLA: yes     24 hr Temp:  [97.7 °F (36.5 °C)-99.7 °F (37.6 °C)]     CV:   Rhythm: normal sinus rhythm  BP goals:   SBP < 160  MAP > 65    Resp:      Vent Mode: Spont  Set Rate: 16 BPM  Oxygen Concentration (%): 32  Vt Set: 420 mL  PEEP/CPAP: 5 cmH20  Pressure Support: 7 cmH20    Plan: N/A    GI/:     Diet/Nutrition Received: consistent carb/diabetic diet  Last Bowel Movement: 02/04/23  Voiding Characteristics: voids spontaneously without difficulty    Intake/Output Summary (Last 24 hours) at 2/8/2023 1549  Last data filed at 2/8/2023 0105  Gross per 24 hour   Intake 94.21 ml   Output --   Net 94.21 ml     Unmeasured Output  Urine Occurrence: 1  Stool Occurrence: 0  Emesis Occurrence: 0  Pad Count: 2    Labs/Accuchecks:  Recent Labs   Lab 02/08/23  0216   WBC 8.68   RBC 3.45*   HGB 10.6*   HCT 32.8*         Recent Labs   Lab 02/08/23 0216      K 3.5   CO2 23      BUN 8   CREATININE 0.8   ALKPHOS 143*   ALT 8*   AST 18   BILITOT 0.2    No results for input(s): PROTIME, INR, APTT, HEPANTIXA in the last 168 hours. No results for input(s): CPK, CPKMB, TROPONINI, MB in the last 168 hours.    Electrolytes: N/A - electrolytes  PHONG  Accuchecks: ACHS    Gtts:      LDA/Wounds:  Lines/Drains/Airways       Peripheral Intravenous Line  Duration                  Peripheral IV - Single Lumen 02/07/23 0030 20 G Left;Posterior Forearm 1 day                  Wounds: No  Wound care consulted: No

## 2023-02-08 NOTE — SUBJECTIVE & OBJECTIVE
Neurologic Chief Complaint: HA    Subjective:     Interval History: Patient is seen for follow-up neurological assessment and treatment recommendations:     Patient with neuro exam stable, delayed responses but able to answer questions appropriately. Continues to complain of headaches. EVD removed by ERIKA yesterday.      HPI, Past Medical, Family, and Social History remains the same as documented in the initial encounter.     Review of Systems   Constitutional:  Negative for diaphoresis.   HENT:  Negative for trouble swallowing.    Eyes:  Negative for photophobia.   Respiratory:  Negative for shortness of breath.    Gastrointestinal:  Negative for vomiting.   Musculoskeletal:  Negative for neck stiffness.   Neurological:  Positive for headaches. Negative for weakness.   Scheduled Meds:   amantadine HCL  100 mg Oral BID    ceFAZolin (ANCEF) IVPB  2 g Intravenous Q8H    heparin (porcine)  5,000 Units Subcutaneous Q8H    insulin aspart U-100  6 Units Subcutaneous TIDWM    insulin detemir U-100  16 Units Subcutaneous BID     Continuous Infusions:      PRN Meds:acetaminophen, albuterol-ipratropium, dextrose 10%, glucagon (human recombinant), glucose, glucose, hydrALAZINE, insulin aspart U-100, labetalol, magnesium oxide, magnesium oxide, melatonin, ondansetron, potassium bicarbonate, potassium bicarbonate, potassium bicarbonate, potassium, sodium phosphates, potassium, sodium phosphates, potassium, sodium phosphates    Objective:     Vital Signs (Most Recent):  Temp: 97.7 °F (36.5 °C) (02/08/23 0305)  Pulse: 93 (02/08/23 0907)  Resp: (!) 33 (02/08/23 0907)  BP: 137/63 (02/08/23 0701)  SpO2: 95 % (02/08/23 0907)  BP Location: Right arm    Vital Signs Range (Last 24H):  Temp:  [97.7 °F (36.5 °C)-99.7 °F (37.6 °C)]   Pulse:  [63-93]   Resp:  [15-33]   BP: (122-163)/()   SpO2:  [94 %-100 %]   BP Location: Right arm    Physical Exam  Vitals and nursing note reviewed.   Constitutional:       General: She is not in acute  distress.  HENT:      Head: Normocephalic.      Mouth/Throat:      Mouth: Mucous membranes are moist.   Eyes:      General: No scleral icterus.     Extraocular Movements: Extraocular movements intact.      Conjunctiva/sclera: Conjunctivae normal.   Cardiovascular:      Rate and Rhythm: Normal rate.   Pulmonary:      Effort: Pulmonary effort is normal. No respiratory distress.   Musculoskeletal:         General: No tenderness.   Skin:     General: Skin is warm.   Neurological:      Mental Status: She is alert.      Sensory: No sensory deficit.      Motor: No weakness.      Comments: Moves all extremities against gravity   Intact to light touch   Follows commands    Psychiatric:         Behavior: Behavior normal.       Neurological Exam:   LOC: alert  Attention Span: good  Language: No aphasia  Articulation: No dysarthria  Orientation: Person, Place, Time   EOM (CN III, IV, VI): Full/intact  Facial Movement (CN VII): Symmetric facial expression    Motor: Moves all extremities spontaneously and antigravity   Sensation: Intact to light touch throughout    Laboratory:  CMP:   Recent Labs   Lab 02/08/23  0216   CALCIUM 10.1   ALBUMIN 3.0*   PROT 6.7      K 3.5   CO2 23      BUN 8   CREATININE 0.8   ALKPHOS 143*   ALT 8*   AST 18   BILITOT 0.2       CBC:   Recent Labs   Lab 02/08/23  0216   WBC 8.68   RBC 3.45*   HGB 10.6*   HCT 32.8*      MCV 95   MCH 30.7   MCHC 32.3       Lipid Panel: No results for input(s): CHOL, LDLCALC, HDL, TRIG in the last 168 hours.  Coagulation: No results for input(s): PT, INR, APTT in the last 168 hours.  Platelet Aggregation Study: No results for input(s): PLTAGG, PLTAGINTERP, PLTAGREGLACO, ADPPLTAGGREG in the last 168 hours.  Hgb A1C: No results for input(s): HGBA1C in the last 168 hours.  TSH: No results for input(s): TSH in the last 168 hours.    Diagnostic Results     Brain/Vessel Imaging   [facility] WO 2/8/2023 pending    [facility] 2/7/2023  Ventriculostomy catheter in place, with  stable mild prominence of the supratentorial ventricular system.     Stable small volume subarachnoid, intraventricular, and subdural hemorrhage.     No new hemorrhage or major vascular distribution infarct.    CTH 2/6/23  -Ventriculostomy catheter in place with stable mild of the supratentorial prominence of ventricular system.   -Small volume residual subarachnoid, intraventricular, and subdural hemorrhage, improved from priors.   -No new hemorrhage or major vascular distribution infarct.     CTH 2/3/23  There appears to be mild increased intracranial hemorrhage however this is thought more likely artifactual due to the presence of vascular contrast from recent arteriogram as discussed above.    IR Angiogram Carotid Cerebral BL 2/3/23  1. Angiogram negative for any vascular etiologies/malformations accounting for the SAH/IVH.  Negative for any vascular malformations - AVMs/AVF/aneurysms.  Negative for any significant vasospasm.    CTH 2/3/23   1. Stable position of the right ventriculostomy catheter and stable prominence of the ventricles.  2. Stable subarachnoid hemorrhage and intraventricular hemorrhage.  3. Stable trace subdural hemorrhage.  4. No new areas of hemorrhage.    CTH 2/2/23  Stable subarachnoid and intraventricular hemorrhage. Ventriculostomy catheter in place with stable mild prominence of ventricles. Stable trace subdural hemorrhage. No new hemorrhage or major vascular distribution infarct    CTH 2/2/23   Stable position of right frontal ventriculostomy catheter with decreased size of the ventricles compared to prior exam compatible with improving hydrocephalus. Decreased volume of multi compartment hemorrhage as described above. No evidence for significant new hemorrhage or evidence for acute major vascular distribution infarct.    MRI Brain W WO Contrast 1/28/23   Brain: Slight improvement in mild to moderate hydrocephalus. Diffuse subarachnoid and intraventricular hemorrhage again noted. No  acute infarct. No enhancing intracranial lesion or source of intracranial hemorrhage identified.  MRA: No aneurysm or AVM identified.  No evidence of vasospasm.  No abnormal vessel wall enhancement.    MRA Neck/Brain WO Contrast 1/28/23   There is no significant stenosis at the carotid bifurcations by NASCET criteria.  The vertebral arteries are patent. No evidence of definite prominent vessels within the cervical spinal canal to suggest a cervical vascular malformation.    IR Carotid Cerebral Angiogram 1/27/23   Preliminary interpretation: 6 vessel angiogram showed no abnormalities to account for SAH. No aneurysm, AVM or AV fistula.  Please see Imaging report for full details.     CTH Without Contrast 1/27/23  Postsurgical changes status post intraventricular catheter placement with its tip between the frontal horns.  Stable moderate hydrocephalus.     CTA Head and Neck 1/27/23  -Diffuse subarachnoid hemorrhage with moderate hydrocephalus.  -No intracranial aneurysm or AVM is identified.  -Atherosclerotic disease but no advanced stenosis of the carotid or vertebral arteries in the neck with no major branch stenosis/occlusion at the [id number]     MRI Brain Ischemic Protocol 1/27/23  1. Acute intraventricular and subarachnoid hemorrhage, as described.  Findings compatible with early obstructive hydrocephalus and transependymal CSF egress.  2. No evidence of acute ischemia or recent infarction.  3. MRA motion compromised. Tiny approximately 1.5-2 mm laterally directed outpouching at the proximal right A2 QUIQUE could represent infundibulum/tortuous origin of a proximal QUIQUE branch, noting that a small aneurysm is difficult to exclude given motion.  Further characterization with CTA could be performed given extensive motion on the current study.    Cardiac Imaging   TTE 1/28/23   The left ventricle is normal in size with normal systolic function.  The estimated ejection fraction is 65-70%.  Normal left ventricular  diastolic function.  Normal right ventricular size with normal right ventricular systolic function.  There is no significant tricuspid regurgitation and therefore the pulmonary artery systolic pressure cannot be reported.  Mechanically ventilated; cannot use inferior caval vein diameter to estimate central venous pressure

## 2023-02-08 NOTE — PT/OT/SLP PROGRESS
"Physical Therapy Treatment    Patient Name:  Leida Hoyos   MRN:  [id number]    Recommendations:     Discharge Recommendations: rehabilitation facility  Discharge Equipment Recommendations: none  Barriers to discharge: Inaccessible home and Decreased caregiver support 1 ALIS and pt requires assist for mobility    Assessment:     Leida Hoyos is a 52 y.o. female admitted with a medical diagnosis of Nontraumatic subarachnoid hemorrhage.  She presents with the following impairments/functional limitations: weakness, impaired endurance, impaired functional mobility, gait instability, impaired self care skills, impaired cognition . Pt is unsafe with functional mobility at this time due to pt requires minimal assist for bed mobility, minimal assist for transfers, and minimal assist for gait due to posterior lean at times, lateral instability, and required assist for directional cues. Pt is motivated to progress with functional mobility.     Rehab Prognosis: Good; patient would benefit from acute skilled PT services to address these deficits and reach maximum level of function.    Recent Surgery: Procedure(s) (LRB):  ANGIOGRAM-CEREBRAL (N/A) 12 Days Post-Op    Plan:     During this hospitalization, patient to be seen 4 x/week to address the identified rehab impairments via gait training, therapeutic activities, therapeutic exercises, neuromuscular re-education and progress toward the following goals:    Plan of Care Expires:  03/02/23    Subjective   "I am tired" "I feel good walking"    Pain/Comfort:  Pain Rating 1: 0/10  Pain Rating Post-Intervention 1: 0/10      Objective:     Communicated with nurse prior to session.  Patient found HOB elevated with bed alarm, blood pressure cuff, peripheral IV, telemetry, pulse ox (continuous) upon PT entry to room.     General Precautions: Standard, aspiration, fall  Orthopedic Precautions: N/A  Braces: N/A  Respiratory Status: Room air     Functional Mobility:  Bed " Mobility:     Rolling Left:  minimum assistance  Supine to Sit: minimum assistance  Transfers:     Sit to Stand:  minimum assistance with hand-held assist  Bed to Chair: minimum assistance with  hand-held assist  using  Stand Pivot  Gait: 120ft x 2 trials with no AD with minimal assist. Pt with noted posterior lean at times, lateral instability, and c/o fatigue at end of gait trials. Pt required manual cues for directional cues   pt wore mask and was on portable monitor throughout gait trial.    AM-PAC 6 CLICK MOBILITY  Turning over in bed (including adjusting bedclothes, sheets and blankets)?: 3  Sitting down on and standing up from a chair with arms (e.g., wheelchair, bedside commode, etc.): 3  Moving from lying on back to sitting on the side of the bed?: 3  Moving to and from a bed to a chair (including a wheelchair)?: 3  Need to walk in hospital room?: 3  Climbing 3-5 steps with a railing?: 3  Basic Mobility Total Score: 18     Treatment:   Pt sat on the EOB ~ 5 min with SBA prior to transfer due to pt c/o dizziness upon sitting.     Patient left up in chair with all lines intact, call button in reach, chair alarm on, and nurse notified..    GOALS:   Multidisciplinary Problems       Physical Therapy Goals          Problem: Physical Therapy    Goal Priority Disciplines Outcome Goal Variances Interventions   Physical Therapy Goal     PT, PT/OT Ongoing, Progressing     Description: PT goals until 2/15/23    1. Pt supine to sit with minimal assist-  met 2/8/23  Revised goal: supine to sit with supervision-not met  2. Pt sit to supine with minimal assist-not met  3. Pt sit to stand with LRAD as needed for safety with minimal assist- met 2/8/23  Revised goal: sit to stand with no AD with supervision-not met  4. Pt to perform gait 20ft with LRAD as needed for safety with moderate assist.- met 2/3/23  Revised goal: gait 100ft with LRAD as needed for safety with CGA-not met  5. Pt to go up/down curb step with LRAD as  needed for safety with moderate assist.-not met  6. Pt to transfer bed to/from bedside chair with LRAD as needed for safety with minimal assist.- met 2/8/23  7. Pt to perform B LE exs in sitting or supine x 10 reps to strengthen B LE to improve functional mobility.-not met                        Time Tracking:     PT Received On: 02/08/23  PT Start Time: 0758     PT Stop Time: 0840  PT Total Time (min): 42 min     Billable Minutes: Gait Training 30 and Therapeutic Activity 12    Treatment Type: Treatment  PT/PTA: PT     PTA Visit Number: 1     02/08/2023

## 2023-02-08 NOTE — SUBJECTIVE & OBJECTIVE
Subjective:     Interval History:  As above.    Review of Systems   Constitutional:  Negative for fatigue and fever.   HENT:  Negative for trouble swallowing.    Eyes:  Negative for photophobia and visual disturbance.   Respiratory:  Negative for chest tightness and shortness of breath.    Cardiovascular:  Negative for chest pain and leg swelling.   Gastrointestinal:  Negative for nausea and vomiting.   Musculoskeletal:  Negative for neck stiffness.   Neurological:  Positive for speech difficulty and headaches. Negative for dizziness.   Psychiatric/Behavioral:  Positive for decreased concentration. Negative for agitation.      Objective:     Vitals:  Temp: 98.2 °F (36.8 °C)  Pulse: 81  Rhythm: normal sinus rhythm  BP: 134/87  MAP (mmHg): 106  Resp: 16  SpO2: 96 %    Temp  Min: 97.7 °F (36.5 °C)  Max: 99.7 °F (37.6 °C)  Pulse  Min: 63  Max: 93  BP  Min: 129/63  Max: 161/74  MAP (mmHg)  Min: 91  Max: 129  ICP Mean (mmHg)  Min: 9 mmHg  Max: 13 mmHg  Resp  Min: 15  Max: 33  SpO2  Min: 95 %  Max: 100 %    02/07 0701 - 02/08 0700  In: 564.2 [P.O.:400; I.V.:20]  Out: 550 [Urine:550]   Unmeasured Output  Urine Occurrence: 1  Stool Occurrence: 0  Emesis Occurrence: 0  Pad Count: 2       Physical Exam  Vitals and nursing note reviewed.   HENT:      Head: Normocephalic.      Comments: EVD site clean and dry, without drainage post removal      Nose: Nose normal.      Mouth/Throat:      Mouth: Mucous membranes are moist.      Pharynx: Oropharynx is clear.   Cardiovascular:      Rate and Rhythm: Normal rate and regular rhythm.      Pulses: Normal pulses.      Heart sounds: Normal heart sounds.   Pulmonary:      Effort: Pulmonary effort is normal.      Breath sounds: Normal breath sounds.   Abdominal:      General: Bowel sounds are normal.      Palpations: Abdomen is soft.   Musculoskeletal:         General: Normal range of motion.   Skin:     General: Skin is warm and dry.      Capillary Refill: Capillary refill takes 2 to 3  seconds.   Neurological:      Mental Status: She is alert.      Comments:   GCS 14 with improved delayed response time   Oriented to self and situation. Intermittently to place and time.   Follows commands in BUE/BLE (improvement in multistep and bilateral commands that cross midline)  PERRL  Sensation grossly intact       Unable to test orientation, language, memory, judgment, insight, fund of knowledge, hearing, shoulder shrug, tongue protrusion, coordination, gait due to level of consciousness.    Medications:  Continuous   Scheduledheparin (porcine), 5,000 Units, Q8H  insulin aspart U-100, 5 Units, QID (AC & HS)  insulin detemir U-100, 16 Units, BID  PRNacetaminophen, 650 mg, Q6H PRN  albuterol-ipratropium, 3 mL, Q6H PRN  dextrose 10%, 12.5 g, PRN  glucagon (human recombinant), 1 mg, PRN  glucose, 16 g, PRN  glucose, 24 g, PRN  hydrALAZINE, 10 mg, Q6H PRN  insulin aspart U-100, 0-15 Units, QID (AC + HS) PRN  labetalol, 10 mg, Q4H PRN  magnesium oxide, 800 mg, PRN  magnesium oxide, 800 mg, PRN  melatonin, 6 mg, Nightly PRN  ondansetron, 4 mg, Q6H PRN  potassium bicarbonate, 35 mEq, PRN  potassium bicarbonate, 50 mEq, PRN  potassium bicarbonate, 60 mEq, PRN  potassium, sodium phosphates, 2 packet, PRN  potassium, sodium phosphates, 2 packet, PRN  potassium, sodium phosphates, 2 packet, PRN    Today I personally reviewed pertinent medications, lines/drains/airways, imaging, cardiology results, laboratory results, microbiology results, notably:    Glucose better controlled, monitor PO intake  CTH stable post EVD removal     Diet  [facility]; 2000 Calorie  Diet diabetic [facility]; 2000 Calorie

## 2023-02-08 NOTE — ASSESSMENT & PLAN NOTE
A1c 6.5  BG Goal 140-180 while inpatient  Currently on Detemir 16U BID + Aspart 6U TID WM + SSI PRN

## 2023-02-08 NOTE — ASSESSMENT & PLAN NOTE
53 yo F w/ PMH DM1, HTN presented to the ED with AMS after HA and vomiting the night before. Patient was non-verbal, B/l fixed pupils, and only intermittently following commands in B/L UE. GCS 8. MRI shows Acute IVH, SAH w/ obstructive hydrocephalus w/o signs of ischemia and R A2 QUIQUE aneurysm. VN consulted. Patient intubated in ED after concern for airway protection. NSGY placed EVD. IR consulted for possible angiogram. CTA shows diffuse subarachnoid hemorrhage with moderate hydrocephalus. No intracranial aneurysm or AVM is identified. NIHSS 9 on admission.     - IR angiogram on 1/27 shows no aneurysm, AVM, or AV fistula. Will need repeat angiogram on 2/3; will discuss with IR   - CTA shows diffuse subarachnoid hemorrhage with moderate hydrocephalus. No intracranial aneurysm or AVM is identified.  - Repeat DSA on PBD 7 without aneurysm, AVM or AV fistula.   - Plan for EVD removal 2/7    - Stable for step down to Neurosurgery   - Q1H neuro checks and vitals  - NSGY following; appreciate recs  - EVD removed, CTH stable  - Ancef for drain ppx, d/c after EVD removal   - Daily TCDs, discission on discontinuation   - Echo reviewed   - CBC, CMP, Mag, Phos daily, replace PRN  - SBP goal <160  - Cardene gtt, wean as able   - PRN labetalol, hydralazine  - SQ heparin  - PT/OT/SLP

## 2023-02-08 NOTE — PROGRESS NOTES
[facility]  Neurosurgery  Progress Note    Subjective:     History of Present Illness: 53 yo F with PMH of DM1, autoimmune hepatitis, and HTN who presents to ED this am after confusion this am. She was LKN last night and became confused after showering this am so  brought to the ED. She quickly decompensated in the ER and was not following commands or waking to stimulation. MRI and CTA showed diffuse thick SAH and hydrocephalus. No identifiable aneurysm seen on CTA.       Post-Op Info:  Procedure(s) (LRB):  ANGIOGRAM-CEREBRAL (N/A)   12 Days Post-Op     Interval History: 2/8: No acute events, EVD removed without complication and with stable imaging. Pt is disoriented to place again today, otherwise intact, no headaches. EVD site is dry and intact.      Medications:  Continuous Infusions:      Scheduled Meds:   amantadine HCL  100 mg Oral BID    ceFAZolin (ANCEF) IVPB  2 g Intravenous Q8H    heparin (porcine)  5,000 Units Subcutaneous Q8H    insulin aspart U-100  6 Units Subcutaneous TIDWM    insulin detemir U-100  16 Units Subcutaneous BID     PRN Meds:acetaminophen, albuterol-ipratropium, dextrose 10%, glucagon (human recombinant), glucose, glucose, hydrALAZINE, insulin aspart U-100, labetalol, magnesium oxide, magnesium oxide, melatonin, ondansetron, potassium bicarbonate, potassium bicarbonate, potassium bicarbonate, potassium, sodium phosphates, potassium, sodium phosphates, potassium, sodium phosphates     Review of Systems  Objective:     Weight: 55.5 kg (122 lb 5.7 oz)  Body mass index is 22.38 kg/m².  Vital Signs (Most Recent):  Temp: 97.7 °F (36.5 °C) (02/08/23 0305)  Pulse: 72 (02/08/23 0605)  Resp: (!) 21 (02/08/23 0605)  BP: 137/63 (02/08/23 0605)  SpO2: 98 % (02/08/23 0605) Vital Signs (24h Range):  Temp:  [97.7 °F (36.5 °C)-99.7 °F (37.6 °C)] 97.7 °F (36.5 °C)  Pulse:  [63-92] 72  Resp:  [15-23] 21  SpO2:  [94 %-100 %] 98 %  BP: (122-163)/() 137/63                             ICP/Ventriculostomy 01/27/23 1400 (Active)   $ ICP/Ventriculostomy [place] Placement Bedside Insertion Performed 01/27/23 1501   Level of Ventriculostomy (cm above) 20 02/02/23 0201   Status Clamped 02/03/23 0601   Site Assessment Clean;Dry 02/03/23 0601   Site Drainage No drainage 02/03/23 0601   Waveform normal waveform 02/02/23 2301   Output (mL) 0 mL 02/03/23 0500   CSF Color clear 02/01/23 1500   Dressing Status Clean;Dry;Intact 02/03/23 0601   Interventions HOB degrees 02/03/23 0601       Physical Exam    Neurosurgery Physical Exam       [id number].   AOx3.   PERRL.   CN non focal.   FC x4 grossly full strength      Significant Labs:  Recent Labs   Lab 02/07/23  0416 02/08/23  0216   * 162*    139   K 3.7 3.5    104   CO2 24 23   BUN 8 8   CREATININE 0.8 0.8   CALCIUM 10.1 10.1   MG 2.2 1.9       Recent Labs   Lab 02/07/23  0416 02/08/23  0216   WBC 9.21 8.68   HGB 10.6* 10.6*   HCT 32.7* 32.8*    326       No results for input(s): LABPT, INR, APTT in the last 48 hours.  Microbiology Results (last 7 days)       Procedure Component Value Units Date/Time    CSF culture [[id number]] Collected: 02/06/23 0802    Order Status: Completed Specimen: CSF (Spinal Fluid) from CSF Shunt Updated: 02/08/23 0611     CSF CULTURE No Growth to date     Gram Stain Result Cytospin indicates:      Rare WBC's      No organisms seen    CSF culture [[id number]] Collected: 02/02/23 1013    Order Status: Completed Specimen: CSF (Spinal Fluid) from CSF Tap, Tube 3 Updated: 02/07/23 0623     CSF CULTURE No Growth     Gram Stain Result No WBC's      No organisms seen    CSF culture [[id number]] Collected: 01/30/23 1449    Order Status: Completed Specimen: CSF (Spinal Fluid) from CSF Shunt Updated: 02/04/23 0742     CSF CULTURE No Growth     Gram Stain Result Rare WBC's      No organisms seen    Gram stain [[id number]] Collected: 02/02/23 1013    Order Status: Canceled Specimen: CSF (Spinal Fluid) from CSF Tap,  Tube 3           All pertinent labs from the last 24 hours have been reviewed.    Significant Diagnostics:  I have reviewed all pertinent imaging results/findings within the past 24 hours.    Assessment/Plan:     * SAH (subarachnoid hemorrhage)  53 yo F with PMH of DM1, autoimmune hepatitis, and HTN who presents to ED and was found to have diffuse non-aneurysmal SAH and hydrocephalus    PBD 12    No acute events, EVD removed without complication and with stable imaging. Pt is disoriented to place again today, otherwise intact, no headaches. EVD site is dry and intact.    --Pt cleared from neurosurgical perspective for stepdown to neurosurgical stepdown unit under neurosurgery service.   - q1 neuro checks  - all labs and diagnostics reviewed   - MRI/CTA showed diffuse SAH and small volume IVH with hydrocephalus, no identifiable aneurysm   - CTH repeat with EVD in good position   - CTA without identifiable aneurysm   - DSA 1/27 without any identifiable aneurysm   - MRI brain/C spine and MRI vessel wall imaging 1/29: no vascular anomaly seen or vessell wall enhancement   - CTH 2/2 after EVD clamped the day before with decrease in ventricular caliber from prior, decreasing SAH   - DSA 2/3: negative   - CTH 2/3 post angio, mild increase in ventricular size  - SAH protocol   - daily TCDs x 14 days   - keppra 500mg x 7 days   - goal euvolemia to 1L positive  - Na >135, hypertonics as needed  - elevate HOB  - SBP <140  - extubated 1/30  - ok for diet, SLP  - ok for SQH    Dispo: ongoing        Pablo Gillis MD  Neurosurgery  [facility]

## 2023-02-09 LAB
ALBUMIN SERPL BCP-MCNC: 3.3 G/DL (ref 3.5–5.2)
ALP SERPL-CCNC: 173 U/L (ref 55–135)
ALT SERPL W/O P-5'-P-CCNC: 14 U/L (ref 10–44)
ANION GAP SERPL CALC-SCNC: 11 MMOL/L (ref 8–16)
AST SERPL-CCNC: 30 U/L (ref 10–40)
BASOPHILS # BLD AUTO: 0.07 K/UL (ref 0–0.2)
BASOPHILS NFR BLD: 1 % (ref 0–1.9)
BILIRUB SERPL-MCNC: 0.3 MG/DL (ref 0.1–1)
BUN SERPL-MCNC: 8 MG/DL (ref 6–20)
CALCIUM SERPL-MCNC: 10.3 MG/DL (ref 8.7–10.5)
CHLORIDE SERPL-SCNC: 105 MMOL/L (ref 95–110)
CO2 SERPL-SCNC: 21 MMOL/L (ref 23–29)
CREAT SERPL-MCNC: 0.8 MG/DL (ref 0.5–1.4)
DIFFERENTIAL METHOD: ABNORMAL
EOSINOPHIL # BLD AUTO: 0.2 K/UL (ref 0–0.5)
EOSINOPHIL NFR BLD: 3 % (ref 0–8)
ERYTHROCYTE [DISTWIDTH] IN BLOOD BY AUTOMATED COUNT: 13 % (ref 11.5–14.5)
EST. GFR  (NO RACE VARIABLE): >60 ML/MIN/1.73 M^2
GLUCOSE SERPL-MCNC: 147 MG/DL (ref 70–110)
HCT VFR BLD AUTO: 41.8 % (ref 37–48.5)
HGB BLD-MCNC: 13 G/DL (ref 12–16)
IMM GRANULOCYTES # BLD AUTO: 0.04 K/UL (ref 0–0.04)
IMM GRANULOCYTES NFR BLD AUTO: 0.5 % (ref 0–0.5)
LYMPHOCYTES # BLD AUTO: 2.4 K/UL (ref 1–4.8)
LYMPHOCYTES NFR BLD: 32.4 % (ref 18–48)
MAGNESIUM SERPL-MCNC: 2 MG/DL (ref 1.6–2.6)
MCH RBC QN AUTO: 30.4 PG (ref 27–31)
MCHC RBC AUTO-ENTMCNC: 31.1 G/DL (ref 32–36)
MCV RBC AUTO: 98 FL (ref 82–98)
MONOCYTES # BLD AUTO: 0.7 K/UL (ref 0.3–1)
MONOCYTES NFR BLD: 9.5 % (ref 4–15)
NEUTROPHILS # BLD AUTO: 3.9 K/UL (ref 1.8–7.7)
NEUTROPHILS NFR BLD: 53.6 % (ref 38–73)
NRBC BLD-RTO: 0 /100 WBC
PHOSPHATE SERPL-MCNC: 3.4 MG/DL (ref 2.7–4.5)
PLATELET # BLD AUTO: 430 K/UL (ref 150–450)
PMV BLD AUTO: 9 FL (ref 9.2–12.9)
POCT GLUCOSE: 127 MG/DL (ref 70–110)
POCT GLUCOSE: 169 MG/DL (ref 70–110)
POCT GLUCOSE: 176 MG/DL (ref 70–110)
POCT GLUCOSE: 281 MG/DL (ref 70–110)
POTASSIUM SERPL-SCNC: 4.5 MMOL/L (ref 3.5–5.1)
PROT SERPL-MCNC: 7.6 G/DL (ref 6–8.4)
RBC # BLD AUTO: 4.28 M/UL (ref 4–5.4)
SODIUM SERPL-SCNC: 137 MMOL/L (ref 136–145)
WBC # BLD AUTO: 7.35 K/UL (ref 3.9–12.7)

## 2023-02-09 PROCEDURE — 99233 PR SUBSEQUENT HOSPITAL CARE,LEVL III: ICD-10-PCS | Mod: ,,, | Performed by: PHYSICIAN ASSISTANT

## 2023-02-09 PROCEDURE — 80053 COMPREHEN METABOLIC PANEL: CPT

## 2023-02-09 PROCEDURE — 99222 PR INITIAL HOSPITAL CARE,LEVL II: ICD-10-PCS | Mod: ,,, | Performed by: NURSE PRACTITIONER

## 2023-02-09 PROCEDURE — 63600175 PHARM REV CODE 636 W HCPCS

## 2023-02-09 PROCEDURE — 25000003 PHARM REV CODE 250: Performed by: STUDENT IN AN ORGANIZED HEALTH CARE EDUCATION/TRAINING PROGRAM

## 2023-02-09 PROCEDURE — 36415 COLL VENOUS BLD VENIPUNCTURE: CPT

## 2023-02-09 PROCEDURE — 99222 1ST HOSP IP/OBS MODERATE 55: CPT | Mod: ,,, | Performed by: NURSE PRACTITIONER

## 2023-02-09 PROCEDURE — 20600001 HC STEP DOWN PRIVATE ROOM

## 2023-02-09 PROCEDURE — 85025 COMPLETE CBC W/AUTO DIFF WBC: CPT

## 2023-02-09 PROCEDURE — 11000001 HC ACUTE MED/SURG PRIVATE ROOM

## 2023-02-09 PROCEDURE — 25000003 PHARM REV CODE 250

## 2023-02-09 PROCEDURE — 84100 ASSAY OF PHOSPHORUS: CPT

## 2023-02-09 PROCEDURE — 97116 GAIT TRAINING THERAPY: CPT

## 2023-02-09 PROCEDURE — 83735 ASSAY OF MAGNESIUM: CPT

## 2023-02-09 PROCEDURE — 99233 SBSQ HOSP IP/OBS HIGH 50: CPT | Mod: ,,, | Performed by: PHYSICIAN ASSISTANT

## 2023-02-09 PROCEDURE — 97535 SELF CARE MNGMENT TRAINING: CPT

## 2023-02-09 PROCEDURE — 92526 ORAL FUNCTION THERAPY: CPT

## 2023-02-09 RX ORDER — QUETIAPINE FUMARATE 25 MG/1
25 TABLET, FILM COATED ORAL ONCE
Status: COMPLETED | OUTPATIENT
Start: 2023-02-09 | End: 2023-02-09

## 2023-02-09 RX ORDER — QUETIAPINE FUMARATE 25 MG/1
25 TABLET, FILM COATED ORAL 2 TIMES DAILY PRN
Status: DISCONTINUED | OUTPATIENT
Start: 2023-02-09 | End: 2023-02-14

## 2023-02-09 RX ADMIN — INSULIN ASPART 9 UNITS: 100 INJECTION, SOLUTION INTRAVENOUS; SUBCUTANEOUS at 05:02

## 2023-02-09 RX ADMIN — INSULIN ASPART 5 UNITS: 100 INJECTION, SOLUTION INTRAVENOUS; SUBCUTANEOUS at 09:02

## 2023-02-09 RX ADMIN — ACETAMINOPHEN 650 MG: 650 SOLUTION ORAL at 10:02

## 2023-02-09 RX ADMIN — HEPARIN SODIUM 5000 UNITS: 5000 INJECTION INTRAVENOUS; SUBCUTANEOUS at 04:02

## 2023-02-09 RX ADMIN — INSULIN ASPART 5 UNITS: 100 INJECTION, SOLUTION INTRAVENOUS; SUBCUTANEOUS at 07:02

## 2023-02-09 RX ADMIN — INSULIN DETEMIR 16 UNITS: 100 INJECTION, SOLUTION SUBCUTANEOUS at 08:02

## 2023-02-09 RX ADMIN — INSULIN ASPART 5 UNITS: 100 INJECTION, SOLUTION INTRAVENOUS; SUBCUTANEOUS at 05:02

## 2023-02-09 RX ADMIN — INSULIN ASPART 3 UNITS: 100 INJECTION, SOLUTION INTRAVENOUS; SUBCUTANEOUS at 12:02

## 2023-02-09 RX ADMIN — HEPARIN SODIUM 5000 UNITS: 5000 INJECTION INTRAVENOUS; SUBCUTANEOUS at 01:02

## 2023-02-09 RX ADMIN — HEPARIN SODIUM 5000 UNITS: 5000 INJECTION INTRAVENOUS; SUBCUTANEOUS at 09:02

## 2023-02-09 RX ADMIN — INSULIN DETEMIR 16 UNITS: 100 INJECTION, SOLUTION SUBCUTANEOUS at 09:02

## 2023-02-09 RX ADMIN — INSULIN ASPART 5 UNITS: 100 INJECTION, SOLUTION INTRAVENOUS; SUBCUTANEOUS at 12:02

## 2023-02-09 RX ADMIN — QUETIAPINE FUMARATE 25 MG: 25 TABLET ORAL at 04:02

## 2023-02-09 NOTE — ASSESSMENT & PLAN NOTE
Nontraumatic subarachnoid hemorrhage  53 yo F w/ PMH DM1, HTN, and autoimmune hepatitis who presented to the ED with AMS after HA and vomiting the night before. Patient was non-verbal, B/l fixed pupils, and only intermittently following commands in B/L UE. GCS 8. MRI showed Acute IVH, SAH w/ obstructive hydrocephalus w/o signs of ischemia, and questionable R A2 QUIQUE aneurysm. VN consulted. Patient intubated in ED after concern for airway protection. NSGY placed EVD. CTA showed diffuse subarachnoid hemorrhage with moderate hydrocephalus. IR consulted for angiogram. No intracranial aneurysm or AVM identified. NIHSS 9 on admission.      - IR angiogram on 1/27 shows no aneurysm, AVM, or AV fistula.   - CTA 1/27 shows diffuse subarachnoid hemorrhage with moderate hydrocephalus. No intracranial aneurysm or AVM is identified.  - Repeat DSA on PBD 7 without aneurysm, AVM or AV fistula.   - EVD removed 2/7, post-pull CTH on 2/8 stable  - Stepped down to floor under NSGY service on 2/8     - Q4H neuro checks and vitals  - Last TCDs done 2/6 negative for vasospasm, discontinued  - Echo reviewed   - Daily labs, replete electrolytes PRN  - SBP goal <160; PRN labetalol, hydralazine   - SQ heparin  - PT/OT/SLP     IVH (intraventricular hemorrhage)  See SAH  EVD care     Vasogenic cerebral edema  See SAH     Cardiac/Vascular  Hypertension  SBP 160s on arrival.   states patient does not take home Losartan regularly.      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 BG on arrival 284  Home regimen 13u Lantus; Lispro AC  A1C 6.5            -Transitioned from Insulin gtt to Detemir 16 BID  -Aspart 6 TIDWM  -Hypoglycemic protocol in place  -Accuchecks AC/HS  -Will need follow up with PCP after discharge     Autoimmune hepatitis  Hx of AIH. Previously on mercaptopurine. Not currently taking.   LFTs  slightly elevated on arrival.     -Will continue to monitor  -Holding Tylenol and hepatotoxic agents  -CMP daily  -If LFTs worsen will obtain RUQ US.      Debility  PT/OT - recs for IPR    Encephalopathy, Agitation  Likely 2/2 cerebral hemorrhage, extended hospitalization/ICU stay    -Delirium precautions, frequent re-orientation  -Seroquel PRN for non-redirectable agitation  -Telesitter discontinued 2/9    Dispo: IPR, pending placement

## 2023-02-09 NOTE — PLAN OF CARE
Problem: Adult Inpatient Plan of Care  Goal: Plan of Care Review  Outcome: Ongoing, Progressing  Flowsheets (Taken 2023)  Plan of Care Reviewed With:   patient   spouse  Goal: Patient-Specific Goal (Individualized)  Description: Admit Date: 23    Admit Dx:  SAH    Past Medical History:  No date: Autoimmune hepatitis      Comment:  managed by Dr. Russell on mercaptopurine  No date: Diabetes mellitus type II  No date: Hypertension    Past Surgical History:  2001:  SECTION, CLASSIC  2019: COLONOSCOPY; N/A      Comment:  Procedure: COLONOSCOPY;  Surgeon: Dipesh Victoria MD;  Location: [facility] ENDO ([address] [place] [address]);  Service: Endoscopy;                Laterality: N/A;  2013: FRACTURE SURGERY; Left      Comment:  leg   No date: MOUTH SURGERY    Individualization:   1. Lights dim  2. Stroke educational booklet updated every shift and reviewed with patient and family at bedside.     Restraints: 23 yes-pulling lines         Outcome: Ongoing, Progressing  Goal: Absence of Hospital-Acquired Illness or Injury  Outcome: Ongoing, Progressing  Intervention: Identify and Manage Fall Risk  Flowsheets (Taken 2023)  Safety Promotion/Fall Prevention:   assistive device/personal item within reach   bed alarm set   Fall Risk reviewed with patient/family   Fall Risk signage in place   family to remain at bedside   side rails raised x 3   instructed to call staff for mobility   supervised activity   Supervised toileting - stay within arms reach     Problem: Adjustment to Illness (Stroke, Hemorrhagic)  Goal: Optimal Coping  Outcome: Ongoing, Progressing  Intervention: Support Psychosocial Response to Stroke  Flowsheets (Taken 2023)  Family/Support System Care:   caregiver stress acknowledged   presence promoted     Problem: Diabetes Comorbidity  Goal: Blood Glucose Level Within Targeted Range  Outcome: Ongoing, Progressing  Intervention: Monitor and Manage Glycemia  Flowsheets  (Taken 2/9/2023 1713)  Glycemic Management: blood glucose monitored

## 2023-02-09 NOTE — PT/OT/SLP PROGRESS
"Speech Language Pathology Treatment    Patient Name:  Leida Hoyos   MRN:  [id number]  Admitting Diagnosis: Nontraumatic subarachnoid hemorrhage    Recommendations:                 General Recommendations:  Dysphagia therapy, Speech/language therapy, and Cognitive-linguistic therapy  Diet recommendations:  Regular, Liquid Diet Level: Thin   Aspiration Precautions: Standard aspiration precautions   General Precautions: Standard, aspiration, fall  Communication strategies:  provide increased time to answer    Subjective     "I don't know what category you are on"  Patient goals: home     Pain/Comfort:  Pain Rating 1: 0/10  Pain Rating Post-Intervention 1: 0/10    Respiratory Status: Room air    Objective:     Has the patient been evaluated by SLP for swallowing?   Yes  Keep patient NPO? No   Current Respiratory Status:        Pt seen bedside with her  present. Pt asleep but easily roused with good participation in session. She was oriented to month only. With cues she recalled the year but not place. She named 3 items in a category with 90% acc and with cues 100% ac. She named item in a category given a specific letter with 75% acc and with cues 85% acc. She compared items with 60% acc and with cues 80% acc. She contrasted them with 60% acc and with cues 100% acc. Pt needed slightly increased time and occasional repetition of task directions. Education provided re: decreased distractions and provideing breaks during cognitive tasks. Pt and  with good understanding. Pt will need ongoing education. White board updated.       Assessment:     Leida Hoyos is a 52 y.o. female with an SLP diagnosis of Cognitive-Linguistic Impairment.  She presents with progress towards goals.    Goals:   Multidisciplinary Problems       SLP Goals          Problem: SLP    Goal Priority Disciplines Outcome   SLP Goal     SLP Ongoing, Progressing   Description: Goals due 2/10  1.  Pt. Will participate in ongoing " assessment of swallow at bedside  2.  Tolerate trials of regular diet with thin liquids with no s/s of aspiration  3.  Assess functional reading and writing skills  4.  [place] to time and place  5.  Respond to word finding/categorization tasks with 80% accuracy  6.  Respond to verbal problem solving tasks with 75% accuracy                       Plan:     Patient to be seen:  4 x/week   Plan of Care expires:  02/27/23  Plan of Care reviewed with:  patient, spouse   SLP Follow-Up:  Yes       Discharge recommendations:  rehabilitation facility   Barriers to Discharge:  None    Time Tracking:     SLP Treatment Date:   02/09/23  Speech Start Time:  0856  Speech Stop Time:  0915     Speech Total Time (min):  19 min    Billable Minutes: Speech Therapy Individual 11 and Self Care/Home Management Training 8    02/09/2023

## 2023-02-09 NOTE — PROGRESS NOTES
[facility] - Neurosurgery ([facility])  Neurosurgery  Progress Note    Subjective:     History of Present Illness: 51 yo F with PMH of DM1, autoimmune hepatitis, and HTN who presents to ED this am after confusion this am. She was LKN last night and became confused after showering this am so  brought to the ED. She quickly decompensated in the ER and was not following commands or waking to stimulation. MRI and CTA showed diffuse thick SAH and hydrocephalus. No identifiable aneurysm seen on CTA.       Post-Op Info:  Procedure(s) (LRB):  ANGIOGRAM-CEREBRAL (N/A)   13 Days Post-Op     Interval History: NAEON. AFVSS. Stepped down from NCC to floor under NSGY service. Had some increased agitation/confusion overnight, attempting to get OOB, resolved with dose of Seroquel. Pt sleeping this am but easily arousable, Aox2, cooperative and following commands w/o difficulty.  at bedside. Reports ongoing intermittent HA but no other acut complaints. Participating with therapy, recs for IPR.     Medications:  Continuous Infusions:  Scheduled Meds:   heparin (porcine)  5,000 Units Subcutaneous Q8H    insulin aspart U-100  5 Units Subcutaneous QID (AC & HS)    insulin detemir U-100  16 Units Subcutaneous BID     PRN Meds:acetaminophen, albuterol-ipratropium, dextrose 10%, glucagon (human recombinant), glucose, glucose, hydrALAZINE, insulin aspart U-100, labetalol, melatonin, ondansetron, QUEtiapine     Review of Systems   Constitutional:  Negative for chills and fever.   Eyes:  Negative for photophobia and visual disturbance.   Respiratory:  Negative for cough and shortness of breath.    Gastrointestinal:  Negative for nausea and vomiting.   Neurological:  Positive for headaches. Negative for seizures, speech difficulty, weakness and numbness.   Psychiatric/Behavioral:  Positive for agitation and confusion.    Objective:     Weight: 55.5 kg (122 lb 5.7 oz)  Body mass index is 22.38 kg/m².  Vital Signs (Most  Recent):  Temp: 98.1 °F (36.7 °C) (02/09/23 1228)  Pulse: 87 (02/09/23 1228)  Resp: 18 (02/09/23 1228)  BP: 117/69 (02/09/23 1228)  SpO2: 99 % (02/09/23 1228) Vital Signs (24h Range):  Temp:  [97.8 °F (36.6 °C)-98.7 °F (37.1 °C)] 98.1 °F (36.7 °C)  Pulse:  [66-95] 87  Resp:  [15-25] 18  SpO2:  [94 %-100 %] 99 %  BP: (117-145)/(60-94) 117/69                          Physical Exam    Neurosurgery Physical Exam    General: well developed, well nourished, no distress.   Head: normocephalic, atraumatic.    EVD site C/D/I  GCS: E4 V5 M6; Total: 15  Mental Status: Awake, Alert, Oriented x 3 (self, situation; month but not year; hospital but not city)  Language: Mildly delayed response time, no dain aphasia  Speech: No dysarthria  Cranial nerves: face symmetric, tongue midline, CN II-XII grossly intact.   Eyes: pupils equal, round, reactive to light with accomodation, EOMI.   Pulmonary: normal respirations, no signs of respiratory distress  Abdomen: soft, non-distended, not tender to palpation  Skin: Skin is warm, dry and intact.    Sensory: intact to light touch throughout  Motor Strength: Moves all extremities spontaneously with good tone. Grossly full strength upper and lower extremities. No abnormal movements seen.     Finger-to-nose: intact bilaterally   Pronator drift: absent bilaterally          Significant Labs:  Recent Labs   Lab 02/08/23  0216 02/09/23  0803   * 147*    137   K 3.5 4.5    105   CO2 23 21*   BUN 8 8   CREATININE 0.8 0.8   CALCIUM 10.1 10.3   MG 1.9 2.0     Recent Labs   Lab 02/08/23  0216 02/09/23  0803   WBC 8.68 7.35   HGB 10.6* 13.0   HCT 32.8* 41.8    430     No results for input(s): LABPT, INR, APTT in the last 48 hours.  Microbiology Results (last 7 days)       Procedure Component Value Units Date/Time    CSF culture [[id number]] Collected: 02/06/23 0802    Order Status: Completed Specimen: CSF (Spinal Fluid) from CSF Shunt Updated: 02/09/23 0620     CSF CULTURE No  Growth to date     Gram Stain Result Cytospin indicates:      Rare WBC's      No organisms seen    CSF culture [[id number]] Collected: 02/02/23 1013    Order Status: Completed Specimen: CSF (Spinal Fluid) from CSF Tap, Tube 3 Updated: 02/07/23 0623     CSF CULTURE No Growth     Gram Stain Result No WBC's      No organisms seen    CSF culture [[id number]] Collected: 01/30/23 1449    Order Status: Completed Specimen: CSF (Spinal Fluid) from CSF Shunt Updated: 02/04/23 0742     CSF CULTURE No Growth     Gram Stain Result Rare WBC's      No organisms seen          All pertinent labs from the last 24 hours have been reviewed.    Significant Diagnostics:  I have reviewed and interpreted all pertinent imaging results/findings within the past 24 hours.    Assessment/Plan:     * Nontraumatic subarachnoid hemorrhage    Nontraumatic subarachnoid hemorrhage  51 yo F w/ PMH DM1, HTN, and autoimmune hepatitis who presented to the ED with AMS after HA and vomiting the night before. Patient was non-verbal, B/l fixed pupils, and only intermittently following commands in B/L UE. GCS 8. MRI showed Acute IVH, SAH w/ obstructive hydrocephalus w/o signs of ischemia, and questionable R A2 QUIQUE aneurysm. VN consulted. Patient intubated in ED after concern for airway protection. NSGY placed EVD. CTA showed diffuse subarachnoid hemorrhage with moderate hydrocephalus. IR consulted for angiogram. No intracranial aneurysm or AVM identified. NIHSS 9 on admission.      - IR angiogram on 1/27 shows no aneurysm, AVM, or AV fistula.   - CTA 1/27 shows diffuse subarachnoid hemorrhage with moderate hydrocephalus. No intracranial aneurysm or AVM is identified.  - Repeat DSA on PBD 7 without aneurysm, AVM or AV fistula.   - EVD removed 2/7, post-pull CTH on 2/8 stable  - Stepped down to floor under NSGY service on 2/8     - Q4H neuro checks and vitals  - Last TCDs done 2/6 negative for vasospasm, discontinued  - Echo reviewed   - Daily labs, replete  electrolytes PRN  - SBP goal <160; PRN labetalol, hydralazine   - SQ heparin  - PT/OT/SLP     IVH (intraventricular hemorrhage)  See SAH  EVD care     Vasogenic cerebral edema  See SAH     Cardiac/Vascular  Hypertension  SBP 160s on arrival.   states patient does not take home Losartan regularly.      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 BG on arrival 284  Home regimen 13u Lantus; Lispro AC  A1C 6.5            -Transitioned from Insulin gtt to Detemir 16 BID  -Aspart 6 TIDWM  -Hypoglycemic protocol in place  -Accuchecks AC/HS  -Will need follow up with PCP after discharge     Autoimmune hepatitis  Hx of AIH. Previously on mercaptopurine. Not currently taking.   LFTs slightly elevated on arrival.     -Will continue to monitor  -Holding Tylenol and hepatotoxic agents  -CMP daily  -If LFTs worsen will obtain RUQ US.      Debility  PT/OT - recs for IPR    Encephalopathy, Agitation  Likely 2/2 cerebral hemorrhage, extended hospitalization/ICU stay    -Delirium precautions, frequent re-orientation  -Seroquel PRN for non-redirectable agitation  -Telesitter discontinued 2/9    Dispo: IPR, pending placement          Nighat Siegel PA-C  Neurosurgery  [facility] - Neuro[facility] ([facility])

## 2023-02-09 NOTE — HPI
Leida Hoyos is a 52-year-old female with PMHx of DM, HTN, autoimmune hepatitis. [place] [facility]. On arrival, found to have MRI shows Acute IVH, SAH w/ obstructive hydrocephalus w/o signs of ischemia and R A2 QUIQUE aneurysm. Patient intubated. IR consulted for possible angiogram. CTA shows diffuse subarachnoid hemorrhage with moderate hydrocephalus. No intracranial aneurysm or AVM is identified. EVD removed. CTH stable. Hospital curse complicated by HTN (Was on Cardene drip, now improved) and agitation (camera sitter at bs, given Seroquel 2/8).    Functional History: Patient lives with  in a single story home with 1 step to enter.  Prior to admission, I. DME: none.

## 2023-02-09 NOTE — CONSULTS
[facility] - Neurosurgery ([facility])  Physical Medicine & Rehab  Consult Note    Patient Name: Leida Hoyos  MRN: [id number]  Admission Date: 1/27/2023  Hospital Length of Stay: 13 days  Attending Physician: Celso Khan MD     Inpatient consult to Physical Medicine & Rehabilitation  Consult performed by: Yasmin Dixon NP  Consult requested by:  Celso Khan MD    Collaborating Physician: Heather York MD  Reason for Consult:  Assess rehabilitation needs     Consults  Subjective:     Principal Problem: Nontraumatic subarachnoid hemorrhage    HPI: Leida Hoyos is a 52-year-old female with PMHx of DM, HTN, autoimmune hepatitis. [place] [facility] [place]. On arrival, found to have MRI shows Acute IVH, SAH w/ obstructive hydrocephalus w/o signs of ischemia and R A2 QUIQUE aneurysm. Patient intubated. IR consulted for possible angiogram. CTA shows diffuse subarachnoid hemorrhage with moderate hydrocephalus. No intracranial aneurysm or AVM is identified. EVD removed. CTH stable. Hospital curse complicated by HTN (Was on Cardene drip, now improved) and agitation (camera sitter at , given Seroquel 2/8).    Functional History: Patient lives with  in a single story home with 1 step to enter.  Prior to admission, I. DME: none.       Hospital Course: 2/8/23: Participated w/ PT. 120ft x 2 trials with no AD with minimal assist. Pt with noted posterior lean at times, lateral instability, and c/o fatigue at end of gait trials. Pt required manual cues for directional cues   pt wore mask and was on portable monitor throughout gait trial. [facility] 18  2/9/23 SLP reg and thin. SLP diagnosis of Cognitive-Linguistic Impairment.       Past Medical History:   Diagnosis Date    Autoimmune hepatitis     managed by Dr. Russell on mercaptopurine    Diabetes mellitus type II     Hypertension      Past Surgical History:   Procedure Laterality Date    CEREBRAL ANGIOGRAM N/A 1/27/2023    Procedure:  ANGIOGRAM-CEREBRAL;  Surgeon: Yue Surgeon;  [facility];  Service: Anesthesiology;  Laterality: N/A;     SECTION, CLASSIC      COLONOSCOPY N/A 2019    Procedure: COLONOSCOPY;  Surgeon: Dipesh Victoria MD;  Location: [facility] (4TH FLR);  Service: Endoscopy;  Laterality: N/A;    FRACTURE SURGERY Left 2013    leg     MOUTH SURGERY       Review of patient's allergies indicates:  No Known Allergies    Scheduled Medications:    heparin (porcine)  5,000 Units Subcutaneous Q8H    insulin aspart U-100  5 Units Subcutaneous QID (AC & HS)    insulin detemir U-100  16 Units Subcutaneous BID       PRN Medications: acetaminophen, albuterol-ipratropium, dextrose 10%, glucagon (human recombinant), glucose, glucose, hydrALAZINE, insulin aspart U-100, labetalol, melatonin, ondansetron    Family History       Problem Relation (Age of Onset)    Alcohol abuse Maternal Grandfather, Maternal Grandmother    Arthritis Mother    Asthma Father    Diabetes Maternal Grandfather, Paternal Grandfather    Heart attack Father (60)    Heart disease Father    Hyperlipidemia Father    Hypertension Father, Sister    Learning disabilities Daughter, Son    No Known Problems Son, Sister, Daughter    Ulcerative colitis Mother          Tobacco Use    Smoking status: Never    Smokeless tobacco: Never   Substance and Sexual Activity    Alcohol use: Yes     Alcohol/week: 2.0 standard drinks     Types: 2 Glasses of wine per week     Comment: social    Drug use: Never    Sexual activity: Yes     Partners: Male     Birth control/protection: Partner-Vasectomy     Review of Systems   Constitutional:  Positive for activity change. Negative for fatigue and fever.   Musculoskeletal:  Positive for gait problem.   Neurological:  Positive for weakness.   Psychiatric/Behavioral:  Positive for agitation and confusion.    Objective:     Vital Signs (Most Recent):  Temp: 98.1 °F (36.7 °C) (23 0735)  Pulse: 74 (23  1118)  Resp: 18 (02/09/23 0735)  BP: 126/63 (02/09/23 0735)  SpO2: 99 % (02/09/23 0735)    Vital Signs (24h Range):  Temp:  [97.8 °F (36.6 °C)-98.7 °F (37.1 °C)] 98.1 °F (36.7 °C)  Pulse:  [] 74  Resp:  [15-25] 18  SpO2:  [94 %-100 %] 99 %  BP: (125-145)/(60-94) 126/63     Body mass index is 22.38 kg/m².    Physical Exam  Vitals and nursing note reviewed.   HENT:      Nose: Nose normal.      Mouth/Throat:      Mouth: Mucous membranes are moist.   Eyes:      Extraocular Movements: Extraocular movements intact.      Pupils: Pupils are equal, round, and reactive to light.   Pulmonary:      Effort: Pulmonary effort is normal. No respiratory distress.   Musculoskeletal:         General: Normal range of motion.   Skin:     General: Skin is warm.   Neurological:      Mental Status: She is alert.      Motor: Weakness present.      Gait: Gait abnormal.      Comments: Following commands  Oriented to self    Psychiatric:      Comments: Camera sitter     Diagnostic Results:   Labs: Reviewed  ECG: Reviewed  CT: Reviewed  MRI: Reviewed    Assessment/Plan:     * Nontraumatic subarachnoid hemorrhage  - MRI shows Acute IVH, SAH w/ obstructive hydrocephalus w/o signs of ischemia and R A2 QUIQUE aneurysm.    - CTA shows diffuse subarachnoid hemorrhage with moderate hydrocephalus. No intracranial aneurysm or AVM is identified.   - EVD removed. CTH stable.     Debility  - Related to prolonged/acute hospital course.     Recommendations  -  Encourage mobility, OOB in chair at least 3 hours per day, and early ambulation as appropriate  -  PT/OT evaluate and treat  -  Pain management  -  Monitor for and prevent skin breakdown and pressure ulcers  · Early mobility, repositioning/weight shifting every 20-30 minutes when sitting, turn patient every 2 hours, proper mattress/overlay and chair cushioning, pressure relief/heel protector boots  -  DVT prophylaxis    -  Reviewed discharge options (IP rehab, SNF, HH therapy, and OP  therapy)    Hypertension  - now improved    PM&R Recommendation:     At this time, the PM&R team has reviewed this patient's ongoing medical case including inpatient diagnosis, medical history, clinical examination, labs, vitals, current social and functional history.    At this time, we will continue to follow for PT & OT and improvement in agitation with removal of camera sitter as a rehab candidate.     Thank you for your consult.     Yasmin Dixon NP  Department of Physical Medicine & Rehab  [facility] - Neurosurgery ([facility])

## 2023-02-09 NOTE — NURSING
"Pt attempted to get out of bed telesitter/bed alarm activated. Reoriented pt. Pt verbalized that "she thinks she is in [facility]". Able to redirect at this time. +Irritable +Agitated. Will continue to monitor. Safety maintained. Bed alarm remains activated. Telesitter in progress for safety/fall prevention.    "

## 2023-02-09 NOTE — PLAN OF CARE
Problem: Adult Inpatient Plan of Care  Goal: Plan of Care Review  Outcome: Ongoing, Progressing  Goal: Absence of Hospital-Acquired Illness or Injury  Outcome: Ongoing, Progressing  Intervention: Prevent Infection  Flowsheets (Taken 2/9/2023 0306)  Infection Prevention: hand hygiene promoted     Problem: Adjustment to Illness (Stroke, Hemorrhagic)  Goal: Optimal Coping  Outcome: Ongoing, Progressing  Intervention: Support Psychosocial Response to Stroke  Flowsheets (Taken 2/9/2023 0306)  Supportive Measures: active listening utilized  Family/Support System Care:   presence promoted   support provided   self-care encouraged     Problem: Cerebral Tissue Perfusion (Stroke, Hemorrhagic)  Goal: Optimal Cerebral Tissue Perfusion  Outcome: Ongoing, Progressing  Intervention: Protect and Optimize Cerebral Perfusion  Flowsheets (Taken 2/9/2023 0306)  Cerebral Perfusion Promotion: blood pressure monitored     Problem: Pain (Stroke, Hemorrhagic)  Goal: Acceptable Pain Control  Outcome: Ongoing, Progressing  Intervention: Monitor and Manage Pain  Flowsheets (Taken 2/9/2023 0306)  Pain Management Interventions: awakened for pain meds per patient request     Problem: Diabetes Comorbidity  Goal: Blood Glucose Level Within Targeted Range  Outcome: Ongoing, Progressing  Intervention: Monitor and Manage Glycemia  Flowsheets (Taken 2/9/2023 0306)  Glycemic Management: blood glucose monitored     Problem: Sleep Disturbance (Delirium)  Goal: Improved Sleep  Outcome: Ongoing, Progressing  Intervention: Promote Sleep  Flowsheets (Taken 2/9/2023 0306)  Sleep/Rest Enhancement: awakenings minimized

## 2023-02-09 NOTE — SUBJECTIVE & OBJECTIVE
Interval History: NAEON. AFVSS. Stepped down from NCC to floor under NSGY service. Had some increased agitation/confusion overnight, attempting to get OOB, resolved with dose of Seroquel. Pt sleeping this am but easily arousable, Aox2, cooperative and following commands w/o difficulty.  at bedside. Reports ongoing intermittent HA but no other acut complaints. Participating with therapy, recs for IPR.     Medications:  Continuous Infusions:  Scheduled Meds:   heparin (porcine)  5,000 Units Subcutaneous Q8H    insulin aspart U-100  5 Units Subcutaneous QID (AC & HS)    insulin detemir U-100  16 Units Subcutaneous BID     PRN Meds:acetaminophen, albuterol-ipratropium, dextrose 10%, glucagon (human recombinant), glucose, glucose, hydrALAZINE, insulin aspart U-100, labetalol, melatonin, ondansetron, QUEtiapine     Review of Systems   Constitutional:  Negative for chills and fever.   Eyes:  Negative for photophobia and visual disturbance.   Respiratory:  Negative for cough and shortness of breath.    Gastrointestinal:  Negative for nausea and vomiting.   Neurological:  Positive for headaches. Negative for seizures, speech difficulty, weakness and numbness.   Psychiatric/Behavioral:  Positive for agitation and confusion.    Objective:     Weight: 55.5 kg (122 lb 5.7 oz)  Body mass index is 22.38 kg/m².  Vital Signs (Most Recent):  Temp: 98.1 °F (36.7 °C) (02/09/23 1228)  Pulse: 87 (02/09/23 1228)  Resp: 18 (02/09/23 1228)  BP: 117/69 (02/09/23 1228)  SpO2: 99 % (02/09/23 1228) Vital Signs (24h Range):  Temp:  [97.8 °F (36.6 °C)-98.7 °F (37.1 °C)] 98.1 °F (36.7 °C)  Pulse:  [66-95] 87  Resp:  [15-25] 18  SpO2:  [94 %-100 %] 99 %  BP: (117-145)/(60-94) 117/69                          Physical Exam    Neurosurgery Physical Exam    General: well developed, well nourished, no distress.   Head: normocephalic, atraumatic.    EVD site C/D/I  GCS: E4 V5 M6; Total: 15  Mental Status: Awake, Alert, Oriented x 3 (self,  situation; month but not year; hospital but not city)  Language: Mildly delayed response time, no dain aphasia  Speech: No dysarthria  Cranial nerves: face symmetric, tongue midline, CN II-XII grossly intact.   Eyes: pupils equal, round, reactive to light with accomodation, EOMI.   Pulmonary: normal respirations, no signs of respiratory distress  Abdomen: soft, non-distended, not tender to palpation  Skin: Skin is warm, dry and intact.    Sensory: intact to light touch throughout  Motor Strength: Moves all extremities spontaneously with good tone. Grossly full strength upper and lower extremities. No abnormal movements seen.     Finger-to-nose: intact bilaterally   Pronator drift: absent bilaterally          Significant Labs:  Recent Labs   Lab 02/08/23  0216 02/09/23  0803   * 147*    137   K 3.5 4.5    105   CO2 23 21*   BUN 8 8   CREATININE 0.8 0.8   CALCIUM 10.1 10.3   MG 1.9 2.0     Recent Labs   Lab 02/08/23  0216 02/09/23  0803   WBC 8.68 7.35   HGB 10.6* 13.0   HCT 32.8* 41.8    430     No results for input(s): LABPT, INR, APTT in the last 48 hours.  Microbiology Results (last 7 days)       Procedure Component Value Units Date/Time    CSF culture [[id number]] Collected: 02/06/23 0802    Order Status: Completed Specimen: CSF (Spinal Fluid) from CSF Shunt Updated: 02/09/23 0620     CSF CULTURE No Growth to date     Gram Stain Result Cytospin indicates:      Rare WBC's      No organisms seen    CSF culture [[id number]] Collected: 02/02/23 1013    Order Status: Completed Specimen: CSF (Spinal Fluid) from CSF Tap, Tube 3 Updated: 02/07/23 0623     CSF CULTURE No Growth     Gram Stain Result No WBC's      No organisms seen    CSF culture [[id number]] Collected: 01/30/23 1449    Order Status: Completed Specimen: CSF (Spinal Fluid) from CSF Shunt Updated: 02/04/23 0742     CSF CULTURE No Growth     Gram Stain Result Rare WBC's      No organisms seen          All pertinent labs from the last  24 hours have been reviewed.    Significant Diagnostics:  I have reviewed and interpreted all pertinent imaging results/findings within the past 24 hours.

## 2023-02-09 NOTE — PT/OT/SLP PROGRESS
"Physical Therapy Treatment    Patient Name:  Leida Hoyos   MRN:  [id number]    Recommendations:     Discharge Recommendations: rehabilitation facility  Discharge Equipment Recommendations: none  Barriers to discharge: Inaccessible home and Decreased caregiver support 1STE and requires assist for mobility    Assessment:     Leida Hoyos is a 52 y.o. female admitted with a medical diagnosis of Nontraumatic subarachnoid hemorrhage.  She presents with the following impairments/functional limitations: impaired functional mobility, gait instability, impaired balance, decreased safety awareness . Pt is unsafe with functional mobility at this time due to pt requires SBA for bed mobility, CGA for transfers, and CGA for gait due to instability. Pt unable to find room numbers without max verbal and manual directional cues. Pt is motivated to progress with functional mobility.     Rehab Prognosis: Good; patient would benefit from acute skilled PT services to address these deficits and reach maximum level of function.    Recent Surgery: Procedure(s) (LRB):  ANGIOGRAM-CEREBRAL (N/A) 13 Days Post-Op    Plan:     During this hospitalization, patient to be seen 4 x/week to address the identified rehab impairments via gait training, therapeutic activities, therapeutic exercises, neuromuscular re-education and progress toward the following goals:    Plan of Care Expires:  03/02/23    Subjective   "I am ready"    Pain/Comfort:  Pain Rating 1: 0/10  Pain Rating Post-Intervention 1: 0/10      Objective:     Communicated with nurse prior to session.  Patient found HOB elevated with telemetry (AVS camera) upon PT entry to room.     General Precautions: Standard, aspiration, fall  Orthopedic Precautions: N/A  Braces: N/A  Respiratory Status: Room air     Functional Mobility:  Bed Mobility:     Supine to Sit: stand by assistance  Sit to Supine: supervision  Transfers:     Sit to Stand:  contact guard assistance with no " AD  Gait: 125ft x 2 trials then 5ft with no AD with CGA. Pt performed gait at slow pace, drifts to the R, and requires manual directional cues. Pt able to find 0/3 room numbers given to her without max manual and verbal directional cues. Pt able to follow 2 step command to bring a pen to her mother in the room.     AM-PAC 6 CLICK MOBILITY  Turning over in bed (including adjusting bedclothes, sheets and blankets)?: 4  Sitting down on and standing up from a chair with arms (e.g., wheelchair, bedside commode, etc.): 3  Moving from lying on back to sitting on the side of the bed?: 3  Moving to and from a bed to a chair (including a wheelchair)?: 3  Need to walk in hospital room?: 3  Climbing 3-5 steps with a railing?: 3  Basic Mobility Total Score: 19     Treatment & Education:  Pt ambulated to the bathroom and urinated. Pt able to clean herself with set up assist.     Patient left HOB elevated with all lines intact, call button in reach, bed alarm on, nurse notified, and AVS camera and mother present..    GOALS:   Multidisciplinary Problems       Physical Therapy Goals          Problem: Physical Therapy    Goal Priority Disciplines Outcome Goal Variances Interventions   Physical Therapy Goal     PT, PT/OT Ongoing, Progressing     Description: PT goals until 2/15/23    1. Pt supine to sit with minimal assist-  met 2/8/23  Revised goal: supine to sit with supervision-not met  2. Pt sit to supine with minimal assist -met 2/9/23  3. Pt sit to stand with LRAD as needed for safety with minimal assist- met 2/8/23  Revised goal: sit to stand with no AD with supervision-not met  4. Pt to perform gait 20ft with LRAD as needed for safety with moderate assist.- met 2/3/23  Revised goal: gait 100ft with LRAD as needed for safety with CGA- met 2/9/23  Revised goal: gait 200ft with no AD with supervision-not met  New goal: pt to be able to find 3/3 room numbers in the garcia while walking-not met  5. Pt to go up/down curb step with  LRAD as needed for safety with moderate assist.-not met  6. Pt to transfer bed to/from bedside chair with LRAD as needed for safety with minimal assist.- met 2/8/23  7. Pt to perform B LE exs in sitting or supine x 10 reps to strengthen B LE to improve functional mobility.-not met                        Time Tracking:     PT Received On: 02/09/23  PT Start Time: 1425     PT Stop Time: 1500  PT Total Time (min): 35 min     Billable Minutes: Gait Training 35    Treatment Type: Treatment  PT/PTA: PT     PTA Visit Number: 1     02/09/2023

## 2023-02-09 NOTE — NURSING
"Pt attempting to get out of bed again and pulled off telemetry monitor. Pt is confused with time/place thinks "she is in [facility]". Redirected pt. Pt +Agitated +Irritable. Placed call to Neurosurgery to inform received order to give 25 mg Seroquel PO once for nonredirectable agitation. Administered per orders see MAR. NADN. Safety maintained.   "

## 2023-02-09 NOTE — PLAN OF CARE
Problem: Physical Therapy  Goal: Physical Therapy Goal  Description: PT goals until 2/15/23    1. Pt supine to sit with minimal assist-  met 2/8/23  Revised goal: supine to sit with supervision-not met  2. Pt sit to supine with minimal assist -met 2/9/23  3. Pt sit to stand with LRAD as needed for safety with minimal assist- met 2/8/23  Revised goal: sit to stand with no AD with supervision-not met  4. Pt to perform gait 20ft with LRAD as needed for safety with moderate assist.- met 2/3/23  Revised goal: gait 100ft with LRAD as needed for safety with CGA- met 2/9/23  Revised goal: gait 200ft with no AD with supervision-not met  New goal: pt to be able to find 3/3 room numbers in the garcia while walking-not met  5. Pt to go up/down curb step with LRAD as needed for safety with moderate assist.-not met  6. Pt to transfer bed to/from bedside chair with LRAD as needed for safety with minimal assist.- met 2/8/23  7. Pt to perform B LE exs in sitting or supine x 10 reps to strengthen B LE to improve functional mobility.-not met   Outcome: Ongoing, Progressing   Pt's goals revised as appropriate and pt will continue to benefit from skilled PT services to work towards improved functional mobility including: bed mobility, transfers, and gait.   2/9/2023

## 2023-02-09 NOTE — NURSING TRANSFER
Nursing Transfer Note      2/9/2023     Reason patient is being transferred: Stepdown    Transfer To: NPU    Transfer via wheelchair    Transfer with cardiac monitoring    Transported by Aliza Torres, RN    Medicines sent: insulin aspart, insulin detemir    Any special needs or follow-up needed: None    Chart send with patient: Yes    Notified: spouse    Upon arrival to floor: cardiac monitor applied, patient oriented to room, call bell in reach, and bed in lowest position

## 2023-02-09 NOTE — SUBJECTIVE & OBJECTIVE
Past Medical History:   Diagnosis Date    Autoimmune hepatitis     managed by Dr. Russell on mercaptopurine    Diabetes mellitus type II     Hypertension      Past Surgical History:   Procedure Laterality Date    CEREBRAL ANGIOGRAM N/A 2023    Procedure: ANGIOGRAM-CEREBRAL;  Surgeon: Yue Surgeon;  Location: [facility];  Service: Anesthesiology;  Laterality: N/A;     SECTION, CLASSIC      COLONOSCOPY N/A 2019    Procedure: COLONOSCOPY;  Surgeon: Dipehs Victoria MD;  Location: [facility] ([address] [place] [address]);  Service: Endoscopy;  Laterality: N/A;    FRACTURE SURGERY Left 2013    leg     MOUTH SURGERY       Review of patient's allergies indicates:  No Known Allergies    Scheduled Medications:    heparin (porcine)  5,000 Units Subcutaneous Q8H    insulin aspart U-100  5 Units Subcutaneous QID (AC & HS)    insulin detemir U-100  16 Units Subcutaneous BID       PRN Medications: acetaminophen, albuterol-ipratropium, dextrose 10%, glucagon (human recombinant), glucose, glucose, hydrALAZINE, insulin aspart U-100, labetalol, melatonin, ondansetron    Family History       Problem Relation (Age of Onset)    Alcohol abuse Maternal Grandfather, Maternal Grandmother    Arthritis Mother    Asthma Father    Diabetes Maternal Grandfather, Paternal Grandfather    Heart attack Father (60)    Heart disease Father    Hyperlipidemia Father    Hypertension Father, Sister    Learning disabilities Daughter, Son    No Known Problems Son, Sister, Daughter    Ulcerative colitis Mother          Tobacco Use    Smoking status: Never    Smokeless tobacco: Never   Substance and Sexual Activity    Alcohol use: Yes     Alcohol/week: 2.0 standard drinks     Types: 2 Glasses of wine per week     Comment: social    Drug use: Never    Sexual activity: Yes     Partners: Male     Birth control/protection: Partner-Vasectomy     Review of Systems   Constitutional:  Positive for activity change. Negative for fatigue and fever.    Musculoskeletal:  Positive for gait problem.   Neurological:  Positive for weakness.   Psychiatric/Behavioral:  Positive for agitation and confusion.    Objective:     Vital Signs (Most Recent):  Temp: 98.1 °F (36.7 °C) (02/09/23 0735)  Pulse: 74 (02/09/23 1118)  Resp: 18 (02/09/23 0735)  BP: 126/63 (02/09/23 0735)  SpO2: 99 % (02/09/23 0735)    Vital Signs (24h Range):  Temp:  [97.8 °F (36.6 °C)-98.7 °F (37.1 °C)] 98.1 °F (36.7 °C)  Pulse:  [] 74  Resp:  [15-25] 18  SpO2:  [94 %-100 %] 99 %  BP: (125-145)/(60-94) 126/63     Body mass index is 22.38 kg/m².    Physical Exam  Vitals and nursing note reviewed.   HENT:      Nose: Nose normal.      Mouth/Throat:      Mouth: Mucous membranes are moist.   Eyes:      Extraocular Movements: Extraocular movements intact.      Pupils: Pupils are equal, round, and reactive to light.   Pulmonary:      Effort: Pulmonary effort is normal. No respiratory distress.   Musculoskeletal:         General: Normal range of motion.   Skin:     General: Skin is warm.   Neurological:      Mental Status: She is alert.      Motor: Weakness present.      Gait: Gait abnormal.      Comments: Following commands  Oriented to self    Psychiatric:      Comments: Camera sitter     NEUROLOGICAL EXAMINATION:     CRANIAL NERVES     CN III, IV, VI   Pupils are equal, round, and reactive to light.    Diagnostic Results: Labs: Reviewed  ECG: Reviewed  CT: Reviewed  MRI: Reviewed

## 2023-02-09 NOTE — ASSESSMENT & PLAN NOTE
- MRI shows Acute IVH, SAH w/ obstructive hydrocephalus w/o signs of ischemia and R A2 QUIQUE aneurysm.    - CTA shows diffuse subarachnoid hemorrhage with moderate hydrocephalus. No intracranial aneurysm or AVM is identified.   - EVD removed. CTH stable.

## 2023-02-09 NOTE — HOSPITAL COURSE
2/8/23: Participated w/ PT. 120ft x 2 trials with no AD with minimal assist. Pt with noted posterior lean at times, lateral instability, and c/o fatigue at end of gait trials. Pt required manual cues for directional cues   pt wore mask and was on portable monitor throughout gait trial. [facility] 18  2/9/23 SLP reg and thin. SLP diagnosis of Cognitive-Linguistic Impairment.

## 2023-02-09 NOTE — NURSING
Arrived to unit from [facility] via wheelchair. POC reviewed with pt and pt verbalized understanding. Questions/Concerns addressed. A&Ox2 (Name and ) Disoriented to time/place and situation. Requires reorientation @ times. Calm/cooperative. NADN. Respirations equal and unlabored. Bed in low position, side rails up x3.  Aspiration/Seizure/Safety/Fall precautions in place per facility protocol for safety. Instructed pt to press call bell for assistance if needed pt verbalized understanding. VS stable. Neuro assessment completed see assessment. Will continue to monitor closely throughout this [id number] nightshift Safety maintained. Call bell in reach. Bed alarm activated for safety.Continent of B/B. Pt is on bedrest encouraged/ weight shifted to turn Q 2 hours to prevent skin breakdown.   Ambulation:  Up with x 1 assistance due to unsteady gait     Oriented to room/unit/call bell pt verbalized understanding.       Pt placed on Telesitter for safety/fall prevention Camera MC26 verified with Brea (telesitter).  Telemetry monitor intact for monitoring.      Nurses Note -- 4 Eyes      2023   1:27 AM      Skin assessed during: Transfer  Generalized bruising noted to abdomen and right arm. Incision to right side of head ANDRES sutures intact.     [x] No Pressure Injuries Present    [x]Prevention Measures Documented      [] Yes- Altered Skin Integrity Present or Discovered   [] LDA Added if Not in Epic (Describe Wound)   [] New Altered Skin Integrity was Present on Admit and Documented in LDA   [] Wound Image Taken    Wound Care Consulted? No    Attending Nurse:  SELVIN HASSAN RN     Second RN/Staff Member:  Ivania LOVE      Stroke booklet present @ bedside pt educated and verbalized understanding of booklet/ s&s of stroke, modifiable risk factors, diet and when to seek medical attention. Refused SCDs.  Pt receiving Heparin scQ 8hrs.

## 2023-02-10 LAB
ALBUMIN SERPL BCP-MCNC: 3.2 G/DL (ref 3.5–5.2)
ALP SERPL-CCNC: 177 U/L (ref 55–135)
ALT SERPL W/O P-5'-P-CCNC: 19 U/L (ref 10–44)
ANION GAP SERPL CALC-SCNC: 9 MMOL/L (ref 8–16)
AST SERPL-CCNC: 32 U/L (ref 10–40)
BASOPHILS # BLD AUTO: 0.04 K/UL (ref 0–0.2)
BASOPHILS NFR BLD: 0.6 % (ref 0–1.9)
BILIRUB SERPL-MCNC: 0.3 MG/DL (ref 0.1–1)
BUN SERPL-MCNC: 16 MG/DL (ref 6–20)
CALCIUM SERPL-MCNC: 9.6 MG/DL (ref 8.7–10.5)
CHLORIDE SERPL-SCNC: 104 MMOL/L (ref 95–110)
CO2 SERPL-SCNC: 22 MMOL/L (ref 23–29)
CREAT SERPL-MCNC: 1 MG/DL (ref 0.5–1.4)
DIFFERENTIAL METHOD: ABNORMAL
EOSINOPHIL # BLD AUTO: 0.2 K/UL (ref 0–0.5)
EOSINOPHIL NFR BLD: 2.8 % (ref 0–8)
ERYTHROCYTE [DISTWIDTH] IN BLOOD BY AUTOMATED COUNT: 13.1 % (ref 11.5–14.5)
EST. GFR  (NO RACE VARIABLE): >60 ML/MIN/1.73 M^2
GLUCOSE SERPL-MCNC: 291 MG/DL (ref 70–110)
HCT VFR BLD AUTO: 36.5 % (ref 37–48.5)
HGB BLD-MCNC: 11.5 G/DL (ref 12–16)
IMM GRANULOCYTES # BLD AUTO: 0.04 K/UL (ref 0–0.04)
IMM GRANULOCYTES NFR BLD AUTO: 0.6 % (ref 0–0.5)
LYMPHOCYTES # BLD AUTO: 1.7 K/UL (ref 1–4.8)
LYMPHOCYTES NFR BLD: 26.1 % (ref 18–48)
MAGNESIUM SERPL-MCNC: 1.8 MG/DL (ref 1.6–2.6)
MCH RBC QN AUTO: 30.6 PG (ref 27–31)
MCHC RBC AUTO-ENTMCNC: 31.5 G/DL (ref 32–36)
MCV RBC AUTO: 97 FL (ref 82–98)
MONOCYTES # BLD AUTO: 0.5 K/UL (ref 0.3–1)
MONOCYTES NFR BLD: 8.2 % (ref 4–15)
NEUTROPHILS # BLD AUTO: 3.9 K/UL (ref 1.8–7.7)
NEUTROPHILS NFR BLD: 61.7 % (ref 38–73)
NRBC BLD-RTO: 0 /100 WBC
PHOSPHATE SERPL-MCNC: 3.1 MG/DL (ref 2.7–4.5)
PLATELET # BLD AUTO: 376 K/UL (ref 150–450)
PMV BLD AUTO: 9.3 FL (ref 9.2–12.9)
POCT GLUCOSE: 149 MG/DL (ref 70–110)
POCT GLUCOSE: 277 MG/DL (ref 70–110)
POCT GLUCOSE: 285 MG/DL (ref 70–110)
POCT GLUCOSE: 302 MG/DL (ref 70–110)
POCT GLUCOSE: 365 MG/DL (ref 70–110)
POCT GLUCOSE: 64 MG/DL (ref 70–110)
POTASSIUM SERPL-SCNC: 4.5 MMOL/L (ref 3.5–5.1)
PROT SERPL-MCNC: 6.9 G/DL (ref 6–8.4)
RBC # BLD AUTO: 3.76 M/UL (ref 4–5.4)
SODIUM SERPL-SCNC: 135 MMOL/L (ref 136–145)
WBC # BLD AUTO: 6.36 K/UL (ref 3.9–12.7)

## 2023-02-10 PROCEDURE — 25000003 PHARM REV CODE 250

## 2023-02-10 PROCEDURE — 11000001 HC ACUTE MED/SURG PRIVATE ROOM

## 2023-02-10 PROCEDURE — 20600001 HC STEP DOWN PRIVATE ROOM

## 2023-02-10 PROCEDURE — 25000003 PHARM REV CODE 250: Performed by: PHYSICIAN ASSISTANT

## 2023-02-10 PROCEDURE — 84100 ASSAY OF PHOSPHORUS: CPT

## 2023-02-10 PROCEDURE — 99232 PR SUBSEQUENT HOSPITAL CARE,LEVL II: ICD-10-PCS | Mod: ,,, | Performed by: PHYSICIAN ASSISTANT

## 2023-02-10 PROCEDURE — 97535 SELF CARE MNGMENT TRAINING: CPT

## 2023-02-10 PROCEDURE — 83735 ASSAY OF MAGNESIUM: CPT

## 2023-02-10 PROCEDURE — 92507 TX SP LANG VOICE COMM INDIV: CPT

## 2023-02-10 PROCEDURE — 85025 COMPLETE CBC W/AUTO DIFF WBC: CPT

## 2023-02-10 PROCEDURE — 80053 COMPREHEN METABOLIC PANEL: CPT

## 2023-02-10 PROCEDURE — 63600175 PHARM REV CODE 636 W HCPCS

## 2023-02-10 PROCEDURE — 99232 SBSQ HOSP IP/OBS MODERATE 35: CPT | Mod: ,,, | Performed by: NURSE PRACTITIONER

## 2023-02-10 PROCEDURE — 36415 COLL VENOUS BLD VENIPUNCTURE: CPT

## 2023-02-10 PROCEDURE — 99232 PR SUBSEQUENT HOSPITAL CARE,LEVL II: ICD-10-PCS | Mod: ,,, | Performed by: NURSE PRACTITIONER

## 2023-02-10 PROCEDURE — 97129 THER IVNTJ 1ST 15 MIN: CPT

## 2023-02-10 PROCEDURE — 99232 SBSQ HOSP IP/OBS MODERATE 35: CPT | Mod: ,,, | Performed by: PHYSICIAN ASSISTANT

## 2023-02-10 RX ORDER — INSULIN ASPART 100 [IU]/ML
7 INJECTION, SOLUTION INTRAVENOUS; SUBCUTANEOUS
Refills: 0
Start: 2023-02-10 | End: 2023-03-20

## 2023-02-10 RX ORDER — ACETAMINOPHEN 650 MG/20.3ML
650 LIQUID ORAL EVERY 6 HOURS PRN
Start: 2023-02-10 | End: 2023-11-14

## 2023-02-10 RX ORDER — INSULIN ASPART 100 [IU]/ML
7 INJECTION, SOLUTION INTRAVENOUS; SUBCUTANEOUS
Status: DISCONTINUED | OUTPATIENT
Start: 2023-02-10 | End: 2023-02-15 | Stop reason: HOSPADM

## 2023-02-10 RX ORDER — QUETIAPINE FUMARATE 25 MG/1
25 TABLET, FILM COATED ORAL 2 TIMES DAILY PRN
Start: 2023-02-10 | End: 2023-02-15 | Stop reason: HOSPADM

## 2023-02-10 RX ORDER — INSULIN ASPART 100 [IU]/ML
0-15 INJECTION, SOLUTION INTRAVENOUS; SUBCUTANEOUS
Refills: 0
Start: 2023-02-10 | End: 2023-03-20

## 2023-02-10 RX ORDER — TALC
6 POWDER (GRAM) TOPICAL NIGHTLY PRN
Refills: 0
Start: 2023-02-10 | End: 2023-11-14

## 2023-02-10 RX ADMIN — INSULIN ASPART 7 UNITS: 100 INJECTION, SOLUTION INTRAVENOUS; SUBCUTANEOUS at 11:02

## 2023-02-10 RX ADMIN — INSULIN DETEMIR 16 UNITS: 100 INJECTION, SOLUTION SUBCUTANEOUS at 07:02

## 2023-02-10 RX ADMIN — HEPARIN SODIUM 5000 UNITS: 5000 INJECTION INTRAVENOUS; SUBCUTANEOUS at 10:02

## 2023-02-10 RX ADMIN — Medication 6 MG: at 10:02

## 2023-02-10 RX ADMIN — INSULIN ASPART 7 UNITS: 100 INJECTION, SOLUTION INTRAVENOUS; SUBCUTANEOUS at 05:02

## 2023-02-10 RX ADMIN — INSULIN ASPART 15 UNITS: 100 INJECTION, SOLUTION INTRAVENOUS; SUBCUTANEOUS at 11:02

## 2023-02-10 RX ADMIN — ACETAMINOPHEN 650 MG: 650 SOLUTION ORAL at 03:02

## 2023-02-10 RX ADMIN — INSULIN DETEMIR 16 UNITS: 100 INJECTION, SOLUTION SUBCUTANEOUS at 10:02

## 2023-02-10 RX ADMIN — HEPARIN SODIUM 5000 UNITS: 5000 INJECTION INTRAVENOUS; SUBCUTANEOUS at 06:02

## 2023-02-10 RX ADMIN — INSULIN ASPART 5 UNITS: 100 INJECTION, SOLUTION INTRAVENOUS; SUBCUTANEOUS at 06:02

## 2023-02-10 RX ADMIN — HEPARIN SODIUM 5000 UNITS: 5000 INJECTION INTRAVENOUS; SUBCUTANEOUS at 01:02

## 2023-02-10 RX ADMIN — INSULIN ASPART 6 UNITS: 100 INJECTION, SOLUTION INTRAVENOUS; SUBCUTANEOUS at 10:02

## 2023-02-10 RX ADMIN — QUETIAPINE FUMARATE 25 MG: 25 TABLET ORAL at 10:02

## 2023-02-10 RX ADMIN — INSULIN ASPART 7 UNITS: 100 INJECTION, SOLUTION INTRAVENOUS; SUBCUTANEOUS at 10:02

## 2023-02-10 NOTE — PT/OT/SLP PROGRESS
"Speech Language Pathology Treatment    Patient Name:  Leida Hoyos   MRN:  [id number]  Admitting Diagnosis: Nontraumatic subarachnoid hemorrhage    Recommendations:                 General Recommendations:  Cognitive-linguistic therapy  Diet recommendations:  Regular, Liquid Diet Level: Thin   Aspiration Precautions: Standard aspiration precautions   General Precautions: Standard, fall  Communication strategies:  none    Subjective     "I am doing okay"  per pt.  Patient goals: home     Pain/Comfort:  Pain Rating 1: 0/10  Pain Rating Post-Intervention 1: 0/10    Respiratory Status: Room air    Objective:     Has the patient been evaluated by SLP for swallowing?   Yes  Keep patient NPO? No   Current Respiratory Status:        Pt. Seen at bedside with spouse present.  Ongoing education provided to spue and pt. Re cognitive activities to perform outside of therapy.  Ms. Hoyos was oriented to year only with delays in responding noted.  She verbalized knowledge of confusion this am when on phone with her son.  Pt. Responded to category exclusion tasks in field of 4 with 80% accuracy with delays in responding noted.  She responded to mental manipulation tasks given 3 items with 90% accuracy with mod cues/repetition and delays in responding noted.  Pt. Responded to functional math and time calculations with 90% accuracy with min cues and word  deduction tasks with 90% accuracy given mod cues.        Assessment:     Leida Hoyos is a 52 y.o. female with an SLP diagnosis of Cognitive-Linguistic Impairment.   Goals:   Multidisciplinary Problems       SLP Goals          Problem: SLP    Goal Priority Disciplines Outcome   SLP Goal     SLP Ongoing, Progressing   Description: Goals due 2/10  1.  Pt. Will participate in ongoing assessment of swallow at bedside  2.  Tolerate trials of regular diet with thin liquids with no s/s of aspiration  3.  Assess functional reading and writing skills  4.  Sylvester to time and " place  5.  Respond to word finding/categorization tasks with 80% accuracy  6.  Respond to verbal problem solving tasks with 75% accuracy                       Plan:     Patient to be seen:  4 x/week   Plan of Care expires:  02/27/23  Plan of Care reviewed with:  patient, spouse   SLP Follow-Up:  Yes       Discharge recommendations:  rehabilitation facility   Barriers to Discharge:  None    Time Tracking:     SLP Treatment Date:   02/10/23  Speech Start Time:  0915  Speech Stop Time:  0935     Speech Total Time (min):  20 min    Billable Minutes: Speech Therapy Individual 12 and Self Care/Home Management Training 8    02/10/2023

## 2023-02-10 NOTE — PLAN OF CARE
[facility]    FACILITY TRANSFER ORDERS      Patient Name: Leida Hoyos  YOB: 1970    PCP: Frieda Mera MD   PCP Address: [address] [place] [address] [address] / [place]  PCP Phone Number: [phone]  PCP Fax: [phone]    Encounter Date: 02/14/2023    Admit to: Inpatient rehab    Vital Signs:  Routine    Diagnoses:   Active Hospital Problems    Diagnosis  POA    *Nontraumatic subarachnoid hemorrhage [I60.9]  Yes    Debility [R53.81]  Yes    Vasogenic cerebral edema [G93.6]  Yes    IVH (intraventricular hemorrhage) [I61.5]  Yes    Hypertension [I10]  Yes    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), managed as type 1 [E13.9]  Yes    Autoimmune hepatitis [K75.4]  Yes      Resolved Hospital Problems    Diagnosis Date Resolved POA    Endotracheally intubated [Z97.8] 02/03/2023 Yes       Allergies:Review of patient's allergies indicates:  No Known Allergies    Diet: diabetic diet: 2000 calorie    Activities: Activity as tolerated, Bathroom privileges with assistance, and Up with assistance    Goals of Care Treatment Preferences:  Code Status: Full Code      Nursing: Delirium precautions, standard fall and aspiration precautions     Labs: CMP daily for 3 days, then CBC/CMP per facility provider    CONSULTS:    Physical Therapy to evaluate and treat. , Occupational Therapy to evaluate and treat., and Speech Therapy to evaluate and treat for Language and Cognition.    MISCELLANEOUS CARE:  Diabetes Care:   SN to perform and educate Diabetic management with blood glucose monitoring:, Fingerstick blood sugar AC and HS, and Report CBG < 60 or > 350 to physician.    Current Insulin Regimen:  -Detemir 16 units BID  -Aspart 7 units TID w/ meals  -SSI:    **HIGH CORRECTION DOSE**   Blood Glucose   mg/dL                  Pre-meal                2200   151-200                3 units                    2 units   201-250                6 units                    4 units   251-300              "   9 units                    6 units   301-350                12 units                  8 units   >350                    15 units                  10 units   Administer subcutaneously if needed at times designated by monitoring schedule.    DO NOT HOLD correction dose insulin for patients who are  NPO.   "HIGH ALERT MEDICATION" - Administer with meals or TF/TPN.         WOUND CARE ORDERS  None    Medications: Review discharge medications with patient and family and provide education.      Current Discharge Medication List        START taking these medications    Details   acetaminophen (TYLENOL) 650 mg/20.3 mL Soln Take 20.3 mLs (650 mg total) by mouth every 6 (six) hours as needed for Pain (headaches).      !! insulin aspart U-100 (NOVOLOG) 100 unit/mL (3 mL) InPn pen Inject 0-15 Units into the skin before meals and at bedtime as needed (hyperglycemia).  Refills: 0      !! insulin aspart U-100 (NOVOLOG) 100 unit/mL (3 mL) InPn pen Inject 7 Units into the skin 4 (four) times daily before meals and nightly.  Refills: 0      insulin detemir U-100 (LEVEMIR FLEXTOUCH) 100 unit/mL (3 mL) SubQ InPn pen Inject 16 Units into the skin 2 (two) times daily.  Refills: 0      melatonin (MELATIN) 3 mg tablet Take 2 tablets (6 mg total) by mouth nightly as needed for Insomnia.  Refills: 0      QUEtiapine (SEROQUEL) 25 MG Tab Take 1 tablet (25 mg total) by mouth 2 (two) times daily as needed (Agitation).       !! - Potential duplicate medications found. Please discuss with provider.        CONTINUE these medications which have NOT CHANGED    Details   blood sugar diagnostic Strp For one touch verio IQ meter  Qty: 100 each, Refills: 3      blood-glucose meter (ONETOUCH VERIO IQ METER MISC) OneTouch Verio IQ Meter      blood-glucose sensor (DEXCOM G6 SENSOR) Melissa Monitor blood sugars daily  Qty: 9 each, Refills: 3    Associated Diagnoses: 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), managed as type 1      blood-glucose transmitter " "(DEXCOM G6 TRANSMITTER) Melissa USE AS DIRECTED for daily use TO TEST BLOOD GLUCOSE.  Qty: 1 each, Refills: 4    Comments: No refills available  Associated Diagnoses: 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), managed as type 1      cholecalciferol, vitamin D3, (VITAMIN D3) 1,000 unit capsule Take 1 capsule (1,000 Units total) by mouth once daily.  Refills: 0    Associated Diagnoses: Vitamin D insufficiency      insulin (LANTUS SOLOSTAR U-100 INSULIN) glargine 100 units/mL (3mL) SubQ pen INJECT 13 UNITS UNDER THE SKIN EVERY EVENING  Qty: 45 mL, Refills: 3    Associated Diagnoses: 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), managed as type 1; Hypoglycemia associated with diabetes      insulin lispro (HUMALOG KWIKPEN INSULIN) 100 unit/mL pen Takes 5 units/7 units and 9 units before meals, plus correction for a TDD 25 units  Qty: 45 mL, Refills: 3    Associated Diagnoses: 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), managed as type 1      multivitamin (THERAGRAN) per tablet Take 1 tablet by mouth once daily.      ONETOUCH DELICA PLUS LANCET 33 gauge Misc USE AS DIRECTED TO TEST BLOOD GLUCOSE.  Qty: 300 each, Refills: 8      pen needle, diabetic (BD ULTRA-FINE NICK PEN NEEDLE) 32 gauge x 5/32" Ndle USE FOUR TIMES A DAY FOR INSULIN INJECTIONS  Qty: 360 each, Refills: 4    Associated Diagnoses: 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), managed as type 1      pravastatin (PRAVACHOL) 10 MG tablet Take 1 tablet (10 mg total) by mouth once daily.  Qty: 90 tablet, Refills: 3    Associated Diagnoses: Hyperlipidemia due to type 1 diabetes mellitus           STOP taking these medications       aspirin (ECOTRIN) 81 MG EC tablet Comments:   Reason for Stopping:         losartan (COZAAR) 25 MG tablet Comments:   Reason for Stopping:                  Immunizations Administered as of 2/13/2023       Name Date Dose VIS Date Route Exp Date    COVID-19, MRNA, LN-S, PF (Pfizer) (Purple Cap) 10/26/2021 0.3 mL -- Intramuscular --    " Site: Left arm     : [employer]     Lot: [id number]     COVID-19, MRNA, LN-S, PF (Pfizer) (Purple Cap) 4/2/2021 10:08 AM 0.3 mL 12/12/2020 Intramuscular 7/31/2021    Site: Left deltoid     Given By: Charli Fry     : [employer]     Lot: [id number]     COVID-19, MRNA, LN-S, PF (Pfizer) (Purple Cap) 3/12/2021  9:37 AM 0.3 mL 12/12/2020 Intramuscular 3/12/2021    Site: Left deltoid     Given By: Dwight Walker     : Pf[employer]     Lot: [id number]             This patient has had both covid vaccinations    Some patients may experience side effects after vaccination.  These may include fever, headache, muscle or joint aches.  Most symptoms resolve with 24-48 hours and do not require urgent medical evaluation unless they persist for more than 72 hours or symptoms are concerning for an unrelated medical condition.          _________________________________  Nighat Siegel PA-C  02/13/2023

## 2023-02-10 NOTE — ASSESSMENT & PLAN NOTE
Nontraumatic subarachnoid hemorrhage  51 yo F w/ PMH DM1, HTN, and autoimmune hepatitis who presented to the ED with AMS after HA and vomiting the night before. Patient was non-verbal, B/l fixed pupils, and only intermittently following commands in B/L UE. GCS 8. MRI showed Acute IVH, SAH w/ obstructive hydrocephalus w/o signs of ischemia, and questionable R A2 QUIQUE aneurysm. VN consulted. Patient intubated in ED after concern for airway protection. NSGY placed EVD. CTA showed diffuse subarachnoid hemorrhage with moderate hydrocephalus. IR consulted for angiogram. No intracranial aneurysm or AVM identified. NIHSS 9 on admission.      - IR angiogram on 1/27 shows no aneurysm, AVM, or AV fistula.   - CTA 1/27 shows diffuse subarachnoid hemorrhage with moderate hydrocephalus. No intracranial aneurysm or AVM is identified.  - Repeat DSA on PBD 7 without aneurysm, AVM or AV fistula.   - EVD removed 2/7, post-pull CTH on 2/8 stable  - Stepped down to floor under NSGY service on 2/8     - Q4H neuro checks and vitals  - Last TCDs done 2/6 negative for vasospasm, discontinued  - Echo 1/28 reviewed: normal EF, normal systolic/diastolic function  - Daily labs, replete electrolytes PRN  - SBP goal <160; PRN labetalol, hydralazine   - SQ heparin  - PT/OT/SLP     IVH (intraventricular hemorrhage)  See SAH  EVD care     Vasogenic cerebral edema  See SAH     Cardiac/Vascular  Hypertension  SBP 160s on arrival.   states patient does not take home Losartan regularly.      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 BG on arrival 284  Home regimen 13u Lantus; Lispro AC  A1C 6.5            -Transitioned from Insulin gtt to Detemir 16 BID  -Aspart increased to 7u TIDWM  -Hypoglycemic protocol in place  -Accuchecks AC/HS  -Will need follow up with established Endocrinologist after  discharge     Autoimmune hepatitis  Hx of AIH. Previously on mercaptopurine. Not currently taking.   LFTs slightly elevated on arrival.     -Will continue to monitor  -Holding Tylenol and hepatotoxic agents  -CMP daily  -If LFTs worsen will obtain RUQ US.      Debility  PT/OT - recs for IPR    Encephalopathy, Agitation  Likely 2/2 cerebral hemorrhage, extended hospitalization/ICU stay    -Delirium precautions, frequent re-orientation  -Seroquel PRN for non-redirectable agitation  -Telesitter discontinued 2/9    Dispo: IPR, pending placement, medically stable to discharge pending placement

## 2023-02-10 NOTE — DISCHARGE INSTRUCTIONS
Neurosurgery Patient Information  -Return to work will be determined on an individual basis.  -No driving until released by your medical provider or while taking narcotics  -Do not take any Aspirin, Aspirin-containing products, or other blood thinners until cleared by your neurosurgeon.  -Do not take any Aleve, Naprosyn, Naproxen, Ibuprofen, Advil or any other nonsteroidal anti-inflammatory drug (NSAID) for 2 weeks after surgery.  -Do not take any herbal supplements for 2 weeks after surgery.   -Do not consume any alcoholic beverages until released by your neurosurgeon  -Do not perform any excessive bending over or leaning forward as this is a fall hazard.  -Do not perform any heavy lifting or lifting more than 5-10 lbs from the ground level as this is a fall hazard.  -Slowly increase your walking over the next 2 weeks as tolerated.   -Walk on paved surfaces only. It is okay to walk up and down stairs while holding onto a side rail.      Contact the Neurosurgery Office immediately if:  If you begin to notice any neurologic changes such as:           -Sudden onset of lethargy or sleepiness           -Sudden confusion, trouble speaking, or understanding            -Sudden trouble seeing in one or both eyes            -Sudden trouble walking, dizziness, loss of coordination            -Sudden severe headache with no known cause            -Sudden onset of numbness or weakness           -Pulsating at the groin/wrist puncture site, bleeding from the groin/wrist site, swelling at the groin/wrist site and/or arm/leg     Wound Care:    You have dissolvable sutures in place to scalp incision. These do not need to be removed.  You may shower. Wash your hair with unscented/baby shampoo. Do not rub or scrub the incision. If the incision gets wet, pat it dry. Do not take a bath/swim/submerge the incision until 8 weeks after surgery.    Call your doctor or go to the Emergency Room for any signs of infection including: increased  redness, drainage, pain or fever (temperature greater than or equal to 101.4).       Miscellaneous:   -Follow up with in Neurosurgery clinic scheduled for 3/28/23. Appointment will be mailed to you.    Please call our office with any questions or concerns.    [facility] Neurosurgery Office: [phone]

## 2023-02-10 NOTE — PROGRESS NOTES
[facility] - Neurosurgery ([facility])  Neurosurgery  Progress Note    Subjective:     History of Present Illness: 53 yo F with PMH of DM1, autoimmune hepatitis, and HTN who presents to ED this am after confusion this am. She was LKN last night and became confused after showering this am so  brought to the ED. She quickly decompensated in the ER and was not following commands or waking to stimulation. MRI and CTA showed diffuse thick SAH and hydrocephalus. No identifiable aneurysm seen on CTA.        Post-Op Info:  Procedure(s) (LRB):  ANGIOGRAM-CEREBRAL (N/A)   14 Days Post-Op     Interval History: PBD 14. NAEON. AFVSS. Pt's mentation continues to improve, alert & oriented x 3 this am, sitting up in chair. No issues overnight or since telesitter discontinued yesterday. Requesting to take a shower. Medically stable for rehab pending placement.    Medications:  Continuous Infusions:  Scheduled Meds:   heparin (porcine)  5,000 Units Subcutaneous Q8H    insulin aspart U-100  7 Units Subcutaneous QID (AC & HS)    insulin detemir U-100  16 Units Subcutaneous BID     PRN Meds:acetaminophen, albuterol-ipratropium, dextrose 10%, glucagon (human recombinant), glucose, glucose, hydrALAZINE, insulin aspart U-100, labetalol, melatonin, ondansetron, QUEtiapine     Review of Systems   Constitutional:  Negative for chills and fever.   Eyes:  Negative for photophobia and visual disturbance.   Gastrointestinal:  Negative for nausea and vomiting.   Genitourinary:  Negative for difficulty urinating and dysuria.   Neurological:  Positive for headaches. Negative for speech difficulty, weakness and numbness.   Psychiatric/Behavioral:  Positive for confusion. Negative for agitation.    Objective:     Weight: 55.5 kg (122 lb 5.7 oz)  Body mass index is 22.38 kg/m².  Vital Signs (Most Recent):  Temp: 98.1 °F (36.7 °C) (02/10/23 0823)  Pulse: 81 (02/10/23 0823)  Resp: 18 (02/10/23 0823)  BP: 131/62 (02/10/23 0823)  SpO2: 95 % (02/10/23  0823) Vital Signs (24h Range):  Temp:  [97.2 °F (36.2 °C)-99.5 °F (37.5 °C)] 98.1 °F (36.7 °C)  Pulse:  [] 81  Resp:  [16-20] 18  SpO2:  [95 %-99 %] 95 %  BP: (106-131)/(54-69) 131/62                          Physical Exam    Neurosurgery Physical Exam    General: well developed, well nourished, no distress.   Head: normocephalic, atraumatic.               EVD site C/D/I with monocryl suture  GCS: E4 V5 M6; Total: 15  Mental Status: Awake, Alert, Oriented x 3   Language: Mildly delayed response time, no dain aphasia  Speech: No dysarthria  Cranial nerves: face symmetric, tongue midline, CN II-XII grossly intact.   Eyes: pupils equal, round, reactive to light with accomodation, EOMI.   Pulmonary: normal respirations, no signs of respiratory distress  Abdomen: soft, non-distended, not tender to palpation  Skin: Skin is warm, dry and intact.     Sensory: intact to light touch throughout  Motor Strength: Moves all extremities spontaneously with good tone. Grossly full strength upper and lower extremities. No abnormal movements seen.      Finger-to-nose: intact bilaterally   Pronator drift: absent bilaterally    Significant Labs:  Recent Labs   Lab 02/09/23  0803 02/10/23  0402   * 291*    135*   K 4.5 4.5    104   CO2 21* 22*   BUN 8 16   CREATININE 0.8 1.0   CALCIUM 10.3 9.6   MG 2.0 1.8     Recent Labs   Lab 02/09/23  0803 02/10/23  0402   WBC 7.35 6.36   HGB 13.0 11.5*   HCT 41.8 36.5*    376     No results for input(s): LABPT, INR, APTT in the last 48 hours.  Microbiology Results (last 7 days)       Procedure Component Value Units Date/Time    CSF culture [[id number]] Collected: 02/06/23 0802    Order Status: Completed Specimen: CSF (Spinal Fluid) from CSF Shunt Updated: 02/10/23 0511     CSF CULTURE No Growth to date     Gram Stain Result Cytospin indicates:      Rare WBC's      No organisms seen    CSF culture [[id number]] Collected: 02/02/23 1013    Order Status: Completed Specimen:  CSF (Spinal Fluid) from CSF Tap, Tube 3 Updated: 02/07/23 0623     CSF CULTURE No Growth     Gram Stain Result No WBC's      No organisms seen    CSF culture [[id number]] Collected: 01/30/23 1449    Order Status: Completed Specimen: CSF (Spinal Fluid) from CSF Shunt Updated: 02/04/23 0742     CSF CULTURE No Growth     Gram Stain Result Rare WBC's      No organisms seen          All pertinent labs from the last 24 hours have been reviewed.    Significant Diagnostics:  I have reviewed and interpreted all pertinent imaging results/findings within the past 24 hours.    Assessment/Plan:     * Nontraumatic subarachnoid hemorrhage    Nontraumatic subarachnoid hemorrhage  51 yo F w/ PMH DM1, HTN, and autoimmune hepatitis who presented to the ED with AMS after HA and vomiting the night before. Patient was non-verbal, B/l fixed pupils, and only intermittently following commands in B/L UE. GCS 8. MRI showed Acute IVH, SAH w/ obstructive hydrocephalus w/o signs of ischemia, and questionable R A2 QUIQUE aneurysm. VN consulted. Patient intubated in ED after concern for airway protection. NSGY placed EVD. CTA showed diffuse subarachnoid hemorrhage with moderate hydrocephalus. IR consulted for angiogram. No intracranial aneurysm or AVM identified. NIHSS 9 on admission.      - IR angiogram on 1/27 shows no aneurysm, AVM, or AV fistula.   - CTA 1/27 shows diffuse subarachnoid hemorrhage with moderate hydrocephalus. No intracranial aneurysm or AVM is identified.  - Repeat DSA on PBD 7 without aneurysm, AVM or AV fistula.   - EVD removed 2/7, post-pull CTH on 2/8 stable  - Stepped down to floor under NSGY service on 2/8     - Q4H neuro checks and vitals  - Last TCDs done 2/6 negative for vasospasm, discontinued  - Echo 1/28 reviewed: normal EF, normal systolic/diastolic function  - Daily labs, replete electrolytes PRN  - SBP goal <160; PRN labetalol, hydralazine   - SQ heparin  - PT/OT/SLP     IVH (intraventricular hemorrhage)  See  SAH  EVD care     Vasogenic cerebral edema  See SAH     Cardiac/Vascular  Hypertension  SBP 160s on arrival.   states patient does not take home Losartan regularly.      -Goal SBP <160; currently at goal without scheduled meds  -PRN Labetalol and Hydralazine     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), managed as type 1  Diagnosed in 2013. BG on arrival 284  Home regimen 13u Lantus; Lispro AC  A1C 6.5            -Transitioned from Insulin gtt to Detemir 16 BID  -Aspart increased to 7u TIDWM  -Hypoglycemic protocol in place  -Accuchecks AC/HS  -Will need follow up with established Endocrinologist after discharge     Autoimmune hepatitis  Hx of AIH. Previously on mercaptopurine. Not currently taking.   LFTs slightly elevated on arrival.     -Will continue to monitor  -Holding Tylenol and hepatotoxic agents  -CMP daily  -If LFTs worsen will obtain RUQ US.      Debility  PT/OT - recs for IPR    Encephalopathy, Agitation  Likely 2/2 cerebral hemorrhage, extended hospitalization/ICU stay    -Delirium precautions, frequent re-orientation  -Seroquel PRN for non-redirectable agitation  -Telesitter discontinued 2/9    Dispo: IPR, pending placement             Nighat Siegel PA-C  Neurosurgery  [facility] - Neurosurgery ([facility])

## 2023-02-10 NOTE — SUBJECTIVE & OBJECTIVE
Interval History 2/10/2023:  Patient is seen for follow-up PM&R evaluation and recommendations:Participating with therapy.     HPI, Past Medical, Family, and Social History remains the same as documented in the initial encounter.    Scheduled Medications:    heparin (porcine)  5,000 Units Subcutaneous Q8H    insulin aspart U-100  7 Units Subcutaneous QID (AC & HS)    insulin detemir U-100  16 Units Subcutaneous BID       Diagnostic Results: Labs: Reviewed  ECG: Reviewed  CT: Reviewed  MRI: Reviewed    PRN Medications: acetaminophen, albuterol-ipratropium, dextrose 10%, glucagon (human recombinant), glucose, glucose, hydrALAZINE, insulin aspart U-100, labetalol, melatonin, ondansetron, QUEtiapine    Review of Systems   Constitutional:  Positive for activity change. Negative for fatigue and fever.   Musculoskeletal:  Positive for gait problem.   Neurological:  Positive for weakness.   Psychiatric/Behavioral:  Positive for agitation and confusion.    Objective:     Vital Signs (Most Recent):  Temp: 98.1 °F (36.7 °C) (02/10/23 1218)  Pulse: 83 (02/10/23 1218)  Resp: 16 (02/10/23 1218)  BP: (!) 118/58 (02/10/23 1218)  SpO2: 96 % (02/10/23 1218)      Vital Signs (24h Range):  Temp:  [97.2 °F (36.2 °C)-99.5 °F (37.5 °C)] 98.1 °F (36.7 °C)  Pulse:  [] 83  Resp:  [16-20] 16  SpO2:  [95 %-98 %] 96 %  BP: (106-131)/(54-62) 118/58     Physical Exam  Vitals and nursing note reviewed.   HENT:      Nose: Nose normal.      Mouth/Throat:      Mouth: Mucous membranes are moist.   Eyes:      Extraocular Movements: Extraocular movements intact.      Pupils: Pupils are equal, round, and reactive to light.   Pulmonary:      Effort: Pulmonary effort is normal. No respiratory distress.   Musculoskeletal:         General: Normal range of motion.   Skin:     General: Skin is warm.   Neurological:      Mental Status: She is alert.      Motor: Weakness present.      Gait: Gait abnormal.      Comments: Following commands  Oriented to self     Psychiatric:      Comments: Camera sitter     NEUROLOGICAL EXAMINATION:     CRANIAL NERVES     CN III, IV, VI   Pupils are equal, round, and reactive to light.

## 2023-02-10 NOTE — ASSESSMENT & PLAN NOTE
Nontraumatic subarachnoid hemorrhage  53 yo F w/ PMH DM1, HTN, and autoimmune hepatitis who presented to the ED with AMS after HA and vomiting the night before. Patient was non-verbal, B/l fixed pupils, and only intermittently following commands in B/L UE. GCS 8. MRI showed Acute IVH, SAH w/ obstructive hydrocephalus w/o signs of ischemia, and questionable R A2 QUIQUE aneurysm. VN consulted. Patient intubated in ED after concern for airway protection. NSGY placed EVD. CTA showed diffuse subarachnoid hemorrhage with moderate hydrocephalus. IR consulted for angiogram. No intracranial aneurysm or AVM identified. NIHSS 9 on admission.      - IR angiogram on 1/27 shows no aneurysm, AVM, or AV fistula.   - CTA 1/27 shows diffuse subarachnoid hemorrhage with moderate hydrocephalus. No intracranial aneurysm or AVM is identified.  - Repeat DSA on PBD 7 without aneurysm, AVM or AV fistula.   - EVD removed 2/7, post-pull CTH on 2/8 stable  - Stepped down to floor under NSGY service on 2/8     - Q4H neuro checks and vitals  - Last TCDs done 2/6 negative for vasospasm, discontinued  - Echo 1/28 reviewed: normal EF, normal systolic/diastolic function  - Daily labs, replete electrolytes PRN  - SBP goal <160; PRN labetalol, hydralazine   - SQ heparin  - PT/OT/SLP     IVH (intraventricular hemorrhage)  See SAH  EVD care     Vasogenic cerebral edema  See SAH     Cardiac/Vascular  Hypertension  SBP 160s on arrival.   states patient does not take home Losartan regularly.      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 BG on arrival 284  Home regimen 13u Lantus; Lispro AC  A1C 6.5            -Transitioned from Insulin gtt to Detemir 16 BID  -Aspart 6 TIDWM  -Hypoglycemic protocol in place  -Accuchecks AC/HS  -Will need follow up with PCP after discharge     Autoimmune hepatitis  Hx of AIH. Previously  on mercaptopurine. Not currently taking.   LFTs slightly elevated on arrival.     -Will continue to monitor  -Holding Tylenol and hepatotoxic agents  -CMP daily  -If LFTs worsen will obtain RUQ US.      Debility  PT/OT - recs for IPR    Encephalopathy, Agitation  Likely 2/2 cerebral hemorrhage, extended hospitalization/ICU stay    -Delirium precautions, frequent re-orientation  -Seroquel PRN for non-redirectable agitation  -Telesitter discontinued 2/9    Dispo: IPR, pending placement

## 2023-02-10 NOTE — PT/OT/SLP PROGRESS
"Occupational Therapy   Treatment    Name: Leida Hoyos  MRN: [id number]  Admitting Diagnosis:  Nontraumatic subarachnoid hemorrhage  14 Days Post-Op    Recommendations:     Discharge Recommendations: rehabilitation facility  Discharge Equipment Recommendations:  none  Barriers to discharge:  None    Assessment:     Leida Hoyos is a 52 y.o. female with a medical diagnosis of Nontraumatic subarachnoid hemorrhage.  She presents with performance deficits affecting function are weakness, impaired self care skills, impaired functional mobility, impaired balance, impaired cognition.     Rehab Prognosis:  Good; patient would benefit from acute skilled OT services to address these deficits and reach maximum level of function.       Plan:     Patient to be seen 3 x/week to address the above listed problems via cognitive retraining, neuromuscular re-education, therapeutic exercises, therapeutic activities, self-care/home management  Plan of Care Expires: 02/25/23  Plan of Care Reviewed with: patient    Subjective   Patient: "I'm in a hospital in [place]."    Pain/Comfort:  Pain Rating 1: 0/10  Pain Rating Post-Intervention 1: 0/10    Objective:     Communicated with: Nurse prior to session.  Patient found supine with telemetry upon OT entry to room.    General Precautions: Standard, aspiration, fall    Orthopedic Precautions:N/A  Braces: N/A  Respiratory Status: Room air     Occupational Performance:     Bed Mobility:    Patient completed Rolling/Turning to Left with  modified independence  Patient completed Rolling/Turning to Right with modified independence  Patient completed Supine to Sit with modified independence  Patient completed Sit to Supine with modified independence     Functional Mobility/Transfers:  Patient completed Sit <> Stand Transfer with supervision  with  no assistive device   Patient completed Bed <> Chair Transfer using Stand Pivot technique with supervision with no assistive " device    Activities of Daily Living:  Grooming: stand by assistance while standing  Upper Body Dressing: stand by assistance    Lower Body Dressing: stand by assistance      [facility] 6 Click ADL: 18    Treatment & Education:  Patient alert and oriented x person.  Patient attentive and interactive throughout the session.  Patient able to sequence 7/7 days of the week and 12/12 months of the year.  Able to categorize 6 colors, 1 vegetable, 7 fruits, and 6 animals each within 30 second time frame.     Patient left supine with all lines intact, call button in reach, and bed alarm on    GOALS:   Multidisciplinary Problems       Occupational Therapy Goals          Problem: Occupational Therapy    Goal Priority Disciplines Outcome Interventions   Occupational Therapy Goal     OT, PT/OT Ongoing, Progressing    Description: Goals set 2/10 to be addressed for 14 days with expiration date, 2/24:  Patient will increase functional independence with ADLs by performing:  Patient will demonstrate stand pivot transfers with modified independence.   Not met  Patient will demonstrate grooming while seated with modified independence.   Not met  Patient will demonstrate upper body dressing with modified independence while seated EOB.   Not met  Patient will demonstrate lower body dressing with modified independence while seated EOB.   Not met  Patient will demonstrate toileting with modified independence.   Not met  Patient will demonstrate bathing while seated EOB with modified independence.   Not met  Patient's family / caregiver will demonstrate independence and safety with assisting patient with self-care skills and functional mobility.     Not met                               Time Tracking:     OT Date of Treatment: 02/10/23  OT Start Time: 0511  OT Stop Time: 0536  OT Total Time (min): 25 min    Billable Minutes:Self Care/Home Management 15  Cognitive Retraining 10    OT/ANDRES: OT          2/10/2023

## 2023-02-10 NOTE — PROGRESS NOTES
[facility] ([facility]  Physical Medicine & Rehab  Progress Note    Patient Name: Leida Hoyos  MRN: [id number]  Admission Date: 1/27/2023  Length of Stay: 14 days  Attending Physician: Celso Khan MD    Subjective:     Principal Problem:Nontraumatic subarachnoid hemorrhage    Hospital Course:   2/8/23: Participated w/ PT. 120ft x 2 trials with no AD with minimal assist. Pt with noted posterior lean at times, lateral instability, and c/o fatigue at end of gait trials. Pt required manual cues for directional cues   pt wore mask and was on portable monitor throughout gait trial. [facility] 18  2/9/23 SLP reg and thin. SLP diagnosis of Cognitive-Linguistic Impairment.       Interval History 2/10/2023:  Patient is seen for follow-up PM&R evaluation and recommendations:Participating with therapy.     HPI, Past Medical, Family, and Social History remains the same as documented in the initial encounter.    Scheduled Medications:    heparin (porcine)  5,000 Units Subcutaneous Q8H    insulin aspart U-100  7 Units Subcutaneous QID (AC & HS)    insulin detemir U-100  16 Units Subcutaneous BID       Diagnostic Results: Labs: Reviewed  ECG: Reviewed  CT: Reviewed  MRI: Reviewed    PRN Medications: acetaminophen, albuterol-ipratropium, dextrose 10%, glucagon (human recombinant), glucose, glucose, hydrALAZINE, insulin aspart U-100, labetalol, melatonin, ondansetron, QUEtiapine    Review of Systems   Constitutional:  Positive for activity change. Negative for fatigue and fever.   Musculoskeletal:  Positive for gait problem.   Neurological:  Positive for weakness.   Psychiatric/Behavioral:  Positive for agitation and confusion.    Objective:     Vital Signs (Most Recent):  Temp: 98.1 °F (36.7 °C) (02/10/23 1218)  Pulse: 83 (02/10/23 1218)  Resp: 16 (02/10/23 1218)  BP: (!) 118/58 (02/10/23 1218)  SpO2: 96 % (02/10/23 1218)      Vital Signs (24h Range):  Temp:  [97.2 °F (36.2 °C)-99.5 °F (37.5 °C)] 98.1 °F  (36.7 °C)  Pulse:  [] 83  Resp:  [16-20] 16  SpO2:  [95 %-98 %] 96 %  BP: (106-131)/(54-62) 118/58     Physical Exam  Vitals and nursing note reviewed.   HENT:      Nose: Nose normal.      Mouth/Throat:      Mouth: Mucous membranes are moist.   Eyes:      Extraocular Movements: Extraocular movements intact.      Pupils: Pupils are equal, round, and reactive to light.   Pulmonary:      Effort: Pulmonary effort is normal. No respiratory distress.   Musculoskeletal:         General: Normal range of motion.   Skin:     General: Skin is warm.   Neurological:      Mental Status: She is alert.      Motor: Weakness present.      Gait: Gait abnormal.      Comments: Following commands  Oriented to self    Psychiatric:      Comments: Camera sitter       Assessment/Plan:      * Nontraumatic subarachnoid hemorrhage  - MRI shows Acute IVH, SAH w/ obstructive hydrocephalus w/o signs of ischemia and R A2 QUIQUE aneurysm.    - CTA shows diffuse subarachnoid hemorrhage with moderate hydrocephalus. No intracranial aneurysm or AVM is identified.   - EVD removed. CTH stable.     Debility  - Related to prolonged/acute hospital course.     Recommendations  -  Encourage mobility, OOB in chair at least 3 hours per day, and early ambulation as appropriate  -  PT/OT evaluate and treat  -  Pain management  -  Monitor for and prevent skin breakdown and pressure ulcers  · Early mobility, repositioning/weight shifting every 20-30 minutes when sitting, turn patient every 2 hours, proper mattress/overlay and chair cushioning, pressure relief/heel protector boots  -  DVT prophylaxis    -  Reviewed discharge options (IP rehab, SNF, HH therapy, and OP therapy)    Hypertension  - now improved    PM&R Recommendation:     At this time, the PM&R team has reviewed this patient's ongoing medical case including inpatient diagnosis, medical history, clinical examination, labs, vitals, current social and functional history to provide the post-acute  recommendation as follows:     RECOMMENDATIONS: inpatient rehabilitation due to good motivation/participation with therapies, has been determined to tolerate 3 hours of therapy and good potential for recovery.     The patient will be admitted for comprehensive interdisciplinary inpatient rehabilitation to address the impairments due to medical diagnosis of Nontraumatic SAH. The patient will benefit from an inpatient rehabilitation program to promote functional recovery, implement compensatory strategies and will undergo assessment for needs for durable medical equipment for safe discharge to the community. This patient will benefit from a coordinated interdisciplinary rehabilitation program services that require close monitoring and treatment with 24-hour rehabilitative nursing and physical/occupational therapies for 3 hours/day for 5 days/week.This interdisciplinary program will be performed under the direction of a physiatrist.    MEDICAL STABILITY:     At this time, no barriers for post-acute rehab admission.    We will continue to follow.    Yasmin Dixon NP  Department of Physical Medicine & Rehab   [facility])

## 2023-02-10 NOTE — SUBJECTIVE & OBJECTIVE
Interval History: PBD 14. NAEON. AFVSS. Pt's mentation continues to improve, alert & oriented x 3 this am, sitting up in chair. No issues overnight or since telesitter discontinued yesterday. Requesting to take a shower. Medically stable for rehab pending placement.    Medications:  Continuous Infusions:  Scheduled Meds:   heparin (porcine)  5,000 Units Subcutaneous Q8H    insulin aspart U-100  7 Units Subcutaneous QID (AC & HS)    insulin detemir U-100  16 Units Subcutaneous BID     PRN Meds:acetaminophen, albuterol-ipratropium, dextrose 10%, glucagon (human recombinant), glucose, glucose, hydrALAZINE, insulin aspart U-100, labetalol, melatonin, ondansetron, QUEtiapine     Review of Systems   Constitutional:  Negative for chills and fever.   Eyes:  Negative for photophobia and visual disturbance.   Gastrointestinal:  Negative for nausea and vomiting.   Genitourinary:  Negative for difficulty urinating and dysuria.   Neurological:  Positive for headaches. Negative for speech difficulty, weakness and numbness.   Psychiatric/Behavioral:  Positive for confusion. Negative for agitation.    Objective:     Weight: 55.5 kg (122 lb 5.7 oz)  Body mass index is 22.38 kg/m².  Vital Signs (Most Recent):  Temp: 98.1 °F (36.7 °C) (02/10/23 0823)  Pulse: 81 (02/10/23 0823)  Resp: 18 (02/10/23 0823)  BP: 131/62 (02/10/23 0823)  SpO2: 95 % (02/10/23 0823) Vital Signs (24h Range):  Temp:  [97.2 °F (36.2 °C)-99.5 °F (37.5 °C)] 98.1 °F (36.7 °C)  Pulse:  [] 81  Resp:  [16-20] 18  SpO2:  [95 %-99 %] 95 %  BP: (106-131)/(54-69) 131/62                          Physical Exam    Neurosurgery Physical Exam    General: well developed, well nourished, no distress.   Head: normocephalic, atraumatic.               EVD site C/D/I with monocryl suture  GCS: E4 V5 M6; Total: 15  Mental Status: Awake, Alert, Oriented x 3   Language: Mildly delayed response time, no dain aphasia  Speech: No dysarthria  Cranial nerves: face symmetric, tongue  midline, CN II-XII grossly intact.   Eyes: pupils equal, round, reactive to light with accomodation, EOMI.   Pulmonary: normal respirations, no signs of respiratory distress  Abdomen: soft, non-distended, not tender to palpation  Skin: Skin is warm, dry and intact.     Sensory: intact to light touch throughout  Motor Strength: Moves all extremities spontaneously with good tone. Grossly full strength upper and lower extremities. No abnormal movements seen.      Finger-to-nose: intact bilaterally   Pronator drift: absent bilaterally    Significant Labs:  Recent Labs   Lab 02/09/23  0803 02/10/23  0402   * 291*    135*   K 4.5 4.5    104   CO2 21* 22*   BUN 8 16   CREATININE 0.8 1.0   CALCIUM 10.3 9.6   MG 2.0 1.8     Recent Labs   Lab 02/09/23  0803 02/10/23  0402   WBC 7.35 6.36   HGB 13.0 11.5*   HCT 41.8 36.5*    376     No results for input(s): LABPT, INR, APTT in the last 48 hours.  Microbiology Results (last 7 days)       Procedure Component Value Units Date/Time    CSF culture [[id number]] Collected: 02/06/23 0802    Order Status: Completed Specimen: CSF (Spinal Fluid) from CSF Shunt Updated: 02/10/23 0511     CSF CULTURE No Growth to date     Gram Stain Result Cytospin indicates:      Rare WBC's      No organisms seen    CSF culture [[id number]] Collected: 02/02/23 1013    Order Status: Completed Specimen: CSF (Spinal Fluid) from CSF Tap, Tube 3 Updated: 02/07/23 0623     CSF CULTURE No Growth     Gram Stain Result No WBC's      No organisms seen    CSF culture [[id number]] Collected: 01/30/23 1449    Order Status: Completed Specimen: CSF (Spinal Fluid) from CSF Shunt Updated: 02/04/23 0742     CSF CULTURE No Growth     Gram Stain Result Rare WBC's      No organisms seen          All pertinent labs from the last 24 hours have been reviewed.    Significant Diagnostics:  I have reviewed and interpreted all pertinent imaging results/findings within the past 24 hours.

## 2023-02-11 LAB
ALBUMIN SERPL BCP-MCNC: 3.2 G/DL (ref 3.5–5.2)
ALP SERPL-CCNC: 187 U/L (ref 55–135)
ALT SERPL W/O P-5'-P-CCNC: 24 U/L (ref 10–44)
ANION GAP SERPL CALC-SCNC: 12 MMOL/L (ref 8–16)
AST SERPL-CCNC: 32 U/L (ref 10–40)
BASOPHILS # BLD AUTO: 0.05 K/UL (ref 0–0.2)
BASOPHILS NFR BLD: 1 % (ref 0–1.9)
BILIRUB SERPL-MCNC: 0.3 MG/DL (ref 0.1–1)
BUN SERPL-MCNC: 14 MG/DL (ref 6–20)
CALCIUM SERPL-MCNC: 10.2 MG/DL (ref 8.7–10.5)
CHLORIDE SERPL-SCNC: 105 MMOL/L (ref 95–110)
CO2 SERPL-SCNC: 22 MMOL/L (ref 23–29)
CREAT SERPL-MCNC: 0.7 MG/DL (ref 0.5–1.4)
DIFFERENTIAL METHOD: ABNORMAL
EOSINOPHIL # BLD AUTO: 0.1 K/UL (ref 0–0.5)
EOSINOPHIL NFR BLD: 1.4 % (ref 0–8)
ERYTHROCYTE [DISTWIDTH] IN BLOOD BY AUTOMATED COUNT: 13.2 % (ref 11.5–14.5)
EST. GFR  (NO RACE VARIABLE): >60 ML/MIN/1.73 M^2
GLUCOSE SERPL-MCNC: 104 MG/DL (ref 70–110)
HCT VFR BLD AUTO: 36.9 % (ref 37–48.5)
HGB BLD-MCNC: 11.8 G/DL (ref 12–16)
IMM GRANULOCYTES # BLD AUTO: 0.01 K/UL (ref 0–0.04)
IMM GRANULOCYTES NFR BLD AUTO: 0.2 % (ref 0–0.5)
LYMPHOCYTES # BLD AUTO: 1.8 K/UL (ref 1–4.8)
LYMPHOCYTES NFR BLD: 34.2 % (ref 18–48)
MAGNESIUM SERPL-MCNC: 1.9 MG/DL (ref 1.6–2.6)
MCH RBC QN AUTO: 30.6 PG (ref 27–31)
MCHC RBC AUTO-ENTMCNC: 32 G/DL (ref 32–36)
MCV RBC AUTO: 96 FL (ref 82–98)
MONOCYTES # BLD AUTO: 0.5 K/UL (ref 0.3–1)
MONOCYTES NFR BLD: 8.7 % (ref 4–15)
NEUTROPHILS # BLD AUTO: 2.8 K/UL (ref 1.8–7.7)
NEUTROPHILS NFR BLD: 54.5 % (ref 38–73)
NRBC BLD-RTO: 0 /100 WBC
PHOSPHATE SERPL-MCNC: 3.5 MG/DL (ref 2.7–4.5)
PLATELET # BLD AUTO: 404 K/UL (ref 150–450)
PMV BLD AUTO: 9.2 FL (ref 9.2–12.9)
POCT GLUCOSE: 136 MG/DL (ref 70–110)
POCT GLUCOSE: 138 MG/DL (ref 70–110)
POCT GLUCOSE: 173 MG/DL (ref 70–110)
POCT GLUCOSE: 272 MG/DL (ref 70–110)
POCT GLUCOSE: 291 MG/DL (ref 70–110)
POTASSIUM SERPL-SCNC: 4.3 MMOL/L (ref 3.5–5.1)
PROT SERPL-MCNC: 7.2 G/DL (ref 6–8.4)
RBC # BLD AUTO: 3.85 M/UL (ref 4–5.4)
SODIUM SERPL-SCNC: 139 MMOL/L (ref 136–145)
WBC # BLD AUTO: 5.17 K/UL (ref 3.9–12.7)

## 2023-02-11 PROCEDURE — 84100 ASSAY OF PHOSPHORUS: CPT

## 2023-02-11 PROCEDURE — 63600175 PHARM REV CODE 636 W HCPCS

## 2023-02-11 PROCEDURE — 36415 COLL VENOUS BLD VENIPUNCTURE: CPT | Performed by: NEUROLOGICAL SURGERY

## 2023-02-11 PROCEDURE — 94761 N-INVAS EAR/PLS OXIMETRY MLT: CPT

## 2023-02-11 PROCEDURE — 85025 COMPLETE CBC W/AUTO DIFF WBC: CPT | Performed by: NEUROLOGICAL SURGERY

## 2023-02-11 PROCEDURE — 99233 SBSQ HOSP IP/OBS HIGH 50: CPT | Mod: ,,, | Performed by: PHYSICIAN ASSISTANT

## 2023-02-11 PROCEDURE — 20600001 HC STEP DOWN PRIVATE ROOM

## 2023-02-11 PROCEDURE — 99233 PR SUBSEQUENT HOSPITAL CARE,LEVL III: ICD-10-PCS | Mod: ,,, | Performed by: PHYSICIAN ASSISTANT

## 2023-02-11 PROCEDURE — 80053 COMPREHEN METABOLIC PANEL: CPT

## 2023-02-11 PROCEDURE — 83735 ASSAY OF MAGNESIUM: CPT

## 2023-02-11 PROCEDURE — 25000003 PHARM REV CODE 250: Performed by: PHYSICIAN ASSISTANT

## 2023-02-11 PROCEDURE — 36415 COLL VENOUS BLD VENIPUNCTURE: CPT

## 2023-02-11 PROCEDURE — 25000003 PHARM REV CODE 250

## 2023-02-11 PROCEDURE — 11000001 HC ACUTE MED/SURG PRIVATE ROOM

## 2023-02-11 RX ADMIN — QUETIAPINE FUMARATE 25 MG: 25 TABLET ORAL at 09:02

## 2023-02-11 RX ADMIN — HEPARIN SODIUM 5000 UNITS: 5000 INJECTION INTRAVENOUS; SUBCUTANEOUS at 06:02

## 2023-02-11 RX ADMIN — INSULIN DETEMIR 16 UNITS: 100 INJECTION, SOLUTION SUBCUTANEOUS at 09:02

## 2023-02-11 RX ADMIN — INSULIN ASPART 7 UNITS: 100 INJECTION, SOLUTION INTRAVENOUS; SUBCUTANEOUS at 12:02

## 2023-02-11 RX ADMIN — Medication 6 MG: at 09:02

## 2023-02-11 RX ADMIN — INSULIN ASPART 7 UNITS: 100 INJECTION, SOLUTION INTRAVENOUS; SUBCUTANEOUS at 06:02

## 2023-02-11 RX ADMIN — HEPARIN SODIUM 5000 UNITS: 5000 INJECTION INTRAVENOUS; SUBCUTANEOUS at 09:02

## 2023-02-11 RX ADMIN — HEPARIN SODIUM 5000 UNITS: 5000 INJECTION INTRAVENOUS; SUBCUTANEOUS at 02:02

## 2023-02-11 RX ADMIN — INSULIN ASPART 7 UNITS: 100 INJECTION, SOLUTION INTRAVENOUS; SUBCUTANEOUS at 04:02

## 2023-02-11 RX ADMIN — INSULIN ASPART 9 UNITS: 100 INJECTION, SOLUTION INTRAVENOUS; SUBCUTANEOUS at 04:02

## 2023-02-11 RX ADMIN — INSULIN ASPART 7 UNITS: 100 INJECTION, SOLUTION INTRAVENOUS; SUBCUTANEOUS at 09:02

## 2023-02-11 RX ADMIN — INSULIN ASPART 6 UNITS: 100 INJECTION, SOLUTION INTRAVENOUS; SUBCUTANEOUS at 12:02

## 2023-02-11 NOTE — PROGRESS NOTES
[facility] - Neurosurgery ([facility])  Neurosurgery  Progress Note    Subjective:     History of Present Illness: 51 yo F with PMH of DM1, autoimmune hepatitis, and HTN who presents to ED this am after confusion this am. She was LKN last night and became confused after showering this am so  brought to the ED. She quickly decompensated in the ER and was not following commands or waking to stimulation. MRI and CTA showed diffuse thick SAH and hydrocephalus. No identifiable aneurysm seen on CTA.       Post-Op Info:  Procedure(s) (LRB):  ANGIOGRAM-CEREBRAL (N/A)   15 Days Post-Op     Interval History: PBD 15. NAEON. AFVSS. Patient doing well without complaints, awaiting discharge to rehab pending placement.     Medications:  Continuous Infusions:  Scheduled Meds:   heparin (porcine)  5,000 Units Subcutaneous Q8H    insulin aspart U-100  7 Units Subcutaneous QID (AC & HS)    insulin detemir U-100  16 Units Subcutaneous BID     PRN Meds:acetaminophen, albuterol-ipratropium, dextrose 10%, glucagon (human recombinant), glucose, glucose, hydrALAZINE, insulin aspart U-100, labetalol, melatonin, ondansetron, QUEtiapine     Review of Systems  Constitutional:  Negative for chills and fever.   Eyes:  Negative for photophobia and visual disturbance.   Gastrointestinal:  Negative for nausea and vomiting.   Genitourinary:  Negative for difficulty urinating and dysuria.   Neurological:  Positive for headaches. Negative for speech difficulty, weakness and numbness.   Psychiatric/Behavioral:  Positive for confusion. Negative for agitation.   Objective:     Weight: 55.5 kg (122 lb 5.7 oz)  Body mass index is 22.38 kg/m².  Vital Signs (Most Recent):  Temp: 98.1 °F (36.7 °C) (02/11/23 0733)  Pulse: 72 (02/11/23 0733)  Resp: 18 (02/11/23 0733)  BP: (!) 125/59 (02/11/23 0733)  SpO2: 97 % (02/11/23 0733)   Vital Signs (24h Range):  Temp:  [97.7 °F (36.5 °C)-98.7 °F (37.1 °C)] 98.1 °F (36.7 °C)  Pulse:  [67-83] 72  Resp:  [16-18]  18  SpO2:  [96 %-98 %] 97 %  BP: (118-134)/(58-67) 125/59         Neurosurgery Physical Exam  General: well developed, well nourished, no distress.   Head: normocephalic, atraumatic.               EVD site C/D/I with monocryl suture  GCS: E4 V5 M6; Total: 15  Mental Status: Awake, Alert, Oriented x 3   Language: Mildly delayed response time, no dain aphasia  Speech: No dysarthria  Cranial nerves: face symmetric, tongue midline, CN II-XII grossly intact.   Eyes: pupils equal, round, reactive to light with accomodation, EOMI.   Pulmonary: normal respirations, no signs of respiratory distress  Skin: Skin is warm, dry and intact.  Sensory: intact to light touch throughout  Motor Strength: Moves all extremities spontaneously with good tone. Grossly full strength upper and lower extremities. No abnormal movements seen.      Significant Labs:  Recent Labs   Lab 02/10/23  0402 02/11/23  0422   * 104   * 139   K 4.5 4.3    105   CO2 22* 22*   BUN 16 14   CREATININE 1.0 0.7   CALCIUM 9.6 10.2   MG 1.8 1.9     Recent Labs   Lab 02/10/23  0402 02/11/23  0905   WBC 6.36 5.17   HGB 11.5* 11.8*   HCT 36.5* 36.9*    404     No results for input(s): LABPT, INR, APTT in the last 48 hours.  Microbiology Results (last 7 days)       Procedure Component Value Units Date/Time    CSF culture [[id number]] Collected: 02/06/23 0802    Order Status: Completed Specimen: CSF (Spinal Fluid) from CSF Shunt Updated: 02/11/23 0721     CSF CULTURE No Growth to date      No Growth     Gram Stain Result Cytospin indicates:      Rare WBC's      No organisms seen    CSF culture [[id number]] Collected: 02/02/23 1013    Order Status: Completed Specimen: CSF (Spinal Fluid) from CSF Tap, Tube 3 Updated: 02/07/23 0623     CSF CULTURE No Growth     Gram Stain Result No WBC's      No organisms seen          All pertinent labs from the last 24 hours have been reviewed.    Significant Diagnostics:  I have reviewed all pertinent imaging  results/findings within the past 24 hours.    Assessment/Plan:     * Nontraumatic subarachnoid hemorrhage    Nontraumatic subarachnoid hemorrhage  53 yo F w/ PMH DM1, HTN, and autoimmune hepatitis who presented to the ED with AMS after HA and vomiting the night before. Patient was non-verbal, B/l fixed pupils, and only intermittently following commands in B/L UE. GCS 8. MRI showed Acute IVH, SAH w/ obstructive hydrocephalus w/o signs of ischemia, and questionable R A2 QUIQUE aneurysm. VN consulted. Patient intubated in ED after concern for airway protection. NSGY placed EVD. CTA showed diffuse subarachnoid hemorrhage with moderate hydrocephalus. IR consulted for angiogram. No intracranial aneurysm or AVM identified. NIHSS 9 on admission.      - IR angiogram on 1/27 shows no aneurysm, AVM, or AV fistula.   - CTA 1/27 shows diffuse subarachnoid hemorrhage with moderate hydrocephalus. No intracranial aneurysm or AVM is identified.  - Repeat DSA on PBD 7 without aneurysm, AVM or AV fistula.   - EVD removed 2/7, post-pull CTH on 2/8 stable  - Stepped down to floor under NSGY service on 2/8     - Q4H neuro checks and vitals  - Last TCDs done 2/6 negative for vasospasm, discontinued  - Echo 1/28 reviewed: normal EF, normal systolic/diastolic function  - Daily labs, replete electrolytes PRN  - SBP goal <160; PRN labetalol, hydralazine   - SQ heparin  - PT/OT/SLP     IVH (intraventricular hemorrhage)  See SAH  EVD care     Vasogenic cerebral edema  See SAH     Cardiac/Vascular  Hypertension  SBP 160s on arrival.   states patient does not take home Losartan regularly.      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 BG on arrival 284  Home regimen 13u Lantus; Lispro AC  A1C 6.5            -Transitioned from Insulin gtt to Detemir 16 BID  -Aspart increased to 7u TIDWM  -Hypoglycemic protocol  in place  -Accuchecks AC/HS  -Will need follow up with established Endocrinologist after discharge     Autoimmune hepatitis  Hx of AIH. Previously on mercaptopurine. Not currently taking.   LFTs slightly elevated on arrival.     -Will continue to monitor  -Holding Tylenol and hepatotoxic agents  -CMP daily  -If LFTs worsen will obtain RUQ US.      Debility  PT/OT - recs for IPR    Encephalopathy, Agitation  Likely 2/2 cerebral hemorrhage, extended hospitalization/ICU stay    -Delirium precautions, frequent re-orientation  -Seroquel PRN for non-redirectable agitation  -Telesitter discontinued 2/9    Dispo: IPR, pending placement, medically stable to discharge pending placement            Christy Gu PA-C  Neurosurgery  [facility] ([facility])

## 2023-02-11 NOTE — PLAN OF CARE
AROLDO sent secure message to [facility] regarding [facility] and authorization for IP Rehab admission.       02/11/23 1022   Post-Acute Status   Post-Acute Authorization Placement   Post-Acute Placement Status Pending post-acute provider review/more information requested

## 2023-02-11 NOTE — SUBJECTIVE & OBJECTIVE
Interval History: PBD 15. NAEON. AFVSS. Patient doing well without complaints, awaiting discharge to rehab pending placement.     Medications:  Continuous Infusions:  Scheduled Meds:   heparin (porcine)  5,000 Units Subcutaneous Q8H    insulin aspart U-100  7 Units Subcutaneous QID (AC & HS)    insulin detemir U-100  16 Units Subcutaneous BID     PRN Meds:acetaminophen, albuterol-ipratropium, dextrose 10%, glucagon (human recombinant), glucose, glucose, hydrALAZINE, insulin aspart U-100, labetalol, melatonin, ondansetron, QUEtiapine     Review of Systems  Constitutional:  Negative for chills and fever.   Eyes:  Negative for photophobia and visual disturbance.   Gastrointestinal:  Negative for nausea and vomiting.   Genitourinary:  Negative for difficulty urinating and dysuria.   Neurological:  Positive for headaches. Negative for speech difficulty, weakness and numbness.   Psychiatric/Behavioral:  Positive for confusion. Negative for agitation.   Objective:     Weight: 55.5 kg (122 lb 5.7 oz)  Body mass index is 22.38 kg/m².  Vital Signs (Most Recent):  Temp: 98.1 °F (36.7 °C) (02/11/23 0733)  Pulse: 72 (02/11/23 0733)  Resp: 18 (02/11/23 0733)  BP: (!) 125/59 (02/11/23 0733)  SpO2: 97 % (02/11/23 0733)   Vital Signs (24h Range):  Temp:  [97.7 °F (36.5 °C)-98.7 °F (37.1 °C)] 98.1 °F (36.7 °C)  Pulse:  [67-83] 72  Resp:  [16-18] 18  SpO2:  [96 %-98 %] 97 %  BP: (118-134)/(58-67) 125/59         Neurosurgery Physical Exam  General: well developed, well nourished, no distress.   Head: normocephalic, atraumatic.               EVD site C/D/I with monocryl suture  GCS: E4 V5 M6; Total: 15  Mental Status: Awake, Alert, Oriented x 3   Language: Mildly delayed response time, no dain aphasia  Speech: No dysarthria  Cranial nerves: face symmetric, tongue midline, CN II-XII grossly intact.   Eyes: pupils equal, round, reactive to light with accomodation, EOMI.   Pulmonary: normal respirations, no signs of respiratory  distress  Skin: Skin is warm, dry and intact.  Sensory: intact to light touch throughout  Motor Strength: Moves all extremities spontaneously with good tone. Grossly full strength upper and lower extremities. No abnormal movements seen.      Significant Labs:  Recent Labs   Lab 02/10/23  0402 02/11/23  0422   * 104   * 139   K 4.5 4.3    105   CO2 22* 22*   BUN 16 14   CREATININE 1.0 0.7   CALCIUM 9.6 10.2   MG 1.8 1.9     Recent Labs   Lab 02/10/23  0402 02/11/23  0905   WBC 6.36 5.17   HGB 11.5* 11.8*   HCT 36.5* 36.9*    404     No results for input(s): LABPT, INR, APTT in the last 48 hours.  Microbiology Results (last 7 days)       Procedure Component Value Units Date/Time    CSF culture [[id number]] Collected: 02/06/23 0802    Order Status: Completed Specimen: CSF (Spinal Fluid) from CSF Shunt Updated: 02/11/23 0721     CSF CULTURE No Growth to date      No Growth     Gram Stain Result Cytospin indicates:      Rare WBC's      No organisms seen    CSF culture [[id number]] Collected: 02/02/23 1013    Order Status: Completed Specimen: CSF (Spinal Fluid) from CSF Tap, Tube 3 Updated: 02/07/23 0623     CSF CULTURE No Growth     Gram Stain Result No WBC's      No organisms seen          All pertinent labs from the last 24 hours have been reviewed.    Significant Diagnostics:  I have reviewed all pertinent imaging results/findings within the past 24 hours.

## 2023-02-11 NOTE — PLAN OF CARE
Per Jer Camp with [facility], they are following patient and will submit for authorization Monday.       02/11/23 1133   Post-Acute Status   Post-Acute Authorization Placement   Post-Acute Placement Status Pending payor review/awaiting authorization (if required)

## 2023-02-11 NOTE — PLAN OF CARE
Pt a&o4, sometimes doesn't always make sentences that make sense but is fully aware and oriented. Pt is on RA, tele was d/c today, pts family was at bedside 100% of the day and helped her ambulate and go to the BR. All safety precautions in place. No other concerns at this time.   Problem: Adult Inpatient Plan of Care  Goal: Plan of Care Review  Outcome: Ongoing, Progressing  Goal: Patient-Specific Goal (Individualized)  Description: Admit Date: 23    Admit Dx:  SAH    Past Medical History:  No date: Autoimmune hepatitis      Comment:  managed by Dr. Russell on mercaptopurine  No date: Diabetes mellitus type II  No date: Hypertension    Past Surgical History:  :  SECTION, CLASSIC  2019: COLONOSCOPY; N/A      Comment:  Procedure: COLONOSCOPY;  Surgeon: Dipesh Victoria MD;  Location: [address]);  Service: Endoscopy;                Laterality: N/A;  2013: FRACTURE SURGERY; Left      Comment:  leg   No date: MOUTH SURGERY    Individualization:   1. Lights dim  2. Stroke educational booklet updated every shift and reviewed with patient and family at bedside.     Restraints: 23 yes-pulling lines         Outcome: Ongoing, Progressing  Goal: Absence of Hospital-Acquired Illness or Injury  Outcome: Ongoing, Progressing  Goal: Optimal Comfort and Wellbeing  Outcome: Ongoing, Progressing  Goal: Readiness for Transition of Care  Outcome: Ongoing, Progressing     Problem: Adjustment to Illness (Stroke, Hemorrhagic)  Goal: Optimal Coping  Outcome: Ongoing, Progressing     Problem: Bowel Elimination Impaired (Stroke, Hemorrhagic)  Goal: Effective Bowel Elimination  Outcome: Ongoing, Progressing     Problem: Cognitive Impairment (Stroke, Hemorrhagic)  Goal: Optimal Cognitive Function  Outcome: Ongoing, Progressing

## 2023-02-12 LAB
ALBUMIN SERPL BCP-MCNC: 3.1 G/DL (ref 3.5–5.2)
ALP SERPL-CCNC: 204 U/L (ref 55–135)
ALT SERPL W/O P-5'-P-CCNC: 32 U/L (ref 10–44)
ANION GAP SERPL CALC-SCNC: 12 MMOL/L (ref 8–16)
AST SERPL-CCNC: 42 U/L (ref 10–40)
BACTERIA CSF CULT: NO GROWTH
BASOPHILS # BLD AUTO: 0.03 K/UL (ref 0–0.2)
BASOPHILS NFR BLD: 0.6 % (ref 0–1.9)
BILIRUB SERPL-MCNC: 0.2 MG/DL (ref 0.1–1)
BUN SERPL-MCNC: 12 MG/DL (ref 6–20)
CALCIUM SERPL-MCNC: 9.8 MG/DL (ref 8.7–10.5)
CHLORIDE SERPL-SCNC: 105 MMOL/L (ref 95–110)
CO2 SERPL-SCNC: 23 MMOL/L (ref 23–29)
CREAT SERPL-MCNC: 0.7 MG/DL (ref 0.5–1.4)
DIFFERENTIAL METHOD: ABNORMAL
EOSINOPHIL # BLD AUTO: 0 K/UL (ref 0–0.5)
EOSINOPHIL NFR BLD: 0.2 % (ref 0–8)
ERYTHROCYTE [DISTWIDTH] IN BLOOD BY AUTOMATED COUNT: 13.1 % (ref 11.5–14.5)
EST. GFR  (NO RACE VARIABLE): >60 ML/MIN/1.73 M^2
GLUCOSE SERPL-MCNC: 166 MG/DL (ref 70–110)
GRAM STN SPEC: NORMAL
HCT VFR BLD AUTO: 35.5 % (ref 37–48.5)
HGB BLD-MCNC: 11.1 G/DL (ref 12–16)
IMM GRANULOCYTES # BLD AUTO: 0.01 K/UL (ref 0–0.04)
IMM GRANULOCYTES NFR BLD AUTO: 0.2 % (ref 0–0.5)
LYMPHOCYTES # BLD AUTO: 2.1 K/UL (ref 1–4.8)
LYMPHOCYTES NFR BLD: 39.4 % (ref 18–48)
MAGNESIUM SERPL-MCNC: 1.8 MG/DL (ref 1.6–2.6)
MCH RBC QN AUTO: 30.4 PG (ref 27–31)
MCHC RBC AUTO-ENTMCNC: 31.3 G/DL (ref 32–36)
MCV RBC AUTO: 97 FL (ref 82–98)
MONOCYTES # BLD AUTO: 0.5 K/UL (ref 0.3–1)
MONOCYTES NFR BLD: 8.7 % (ref 4–15)
NEUTROPHILS # BLD AUTO: 2.8 K/UL (ref 1.8–7.7)
NEUTROPHILS NFR BLD: 50.9 % (ref 38–73)
NRBC BLD-RTO: 0 /100 WBC
PHOSPHATE SERPL-MCNC: 3.3 MG/DL (ref 2.7–4.5)
PLATELET # BLD AUTO: 417 K/UL (ref 150–450)
PMV BLD AUTO: 9.4 FL (ref 9.2–12.9)
POCT GLUCOSE: 138 MG/DL (ref 70–110)
POCT GLUCOSE: 235 MG/DL (ref 70–110)
POCT GLUCOSE: 248 MG/DL (ref 70–110)
POCT GLUCOSE: 265 MG/DL (ref 70–110)
POTASSIUM SERPL-SCNC: 3.8 MMOL/L (ref 3.5–5.1)
PROT SERPL-MCNC: 6.7 G/DL (ref 6–8.4)
RBC # BLD AUTO: 3.65 M/UL (ref 4–5.4)
SODIUM SERPL-SCNC: 140 MMOL/L (ref 136–145)
WBC # BLD AUTO: 5.4 K/UL (ref 3.9–12.7)

## 2023-02-12 PROCEDURE — 25000003 PHARM REV CODE 250: Performed by: STUDENT IN AN ORGANIZED HEALTH CARE EDUCATION/TRAINING PROGRAM

## 2023-02-12 PROCEDURE — 97129 THER IVNTJ 1ST 15 MIN: CPT

## 2023-02-12 PROCEDURE — S0166 INJ OLANZAPINE 2.5MG: HCPCS | Performed by: STUDENT IN AN ORGANIZED HEALTH CARE EDUCATION/TRAINING PROGRAM

## 2023-02-12 PROCEDURE — 63600175 PHARM REV CODE 636 W HCPCS

## 2023-02-12 PROCEDURE — 36415 COLL VENOUS BLD VENIPUNCTURE: CPT

## 2023-02-12 PROCEDURE — 20600001 HC STEP DOWN PRIVATE ROOM

## 2023-02-12 PROCEDURE — 85025 COMPLETE CBC W/AUTO DIFF WBC: CPT

## 2023-02-12 PROCEDURE — 25000003 PHARM REV CODE 250: Performed by: PHYSICIAN ASSISTANT

## 2023-02-12 PROCEDURE — 11000001 HC ACUTE MED/SURG PRIVATE ROOM

## 2023-02-12 PROCEDURE — 25000003 PHARM REV CODE 250

## 2023-02-12 PROCEDURE — 97535 SELF CARE MNGMENT TRAINING: CPT

## 2023-02-12 PROCEDURE — 84100 ASSAY OF PHOSPHORUS: CPT

## 2023-02-12 PROCEDURE — 83735 ASSAY OF MAGNESIUM: CPT

## 2023-02-12 PROCEDURE — 80053 COMPREHEN METABOLIC PANEL: CPT

## 2023-02-12 RX ORDER — OLANZAPINE 10 MG/2ML
5 INJECTION, POWDER, FOR SOLUTION INTRAMUSCULAR ONCE AS NEEDED
Status: COMPLETED | OUTPATIENT
Start: 2023-02-12 | End: 2023-02-12

## 2023-02-12 RX ADMIN — INSULIN ASPART 9 UNITS: 100 INJECTION, SOLUTION INTRAVENOUS; SUBCUTANEOUS at 12:02

## 2023-02-12 RX ADMIN — INSULIN ASPART 6 UNITS: 100 INJECTION, SOLUTION INTRAVENOUS; SUBCUTANEOUS at 05:02

## 2023-02-12 RX ADMIN — ACETAMINOPHEN 650 MG: 650 SOLUTION ORAL at 04:02

## 2023-02-12 RX ADMIN — Medication 6 MG: at 09:02

## 2023-02-12 RX ADMIN — INSULIN ASPART 7 UNITS: 100 INJECTION, SOLUTION INTRAVENOUS; SUBCUTANEOUS at 06:02

## 2023-02-12 RX ADMIN — HEPARIN SODIUM 5000 UNITS: 5000 INJECTION INTRAVENOUS; SUBCUTANEOUS at 02:02

## 2023-02-12 RX ADMIN — OLANZAPINE 5 MG: 10 INJECTION, POWDER, FOR SOLUTION INTRAMUSCULAR at 02:02

## 2023-02-12 RX ADMIN — HEPARIN SODIUM 5000 UNITS: 5000 INJECTION INTRAVENOUS; SUBCUTANEOUS at 06:02

## 2023-02-12 RX ADMIN — INSULIN ASPART 7 UNITS: 100 INJECTION, SOLUTION INTRAVENOUS; SUBCUTANEOUS at 09:02

## 2023-02-12 RX ADMIN — INSULIN ASPART 7 UNITS: 100 INJECTION, SOLUTION INTRAVENOUS; SUBCUTANEOUS at 05:02

## 2023-02-12 RX ADMIN — HEPARIN SODIUM 5000 UNITS: 5000 INJECTION INTRAVENOUS; SUBCUTANEOUS at 09:02

## 2023-02-12 RX ADMIN — QUETIAPINE FUMARATE 25 MG: 25 TABLET ORAL at 09:02

## 2023-02-12 RX ADMIN — INSULIN ASPART 7 UNITS: 100 INJECTION, SOLUTION INTRAVENOUS; SUBCUTANEOUS at 11:02

## 2023-02-12 RX ADMIN — INSULIN DETEMIR 16 UNITS: 100 INJECTION, SOLUTION SUBCUTANEOUS at 09:02

## 2023-02-12 NOTE — PLAN OF CARE
Goals remain appropriate.  Problem: Occupational Therapy  Goal: Occupational Therapy Goal  Description: Goals set 2/10 to be addressed for 14 days with expiration date, 2/24:  Patient will increase functional independence with ADLs by performing:  Patient will demonstrate stand pivot transfers with modified independence.   Not met  Patient will demonstrate grooming while seated with modified independence.   Not met  Patient will demonstrate upper body dressing with modified independence while seated EOB.   Not met  Patient will demonstrate lower body dressing with modified independence while seated EOB.   Not met  Patient will demonstrate toileting with modified independence.   Not met  Patient will demonstrate bathing while seated EOB with modified independence.   Not met  Patient's family / caregiver will demonstrate independence and safety with assisting patient with self-care skills and functional mobility.     Not met          Outcome: Ongoing, Progressing

## 2023-02-12 NOTE — PROGRESS NOTES
[facility] - Neurosurgery ([facility])  Neurosurgery  Progress Note    Subjective:     History of Present Illness: 53 yo F with PMH of DM1, autoimmune hepatitis, and HTN who presents to ED this am after confusion this am. She was LKN last night and became confused after showering this am so  brought to the ED. She quickly decompensated in the ER and was not following commands or waking to stimulation. MRI and CTA showed diffuse thick SAH and hydrocephalus. No identifiable aneurysm seen on CTA.       Post-Op Info:  Procedure(s) (LRB):  ANGIOGRAM-CEREBRAL (N/A)   16 Days Post-Op     Interval History: 2/12 naeon, pending IPR    Medications:  Continuous Infusions:  Scheduled Meds:   heparin (porcine)  5,000 Units Subcutaneous Q8H    insulin aspart U-100  7 Units Subcutaneous QID (AC & HS)    insulin detemir U-100  16 Units Subcutaneous BID     PRN Meds:acetaminophen, albuterol-ipratropium, dextrose 10%, glucagon (human recombinant), glucose, glucose, hydrALAZINE, insulin aspart U-100, labetalol, melatonin, ondansetron, QUEtiapine     Review of Systems  Constitutional:  Negative for chills and fever.   Eyes:  Negative for photophobia and visual disturbance.   Gastrointestinal:  Negative for nausea and vomiting.   Genitourinary:  Negative for difficulty urinating and dysuria.   Neurological:  Positive for headaches. Negative for speech difficulty, weakness and numbness.   Psychiatric/Behavioral:  Positive for confusion. Negative for agitation.   Objective:     Weight: 55.5 kg (122 lb 5.7 oz)  Body mass index is 22.38 kg/m².  Vital Signs (Most Recent):  Temp: 98.5 °F (36.9 °C) (02/12/23 0842)  Pulse: 80 (02/12/23 0842)  Resp: 18 (02/12/23 0842)  BP: 139/64 (02/12/23 0842)  SpO2: 99 % (02/12/23 0842)   Vital Signs (24h Range):  Temp:  [96.9 °F (36.1 °C)-98.5 °F (36.9 °C)] 98.5 °F (36.9 °C)  Pulse:  [73-80] 80  Resp:  [17-18] 18  SpO2:  [95 %-99 %] 99 %  BP: (104-139)/(53-65) 139/64         Neurosurgery Physical  Exam  General: well developed, well nourished, no distress.   Head: normocephalic, atraumatic.               EVD site C/D/I with monocryl suture  GCS: E4 V5 M6; Total: 15  Mental Status: Awake, Alert, Oriented x 3   Language: Mildly delayed response time, no dain aphasia  Speech: No dysarthria  Cranial nerves: face symmetric, tongue midline, CN II-XII grossly intact.   Eyes: pupils equal, round, reactive to light with accomodation, EOMI.   Pulmonary: normal respirations, no signs of respiratory distress  Skin: Skin is warm, dry and intact.  Sensory: intact to light touch throughout  Motor Strength: Moves all extremities spontaneously with good tone. Grossly full strength upper and lower extremities. No abnormal movements seen.      Significant Labs:  Recent Labs   Lab 02/11/23  0422 02/12/23 0229    166*    140   K 4.3 3.8    105   CO2 22* 23   BUN 14 12   CREATININE 0.7 0.7   CALCIUM 10.2 9.8   MG 1.9 1.8       Recent Labs   Lab 02/11/23  0905 02/12/23 0229   WBC 5.17 5.40   HGB 11.8* 11.1*   HCT 36.9* 35.5*    417       No results for input(s): LABPT, INR, APTT in the last 48 hours.  Microbiology Results (last 7 days)       Procedure Component Value Units Date/Time    CSF culture [[id number]] Collected: 02/06/23 0802    Order Status: Completed Specimen: CSF (Spinal Fluid) from CSF Shunt Updated: 02/12/23 0740     CSF CULTURE No Growth     Gram Stain Result Cytospin indicates:      Rare WBC's      No organisms seen    CSF culture [[id number]] Collected: 02/02/23 1013    Order Status: Completed Specimen: CSF (Spinal Fluid) from CSF Tap, Tube 3 Updated: 02/07/23 0623     CSF CULTURE No Growth     Gram Stain Result No WBC's      No organisms seen          All pertinent labs from the last 24 hours have been reviewed.    Significant Diagnostics:  I have reviewed all pertinent imaging results/findings within the past 24 hours.  Physical Exam    Assessment/Plan:     * Nontraumatic subarachnoid  hemorrhage    Nontraumatic subarachnoid hemorrhage  53 yo F w/ PMH DM1, HTN, and autoimmune hepatitis who presented to the ED with AMS after HA and vomiting the night before. Patient was non-verbal, B/l fixed pupils, and only intermittently following commands in B/L UE. GCS 8. MRI showed Acute IVH, SAH w/ obstructive hydrocephalus w/o signs of ischemia, and questionable R A2 QUIQUE aneurysm. VN consulted. Patient intubated in ED after concern for airway protection. NSGY placed EVD. CTA showed diffuse subarachnoid hemorrhage with moderate hydrocephalus. IR consulted for angiogram. No intracranial aneurysm or AVM identified. NIHSS 9 on admission.      - IR angiogram on 1/27 shows no aneurysm, AVM, or AV fistula.   - CTA 1/27 shows diffuse subarachnoid hemorrhage with moderate hydrocephalus. No intracranial aneurysm or AVM is identified.  - Repeat DSA on PBD 7 without aneurysm, AVM or AV fistula.   - EVD removed 2/7, post-pull CTH on 2/8 stable  - Stepped down to floor under NSGY service on 2/8     - Q4H neuro checks and vitals  - Last TCDs done 2/6 negative for vasospasm, discontinued  - Echo 1/28 reviewed: normal EF, normal systolic/diastolic function  - Daily labs, replete electrolytes PRN  - SBP goal <160; PRN labetalol, hydralazine   - SQ heparin  - PT/OT/SLP     IVH (intraventricular hemorrhage)  See SAH  EVD care     Vasogenic cerebral edema  See SAH     Cardiac/Vascular  Hypertension  SBP 160s on arrival.   states patient does not take home Losartan regularly.      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 BG on arrival 284  Home regimen 13u Lantus; Lispro AC  A1C 6.5            -Transitioned from Insulin gtt to Detemir 16 BID  -Aspart increased to 7u TIDWM  -Hypoglycemic protocol in place  -Accuchecks AC/HS  -Will need follow up with established Endocrinologist after  discharge     Autoimmune hepatitis  Hx of AIH. Previously on mercaptopurine. Not currently taking.   LFTs slightly elevated on arrival.     -Will continue to monitor  -Holding Tylenol and hepatotoxic agents  -CMP daily  -If LFTs worsen will obtain RUQ US.      Debility  PT/OT - recs for IPR    Encephalopathy, Agitation  Likely 2/2 cerebral hemorrhage, extended hospitalization/ICU stay    -Delirium precautions, frequent re-orientation  -Seroquel PRN for non-redirectable agitation  -Telesitter discontinued 2/9    Dispo: IPR, pending placement, medically stable to discharge pending placement            Lazaro Chavez MD  Neurosurgery  [facility] - [facility] ([facility])

## 2023-02-12 NOTE — SUBJECTIVE & OBJECTIVE
Interval History: 2/12 naeon, pending IPR    Medications:  Continuous Infusions:  Scheduled Meds:   heparin (porcine)  5,000 Units Subcutaneous Q8H    insulin aspart U-100  7 Units Subcutaneous QID (AC & HS)    insulin detemir U-100  16 Units Subcutaneous BID     PRN Meds:acetaminophen, albuterol-ipratropium, dextrose 10%, glucagon (human recombinant), glucose, glucose, hydrALAZINE, insulin aspart U-100, labetalol, melatonin, ondansetron, QUEtiapine     Review of Systems  Constitutional:  Negative for chills and fever.   Eyes:  Negative for photophobia and visual disturbance.   Gastrointestinal:  Negative for nausea and vomiting.   Genitourinary:  Negative for difficulty urinating and dysuria.   Neurological:  Positive for headaches. Negative for speech difficulty, weakness and numbness.   Psychiatric/Behavioral:  Positive for confusion. Negative for agitation.   Objective:     Weight: 55.5 kg (122 lb 5.7 oz)  Body mass index is 22.38 kg/m².  Vital Signs (Most Recent):  Temp: 98.5 °F (36.9 °C) (02/12/23 0842)  Pulse: 80 (02/12/23 0842)  Resp: 18 (02/12/23 0842)  BP: 139/64 (02/12/23 0842)  SpO2: 99 % (02/12/23 0842)   Vital Signs (24h Range):  Temp:  [96.9 °F (36.1 °C)-98.5 °F (36.9 °C)] 98.5 °F (36.9 °C)  Pulse:  [73-80] 80  Resp:  [17-18] 18  SpO2:  [95 %-99 %] 99 %  BP: (104-139)/(53-65) 139/64         Neurosurgery Physical Exam  General: well developed, well nourished, no distress.   Head: normocephalic, atraumatic.               EVD site C/D/I with monocryl suture  GCS: E4 V5 M6; Total: 15  Mental Status: Awake, Alert, Oriented x 3   Language: Mildly delayed response time, no dain aphasia  Speech: No dysarthria  Cranial nerves: face symmetric, tongue midline, CN II-XII grossly intact.   Eyes: pupils equal, round, reactive to light with accomodation, EOMI.   Pulmonary: normal respirations, no signs of respiratory distress  Skin: Skin is warm, dry and intact.  Sensory: intact to light touch throughout  Motor  Strength: Moves all extremities spontaneously with good tone. Grossly full strength upper and lower extremities. No abnormal movements seen.      Significant Labs:  Recent Labs   Lab 02/11/23  0422 02/12/23  0229    166*    140   K 4.3 3.8    105   CO2 22* 23   BUN 14 12   CREATININE 0.7 0.7   CALCIUM 10.2 9.8   MG 1.9 1.8       Recent Labs   Lab 02/11/23  0905 02/12/23  0229   WBC 5.17 5.40   HGB 11.8* 11.1*   HCT 36.9* 35.5*    417       No results for input(s): LABPT, INR, APTT in the last 48 hours.  Microbiology Results (last 7 days)       Procedure Component Value Units Date/Time    CSF culture [[id number]] Collected: 02/06/23 0802    Order Status: Completed Specimen: CSF (Spinal Fluid) from CSF Shunt Updated: 02/12/23 0740     CSF CULTURE No Growth     Gram Stain Result Cytospin indicates:      Rare WBC's      No organisms seen    CSF culture [[id number]] Collected: 02/02/23 1013    Order Status: Completed Specimen: CSF (Spinal Fluid) from CSF Tap, Tube 3 Updated: 02/07/23 0623     CSF CULTURE No Growth     Gram Stain Result No WBC's      No organisms seen          All pertinent labs from the last 24 hours have been reviewed.    Significant Diagnostics:  I have reviewed all pertinent imaging results/findings within the past 24 hours.  Physical Exam

## 2023-02-12 NOTE — PT/OT/SLP PROGRESS
"Occupational Therapy   Treatment    Name: Leida Hoyos  MRN: [id number]  Admitting Diagnosis:  Nontraumatic subarachnoid hemorrhage  16 Days Post-Op    Recommendations:     Discharge Recommendations: rehabilitation facility  Discharge Equipment Recommendations:  none  Barriers to discharge:  None    Assessment:     Leida Hoyos is a 53 y.o. female with a medical diagnosis of Nontraumatic subarachnoid hemorrhage.  She presents with performance deficits affecting function are impaired self care skills, impaired functional mobility, impaired endurance, impaired cognition.     Rehab Prognosis:  Good; patient would benefit from acute skilled OT services to address these deficits and reach maximum level of function.       Plan:     Patient to be seen 3 x/week to address the above listed problems via therapeutic activities, therapeutic exercises, self-care/home management, neuromuscular re-education, cognitive retraining  Plan of Care Expires: 02/25/23  Plan of Care Reviewed with: patient    Subjective   Patient:  "[birth year]."  "I'm in the hospital in [place]."  Pain/Comfort:  Pain Rating 1: 0/10  Pain Rating Post-Intervention 1: 0/10    Objective:     Communicated with: Nurse prior to session.  Patient found supine with bed alarm, telemetry upon OT entry to room.    General Precautions: Standard, fall    Orthopedic Precautions:N/A  Braces: N/A  Respiratory Status: Room air     Occupational Performance:     Bed Mobility:    Patient completed Rolling/Turning to Left with  modified independence  Patient completed Rolling/Turning to Right with modified independence  Patient completed Supine to Sit with modified independence  Patient completed Sit to Supine with modified independence     Functional Mobility/Transfers:  Patient completed Sit <> Stand Transfer with supervision  with  no assistive device   Patient completed Toilet Transfer Stand Pivot technique with supervision with "  no AD    Activities of Daily Living:  Grooming: stand by assistance while standing  Upper Body Dressing: stand by assistance    Lower Body Dressing: stand by assistance    Toileting: stand by assistance      [facility] [address] 6 Click ADL: 18    Treatment & Education:  Patient alert and oriented x person.  Able to follow 4/4 one step commands.  Patient attentive and interactive throughout the session.  Patient able to  sequence 7/7 days of the week and 12/12 months of the year.  Able to perform simple problem solving scenarios.  Poor memory noted throughout the session.    Patient left supine with all lines intact, call button in reach, and bed alarm on    GOALS:   Multidisciplinary Problems       Occupational Therapy Goals          Problem: Occupational Therapy    Goal Priority Disciplines Outcome Interventions   Occupational Therapy Goal     OT, PT/OT Ongoing, Progressing    Description: Goals set 2/10 to be addressed for 14 days with expiration date, 2/24:  Patient will increase functional independence with ADLs by performing:  Patient will demonstrate stand pivot transfers with modified independence.   Not met  Patient will demonstrate grooming while seated with modified independence.   Not met  Patient will demonstrate upper body dressing with modified independence while seated EOB.   Not met  Patient will demonstrate lower body dressing with modified independence while seated EOB.   Not met  Patient will demonstrate toileting with modified independence.   Not met  Patient will demonstrate bathing while seated EOB with modified independence.   Not met  Patient's family / caregiver will demonstrate independence and safety with assisting patient with self-care skills and functional mobility.     Not met                               Time Tracking:     OT Date of Treatment: 02/12/23  OT Start Time: 0638  OT Stop Time: 0701  OT Total Time (min): 23 min    Billable Minutes:Self Care/Home Management 14  Cognitive Retraining  9    OT/ANDRES: OT          2/12/2023

## 2023-02-12 NOTE — ASSESSMENT & PLAN NOTE
Nontraumatic subarachnoid hemorrhage  53 yo F w/ PMH DM1, HTN, and autoimmune hepatitis who presented to the ED with AMS after HA and vomiting the night before. Patient was non-verbal, B/l fixed pupils, and only intermittently following commands in B/L UE. GCS 8. MRI showed Acute IVH, SAH w/ obstructive hydrocephalus w/o signs of ischemia, and questionable R A2 QUIQUE aneurysm. VN consulted. Patient intubated in ED after concern for airway protection. NSGY placed EVD. CTA showed diffuse subarachnoid hemorrhage with moderate hydrocephalus. IR consulted for angiogram. No intracranial aneurysm or AVM identified. NIHSS 9 on admission.      - IR angiogram on 1/27 shows no aneurysm, AVM, or AV fistula.   - CTA 1/27 shows diffuse subarachnoid hemorrhage with moderate hydrocephalus. No intracranial aneurysm or AVM is identified.  - Repeat DSA on PBD 7 without aneurysm, AVM or AV fistula.   - EVD removed 2/7, post-pull CTH on 2/8 stable  - Stepped down to floor under NSGY service on 2/8     - Q4H neuro checks and vitals  - Last TCDs done 2/6 negative for vasospasm, discontinued  - Echo 1/28 reviewed: normal EF, normal systolic/diastolic function  - Daily labs, replete electrolytes PRN  - SBP goal <160; PRN labetalol, hydralazine   - SQ heparin  - PT/OT/SLP     IVH (intraventricular hemorrhage)  See SAH  EVD care     Vasogenic cerebral edema  See SAH     Cardiac/Vascular  Hypertension  SBP 160s on arrival.   states patient does not take home Losartan regularly.      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 BG on arrival 284  Home regimen 13u Lantus; Lispro AC  A1C 6.5            -Transitioned from Insulin gtt to Detemir 16 BID  -Aspart increased to 7u TIDWM  -Hypoglycemic protocol in place  -Accuchecks AC/HS  -Will need follow up with established Endocrinologist after  discharge     Autoimmune hepatitis  Hx of AIH. Previously on mercaptopurine. Not currently taking.   LFTs slightly elevated on arrival.     -Will continue to monitor  -Holding Tylenol and hepatotoxic agents  -CMP daily  -If LFTs worsen will obtain RUQ US.      Debility  PT/OT - recs for IPR    Encephalopathy, Agitation  Likely 2/2 cerebral hemorrhage, extended hospitalization/ICU stay    -Delirium precautions, frequent re-orientation  -Seroquel PRN for non-redirectable agitation  -Telesitter discontinued 2/9    Dispo: IPR, pending placement, medically stable to discharge pending placement

## 2023-02-13 LAB
ALBUMIN SERPL BCP-MCNC: 3.2 G/DL (ref 3.5–5.2)
ALP SERPL-CCNC: 209 U/L (ref 55–135)
ALT SERPL W/O P-5'-P-CCNC: 40 U/L (ref 10–44)
ANION GAP SERPL CALC-SCNC: 12 MMOL/L (ref 8–16)
AST SERPL-CCNC: 41 U/L (ref 10–40)
BASOPHILS # BLD AUTO: 0.05 K/UL (ref 0–0.2)
BASOPHILS NFR BLD: 1.1 % (ref 0–1.9)
BILIRUB SERPL-MCNC: 0.3 MG/DL (ref 0.1–1)
BUN SERPL-MCNC: 13 MG/DL (ref 6–20)
CALCIUM SERPL-MCNC: 10.6 MG/DL (ref 8.7–10.5)
CHLORIDE SERPL-SCNC: 106 MMOL/L (ref 95–110)
CO2 SERPL-SCNC: 22 MMOL/L (ref 23–29)
CREAT SERPL-MCNC: 0.7 MG/DL (ref 0.5–1.4)
DIFFERENTIAL METHOD: ABNORMAL
EOSINOPHIL # BLD AUTO: 0.1 K/UL (ref 0–0.5)
EOSINOPHIL NFR BLD: 2.1 % (ref 0–8)
ERYTHROCYTE [DISTWIDTH] IN BLOOD BY AUTOMATED COUNT: 13.2 % (ref 11.5–14.5)
EST. GFR  (NO RACE VARIABLE): >60 ML/MIN/1.73 M^2
GLUCOSE SERPL-MCNC: 193 MG/DL (ref 70–110)
HCT VFR BLD AUTO: 37.5 % (ref 37–48.5)
HGB BLD-MCNC: 11.5 G/DL (ref 12–16)
IMM GRANULOCYTES # BLD AUTO: 0.01 K/UL (ref 0–0.04)
IMM GRANULOCYTES NFR BLD AUTO: 0.2 % (ref 0–0.5)
LYMPHOCYTES # BLD AUTO: 1.6 K/UL (ref 1–4.8)
LYMPHOCYTES NFR BLD: 32.9 % (ref 18–48)
MAGNESIUM SERPL-MCNC: 1.9 MG/DL (ref 1.6–2.6)
MCH RBC QN AUTO: 30.5 PG (ref 27–31)
MCHC RBC AUTO-ENTMCNC: 30.7 G/DL (ref 32–36)
MCV RBC AUTO: 100 FL (ref 82–98)
MONOCYTES # BLD AUTO: 0.4 K/UL (ref 0.3–1)
MONOCYTES NFR BLD: 9.3 % (ref 4–15)
NEUTROPHILS # BLD AUTO: 2.6 K/UL (ref 1.8–7.7)
NEUTROPHILS NFR BLD: 54.4 % (ref 38–73)
NRBC BLD-RTO: 0 /100 WBC
PHOSPHATE SERPL-MCNC: 4.1 MG/DL (ref 2.7–4.5)
PLATELET # BLD AUTO: 341 K/UL (ref 150–450)
PMV BLD AUTO: 9.5 FL (ref 9.2–12.9)
POCT GLUCOSE: 184 MG/DL (ref 70–110)
POCT GLUCOSE: 193 MG/DL (ref 70–110)
POCT GLUCOSE: 229 MG/DL (ref 70–110)
POCT GLUCOSE: 256 MG/DL (ref 70–110)
POTASSIUM SERPL-SCNC: 4.3 MMOL/L (ref 3.5–5.1)
PROT SERPL-MCNC: 6.9 G/DL (ref 6–8.4)
RBC # BLD AUTO: 3.77 M/UL (ref 4–5.4)
SODIUM SERPL-SCNC: 140 MMOL/L (ref 136–145)
WBC # BLD AUTO: 4.74 K/UL (ref 3.9–12.7)

## 2023-02-13 PROCEDURE — 84100 ASSAY OF PHOSPHORUS: CPT

## 2023-02-13 PROCEDURE — 97116 GAIT TRAINING THERAPY: CPT | Mod: CQ

## 2023-02-13 PROCEDURE — 63600175 PHARM REV CODE 636 W HCPCS

## 2023-02-13 PROCEDURE — 99232 PR SUBSEQUENT HOSPITAL CARE,LEVL II: ICD-10-PCS | Mod: ,,, | Performed by: PHYSICIAN ASSISTANT

## 2023-02-13 PROCEDURE — 80053 COMPREHEN METABOLIC PANEL: CPT

## 2023-02-13 PROCEDURE — 92507 TX SP LANG VOICE COMM INDIV: CPT

## 2023-02-13 PROCEDURE — 99232 SBSQ HOSP IP/OBS MODERATE 35: CPT | Mod: ,,, | Performed by: PHYSICIAN ASSISTANT

## 2023-02-13 PROCEDURE — 83735 ASSAY OF MAGNESIUM: CPT

## 2023-02-13 PROCEDURE — 85025 COMPLETE CBC W/AUTO DIFF WBC: CPT

## 2023-02-13 PROCEDURE — 97535 SELF CARE MNGMENT TRAINING: CPT

## 2023-02-13 PROCEDURE — 36415 COLL VENOUS BLD VENIPUNCTURE: CPT

## 2023-02-13 PROCEDURE — 20600001 HC STEP DOWN PRIVATE ROOM

## 2023-02-13 RX ADMIN — INSULIN ASPART 7 UNITS: 100 INJECTION, SOLUTION INTRAVENOUS; SUBCUTANEOUS at 09:02

## 2023-02-13 RX ADMIN — INSULIN DETEMIR 16 UNITS: 100 INJECTION, SOLUTION SUBCUTANEOUS at 09:02

## 2023-02-13 RX ADMIN — INSULIN ASPART 7 UNITS: 100 INJECTION, SOLUTION INTRAVENOUS; SUBCUTANEOUS at 04:02

## 2023-02-13 RX ADMIN — HEPARIN SODIUM 5000 UNITS: 5000 INJECTION INTRAVENOUS; SUBCUTANEOUS at 09:02

## 2023-02-13 RX ADMIN — INSULIN ASPART 3 UNITS: 100 INJECTION, SOLUTION INTRAVENOUS; SUBCUTANEOUS at 12:02

## 2023-02-13 RX ADMIN — HEPARIN SODIUM 5000 UNITS: 5000 INJECTION INTRAVENOUS; SUBCUTANEOUS at 05:02

## 2023-02-13 RX ADMIN — INSULIN ASPART 3 UNITS: 100 INJECTION, SOLUTION INTRAVENOUS; SUBCUTANEOUS at 08:02

## 2023-02-13 RX ADMIN — INSULIN ASPART 9 UNITS: 100 INJECTION, SOLUTION INTRAVENOUS; SUBCUTANEOUS at 04:02

## 2023-02-13 RX ADMIN — INSULIN ASPART 7 UNITS: 100 INJECTION, SOLUTION INTRAVENOUS; SUBCUTANEOUS at 12:02

## 2023-02-13 RX ADMIN — INSULIN DETEMIR 16 UNITS: 100 INJECTION, SOLUTION SUBCUTANEOUS at 08:02

## 2023-02-13 RX ADMIN — HEPARIN SODIUM 5000 UNITS: 5000 INJECTION INTRAVENOUS; SUBCUTANEOUS at 02:02

## 2023-02-13 RX ADMIN — INSULIN ASPART 7 UNITS: 100 INJECTION, SOLUTION INTRAVENOUS; SUBCUTANEOUS at 08:02

## 2023-02-13 NOTE — PT/OT/SLP PROGRESS
Speech Language Pathology Treatment    Patient Name:  Leida Hoyos   MRN:  [id number]  Admitting Diagnosis: Nontraumatic subarachnoid hemorrhage    Recommendations:                 General Recommendations:  Cognitive-linguistic therapy  Diet recommendations:  Regular, Liquid Diet Level: Thin   Aspiration Precautions: Standard aspiration precautions   General Precautions: Standard, aspiration  Communication strategies:  none    Subjective     Per friend, likely transferring to rehab today or tomorrow  Patient goals: home     Pain/Comfort:  Pain Rating 1: 0/10    Respiratory Status: Room air    Objective:     Has the patient been evaluated by SLP for swallowing?   Yes  Keep patient NPO? No   Current Respiratory Status:        Pt. Seen while sitting up in chair with improved attention to task however easily distracted by background noise.  Ms. Hoyos was oriented to place only requiring cues to locate month, year and on white board.  Poor carry over of orientation information noted from beginning to end of session requiring redirection to white board.  Recall of recent events was fair-poor.  On cognitive tasks, pt. Named 3 category members with 100% accuracy with delays in responding noted.  She responded to word deduction tasks given increased time and min-mod cues with 80% accuracy.  Pt. Responded to category exclusion tasks in field of 4 with 90%accuracy with delays in responding and repetition needed.  Self corrections and repetition needed for pt. To respond to mental manipulation tasks with 80% accuracy.  Ongoing education provided to pt. And friend re cognitive stimulation activities.    Assessment:     Leida Hoyos is a 53 y.o. female with an SLP diagnosis of Cognitive-Linguistic Impairment.    Goals:   Multidisciplinary Problems       SLP Goals          Problem: SLP    Goal Priority Disciplines Outcome   SLP Goal     SLP Ongoing, Progressing   Description: Goals due 2/10  1.  Pt. Will participate  in ongoing assessment of swallow at bedside  2.  Tolerate trials of regular diet with thin liquids with no s/s of aspiration  3.  Assess functional reading and writing skills  4.  [place] to time and place  5.  Respond to word finding/categorization tasks with 80% accuracy  6.  Respond to verbal problem solving tasks with 75% accuracy                       Plan:     Patient to be seen:  4 x/week   Plan of Care expires:  02/27/23  Plan of Care reviewed with:  patient, friend   SLP Follow-Up:  Yes       Discharge recommendations:  rehabilitation facility   Barriers to Discharge:  None    Time Tracking:     SLP Treatment Date:   02/13/23  Speech Start Time:  1215  Speech Stop Time:  1244     Speech Total Time (min):  29 min    Billable Minutes: Speech Therapy Individual 21 and Self Care/Home Management Training 8    02/13/2023

## 2023-02-13 NOTE — SUBJECTIVE & OBJECTIVE
Interval History: PBD 15. NAEON. AFVSS. CTH done yesterday stable. Patient doing well without complaints, awaiting discharge to rehab pending placement.     Medications:  Continuous Infusions:  Scheduled Meds:   heparin (porcine)  5,000 Units Subcutaneous Q8H    insulin aspart U-100  7 Units Subcutaneous QID (AC & HS)    insulin detemir U-100  16 Units Subcutaneous BID     PRN Meds:acetaminophen, albuterol-ipratropium, dextrose 10%, glucagon (human recombinant), glucose, glucose, hydrALAZINE, insulin aspart U-100, labetalol, melatonin, ondansetron, QUEtiapine     Review of Systems  Constitutional:  Negative for chills and fever.   Eyes:  Negative for photophobia and visual disturbance.   Gastrointestinal:  Negative for nausea and vomiting.   Genitourinary:  Negative for difficulty urinating and dysuria.   Neurological:  Positive for headaches. Negative for speech difficulty, weakness and numbness.   Psychiatric/Behavioral:  Positive for confusion. Negative for agitation.   Objective:     Weight: 55.5 kg (122 lb 5.7 oz)  Body mass index is 22.38 kg/m².  Vital Signs (Most Recent):  Temp: 98.7 °F (37.1 °C) (02/13/23 1141)  Pulse: 86 (02/13/23 1141)  Resp: 20 (02/13/23 1141)  BP: (!) 103/55 (02/13/23 1141)  SpO2: (!) 94 % (02/13/23 1141)   Vital Signs (24h Range):  Temp:  [96.8 °F (36 °C)-98.7 °F (37.1 °C)] 98.7 °F (37.1 °C)  Pulse:  [76-89] 86  Resp:  [16-20] 20  SpO2:  [93 %-98 %] 94 %  BP: (100-140)/(55-70) 103/55                          Physical Exam    Neurosurgery Physical Exam  General: well developed, well nourished, no distress.   Head: normocephalic, atraumatic.               EVD site C/D/I with monocryl suture  GCS: E4 V5 M6; Total: 15  Mental Status: Awake, Alert, Oriented x 3   Language: Mildly delayed response time, no dain aphasia  Speech: No dysarthria  Cranial nerves: face symmetric, tongue midline, CN II-XII grossly intact.   Eyes: pupils equal, round, reactive to light with accomodation, EOMI.    Pulmonary: normal respirations, no signs of respiratory distress  Skin: Skin is warm, dry and intact.  Sensory: intact to light touch throughout  Motor Strength: Moves all extremities spontaneously with good tone. Grossly full strength upper and lower extremities. No abnormal movements seen.     Significant Labs:  Recent Labs   Lab 02/12/23 0229 02/13/23 0423   * 193*    140   K 3.8 4.3    106   CO2 23 22*   BUN 12 13   CREATININE 0.7 0.7   CALCIUM 9.8 10.6*   MG 1.8 1.9     Recent Labs   Lab 02/12/23 0229 02/13/23 0423   WBC 5.40 4.74   HGB 11.1* 11.5*   HCT 35.5* 37.5    341     No results for input(s): LABPT, INR, APTT in the last 48 hours.  Microbiology Results (last 7 days)       Procedure Component Value Units Date/Time    CSF culture [[id number]] Collected: 02/06/23 0802    Order Status: Completed Specimen: CSF (Spinal Fluid) from CSF Shunt Updated: 02/12/23 0740     CSF CULTURE No Growth     Gram Stain Result Cytospin indicates:      Rare WBC's      No organisms seen    CSF culture [[id number]] Collected: 02/02/23 1013    Order Status: Completed Specimen: CSF (Spinal Fluid) from CSF Tap, Tube 3 Updated: 02/07/23 0623     CSF CULTURE No Growth     Gram Stain Result No WBC's      No organisms seen          All pertinent labs from the last 24 hours have been reviewed.    Significant Diagnostics:  I have reviewed and interpreted all pertinent imaging results/findings within the past 24 hours.

## 2023-02-13 NOTE — PLAN OF CARE
Problem: Adult Inpatient Plan of Care  Goal: Plan of Care Review  Outcome: Ongoing, Progressing  Goal: Patient-Specific Goal (Individualized)  Description: Admit Date: 1/27/23     Outcome: Ongoing, Progressing  Goal: Absence of Hospital-Acquired Illness or Injury  Outcome: Ongoing, Progressing  Goal: Optimal Comfort and Wellbeing  Outcome: Ongoing, Progressing  Goal: Readiness for Transition of Care  Outcome: Ongoing, Progressing     Problem: Adjustment to Illness (Stroke, Hemorrhagic)  Goal: Optimal Coping  Outcome: Ongoing, Progressing     Problem: Bowel Elimination Impaired (Stroke, Hemorrhagic)  Goal: Effective Bowel Elimination  Outcome: Ongoing, Progressing     Problem: Cerebral Tissue Perfusion (Stroke, Hemorrhagic)  Goal: Optimal Cerebral Tissue Perfusion  Outcome: Ongoing, Progressing     Problem: Cognitive Impairment (Stroke, Hemorrhagic)  Goal: Optimal Cognitive Function  Outcome: Ongoing, Progressing     Problem: Communication Impairment (Stroke, Hemorrhagic)  Goal: Effective Communication Skills  Outcome: Ongoing, Progressing     Problem: Functional Ability Impaired (Stroke, Hemorrhagic)  Goal: Optimal Functional Ability  Outcome: Ongoing, Progressing     Problem: Pain (Stroke, Hemorrhagic)  Goal: Acceptable Pain Control  Outcome: Ongoing, Progressing     Problem: Respiratory Compromise (Stroke, Hemorrhagic)  Goal: Effective Oxygenation and Ventilation  Outcome: Ongoing, Progressing     Problem: Sensorimotor Impairment (Stroke, Hemorrhagic)  Goal: Improved Sensorimotor Function  Outcome: Ongoing, Progressing     Problem: Swallowing Impairment (Stroke, Hemorrhagic)  Goal: Optimal Eating and Swallowing Without Aspiration  Outcome: Ongoing, Progressing     Problem: Urinary Elimination Impaired (Stroke, Hemorrhagic)  Goal: Effective Urinary Elimination  Outcome: Ongoing, Progressing     Problem: Diabetes Comorbidity  Goal: Blood Glucose Level Within Targeted Range  Outcome: Ongoing, Progressing      Problem: Infection  Goal: Absence of Infection Signs and Symptoms  Outcome: Ongoing, Progressing     Problem: Skin Injury Risk Increased  Goal: Skin Health and Integrity  Outcome: Ongoing, Progressing     Problem: Fall Injury Risk  Goal: Absence of Fall and Fall-Related Injury  Outcome: Ongoing, Progressing     Problem: Restraint, Nonbehavioral (Nonviolent)  Goal: Absence of Harm or Injury  Outcome: Ongoing, Progressing     Problem: Adjustment to Illness (Delirium)  Goal: Optimal Coping  Outcome: Ongoing, Progressing     Problem: Altered Behavior (Delirium)  Goal: Improved Behavioral Control  Outcome: Ongoing, Progressing     Problem: Attention and Thought Clarity Impairment (Delirium)  Goal: Improved Attention and Thought Clarity  Outcome: Ongoing, Progressing     Problem: Sleep Disturbance (Delirium)  Goal: Improved Sleep  Outcome: Ongoing, Progressing

## 2023-02-13 NOTE — ASSESSMENT & PLAN NOTE
Nontraumatic subarachnoid hemorrhage  51 yo F w/ PMH DM1, HTN, and autoimmune hepatitis who presented to the ED with AMS after HA and vomiting the night before. Patient was non-verbal, B/l fixed pupils, and only intermittently following commands in B/L UE. GCS 8. MRI showed Acute IVH, SAH w/ obstructive hydrocephalus w/o signs of ischemia, and questionable R A2 QUIQUE aneurysm. VN consulted. Patient intubated in ED after concern for airway protection. NSGY placed EVD. CTA showed diffuse subarachnoid hemorrhage with moderate hydrocephalus. IR consulted for angiogram. No intracranial aneurysm or AVM identified. NIHSS 9 on admission.      - IR angiogram on 1/27 shows no aneurysm, AVM, or AV fistula.   - CTA 1/27 shows diffuse subarachnoid hemorrhage with moderate hydrocephalus. No intracranial aneurysm or AVM is identified.  - Repeat DSA on PBD 7 without aneurysm, AVM or AV fistula.   - EVD removed 2/7, post-pull CTH on 2/8 stable  - Stepped down to floor under NSGY service on 2/8     - Q4H neuro checks and vitals  - Last TCDs done 2/6 negative for vasospasm, discontinued  - Echo 1/28 reviewed: normal EF, normal systolic/diastolic function  - Daily labs, replete electrolytes PRN  - SBP goal <160; PRN labetalol, hydralazine   - SQ heparin  - PT/OT/SLP     IVH (intraventricular hemorrhage)  See SAH  EVD weaned and removed     Vasogenic cerebral edema  See SAH     Cardiac/Vascular  Hypertension  SBP 160s on arrival.   states patient does not take home Losartan regularly.      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 BG on arrival 284  Home regimen 13u Lantus; Lispro AC  A1C 6.5            -Transitioned from Insulin gtt to Detemir 16 BID  -Aspart increased to 7u TIDWM  -Hypoglycemic protocol in place  -Accuchecks AC/HS  -Will need follow up with established Endocrinologist after  discharge     Autoimmune hepatitis  Hx of AIH. Previously on mercaptopurine. Not currently taking.   LFTs slightly elevated on arrival.     -Will continue to monitor. Alk phos trending up, AST/ALT grossly stable.  -Avoid Tylenol and hepatotoxic agents  -CMP daily  -If LFTs worsen will obtain RUQ US.      Debility  PT/OT - recs for IPR    Encephalopathy, Agitation  Likely 2/2 cerebral hemorrhage, extended hospitalization/ICU stay    -Delirium precautions, frequent re-orientation  -Seroquel PRN for non-redirectable agitation  -Telesitter discontinued 2/9    Dispo: IPR, pending placement, medically stable to discharge pending placement

## 2023-02-13 NOTE — PROGRESS NOTES
[facility] - Neurosurgery ([facility])  Neurosurgery  Progress Note    Subjective:     History of Present Illness: 51 yo F with PMH of DM1, autoimmune hepatitis, and HTN who presents to ED this am after confusion this am. She was LKN last night and became confused after showering this am so  brought to the ED. She quickly decompensated in the ER and was not following commands or waking to stimulation. MRI and CTA showed diffuse thick SAH and hydrocephalus. No identifiable aneurysm seen on CTA.       Post-Op Info:  Procedure(s) (LRB):  ANGIOGRAM-CEREBRAL (N/A)   17 Days Post-Op     Interval History: PBD 15. NAEON. AFVSS. CTH done yesterday stable. Patient doing well without complaints, awaiting discharge to rehab pending placement.     Medications:  Continuous Infusions:  Scheduled Meds:   heparin (porcine)  5,000 Units Subcutaneous Q8H    insulin aspart U-100  7 Units Subcutaneous QID (AC & HS)    insulin detemir U-100  16 Units Subcutaneous BID     PRN Meds:acetaminophen, albuterol-ipratropium, dextrose 10%, glucagon (human recombinant), glucose, glucose, hydrALAZINE, insulin aspart U-100, labetalol, melatonin, ondansetron, QUEtiapine     Review of Systems  Constitutional:  Negative for chills and fever.   Eyes:  Negative for photophobia and visual disturbance.   Gastrointestinal:  Negative for nausea and vomiting.   Genitourinary:  Negative for difficulty urinating and dysuria.   Neurological:  Positive for headaches. Negative for speech difficulty, weakness and numbness.   Psychiatric/Behavioral:  Positive for confusion. Negative for agitation.   Objective:     Weight: 55.5 kg (122 lb 5.7 oz)  Body mass index is 22.38 kg/m².  Vital Signs (Most Recent):  Temp: 98.7 °F (37.1 °C) (02/13/23 1141)  Pulse: 86 (02/13/23 1141)  Resp: 20 (02/13/23 1141)  BP: (!) 103/55 (02/13/23 1141)  SpO2: (!) 94 % (02/13/23 1141)   Vital Signs (24h Range):  Temp:  [96.8 °F (36 °C)-98.7 °F (37.1 °C)] 98.7 °F (37.1 °C)  Pulse:   [76-89] 86  Resp:  [16-20] 20  SpO2:  [93 %-98 %] 94 %  BP: (100-140)/(55-70) 103/55                          Physical Exam    Neurosurgery Physical Exam  General: well developed, well nourished, no distress.   Head: normocephalic, atraumatic.               EVD site C/D/I with monocryl suture  GCS: E4 V5 M6; Total: 15  Mental Status: Awake, Alert, Oriented x 3   Language: Mildly delayed response time, no dain aphasia  Speech: No dysarthria  Cranial nerves: face symmetric, tongue midline, CN II-XII grossly intact.   Eyes: pupils equal, round, reactive to light with accomodation, EOMI.   Pulmonary: normal respirations, no signs of respiratory distress  Skin: Skin is warm, dry and intact.  Sensory: intact to light touch throughout  Motor Strength: Moves all extremities spontaneously with good tone. Grossly full strength upper and lower extremities. No abnormal movements seen.     Significant Labs:  Recent Labs   Lab 02/12/23 0229 02/13/23 0423   * 193*    140   K 3.8 4.3    106   CO2 23 22*   BUN 12 13   CREATININE 0.7 0.7   CALCIUM 9.8 10.6*   MG 1.8 1.9     Recent Labs   Lab 02/12/23 0229 02/13/23 0423   WBC 5.40 4.74   HGB 11.1* 11.5*   HCT 35.5* 37.5    341     No results for input(s): LABPT, INR, APTT in the last 48 hours.  Microbiology Results (last 7 days)       Procedure Component Value Units Date/Time    CSF culture [[id number]] Collected: 02/06/23 0802    Order Status: Completed Specimen: CSF (Spinal Fluid) from CSF Shunt Updated: 02/12/23 0740     CSF CULTURE No Growth     Gram Stain Result Cytospin indicates:      Rare WBC's      No organisms seen    CSF culture [[id number]] Collected: 02/02/23 1013    Order Status: Completed Specimen: CSF (Spinal Fluid) from CSF Tap, Tube 3 Updated: 02/07/23 0623     CSF CULTURE No Growth     Gram Stain Result No WBC's      No organisms seen          All pertinent labs from the last 24 hours have been reviewed.    Significant Diagnostics:  I  have reviewed and interpreted all pertinent imaging results/findings within the past 24 hours.    Assessment/Plan:     * Nontraumatic subarachnoid hemorrhage    Nontraumatic subarachnoid hemorrhage  53 yo F w/ PMH DM1, HTN, and autoimmune hepatitis who presented to the ED with AMS after HA and vomiting the night before. Patient was non-verbal, B/l fixed pupils, and only intermittently following commands in B/L UE. GCS 8. MRI showed Acute IVH, SAH w/ obstructive hydrocephalus w/o signs of ischemia, and questionable R A2 QUIQUE aneurysm. VN consulted. Patient intubated in ED after concern for airway protection. NSGY placed EVD. CTA showed diffuse subarachnoid hemorrhage with moderate hydrocephalus. IR consulted for angiogram. No intracranial aneurysm or AVM identified. NIHSS 9 on admission.      - IR angiogram on 1/27 shows no aneurysm, AVM, or AV fistula.   - CTA 1/27 shows diffuse subarachnoid hemorrhage with moderate hydrocephalus. No intracranial aneurysm or AVM is identified.  - Repeat DSA on PBD 7 without aneurysm, AVM or AV fistula.   - EVD removed 2/7, post-pull CTH on 2/8 stable  - Stepped down to floor under NSGY service on 2/8     - Q4H neuro checks and vitals  - Last TCDs done 2/6 negative for vasospasm, discontinued  - Echo 1/28 reviewed: normal EF, normal systolic/diastolic function  - Daily labs, replete electrolytes PRN  - SBP goal <160; PRN labetalol, hydralazine   - SQ heparin  - PT/OT/SLP     IVH (intraventricular hemorrhage)  See SAH  EVD weaned and removed     Vasogenic cerebral edema  See SAH     Cardiac/Vascular  Hypertension  SBP 160s on arrival.   states patient does not take home Losartan regularly.      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 BG on arrival 284  Home regimen 13u Lantus; Lispro AC  A1C 6.5            -Transitioned from Insulin gtt to  Detemir 16 BID  -Aspart increased to 7u TIDWM  -Hypoglycemic protocol in place  -Accuchecks AC/HS  -Will need follow up with established Endocrinologist after discharge     Autoimmune hepatitis  Hx of AIH. Previously on mercaptopurine. Not currently taking.   LFTs slightly elevated on arrival.     -Will continue to monitor. Alk phos trending up, AST/ALT grossly stable.  -Avoid Tylenol and hepatotoxic agents  -CMP daily  -If LFTs worsen will obtain RUQ US.      Debility  PT/OT - recs for IPR    Encephalopathy, Agitation  Likely 2/2 cerebral hemorrhage, extended hospitalization/ICU stay    -Delirium precautions, frequent re-orientation  -Seroquel PRN for non-redirectable agitation  -Telesitter discontinued 2/9    Dispo: IPR, pending placement, medically stable to discharge pending placement            Nighat Siegel PA-C  Neurosurgery  [facility] - Neurosurgery ([facility])

## 2023-02-13 NOTE — PLAN OF CARE
Recommendations  1. Continue Diabetic diet,2000 calorie   2. Continue Boost Glucose Control BID for optimization of protein and calorie intake   3. RD following     Goals: Meet % EEN by RD f/u.  Nutrition Goal Status: progressing towards goal  Communication of RD Recs:  (POC)

## 2023-02-13 NOTE — PLAN OF CARE
Problem: Adult Inpatient Plan of Care  Goal: Plan of Care Review  Outcome: Ongoing, Progressing  Flowsheets (Taken 2/13/2023 0131)  Plan of Care Reviewed With: patient     Problem: Adjustment to Illness (Stroke, Hemorrhagic)  Goal: Optimal Coping  Outcome: Ongoing, Progressing     Problem: Cerebral Tissue Perfusion (Stroke, Hemorrhagic)  Goal: Optimal Cerebral Tissue Perfusion  Outcome: Ongoing, Progressing     Problem: Cognitive Impairment (Stroke, Hemorrhagic)  Goal: Optimal Cognitive Function  Outcome: Ongoing, Progressing     Problem: Diabetes Comorbidity  Goal: Blood Glucose Level Within Targeted Range  Outcome: Ongoing, Progressing  Intervention: Monitor and Manage Glycemia  Flowsheets (Taken 2/13/2023 0131)  Glycemic Management: blood glucose monitored     Problem: Fall Injury Risk  Goal: Absence of Fall and Fall-Related Injury  Outcome: Ongoing, Progressing  Intervention: Promote Injury-Free Environment  Flowsheets (Taken 2/13/2023 0131)  Safety Promotion/Fall Prevention:   assistive device/personal item within reach   bed alarm set   Fall Risk reviewed with patient/family   nonskid shoes/socks when out of bed   /camera at bedside   side rails raised x 3   instructed to call staff for mobility     Problem: Adjustment to Illness (Delirium)  Goal: Optimal Coping  Outcome: Ongoing, Progressing   POC reviewed with patient, verbalized understanding. Patient confused and very forgetful. Pt denied any pain or discomfort; VSS. Blood glucose checked and insulin administered as ordered. Fall precautions maintained. Bed locked and in lowest position, side rails up and locked x3. Bed alarm set and audible, Tele sitter remains at bedside. See flow sheet for full assessment and VS info, will continue to monitor.

## 2023-02-13 NOTE — PLAN OF CARE
Problem: Adult Inpatient Plan of Care  Goal: Plan of Care Review  Outcome: Ongoing, Progressing  Goal: Patient-Specific Goal (Individualized)  Description: Admit Date: 23    Admit Dx:  SAH    Past Medical History:  No date: Autoimmune hepatitis      Comment:  managed by Dr. Russell on mercaptopurine  No date: Diabetes mellitus type II  No date: Hypertension    Past Surgical History:  2001:  SECTION, CLASSIC  2019: COLONOSCOPY; N/A      Comment:  Procedure: COLONOSCOPY;  Surgeon: Dipesh Victoria MD;  Location: [facility] ([address] [place] [address]);  Service: Endoscopy;                Laterality: N/A;  2013: FRACTURE SURGERY; Left      Comment:  leg   No date: MOUTH SURGERY    Individualization:   1. Lights dim  2. Stroke educational booklet updated every shift and reviewed with patient and family at bedside.     Restraints: 23 yes-pulling lines         Outcome: Ongoing, Progressing  Goal: Absence of Hospital-Acquired Illness or Injury  Outcome: Ongoing, Progressing  Goal: Optimal Comfort and Wellbeing  Outcome: Ongoing, Progressing  Goal: Readiness for Transition of Care  Outcome: Ongoing, Progressing     Problem: Adjustment to Illness (Stroke, Hemorrhagic)  Goal: Optimal Coping  Outcome: Ongoing, Progressing     Problem: Bowel Elimination Impaired (Stroke, Hemorrhagic)  Goal: Effective Bowel Elimination  Outcome: Ongoing, Progressing     Problem: Cerebral Tissue Perfusion (Stroke, Hemorrhagic)  Goal: Optimal Cerebral Tissue Perfusion  Outcome: Ongoing, Progressing     Problem: Cognitive Impairment (Stroke, Hemorrhagic)  Goal: Optimal Cognitive Function  Outcome: Ongoing, Progressing     Problem: Communication Impairment (Stroke, Hemorrhagic)  Goal: Effective Communication Skills  Outcome: Ongoing, Progressing     Problem: Functional Ability Impaired (Stroke, Hemorrhagic)  Goal: Optimal Functional Ability  Outcome: Ongoing, Progressing     Problem: Pain (Stroke, Hemorrhagic)  Goal:  Acceptable Pain Control  Outcome: Ongoing, Progressing     Problem: Respiratory Compromise (Stroke, Hemorrhagic)  Goal: Effective Oxygenation and Ventilation  Outcome: Ongoing, Progressing     Problem: Sensorimotor Impairment (Stroke, Hemorrhagic)  Goal: Improved Sensorimotor Function  Outcome: Ongoing, Progressing     Problem: Swallowing Impairment (Stroke, Hemorrhagic)  Goal: Optimal Eating and Swallowing Without Aspiration  Outcome: Ongoing, Progressing     Problem: Urinary Elimination Impaired (Stroke, Hemorrhagic)  Goal: Effective Urinary Elimination  Outcome: Ongoing, Progressing     Problem: Diabetes Comorbidity  Goal: Blood Glucose Level Within Targeted Range  Outcome: Ongoing, Progressing     Problem: Infection  Goal: Absence of Infection Signs and Symptoms  Outcome: Ongoing, Progressing     Problem: Skin Injury Risk Increased  Goal: Skin Health and Integrity  Outcome: Ongoing, Progressing     Problem: Fall Injury Risk  Goal: Absence of Fall and Fall-Related Injury  Outcome: Ongoing, Progressing     Problem: Restraint, Nonbehavioral (Nonviolent)  Goal: Absence of Harm or Injury  Outcome: Ongoing, Progressing     Problem: Adjustment to Illness (Delirium)  Goal: Optimal Coping  Outcome: Ongoing, Progressing     Problem: Altered Behavior (Delirium)  Goal: Improved Behavioral Control  Outcome: Ongoing, Progressing     Problem: Attention and Thought Clarity Impairment (Delirium)  Goal: Improved Attention and Thought Clarity  Outcome: Ongoing, Progressing     Problem: Sleep Disturbance (Delirium)  Goal: Improved Sleep  Outcome: Ongoing, Progressing

## 2023-02-13 NOTE — PT/OT/SLP PROGRESS
"Physical Therapy Treatment    Patient Name:  Leida Hoyos   MRN:  [id number]    Recommendations:     Discharge Recommendations: rehabilitation facility  Discharge Equipment Recommendations: none  Barriers to discharge:  Inaccessible home and Decreased caregiver support 1STE and requires assist for mobility    Assessment:     Leida Hoyos is a 53 y.o. female admitted with a medical diagnosis of Nontraumatic subarachnoid hemorrhage.  She presents with the following impairments/functional limitations: impaired self care skills, impaired functional mobility, impaired endurance, impaired cognition. Pt was seen seated in bedside chair and agreed to PT. Pt participated in gait training in the hallway @ SBA. Pt appeared to have min unsteady gait w/o LOB. Pt was able to identify 3 room numbers in the hallway w/o LOB according to POC. Pt will cont to benefit from skilled acute PT to improve strength, balance, and coordination to return to PLOF.    Rehab Prognosis: Good; patient would benefit from acute skilled PT services to address these deficits and reach maximum level of function.    Recent Surgery: Procedure(s) (LRB):  ANGIOGRAM-CEREBRAL (N/A) 17 Days Post-Op    Plan:     During this hospitalization, patient to be seen 4 x/week to address the identified rehab impairments via gait training, therapeutic activities, therapeutic exercises, neuromuscular re-education and progress toward the following goals:    Plan of Care Expires:  03/02/23    Subjective     Chief Complaint: None  Patient/Family Comments/goals: "Thank you for coming, come back tomorrow"  Pain/Comfort:  Pain Rating 1: 0/10      Objective:     Communicated with nurse prior to session.  Patient found up in chair with   upon PT entry to room.     General Precautions: Standard, aspiration  Orthopedic Precautions: N/A  Braces: N/A  Respiratory Status: Room air     Functional Mobility:  Transfers:     Sit to Stand:  supervision with no AD  Bed to Chair: " supervision with  no AD  using  Step Transfer  Gait: >300 ft w/o AD @ CGA in the hallway, min unsteady gait, decrease step length, decreased favio  Balance: Good standing balance with visual eye tracking in environment      AM-PAC 6 CLICK MOBILITY  Turning over in bed (including adjusting bedclothes, sheets and blankets)?: 4  Sitting down on and standing up from a chair with arms (e.g., wheelchair, bedside commode, etc.): 4  Moving from lying on back to sitting on the side of the bed?: 4  Climbing 3-5 steps with a railing?: 4       Treatment & Education:  Pt was able to identify 3/3 room numbers in hallway during gait training.  Pt was educated on progress toward PT goals.    Patient left up in chair with call button in reach, nurse notified, and friend present..    GOALS:   Multidisciplinary Problems       Physical Therapy Goals          Problem: Physical Therapy    Goal Priority Disciplines Outcome Goal Variances Interventions   Physical Therapy Goal     PT, PT/OT Ongoing, Progressing     Description: PT goals until 2/15/23    1. Pt supine to sit with minimal assist-  met 2/8/23  Revised goal: supine to sit with supervision-not met  2. Pt sit to supine with minimal assist -met 2/9/23  3. Pt sit to stand with LRAD as needed for safety with minimal assist- met 2/8/23  Revised goal: sit to stand with no AD with supervision- met 2/13/23  4. Pt to perform gait 20ft with LRAD as needed for safety with moderate assist.- met 2/3/23  Revised goal: gait 100ft with LRAD as needed for safety with CGA- met 2/9/23  Revised goal: gait 200ft with no AD with supervision- met 2/13/23  New goal: pt to be able to find 3/3 room numbers in the garcia while walking-met 2/13/23  5. Pt to go up/down curb step with LRAD as needed for safety with moderate assist.-not met  6. Pt to transfer bed to/from bedside chair with LRAD as needed for safety with minimal assist.- met 2/8/23  7. Pt to perform B LE exs in sitting or supine x 10 reps to  strengthen B LE to improve functional mobility.-not met                        Time Tracking:     PT Received On: 02/13/23  PT Start Time: 1650     PT Stop Time: 1707  PT Total Time (min): 17 min     Billable Minutes: Gait Training 17    Treatment Type: Treatment  PT/PTA: PTA     PTA Visit Number: 2     02/13/2023

## 2023-02-13 NOTE — PROGRESS NOTES
"[facility] - Neurosurgery ([facility])  Adult Nutrition  Progress Note    SUMMARY       Recommendations  1. Continue Diabetic diet,2000 calorie   2. Continue Boost Glucose Control BID for optimization of protein and calorie intake   3. RD following    Goals: Meet % EEN by RD f/u.  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 Pt currently on a diabetic diet. Reports consuming % meal consumption. Pt denies any issues w/ chewing/swallowing at this time. No n/v/d/c. UBW unknown. RD modified Boost Plus to Boost Glucose control d/t sugar levels. NFPE completed, 2/13/23, pt w/ no s/s of malnutrition at this time.  Nutrition Discharge Planning: Diabetic diet with texture per SLP    Nutrition Risk Screen    Nutrition Risk Screen: no indicators present    Nutrition/Diet History    Spiritual, Cultural Beliefs, Yarsanism Practices, Values that Affect Care: no  Food Allergies: NKFA    Anthropometrics    Temp: 98.7 °F (37.1 °C)  Height Method: Stated  Height: 5' 2" (157.5 cm)  Height (inches): 62 in  Weight Method: Bed Scale  Weight: 55.5 kg (122 lb 5.7 oz)  Weight (lb): 122.36 lb  Ideal Body Weight (IBW), Female: 110 lb  % Ideal Body Weight, Female (lb): 111.24 %  BMI " (Calculated): 22.4  BMI Grade: 18.5-24.9 - normal       Lab/Procedures/Meds    Pertinent Labs Reviewed: reviewed  Pertinent Labs Comments: Glucose 193  Pertinent Medications Reviewed: reviewed  Pertinent Medications Comments: insulin    Estimated/Assessed Needs    Weight Used For Calorie Calculations: 55.5 kg (122 lb 5.7 oz)  Energy Calorie Requirements (kcal): 1392 kcals  Energy Need Method: Hecker-St Jeor (MSJ x 1.25 PAL)  Protein Requirements: 56 g (1.0 g/kg)  Weight Used For Protein Calculations: 55.5 kg (122 lb 5.7 oz)  Fluid Requirements (mL): 1 mL/kcal or fluid per MD  Estimated Fluid Requirement Method: RDA Method  RDA Method (mL): 1392  CHO Requirement: 175 g      Nutrition Prescription Ordered    Current Diet Order: Diabetic diet, 2000 calorie     Evaluation of Received Nutrient/Fluid Intake    I/O: +4.2 L since 1/30/23  Energy Calories Required: meeting needs  Protein Required: meeting needs  Fluid Required: meeting needs  Total Fluid Intake (mL/kg): 1 ml or fluid per MD  % Intake of Estimated Energy Needs: 75 - 100 %  % Meal Intake: 75 - 100 %    Nutrition Risk    Level of Risk/Frequency of Follow-up:  (1 time/week)     Monitor and Evaluation    Food and Nutrient Intake: energy intake, food and beverage intake  Food and Nutrient Adminstration: diet order  Knowledge/Beliefs/Attitudes: food and nutrition knowledge/skill, beliefs and attitudes  Physical Activity and Function: factors affecting access to physical activity  Anthropometric Measurements: height/length, weight, weight change, body mass index  Biochemical Data, Medical Tests and Procedures: electrolyte and renal panel, gastrointestinal profile, glucose/endocrine profile, inflammatory profile, lipid profile  Nutrition-Focused Physical Findings: overall appearance, extremities, muscles and bones     Nutrition Follow-Up    RD Follow-up?: Yes  By Suzan Dowd, Registration Eligible, Provisional LDN

## 2023-02-14 LAB
ALBUMIN SERPL BCP-MCNC: 3.4 G/DL (ref 3.5–5.2)
ALP SERPL-CCNC: 237 U/L (ref 55–135)
ALT SERPL W/O P-5'-P-CCNC: 48 U/L (ref 10–44)
ANION GAP SERPL CALC-SCNC: 9 MMOL/L (ref 8–16)
AST SERPL-CCNC: 45 U/L (ref 10–40)
BASOPHILS # BLD AUTO: 0.03 K/UL (ref 0–0.2)
BASOPHILS NFR BLD: 0.6 % (ref 0–1.9)
BILIRUB SERPL-MCNC: 0.2 MG/DL (ref 0.1–1)
BUN SERPL-MCNC: 16 MG/DL (ref 6–20)
CALCIUM SERPL-MCNC: 10.7 MG/DL (ref 8.7–10.5)
CHLORIDE SERPL-SCNC: 101 MMOL/L (ref 95–110)
CO2 SERPL-SCNC: 28 MMOL/L (ref 23–29)
CREAT SERPL-MCNC: 0.8 MG/DL (ref 0.5–1.4)
DIFFERENTIAL METHOD: ABNORMAL
EOSINOPHIL # BLD AUTO: 0.2 K/UL (ref 0–0.5)
EOSINOPHIL NFR BLD: 4.3 % (ref 0–8)
ERYTHROCYTE [DISTWIDTH] IN BLOOD BY AUTOMATED COUNT: 13.2 % (ref 11.5–14.5)
EST. GFR  (NO RACE VARIABLE): >60 ML/MIN/1.73 M^2
GLUCOSE SERPL-MCNC: 168 MG/DL (ref 70–110)
HCT VFR BLD AUTO: 39.6 % (ref 37–48.5)
HGB BLD-MCNC: 12.1 G/DL (ref 12–16)
IMM GRANULOCYTES # BLD AUTO: 0.01 K/UL (ref 0–0.04)
IMM GRANULOCYTES NFR BLD AUTO: 0.2 % (ref 0–0.5)
LYMPHOCYTES # BLD AUTO: 2.2 K/UL (ref 1–4.8)
LYMPHOCYTES NFR BLD: 41.2 % (ref 18–48)
MAGNESIUM SERPL-MCNC: 1.9 MG/DL (ref 1.6–2.6)
MCH RBC QN AUTO: 30 PG (ref 27–31)
MCHC RBC AUTO-ENTMCNC: 30.6 G/DL (ref 32–36)
MCV RBC AUTO: 98 FL (ref 82–98)
MONOCYTES # BLD AUTO: 0.5 K/UL (ref 0.3–1)
MONOCYTES NFR BLD: 10.1 % (ref 4–15)
NEUTROPHILS # BLD AUTO: 2.3 K/UL (ref 1.8–7.7)
NEUTROPHILS NFR BLD: 43.6 % (ref 38–73)
NRBC BLD-RTO: 0 /100 WBC
PHOSPHATE SERPL-MCNC: 3.8 MG/DL (ref 2.7–4.5)
PLATELET # BLD AUTO: 412 K/UL (ref 150–450)
PMV BLD AUTO: 10 FL (ref 9.2–12.9)
POCT GLUCOSE: 161 MG/DL (ref 70–110)
POCT GLUCOSE: 177 MG/DL (ref 70–110)
POCT GLUCOSE: 214 MG/DL (ref 70–110)
POCT GLUCOSE: 248 MG/DL (ref 70–110)
POTASSIUM SERPL-SCNC: 4 MMOL/L (ref 3.5–5.1)
PROT SERPL-MCNC: 7.3 G/DL (ref 6–8.4)
RBC # BLD AUTO: 4.03 M/UL (ref 4–5.4)
SODIUM SERPL-SCNC: 138 MMOL/L (ref 136–145)
WBC # BLD AUTO: 5.34 K/UL (ref 3.9–12.7)

## 2023-02-14 PROCEDURE — 84100 ASSAY OF PHOSPHORUS: CPT

## 2023-02-14 PROCEDURE — 36415 COLL VENOUS BLD VENIPUNCTURE: CPT

## 2023-02-14 PROCEDURE — 97535 SELF CARE MNGMENT TRAINING: CPT

## 2023-02-14 PROCEDURE — 20600001 HC STEP DOWN PRIVATE ROOM

## 2023-02-14 PROCEDURE — 63600175 PHARM REV CODE 636 W HCPCS

## 2023-02-14 PROCEDURE — 80053 COMPREHEN METABOLIC PANEL: CPT

## 2023-02-14 PROCEDURE — 99232 SBSQ HOSP IP/OBS MODERATE 35: CPT | Mod: ,,, | Performed by: PHYSICIAN ASSISTANT

## 2023-02-14 PROCEDURE — 97129 THER IVNTJ 1ST 15 MIN: CPT

## 2023-02-14 PROCEDURE — 99232 PR SUBSEQUENT HOSPITAL CARE,LEVL II: ICD-10-PCS | Mod: ,,, | Performed by: PHYSICIAN ASSISTANT

## 2023-02-14 PROCEDURE — 83735 ASSAY OF MAGNESIUM: CPT

## 2023-02-14 PROCEDURE — 63600175 PHARM REV CODE 636 W HCPCS: Performed by: PHYSICIAN ASSISTANT

## 2023-02-14 PROCEDURE — 85025 COMPLETE CBC W/AUTO DIFF WBC: CPT

## 2023-02-14 RX ADMIN — INSULIN ASPART 6 UNITS: 100 INJECTION, SOLUTION INTRAVENOUS; SUBCUTANEOUS at 07:02

## 2023-02-14 RX ADMIN — INSULIN ASPART 7 UNITS: 100 INJECTION, SOLUTION INTRAVENOUS; SUBCUTANEOUS at 11:02

## 2023-02-14 RX ADMIN — HEPARIN SODIUM 5000 UNITS: 5000 INJECTION INTRAVENOUS; SUBCUTANEOUS at 05:02

## 2023-02-14 RX ADMIN — INSULIN ASPART 6 UNITS: 100 INJECTION, SOLUTION INTRAVENOUS; SUBCUTANEOUS at 05:02

## 2023-02-14 RX ADMIN — INSULIN DETEMIR 16 UNITS: 100 INJECTION, SOLUTION SUBCUTANEOUS at 08:02

## 2023-02-14 RX ADMIN — HEPARIN SODIUM 5000 UNITS: 5000 INJECTION INTRAVENOUS; SUBCUTANEOUS at 10:02

## 2023-02-14 RX ADMIN — INSULIN ASPART 7 UNITS: 100 INJECTION, SOLUTION INTRAVENOUS; SUBCUTANEOUS at 07:02

## 2023-02-14 RX ADMIN — INSULIN ASPART 7 UNITS: 100 INJECTION, SOLUTION INTRAVENOUS; SUBCUTANEOUS at 08:02

## 2023-02-14 RX ADMIN — INSULIN ASPART 7 UNITS: 100 INJECTION, SOLUTION INTRAVENOUS; SUBCUTANEOUS at 05:02

## 2023-02-14 RX ADMIN — HEPARIN SODIUM 5000 UNITS: 5000 INJECTION INTRAVENOUS; SUBCUTANEOUS at 02:02

## 2023-02-14 RX ADMIN — INSULIN ASPART 3 UNITS: 100 INJECTION, SOLUTION INTRAVENOUS; SUBCUTANEOUS at 11:02

## 2023-02-14 NOTE — PLAN OF CARE
[facility] - Neurosurgery ([facility])  Discharge Reassessment    Primary Care Provider: Frieda Mera MD    Expected Discharge Date: 2/14/2023    Patient is medically ready for discharge.  Patient is pending peer to peer after initial denial for Rehab. Peer to peer scheduled for today 2/14 at 4:00 with Dr. Wagner for JEREMY Gomez/NS.    Reassessment (most recent)       Discharge Reassessment - 02/14/23 1224          Discharge Reassessment    Assessment Type Discharge Planning Reassessment     Did the patient's condition or plan change since previous assessment? Yes     Discharge Plan discussed with: Spouse/sig other;Patient     Communicated ISAIAH with patient/caregiver Yes     Discharge Plan A Rehab     Discharge Plan B Home with family;Home Health     DME Needed Upon Discharge  other (see comments)   tbd    Discharge Barriers Identified None     Why the patient remains in the hospital Insurance issues        Post-Acute Status    Post-Acute Authorization Placement     Post-Acute Placement Status Pending payor medical review/second level review     Discharge Delays None known at this time

## 2023-02-14 NOTE — PLAN OF CARE
Problem: Adjustment to Illness (Stroke, Hemorrhagic)  Goal: Optimal Coping  Outcome: Ongoing, Progressing     Problem: Bowel Elimination Impaired (Stroke, Hemorrhagic)  Goal: Effective Bowel Elimination  Outcome: Ongoing, Progressing

## 2023-02-14 NOTE — PROGRESS NOTES
[facility] - Neurosurgery ([facility])  Neurosurgery  Progress Note    Subjective:     History of Present Illness: 53 yo F with PMH of DM1, autoimmune hepatitis, and HTN who presents to ED this am after confusion this am. She was LKN last night and became confused after showering this am so  brought to the ED. She quickly decompensated in the ER and was not following commands or waking to stimulation. MRI and CTA showed diffuse thick SAH and hydrocephalus. No identifiable aneurysm seen on CTA.       Post-Op Info:  Procedure(s) (LRB):  ANGIOGRAM-CEREBRAL (N/A)   18 Days Post-Op     Interval History: NAEON. AFVSS. Pt neurologically stable, ongoing confusion/disorientation, continues to require assistance and frequent reorientation. Headaches improving. LFT's trending up, CTM, will obtain liver US. Participating with PT/OT/SLP. IPR denied by insurance, pending peer to peer.    Medications:  Continuous Infusions:  Scheduled Meds:   heparin (porcine)  5,000 Units Subcutaneous Q8H    insulin aspart U-100  7 Units Subcutaneous QID (AC & HS)    insulin detemir U-100  16 Units Subcutaneous BID     PRN Meds:acetaminophen, albuterol-ipratropium, dextrose 10%, glucagon (human recombinant), glucose, glucose, hydrALAZINE, insulin aspart U-100, labetalol, melatonin, ondansetron, QUEtiapine     Review of Systems  Constitutional:  Negative for chills and fever.   Eyes:  Negative for photophobia and visual disturbance.   Gastrointestinal:  Negative for nausea and vomiting.   Genitourinary:  Negative for difficulty urinating and dysuria.   Neurological:  Positive for headaches. Negative for speech difficulty, weakness and numbness.   Psychiatric/Behavioral:  Positive for confusion. Negative for agitation.   Objective:     Weight: 55.5 kg (122 lb 5.7 oz)  Body mass index is 22.38 kg/m².  Vital Signs (Most Recent):  Temp: 98.1 °F (36.7 °C) (02/14/23 1132)  Pulse: 86 (02/14/23 1132)  Resp: 16 (02/14/23 1132)  BP: 128/74 (02/14/23  1132)  SpO2: 95 % (02/14/23 1132) Vital Signs (24h Range):  Temp:  [97.3 °F (36.3 °C)-98.5 °F (36.9 °C)] 98.1 °F (36.7 °C)  Pulse:  [71-91] 86  Resp:  [16-17] 16  SpO2:  [95 %-97 %] 95 %  BP: (111-128)/(58-74) 128/74                          Physical Exam    Neurosurgery Physical Exam  General: well developed, well nourished, no distress.   Head: normocephalic, atraumatic.               EVD site C/D/I with monocryl suture  GCS: E4 V5 M6; Total: 15  Mental Status: Awake, Alert, Oriented x 2-3 with reorientation. Decreased concentration, memory deficits present.   Language: Mildly delayed response time, no dain aphasia  Speech: No dysarthria  Cranial nerves: face symmetric, tongue midline, CN II-XII grossly intact.   Eyes: pupils equal, round, reactive to light with accomodation, EOMI.   Pulmonary: normal respirations, no signs of respiratory distress  Skin: Skin is warm, dry and intact.  Sensory: intact to light touch throughout  Motor Strength: Moves all extremities spontaneously with good tone. Grossly full strength upper and lower extremities. No abnormal movements seen.     Significant Labs:  Recent Labs   Lab 02/13/23 0423 02/14/23 0437   * 168*    138   K 4.3 4.0    101   CO2 22* 28   BUN 13 16   CREATININE 0.7 0.8   CALCIUM 10.6* 10.7*   MG 1.9 1.9     Recent Labs   Lab 02/13/23 0423 02/14/23 0437   WBC 4.74 5.34   HGB 11.5* 12.1   HCT 37.5 39.6    412     No results for input(s): LABPT, INR, APTT in the last 48 hours.  Microbiology Results (last 7 days)       Procedure Component Value Units Date/Time    CSF culture [[id number]] Collected: 02/06/23 0802    Order Status: Completed Specimen: CSF (Spinal Fluid) from CSF Shunt Updated: 02/12/23 0740     CSF CULTURE No Growth     Gram Stain Result Cytospin indicates:      Rare WBC's      No organisms seen          All pertinent labs from the last 24 hours have been reviewed.    Significant Diagnostics:  I have reviewed and interpreted  all pertinent imaging results/findings within the past 24 hours.    Assessment/Plan:     * Nontraumatic subarachnoid hemorrhage    Nontraumatic subarachnoid hemorrhage  51 yo F w/ PMH DM1, HTN, and autoimmune hepatitis who presented to the ED with AMS after HA and vomiting the night before. Patient was non-verbal, B/l fixed pupils, and only intermittently following commands in B/L UE. GCS 8. MRI showed Acute IVH, SAH w/ obstructive hydrocephalus w/o signs of ischemia, and questionable R A2 QUIQUE aneurysm. VN consulted. Patient intubated in ED after concern for airway protection. NSGY placed EVD. CTA showed diffuse subarachnoid hemorrhage with moderate hydrocephalus. IR consulted for angiogram. No intracranial aneurysm or AVM identified. NIHSS 9 on admission.      - IR angiogram on 1/27 shows no aneurysm, AVM, or AV fistula.   - CTA 1/27 shows diffuse subarachnoid hemorrhage with moderate hydrocephalus. No intracranial aneurysm or AVM is identified.  - Repeat DSA on PBD 7 without aneurysm, AVM or AV fistula.   - EVD removed 2/7, post-pull CTH on 2/8 stable  - Stepped down to floor under NSGY service on 2/8     - Q4H neuro checks and vitals  - Last TCDs done 2/6 negative for vasospasm, discontinued  - Echo 1/28 reviewed: normal EF, normal systolic/diastolic function  - Daily labs, replete electrolytes PRN  - SBP goal <160; PRN labetalol, hydralazine   - SQ heparin  - PT/OT/SLP     IVH (intraventricular hemorrhage)  See SAH  EVD weaned and removed     Vasogenic cerebral edema  See SAH     Cardiac/Vascular  Hypertension  SBP 160s on arrival.   states patient does not take home Losartan regularly.      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 BG on arrival 284  Home regimen 13u Lantus; Lispro AC  A1C 6.5            -Transitioned from Insulin gtt to Detemir 16 BID  -Aspart increased  to 7u TIDWM  -High dose SSI  -Hypoglycemic protocol in place  -Accuchecks AC/HS  -Will need follow up with established Endocrinologist after discharge     Autoimmune hepatitis  Hx of AIH. Previously on mercaptopurine. Not currently taking.   LFTs slightly elevated on arrival.     -Will continue to monitor. LFT's trending up.  -CTM, daily CMP  -Will obtain liver US  -Avoid Tylenol and hepatotoxic agents, Seroquel dose decreased     Debility  PT/OT - recs for IPR    Encephalopathy, Agitation  Likely 2/2 cerebral hemorrhage, extended hospitalization/ICU stay    -Delirium precautions, frequent re-orientation  -Seroquel 12.5 mg nightly PRN for non-redirectable agitation  -Telesitter discontinued 2/9    Dispo: IPR, pending placement, medically stable to discharge pending placement - denied by insurance, pending P2P            Nighat Siegel PA-C  Neurosurgery  [facility] - Neurosurgery ([facility])

## 2023-02-14 NOTE — PT/OT/SLP PROGRESS
"Occupational Therapy   Treatment    Name: Leida Hoyos  MRN: [id number]  Admitting Diagnosis:  Nontraumatic subarachnoid hemorrhage  18 Days Post-Op    Recommendations:     Discharge Recommendations: rehabilitation facility  Discharge Equipment Recommendations:  none  Barriers to discharge:  None    Assessment:     Leida Hoyos is a 53 y.o. female with a medical diagnosis of Nontraumatic subarachnoid hemorrhage.  She presents with performance deficits affecting function are impaired self care skills, impaired functional mobility, impaired cognition.     Rehab Prognosis:  Good; patient would benefit from acute skilled OT services to address these deficits and reach maximum level of function.       Plan:     Patient to be seen 3 x/week to address the above listed problems via self-care/home management, therapeutic activities, therapeutic exercises, neuromuscular re-education, cognitive retraining  Plan of Care Expires: 02/25/23  Plan of Care Reviewed with: patient    Subjective   Patient:  "This is a hotel."  "It's 2009."  Pain/Comfort:  Pain Rating 1: 0/10  Pain Rating Post-Intervention 1: 0/10    Objective:     Communicated with: Nurse prior to session.  Patient found supine with bed alarm, telemetry upon OT entry to room.    General Precautions: Standard, aspiration    Orthopedic Precautions:N/A  Braces: N/A  Respiratory Status: Room air     Occupational Performance:     Bed Mobility:    Patient completed Rolling/Turning to Left with  modified independence  Patient completed Rolling/Turning to Right with modified independence  Patient completed Supine to Sit with modified independence  Patient completed Sit to Supine with modified independence     Functional Mobility/Transfers:  Patient completed Sit <> Stand Transfer with supervision  with  no assistive device   Patient completed Bed <> Chair Transfer using Stand Pivot technique with supervision with no assistive device    Activities of Daily " Living:  Grooming: stand by assistance    Upper Body Dressing: stand by assistance    Lower Body Dressing: stand by assistance      [facility] 6 Click ADL: 18    Treatment & Education:  Patient alert and oriented x person.  Able to follow 4/4 one step commands.  Patient attentive and interactive throughout the session.  Patient able to  sequence 7/7 days of the week and 12/12 months of the year.  Able to perform simple problem solving scenarios.  Poor memory noted throughout the session.    Patient left supine with all lines intact, call button in reach, and bed alarm on    GOALS:   Multidisciplinary Problems       Occupational Therapy Goals          Problem: Occupational Therapy    Goal Priority Disciplines Outcome Interventions   Occupational Therapy Goal     OT, PT/OT Ongoing, Progressing    Description: Goals set 2/10 to be addressed for 14 days with expiration date, 2/24:  Patient will increase functional independence with ADLs by performing:  Patient will demonstrate stand pivot transfers with modified independence.   Not met  Patient will demonstrate grooming while seated with modified independence.   Not met  Patient will demonstrate upper body dressing with modified independence while seated EOB.   Not met  Patient will demonstrate lower body dressing with modified independence while seated EOB.   Not met  Patient will demonstrate toileting with modified independence.   Not met  Patient will demonstrate bathing while seated EOB with modified independence.   Not met  Patient's family / caregiver will demonstrate independence and safety with assisting patient with self-care skills and functional mobility.     Not met                               Time Tracking:     OT Date of Treatment: 02/14/23  OT Start Time: 0609  OT Stop Time: 0632  OT Total Time (min): 23 min    Billable Minutes:Self Care/Home Management 15  Cognitive Retraining 8    OT/ANDRES: OT          2/14/2023

## 2023-02-14 NOTE — PLAN OF CARE
Patient spouse provided denial letter for Rehab.  CM advised that peer to peer is scheduled for 4:00 today.

## 2023-02-14 NOTE — SUBJECTIVE & OBJECTIVE
Interval History: NAEON. AFVSS. Pt neurologically stable, ongoing confusion/disorientation, continues to require assistance and frequent reorientation. Headaches improving. LFT's trending up, CTM, will obtain liver US. Participating with PT/OT/SLP. IPR denied by insurance, pending peer to peer.    Medications:  Continuous Infusions:  Scheduled Meds:   heparin (porcine)  5,000 Units Subcutaneous Q8H    insulin aspart U-100  7 Units Subcutaneous QID (AC & HS)    insulin detemir U-100  16 Units Subcutaneous BID     PRN Meds:acetaminophen, albuterol-ipratropium, dextrose 10%, glucagon (human recombinant), glucose, glucose, hydrALAZINE, insulin aspart U-100, labetalol, melatonin, ondansetron, QUEtiapine     Review of Systems  Constitutional:  Negative for chills and fever.   Eyes:  Negative for photophobia and visual disturbance.   Gastrointestinal:  Negative for nausea and vomiting.   Genitourinary:  Negative for difficulty urinating and dysuria.   Neurological:  Positive for headaches. Negative for speech difficulty, weakness and numbness.   Psychiatric/Behavioral:  Positive for confusion. Negative for agitation.   Objective:     Weight: 55.5 kg (122 lb 5.7 oz)  Body mass index is 22.38 kg/m².  Vital Signs (Most Recent):  Temp: 98.1 °F (36.7 °C) (02/14/23 1132)  Pulse: 86 (02/14/23 1132)  Resp: 16 (02/14/23 1132)  BP: 128/74 (02/14/23 1132)  SpO2: 95 % (02/14/23 1132) Vital Signs (24h Range):  Temp:  [97.3 °F (36.3 °C)-98.5 °F (36.9 °C)] 98.1 °F (36.7 °C)  Pulse:  [71-91] 86  Resp:  [16-17] 16  SpO2:  [95 %-97 %] 95 %  BP: (111-128)/(58-74) 128/74                          Physical Exam    Neurosurgery Physical Exam  General: well developed, well nourished, no distress.   Head: normocephalic, atraumatic.               EVD site C/D/I with monocryl suture  GCS: E4 V5 M6; Total: 15  Mental Status: Awake, Alert, Oriented x 2-3 with reorientation. Decreased concentration, memory deficits present.   Language: Mildly delayed  response time, no dain aphasia  Speech: No dysarthria  Cranial nerves: face symmetric, tongue midline, CN II-XII grossly intact.   Eyes: pupils equal, round, reactive to light with accomodation, EOMI.   Pulmonary: normal respirations, no signs of respiratory distress  Skin: Skin is warm, dry and intact.  Sensory: intact to light touch throughout  Motor Strength: Moves all extremities spontaneously with good tone. Grossly full strength upper and lower extremities. No abnormal movements seen.     Significant Labs:  Recent Labs   Lab 02/13/23 0423 02/14/23 0437   * 168*    138   K 4.3 4.0    101   CO2 22* 28   BUN 13 16   CREATININE 0.7 0.8   CALCIUM 10.6* 10.7*   MG 1.9 1.9     Recent Labs   Lab 02/13/23 0423 02/14/23 0437   WBC 4.74 5.34   HGB 11.5* 12.1   HCT 37.5 39.6    412     No results for input(s): LABPT, INR, APTT in the last 48 hours.  Microbiology Results (last 7 days)       Procedure Component Value Units Date/Time    CSF culture [[id number]] Collected: 02/06/23 0802    Order Status: Completed Specimen: CSF (Spinal Fluid) from CSF Shunt Updated: 02/12/23 0740     CSF CULTURE No Growth     Gram Stain Result Cytospin indicates:      Rare WBC's      No organisms seen          All pertinent labs from the last 24 hours have been reviewed.    Significant Diagnostics:  I have reviewed and interpreted all pertinent imaging results/findings within the past 24 hours.

## 2023-02-14 NOTE — ASSESSMENT & PLAN NOTE
Nontraumatic subarachnoid hemorrhage  51 yo F w/ PMH DM1, HTN, and autoimmune hepatitis who presented to the ED with AMS after HA and vomiting the night before. Patient was non-verbal, B/l fixed pupils, and only intermittently following commands in B/L UE. GCS 8. MRI showed Acute IVH, SAH w/ obstructive hydrocephalus w/o signs of ischemia, and questionable R A2 QUIQUE aneurysm. VN consulted. Patient intubated in ED after concern for airway protection. NSGY placed EVD. CTA showed diffuse subarachnoid hemorrhage with moderate hydrocephalus. IR consulted for angiogram. No intracranial aneurysm or AVM identified. NIHSS 9 on admission.      - IR angiogram on 1/27 shows no aneurysm, AVM, or AV fistula.   - CTA 1/27 shows diffuse subarachnoid hemorrhage with moderate hydrocephalus. No intracranial aneurysm or AVM is identified.  - Repeat DSA on PBD 7 without aneurysm, AVM or AV fistula.   - EVD removed 2/7, post-pull CTH on 2/8 stable  - Stepped down to floor under NSGY service on 2/8     - Q4H neuro checks and vitals  - Last TCDs done 2/6 negative for vasospasm, discontinued  - Echo 1/28 reviewed: normal EF, normal systolic/diastolic function  - Daily labs, replete electrolytes PRN  - SBP goal <160; PRN labetalol, hydralazine   - SQ heparin  - PT/OT/SLP     IVH (intraventricular hemorrhage)  See SAH  EVD weaned and removed     Vasogenic cerebral edema  See SAH     Cardiac/Vascular  Hypertension  SBP 160s on arrival.   states patient does not take home Losartan regularly.      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 BG on arrival 284  Home regimen 13u Lantus; Lispro AC  A1C 6.5            -Transitioned from Insulin gtt to Detemir 16 BID  -Aspart increased to 7u TIDWM  -High dose SSI  -Hypoglycemic protocol in place  -Accuchecks AC/HS  -Will need follow up with established  Endocrinologist after discharge     Autoimmune hepatitis  Hx of AIH. Previously on mercaptopurine. Not currently taking.   LFTs slightly elevated on arrival.     -Will continue to monitor. LFT's trending up.  -CTM, daily CMP  -Will obtain liver US  -Avoid Tylenol and hepatotoxic agents, Seroquel dose decreased     Debility  PT/OT - recs for IPR    Encephalopathy, Agitation  Likely 2/2 cerebral hemorrhage, extended hospitalization/ICU stay    -Delirium precautions, frequent re-orientation  -Seroquel 12.5 mg nightly PRN for non-redirectable agitation  -Telesitter discontinued 2/9    Dispo: IPR, pending placement, medically stable to discharge pending placement - denied by insurance, pending P2P

## 2023-02-15 VITALS
OXYGEN SATURATION: 96 % | HEIGHT: 62 IN | SYSTOLIC BLOOD PRESSURE: 135 MMHG | WEIGHT: 122.38 LBS | BODY MASS INDEX: 22.52 KG/M2 | RESPIRATION RATE: 15 BRPM | TEMPERATURE: 98 F | DIASTOLIC BLOOD PRESSURE: 70 MMHG | HEART RATE: 82 BPM

## 2023-02-15 LAB
ALBUMIN SERPL BCP-MCNC: 3.3 G/DL (ref 3.5–5.2)
ALP SERPL-CCNC: 221 U/L (ref 55–135)
ALT SERPL W/O P-5'-P-CCNC: 48 U/L (ref 10–44)
ANION GAP SERPL CALC-SCNC: 10 MMOL/L (ref 8–16)
AST SERPL-CCNC: 38 U/L (ref 10–40)
BASOPHILS # BLD AUTO: 0.04 K/UL (ref 0–0.2)
BASOPHILS NFR BLD: 0.8 % (ref 0–1.9)
BILIRUB SERPL-MCNC: 0.3 MG/DL (ref 0.1–1)
BUN SERPL-MCNC: 18 MG/DL (ref 6–20)
CALCIUM SERPL-MCNC: 10.5 MG/DL (ref 8.7–10.5)
CHLORIDE SERPL-SCNC: 105 MMOL/L (ref 95–110)
CO2 SERPL-SCNC: 26 MMOL/L (ref 23–29)
CREAT SERPL-MCNC: 0.8 MG/DL (ref 0.5–1.4)
DIFFERENTIAL METHOD: ABNORMAL
EOSINOPHIL # BLD AUTO: 0.2 K/UL (ref 0–0.5)
EOSINOPHIL NFR BLD: 3.8 % (ref 0–8)
ERYTHROCYTE [DISTWIDTH] IN BLOOD BY AUTOMATED COUNT: 13.2 % (ref 11.5–14.5)
EST. GFR  (NO RACE VARIABLE): >60 ML/MIN/1.73 M^2
GLUCOSE SERPL-MCNC: 167 MG/DL (ref 70–110)
HCT VFR BLD AUTO: 38.2 % (ref 37–48.5)
HGB BLD-MCNC: 12 G/DL (ref 12–16)
IMM GRANULOCYTES # BLD AUTO: 0.01 K/UL (ref 0–0.04)
IMM GRANULOCYTES NFR BLD AUTO: 0.2 % (ref 0–0.5)
LYMPHOCYTES # BLD AUTO: 1.9 K/UL (ref 1–4.8)
LYMPHOCYTES NFR BLD: 38.6 % (ref 18–48)
MAGNESIUM SERPL-MCNC: 1.9 MG/DL (ref 1.6–2.6)
MCH RBC QN AUTO: 30.5 PG (ref 27–31)
MCHC RBC AUTO-ENTMCNC: 31.4 G/DL (ref 32–36)
MCV RBC AUTO: 97 FL (ref 82–98)
MONOCYTES # BLD AUTO: 0.5 K/UL (ref 0.3–1)
MONOCYTES NFR BLD: 9.7 % (ref 4–15)
NEUTROPHILS # BLD AUTO: 2.3 K/UL (ref 1.8–7.7)
NEUTROPHILS NFR BLD: 46.9 % (ref 38–73)
NRBC BLD-RTO: 0 /100 WBC
PHOSPHATE SERPL-MCNC: 4 MG/DL (ref 2.7–4.5)
PLATELET # BLD AUTO: 325 K/UL (ref 150–450)
PMV BLD AUTO: 9.3 FL (ref 9.2–12.9)
POCT GLUCOSE: 157 MG/DL (ref 70–110)
POCT GLUCOSE: 159 MG/DL (ref 70–110)
POCT GLUCOSE: 216 MG/DL (ref 70–110)
POTASSIUM SERPL-SCNC: 4 MMOL/L (ref 3.5–5.1)
PROT SERPL-MCNC: 7.1 G/DL (ref 6–8.4)
RBC # BLD AUTO: 3.94 M/UL (ref 4–5.4)
SODIUM SERPL-SCNC: 141 MMOL/L (ref 136–145)
WBC # BLD AUTO: 4.97 K/UL (ref 3.9–12.7)

## 2023-02-15 PROCEDURE — 63600175 PHARM REV CODE 636 W HCPCS

## 2023-02-15 PROCEDURE — 36415 COLL VENOUS BLD VENIPUNCTURE: CPT

## 2023-02-15 PROCEDURE — 99239 HOSP IP/OBS DSCHRG MGMT >30: CPT | Mod: ,,, | Performed by: PHYSICIAN ASSISTANT

## 2023-02-15 PROCEDURE — 92507 TX SP LANG VOICE COMM INDIV: CPT

## 2023-02-15 PROCEDURE — 84100 ASSAY OF PHOSPHORUS: CPT

## 2023-02-15 PROCEDURE — 80053 COMPREHEN METABOLIC PANEL: CPT

## 2023-02-15 PROCEDURE — 85025 COMPLETE CBC W/AUTO DIFF WBC: CPT

## 2023-02-15 PROCEDURE — 83735 ASSAY OF MAGNESIUM: CPT

## 2023-02-15 PROCEDURE — 99239 PR HOSPITAL DISCHARGE DAY,>30 MIN: ICD-10-PCS | Mod: ,,, | Performed by: PHYSICIAN ASSISTANT

## 2023-02-15 PROCEDURE — 97535 SELF CARE MNGMENT TRAINING: CPT

## 2023-02-15 PROCEDURE — 97116 GAIT TRAINING THERAPY: CPT | Mod: CQ

## 2023-02-15 RX ORDER — QUETIAPINE FUMARATE 25 MG/1
25 TABLET, FILM COATED ORAL NIGHTLY PRN
Start: 2023-02-15 | End: 2023-03-27

## 2023-02-15 RX ADMIN — HEPARIN SODIUM 5000 UNITS: 5000 INJECTION INTRAVENOUS; SUBCUTANEOUS at 05:02

## 2023-02-15 RX ADMIN — INSULIN ASPART 7 UNITS: 100 INJECTION, SOLUTION INTRAVENOUS; SUBCUTANEOUS at 08:02

## 2023-02-15 RX ADMIN — INSULIN ASPART 6 UNITS: 100 INJECTION, SOLUTION INTRAVENOUS; SUBCUTANEOUS at 08:02

## 2023-02-15 RX ADMIN — INSULIN ASPART 7 UNITS: 100 INJECTION, SOLUTION INTRAVENOUS; SUBCUTANEOUS at 12:02

## 2023-02-15 RX ADMIN — INSULIN ASPART 3 UNITS: 100 INJECTION, SOLUTION INTRAVENOUS; SUBCUTANEOUS at 12:02

## 2023-02-15 RX ADMIN — INSULIN DETEMIR 16 UNITS: 100 INJECTION, SOLUTION SUBCUTANEOUS at 09:02

## 2023-02-15 NOTE — PT/OT/SLP PROGRESS
Physical Therapy Treatment    Patient Name:  Leida Hoyos   MRN:  [id number]    Recommendations:     Discharge Recommendations: rehabilitation facility  Discharge Equipment Recommendations: none  Barriers to discharge: Inaccessible home and Decreased caregiver support    Assessment:     Leida Hoyos is a 53 y.o. female admitted with a medical diagnosis of Nontraumatic subarachnoid hemorrhage.  She presents with the following impairments/functional limitations: impaired self care skills, impaired functional mobility, impaired cognition, impaired endurance. Pt participated, and tolerated treatment well. Pt will continue to benefit from skilled PT services to improve all deficits noted above.     Rehab Prognosis: Good; patient would benefit from acute skilled PT services to address these deficits and reach maximum level of function.    Recent Surgery: Procedure(s) (LRB):  ANGIOGRAM-CEREBRAL (N/A) 19 Days Post-Op    Plan:     During this hospitalization, patient to be seen 4 x/week to address the identified rehab impairments via gait training, therapeutic activities, therapeutic exercises, neuromuscular re-education and progress toward the following goals:    Plan of Care Expires:  03/02/23    Subjective     Chief Complaint: none stated  Patient/Family Comments/goals: none stated  Pain/Comfort:  Pain Rating 1: 0/10  Pain Rating Post-Intervention 1: 0/10      Objective:     Communicated with nursing prior to session.  Patient found HOB elevated with  (no active lines) upon PT entry to room.     General Precautions: Standard, aspiration  Orthopedic Precautions: N/A  Braces: N/A  Respiratory Status: Room air     Functional Mobility:  Bed Mobility:  Scooting: supervision  Supine to Sit: supervision  Transfers:  Sit to Stand:  supervision with no AD  Gait: ~300ft no AD with CGA due to gait instability.       AM-PAC 6 CLICK MOBILITY  Turning over in bed (including adjusting bedclothes, sheets and blankets)?:  4  Sitting down on and standing up from a chair with arms (e.g., wheelchair, bedside commode, etc.): 4  Moving from lying on back to sitting on the side of the bed?: 4  Moving to and from a bed to a chair (including a wheelchair)?: 4  Need to walk in hospital room?: 3  Climbing 3-5 steps with a railing?: 4  Basic Mobility Total Score: 23       Treatment & Education:  -All questions/concerns answered within PTA scope of practice.     Patient left up in chair with all lines intact, call button in reach, chair alarm on, nursing notified, and spouse present..    GOALS:   Multidisciplinary Problems       Physical Therapy Goals          Problem: Physical Therapy    Goal Priority Disciplines Outcome Goal Variances Interventions   Physical Therapy Goal     PT, PT/OT Ongoing, Progressing     Description: PT goals until 2/15/23    1. Pt supine to sit with minimal assist-  met 2/8/23  Revised goal: supine to sit with supervision-not met  2. Pt sit to supine with minimal assist -met 2/9/23  3. Pt sit to stand with LRAD as needed for safety with minimal assist- met 2/8/23  Revised goal: sit to stand with no AD with supervision-not met  4. Pt to perform gait 20ft with LRAD as needed for safety with moderate assist.- met 2/3/23  Revised goal: gait 100ft with LRAD as needed for safety with CGA- met 2/9/23  Revised goal: gait 200ft with no AD with supervision-not met  New goal: pt to be able to find 3/3 room numbers in the garcia while walking-not met  5. Pt to go up/down curb step with LRAD as needed for safety with moderate assist.-not met  6. Pt to transfer bed to/from bedside chair with LRAD as needed for safety with minimal assist.- met 2/8/23  7. Pt to perform B LE exs in sitting or supine x 10 reps to strengthen B LE to improve functional mobility.-not met                        Time Tracking:     PT Received On: 02/15/23  PT Start Time: 0905     PT Stop Time: 0922  PT Total Time (min): 17 min     Billable Minutes: Gait  Training 17    Treatment Type: Treatment  PT/PTA: PTA     PTA Visit Number: 3     02/15/2023

## 2023-02-15 NOTE — NURSING
Called report to nurse receiving  patient and answered all questions.Patient left unit via a wheelchair, spouse was on her side.

## 2023-02-15 NOTE — PLAN OF CARE
Problem: Adult Inpatient Plan of Care  Goal: Plan of Care Review  Outcome: Ongoing, Progressing  Goal: Patient-Specific Goal (Individualized)  Description: Admit Date: 23    Admit Dx:  SAH    Past Medical History:  No date: Autoimmune hepatitis      Comment:  managed by Dr. Russell on mercaptopurine  No date: Diabetes mellitus type II  No date: Hypertension    Past Surgical History:  :  SECTION, CLASSIC  2019: COLONOSCOPY; N/A      Comment:  Procedure: COLONOSCOPY;  Surgeon: Dipesh Victoria MD;  Location: [address]);  Service: Endoscopy;                Laterality: N/A;  2013: FRACTURE SURGERY; Left      Comment:  leg   No date: MOUTH SURGERY    Individualization:   1. Lights dim  2. Stroke educational booklet updated every shift and reviewed with patient and family at bedside.     Restraints: 23 yes-pulling lines         Outcome: Ongoing, Progressing  Goal: Absence of Hospital-Acquired Illness or Injury  Outcome: Ongoing, Progressing  Goal: Optimal Comfort and Wellbeing  Outcome: Ongoing, Progressing  Goal: Readiness for Transition of Care  Outcome: Ongoing, Progressing   POC and meds reviewed with patient and spouse.No concerns noted.Will continue to monitor.

## 2023-02-15 NOTE — PLAN OF CARE
Problem: Adult Inpatient Plan of Care  Goal: Plan of Care Review  Outcome: Ongoing, Progressing  Goal: Patient-Specific Goal (Individualized)  Description: Admit Date: 23    Admit Dx:  SAH    Past Medical History:  No date: Autoimmune hepatitis      Comment:  managed by Dr. Russell on mercaptopurine  No date: Diabetes mellitus type II  No date: Hypertension    Past Surgical History:  :  SECTION, CLASSIC  2019: COLONOSCOPY; N/A      Comment:  Procedure: COLONOSCOPY;  Surgeon: Dipesh Victoria MD;  Location: [facility] ([address] [place] [address]);  Service: Endoscopy;                Laterality: N/A;  2013: FRACTURE SURGERY; Left      Comment:  leg   No date: MOUTH SURGERY    Individualization:   1. Lights dim  2. Stroke educational booklet updated every shift and reviewed with patient and family at bedside.     Restraints: 23 yes-pulling lines         Outcome: Ongoing, Progressing  Goal: Absence of Hospital-Acquired Illness or Injury  Outcome: Ongoing, Progressing  Goal: Optimal Comfort and Wellbeing  Outcome: Ongoing, Progressing  Goal: Readiness for Transition of Care  Outcome: Ongoing, Progressing   POC and meds reviewed.Patient is AAOX4, ambulates to bathroom.No c/o pain or discomfort.Bed is in low position, bed rails up X2, call light within reach and bed alarm on.Spouse is at bedside.Will continue to monitor.

## 2023-02-15 NOTE — DISCHARGE SUMMARY
[facility] Buck - Neurosurgery ([facility])  Neurosurgery  Discharge Summary      Patient Name: Leida Hoyos  MRN: [id number]  Admission Date: 1/27/2023  Hospital Length of Stay: 19 days  Discharge Date and Time:  02/15/2023 10:14 AM  Attending Physician: Celso Khan MD   Discharging Provider: Nighat Siegel PA-C  Primary Care Provider: Frieda Mera MD    HPI:   51 yo F with PMH of DM1, autoimmune hepatitis, and HTN who presents to ED this am after confusion this am. She was LKN last night and became confused after showering this am so  brought to the ED. She quickly decompensated in the ER and was not following commands or waking to stimulation. MRI and CTA showed diffuse thick SAH and hydrocephalus. No identifiable aneurysm seen on CTA.       Procedure(s) (LRB):  ANGIOGRAM-CEREBRAL (N/A)     Hospital Course: 1/27: PBD 0. patient with worsening exam in ED with diffuse SAH. Intubated and EVD placed. Angio was negative for any aneurysm  1/28: PBD 1. grossly stable on exam on propofol. EVD drained 23cc, ICPs wnl.   1/29: PBD 2, neuro exam improving. On pcdx. EVD put out 100, ICPs wnl.  1/30: PBD 3, exam improving, following commands. R EVD at 20, output 92, ICP 13-22  1/31: PBD 4. Neuro exam improving. Following some commands. EVD at 20, output 91cc. ICPs 6-18  2/1: PBD 5. Neuro exam improving, more alert and talking this morning. EVD at 20, ICP 8-19, clamping today.   2/2: PBD 6. Sleepier this morning on exam. EVD clamped yesterday, ICPs wnl. CTH this am with decrease in ventricular size overall from prior.  2/3: PBD7   repeat angio today to evaluate for aneurysm. Tolerating EVD clamp   2/4: PBD 8, angio yesterday negative. ICP up to 30 intraop. EVD reopened to 10. ICP resolved. CTH post op with mild worsening in ventriculomegaly. Exam stable this morning.   2/5: neuro stable. EVD raised to 20 today  2/6: NAEON, EVD draining at 20, ouptut 102, ICP 0-15. Clamping EVD today  2/7: No acute events,  pt tolerated EVD clamp with stable interval imaging. Will discontinue EVD today and re-scan tomorrow morning.   2/8: No acute events, EVD removed without complication and with stable imaging. Pt is disoriented to place again today, otherwise intact, no headaches. EVD site is dry and intact.  2/9: NAEON. AFVSS. Stepped down from NCC to floor under NSGY service. Had some increased agitation/confusion overnight, attempting to get OOB, resolved with dose of Seroquel. Pt sleeping this am but easily arousable, Aox2, cooperative and following commands w/o difficulty.  at bedside. Reports ongoing intermittent HA but no other acut complaints. Participating with therapy, recs for IPR.   2/10: PBD 14. NAEON. AFVSS. Pt's mentation continues to improve, alert & oriented x 3 this am, sitting up in chair. No issues overnight or since telesitter discontinued yesterday. Requesting to take a shower. Medically stable for rehab pending placement.  2/11: PBD 15. NAEON. AFVSS. CTH done yesterday stable. Patient doing well without complaints, awaiting discharge to rehab pending placement.   2/12 naeon, pending IPR  2/13: NAEON. AFVSS. Pt doing well, slept better through the night after getting dose of Seroquel. Awaiting IPR placement, pending insurance authorization.  2/14: NAEON. AFVSS. Pt neurologically stable, ongoing confusion/disorientation, continues to require assistance and frequent reorientation. Headaches improving. LFT's trending up, CTM, will obtain liver US. Participating with PT/OT/SLP. IPR denied by insurance, pending peer to peer.  2/15: NAEON. AFVSS. Pt remains neurologically stable with intermittent headaches and confusion. LFT's trended down slightly today, liver US done with results pending. Medically stable for discharge to IPR today. Discussed discharge instructions with pt and , all questions answered, follow up appointment scheduled. Encouraged to call our office with any questions or  concerns.      Neurosurgery Physical Exam  General: well developed, well nourished, no distress.   Head: normocephalic, atraumatic.               EVD site C/D/I with monocryl suture  GCS: E4 V5 M6; Total: 15  Mental Status: Awake, Alert, Oriented x 2-3 with reorientation. Decreased concentration, memory deficits present.   Language: Mildly delayed response time, no dain aphasia  Speech: No dysarthria  Cranial nerves: face symmetric, tongue midline, CN II-XII grossly intact.   Eyes: pupils equal, round, reactive to light with accomodation, EOMI.   Pulmonary: normal respirations, no signs of respiratory distress  Skin: Skin is warm, dry and intact.  Sensory: intact to light touch throughout  Motor Strength: Moves all extremities spontaneously with good tone. Grossly full strength upper and lower extremities. No abnormal movements seen.       Goals of Care Treatment Preferences:  Code Status: Full Code      Consults:   Consults (From admission, onward)        Status Ordering Provider     Inpatient consult to Physical Medicine Rehab  Once        Provider:  (Not yet assigned)    Completed NORBERT RIVAS     Inpatient consult to Physical Medicine Rehab  Once        Provider:  Jose Crooks MD    Completed JOSE CROOKS     Inpatient consult to Physical Medicine Rehab  Once        Provider:  (Not yet assigned)    Completed ADI SHAW     Inpatient consult to Registered Dietitian/Nutritionist  Once        Provider:  (Not yet assigned)    Completed ADI SHAW     Inpatient consult to Social Work/Case Management  Once        Provider:  (Not yet assigned)    Completed ADI SHAW     Inpatient consult to Neurosurgery  Once        Provider:  (Not yet assigned)    Completed ADI SHAW     Inpatient consult to Interventional Radiology  Once        Provider:  (Not yet assigned)    Completed ADI SHAW     Inpatient consult to Vascular (Stroke) Neurology  Once        Provider:  (Not yet assigned)    Completed  CHAD GO          Significant Diagnostic Studies: Labs:   BMP:   Recent Labs   Lab 02/14/23  0437 02/15/23  0451   * 167*    141   K 4.0 4.0    105   CO2 28 26   BUN 16 18   CREATININE 0.8 0.8   CALCIUM 10.7* 10.5   MG 1.9 1.9   , CMP   Recent Labs   Lab 02/14/23  0437 02/15/23  0451    141   K 4.0 4.0    105   CO2 28 26   * 167*   BUN 16 18   CREATININE 0.8 0.8   CALCIUM 10.7* 10.5   PROT 7.3 7.1   ALBUMIN 3.4* 3.3*   BILITOT 0.2 0.3   ALKPHOS 237* 221*   AST 45* 38   ALT 48* 48*   ANIONGAP 9 10   , CBC   Recent Labs   Lab 02/14/23 0437 02/15/23  0451   WBC 5.34 4.97   HGB 12.1 12.0   HCT 39.6 38.2    325   , INR   Lab Results   Component Value Date    INR 1.2 01/27/2023    INR 1.0 01/27/2023   , A1C:   Recent Labs   Lab 09/20/22  0720 01/27/23  1531   HGBA1C 6.6* 6.5*    and All labs within the past 24 hours have been reviewed  Radiology:   Imaging Results          CT Head Without Contrast (Final result)  Result time 01/27/23 14:31:35    Final result by Ruperto Henry MD (01/27/23 14:31:35)                 Impression:      Postsurgical changes status post intraventricular catheter placement with its tip between the frontal horns.  Stable moderate hydrocephalus.      Electronically signed by: Ruperto Henry  Date:    01/27/2023  Time:    14:31             Narrative:    EXAMINATION:  CT HEAD WITHOUT CONTRAST    CLINICAL HISTORY:  Subarachnoid hemorrhage (SAH) suspected;    TECHNIQUE:  Low dose axial images were obtained through the head.  Coronal and sagittal reformations were also performed. Contrast was not administered.    COMPARISON:  CT earlier the same day.    FINDINGS:  Since the prior study an intraventricular catheter has been placed the a right transfrontal approach with its tip between the frontal horns.  The ventricles are similar in appearance to the prior study with moderate dilatation.  Mild pneumocephalus.    Diffuse subarachnoid hemorrhage and  intraventricular hemorrhage is similar to the prior study.  No new intraparenchymal process.                               CTA Head and Neck (xpd) (Final result)  Result time 01/27/23 13:30:20    Final result by Ruperto Henry MD (01/27/23 13:30:20)                 Impression:      Diffuse subarachnoid hemorrhage with moderate hydrocephalus.    No intracranial aneurysm or AVM is identified.    Atherosclerotic disease but no advanced stenosis of the carotid or vertebral arteries in the neck with no major branch stenosis/occlusion at the [id number]      Electronically signed by: Ruperto Henry  Date:    01/27/2023  Time:    13:30             Narrative:    EXAMINATION:  CTA HEAD AND NECK (XPD)    CLINICAL HISTORY:  IVH and SAH(R QUIQUE);    TECHNIQUE:  Non contrast low dose axial images were obtained through the head.  CT angiogram was performed from the level of the viviana to the top of the head following the IV administration of 75mL of Omnipaque 350.   Sagittal and coronal reconstructions and maximum intensity projection reconstructions were performed. Arterial stenosis percentages are based on NASCET measurement criteria.    3D reformats were created on an independent workstation to evaluate the yrxhmd-bc-Daklsv.    COMPARISON:  MRI/MRA 01/27/2023    FINDINGS:  CT head:    There is diffuse subarachnoid hemorrhage throughout the basal cisterns with intraventricular hemorrhage most notable in the 3rd and 4th ventricles.  Moderate hydrocephalus.    No acute territorial infarct.  No midline shift or herniation.No enhancing lesion identified.    The calvarium is intact.    CTA:    Calcifications result in at least mild stenosis at the origin of the right brachiocephalic artery and left subclavian artery.  Calcification with mild narrowing at the origin of the right vertebral artery.    No significant stenosis at the carotid bifurcations by NASCET criteria.  The vertebral arteries are patent without advanced  stenosis.  No evidence of dissection.    Intracranially no major branch stenosis/occlusion at the kvclee-sq-Ouuddm.  No evidence of aneurysm or AVM.    The venous sinuses are patent with a prominent arachnoid granulation presumed on the left transverse sinus.    No soft tissue mass is identified in the neck.  Presumed mild chronic compression fractures of the superior endplate of C7 and inferior endplate of T2.                               X-Ray Chest 1 View (Final result)  Result time 01/27/23 10:48:15    Final result by Kendell Gilbert MD (01/27/23 10:48:15)                 Impression:      No acute abnormality.      Electronically signed by: Kendell Gilbert MD  Date:    01/27/2023  Time:    10:48             Narrative:    EXAMINATION:  XR CHEST 1 VIEW    CLINICAL HISTORY:  Altered mental status, unspecified    TECHNIQUE:  Single view of the chest were performed.    COMPARISON:  No prior study    FINDINGS:  The trachea and cardiomediastinal silhouette are within normal limits.  There is no evidence of pleural effusions, pneumothoraces or consolidations.  Chronic appearing interstitial markings are identified bilaterally.  Osseous structures demonstrate no evidence for acute fractures or dislocations.                               MRI Brain Ischemic Inter Pro Incl MRA W/O Con (Final result)  Result time 01/27/23 10:41:00    Final result by Cory Vazquez MD (01/27/23 10:41:00)                 Impression:      1. Acute intraventricular and subarachnoid hemorrhage, as described.  Findings compatible with early obstructive hydrocephalus and transependymal CSF egress.  2. No evidence of acute ischemia or recent infarction.  3. MRA motion compromised. Tiny approximately 1.5-2 mm laterally directed outpouching at the proximal right A2 QUIQUE could represent infundibulum/tortuous origin of a proximal QUIQUE branch, noting that a small aneurysm is difficult to exclude given motion.  Further characterization with CTA could be  performed given extensive motion on the current study.  Prelim results called to Dr. Perales at approximately 10:27, 01/27/2023.      Electronically signed by: Cory Vazquez  Date:    01/27/2023  Time:    10:41             Narrative:    EXAMINATION:  MRI BRAIN ISCHEMIC INTERVENTIONAL PROTOCOL INCL MRA W/O CONTRAST    CLINICAL HISTORY:  altered mental status;    TECHNIQUE:  Abbreviated stroke protocol MRI of the brain was performed.  7 [place] is time-of-flight MRA of the head was performed with multiplanar reformats and MIP reconstructions.    COMPARISON:  None    FINDINGS:  MRI brain:    Parenchyma: There is no restricted diffusion to suggest acute or subacute ischemic infarct.Few areas of nonspecific T2/FLAIR hyperintense signal in the frontoparietal white matter could relate to sequela of chronic small vessel ischemic disease.Sub append mole T2/FLAIR hyperintense signal may relate to early transependymal CSF egress.    Additional comments: Subarachnoid hemorrhage is noted about the cerebral hemispheres,, right greater left cerebellum, basilar cisterns, and within the posterior fossa extending to the upper cervical spinal canal.    Ventricles: Suspected early obstructive hydrocephalus related to intraventricular hemorrhage.    MRA:    Motion compromised examination.    Right anterior circulation:No definite flow-limiting stenosis.  Mild atheromatous irregularity of the ICA suggested .  Flow related signal appears to be present at the proximal ophthalmic artery.Tiny approximately 1.5-2 mm laterally directed outpouching at the proximal right A2 QUIQUE (axial series 13, image 121), could represent infundibulum/tortuous origin of a proximal QUIQUE branch, noting that a small aneurysm is difficult to exclude given motion.    Left anterior circulation: No definite limiting stenosis.  Mild atheromatous irregularity of the ICA suggested.Flow related signal appears to be present at the proximal thumb liza artery.    Posterior  circulation: There is no hemodynamically significant vertebrobasilar stenosis. There is no significant PCA stenosis. Posterior inferior cerebellar arteries patent at origin.    Anterior and posterior communicating arteries: The anterior communicating artery appears patent.  Posterior communicating arteries not reliably visualized.                                  Pending Diagnostic Studies:     Procedure Component Value Units Date/Time    Potassium [[id number]] Collected: 02/05/23 1137    Order Status: Sent Lab Status: In process Updated: 02/05/23 1138    Specimen: Blood     US Liver with Doppler (xpd) [[id number]] Resulted: 02/15/23 0833    Order Status: Sent Lab Status: In process Updated: 02/15/23 0859        Final Active Diagnoses:    Diagnosis Date Noted POA    PRINCIPAL PROBLEM:  Nontraumatic subarachnoid hemorrhage [I60.9] 01/27/2023 Yes    Debility [R53.81] 02/03/2023 Yes    Vasogenic cerebral edema [G93.6] 01/27/2023 Yes    IVH (intraventricular hemorrhage) [I61.5] 01/27/2023 Yes    Hypertension [I10] 06/30/2020 Yes    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    Specialty: Neurosurgery  Why: Follow up for subarachnoid hemorrhage. With CTA before appointment.  Contact information:  [address]  [facility] [address] [phone]  Additional information:  8th Floor [facility] [place]   Please park in [facility].   Check in desk is located in the lobby. Please take the C elevator to 8th floor which opens to the lobby.           Nini Kurtz MD. Schedule an appointment as soon as possible for a visit in  2 week(s).    Specialty: Endocrinology  Contact information:  [address]  [facility] [address]  [phone]                       Patient Instructions:      CTA Head and Neck (xpd)   Standing Status: Future Standing Exp. Date: 02/10/24     Order Specific Question Answer Comments   Is the patient allergic to iodine contrast? No    Does this patient have impaired renal function? No    May the Radiologist modify the order per protocol to meet the clinical needs of the patient? Yes      Notify your health care provider if you experience any of the following:  temperature >100.4     Notify your health care provider if you experience any of the following:  persistent nausea and vomiting or diarrhea     Notify your health care provider if you experience any of the following:  severe uncontrolled pain     Notify your health care provider if you experience any of the following:  redness, tenderness, or signs of infection (pain, swelling, redness, odor or green/yellow discharge around incision site)     Notify your health care provider if you experience any of the following:  difficulty breathing or increased cough     Notify your health care provider if you experience any of the following:  severe persistent headache     Notify your health care provider if you experience any of the following:  worsening rash     Notify your health care provider if you experience any of the following:  persistent dizziness, light-headedness, or visual disturbances     Notify your health care provider if you experience any of the following:  increased confusion or weakness     Activity as tolerated     Medications:  Reconciled Home Medications:      Medication List      START taking these medications    acetaminophen 650 mg/20.3 mL Soln  Commonly known as: TYLENOL  Take 20.3 mLs (650 mg total) by mouth every 6 (six) hours as needed for Pain (headaches).     * insulin aspart U-100 100 unit/mL (3 mL) Inpn pen  Commonly known as:  "NovoLOG  Inject 0-15 Units into the skin before meals and at bedtime as needed (hyperglycemia).     * insulin aspart U-100 100 unit/mL (3 mL) Inpn pen  Commonly known as: NovoLOG  Inject 7 Units into the skin 4 (four) times daily before meals and nightly.     insulin detemir U-100 100 unit/mL (3 mL) Inpn pen  Commonly known as: Levemir FLEXTOUCH  Inject 16 Units into the skin 2 (two) times daily.     melatonin 3 mg tablet  Commonly known as: MELATIN  Take 2 tablets (6 mg total) by mouth nightly as needed for Insomnia.     QUEtiapine 25 MG Tab  Commonly known as: SEROQUEL  Take 1 tablet (25 mg total) by mouth nightly as needed (Agitation).         * This list has 2 medication(s) that are the same as other medications prescribed for you. Read the directions carefully, and ask your doctor or other care provider to review them with you.            CONTINUE taking these medications    blood sugar diagnostic Strp  For one touch verio IQ meter     cholecalciferol (vitamin D3) 25 mcg (1,000 unit) capsule  Commonly known as: VITAMIN D3  Take 1 capsule (1,000 Units total) by mouth once daily.     DEXCOM G6 SENSOR Melissa  Generic drug: blood-glucose sensor  Monitor blood sugars daily     DEXCOM G6 TRANSMITTER Melissa  Generic drug: blood-glucose transmitter  USE AS DIRECTED for daily use TO TEST BLOOD GLUCOSE.     multivitamin per tablet  Commonly known as: THERAGRAN  Take 1 tablet by mouth once daily.     ONETOUCH DELICA PLUS LANCET 33 gauge Misc  Generic drug: lancets  USE AS DIRECTED TO TEST BLOOD GLUCOSE.     ONETOUCH VERIO IQ METER MISC  OneTouch Verio IQ Meter     pen needle, diabetic 32 gauge x 5/32" Ndle  Commonly known as: BD ULTRA-FINE NICK PEN NEEDLE  USE FOUR TIMES A DAY FOR INSULIN INJECTIONS     pravastatin 10 MG tablet  Commonly known as: PRAVACHOL  Take 1 tablet (10 mg total) by mouth once daily.        STOP taking these medications    aspirin 81 MG EC tablet  Commonly known as: ECOTRIN     losartan 25 MG " tablet  Commonly known as: COZAAR        ASK your doctor about these medications    insulin lispro 100 unit/mL pen  Commonly known as: HumaLOG KwikPen Insulin  Takes 5 units/7 units and 9 units before meals, plus correction for a TDD 25 units     LANTUS SOLOSTAR U-100 INSULIN glargine 100 units/mL SubQ pen  Generic drug: insulin  INJECT 13 UNITS UNDER THE SKIN EVERY EVENING            Nighat Siegel PA-C  Neurosurgery  [facility] ([facility])

## 2023-02-15 NOTE — PT/OT/SLP PROGRESS
"Speech Language Pathology Treatment    Patient Name:  Leida Hoyos   MRN:  [id number]  Admitting Diagnosis: Nontraumatic subarachnoid hemorrhage    Recommendations:                 General Recommendations:  Speech/language therapy and Cognitive-linguistic therapy  Diet recommendations:  Regular, Liquid Diet Level: Thin   Aspiration Precautions: Standard aspiration precautions   General Precautions: Standard, aspiration  Communication strategies:  none    Subjective     :I am going to work tomorrow" per pt  Patient goals: home     Pain/Comfort:  Pain Rating 1: 0/10  Pain Rating Post-Intervention 1: 0/10    Respiratory Status: Room air    Objective:     Has the patient been evaluated by SLP for swallowing?   Yes  Keep patient NPO? No   Current Respiratory Status:        Pt. Seen at bedside and was alert though confusion was evident.  Ms. Hoyos was oriented to month,year, place and recalled recent holiday.  Recall of personal biographical information was good.  Ms. Hoyos responded to category exclusion tasks in field of 4 with 90% accuracy with delays in responding noted.  She responded to functional math and time calculations with 60% accuracy and completed verbal analogies with 100% accuracy.  Pt. Requesting to make a phone call using the remote verbal cues.  Max verbal cues needed to instruct pt. On how to place a phone call.  Ongoing education provided to spouse re cognitive activities.   Assessment:     Leida Hoyos is a 53 y.o. female with an SLP diagnosis of Cognitive-Linguistic Impairment.   Goals:   Multidisciplinary Problems       SLP Goals          Problem: SLP    Goal Priority Disciplines Outcome   SLP Goal     SLP Ongoing, Progressing   Description: Goals due 2/10  1.  Pt. Will participate in ongoing assessment of swallow at bedside  2.  Tolerate trials of regular diet with thin liquids with no s/s of aspiration  3.  Assess functional reading and writing skills  4.  [place] to time and " place  5.  Respond to word finding/categorization tasks with 80% accuracy  6.  Respond to verbal problem solving tasks with 75% accuracy                       Plan:     Patient to be seen:  4 x/week   Plan of Care expires:  02/27/23  Plan of Care reviewed with:  patient   SLP Follow-Up:  Yes       Discharge recommendations:  rehabilitation facility   Barriers to Discharge:  None    Time Tracking:     SLP Treatment Date:   02/15/23  Speech Start Time:  0755  Speech Stop Time:  0815     Speech Total Time (min):  20 min    Billable Minutes: Speech Therapy Individual 12 and Self Care/Home Management Training 8    02/15/2023

## 2023-02-15 NOTE — PLAN OF CARE
[facility]  Discharge Final Note    Primary Care Provider: Frieda Mera MD    Expected Discharge Date: 2/15/2023    Final Discharge Note (most recent)       Final Note - 02/15/23 1152          Final Note    Assessment Type Final Discharge Note (P)      Anticipated Discharge Disposition Rehab Facility (P)      What phone number can be called within the next 1-3 days to see how you are doing after discharge? [phone] (P)      Hospital Resources/Appts/Education Provided Provided patient/caregiver with written discharge plan information (P)         Post-Acute Status    Post-Acute Authorization Placement (P)      Post-Acute Placement Status Set-up Complete/Auth obtained (P)      Patient choice form signed by patient/caregiver List from System Post-Acute Care (P)      Discharge Delays Ambulance Transport/Facility Transport (P)                      Contact Info       [facility]   Specialty: Neurosurgery    [address]  [place] [address]   Phone: [phone]       Next Steps: Follow up on 3/28/2023    Instructions: Follow up on 3/28 10am for subarachnoid hemorrhage. With CTA before appointment.    Nini Kurtz MD   Specialty: Endocrinology    [address] [facility] [address]   Phone: [phone]       Next Steps: Schedule an appointment as soon as possible for a visit in 2 week(s)          Pt is discharging to [facility], wheelchair van set up for 12:30 PM. RN and Pt notified.     RN to call report to [phone]     Pt has no other post acute needs and is cleared for discharge from a case management standpoint.     AUTUMN Campbell, LMSW [facility]  [id number]

## 2023-02-15 NOTE — NURSING
Patient is discharged to [facility].Attempted to call  report, nurse receiving patient not ready to get report and will call back soon.

## 2023-02-27 ENCOUNTER — PATIENT MESSAGE (OUTPATIENT)
Dept: PRIMARY CARE CLINIC | Facility: CLINIC | Age: 53
End: 2023-02-27
Payer: COMMERCIAL

## 2023-02-27 ENCOUNTER — PATIENT MESSAGE (OUTPATIENT)
Dept: ENDOCRINOLOGY | Facility: CLINIC | Age: 53
End: 2023-02-27
Payer: COMMERCIAL

## 2023-02-27 ENCOUNTER — PATIENT MESSAGE (OUTPATIENT)
Dept: NEUROSURGERY | Facility: CLINIC | Age: 53
End: 2023-02-27
Payer: COMMERCIAL

## 2023-02-28 DIAGNOSIS — I60.9 NONTRAUMATIC SUBARACHNOID HEMORRHAGE: Primary | ICD-10-CM

## 2023-02-28 RX ORDER — HYDROXYZINE HYDROCHLORIDE 25 MG/1
25 TABLET, FILM COATED ORAL NIGHTLY PRN
Qty: 30 TABLET | Refills: 1 | Status: SHIPPED | OUTPATIENT
Start: 2023-02-28 | End: 2024-01-24

## 2023-03-03 PROBLEM — R41.841 COGNITIVE COMMUNICATION DEFICIT: Status: ACTIVE | Noted: 2023-03-03

## 2023-03-06 ENCOUNTER — PATIENT MESSAGE (OUTPATIENT)
Dept: NEUROSURGERY | Facility: CLINIC | Age: 53
End: 2023-03-06
Payer: COMMERCIAL

## 2023-03-06 PROBLEM — Z78.9 IMPAIRED MOBILITY AND ACTIVITIES OF DAILY LIVING: Status: ACTIVE | Noted: 2023-03-06

## 2023-03-06 PROBLEM — Z74.09 IMPAIRED MOBILITY AND ACTIVITIES OF DAILY LIVING: Status: ACTIVE | Noted: 2023-03-06

## 2023-03-15 ENCOUNTER — PATIENT MESSAGE (OUTPATIENT)
Dept: PRIMARY CARE CLINIC | Facility: CLINIC | Age: 53
End: 2023-03-15
Payer: COMMERCIAL

## 2023-03-16 ENCOUNTER — PATIENT MESSAGE (OUTPATIENT)
Dept: PRIMARY CARE CLINIC | Facility: CLINIC | Age: 53
End: 2023-03-16

## 2023-03-16 ENCOUNTER — E-VISIT (OUTPATIENT)
Dept: PRIMARY CARE CLINIC | Facility: CLINIC | Age: 53
End: 2023-03-16
Payer: COMMERCIAL

## 2023-03-16 DIAGNOSIS — N30.00 ACUTE CYSTITIS WITHOUT HEMATURIA: Primary | ICD-10-CM

## 2023-03-16 PROCEDURE — 99421 PR E&M, ONLINE DIGIT, EST, < 7 DAYS, 5-10 MINS: ICD-10-PCS | Mod: 95,,, | Performed by: INTERNAL MEDICINE

## 2023-03-16 PROCEDURE — 99421 OL DIG E/M SVC 5-10 MIN: CPT | Mod: 95,,, | Performed by: INTERNAL MEDICINE

## 2023-03-16 RX ORDER — NITROFURANTOIN 25; 75 MG/1; MG/1
100 CAPSULE ORAL 2 TIMES DAILY
Qty: 10 CAPSULE | Refills: 0 | Status: SHIPPED | OUTPATIENT
Start: 2023-03-16 | End: 2023-03-20

## 2023-03-16 NOTE — PROGRESS NOTES
Patient ID: Leida Hoyos is a 53 y.o. female.    Chief Complaint: Urinary Tract Infection    The patient initiated a request through [facility] on 3/16/2023 for evaluation and management with a chief complaint of Urinary Tract Infection     I evaluated the questionnaire submission on 3/16/2023.    [facility] Peq Evisit Uti Questionnaire    3/16/2023 12:38 PM CDT - Filed by Patient   Do you agree to participate in an E-Visit? Yes   If you have any of the following problems, please present to your local ER or call 911:  I acknowledge   What is the main issue that you would like for your doctor to address today? Constatnt urge to urinate   Are you able to take your vital signs? Yes   Systolic Blood Pressure: 128   Diastolic Blood Pressure: 79   Weight: 130   Height: 52   Pulse: 79   Temp: 98.1   Resp: 18   Pulse Ox: 99   Are you currently pregnant, could you be pregnant, or are you breast feeding? None of the above   What symptoms do you currently have? Pain while passing urine;  Difficulty passing urine   When did your symptoms first appear? 3/14/2023   List what you have done or taken to help your symptoms. Took some D-mannose Powder Zolus, but i think its very highidugar and i am diabetic.   Please indicate whether you have had the following symptoms during the past 24 hours     Urgent urination (a sudden and uncontrollable urge to urinate) Moderate   Frequent urination of small amounts of urine (going to the toilet very often) Moderate   Burning pain when urinating Mild   Incomplete bladder emptying (still feel like you need to urinate again after urination) Mild   Pain not associated with urination in the lower abdomen below the belly button) Mild   What does your urine look like? Cloudy   Blood seen in the urine None   Flank pain (pain in one or both sides of the lower back) None   Abnormal Vaginal Discharge (abnormal amount, color and/or odor) None   Discharge from the urethra (urinary opening) without urination  Mild   High body temperature/fever? None-<99.5   Please rate how much discomfort you have experience because of the symptoms in the past 24 hours: Moderate   Please indicate how the symptoms have interfered with your every day activities/work in the past 24 hours: Mild   Please indicate how these symptoms have interfered with your social activities (visiting people, meeting with friends, etc.) in the past 24 hours? Mild   Are you a diabetic? Yes   Please indicate whether you have the following at the time of completion of this questionnaire: None of the above   Provide any information you feel is important to your history not asked above I was in the hodpital last week on a cathater   Please attach any relevant images or files (if you have performed a home test for UTI, please submit a photo of results)          Recent Labs Obtained:  Lab Visit on 03/14/2023   Component Date Value Ref Range Status    WBC 03/14/2023 6.82  3.90 - 12.70 K/uL Final    RBC 03/14/2023 4.09  4.00 - 5.40 M/uL Final    Hemoglobin 03/14/2023 12.6  12.0 - 16.0 g/dL Final    Hematocrit 03/14/2023 38.6  37.0 - 48.5 % Final    MCV 03/14/2023 94  82 - 98 fL Final    MCH 03/14/2023 30.8  27.0 - 31.0 pg Final    MCHC 03/14/2023 32.6  32.0 - 36.0 g/dL Final    RDW 03/14/2023 13.0  11.5 - 14.5 % Final    Platelets 03/14/2023 229  150 - 450 K/uL Final    MPV 03/14/2023 9.3  9.2 - 12.9 fL Final    Immature Granulocytes 03/14/2023 0.1  0.0 - 0.5 % Final    Gran # (ANC) 03/14/2023 4.2  1.8 - 7.7 K/uL Final    Immature Grans (Abs) 03/14/2023 0.01  0.00 - 0.04 K/uL Final    Comment: Mild elevation in immature granulocytes is non specific and   can be seen in a variety of conditions including stress response,   acute inflammation, trauma and pregnancy. Correlation with other   laboratory and clinical findings is essential.      Lymph # 03/14/2023 2.0  1.0 - 4.8 K/uL Final    Mono # 03/14/2023 0.6  0.3 - 1.0 K/uL Final    Eos # 03/14/2023 0.0  0.0 - 0.5 K/uL  Final    Baso # 03/14/2023 0.03  0.00 - 0.20 K/uL Final    nRBC 03/14/2023 0  0 /100 WBC Final    Gran % 03/14/2023 61.1  38.0 - 73.0 % Final    Lymph % 03/14/2023 29.0  18.0 - 48.0 % Final    Mono % 03/14/2023 9.4  4.0 - 15.0 % Final    Eosinophil % 03/14/2023 0.0  0.0 - 8.0 % Final    Basophil % 03/14/2023 0.4  0.0 - 1.9 % Final    Differential Method 03/14/2023 Automated   Final    Sodium 03/14/2023 139  136 - 145 mmol/L Final    Potassium 03/14/2023 4.8  3.5 - 5.1 mmol/L Final    Chloride 03/14/2023 107  95 - 110 mmol/L Final    CO2 03/14/2023 26  23 - 29 mmol/L Final    Glucose 03/14/2023 190 (H)  70 - 110 mg/dL Final    BUN 03/14/2023 13  7 - 17 mg/dL Final    Creatinine 03/14/2023 0.56  0.50 - 1.40 mg/dL Final    Calcium 03/14/2023 9.7  8.7 - 10.5 mg/dL Final    Total Protein 03/14/2023 7.1  6.0 - 8.4 g/dL Final    Albumin 03/14/2023 4.1  3.5 - 5.2 g/dL Final    Total Bilirubin 03/14/2023 0.7  0.1 - 1.0 mg/dL Final    Comment: For infants and newborns, interpretation of results should be based  on gestational age, weight and in agreement with clinical  observations.    Premature Infant recommended reference ranges:  Up to 24 hours.............<8.0 mg/dL  Up to 48 hours............<12.0 mg/dL  3-5 days..................<15.0 mg/dL  6-29 days.................<15.0 mg/dL      Alkaline Phosphatase 03/14/2023 153 (H)  38 - 126 U/L Final    AST 03/14/2023 63 (H)  15 - 46 U/L Final    ALT 03/14/2023 75 (H)  10 - 44 U/L Final    Anion Gap 03/14/2023 6 (L)  8 - 16 mmol/L Final    eGFR 03/14/2023 >60.0  >60 mL/min/1.73 m^2 Final    Cholesterol 03/14/2023 161  120 - 199 mg/dL Final    Comment: The National Cholesterol Education Program (NCEP) has set the  following guidelines (reference ranges) for Cholesterol:  Optimal.....................<200 mg/dL  Borderline High.............200-239 mg/dL  High........................> or = 240 mg/dL      Triglycerides 03/14/2023 112  30 - 150 mg/dL Final    Comment: The National  Cholesterol Education Program (NCEP) has set the  following guidelines (reference values) for triglycerides:  Normal......................<150 mg/dL  Borderline High.............150-199 mg/dL  High........................200-499 mg/dL      HDL 03/14/2023 64  40 - 75 mg/dL Final    Comment: The National Cholesterol Education Program (NCEP) has set the  following guidelines (reference values) for HDL Cholesterol:  Low...............<40 mg/dL  Optimal...........>60 mg/dL      LDL Cholesterol 03/14/2023 74.6  63.0 - 159.0 mg/dL Final    Comment: The National Cholesterol Education Program (NCEP) has set the  following guidelines (reference values) for LDL Cholesterol:  Optimal.......................<130 mg/dL  Borderline High...............130-159 mg/dL  High..........................160-189 mg/dL  Very High.....................>190 mg/dL      HDL/Cholesterol Ratio 03/14/2023 39.8  20.0 - 50.0 % Final    Total Cholesterol/HDL Ratio 03/14/2023 2.5  2.0 - 5.0 Final    Non-HDL Cholesterol 03/14/2023 97  mg/dL Final    Comment: Risk category and Non-HDL cholesterol goals:  Coronary heart disease (CHD)or equivalent (10-year risk of CHD >20%):  Non-HDL cholesterol goal     <130 mg/dL  Two or more CHD risk factors and 10-year risk of CHD <= 20%:  Non-HDL cholesterol goal     <160 mg/dL  0 to 1 CHD risk factor:  Non-HDL cholesterol goal     <190 mg/dL      Hemoglobin A1C 03/14/2023 6.4 (H)  4.0 - 5.6 % Final    Comment: ADA Screening Guidelines:  5.7-6.4%  Consistent with prediabetes  >or=6.5%  Consistent with diabetes    High levels of fetal hemoglobin interfere with the HbA1C  assay. Heterozygous hemoglobin variants (HbS, HgC, etc)do  not significantly interfere with this assay.   However, presence of multiple variants may affect accuracy.      Estimated Avg Glucose 03/14/2023 137 (H)  68 - 131 mg/dL Final       Encounter Diagnosis   Name Primary?    Acute cystitis without hematuria Yes        Orders Placed This Encounter    Procedures    Urinalysis, Reflex to Urine Culture Urine, Clean Catch     Standing Status:   Future     Standing Expiration Date:   5/16/2024     Order Specific Question:   Preferred Collection Type     Answer:   Urine, Clean Catch     Order Specific Question:   Specimen Source     Answer:   Urine      Medications Ordered This Encounter   Medications    nitrofurantoin, macrocrystal-monohydrate, (MACROBID) 100 MG capsule     Sig: Take 1 capsule (100 mg total) by mouth 2 (two) times daily.     Dispense:  10 capsule     Refill:  0        Follow up if symptoms worsen or fail to improve.      E-Visit Time Tracking:    Day 1 Time (in minutes): 5     Total Time (in minutes): 5

## 2023-03-17 ENCOUNTER — PATIENT MESSAGE (OUTPATIENT)
Dept: NEUROSURGERY | Facility: CLINIC | Age: 53
End: 2023-03-17
Payer: COMMERCIAL

## 2023-03-20 ENCOUNTER — OFFICE VISIT (OUTPATIENT)
Dept: PRIMARY CARE CLINIC | Facility: CLINIC | Age: 53
End: 2023-03-20
Payer: COMMERCIAL

## 2023-03-20 DIAGNOSIS — E13.9 LADA (LATENT AUTOIMMUNE DIABETES IN ADULTS), MANAGED AS TYPE 1: ICD-10-CM

## 2023-03-20 DIAGNOSIS — E66.3 OVERWEIGHT: ICD-10-CM

## 2023-03-20 DIAGNOSIS — E11.59 HYPERTENSION ASSOCIATED WITH DIABETES: ICD-10-CM

## 2023-03-20 DIAGNOSIS — R41.841 COGNITIVE COMMUNICATION DEFICIT: ICD-10-CM

## 2023-03-20 DIAGNOSIS — I60.9 NONTRAUMATIC SUBARACHNOID HEMORRHAGE: ICD-10-CM

## 2023-03-20 DIAGNOSIS — I15.2 HYPERTENSION ASSOCIATED WITH DIABETES: ICD-10-CM

## 2023-03-20 DIAGNOSIS — K75.4 AUTOIMMUNE HEPATITIS: ICD-10-CM

## 2023-03-20 DIAGNOSIS — Z00.00 ANNUAL PHYSICAL EXAM: Primary | ICD-10-CM

## 2023-03-20 DIAGNOSIS — Z79.4 LONG TERM CURRENT USE OF INSULIN: ICD-10-CM

## 2023-03-20 PROBLEM — I69.00: Status: ACTIVE | Noted: 2023-02-15

## 2023-03-20 PROBLEM — R53.81 DEBILITY: Status: RESOLVED | Noted: 2023-02-03 | Resolved: 2023-03-20

## 2023-03-20 PROBLEM — Z74.09 IMPAIRED MOBILITY AND ACTIVITIES OF DAILY LIVING: Status: RESOLVED | Noted: 2023-03-06 | Resolved: 2023-03-20

## 2023-03-20 PROBLEM — G93.6 VASOGENIC CEREBRAL EDEMA: Status: RESOLVED | Noted: 2023-01-27 | Resolved: 2023-03-20

## 2023-03-20 PROBLEM — I61.5 IVH (INTRAVENTRICULAR HEMORRHAGE): Status: RESOLVED | Noted: 2023-01-27 | Resolved: 2023-03-20

## 2023-03-20 PROBLEM — Z78.9 IMPAIRED MOBILITY AND ACTIVITIES OF DAILY LIVING: Status: RESOLVED | Noted: 2023-03-06 | Resolved: 2023-03-20

## 2023-03-20 PROCEDURE — 3044F PR MOST RECENT HEMOGLOBIN A1C LEVEL <7.0%: ICD-10-PCS | Mod: CPTII,S$GLB,, | Performed by: INTERNAL MEDICINE

## 2023-03-20 PROCEDURE — 1159F MED LIST DOCD IN RCRD: CPT | Mod: CPTII,S$GLB,, | Performed by: INTERNAL MEDICINE

## 2023-03-20 PROCEDURE — 99396 PREV VISIT EST AGE 40-64: CPT | Mod: S$GLB,,, | Performed by: INTERNAL MEDICINE

## 2023-03-20 PROCEDURE — 99999 PR PBB SHADOW E&M-EST. PATIENT-LVL IV: ICD-10-PCS | Mod: PBBFAC,,, | Performed by: INTERNAL MEDICINE

## 2023-03-20 PROCEDURE — 99999 PR PBB SHADOW E&M-EST. PATIENT-LVL IV: CPT | Mod: PBBFAC,,, | Performed by: INTERNAL MEDICINE

## 2023-03-20 PROCEDURE — 99396 PR PREVENTIVE VISIT,EST,40-64: ICD-10-PCS | Mod: S$GLB,,, | Performed by: INTERNAL MEDICINE

## 2023-03-20 PROCEDURE — 1159F PR MEDICATION LIST DOCUMENTED IN MEDICAL RECORD: ICD-10-PCS | Mod: CPTII,S$GLB,, | Performed by: INTERNAL MEDICINE

## 2023-03-20 PROCEDURE — 3044F HG A1C LEVEL LT 7.0%: CPT | Mod: CPTII,S$GLB,, | Performed by: INTERNAL MEDICINE

## 2023-03-20 RX ORDER — INSULIN GLARGINE-YFGN 100 [IU]/ML
4 INJECTION, SOLUTION SUBCUTANEOUS 2 TIMES DAILY
COMMUNITY
End: 2023-11-14

## 2023-03-20 NOTE — ASSESSMENT & PLAN NOTE
Dx'ed in 2013.  Sees Dr. Kurtz, A1C 6.4 in 3/2023.  On Lantus 13 units at night with novolog 4 units BID.   Still exercises but not as much as she used to.

## 2023-03-20 NOTE — PROGRESS NOTES
[facility] Note    Chief Complaint      Chief Complaint   Patient presents with    Annual Exam     History of Present Illness      Leida Hoyos is a 53 y.o. female who presents today for Annual preventative visit.  Patient comes to appointment with spouse.  GYN: Laurie Menjivar, Endo: Dr. Kurtz, GI: Dr. Russell, Optho: Dr. Ferguson, Derm: Vanessa    Admitted 1/27/23 at [facility] with SAH, required EVD.  No aneurysm.  D/C'ed 2/15 to [facility], has been home since 2/22/23.  Doing well, still going to ST twice per week and PT twice per week (likely nearing the end, was seeing them for balance).    Problem List Items Addressed This Visit       Autoimmune hepatitis    Current Assessment & Plan     Sees Dr. Russell prior to his jail.  Was on mercaptopurine in past, no issues with side effects.  Drinks very rarely, 1-2 times per month. LFT's mildly elevated 3/2023.         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  Sees Dr. Kurtz, A1C 6.4 in 3/2023.  On Lantus 13 units at night with novolog 4 units BID.   Still exercises but not as much as she used to.         Relevant Medications    insulin glargine-yfgn (SEMGLEE,INSULIN GLARG-YFGN,PEN) 100 unit/mL (3 mL) InPn    Hypertension associated with diabetes    Current Assessment & Plan     BP controlled on no meds.  No CP/SOB/HA.  Doesn't check at home.         Relevant Medications    insulin glargine-yfgn (SEMGLEE,INSULIN GLARG-YFGN,PEN) 100 unit/mL (3 mL) InPn    Overweight    Current Assessment & Plan     Has been walking a few miles per day, going to PT.         Nontraumatic subarachnoid hemorrhage    Current Assessment & Plan     Seeing [facility] on 3/28/23 for f/u, only residual deficit is memory.         Cognitive communication deficit    Current Assessment & Plan     Has seen some improvement since DC, worse when tired or  when glucose is high.         Long term current use of insulin     Other Visit Diagnoses       Annual physical exam    -  Primary              Health Maintenance   Topic Date Due    Hemoglobin A1c  2023    Foot Exam  2023    Mammogram  2023    Eye Exam  2024    Lipid Panel  2024    TETANUS VACCINE  2026    Hepatitis C Screening  Completed    High Dose Statin  Discontinued       Past Medical History:   Diagnosis Date    Autoimmune hepatitis     managed by Dr. Russell on mercaptopurine    Diabetes mellitus type II     Hypertension        Past Surgical History:   Procedure Laterality Date    CEREBRAL ANGIOGRAM N/A 2023    Procedure: ANGIOGRAM-CEREBRAL;  Surgeon: Yue Surgeon;  Location: [facility];  Service: Anesthesiology;  Laterality: N/A;     SECTION, CLASSIC      COLONOSCOPY N/A 2019    Procedure: COLONOSCOPY;  Surgeon: Dipesh Victoria MD;  Location: [facility] ([address] [place] [address]);  Service: Endoscopy;  Laterality: N/A;    FRACTURE SURGERY Left     leg     MOUTH SURGERY         family history includes Alcohol abuse in her maternal grandfather and maternal grandmother; Arthritis in her mother; Asthma in her father; Diabetes in her maternal grandfather and paternal grandfather; Heart attack (age of onset: 60) in her father; Heart disease in her father; Hyperlipidemia in her father; Hypertension in her father and sister; Learning disabilities in her daughter and son; No Known Problems in her daughter, sister, and son; Ulcerative colitis in her mother.    Social History     Tobacco Use    Smoking status: Never    Smokeless tobacco: Never   Substance Use Topics    Alcohol use: Yes     Alcohol/week: 2.0 standard drinks     Types: 2 Glasses of wine per week     Comment: social    Drug use: Never       Review of Systems   Constitutional:  Negative for chills and fever.   Respiratory:  Negative for cough and shortness of breath.    Cardiovascular:   "Negative for chest pain and palpitations.   Gastrointestinal:  Negative for constipation, diarrhea, nausea and vomiting.   Genitourinary:  Negative for dysuria and hematuria.   Musculoskeletal:  Negative for falls.   Neurological:  Negative for headaches.      Outpatient Encounter Medications as of 3/20/2023   Medication Sig Dispense Refill    acetaminophen (TYLENOL) 650 mg/20.3 mL Soln Take 20.3 mLs (650 mg total) by mouth every 6 (six) hours as needed for Pain (headaches).      blood sugar diagnostic Strp For one touch verio IQ meter 100 each 3    blood-glucose meter (ONETOUCH VERIO IQ METER MISC) OneTouch Verio IQ Meter      blood-glucose sensor (DEXCOM G6 SENSOR) Melissa Monitor blood sugars daily 9 each 3    blood-glucose transmitter (DEXCOM G6 TRANSMITTER) Melissa USE AS DIRECTED for daily use TO TEST BLOOD GLUCOSE. 1 each 4    cholecalciferol, vitamin D3, (VITAMIN D3) 1,000 unit capsule Take 1 capsule (1,000 Units total) by mouth once daily.  0    hydrOXYzine HCL (ATARAX) 25 MG tablet Take 1 tablet (25 mg total) by mouth nightly as needed (Insomnia). 30 tablet 1    insulin glargine-yfgn (SEMGLEE,INSULIN GLARG-YFGN,PEN) 100 unit/mL (3 mL) InPn Inject 4 Units into the skin 2 (two) times a day.      insulin lispro (HUMALOG KWIKPEN INSULIN) 100 unit/mL pen Takes 5 units/7 units and 9 units before meals, plus correction for a TDD 25 units (Patient taking differently: Inject 8 Units into the skin 4 (four) times daily before meals and nightly.) 45 mL 3    melatonin (MELATIN) 3 mg tablet Take 2 tablets (6 mg total) by mouth nightly as needed for Insomnia.  0    multivitamin (THERAGRAN) per tablet Take 1 tablet by mouth once daily.      ONETOUCH DELICA PLUS LANCET 33 gauge Misc USE AS DIRECTED TO TEST BLOOD GLUCOSE. 300 each 8    pen needle, diabetic (BD ULTRA-FINE NICK PEN NEEDLE) 32 gauge x 5/32" Ndle USE FOUR TIMES A DAY FOR INSULIN INJECTIONS 360 each 4    pravastatin (PRAVACHOL) 10 MG tablet Take 1 tablet " "(10 mg total) by mouth once daily. 90 tablet 3    QUEtiapine (SEROQUEL) 25 MG Tab Take 1 tablet (25 mg total) by mouth nightly as needed (Agitation).      [DISCONTINUED] insulin (LANTUS SOLOSTAR U-100 INSULIN) glargine 100 units/mL (3mL) SubQ pen INJECT 13 UNITS UNDER THE SKIN EVERY EVENING 45 mL 3    [DISCONTINUED] insulin aspart U-100 (NOVOLOG) 100 unit/mL (3 mL) InPn pen Inject 7 Units into the skin 4 (four) times daily before meals and nightly. (Patient taking differently: Inject 4 Units into the skin 2 (two) times a day.)  0    [DISCONTINUED] insulin aspart U-100 (NOVOLOG) 100 unit/mL (3 mL) InPn pen Inject 0-15 Units into the skin before meals and at bedtime as needed (hyperglycemia). (Patient not taking: Reported on 3/20/2023)  0    [DISCONTINUED] insulin detemir U-100 (LEVEMIR FLEXTOUCH) 100 unit/mL (3 mL) SubQ InPn pen Inject 16 Units into the skin 2 (two) times daily. (Patient not taking: Reported on 3/20/2023)  0    [DISCONTINUED] nitrofurantoin, macrocrystal-monohydrate, (MACROBID) 100 MG capsule Take 1 capsule (100 mg total) by mouth 2 (two) times daily. (Patient not taking: Reported on 3/20/2023) 10 capsule 0     No facility-administered encounter medications on file as of 3/20/2023.        Review of patient's allergies indicates:  No Known Allergies    Physical Exam      Vital Signs  Pulse: (P) 76  SpO2: (P) 100 %  BP: (P) 130/76  Pain Score: (P) 0-No pain  Height and Weight  Height: (P) 5' 2" (157.5 cm)  Weight: (P) 59.7 kg (131 lb 9.8 oz)  BSA (Calculated - sq m): (P) 1.62 sq meters  BMI (Calculated): (P) 24.1  Weight in (lb) to have BMI = 25: (P) 136.4]    Physical Exam  Constitutional:       Appearance: She is well-developed.   HENT:      Head: Normocephalic and atraumatic.   Cardiovascular:      Rate and Rhythm: Normal rate and regular rhythm.      Heart sounds: Normal heart sounds. No murmur heard.  Pulmonary:      Effort: Pulmonary effort is normal. No respiratory distress.      Breath " sounds: Normal breath sounds.   Abdominal:      General: There is no distension.      Palpations: Abdomen is soft.      Tenderness: There is no abdominal tenderness. There is no guarding.   Skin:     General: Skin is warm and dry.   Neurological:      Mental Status: She is alert. Mental status is at baseline.   Psychiatric:         Behavior: Behavior normal.        Laboratory:  CBC:  Recent Labs   Lab Result Units 02/14/23  0437 02/15/23  0451 03/14/23  0726   WBC K/uL 5.34 4.97 6.82   RBC M/uL 4.03 3.94* 4.09   Hemoglobin g/dL 12.1 12.0 12.6   Hematocrit % 39.6 38.2 38.6   Platelets K/uL 412 325 229   MCV fL 98 97 94   MCH pg 30.0 30.5 30.8   MCHC g/dL 30.6* 31.4* 32.6     CMP:  Recent Labs   Lab Result Units 02/14/23  0437 02/15/23  0451 03/14/23  0726   Glucose mg/dL 168* 167* 190*   Calcium mg/dL 10.7* 10.5 9.7   Albumin g/dL 3.4* 3.3* 4.1   Total Protein g/dL 7.3 7.1 7.1   Sodium mmol/L 138 141 139   Potassium mmol/L 4.0 4.0 4.8   CO2 mmol/L 28 26 26   Chloride mmol/L 101 105 107   BUN mg/dL 16 18 13   Alkaline Phosphatase U/L 237* 221* 153*   ALT U/L 48* 48* 75*   AST U/L 45* 38 63*   Total Bilirubin mg/dL 0.2 0.3 0.7     URINALYSIS:  Recent Labs   Lab Result Units 01/27/23  1118 03/16/23  1516   Color, UA  Yellow Yellow   Specific Gravity, UA  1.020 >=1.030*   pH, UA  6.0 6.0   Protein, UA  Negative 2+*   Bacteria /hpf Rare Few*   Nitrite, UA  Negative Positive*   Leukocytes, UA  Negative 1+*   Urobilinogen, UA EU/dL  --  Negative   Hyaline Casts, UA /lpf 1 0      LIPIDS:  Recent Labs   Lab Result Units 01/27/23  0949 03/14/23  0726   TSH uIU/mL 0.972  --    HDL mg/dL 82* 64   Cholesterol mg/dL 181 161   Triglycerides mg/dL 49 112   LDL Cholesterol mg/dL 89.2 74.6   HDL/Cholesterol Ratio % 45.3 39.8   Non-HDL Cholesterol mg/dL 99 97   Total Cholesterol/HDL Ratio  2.2 2.5     TSH:  Recent Labs   Lab Result Units 01/27/23  0949   TSH uIU/mL 0.972     A1C:  Recent Labs   Lab Result Units 01/27/23  1531  03/14/23  0726   Hemoglobin A1C % 6.5* 6.4*       Radiology:  No results found in the last 30 days.     Assessment/Plan     Leida Hoyos is a 53 y.o.female with:    1. Annual physical exam    2. 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 Hypertension associated with diabetes    4. Cognitive communication deficit    5. Long term current use of insulin    6. Autoimmune hepatitis  - Ambulatory referral/consult to Hepatology; Future    7. Overweight    8. Nontraumatic subarachnoid hemorrhage    -monitor glucose at home until appt with endo  -monitor BP once weekly  -refer to hepatology given increase in LFT's  -Counseled on increasing exercise  -Continue current medications and maintain follow up with specialists.    -Follow up in about 6 months (around 9/20/2023) for follow up of medical problems.       Frieda Mera MD  [facility]

## 2023-03-20 NOTE — ASSESSMENT & PLAN NOTE
Sees Dr. Russell prior to his detention.  Was on mercaptopurine in past, no issues with side effects.  Drinks very rarely, 1-2 times per month. LFT's mildly elevated 3/2023.

## 2023-03-27 ENCOUNTER — LAB VISIT (OUTPATIENT)
Dept: LAB | Facility: HOSPITAL | Age: 53
End: 2023-03-27
Attending: INTERNAL MEDICINE
Payer: COMMERCIAL

## 2023-03-27 ENCOUNTER — OFFICE VISIT (OUTPATIENT)
Dept: HEPATOLOGY | Facility: CLINIC | Age: 53
End: 2023-03-27
Payer: COMMERCIAL

## 2023-03-27 VITALS — BODY MASS INDEX: 23.98 KG/M2 | HEIGHT: 62 IN | WEIGHT: 130.31 LBS

## 2023-03-27 DIAGNOSIS — R74.8 ELEVATED ALKALINE PHOSPHATASE LEVEL: ICD-10-CM

## 2023-03-27 DIAGNOSIS — R74.8 ELEVATED ALKALINE PHOSPHATASE LEVEL: Primary | ICD-10-CM

## 2023-03-27 DIAGNOSIS — R74.8 ELEVATED LIVER ENZYMES: ICD-10-CM

## 2023-03-27 DIAGNOSIS — K75.4 AUTOIMMUNE HEPATITIS: ICD-10-CM

## 2023-03-27 DIAGNOSIS — E13.9 LADA (LATENT AUTOIMMUNE DIABETES IN ADULTS), MANAGED AS TYPE 1: ICD-10-CM

## 2023-03-27 LAB
A1AT SERPL-MCNC: 163 MG/DL (ref 100–190)
ALBUMIN SERPL BCP-MCNC: 4 G/DL (ref 3.5–5.2)
ALP SERPL-CCNC: 298 U/L (ref 55–135)
ALT SERPL W/O P-5'-P-CCNC: 112 U/L (ref 10–44)
AST SERPL-CCNC: 55 U/L (ref 10–40)
BILIRUB DIRECT SERPL-MCNC: 0.1 MG/DL (ref 0.1–0.3)
BILIRUB SERPL-MCNC: 0.2 MG/DL (ref 0.1–1)
CERULOPLASMIN SERPL-MCNC: 36 MG/DL (ref 15–45)
ESTIMATED AVG GLUCOSE: 143 MG/DL (ref 68–131)
FERRITIN SERPL-MCNC: 47 NG/ML (ref 20–300)
GGT SERPL-CCNC: 184 U/L (ref 8–55)
HBA1C MFR BLD: 6.6 % (ref 4–5.6)
HBV SURFACE AG SERPL QL IA: NORMAL
IGG SERPL-MCNC: 1464 MG/DL (ref 650–1600)
IRON SERPL-MCNC: 91 UG/DL (ref 30–160)
PROT SERPL-MCNC: 7.7 G/DL (ref 6–8.4)
SATURATED IRON: 20 % (ref 20–50)
TOTAL IRON BINDING CAPACITY: 447 UG/DL (ref 250–450)
TRANSFERRIN SERPL-MCNC: 302 MG/DL (ref 200–375)

## 2023-03-27 PROCEDURE — 86015 ACTIN ANTIBODY EACH: CPT | Performed by: PHYSICIAN ASSISTANT

## 2023-03-27 PROCEDURE — 80321 ALCOHOLS BIOMARKERS 1OR 2: CPT | Performed by: PHYSICIAN ASSISTANT

## 2023-03-27 PROCEDURE — 87340 HEPATITIS B SURFACE AG IA: CPT | Performed by: PHYSICIAN ASSISTANT

## 2023-03-27 PROCEDURE — 86235 NUCLEAR ANTIGEN ANTIBODY: CPT | Mod: 59 | Performed by: PHYSICIAN ASSISTANT

## 2023-03-27 PROCEDURE — 1160F RVW MEDS BY RX/DR IN RCRD: CPT | Mod: CPTII,S$GLB,, | Performed by: PHYSICIAN ASSISTANT

## 2023-03-27 PROCEDURE — 82728 ASSAY OF FERRITIN: CPT | Performed by: PHYSICIAN ASSISTANT

## 2023-03-27 PROCEDURE — 80076 HEPATIC FUNCTION PANEL: CPT | Performed by: PHYSICIAN ASSISTANT

## 2023-03-27 PROCEDURE — 99204 PR OFFICE/OUTPT VISIT, NEW, LEVL IV, 45-59 MIN: ICD-10-PCS | Mod: S$GLB,,, | Performed by: PHYSICIAN ASSISTANT

## 2023-03-27 PROCEDURE — 82390 ASSAY OF CERULOPLASMIN: CPT | Performed by: PHYSICIAN ASSISTANT

## 2023-03-27 PROCEDURE — 3008F PR BODY MASS INDEX (BMI) DOCUMENTED: ICD-10-PCS | Mod: CPTII,S$GLB,, | Performed by: PHYSICIAN ASSISTANT

## 2023-03-27 PROCEDURE — 99999 PR PBB SHADOW E&M-EST. PATIENT-LVL III: CPT | Mod: PBBFAC,,, | Performed by: PHYSICIAN ASSISTANT

## 2023-03-27 PROCEDURE — 3044F PR MOST RECENT HEMOGLOBIN A1C LEVEL <7.0%: ICD-10-PCS | Mod: CPTII,S$GLB,, | Performed by: PHYSICIAN ASSISTANT

## 2023-03-27 PROCEDURE — 1159F MED LIST DOCD IN RCRD: CPT | Mod: CPTII,S$GLB,, | Performed by: PHYSICIAN ASSISTANT

## 2023-03-27 PROCEDURE — 99204 OFFICE O/P NEW MOD 45 MIN: CPT | Mod: S$GLB,,, | Performed by: PHYSICIAN ASSISTANT

## 2023-03-27 PROCEDURE — 82977 ASSAY OF GGT: CPT | Performed by: PHYSICIAN ASSISTANT

## 2023-03-27 PROCEDURE — 1159F PR MEDICATION LIST DOCUMENTED IN MEDICAL RECORD: ICD-10-PCS | Mod: CPTII,S$GLB,, | Performed by: PHYSICIAN ASSISTANT

## 2023-03-27 PROCEDURE — 1160F PR REVIEW ALL MEDS BY PRESCRIBER/CLIN PHARMACIST DOCUMENTED: ICD-10-PCS | Mod: CPTII,S$GLB,, | Performed by: PHYSICIAN ASSISTANT

## 2023-03-27 PROCEDURE — 86038 ANTINUCLEAR ANTIBODIES: CPT | Performed by: PHYSICIAN ASSISTANT

## 2023-03-27 PROCEDURE — 84075 ASSAY ALKALINE PHOSPHATASE: CPT | Mod: 59 | Performed by: PHYSICIAN ASSISTANT

## 2023-03-27 PROCEDURE — 82103 ALPHA-1-ANTITRYPSIN TOTAL: CPT | Performed by: PHYSICIAN ASSISTANT

## 2023-03-27 PROCEDURE — 99999 PR PBB SHADOW E&M-EST. PATIENT-LVL III: ICD-10-PCS | Mod: PBBFAC,,, | Performed by: PHYSICIAN ASSISTANT

## 2023-03-27 PROCEDURE — 3008F BODY MASS INDEX DOCD: CPT | Mod: CPTII,S$GLB,, | Performed by: PHYSICIAN ASSISTANT

## 2023-03-27 PROCEDURE — 82784 ASSAY IGA/IGD/IGG/IGM EACH: CPT | Performed by: PHYSICIAN ASSISTANT

## 2023-03-27 PROCEDURE — 82164 ANGIOTENSIN I ENZYME TEST: CPT | Performed by: PHYSICIAN ASSISTANT

## 2023-03-27 PROCEDURE — 82787 IGG 1 2 3 OR 4 EACH: CPT | Performed by: PHYSICIAN ASSISTANT

## 2023-03-27 PROCEDURE — 86039 ANTINUCLEAR ANTIBODIES (ANA): CPT | Performed by: INTERNAL MEDICINE

## 2023-03-27 PROCEDURE — 3044F HG A1C LEVEL LT 7.0%: CPT | Mod: CPTII,S$GLB,, | Performed by: PHYSICIAN ASSISTANT

## 2023-03-27 PROCEDURE — 83036 HEMOGLOBIN GLYCOSYLATED A1C: CPT | Performed by: INTERNAL MEDICINE

## 2023-03-27 PROCEDURE — 86381 MITOCHONDRIAL ANTIBODY EACH: CPT | Performed by: PHYSICIAN ASSISTANT

## 2023-03-27 PROCEDURE — 84466 ASSAY OF TRANSFERRIN: CPT | Performed by: PHYSICIAN ASSISTANT

## 2023-03-27 NOTE — PROGRESS NOTES
Hepatology Consultation: [facility]    NEW PATIENT   PCP: Frieda Mera MD    Subjective      Ms. Hoyos is a 53 y.o. female with PMH as listed below who presents to clinic for elevated liver enzymes, mixed picture.     Reports she was diagnosed with AIH w Dr. Russell, outside GI, about 10 years ago, maintained on 6-mmp until 2021, unclear why this agent was started but she recalls no liver biopsy and recalls wanting to avoid prednisone due to T1DM diagnosis.    Carries history of T1DM late onset x10 years ago    In relation to metabolic risk factors:   Lab Results   Component Value Date    HGBA1C 6.4 (H) 03/14/2023    CHOL 161 03/14/2023    TSH 0.972 01/27/2023     Body mass index is 23.83 kg/m².    Lab Results   Component Value Date    ALT 75 (H) 03/14/2023    AST 63 (H) 03/14/2023    ALKPHOS 153 (H) 03/14/2023    BILITOT 0.7 03/14/2023    ALBUMIN 4.1 03/14/2023    INR 1.2 01/27/2023     03/14/2023       Initial history 03/27/2023  Leida Hoyos arrives today with .  She is well-appearing and feels well. Denies symptoms of hepatic decompensation including jaundice, ascites, cognitive problems to suggest hepatic encephalopathy, or GI bleeding.   She denies abdominal pain, new rash, itching, significant/new fatigue, jaundice.    Recently had a brain bleed underwent hospitalization and rehabilitation and is largely back to normal physical function but notes cognitive/memory impairment. History helped by .     Patient reporst  Dr Russell 10 years ago diagnosed with AIH, unclear if liver biopsied 6-MP, she does not recall trying prednisone or imuran. She does not recall s/e to other medications.    Per EMR likely stopped 01/2021 or prior (6mmp)    Late onset DM1 9 years age, wears dexcom  In regards to supplements, multivitamin centrum Women's One A Day & vitamin D.     She denies a family history of liver cancer, cirrhosis, or hepatitis.      No  "smoking or drug use. No new sexual partners. Reports 2-3 drinks of alcohol per month.      PMH Leida has a past medical history of Autoimmune hepatitis, Diabetes mellitus type II, and Hypertension.   PSXH Leida has a past surgical history that includes  section, classic (); Mouth surgery; Fracture surgery (Left, 2013); Colonoscopy (N/A, 2019); and Cerebral angiogram (N/A, 2023).   FH Leida's family history includes Alcohol abuse in her maternal grandfather and maternal grandmother; Arthritis in her mother; Asthma in her father; Diabetes in her maternal grandfather and paternal grandfather; Heart attack (age of onset: 60) in her father; Heart disease in her father; Hyperlipidemia in her father; Hypertension in her father and sister; Learning disabilities in her daughter and son; No Known Problems in her daughter, sister, and son; Ulcerative colitis in her mother.   SH Leida reports that she has never smoked. She has never used smokeless tobacco. She reports current alcohol use of about 2.0 standard drinks per week. She reports that she does not use drugs.   ALG Leida has No Known Allergies.   MED Leida has a current medication list which includes the following prescription(s): acetaminophen, blood sugar diagnostic, blood-glucose meter, dexcom g6 sensor, dexcom g6 transmitter, cholecalciferol (vitamin d3), hydroxyzine hcl, insulin glargine-yfgn, insulin lispro, melatonin, multivitamin, onetouch delica plus lancet, pen needle, diabetic, and pravastatin.     ROS:   GENERAL: Denies fatigue  HEENT: Denies yellowing of eyes   CARDIOVASCULAR: Denies edema  GI: Denies abdominal pain  SKIN: Denies rash, itching     Objective     Ht 5' 2" (1.575 m)   Wt 59.1 kg (130 lb 4.7 oz)   BMI 23.83 kg/m²     PHYSICAL EXAM:   Friendly woman, in no acute distress; alert and oriented to person, place and time  EYES: Sclerae anicteric  GI: Soft, non-tender, non-distended. No ascites.  EXTREMITIES:  No " edema.  SKIN: Warm and dry. No jaundice. No telangectasias noted. No palmar erythema.  NEURO:  Normal gait. No asterixis.  PSYCH:  Memory intact. Thought and speech pattern appropriate. Behavior normal    DIAGNOSTICS  Lab Results   Component Value Date    ALT 75 (H) 03/14/2023    AST 63 (H) 03/14/2023    ALKPHOS 153 (H) 03/14/2023    BILITOT 0.7 03/14/2023    ALBUMIN 4.1 03/14/2023    INR 1.2 01/27/2023     03/14/2023     No results found for: AFP      Prior serologic workup:   Lab Results   Component Value Date    HEPCAB Non-reactive 01/27/2023       Imaging:  US Liver with Doppler (xpd)  Narrative: EXAMINATION:  US LIVER WITH DOPPLER    CLINICAL HISTORY:  h/o autoimmune hepatitis, LFT's trending up;    TECHNIQUE:  Duplex scan of the liver was performed using B-mode/gray scale imaging and Doppler spectral analysis and color flow.    COMPARISON:  Ultrasound 09/23/2013    FINDINGS:  Liver: Normal in size, measuring 13.3 cm. Homogeneous parenchymal echotexture. No focal hepatic lesions.    Hepatic vasculature:Portal veins are patent with proper directional flow. The splenic and superior mesenteric veins are patent at the portal confluence. Hepatic veins and IVC are patent with proper directional flow. Hepatic arteries are patent with normal waveforms. No upper abdominal venous collaterals.    Biliary: Few gallstones, the largest of which measures 1.4 cm.  No gallbladder distension, wall thickening, or pericholecystic fluid.  No biliary ductal dilatation. CBD measures 5 mm.    Spleen: Normal in size, measuring 8.0 x 3.8 cm. Homogeneous parenchymal echotexture.    Pancreas: Visualized portions demonstrate normal contours.    Miscellaneous: No upper abdominal ascites.  Impression: Cholelithiasis.  No cholecystitis or biliary ductal dilatation.    Satisfactory Doppler evaluation of the liver.    Electronically signed by resident: Jael Dave  Date:    02/15/2023  Time:    09:00    Electronically signed by: Jason  Robinson  Date:    02/15/2023  Time:    10:45      Assessment/Plan     53 y.o. female with:    1. Autoimmune hepatitis  - reported history diagnosed 2013 by outside provider, previously maintained on 6-MMP    2. Elevated alkaline phosphatase level  - Alpha-1-Antitrypsin; Future  - AROLDO Screen w/Reflex; Future  - Antimitochondrial Antibody; Future  - Anti-Smooth Muscle Antibody; Future  - Ceruloplasmin; Future  - Hepatitis B Surface Antigen; Future  - Hepatic Function Panel; Future  - Ferritin; Future  - IgG; Future  - Iron and TIBC; Future  - FibroScan Gaines (Vibration Controlled Transient Elastography); Future  - Alkaline Phosphatase, Isoenzymes; Future  - Angiotensin Converting Enzyme; Future  - Gamma GT; Future  - Immunoglobulin G Subclass 4; Future  - Phosphatidylethanol (PETH); Future    3. Elevated liver enzymes  - Alpha-1-Antitrypsin; Future  - AROLDO Screen w/Reflex; Future  - Antimitochondrial Antibody; Future  - Anti-Smooth Muscle Antibody; Future  - Ceruloplasmin; Future  - Hepatitis B Surface Antigen; Future  - Hepatic Function Panel; Future  - Ferritin; Future  - IgG; Future  - Iron and TIBC; Future  - FibroScan Gaines (Vibration Controlled Transient Elastography); Future  - Alkaline Phosphatase, Isoenzymes; Future  - Angiotensin Converting Enzyme; Future  - Gamma GT; Future  - Immunoglobulin G Subclass 4; Future  - Phosphatidylethanol (PETH); Future        Orders Placed This Encounter   Procedures    FibroScan Gaines (Vibration Controlled Transient Elastography)    Alpha-1-Antitrypsin    AROLDO Screen w/Reflex    Antimitochondrial Antibody    Anti-Smooth Muscle Antibody    Ceruloplasmin    Hepatitis B Surface Antigen    Hepatic Function Panel    Ferritin    IgG    Iron and TIBC    Alkaline Phosphatase, Isoenzymes    Angiotensin Converting Enzyme    Gamma GT    Immunoglobulin G Subclass 4    Phosphatidylethanol (PETH)       Unclear prior diagnosis but was maintained on 6-mmp apparently until 2020,  but timeline is unclear.  Recommend serologies and Fibroscan soon.   I discussed liver biopsy may be required to make specific diagnosis of AIH. Will decide pending results of liver serologies  Follow up in about 3 weeks (around 4/17/2023).    I spent 49 minutes on the day of this encounter preparing for, evaluating, treating, and managing this patient.     Thank you for allowing me to participate in the care of JEREMY Harry-C  Hepatology & Liver Transplant

## 2023-03-27 NOTE — PATIENT INSTRUCTIONS
Recommend blood work soon to rule out autoimmune hepatitis.  Fibroscan with follow-up in 2 weeks  Follow-up in 2-3 weeks.  We typically obtain a liver biopsy to diagnose autoimmune hepatitis.

## 2023-03-28 ENCOUNTER — OFFICE VISIT (OUTPATIENT)
Dept: NEUROSURGERY | Facility: CLINIC | Age: 53
End: 2023-03-28
Payer: COMMERCIAL

## 2023-03-28 ENCOUNTER — TELEPHONE (OUTPATIENT)
Dept: HEPATOLOGY | Facility: CLINIC | Age: 53
End: 2023-03-28
Payer: COMMERCIAL

## 2023-03-28 ENCOUNTER — HOSPITAL ENCOUNTER (OUTPATIENT)
Dept: RADIOLOGY | Facility: HOSPITAL | Age: 53
Discharge: HOME | End: 2023-03-28
Attending: PHYSICIAN ASSISTANT
Payer: COMMERCIAL

## 2023-03-28 VITALS
BODY MASS INDEX: 23.92 KG/M2 | WEIGHT: 130 LBS | SYSTOLIC BLOOD PRESSURE: 150 MMHG | HEIGHT: 62 IN | HEART RATE: 75 BPM | DIASTOLIC BLOOD PRESSURE: 83 MMHG

## 2023-03-28 DIAGNOSIS — I60.9 NONTRAUMATIC SUBARACHNOID HEMORRHAGE: ICD-10-CM

## 2023-03-28 DIAGNOSIS — R74.8 ELEVATED LIVER ENZYMES: ICD-10-CM

## 2023-03-28 DIAGNOSIS — R41.841 COGNITIVE COMMUNICATION DEFICIT: ICD-10-CM

## 2023-03-28 DIAGNOSIS — I60.9 NONTRAUMATIC SUBARACHNOID HEMORRHAGE: Primary | ICD-10-CM

## 2023-03-28 DIAGNOSIS — R74.8 ELEVATED ALKALINE PHOSPHATASE LEVEL: Primary | ICD-10-CM

## 2023-03-28 DIAGNOSIS — K75.4 AUTOIMMUNE HEPATITIS: ICD-10-CM

## 2023-03-28 LAB
ACE SERPL-CCNC: 48 U/L (ref 16–85)
ANA PATTERN 1: NORMAL
ANA SER QL IF: POSITIVE
ANA TITR SER IF: NORMAL {TITER}
MITOCHONDRIA AB TITR SER IF: NORMAL {TITER}
SMOOTH MUSCLE AB TITR SER IF: NORMAL {TITER}

## 2023-03-28 PROCEDURE — 99214 OFFICE O/P EST MOD 30 MIN: CPT | Mod: S$GLB,,, | Performed by: PHYSICIAN ASSISTANT

## 2023-03-28 PROCEDURE — 3077F PR MOST RECENT SYSTOLIC BLOOD PRESSURE >= 140 MM HG: ICD-10-PCS | Mod: CPTII,S$GLB,, | Performed by: PHYSICIAN ASSISTANT

## 2023-03-28 PROCEDURE — 3008F BODY MASS INDEX DOCD: CPT | Mod: CPTII,S$GLB,, | Performed by: PHYSICIAN ASSISTANT

## 2023-03-28 PROCEDURE — 70496 CT ANGIOGRAPHY HEAD: CPT | Mod: 26,,, | Performed by: RADIOLOGY

## 2023-03-28 PROCEDURE — 3079F PR MOST RECENT DIASTOLIC BLOOD PRESSURE 80-89 MM HG: ICD-10-PCS | Mod: CPTII,S$GLB,, | Performed by: PHYSICIAN ASSISTANT

## 2023-03-28 PROCEDURE — 1159F PR MEDICATION LIST DOCUMENTED IN MEDICAL RECORD: ICD-10-PCS | Mod: CPTII,S$GLB,, | Performed by: PHYSICIAN ASSISTANT

## 2023-03-28 PROCEDURE — 99999 PR PBB SHADOW E&M-EST. PATIENT-LVL III: CPT | Mod: PBBFAC,,, | Performed by: PHYSICIAN ASSISTANT

## 2023-03-28 PROCEDURE — 3077F SYST BP >= 140 MM HG: CPT | Mod: CPTII,S$GLB,, | Performed by: PHYSICIAN ASSISTANT

## 2023-03-28 PROCEDURE — 70496 CT ANGIOGRAPHY HEAD: CPT | Mod: TC

## 2023-03-28 PROCEDURE — 3079F DIAST BP 80-89 MM HG: CPT | Mod: CPTII,S$GLB,, | Performed by: PHYSICIAN ASSISTANT

## 2023-03-28 PROCEDURE — 3044F HG A1C LEVEL LT 7.0%: CPT | Mod: CPTII,S$GLB,, | Performed by: PHYSICIAN ASSISTANT

## 2023-03-28 PROCEDURE — 3044F PR MOST RECENT HEMOGLOBIN A1C LEVEL <7.0%: ICD-10-PCS | Mod: CPTII,S$GLB,, | Performed by: PHYSICIAN ASSISTANT

## 2023-03-28 PROCEDURE — 3008F PR BODY MASS INDEX (BMI) DOCUMENTED: ICD-10-PCS | Mod: CPTII,S$GLB,, | Performed by: PHYSICIAN ASSISTANT

## 2023-03-28 PROCEDURE — 25500020 PHARM REV CODE 255: Performed by: PHYSICIAN ASSISTANT

## 2023-03-28 PROCEDURE — 1159F MED LIST DOCD IN RCRD: CPT | Mod: CPTII,S$GLB,, | Performed by: PHYSICIAN ASSISTANT

## 2023-03-28 PROCEDURE — 70496 CTA HEAD: ICD-10-PCS | Mod: 26,,, | Performed by: RADIOLOGY

## 2023-03-28 PROCEDURE — 99214 PR OFFICE/OUTPT VISIT, EST, LEVL IV, 30-39 MIN: ICD-10-PCS | Mod: S$GLB,,, | Performed by: PHYSICIAN ASSISTANT

## 2023-03-28 PROCEDURE — 99999 PR PBB SHADOW E&M-EST. PATIENT-LVL III: ICD-10-PCS | Mod: PBBFAC,,, | Performed by: PHYSICIAN ASSISTANT

## 2023-03-28 RX ADMIN — IOHEXOL 75 ML: 350 INJECTION, SOLUTION INTRAVENOUS at 09:03

## 2023-03-28 NOTE — TELEPHONE ENCOUNTER
I discussed this patient's case with  and their pertinent findings during our weekly Patient Care Conference. I presented their medical history, relevant labs, and imaging to the physician. We discussed their plan of care and current assessment.         DISCUSSION:  Liver biopsy recommended by Dr Mehta before restarting 6-mmp  Liver enzymes increasing  Biopsy ordered and patient called and informed

## 2023-03-28 NOTE — PROGRESS NOTES
Neurosurgery  Established Patient    SUBJECTIVE:     History of Present Illness:  Leida Hoyos is a 53 y.o. female with PMH of DM1, autoimmune hepatitis, and HTN  who presents to clinic for follow-up of non-traumatic SAH. Pt was admitted on 1/27/23 after development of acute onset confusion, then decompensated quickly in the ED. CTH revealed diffuse SAH with hydrocephalus. EVD was placed. CTA and angiogram were negative for aneurysm or other underlying abnormality/etiology of her hemorrhage. She was monitored in the neuro ICU, EVD was able to be weaned and removed. Repeat angiogram was done during admission which again was negative for any pathology. Pt's mentation improved throughout her stay, she was eventually discharged to [facility] on 2/15/23. Presents to clinic today accompanied by her  Syed. Discharged from rehab just before [facility], about 1 month ago. Report she has continued to improve. Still having some memory difficulty, predominantly with short-term memory, and occasional word finding difficulty. Denies headaches, seizure like activity, focal weakness/numbness, and visual disturbance. She is working with ST and feels she is making progress. Pt has undergone updated CTA of the head today.     Review of patient's allergies indicates:  No Known Allergies    Current Outpatient Medications   Medication Sig Dispense Refill    acetaminophen (TYLENOL) 650 mg/20.3 mL Soln Take 20.3 mLs (650 mg total) by mouth every 6 (six) hours as needed for Pain (headaches).      blood sugar diagnostic Strp For one touch verio IQ meter 100 each 3    blood-glucose meter (ONETOUCH VERIO IQ METER MISC) OneTouch Verio IQ Meter      blood-glucose sensor (DEXCOM G6 SENSOR) Melissa Monitor blood sugars daily 9 each 3    blood-glucose transmitter (DEXCOM G6 TRANSMITTER) Melissa USE AS DIRECTED for daily use TO TEST BLOOD GLUCOSE. 1 each 4    cholecalciferol, vitamin D3, (VITAMIN D3) 1,000 unit capsule Take 1 capsule (1,000 Units  "total) by mouth once daily.  0    hydrOXYzine HCL (ATARAX) 25 MG tablet Take 1 tablet (25 mg total) by mouth nightly as needed (Insomnia). 30 tablet 1    insulin glargine-yfgn 100 unit/mL (3 mL) InPn Inject 4 Units into the skin 2 (two) times a day.      insulin lispro (HUMALOG KWIKPEN INSULIN) 100 unit/mL pen Takes 5 units/7 units and 9 units before meals, plus correction for a TDD 25 units (Patient taking differently: Inject 8 Units into the skin 4 (four) times daily before meals and nightly.) 45 mL 3    melatonin (MELATIN) 3 mg tablet Take 2 tablets (6 mg total) by mouth nightly as needed for Insomnia.  0    multivitamin (THERAGRAN) per tablet Take 1 tablet by mouth once daily.      ONETOUCH DELICA PLUS LANCET 33 gauge Misc USE AS DIRECTED TO TEST BLOOD GLUCOSE. 300 each 8    pen needle, diabetic (BD ULTRA-FINE NICK PEN NEEDLE) 32 gauge x 5/32" Ndle USE FOUR TIMES A DAY FOR INSULIN INJECTIONS 360 each 4    pravastatin (PRAVACHOL) 10 MG tablet Take 1 tablet (10 mg total) by mouth once daily. 90 tablet 3     No current facility-administered medications for this visit.       Past Medical History:   Diagnosis Date    Autoimmune hepatitis     managed by Dr. Russell on mercaptopurine    Diabetes mellitus type II     Hypertension      Past Surgical History:   Procedure Laterality Date    CEREBRAL ANGIOGRAM N/A 2023    Procedure: ANGIOGRAM-CEREBRAL;  Surgeon: Yue Surgeon;  Location: [facility];  Service: Anesthesiology;  Laterality: N/A;     SECTION, CLASSIC      COLONOSCOPY N/A 2019    Procedure: COLONOSCOPY;  Surgeon: Dipesh Victoria MD;  Location: [address]);  Service: Endoscopy;  Laterality: N/A;    FRACTURE SURGERY Left 2013    leg     MOUTH SURGERY       Family History       Problem Relation (Age of Onset)    Alcohol abuse Maternal Grandmother, Maternal Grandfather    Arthritis Mother    Asthma Father    Diabetes Maternal Grandfather, Paternal Grandfather    Heart attack Father " (60)    Heart disease Father    Hyperlipidemia Father    Hypertension Father, Sister    Learning disabilities Daughter, Son    No Known Problems Sister, Daughter, Son    Ulcerative colitis Mother          Social History     Socioeconomic History    Marital status:     Number of children: 3   Tobacco Use    Smoking status: Never    Smokeless tobacco: Never   Substance and Sexual Activity    Alcohol use: Yes     Alcohol/week: 2.0 standard drinks     Types: 2 Glasses of wine per week     Comment: social    Drug use: Never    Sexual activity: Yes     Partners: Male     Birth control/protection: Partner-Vasectomy   Social History Narrative    Lives in [place] with her  and triplets. They are 17. 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 Grew up in [place].      Social Determinants of Health     Financial Resource Strain: Low Risk     Difficulty of Paying Living Expenses: Not hard at all   Food Insecurity: No Food Insecurity    Worried About Running Out of Food in the Last Year: Never true    Ran Out of Food in the Last Year: Never true   Transportation Needs: No Transportation Needs    Lack of Transportation (Medical): No    Lack of Transportation (Non-Medical): No   Physical Activity: Sufficiently Active    Days of Exercise per Week: 5 days    Minutes of Exercise per Session: 30 min   Stress: Stress Concern Present    Feeling of Stress : To some extent   Social Connections: Unknown    Frequency of Communication with Friends and Family: More than three times a week    Frequency of Social Gatherings with Friends and Family: Twice a week    Active Member of Clubs or Organizations: Yes    Attends Club or Organization Meetings: 1 to 4 times per year    Marital Status:    Housing Stability: Low Risk     Unable to Pay for Housing in the Last Year: No    Number of Places Lived in the Last Year: 2    Unstable Housing in the Last Year: No       Review of Systems  Positive per HPI, otherwise a pertinent ROS was  "performed and was negative.        OBJECTIVE:     Vital Signs  Pulse: 75  BP: (!) 150/83  Pain Score: 0-No pain  Height: 5' 2" (157.5 cm)  Weight: 59 kg (130 lb)  Body mass index is 23.78 kg/m².    Neurosurgery Physical Exam  General: well developed, well nourished, no distress.   Head: normocephalic, atraumatic  Neck: No tracheal deviation. No palpable masses. Full ROM.   Neurologic: Alert and oriented. Thought content appropriate.  GCS: E4 V5 M6; Total: 15  Mental Status: Awake, Alert, Oriented x 4  Language: No aphasia  Speech: No dysarthria  Cranial nerves: face symmetric, tongue midline, CN II-XII grossly intact.   Eyes: pupils equal, round, reactive to light with accomodation, EOMI.   Pulmonary: normal respirations, no signs of respiratory distress  Abdomen: soft, non-distended, not tender to palpation  Skin: Skin is warm, dry and intact.    Sensory: intact to light touch throughout  Motor Strength: Moves all extremities spontaneously with good tone.  Full strength upper and lower extremities. No abnormal movements seen.     Finger-to-nose: intact bilaterally   Pronator drift: absent bilaterally        Diagnostic Results:  Imaging was independently reviewed by myself, along with the associated radiology report.    CTA head/neck 1/27/23:  - Diffuse SAH with moderate hydrocephalus  - No aneurysm or AVM identified    Cerebral Angiogram 1/27/23:  1. Angiogram negative for any vascular etiologies/malformations accounting for the SAH/IVH.  Negative for any vascular malformations - AVMs/AVF/aneurysms.    MRA neck 1/28/23:  There is no significant stenosis at the carotid bifurcations by NASCET criteria.  The vertebral arteries are patent.     No evidence of definite prominent vessels within the cervical spinal canal to suggest a cervical vascular malformation.      MRI/MRA brain w/ vessel wall imaging 1/28/23:  Slight improvement in mild to moderate hydrocephalus.     Diffuse subarachnoid and intraventricular " hemorrhage again noted.    No aneurysm or AVM identified.  No evidence of vasospasm.  No abnormal vessel wall enhancement.    Cerebral Angiogram 2/3/23:  1. Angiogram negative for any vascular etiologies/malformations accounting for the SAH/IVH.  Negative for any vascular malformations - AVMs/AVF/aneurysms.  Negative for any significant vasospasm.    CTA head 3/28/23:  - No aneurysm, AVM, high grade stenosis, LVO, or other abnormalities visualized  - No acute or residual hemorrhage    ASSESSMENT/PLAN:     Leida Hoyos is a 53 y.o. female who presented with non-traumatic diffuse SAH of unknown etiology. Symptomatically has improved significantly, continues to make progress with therapy.     - Pt has undergone multiple imaging modalities, including CTA, MRI/MRA, and angiogram x 2, all of which have been negative for underlying abnormalities or any etiology of SAH.   - At this point, reasonable to suspect a perimesencephalic hemorrhage with venous origin. Discussed the pathology of this with pt and .  - Continue SLP for ongoing memory/speech deficits  - No further imaging or workup at this point. May follow up with NSGY on PRN basis.   - Encouraged patient and family to call the clinic with any questions, concerns, or exam changes.           Nighat Siegel PA-C  Neurosurgery  [facility]

## 2023-03-29 ENCOUNTER — TELEPHONE (OUTPATIENT)
Dept: INTERVENTIONAL RADIOLOGY/VASCULAR | Facility: CLINIC | Age: 53
End: 2023-03-29
Payer: COMMERCIAL

## 2023-03-29 NOTE — TELEPHONE ENCOUNTER
Call and spoke to patient to schedule random liver biopsy. Ref by Susanne Moran PA-C.  Patient schedule on 4/18/2023 at 830am. Patient was given pre op instructions, arrival time and location (hospitals)

## 2023-03-30 ENCOUNTER — PATIENT MESSAGE (OUTPATIENT)
Dept: HEPATOLOGY | Facility: CLINIC | Age: 53
End: 2023-03-30
Payer: COMMERCIAL

## 2023-03-30 LAB
ANTI SM ANTIBODY: 0.1 RATIO (ref 0–0.99)
ANTI SM/RNP ANTIBODY: 0.09 RATIO (ref 0–0.99)
ANTI-SM INTERPRETATION: NEGATIVE
ANTI-SM/RNP INTERPRETATION: NEGATIVE
ANTI-SSA ANTIBODY: 0.15 RATIO (ref 0–0.99)
ANTI-SSA INTERPRETATION: NEGATIVE
ANTI-SSB ANTIBODY: 0.05 RATIO (ref 0–0.99)
ANTI-SSB INTERPRETATION: NEGATIVE
DSDNA AB SER-ACNC: NORMAL [IU]/ML
IGG4 SER-MCNC: 60 MG/DL (ref 4–86)

## 2023-03-31 LAB
CLINICAL BIOCHEMIST REVIEW: NORMAL
PLPETH BLD-MCNC: <20 NG/ML
POPETH BLD-MCNC: <20 NG/ML

## 2023-04-06 LAB
ALP BONE CFR SERPL: 27 % (ref 28–66)
ALP INTEST CFR SERPL: 0 % (ref 1–24)
ALP ISOS SERPL-IMP: ABNORMAL
ALP LIVER CFR SERPL: 73 % (ref 25–69)
ALP MACROHEPATIC CFR SERPL: ABNORMAL %
ALP PLAC CFR SERPL: 0 %
ALP SERPL-CCNC: 253 U/L (ref 37–153)

## 2023-04-07 ENCOUNTER — PATIENT MESSAGE (OUTPATIENT)
Dept: PRIMARY CARE CLINIC | Facility: CLINIC | Age: 53
End: 2023-04-07
Payer: COMMERCIAL

## 2023-04-10 ENCOUNTER — PATIENT MESSAGE (OUTPATIENT)
Dept: NEUROSURGERY | Facility: CLINIC | Age: 53
End: 2023-04-10
Payer: COMMERCIAL

## 2023-04-11 ENCOUNTER — TELEPHONE (OUTPATIENT)
Dept: INTERVENTIONAL RADIOLOGY/VASCULAR | Facility: CLINIC | Age: 53
End: 2023-04-11
Payer: COMMERCIAL

## 2023-04-11 NOTE — TELEPHONE ENCOUNTER
Left message for pt to return my call. Need to schedule labs prior to  IR procedure 4/18.  Please forward call to [phone]. Thanks

## 2023-04-12 ENCOUNTER — TELEPHONE (OUTPATIENT)
Dept: NEUROSURGERY | Facility: CLINIC | Age: 53
End: 2023-04-12
Payer: COMMERCIAL

## 2023-04-17 ENCOUNTER — TELEPHONE (OUTPATIENT)
Dept: INTERVENTIONAL RADIOLOGY/VASCULAR | Facility: HOSPITAL | Age: 53
End: 2023-04-17
Payer: COMMERCIAL

## 2023-04-17 NOTE — NURSING
Advised to arrive at 7:15, where to ck. in, nothing to eat/drink past midnight, have ride to/from procedure, take am meds w/ sm sip water (no oral diabetes Rx or short acting insulins), bring current list of Rx meds. Confirmed no allergies and no thinners.

## 2023-04-18 ENCOUNTER — HOSPITAL ENCOUNTER (OUTPATIENT)
Dept: INTERVENTIONAL RADIOLOGY/VASCULAR | Facility: HOSPITAL | Age: 53
Discharge: HOME | End: 2023-04-18
Attending: PHYSICIAN ASSISTANT | Admitting: RADIOLOGY
Payer: COMMERCIAL

## 2023-04-18 VITALS
RESPIRATION RATE: 14 BRPM | TEMPERATURE: 98 F | HEIGHT: 62 IN | SYSTOLIC BLOOD PRESSURE: 128 MMHG | OXYGEN SATURATION: 99 % | BODY MASS INDEX: 23 KG/M2 | HEART RATE: 60 BPM | DIASTOLIC BLOOD PRESSURE: 59 MMHG | WEIGHT: 125 LBS

## 2023-04-18 DIAGNOSIS — R74.8 ELEVATED ALKALINE PHOSPHATASE LEVEL: ICD-10-CM

## 2023-04-18 DIAGNOSIS — K75.4 AUTOIMMUNE HEPATITIS: ICD-10-CM

## 2023-04-18 DIAGNOSIS — R79.89 ELEVATED LIVER FUNCTION TESTS: ICD-10-CM

## 2023-04-18 DIAGNOSIS — R74.8 ELEVATED LIVER ENZYMES: ICD-10-CM

## 2023-04-18 LAB
ALBUMIN SERPL BCP-MCNC: 3.6 G/DL (ref 3.5–5.2)
ALP SERPL-CCNC: 131 U/L (ref 55–135)
ALT SERPL W/O P-5'-P-CCNC: 51 U/L (ref 10–44)
ANION GAP SERPL CALC-SCNC: 10 MMOL/L (ref 8–16)
AST SERPL-CCNC: 40 U/L (ref 10–40)
BASOPHILS # BLD AUTO: 0.04 K/UL (ref 0–0.2)
BASOPHILS NFR BLD: 0.7 % (ref 0–1.9)
BILIRUB SERPL-MCNC: 0.5 MG/DL (ref 0.1–1)
BUN SERPL-MCNC: 13 MG/DL (ref 6–20)
CALCIUM SERPL-MCNC: 9.6 MG/DL (ref 8.7–10.5)
CHLORIDE SERPL-SCNC: 104 MMOL/L (ref 95–110)
CO2 SERPL-SCNC: 20 MMOL/L (ref 23–29)
CREAT SERPL-MCNC: 0.9 MG/DL (ref 0.5–1.4)
DIFFERENTIAL METHOD: NORMAL
EOSINOPHIL # BLD AUTO: 0.2 K/UL (ref 0–0.5)
EOSINOPHIL NFR BLD: 3.6 % (ref 0–8)
ERYTHROCYTE [DISTWIDTH] IN BLOOD BY AUTOMATED COUNT: 12.5 % (ref 11.5–14.5)
EST. GFR  (NO RACE VARIABLE): >60 ML/MIN/1.73 M^2
GLUCOSE SERPL-MCNC: 196 MG/DL (ref 70–110)
HCT VFR BLD AUTO: 37.7 % (ref 37–48.5)
HGB BLD-MCNC: 12.8 G/DL (ref 12–16)
IMM GRANULOCYTES # BLD AUTO: 0.01 K/UL (ref 0–0.04)
IMM GRANULOCYTES NFR BLD AUTO: 0.2 % (ref 0–0.5)
LYMPHOCYTES # BLD AUTO: 1.5 K/UL (ref 1–4.8)
LYMPHOCYTES NFR BLD: 26.7 % (ref 18–48)
MCH RBC QN AUTO: 30.9 PG (ref 27–31)
MCHC RBC AUTO-ENTMCNC: 34 G/DL (ref 32–36)
MCV RBC AUTO: 91 FL (ref 82–98)
MONOCYTES # BLD AUTO: 0.5 K/UL (ref 0.3–1)
MONOCYTES NFR BLD: 9.6 % (ref 4–15)
NEUTROPHILS # BLD AUTO: 3.3 K/UL (ref 1.8–7.7)
NEUTROPHILS NFR BLD: 59.2 % (ref 38–73)
NRBC BLD-RTO: 0 /100 WBC
PLATELET # BLD AUTO: 229 K/UL (ref 150–450)
PMV BLD AUTO: 9.7 FL (ref 9.2–12.9)
POTASSIUM SERPL-SCNC: 4.8 MMOL/L (ref 3.5–5.1)
PROT SERPL-MCNC: 7.4 G/DL (ref 6–8.4)
RBC # BLD AUTO: 4.14 M/UL (ref 4–5.4)
SODIUM SERPL-SCNC: 134 MMOL/L (ref 136–145)
WBC # BLD AUTO: 5.61 K/UL (ref 3.9–12.7)

## 2023-04-18 PROCEDURE — 99152 MOD SED SAME PHYS/QHP 5/>YRS: CPT | Performed by: RADIOLOGY

## 2023-04-18 PROCEDURE — 88313 PR  SPECIAL STAINS,GROUP II: ICD-10-PCS | Mod: 26,,, | Performed by: PATHOLOGY

## 2023-04-18 PROCEDURE — 99153 MOD SED SAME PHYS/QHP EA: CPT

## 2023-04-18 PROCEDURE — 47000 NEEDLE BIOPSY OF LIVER PERQ: CPT | Performed by: RADIOLOGY

## 2023-04-18 PROCEDURE — 27200940 IR BIOPSY LIVER

## 2023-04-18 PROCEDURE — 88313 SPECIAL STAINS GROUP 2: CPT | Mod: 59 | Performed by: PATHOLOGY

## 2023-04-18 PROCEDURE — 99152 PR MOD CONSCIOUS SEDATION, SAME PHYS, 5+ YRS, FIRST 15 MIN: ICD-10-PCS | Mod: ,,, | Performed by: RADIOLOGY

## 2023-04-18 PROCEDURE — 88307 TISSUE EXAM BY PATHOLOGIST: CPT | Mod: 26,,, | Performed by: PATHOLOGY

## 2023-04-18 PROCEDURE — 63600175 PHARM REV CODE 636 W HCPCS: Performed by: RADIOLOGY

## 2023-04-18 PROCEDURE — 76942 ECHO GUIDE FOR BIOPSY: CPT | Mod: 26,,, | Performed by: RADIOLOGY

## 2023-04-18 PROCEDURE — 88307 TISSUE EXAM BY PATHOLOGIST: CPT | Performed by: PATHOLOGY

## 2023-04-18 PROCEDURE — 76942 IR BIOPSY LIVER: ICD-10-PCS | Mod: 26,,, | Performed by: RADIOLOGY

## 2023-04-18 PROCEDURE — 25000003 PHARM REV CODE 250: Performed by: RADIOLOGY

## 2023-04-18 PROCEDURE — 88307 PR  SURG PATH,LEVEL V: ICD-10-PCS | Mod: 26,,, | Performed by: PATHOLOGY

## 2023-04-18 PROCEDURE — 99152 MOD SED SAME PHYS/QHP 5/>YRS: CPT

## 2023-04-18 PROCEDURE — 99152 MOD SED SAME PHYS/QHP 5/>YRS: CPT | Mod: ,,, | Performed by: RADIOLOGY

## 2023-04-18 PROCEDURE — 85025 COMPLETE CBC W/AUTO DIFF WBC: CPT | Performed by: PHYSICIAN ASSISTANT

## 2023-04-18 PROCEDURE — 99153 MOD SED SAME PHYS/QHP EA: CPT | Performed by: RADIOLOGY

## 2023-04-18 PROCEDURE — A4215 STERILE NEEDLE: HCPCS

## 2023-04-18 PROCEDURE — 80053 COMPREHEN METABOLIC PANEL: CPT | Performed by: PHYSICIAN ASSISTANT

## 2023-04-18 PROCEDURE — 88313 SPECIAL STAINS GROUP 2: CPT | Mod: 26,,, | Performed by: PATHOLOGY

## 2023-04-18 PROCEDURE — 47000 IR BIOPSY LIVER: ICD-10-PCS | Mod: ,,, | Performed by: RADIOLOGY

## 2023-04-18 RX ORDER — SODIUM CHLORIDE 9 MG/ML
INJECTION, SOLUTION INTRAVENOUS
Status: COMPLETED | OUTPATIENT
Start: 2023-04-18 | End: 2023-04-18

## 2023-04-18 RX ORDER — LIDOCAINE HYDROCHLORIDE 10 MG/ML
1 INJECTION, SOLUTION EPIDURAL; INFILTRATION; INTRACAUDAL; PERINEURAL ONCE
Status: DISCONTINUED | OUTPATIENT
Start: 2023-04-18 | End: 2023-04-18

## 2023-04-18 RX ORDER — LIDOCAINE HYDROCHLORIDE 10 MG/ML
INJECTION INFILTRATION; PERINEURAL
Status: COMPLETED | OUTPATIENT
Start: 2023-04-18 | End: 2023-04-18

## 2023-04-18 RX ORDER — MIDAZOLAM HYDROCHLORIDE 1 MG/ML
INJECTION INTRAMUSCULAR; INTRAVENOUS
Status: COMPLETED | OUTPATIENT
Start: 2023-04-18 | End: 2023-04-18

## 2023-04-18 RX ORDER — FENTANYL CITRATE 50 UG/ML
INJECTION, SOLUTION INTRAMUSCULAR; INTRAVENOUS
Status: COMPLETED | OUTPATIENT
Start: 2023-04-18 | End: 2023-04-18

## 2023-04-18 RX ORDER — SODIUM CHLORIDE 9 MG/ML
INJECTION, SOLUTION INTRAVENOUS CONTINUOUS
Status: DISCONTINUED | OUTPATIENT
Start: 2023-04-18 | End: 2023-04-18

## 2023-04-18 RX ADMIN — FENTANYL CITRATE 50 MCG: 50 INJECTION, SOLUTION INTRAMUSCULAR; INTRAVENOUS at 09:04

## 2023-04-18 RX ADMIN — SODIUM CHLORIDE 500 ML: 0.9 INJECTION, SOLUTION INTRAVENOUS at 09:04

## 2023-04-18 RX ADMIN — LIDOCAINE HYDROCHLORIDE 10 ML: 10 INJECTION, SOLUTION INFILTRATION; PERINEURAL at 09:04

## 2023-04-18 RX ADMIN — MIDAZOLAM HYDROCHLORIDE 1 MG: 1 INJECTION, SOLUTION INTRAMUSCULAR; INTRAVENOUS at 09:04

## 2023-04-18 RX ADMIN — GELATIN ABSORBABLE SPONGE SIZE 100 1 APPLICATOR: MISC at 09:04

## 2023-04-18 NOTE — DISCHARGE SUMMARY
Interventional Radiology Short Stay Discharge Summary      Admit Date: 4/18/2023  Discharge Date: 04/18/2023     Hospital Course: Uneventful    Discharge Diagnosis: Abnormal LFTs s/p liver bx today    Discharge Condition: Stable    Discharge Disposition: Home    Diet: Resume prior diet    Activity: Activity as tolerated and no driving for today    Follow-up: With referring provider      Lazaro Damico MD  [facility]  Pager [phone]

## 2023-04-18 NOTE — H&P
Interventional Radiology Pre-Procedure History & Physical      Chief Complaint/Reason for Referral: Abnormal LFTs    History of Present Illness:  Leida Hoyos is a 53 y.o. female who presents with abnormal LFTs. Referred for bx.    Past Medical History:   Diagnosis Date    Autoimmune hepatitis     managed by Dr. Russell on mercaptopurine    Diabetes mellitus type II     Hypertension      Past Surgical History:   Procedure Laterality Date    CEREBRAL ANGIOGRAM N/A 2023    Procedure: ANGIOGRAM-CEREBRAL;  Surgeon: Yue Surgeon;  Location: [facility];  Service: Anesthesiology;  Laterality: N/A;     SECTION, CLASSIC      COLONOSCOPY N/A 2019    Procedure: COLONOSCOPY;  Surgeon: Dipesh Victoria MD;  Location: [facility] ([address] [place] [address]);  Service: Endoscopy;  Laterality: N/A;    FRACTURE SURGERY Left     leg     MOUTH SURGERY         Allergies:   Review of patient's allergies indicates:  No Known Allergies     Home Meds:   Prior to Admission medications    Medication Sig Start Date End Date Taking? Authorizing Provider   acetaminophen (TYLENOL) 650 mg/20.3 mL Soln Take 20.3 mLs (650 mg total) by mouth every 6 (six) hours as needed for Pain (headaches). 2/10/23  Yes Nighat Siegel PA-C   cholecalciferol, vitamin D3, (VITAMIN D3) 1,000 unit capsule Take 1 capsule (1,000 Units total) by mouth once daily. 4/10/19  Yes Chelsie Bundy MD   insulin glargine-yfgn 100 unit/mL (3 mL) InPn Inject 4 Units into the skin 2 (two) times a day.   Yes Historical Provider   insulin lispro (HUMALOG KWIKPEN INSULIN) 100 unit/mL pen Takes 5 units/7 units and 9 units before meals, plus correction for a TDD 25 units  Patient taking differently: Inject 8 Units into the skin 4 (four) times daily before meals and nightly. 22  Yes Nini Kurtz MD   melatonin (MELATIN) 3 mg tablet Take 2 tablets (6 mg total) by mouth nightly as needed for Insomnia. 2/10/23  Yes Nighat Siegel PA-C  "  multivitamin (THERAGRAN) per tablet Take 1 tablet by mouth once daily.   Yes Historical Provider   pravastatin (PRAVACHOL) 10 MG tablet Take 1 tablet (10 mg total) by mouth once daily. 1/27/22 4/18/23 Yes Frieda Mera MD   blood sugar diagnostic Strp For one touch verio IQ meter 4/22/22   Nini Kurtz MD   blood-glucose meter (ONETOUCH VERIO IQ METER MISC) OneTouch Verio IQ Meter    Historical Provider   blood-glucose sensor (DEXCOM G6 SENSOR) Melissa Monitor blood sugars daily 4/22/22   Nini Kurtz MD   blood-glucose transmitter (DEXCOM G6 TRANSMITTER) Melissa USE AS DIRECTED for daily use TO TEST BLOOD GLUCOSE. 4/22/22   Nini Kurtz MD   hydrOXYzine HCL (ATARAX) 25 MG tablet Take 1 tablet (25 mg total) by mouth nightly as needed (Insomnia). 2/28/23   Nighat Siegel PA-C   ONETOUCH DELICA PLUS LANCET 33 gauge Misc USE AS DIRECTED TO TEST BLOOD GLUCOSE. 10/16/21   Nini Kurtz MD   pen needle, diabetic (BD ULTRA-FINE NICK PEN NEEDLE) 32 gauge x 5/32" Ndle USE FOUR TIMES A DAY FOR INSULIN INJECTIONS 4/22/22   Nini Kurtz MD       Anticoagulation/Antiplatelet Meds: no anticoagulation    Review of Systems:   Hematological: no known coagulopathies  Respiratory: no shortness of breath  Cardiovascular: no chest pain  Gastrointestinal: no abdominal pain  Genitourinary: no dysuria  Musculoskeletal: negative  Neurological: no TIA or stroke symptoms     Physical Exam:  Temp: 98 °F (36.7 °C) (04/18/23 0801)  Pulse: 73 (04/18/23 0801)  Resp: 16 (04/18/23 0801)  BP: 138/64 (04/18/23 0801)  SpO2: 100 % (04/18/23 0801)    General: WNWD, NAD  HEENT: Normocephalic, sclera anicteric, oropharynx clear  Neck: Supple, no palpable lymphadenopathy  Heart: RRR  Lungs: Symmetric excursions, breathing unlabored  Abd: NTND, soft  Extremities: MAEW, no CCE  Pulses: 2+ DP b/l  Neuro: Facial asymmetry, AAOx3    Laboratory:  Lab Results   Component Value Date    INR 1.0 04/13/2023       Lab Results   Component Value Date "    WBC 5.61 04/18/2023    HGB 12.8 04/18/2023    HCT 37.7 04/18/2023    MCV 91 04/18/2023     04/18/2023      Lab Results   Component Value Date     (H) 04/18/2023     (L) 04/18/2023    K 4.8 04/18/2023     04/18/2023    CO2 20 (L) 04/18/2023    BUN 13 04/18/2023    CREATININE 0.9 04/18/2023    CALCIUM 9.6 04/18/2023    MG 1.9 02/15/2023    ALT 51 (H) 04/18/2023    AST 40 04/18/2023    ALBUMIN 3.6 04/18/2023    BILITOT 0.5 04/18/2023    BILIDIR 0.1 03/27/2023       Imaging:  US liver 2/15/23 reviewed.    Assessment/Plan:  53 y.o. female with abnormal LFTs. Will undergo liver bx today.    Sedation:  Sedation history: have not been any systemic reactions  ASA: 3 / Mallampati: 3  Sedation plan: Up to moderate (Versed, fentanyl)     Risks (including, but not limited to, pain, bleeding, infection, damage to nearby structures, treatment failure/recurrence, and the need for additional procedures), potential benefits, and alternatives were discussed with the patient. All questions were answered to the best of my abilities. The patient wishes to proceed. Written informed consent was obtained.      Andrew Marsala MD [facility]  Pager [phone]

## 2023-04-18 NOTE — PROCEDURES
Interventional Radiology Immediate Post-Procedure Note    Pre-Op Diagnosis: Abnormal LFTs  Post-Op Diagnosis: Same    Procedure: US-guided coaxial core needle bx    Procedure performed by: Lazaro Damico MD  Assistants: None    Estimated Blood Loss: Minimal  Specimen Removed: Yes    Findings/description of procedure:  18-ga cores x4 taken from the right lobe of the liver under real-time US guidance. Placed in formalin and submitted.    No immediate complications. Patient tolerated procedure well. Please see full dictated procedure report for additional details and recommendations.      Lazaro Damico MD  [facility]  Pager [phone]

## 2023-04-21 ENCOUNTER — CLINICAL SUPPORT (OUTPATIENT)
Dept: OTHER | Facility: CLINIC | Age: 53
End: 2023-04-21

## 2023-04-21 DIAGNOSIS — Z00.8 ENCOUNTER FOR OTHER GENERAL EXAMINATION: ICD-10-CM

## 2023-04-22 VITALS
WEIGHT: 127 LBS | BODY MASS INDEX: 23.37 KG/M2 | HEIGHT: 62 IN | SYSTOLIC BLOOD PRESSURE: 130 MMHG | DIASTOLIC BLOOD PRESSURE: 82 MMHG

## 2023-04-22 LAB
HDLC SERPL-MCNC: 84 MG/DL
POC CHOLESTEROL, LDL (DOCK): 107 MG/DL
POC CHOLESTEROL, TOTAL: 202 MG/DL
POC GLUCOSE, FASTING: 154 MG/DL (ref 60–110)
TRIGL SERPL-MCNC: 62 MG/DL

## 2023-04-26 LAB
COMMENT: NORMAL
FINAL PATHOLOGIC DIAGNOSIS: NORMAL
GROSS: NORMAL
Lab: NORMAL

## 2023-04-27 ENCOUNTER — OFFICE VISIT (OUTPATIENT)
Dept: ENDOCRINOLOGY | Facility: CLINIC | Age: 53
End: 2023-04-27
Payer: COMMERCIAL

## 2023-04-27 VITALS
WEIGHT: 129.94 LBS | SYSTOLIC BLOOD PRESSURE: 132 MMHG | OXYGEN SATURATION: 98 % | DIASTOLIC BLOOD PRESSURE: 78 MMHG | HEIGHT: 62 IN | BODY MASS INDEX: 23.91 KG/M2 | HEART RATE: 90 BPM

## 2023-04-27 DIAGNOSIS — E13.9 LADA (LATENT AUTOIMMUNE DIABETES IN ADULTS), MANAGED AS TYPE 1: Primary | ICD-10-CM

## 2023-04-27 PROCEDURE — 3008F BODY MASS INDEX DOCD: CPT | Mod: CPTII,S$GLB,, | Performed by: INTERNAL MEDICINE

## 2023-04-27 PROCEDURE — 99999 PR PBB SHADOW E&M-EST. PATIENT-LVL IV: CPT | Mod: PBBFAC,,, | Performed by: INTERNAL MEDICINE

## 2023-04-27 PROCEDURE — 95251 PR GLUCOSE MONITOR, 72 HOUR, PHYS INTERP: ICD-10-PCS | Mod: S$GLB,,, | Performed by: INTERNAL MEDICINE

## 2023-04-27 PROCEDURE — 3008F PR BODY MASS INDEX (BMI) DOCUMENTED: ICD-10-PCS | Mod: CPTII,S$GLB,, | Performed by: INTERNAL MEDICINE

## 2023-04-27 PROCEDURE — 99214 OFFICE O/P EST MOD 30 MIN: CPT | Mod: 25,S$GLB,, | Performed by: INTERNAL MEDICINE

## 2023-04-27 PROCEDURE — 95251 CONT GLUC MNTR ANALYSIS I&R: CPT | Mod: S$GLB,,, | Performed by: INTERNAL MEDICINE

## 2023-04-27 PROCEDURE — 3075F SYST BP GE 130 - 139MM HG: CPT | Mod: CPTII,S$GLB,, | Performed by: INTERNAL MEDICINE

## 2023-04-27 PROCEDURE — 3044F PR MOST RECENT HEMOGLOBIN A1C LEVEL <7.0%: ICD-10-PCS | Mod: CPTII,S$GLB,, | Performed by: INTERNAL MEDICINE

## 2023-04-27 PROCEDURE — 3078F DIAST BP <80 MM HG: CPT | Mod: CPTII,S$GLB,, | Performed by: INTERNAL MEDICINE

## 2023-04-27 PROCEDURE — 3044F HG A1C LEVEL LT 7.0%: CPT | Mod: CPTII,S$GLB,, | Performed by: INTERNAL MEDICINE

## 2023-04-27 PROCEDURE — 3078F PR MOST RECENT DIASTOLIC BLOOD PRESSURE < 80 MM HG: ICD-10-PCS | Mod: CPTII,S$GLB,, | Performed by: INTERNAL MEDICINE

## 2023-04-27 PROCEDURE — 1159F MED LIST DOCD IN RCRD: CPT | Mod: CPTII,S$GLB,, | Performed by: INTERNAL MEDICINE

## 2023-04-27 PROCEDURE — 1159F PR MEDICATION LIST DOCUMENTED IN MEDICAL RECORD: ICD-10-PCS | Mod: CPTII,S$GLB,, | Performed by: INTERNAL MEDICINE

## 2023-04-27 PROCEDURE — 99214 PR OFFICE/OUTPT VISIT, EST, LEVL IV, 30-39 MIN: ICD-10-PCS | Mod: 25,S$GLB,, | Performed by: INTERNAL MEDICINE

## 2023-04-27 PROCEDURE — 3075F PR MOST RECENT SYSTOLIC BLOOD PRESS GE 130-139MM HG: ICD-10-PCS | Mod: CPTII,S$GLB,, | Performed by: INTERNAL MEDICINE

## 2023-04-27 PROCEDURE — 99999 PR PBB SHADOW E&M-EST. PATIENT-LVL IV: ICD-10-PCS | Mod: PBBFAC,,, | Performed by: INTERNAL MEDICINE

## 2023-04-27 RX ORDER — INSULIN PMP CART,AUT,G6/7,CNTR
1 EACH SUBCUTANEOUS DAILY
Qty: 1 EACH | Refills: 0 | Status: SHIPPED | OUTPATIENT
Start: 2023-04-27 | End: 2023-05-01 | Stop reason: SDUPTHER

## 2023-04-27 RX ORDER — INSULIN LISPRO 100 [IU]/ML
INJECTION, SOLUTION INTRAVENOUS; SUBCUTANEOUS
Qty: 20 ML | Refills: 11 | Status: SHIPPED | OUTPATIENT
Start: 2023-04-27 | End: 2024-03-11 | Stop reason: SDUPTHER

## 2023-04-27 RX ORDER — INSULIN PMP CART,AUT,G6/7,CNTR
10 EACH SUBCUTANEOUS DAILY
Qty: 1 EACH | Refills: 11 | Status: SHIPPED | OUTPATIENT
Start: 2023-04-27 | End: 2023-05-01 | Stop reason: SDUPTHER

## 2023-04-27 NOTE — PROGRESS NOTES
"Subjective:      Patient ID: Leida Hoyos is a 53 y.o. female.    Chief Complaint:  Diabetes      History of Present Illness    Ms. Hoyos is a 53 year old woman who is here for a follow up.   In January developed an acute headache, when to bed. Next day went to shower and was reportedly not making sense. She has nomemory of the day in the ER --> ICU --> regular room --> rehab and day before before mardi gras she started to remember.     Long term memory is fine   Short term memory is not as good     Plans to restart working in a few days.     Insulin - glargine 13 units at bedtime   Humalog   4 + 2 for breakfast   7 units before lunch   8-9 units before dinner     DEXCOM g6 data reviewed today.   No issues with sensor wear/allergic reactions to adhesive.     Significant for average glucose of  196  GMI: 61  Patterns of highs: post meal   Patterns of lows: none    Coefficient of variation (goal is < 33%):  SD (Goal is < 50) : 61  Time in range (Goal is > 75%): 36%  Time spent < 54 mg/dl: 0%    Successful days include:4/22    Review of Systems  No recent illness     Objective:   Physical Exam  Vitals:    04/27/23 0838   BP: 132/78   BP Location: Right arm   Patient Position: Sitting   BP Method: Medium (Manual)   Pulse: 90   SpO2: 98%   Weight: 59 kg (129 lb 15.4 oz)   Height: 5' 2" (1.575 m)       BP Readings from Last 3 Encounters:   04/27/23 132/78   04/21/23 130/82   04/18/23 (!) 128/59     Wt Readings from Last 1 Encounters:   04/27/23 0838 59 kg (129 lb 15.4 oz)         Body mass index is 23.77 kg/m².    Lab Review:   Lab Results   Component Value Date    HGBA1C 6.6 (H) 03/27/2023     Lab Results   Component Value Date    CHOL 161 03/14/2023    HDL 64 03/14/2023    LDLCALC 74.6 03/14/2023    TRIG 112 03/14/2023    CHOLHDL 39.8 03/14/2023     Lab Results   Component Value Date     (L) 04/18/2023    K 4.8 04/18/2023     04/18/2023    CO2 20 (L) 04/18/2023     (H) 04/18/2023    BUN 13 " 04/18/2023    CREATININE 0.9 04/18/2023    CALCIUM 9.6 04/18/2023    PROT 7.4 04/18/2023    ALBUMIN 3.6 04/18/2023    BILITOT 0.5 04/18/2023    ALKPHOS 131 04/18/2023    AST 40 04/18/2023    ALT 51 (H) 04/18/2023    ANIONGAP 10 04/18/2023    ESTGFRAFRICA >60.0 04/14/2022    EGFRNONAA >60.0 04/14/2022    TSH 0.972 01/27/2023         Assessment and Plan     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), managed as type 1    CHEMA patient with recent hemorrhagic stroke   Currently reports short term memory difficulties     Recommend to patient the following changes:  Increase humalog with dinner to 9 - 11 units before meals unless eating no carb/little carbs to take half the amount 5 - 6 units     Continue long acting insulin with semglee 13 units at bedtime   Appetite is good for the most part     Would benefit greatly from continuous insulin infusion via pump (had discussed omnipod)  Plan to send to pharmacy, may need to write letter of necessity  The CGM reports was scanned into medical chart      The patient:  - Has been seen in clinic within the last 6 months  - Has CHEMA managed as type 1 diabetes  - Performs frequent blood glucose testing with sensor, calibrations and confirmatory testing when low  - Requires frequent adjustments to their treatment regimen based on BGM or CGM testing results

## 2023-04-27 NOTE — ASSESSMENT & PLAN NOTE
CHEMA patient with recent hemorrhagic stroke   Currently reports short term memory difficulties     Recommend to patient the following changes:  Increase humalog with dinner to 9 - 11 units before meals unless eating no carb/little carbs to take half the amount 5 - 6 units     Continue long acting insulin with semglee 13 units at bedtime   Appetite is good for the most part     Would benefit greatly from continuous insulin infusion via pump (had discussed omnipod)  Plan to send to pharmacy, may need to write letter of necessity  The CGM reports was scanned into medical chart      The patient:  - Has been seen in clinic within the last 6 months  - Has CHEMA managed as type 1 diabetes  - Performs frequent blood glucose testing with sensor, calibrations and confirmatory testing when low  - Requires frequent adjustments to their treatment regimen based on BGM or CGM testing results

## 2023-04-28 ENCOUNTER — TELEPHONE (OUTPATIENT)
Dept: HEPATOLOGY | Facility: CLINIC | Age: 53
End: 2023-04-28
Payer: COMMERCIAL

## 2023-04-28 NOTE — TELEPHONE ENCOUNTER
Called Ms Leida and unable to reach, left voicemail  Biopsy was not specific for AIH.      Prior serologic workup:   Lab Results   Component Value Date    SMOOTHMUSCAB Negative 1:40 03/27/2023    AMAIFA Negative 1:40 03/27/2023    IGGSERUM 1464 03/27/2023    ANASCREEN Positive (A) 03/27/2023    FERRITIN 47 03/27/2023    FESATURATED 20 03/27/2023    CERULOPLSM 36.0 03/27/2023    HEPBSAG Non-reactive 03/27/2023    HEPCAB Non-reactive 01/27/2023

## 2023-04-28 NOTE — TELEPHONE ENCOUNTER
[facility] Liver Pathology Conference Note    Patient:  Leida Hoyos  MRN:   [id number]  YOB: 1970  Date of Transplant:  N/A  Native Diagnosis:     Discussion/Plan:    Presenter: JEREMY Forrest    Reason for presenting: diagnosis confirmation and elevated liver function    Concerns for Pathologists:     53 year old woman, type 1 diabetic, told she had autoimmune hepatitis in the past with outside gastro about a decade ago. Unfortunately poor historian due to recent stroke. She does not recall a liver biopsy.  Apparently she was placed on 6-MMP until 2021, unclear why this agent was started, but some discussion of avoiding prednisone.    Her liver enzymes again bumped up, I felt compelled to obtain liver biopsy as I was unclear if she truly had AIH.     AROLDO (+).    Liver enzymes mildly improved at the time of biopsy.     Her Diabetes is well-controlled.     Fibrosis staging? Any AIH? Should we restart 6-MMP?    Lab Results  WBC (K/uL)   Date Value   04/18/2023 5.61   03/14/2023 6.82   02/15/2023 4.97     PLT (K/uL)   Date Value   04/18/2023 229   03/14/2023 229   02/15/2023 325     INR (no units)   Date Value   04/13/2023 1.0   01/27/2023 1.2   01/27/2023 1.0     AST (U/L)   Date Value   04/18/2023 40     ALT (U/L)   Date Value   04/18/2023 51 (H)   03/27/2023 112 (H)   03/14/2023 75 (H)     BILITOT (mg/dL)   Date Value   04/18/2023 0.5   03/27/2023 0.2   03/14/2023 0.7     ALKPHOS (U/L)   Date Value   04/18/2023 131   03/27/2023 298 (H)   03/14/2023 153 (H)     CREATININE (mg/dL)   Date Value   04/18/2023 0.9   03/14/2023 0.56   02/15/2023 0.8

## 2023-05-01 DIAGNOSIS — E13.9 LADA (LATENT AUTOIMMUNE DIABETES IN ADULTS), MANAGED AS TYPE 1: ICD-10-CM

## 2023-05-01 RX ORDER — INSULIN PMP CART,AUT,G6/7,CNTR
10 EACH SUBCUTANEOUS DAILY
Qty: 1 EACH | Refills: 0 | Status: SHIPPED | OUTPATIENT
Start: 2023-05-01 | End: 2024-03-11

## 2023-05-01 RX ORDER — INSULIN PMP CART,AUT,G6/7,CNTR
1 EACH SUBCUTANEOUS DAILY
Qty: 1 EACH | Refills: 0 | Status: SHIPPED | OUTPATIENT
Start: 2023-05-01 | End: 2024-03-11

## 2023-05-02 ENCOUNTER — OFFICE VISIT (OUTPATIENT)
Dept: HEPATOLOGY | Facility: CLINIC | Age: 53
End: 2023-05-02
Payer: COMMERCIAL

## 2023-05-02 VITALS — WEIGHT: 130.75 LBS | HEIGHT: 62 IN | BODY MASS INDEX: 24.06 KG/M2

## 2023-05-02 DIAGNOSIS — K75.4 AUTOIMMUNE HEPATITIS: ICD-10-CM

## 2023-05-02 DIAGNOSIS — R74.8 ELEVATED LIVER ENZYMES: ICD-10-CM

## 2023-05-02 DIAGNOSIS — K76.89: ICD-10-CM

## 2023-05-02 DIAGNOSIS — T50.905A: ICD-10-CM

## 2023-05-02 DIAGNOSIS — R74.8 ELEVATED ALKALINE PHOSPHATASE LEVEL: Primary | ICD-10-CM

## 2023-05-02 PROCEDURE — 3008F BODY MASS INDEX DOCD: CPT | Mod: CPTII,S$GLB,, | Performed by: PHYSICIAN ASSISTANT

## 2023-05-02 PROCEDURE — 3044F HG A1C LEVEL LT 7.0%: CPT | Mod: CPTII,S$GLB,, | Performed by: PHYSICIAN ASSISTANT

## 2023-05-02 PROCEDURE — 99214 OFFICE O/P EST MOD 30 MIN: CPT | Mod: S$GLB,,, | Performed by: PHYSICIAN ASSISTANT

## 2023-05-02 PROCEDURE — 99214 PR OFFICE/OUTPT VISIT, EST, LEVL IV, 30-39 MIN: ICD-10-PCS | Mod: S$GLB,,, | Performed by: PHYSICIAN ASSISTANT

## 2023-05-02 PROCEDURE — 3044F PR MOST RECENT HEMOGLOBIN A1C LEVEL <7.0%: ICD-10-PCS | Mod: CPTII,S$GLB,, | Performed by: PHYSICIAN ASSISTANT

## 2023-05-02 PROCEDURE — 99999 PR PBB SHADOW E&M-EST. PATIENT-LVL IV: CPT | Mod: PBBFAC,,, | Performed by: PHYSICIAN ASSISTANT

## 2023-05-02 PROCEDURE — 1159F MED LIST DOCD IN RCRD: CPT | Mod: CPTII,S$GLB,, | Performed by: PHYSICIAN ASSISTANT

## 2023-05-02 PROCEDURE — 99999 PR PBB SHADOW E&M-EST. PATIENT-LVL IV: ICD-10-PCS | Mod: PBBFAC,,, | Performed by: PHYSICIAN ASSISTANT

## 2023-05-02 PROCEDURE — 1160F RVW MEDS BY RX/DR IN RCRD: CPT | Mod: CPTII,S$GLB,, | Performed by: PHYSICIAN ASSISTANT

## 2023-05-02 PROCEDURE — 3008F PR BODY MASS INDEX (BMI) DOCUMENTED: ICD-10-PCS | Mod: CPTII,S$GLB,, | Performed by: PHYSICIAN ASSISTANT

## 2023-05-02 PROCEDURE — 1159F PR MEDICATION LIST DOCUMENTED IN MEDICAL RECORD: ICD-10-PCS | Mod: CPTII,S$GLB,, | Performed by: PHYSICIAN ASSISTANT

## 2023-05-02 PROCEDURE — 1160F PR REVIEW ALL MEDS BY PRESCRIBER/CLIN PHARMACIST DOCUMENTED: ICD-10-PCS | Mod: CPTII,S$GLB,, | Performed by: PHYSICIAN ASSISTANT

## 2023-05-02 NOTE — PATIENT INSTRUCTIONS
Your liver biopsy suggested NO autoimmune hepatitis and no fibrosis. At this time no indication for treatment of this inflammation as there is no evidence of autoimmune activity in the liver. You also are very far from cirrhosis. Good news!  I suspect you've had a drug induced inflammation in your liver, possibly related to the statin or blood pressure medication.  Follow-up in 1 year with labs every 3 months.

## 2023-05-02 NOTE — PROGRESS NOTES
Hepatology Consultation: [facility]    NEW PATIENT   PCP: Frieda Mera MD    Subjective      Ms. Hoyos is a 53 y.o. female with PMH as listed below who presents to clinic for elevated liver enzymes, mixed picture.     Reports she was diagnosed with AIH w Dr. Russell, outside GI, about 10 years ago, maintained on 6-mmp until 2021, unclear why this agent was started but she recalls no liver biopsy and recalls wanting to avoid prednisone due to T1DM diagnosis.    Carries history of T1DM late onset x10 years ago    In relation to metabolic risk factors:   Lab Results   Component Value Date    HGBA1C 6.6 (H) 03/27/2023    CHOL 161 03/14/2023    TSH 0.972 01/27/2023     Body mass index is 23.91 kg/m².    Lab Results   Component Value Date    ALT 51 (H) 04/18/2023    AST 40 04/18/2023    ALKPHOS 131 04/18/2023    BILITOT 0.5 04/18/2023    ALBUMIN 3.6 04/18/2023    INR 1.0 04/13/2023     04/18/2023       Initial history 03/27/2023  Leida Hoyos arrives today with .  She is well-appearing and feels well. Denies symptoms of hepatic decompensation including jaundice, ascites, cognitive problems to suggest hepatic encephalopathy, or GI bleeding.   She denies abdominal pain, new rash, itching, significant/new fatigue, jaundice.    Recently had a brain bleed underwent hospitalization and rehabilitation and is largely back to normal physical function but notes cognitive/memory impairment. History helped by .     Patient reporst  Dr Russell 10 years ago diagnosed with AIH, unclear if liver biopsied 6-MP, she does not recall trying prednisone or imuran. She does not recall s/e to other medications.    Per EMR likely stopped 01/2021 or prior (6mmp)    Late onset DM1 9 years age, wears dexcom  In regards to supplements, multivitamin centrum Women's One A Day & vitamin D.   She denies a family history of liver cancer, cirrhosis, or hepatitis.    No smoking or  drug use. No new sexual partners. Reports 2-3 drinks of alcohol per month.      Interval history 2023:  Leida Hoyos arrives today alone.   Since our last visit, underwent liver biopsy. Liver tests have improved. Biopsy did not suggest autoimmune hepatitis.   Denies symptoms of hepatic decompensation including jaundice, ascites, or GI bleeding. No abd pain.      PMH Leida has a past medical history of Autoimmune hepatitis, Brain bleed (2023), Diabetes mellitus type II, and Hypertension.   PSXH Leida has a past surgical history that includes  section, classic (); Mouth surgery; Fracture surgery (Left, ); Colonoscopy (N/A, 2019); and Cerebral angiogram (N/A, 2023).    Leida's family history includes Alcohol abuse in her maternal grandfather and maternal grandmother; Arthritis in her mother; Asthma in her father; Diabetes in her maternal grandfather and paternal grandfather; Heart attack (age of onset: 60) in her father; Heart disease in her father; Hyperlipidemia in her father; Hypertension in her father and sister; Learning disabilities in her daughter and son; No Known Problems in her daughter, sister, and son; Ulcerative colitis in her mother.   ALTON Casarez reports that she has never smoked. She has never used smokeless tobacco. She reports current alcohol use of about 2.0 standard drinks per week. She reports that she does not use drugs.   JORDYN Casarez has No Known Allergies.   BROOKLYN Casarez has a current medication list which includes the following prescription(s): acetaminophen, blood sugar diagnostic, blood-glucose meter, dexcom g6 sensor, dexcom g6 transmitter, cholecalciferol (vitamin d3), hydroxyzine hcl, insulin glargine-yfgn, insulin lispro, insulin lispro, omnipod 5 g6 intro kit (gen 5), omnipod 5 g6 pods (gen 5), melatonin, multivitamin, onetouch delica plus lancet, pen needle, diabetic, and pravastatin.     ROS:   GENERAL: Denies fatigue  HEENT: Denies  "yellowing of eyes   CARDIOVASCULAR: Denies edema  GI: Denies abdominal pain  SKIN: Denies rash, itching     Objective     Ht 5' 2" (1.575 m)   Wt 59.3 kg (130 lb 11.7 oz)   LMP 03/21/2023 (Approximate)   BMI 23.91 kg/m²     PHYSICAL EXAM:   Friendly woman, in no acute distress; alert and oriented to person, place and time  EYES: Sclerae anicteric  SKIN: Warm and dry. No jaundice. No telangectasias noted. No palmar erythema.  NEURO:  Normal gait.   PSYCH:  Memory intact. Thought and speech pattern appropriate. Behavior normal    DIAGNOSTICS  Lab Results   Component Value Date    ALT 51 (H) 04/18/2023    AST 40 04/18/2023    ALKPHOS 131 04/18/2023    BILITOT 0.5 04/18/2023    ALBUMIN 3.6 04/18/2023    INR 1.0 04/13/2023     04/18/2023     No results found for: AFP      Prior serologic workup:   Lab Results   Component Value Date    SMOOTHMUSCAB Negative 1:40 03/27/2023    AMAIFA Negative 1:40 03/27/2023    IGGSERUM 1464 03/27/2023    ANASCREEN Positive (A) 03/27/2023    FERRITIN 47 03/27/2023    FESATURATED 20 03/27/2023    CERULOPLSM 36.0 03/27/2023    HEPBSAG Non-reactive 03/27/2023    HEPCAB Non-reactive 01/27/2023       Imaging:      US LIVER WITH DOPPLER  CLINICAL HISTORY:  h/o autoimmune hepatitis, LFT's trending up;     TECHNIQUE:  Duplex scan of the liver was performed using B-mode/gray scale imaging and Doppler spectral analysis and color flow.     COMPARISON:  Ultrasound 09/23/2013     FINDINGS:  Liver: Normal in size, measuring 13.3 cm. Homogeneous parenchymal echotexture. No focal hepatic lesions.     Hepatic vasculature:Portal veins are patent with proper directional flow. The splenic and superior mesenteric veins are patent at the portal confluence. Hepatic veins and IVC are patent with proper directional flow. Hepatic arteries are patent with normal waveforms. No upper abdominal venous collaterals.     Biliary: Few gallstones, the largest of which measures 1.4 cm.  No gallbladder distension, " wall thickening, or pericholecystic fluid.  No biliary ductal dilatation. CBD measures 5 mm.     Spleen: Normal in size, measuring 8.0 x 3.8 cm. Homogeneous parenchymal echotexture.     Pancreas: Visualized portions demonstrate normal contours.     Miscellaneous: No upper abdominal ascites.     Impression:     Cholelithiasis.  No cholecystitis or biliary ductal dilatation.     Satisfactory Doppler evaluation of the liver.     Electronically signed by resident: Jael Dave  Date:                                            02/15/2023  Time:                                           09:00     Electronically signed by: Jason Bowers  Date:                                            02/15/2023  Time:                                           10:45        LIVER PATHOLOGY REPORT 4/20/2023:  The needle biopsy of liver is adequate for evaluation.  The portal tracts contain no significant chronic inflammation - with most portal tracts containing zero to rare inflammatory cells.  Some portal tracts show evidence of minimal bile duct injury,   characterized by a mild withering appearance of the bile ducts.  The lobular parenchyma contains lipofuscin pigment accumulation around central hepatic veins (zone 3).  There is no evidence of hepatic steatosis, and no significant lobular inflammation is    identified.  Histochemical stains are performed with appropriate controls.  Trichrome stain shows no increased liver fibrosis.  Iron stain is negative for hepatic iron accumulation.  Overall, the histopathologic findings are very mild and nonspecific.     Lipofuscin pigment accumulation is nonspecific, but may be secondary to drug/medication effect on the liver.  Please correlate with clinical findings.     Assessment/Plan     53 y.o. female with:    1. Autoimmune hepatitis (?)  - reported history diagnosed 2013 by outside provider, previously maintained on 6-MMP    2. Elevated alkaline phosphatase level  3. Elevated liver  enzymes    4. Drug induced liver inflammation  - suspected per liver biopsy 2023   - ?statin vs antiHTN - stopped and liver enzymes improved significantly    Orders Placed This Encounter   Procedures    Hepatic Function Panel       Liver biopsy suggests drug-induced liver injury. Enzymes improved, perhaps related to statin or antihypertensive, but good news is no liver damage. NO EVIDENCE OF AIH PER BIOPSY  Continue monitoring enzymes [id number]. We discussed if there is any evidence of future worsening of enzymes we can always re-biopsy.  Follow up in about 1 year (around 5/2/2024).    I spent 30 minutes on the day of this encounter preparing for, evaluating, treating, and managing this patient.     Thank you for allowing me to participate in the care of Leida Moran PA-C  Hepatology & Liver Transplant

## 2023-05-11 ENCOUNTER — CONFERENCE (OUTPATIENT)
Dept: TRANSPLANT | Facility: CLINIC | Age: 53
End: 2023-05-11
Payer: COMMERCIAL

## 2023-05-11 NOTE — TELEPHONE ENCOUNTER
5/11/23 Path Conference    4/18/23 biopsy:  bile duct ok; some zone 3 sinusoidal congestion.  No fibrosis.

## 2023-05-19 ENCOUNTER — PATIENT MESSAGE (OUTPATIENT)
Dept: ENDOCRINOLOGY | Facility: CLINIC | Age: 53
End: 2023-05-19
Payer: COMMERCIAL

## 2023-05-24 ENCOUNTER — TELEPHONE (OUTPATIENT)
Dept: ENDOCRINOLOGY | Facility: CLINIC | Age: 53
End: 2023-05-24
Payer: COMMERCIAL

## 2023-05-25 ENCOUNTER — CLINICAL SUPPORT (OUTPATIENT)
Dept: DIABETES | Facility: CLINIC | Age: 53
End: 2023-05-25
Payer: COMMERCIAL

## 2023-05-25 DIAGNOSIS — E13.9 LADA (LATENT AUTOIMMUNE DIABETES IN ADULTS), MANAGED AS TYPE 1: Primary | ICD-10-CM

## 2023-05-25 PROCEDURE — G0108 DIAB MANAGE TRN  PER INDIV: HCPCS | Mod: S$GLB,,, | Performed by: INTERNAL MEDICINE

## 2023-05-25 PROCEDURE — 99999 PR PBB SHADOW E&M-EST. PATIENT-LVL I: CPT | Mod: PBBFAC,,,

## 2023-05-25 PROCEDURE — 99999 PR PBB SHADOW E&M-EST. PATIENT-LVL I: ICD-10-PCS | Mod: PBBFAC,,,

## 2023-05-25 PROCEDURE — G0108 PR DIAB MANAGE TRN  PER INDIV: ICD-10-PCS | Mod: S$GLB,,, | Performed by: INTERNAL MEDICINE

## 2023-05-25 NOTE — PROGRESS NOTES
Diabetes Care Specialist Progress Note  Author: Sheeba Ortiz RN, CDE  Date: 5/25/2023    Program Intake  Reason for Diabetes Program Visit:: Intervention  Type of Intervention:: Individual  Individual: Education, Device Training  Education: Self-Management Skill Review  Device Training: Other (Omnipod 5 pre education)  In the last 12 months, have you:: none  Permission to speak with others about care:: no    Lab Results   Component Value Date    HGBA1C 6.6 (H) 03/27/2023       Clinical    Problem Review  Reviewed Problem List with Patient: yes  Active comorbidities affecting diabetes self-care.: yes  Comorbidities: Hypertension    Clinical Assessment  Current Diabetes Treatment: Insulin (Semglee 13 units in evening: Humalog 7 units with breakfast; uses 1:8 ratios for lunch and dinner)  Have you ever experienced hypoglycemia (low blood sugar)?: yes  In the last month, how often have you experienced low blood sugar?: more than once a week  What symptoms do you experience?: Shaky  Have you ever been hospitalized because your blood sugar was too low?: no  How do you treat hypoglycemia (low blood sugar)?: other  Have you ever experienced hyperglycemia (high blood sugar)?: no    Medication Information  How do you obtain your medications?: Patient drives  How many days a week do you miss your medications?: Never  Do you sometimes have difficulty refilling your medications?: No  Medication adherence impacting ability to self-manage diabetes?: No      Nutritional Status  Diet: Regular  Meal Plan 24 Hour Recall: Breakfast, Lunch, Dinner, Snack  Meal Plan 24 Hour Recall - Breakfast: waffle 19 gram with peanut butter 7 g  Meal Plan 24 Hour Recall - Lunch: leftovers  Meal Plan 24 Hour Recall - Dinner: chicken, veggies  Meal Plan 24 Hour Recall - Snack: occasional  Change in appetite?: No  Dentation:: Intact  Current nutritional status an area of need that is impacting patient's ability to self-manage diabetes?:  No    Additional Social History    Support  Does anyone support you with your diabetes care?: yes  Who supports you?: spouse  Who takes you to your medical appointments?: self  Does the current support meet the patient's needs?: Yes  Is Support an area impacting ability to self-manage diabetes?: No    Access to Mass Media & Technology  Does the patient have access to any of the following devices or technologies?: Smart phone, Internet Access  Media or technology needs impacting ability to self-manage diabetes?: No    Cognitive/Behavioral Health  Alert and Oriented: Yes  Difficulty Thinking: No  Requires Prompting: No (some short term memory issues)  Requires assistance for routine expression?: No  Cognitive or behavioral barriers impacting ability to self-manage diabetes?: No    Communication  Language preference: English  Hearing Problems: No  Vision Problems: No  Communication needs impacting ability to self-manage diabetes?: No    Health Literacy  Preferred Learning Method: Face to Face, Reading Materials  Health literacy needs impacting ability to self-manage diabetes?: Yes      Diabetes Self-Management Skills Assessment    Diabetes Disease Process/Treatment Options  Patient/caregiver able to state what happens when someone has diabetes.: yes  Patient/caregiver knows what type of diabetes they have.: yes  Diabetes Type : Other (CHEMA)  Assessment indicates:: Adequate understanding  Area of need?: No    Nutrition/Healthy Eating  Challenges to healthy eating:: other (see comments) (does not know how to calculate carbs in foods without labels)  Patient can identify foods that impact blood sugar.: yes  Patient-identified foods:: fruit/fruit juice, starches (bread, pasta, rice, cereal), milk, soda, starchy vegetables (corn, peas, beans), sweets, yogurt  Assessment indicates:: Knowledge deficit  Area of need?: Yes    Physical Activity/Exercise  Deffered due to:: Time    Medications  Patient is able to describe current  diabetes management routine.: yes  Diabetes management routine:: insulin  Patient understands the purpose of the medications taken for diabetes.: yes  Patient reports problems or concerns with current medication regimen.: no  Assessment indicates:: Instruction Needed  Area of need?: Yes    Home Blood Glucose Monitoring  Patient states that blood sugar is checked at home daily.: yes  Monitoring Method:: personal continuous glucose monitor  Personal CGM type:: Dexcom G6 Clarity code obtained today  Area of need?: No    Acute Complications  Assessment indicates:: Knowledge deficit  Area of need?: Yes    Chronic Complications  Deferred due to:: Time    Psychosocial/Coping  Deffered due to:: Time      Assessment Summary and Plan    Based on today's diabetes care assessment, the following areas of need were identified:      Social 5/25/2023   Support No   Access to Mass Media/Tech No   Cognitive/Behavioral Health No   Communication No   Health Literacy Yes        Clinical 5/25/2023   Medication Adherence No   Nutritional Status No        Diabetes Self-Management Skills 5/25/2023   Diabetes Disease Process/Treatment Options No   Nutrition/Healthy Eating Yes, see care planning   Medication Yes, seecare planning   Home Blood Glucose Monitoring No   Acute Complications Yes, reviewed s/s and treatment of hypoglycemia          Today's interventions were provided through individual discussion, instruction, and written materials were provided.      Patient verbalized understanding of instruction and written materials.  Pt was able to return back demonstration of instructions today. Patient understood key points, needs reinforcement and further instruction.     Diabetes Self-Management Care Plan:    Today's Diabetes Self-Management Care Plan was developed with Leida's input. Leida has agreed to work toward the following goal(s) to improve his/her overall diabetes control.      Care Plan: Diabetes Management   Updates made since  "4/25/2023 12:00 AM        Problem: Healthy Eating         Goal: Patient will begin Advanced carb counting by measuring  her foods more accurately in order to better utilize her I:C ratios    Start Date: 11/4/2022   Priority: High   Barriers: Knowledge deficit   Note:    Patient seen in clinic today for advanced carb counting.  Has been trying but still struggles with "homemade" foods such as veggies, cassaroles etc.  Reviewed label reading today.  She is not currently using measuring tools, We reviewed these using food models. Suggest keeping carbs at around 2-3 servings per meal.  She has been elevated after breakfast.  Advised to count her coffee as 15 grams.  Also encouraged to test I:C ratios.  Provided guidelines.  We briefly talked about Omnipod 5, she will check with her insurance pharmacy regarding this. Food logs provided as well as suggested apps to use.      Problem: Medications         Goal: Patient Agrees to take Diabetes Medication(s) as prescribed.    Start Date: 5/25/2023   Expected End Date: 6/26/2023   Priority: Medium   Note:    Update to care planning 05/25/2023: Pt was seen today for Omnipod 5 start.  However, due to insurance issues she does not have kit yet.  We reviewed carb counting principles.  Explained that when she begins to use the pump, she just needs to input the carbs she is eating.  Explained how the autofeature works with the Dexcom to keep her in target range.  Used a Ambassador controller to show Leida the features.  Instructed that she will need to enter the number of carbs being consumed and the pump automatically will enter the current glucose per Dexcom reading to calculate her final dose. Explained that the site is changed every 3 days. Performed a mock filling of demo cartridge. Attempted to set up a [employer] account, apparently she had a [employer] set up some years ago.  Advised to call the number provided to get this changed prior to the pump start.     Dexcom was downloaded and " reviewed.  Bgs are quite labile at times.  May be due to to timing of her meal insulin.  Advised to give insulin at least 10-15 mins prior to meal OR at the time of the meal.  She sometimes gives insulin after she eats which results in a low glucose later. She will work on timing of her doses and continue taking her injections until the pump start.        Task: Reviewed with patient all current diabetes medications and provided basic review of the purpose, dosage, frequency, side effects, and storage of both oral and injectable diabetes medications. Completed 5/25/2023     Task: Discussed guidelines for preventing, detecting and treating hypoglycemia and hyperglycemia and reviewed the importance of meal and medication timing with diabetes mediations for prevention of hypoglycemia and maximum drug benefit. Completed 5/25/2023          Follow Up Plan     Plan: She will obtain the Omnipod 5 starter kit and reach out after she receives it to schedule training.     Today's care plan and follow up schedule was discussed with patient.  Leida verbalized understanding of the care plan, goals, and agrees to follow up plan.        The patient was encouraged to communicate with his/her health care provider/physician and care team regarding his/her condition(s) and treatment.  I provided the patient with my contact information today and encouraged to contact me via phone or [facility] Patient Portal as needed.     Length of Visit   Total Time: 90 Minutes

## 2023-05-30 ENCOUNTER — TELEPHONE (OUTPATIENT)
Dept: ENDOCRINOLOGY | Facility: CLINIC | Age: 53
End: 2023-05-30
Payer: COMMERCIAL

## 2023-05-30 NOTE — TELEPHONE ENCOUNTER
[facility]oke with [facility], stated they've spoken with pt and informed pt of he co-payment.  Pt states told them she will call them when she's ready to order.

## 2023-06-01 ENCOUNTER — PATIENT MESSAGE (OUTPATIENT)
Dept: DIABETES | Facility: CLINIC | Age: 53
End: 2023-06-01
Payer: COMMERCIAL

## 2023-06-01 ENCOUNTER — PATIENT MESSAGE (OUTPATIENT)
Dept: ENDOCRINOLOGY | Facility: CLINIC | Age: 53
End: 2023-06-01
Payer: COMMERCIAL

## 2023-06-01 DIAGNOSIS — E13.9 LADA (LATENT AUTOIMMUNE DIABETES IN ADULTS), MANAGED AS TYPE 1: ICD-10-CM

## 2023-06-02 DIAGNOSIS — E13.9 LADA (LATENT AUTOIMMUNE DIABETES IN ADULTS), MANAGED AS TYPE 1: ICD-10-CM

## 2023-06-02 RX ORDER — INSULIN PMP CART,AUT,G6/7,CNTR
1 EACH SUBCUTANEOUS
Qty: 5 EACH | Refills: 0 | Status: SHIPPED | OUTPATIENT
Start: 2023-06-02 | End: 2024-03-11

## 2023-06-02 RX ORDER — INSULIN PMP CART,AUT,G6/7,CNTR
1 EACH SUBCUTANEOUS
Qty: 1 EACH | Refills: 11 | Status: SHIPPED | OUTPATIENT
Start: 2023-06-02 | End: 2024-03-11 | Stop reason: SDUPTHER

## 2023-06-08 ENCOUNTER — PATIENT MESSAGE (OUTPATIENT)
Dept: DIABETES | Facility: CLINIC | Age: 53
End: 2023-06-08
Payer: COMMERCIAL

## 2023-06-14 ENCOUNTER — CLINICAL SUPPORT (OUTPATIENT)
Dept: DIABETES | Facility: CLINIC | Age: 53
End: 2023-06-14
Payer: COMMERCIAL

## 2023-06-14 DIAGNOSIS — E13.9 LADA (LATENT AUTOIMMUNE DIABETES IN ADULTS), MANAGED AS TYPE 1: Primary | ICD-10-CM

## 2023-06-14 PROCEDURE — G0108 PR DIAB MANAGE TRN  PER INDIV: ICD-10-PCS | Mod: S$GLB,,, | Performed by: INTERNAL MEDICINE

## 2023-06-14 PROCEDURE — 99999 PR PBB SHADOW E&M-EST. PATIENT-LVL I: ICD-10-PCS | Mod: PBBFAC,,,

## 2023-06-14 PROCEDURE — 99999 PR PBB SHADOW E&M-EST. PATIENT-LVL I: CPT | Mod: PBBFAC,,,

## 2023-06-14 PROCEDURE — G0108 DIAB MANAGE TRN  PER INDIV: HCPCS | Mod: S$GLB,,, | Performed by: INTERNAL MEDICINE

## 2023-06-14 NOTE — PROGRESS NOTES
Diabetes Care Specialist Progress Note  Author: Sheeba Ortiz RN, CDE  Date: 6/15/2023    Program Intake  Reason for Diabetes Program Visit:: Intervention  Type of Intervention:: Individual  Individual: Device Training  Device Training: Insulin Pump Start (Omnipod 5 start)  Current diabetes risk level:: low  In the last 12 months, have you:: none  Permission to speak with others about care:: no    Lab Results   Component Value Date    HGBA1C 6.6 (H) 03/27/2023     Current Diabetic Medications: Semglee 13 units; using 1:8 for lunch and dinner gives about 7 units for am coffee and breakfast    Clinical  Problem Review  Reviewed Problem List with Patient: yes  Active comorbidities affecting diabetes self-care.: yes  Comorbidities: Hypertension    Clinical Assessment  Have you ever experienced hypoglycemia (low blood sugar)?: yes  In the last month, how often have you experienced low blood sugar?: more than once a week  What symptoms do you experience?: Shaky  Have you ever been hospitalized because your blood sugar was too low?: no  Have you ever experienced hyperglycemia (high blood sugar)?: no    Nutritional Status  Meal Plan 24 Hour Recall: Breakfast, Lunch, Dinner, Snack  Meal Plan 24 Hour Recall - Breakfast: waffle 19 gram with peanut butter 7 g  Meal Plan 24 Hour Recall - Lunch: leftovers  Meal Plan 24 Hour Recall - Dinner: chicken, veggies  Meal Plan 24 Hour Recall - Snack: occasional    Additional Social History    Support  Does anyone support you with your diabetes care?: yes    Access to Mass Media & Technology  Does the patient have access to any of the following devices or technologies?: Smart phone, Internet Access     Health Literacy  Preferred Learning Method: Face to Face, Reading Materials      Diabetes Self-Management Skills Assessment    Diabetes Disease Process/Treatment Options  Patient/caregiver able to state what happens when someone has diabetes.: yes  Patient/caregiver knows what type of  diabetes they have.: yes  Diabetes Type : Other (CHEMA)  Assessment indicates:: Adequate understanding  Area of need?: No    Nutrition/Healthy Eating  Challenges to healthy eating:: other (see comments) (does not know how to calculate carbs in foods without labels)  Patient can identify foods that impact blood sugar.: yes  Patient-identified foods:: fruit/fruit juice, starches (bread, pasta, rice, cereal), milk, soda, starchy vegetables (corn, peas, beans), sweets, yogurt  Assessment indicates:: Knowledge deficit  Area of need?: Yes    Physical Activity/Exercise  Deffered due to:: Time    Medications  Patient is able to describe current diabetes management routine.: yes  Diabetes management routine:: insulin  Patient understands the purpose of the medications taken for diabetes.: yes  Patient reports problems or concerns with current medication regimen.: no  Assessment indicates:: Instruction Needed  Area of need?: Yes    Home Blood Glucose Monitoring  Patient states that blood sugar is checked at home daily.: yes  Monitoring Method:: personal continuous glucose monitor  Personal CGM type:: Dexcom G6 Clarity code obtained today  Area of need?: No    Acute Complications  Assessment indicates:: Knowledge deficit  Area of need?: Yes    Chronic Complications  Deferred due to:: Time    Psychosocial/Coping  Deffered due to:: Time      Assessment Summary and Plan    Based on today's diabetes care assessment, the following areas of need were identified:      Social 5/25/2023   Support No   Access to [employer]/Tech No   Cognitive/Behavioral Health No   Communication No   Health Literacy Yes        Clinical 5/25/2023   Medication Adherence No   Nutritional Status No        Diabetes Self-Management Skills 6/14/2023   Medication Yes, see care planning   Home Blood Glucose Monitoring No, reviewed Dexcom          Today's interventions were provided through individual discussion, instruction, and written materials were provided.   "    Patient verbalized understanding of instruction and written materials.  Pt was able to return back demonstration of instructions today. Patient understood key points, needs reinforcement and further instruction.     Diabetes Self-Management Care Plan:    Today's Diabetes Self-Management Care Plan was developed with Leida's input. Leida has agreed to work toward the following goal(s) to improve his/her overall diabetes control.      Care Plan: Diabetes Management   Updates made since 5/16/2023 12:00 AM        Problem: Healthy Eating         Goal: Patient will begin Advanced carb counting by measuring  her foods more accurately in order to better utilize her I:C ratios    Start Date: 11/4/2022   Priority: High   Barriers: Knowledge deficit   Note:    Patient seen in clinic today for advanced carb counting.  Has been trying but still struggles with "homemade" foods such as veggies, cassaroles etc.  Reviewed label reading today.  She is not currently using measuring tools, We reviewed these using food models. Suggest keeping carbs at around 2-3 servings per meal.  She has been elevated after breakfast.  Advised to count her coffee as 15 grams.  Also encouraged to test I:C ratios.  Provided guidelines.  We briefly talked about Omnipod 5, she will check with her insurance pharmacy regarding this. Food logs provided as well as suggested apps to use.          Problem: Medications         Goal: Patient Agrees to take Diabetes Medication(s) as prescribed using the Omnipod 5 for all insulin administration    Start Date: 5/25/2023   Expected End Date: 6/26/2023   Priority: Medium   Note:    Update to care planning 05/25/2023: Pt was seen today for Omnipod 5 start.  However, due to insurance issues she does not have kit yet.  We reviewed carb counting principles.  Explained that when she begins to use the pump, she just needs to input the carbs she is eating.  Explained how the autofeature works with the Dexcom to keep her " in target range.  Used a demo controller to show Leida the features.  Instructed that she will need to enter the number of carbs being consumed and the pump automatically will enter the current glucose per Dexcom reading to calculate her final dose. Explained that the site is changed every 3 days. Performed a mock filling of demo cartridge. Attempted to set up a [employer] account, apparently she had a [employer] set up some years ago.  Advised to call the number provided to get this changed prior to the pump start.     Dexcom was downloaded and reviewed.  Bgs are quite labile at times.  May be due to to timing of her meal insulin.  Advised to give insulin at least 10-15 mins prior to meal OR at the time of the meal.  She sometimes gives insulin after she eats which results in a low glucose later. She will work on timing of her doses and continue taking her injections until the pump start.     Update to care planning 6/14/2023:  Patient seen in clinic for insulin pump start.  She will be transitioning from [facility]    OMNI POD 5 INSULIN PUMP START    Pump training was provided per Omni Pod protocol.  Patient is new to insulin pump therapy.    Initial settings derived based on weight and current I:C ratios     Basal:  12AM: 0.6 units/hr     Max basal rate: 3 u/hr     Bolus:  CHO ratio: 12 am 10; 11 am 8; 5 pm 8  ISF: 12 am 50; 10 pm 60  Glucose target : 120 mg/dL  Correct  over: 130 mg/dl  Active insulin time: 4 hours  Max bolus: 15 units     Low reservoir insulin alarm: 10 units  Change pod alarm: every 3 days       Details of pump therapy were covered included following controller features and programming, pod activation, pod site selection and rotation, automatic pod priming and insertion, setting & editing basal rates in manual mode, giving bolus and other features in the set-up menu.  The following regarding the Omnipod 5 was covered:  During your first Pod wear, since no recent history is available, the Omnipod 5 System  uses only your active Basal Program from Manual Mode as a starting point to adjust your insulin. After 48 hours of history is collected, which usually happens with the first Pod wear, Kudarom technology stops adjusting insulin against your active Basal Program and starts using the Adaptive Basal Rate for your automated insulin delivery with your next Pod change. With each Pod change, insulin delivery information is sent and saved in the Omnipod 5 [place] so that the next Pod that is started is updated with the new Adaptive Basal Rate. With each new Pod activation, the system adapts insulin delivery based on physiological needs and total daily insulin (TDI) delivered. After 2-3 Pod changes, adaptation generally stabilizes and automated insulin delivery is based on this adaptation.  Patient demonstrated ability to program controller, activate and insert pod using aseptic technique.  Patient demonstrated ability to program Dexcom transmitter into controller and start automated limited mode.    Instructed pt on use of basic pump features ie...give a bolus, pause insulin, switch from manual to automated mode.  Reviewed features available in manual mode verses automated mode.   Reviewed when and how to use activity function in automated mode.  Reviewed site selection of pods, rotation of sites and hard stop to change pod every 72 hrs.   Instructed that insulin vial is good out of refrigeration for up to 28-30 days.   Reviewed treatment of hypoglycemia, hyperglycemia; sick day care, DKA, and troubleshooting of pump.  Omni Pod 24-hour support line provided.       Podder username: Divine  Podder password: [email or web address]     [employer] username: [email or web address]  [employer] password: [email or web address]     We were unable to set up her [employer] account due to tech issues.  Unable to resolve issue in clinic.  Spoke to Omnipod 5 customer support and [employer] will reach out to patient to address issue.         Task: Reviewed with  patient all current diabetes medications and provided basic review of the purpose, dosage, frequency, side effects, and storage of both oral and injectable diabetes medications. Completed 5/25/2023       Task: Instructed patient on how to self-administer insulin using the Omnipod 5 pump Completed 6/14/2023        Task: Discussed guidelines for preventing, detecting and treating hypoglycemia and hyperglycemia and reviewed the importance of meal and medication timing with diabetes mediations for prevention of hypoglycemia and maximum drug benefit. Completed 5/25/2023          Follow Up Plan     F/u on 6/29/2023    Today's care plan and follow up schedule was discussed with patient.  Leida verbalized understanding of the care plan, goals, and agrees to follow up plan.        The patient was encouraged to communicate with his/her health care provider/physician and care team regarding his/her condition(s) and treatment.  I provided the patient with my contact information today and encouraged to contact me via phone or [facility] Patient Portal as needed.     Length of Visit   Total Time: 150 Minutes

## 2023-06-15 ENCOUNTER — PATIENT MESSAGE (OUTPATIENT)
Dept: DIABETES | Facility: CLINIC | Age: 53
End: 2023-06-15
Payer: COMMERCIAL

## 2023-06-20 ENCOUNTER — OFFICE VISIT (OUTPATIENT)
Dept: PRIMARY CARE CLINIC | Facility: CLINIC | Age: 53
End: 2023-06-20
Payer: COMMERCIAL

## 2023-06-20 ENCOUNTER — PATIENT OUTREACH (OUTPATIENT)
Dept: DIABETES | Facility: CLINIC | Age: 53
End: 2023-06-20
Payer: COMMERCIAL

## 2023-06-20 VITALS
BODY MASS INDEX: 24.78 KG/M2 | HEART RATE: 91 BPM | WEIGHT: 134.69 LBS | OXYGEN SATURATION: 98 % | SYSTOLIC BLOOD PRESSURE: 138 MMHG | DIASTOLIC BLOOD PRESSURE: 82 MMHG | HEIGHT: 62 IN

## 2023-06-20 DIAGNOSIS — E78.5 HYPERLIPIDEMIA DUE TO TYPE 1 DIABETES MELLITUS: ICD-10-CM

## 2023-06-20 DIAGNOSIS — E11.59 HYPERTENSION ASSOCIATED WITH DIABETES: ICD-10-CM

## 2023-06-20 DIAGNOSIS — T50.905A: ICD-10-CM

## 2023-06-20 DIAGNOSIS — R41.841 COGNITIVE COMMUNICATION DEFICIT: ICD-10-CM

## 2023-06-20 DIAGNOSIS — Z86.79 HISTORY OF SUBARACHNOID HEMORRHAGE: ICD-10-CM

## 2023-06-20 DIAGNOSIS — K75.4 AUTOIMMUNE HEPATITIS: ICD-10-CM

## 2023-06-20 DIAGNOSIS — I15.2 HYPERTENSION ASSOCIATED WITH DIABETES: ICD-10-CM

## 2023-06-20 DIAGNOSIS — E10.69 HYPERLIPIDEMIA DUE TO TYPE 1 DIABETES MELLITUS: ICD-10-CM

## 2023-06-20 DIAGNOSIS — E13.9 LADA (LATENT AUTOIMMUNE DIABETES IN ADULTS), MANAGED AS TYPE 1: ICD-10-CM

## 2023-06-20 DIAGNOSIS — K76.89: ICD-10-CM

## 2023-06-20 DIAGNOSIS — Z79.4 LONG TERM CURRENT USE OF INSULIN: Primary | ICD-10-CM

## 2023-06-20 DIAGNOSIS — I69.00 UNSPECIFIED SEQUELAE OF NONTRAUMATIC SUBARACHNOID HEMORRHAGE: ICD-10-CM

## 2023-06-20 PROBLEM — E66.3 OVERWEIGHT: Status: RESOLVED | Noted: 2022-09-28 | Resolved: 2023-06-20

## 2023-06-20 PROCEDURE — 3044F HG A1C LEVEL LT 7.0%: CPT | Mod: CPTII,S$GLB,, | Performed by: INTERNAL MEDICINE

## 2023-06-20 PROCEDURE — 99999 PR PBB SHADOW E&M-EST. PATIENT-LVL IV: CPT | Mod: PBBFAC,,, | Performed by: INTERNAL MEDICINE

## 2023-06-20 PROCEDURE — 3044F PR MOST RECENT HEMOGLOBIN A1C LEVEL <7.0%: ICD-10-PCS | Mod: CPTII,S$GLB,, | Performed by: INTERNAL MEDICINE

## 2023-06-20 PROCEDURE — 99214 PR OFFICE/OUTPT VISIT, EST, LEVL IV, 30-39 MIN: ICD-10-PCS | Mod: S$GLB,,, | Performed by: INTERNAL MEDICINE

## 2023-06-20 PROCEDURE — 99214 OFFICE O/P EST MOD 30 MIN: CPT | Mod: S$GLB,,, | Performed by: INTERNAL MEDICINE

## 2023-06-20 PROCEDURE — 99999 PR PBB SHADOW E&M-EST. PATIENT-LVL IV: ICD-10-PCS | Mod: PBBFAC,,, | Performed by: INTERNAL MEDICINE

## 2023-06-20 PROCEDURE — 1159F MED LIST DOCD IN RCRD: CPT | Mod: CPTII,S$GLB,, | Performed by: INTERNAL MEDICINE

## 2023-06-20 PROCEDURE — 1159F PR MEDICATION LIST DOCUMENTED IN MEDICAL RECORD: ICD-10-PCS | Mod: CPTII,S$GLB,, | Performed by: INTERNAL MEDICINE

## 2023-06-20 PROCEDURE — 3079F PR MOST RECENT DIASTOLIC BLOOD PRESSURE 80-89 MM HG: ICD-10-PCS | Mod: CPTII,S$GLB,, | Performed by: INTERNAL MEDICINE

## 2023-06-20 PROCEDURE — 3075F PR MOST RECENT SYSTOLIC BLOOD PRESS GE 130-139MM HG: ICD-10-PCS | Mod: CPTII,S$GLB,, | Performed by: INTERNAL MEDICINE

## 2023-06-20 PROCEDURE — 3008F BODY MASS INDEX DOCD: CPT | Mod: CPTII,S$GLB,, | Performed by: INTERNAL MEDICINE

## 2023-06-20 PROCEDURE — 3008F PR BODY MASS INDEX (BMI) DOCUMENTED: ICD-10-PCS | Mod: CPTII,S$GLB,, | Performed by: INTERNAL MEDICINE

## 2023-06-20 PROCEDURE — 3075F SYST BP GE 130 - 139MM HG: CPT | Mod: CPTII,S$GLB,, | Performed by: INTERNAL MEDICINE

## 2023-06-20 PROCEDURE — 3079F DIAST BP 80-89 MM HG: CPT | Mod: CPTII,S$GLB,, | Performed by: INTERNAL MEDICINE

## 2023-06-20 RX ORDER — PRAVASTATIN SODIUM 10 MG/1
10 TABLET ORAL DAILY
Qty: 90 TABLET | Refills: 3 | Status: SHIPPED | OUTPATIENT
Start: 2023-06-20 | End: 2024-06-19

## 2023-06-20 RX ORDER — TRIAMCINOLONE ACETONIDE 1 MG/G
CREAM TOPICAL 2 TIMES DAILY
Qty: 15 G | Refills: 0 | Status: SHIPPED | OUTPATIENT
Start: 2023-06-20 | End: 2023-11-14

## 2023-06-20 RX ORDER — BLOOD-GLUCOSE SENSOR
EACH MISCELLANEOUS
Qty: 9 EACH | Refills: 2 | Status: SHIPPED | OUTPATIENT
Start: 2023-06-20 | End: 2024-01-30

## 2023-06-20 NOTE — PROGRESS NOTES
Jasiel Casarez,  Just checking in to see how things are going with the pump.  Have your been able to get a [employer] account set up?    Sheeba

## 2023-06-20 NOTE — ASSESSMENT & PLAN NOTE
No evidence of AIH on recent biopsy, questionable dx. Seeing [facility] hepat[facility], previously seeing Dr. Russell.

## 2023-06-20 NOTE — ASSESSMENT & PLAN NOTE
Admitted 1/27/23 at [facility] with SAH, required EVD.  No aneurysm.  D/C'ed 2/15 to [facility], has been home since 2/22/23.  Went back to work.

## 2023-06-20 NOTE — PROGRESS NOTES
[facility] Note    Chief Complaint      Chief Complaint   Patient presents with    Follow-up     History of Present Illness      Leida Hoyos is a 53 y.o. female who presents today for DM f/u.  Patient comes to appointment with spouse.  GYN: Luarie Menjivar, Endo: Dr. Kurtz, GI: Dr. Russell, Optho: Dr. Ferguson, Derm: Paddock    Problem List Items Addressed This Visit       Autoimmune hepatitis    Current Assessment & Plan     No evidence of AIH on recent biopsy, questionable dx. Seeing [facility], previously seeing Dr. Russell.         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  Sees Dr. Kurtz, A1C 6.6 in 6/2023.  On Lantus 13 units at night with novolog 4 units BID.   Still exercises 4-5 times per week.         Hypertension associated with diabetes    Current Assessment & Plan     BP controlled on no meds.  No CP/SOB/HA.  Doesn't check at home.         History of subarachnoid hemorrhage    Current Assessment & Plan     Admitted 1/27/23 at [facility] with SAH, required EVD.  No aneurysm.  D/C'ed 2/15 to [facility], has been home since 2/22/23.  Went back to work.         Cognitive communication deficit    Current Assessment & Plan     Finished with speech.         Long term current use of insulin - Primary    Unspecified sequelae of nontraumatic subarachnoid hemorrhage    Current Assessment & Plan     Still having some issues with short term memory, doing better.         Drug-induced hypersensitivity disease of liver    Current Assessment & Plan     Thought to be 2/2 statin or anti-hypertensive meds, LFT's have improved.          Other Visit Diagnoses       Hyperlipidemia due to type 1 diabetes mellitus        Relevant Medications    pravastatin (PRAVACHOL) 10 MG tablet    Other Relevant Orders    Comprehensive Metabolic Panel                Health Maintenance   Topic Date Due    Hemoglobin A1c  09/27/2023    Foot Exam   2023    Mammogram  2023    Eye Exam  2024    Lipid Panel  2024    TETANUS VACCINE  2026    Hepatitis C Screening  Completed    High Dose Statin  Discontinued       Past Medical History:   Diagnosis Date    Autoimmune hepatitis     managed by Dr. Russell on mercaptopurine    Brain bleed 2023    Diabetes mellitus type II     Hypertension        Past Surgical History:   Procedure Laterality Date    CEREBRAL ANGIOGRAM N/A 2023    Procedure: ANGIOGRAM-CEREBRAL;  Surgeon: Yue Surgeon;  Location: [facility];  Service: Anesthesiology;  Laterality: N/A;     SECTION, CLASSIC      COLONOSCOPY N/A 2019    Procedure: COLONOSCOPY;  Surgeon: iDpesh Victoria MD;  Location: [facility] ([address] [place] [address]);  Service: Endoscopy;  Laterality: N/A;    FRACTURE SURGERY Left     leg     MOUTH SURGERY         family history includes Alcohol abuse in her maternal grandfather and maternal grandmother; Arthritis in her mother; Asthma in her father; Diabetes in her maternal grandfather and paternal grandfather; Heart attack (age of onset: 60) in her father; Heart disease in her father; Hyperlipidemia in her father; Hypertension in her father and sister; Learning disabilities in her daughter and son; No Known Problems in her daughter, sister, and son; Ulcerative colitis in her mother.    Social History     Tobacco Use    Smoking status: Never    Smokeless tobacco: Never   Substance Use Topics    Alcohol use: Yes     Alcohol/week: 2.0 standard drinks     Types: 2 Glasses of wine per week     Comment: social    Drug use: Never       Review of Systems   Constitutional:  Negative for chills and fever.   Respiratory:  Negative for cough and shortness of breath.    Cardiovascular:  Negative for chest pain and palpitations.   Gastrointestinal:  Negative for constipation, diarrhea, nausea and vomiting.   Genitourinary:  Negative for dysuria and hematuria.   Musculoskeletal:  Negative for falls.    Neurological:  Negative for headaches.      Outpatient Encounter Medications as of 6/20/2023   Medication Sig Note Dispense Refill    acetaminophen (TYLENOL) 650 mg/20.3 mL Soln Take 20.3 mLs (650 mg total) by mouth every 6 (six) hours as needed for Pain (headaches).       blood sugar diagnostic Strp For one touch verio IQ meter  100 each 3    blood-glucose meter (ONETOUCH VERIO IQ METER MISC) OneTouch Verio IQ Meter       blood-glucose sensor (DEXCOM G6 SENSOR) Melissa Monitor blood sugars daily  9 each 3    blood-glucose transmitter (DEXCOM G6 TRANSMITTER) Melissa USE AS DIRECTED for daily use TO TEST BLOOD GLUCOSE.  1 each 4    cholecalciferol, vitamin D3, (VITAMIN D3) 1,000 unit capsule Take 1 capsule (1,000 Units total) by mouth once daily.   0    hydrOXYzine HCL (ATARAX) 25 MG tablet Take 1 tablet (25 mg total) by mouth nightly as needed (Insomnia). 4/18/2023: PRN medication - has not taken. 30 tablet 1    insulin glargine-yfgn 100 unit/mL (3 mL) InPn Inject 4 Units into the skin 2 (two) times a day.       insulin lispro (HUMALOG KWIKPEN INSULIN) 100 unit/mL pen Takes 5 units/7 units and 9 units before meals, plus correction for a TDD 25 units (Patient taking differently: Inject 8 Units into the skin 4 (four) times daily before meals and nightly.)  45 mL 3    insulin lispro (HUMALOG U-100 INSULIN) 100 unit/mL injection For use with insulin pump, TDD 45 units daily  20 mL 11    insulin pump cart,auto,BT-cntr (OMNIPOD 5 G6 INTRO KIT, GEN 5,) Crtg Inject 1 kit into the skin once daily.  1 each 0    insulin pump cart,auto,BT-cntr (OMNIPOD 5 G6 INTRO KIT, GEN 5,) Crtg Inject 1 Cartridge into the skin every 72 hours.  5 each 0    insulin pump cart,automated,BT (OMNIPOD 5 G6 PODS, GEN 5,) Crtg Inject 10 each into the skin once daily.  1 each 0    insulin pump cart,automated,BT (OMNIPOD 5 G6 PODS, GEN 5,) Crtg Inject 1 Cartridge into the skin every 72 hours.  1 each 11    melatonin (MELATIN) 3 mg tablet Take 2 tablets (6  "mg total) by mouth nightly as needed for Insomnia.   0    multivitamin (THERAGRAN) per tablet Take 1 tablet by mouth once daily.       ONETOUCH DELICA PLUS LANCET 33 gauge Misc USE AS DIRECTED TO TEST BLOOD GLUCOSE.  300 each 8    pen needle, diabetic (BD ULTRA-FINE NICK PEN NEEDLE) 32 gauge x 5/32" Ndle USE FOUR TIMES A DAY FOR INSULIN INJECTIONS  360 each 4    pravastatin (PRAVACHOL) 10 MG tablet Take 1 tablet (10 mg total) by mouth once daily.  90 tablet 3    triamcinolone acetonide 0.1% (KENALOG) 0.1 % cream Apply topically 2 (two) times daily.  15 g 0    [DISCONTINUED] pravastatin (PRAVACHOL) 10 MG tablet Take 1 tablet (10 mg total) by mouth once daily.  90 tablet 3     No facility-administered encounter medications on file as of 6/20/2023.        Review of patient's allergies indicates:  No Known Allergies    Physical Exam      Vital Signs  Pulse: 91  SpO2: 98 %  BP: 138/82  Pain Score: 0-No pain  Height and Weight  Height: 5' 2" (157.5 cm)  Weight: 61.1 kg (134 lb 11.2 oz)  BSA (Calculated - sq m): 1.63 sq meters  BMI (Calculated): 24.6  Weight in (lb) to have BMI = 25: 136.4]    Physical Exam  Constitutional:       Appearance: She is well-developed.   HENT:      Head: Normocephalic and atraumatic.   Cardiovascular:      Rate and Rhythm: Normal rate and regular rhythm.      Heart sounds: Normal heart sounds. No murmur heard.  Pulmonary:      Effort: Pulmonary effort is normal. No respiratory distress.      Breath sounds: Normal breath sounds.   Abdominal:      General: There is no distension.      Palpations: Abdomen is soft.      Tenderness: There is no abdominal tenderness. There is no guarding.   Skin:     General: Skin is warm and dry.   Neurological:      Mental Status: She is alert. Mental status is at baseline.   Psychiatric:         Behavior: Behavior normal.        Laboratory:  CBC:  Recent Labs   Lab Result Units 04/18/23  0813   WBC K/uL 5.61   RBC M/uL 4.14   Hemoglobin g/dL 12.8   Hematocrit % " 37.7   Platelets K/uL 229   MCV fL 91   MCH pg 30.9   MCHC g/dL 34.0     CMP:  Recent Labs   Lab Result Units 03/27/23  1345 04/18/23  0813   Glucose mg/dL  --  196*   Calcium mg/dL  --  9.6   Albumin g/dL 4.0 3.6   Total Protein g/dL 7.7 7.4   Sodium mmol/L  --  134*   Potassium mmol/L  --  4.8   CO2 mmol/L  --  20*   Chloride mmol/L  --  104   BUN mg/dL  --  13   Alkaline Phosphatase U/L 298* 131   ALT U/L 112* 51*   AST U/L 55* 40   Total Bilirubin mg/dL 0.2 0.5     URINALYSIS:  No results for input(s): COLORU, CLARITYU, SPECGRAV, PHUR, PROTEINUA, GLUCOSEU, BILIRUBINCON, BLOODU, WBCU, RBCU, BACTERIA, MUCUS, NITRITE, LEUKOCYTESUR, UROBILINOGEN, HYALINECASTS in the last 2160 hours.     LIPIDS:  No results for input(s): TSH, HDL, CHOL, TRIG, LDLCALC, CHOLHDL, NONHDLCHOL, TOTALCHOLEST in the last 2160 hours.    TSH:  No results for input(s): TSH in the last 2160 hours.    A1C:  Recent Labs   Lab Result Units 03/27/23  1345   Hemoglobin A1C % 6.6*       Radiology:  No results found in the last 30 days.     Assessment/Plan     Leida Hoyos is a 53 y.o.female with:    1. 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 Hypertension associated with diabetes    3. Long term current use of insulin    4. Autoimmune hepatitis    5. Unspecified sequelae of nontraumatic subarachnoid hemorrhage    6. History of subarachnoid hemorrhage    7. Drug-induced hypersensitivity disease of liver    8. Cognitive communication deficit    9. Hyperlipidemia due to type 1 diabetes mellitus  - pravastatin (PRAVACHOL) 10 MG tablet; Take 1 tablet (10 mg total) by mouth once daily.  Dispense: 90 tablet; Refill: 3  - Comprehensive Metabolic Panel; Future      -check BP at home  -restart the pravastatin, check LFT's in 2 weeks  -Continue current medications and maintain follow up with specialists.    -Follow up in about 6 months (around 12/20/2023) for follow up of medical problems.       Jenna C Jordan, MD [facility]

## 2023-06-20 NOTE — ASSESSMENT & PLAN NOTE
Dx'ed in 2013.  Sees Dr. Kurtz, A1C 6.6 in 6/2023.  On Lantus 13 units at night with novolog 4 units BID.   Still exercises 4-5 times per week.

## 2023-06-23 ENCOUNTER — PATIENT OUTREACH (OUTPATIENT)
Dept: DIABETES | Facility: CLINIC | Age: 53
End: 2023-06-23
Payer: COMMERCIAL

## 2023-06-23 ENCOUNTER — PATIENT MESSAGE (OUTPATIENT)
Dept: DIABETES | Facility: CLINIC | Age: 53
End: 2023-06-23
Payer: COMMERCIAL

## 2023-06-29 ENCOUNTER — CLINICAL SUPPORT (OUTPATIENT)
Dept: DIABETES | Facility: CLINIC | Age: 53
End: 2023-06-29
Payer: COMMERCIAL

## 2023-06-29 DIAGNOSIS — E13.9 LADA (LATENT AUTOIMMUNE DIABETES IN ADULTS), MANAGED AS TYPE 1: Primary | ICD-10-CM

## 2023-06-29 PROCEDURE — G0108 DIAB MANAGE TRN  PER INDIV: HCPCS | Mod: S$GLB,,, | Performed by: INTERNAL MEDICINE

## 2023-06-29 PROCEDURE — 99999 PR PBB SHADOW E&M-EST. PATIENT-LVL I: ICD-10-PCS | Mod: PBBFAC,,,

## 2023-06-29 PROCEDURE — 99999 PR PBB SHADOW E&M-EST. PATIENT-LVL I: CPT | Mod: PBBFAC,,,

## 2023-06-29 PROCEDURE — G0108 PR DIAB MANAGE TRN  PER INDIV: ICD-10-PCS | Mod: S$GLB,,, | Performed by: INTERNAL MEDICINE

## 2023-06-29 NOTE — PROGRESS NOTES
Diabetes Care Specialist Progress Note  Author: Sheeba Ortiz RN, CDE  Date: 6/29/2023    Program Intake  Reason for Diabetes Program Visit:: Intervention  Type of Intervention:: Individual  Individual: Device Training  Current diabetes risk level:: low  In the last 12 months, have you:: none  Permission to speak with others about care:: no    Lab Results   Component Value Date    HGBA1C 6.6 (H) 03/27/2023       Clinical    Problem Review  Reviewed Problem List with Patient: yes  Active comorbidities affecting diabetes self-care.: yes  Comorbidities: Hypertension    Clinical Assessment  Have you ever experienced hypoglycemia (low blood sugar)?: yes  In the last month, how often have you experienced low blood sugar?: more than once a week  What symptoms do you experience?: Shaky  Have you ever been hospitalized because your blood sugar was too low?: no  Have you ever experienced hyperglycemia (high blood sugar)?: no      Nutritional Status  Meal Plan 24 Hour Recall: Breakfast, Lunch, Dinner, Snack  Meal Plan 24 Hour Recall - Breakfast: waffle 19 gram with peanut butter 7 g  Meal Plan 24 Hour Recall - Lunch: leftovers  Meal Plan 24 Hour Recall - Dinner: chicken, veggies  Meal Plan 24 Hour Recall - Snack: occasional    Additional Social History    Support  Does anyone support you with your diabetes care?: yes    Access to Mass Media & Technology  Does the patient have access to any of the following devices or technologies?: Smart phone, Internet Access       Health Literacy  Preferred Learning Method: Face to Face, Reading Materials      Diabetes Self-Management Skills Assessment    Diabetes Disease Process/Treatment Options  Patient/caregiver able to state what happens when someone has diabetes.: yes  Patient/caregiver knows what type of diabetes they have.: yes  Diabetes Type : Other (CHEMA)  Assessment indicates:: Adequate understanding  Area of need?: No    Nutrition/Healthy Eating  Challenges to healthy eating::  other (see comments) (does not know how to calculate carbs in foods without labels)  Patient can identify foods that impact blood sugar.: yes  Patient-identified foods:: fruit/fruit juice, starches (bread, pasta, rice, cereal), milk, soda, starchy vegetables (corn, peas, beans), sweets, yogurt  Assessment indicates:: Knowledge deficit  Area of need?: Yes    Physical Activity/Exercise  Deffered due to:: Time    Medications  Patient is able to describe current diabetes management routine.: yes  Diabetes management routine:: insulin  Patient understands the purpose of the medications taken for diabetes.: yes  Patient reports problems or concerns with current medication regimen.: no  Assessment indicates:: Instruction Needed  Area of need?: Yes    Home Blood Glucose Monitoring  Patient states that blood sugar is checked at home daily.: yes  Monitoring Method:: personal continuous glucose monitor  Personal CGM type:: Dexcom G6 Clarity code obtained today  Area of need?: No    Acute Complications  Assessment indicates:: Knowledge deficit  Area of need?: Yes    Chronic Complications  Deferred due to:: Time    Psychosocial/Coping  Deffered due to:: Time      Diabetes Self Support Plan         Assessment Summary and Plan    Based on today's diabetes care assessment, the following areas of need were identified:      Social 5/25/2023   Support No   Access to Mass Media/Tech No   Cognitive/Behavioral Health No   Communication No   Health Literacy Yes        Clinical 5/25/2023   Medication Adherence No   Nutritional Status No        Diabetes Self-Management Skills 6/29/2023   Diabetes Disease Process/Treatment Options No   Nutrition/Healthy Eating Yes   Medication Yes   Home Blood Glucose Monitoring No   Acute Complications Yes          Today's interventions were provided through individual discussion, instruction, and written materials were provided.      Patient verbalized understanding of instruction and written materials.   Pt was able to return back demonstration of instructions today. Patient understood key points, needs reinforcement and further instruction.     Diabetes Self-Management Care Plan:    Today's Diabetes Self-Management Care Plan was developed with Leida's input. Leida has agreed to work toward the following goal(s) to improve his/her overall diabetes control.      Care Plan: Diabetes Management   Updates made since 5/30/2023 12:00 AM        Problem: Medications         Goal: Patient Agrees to take Diabetes Medication(s) as prescribed using the Omnipod 5 for all insulin administration    Start Date: 5/25/2023   Expected End Date: 7/12/2023   Priority: High   Barriers: Cognitive Deficits   Note:    Update to care planning 05/25/2023: Pt was seen today for Omnipod 5 start.  However, due to insurance issues she does not have kit yet.  We reviewed carb counting principles.  Explained that when she begins to use the pump, she just needs to input the carbs she is eating.  Explained how the autofeature works with the Dexcom to keep her in target range.  Used a [employer] controller to show Leida the features.  Instructed that she will need to enter the number of carbs being consumed and the pump automatically will enter the current glucose per Dexcom reading to calculate her final dose. Explained that the site is changed every 3 days. Performed a mock filling of demo cartridge. Attempted to set up a [employer] account, apparently she had a [employer] set up some years ago.  Advised to call the number provided to get this changed prior to the pump start.     Dexcom was downloaded and reviewed.  Bgs are quite labile at times.  May be due to to timing of her meal insulin.  Advised to give insulin at least 10-15 mins prior to meal OR at the time of the meal.  She sometimes gives insulin after she eats which results in a low glucose later. She will work on timing of her doses and continue taking her injections until the pump start.     Update  to care planning 6/14/2023:  Patient seen in clinic for insulin pump start.  She will be transitioning from MDI    OMNI POD 5 INSULIN PUMP START    Pump training was provided per Omni Pod protocol.  Patient is new to insulin pump therapy.    Initial settings derived based on weight and current I:C ratios     Basal:  12AM: 0.6 units/hr     Max basal rate: 3 u/hr     Bolus:  CHO ratio: 12 am 10; 11 am 8; 5 pm 8  ISF: 12 am 50; 10 pm 60  Glucose target : 120 mg/dL  Correct  over: 130 mg/dl  Active insulin time: 4 hours  Max bolus: 15 units     Low reservoir insulin alarm: 10 units  Change pod alarm: every 3 days       Details of pump therapy were covered included following controller features and programming, pod activation, pod site selection and rotation, automatic pod priming and insertion, setting & editing basal rates in manual mode, giving bolus and other features in the set-up menu.  The following regarding the Omnipod 5 was covered:  During your first Pod wear, since no recent history is available, the Omnipod 5 System uses only your active Basal Program from Manual Mode as a starting point to adjust your insulin. After 48 hours of history is collected, which usually happens with the first Pod wear, [employer] technology stops adjusting insulin against your active Basal Program and starts using the Adaptive Basal Rate for your automated insulin delivery with your next Pod change. With each Pod change, insulin delivery information is sent and saved in the Omnipod 5 Truman so that the next Pod that is started is updated with the new Adaptive Basal Rate. With each new Pod activation, the system adapts insulin delivery based on physiological needs and total daily insulin (TDI) delivered. After 2-3 Pod changes, adaptation generally stabilizes and automated insulin delivery is based on this adaptation.  Patient demonstrated ability to program controller, activate and insert pod using aseptic technique.  Patient  demonstrated ability to program Dexcom transmitter into controller and start automated limited mode.    Instructed pt on use of basic pump features ie...give a bolus, pause insulin, switch from manual to automated mode.  Reviewed features available in manual mode verses automated mode.   Reviewed when and how to use activity function in automated mode.  Reviewed site selection of pods, rotation of sites and hard stop to change pod every 72 hrs.   Instructed that insulin vial is good out of refrigeration for up to 28-30 days.   Reviewed treatment of hypoglycemia, hyperglycemia; sick day care, DKA, and troubleshooting of pump.  Omni Pod 24-hour support line provided.       Podder username: Divine  Podder password: [email or web address]     Glooko username: [email or web address]  Glooko password: [email or web address]     We were unable to set up her [employer] account due to tech issues.  Unable to resolve issue in clinic.  Spoke to Omnipod 5 customer support and [employer] [employer] will reach out to patient to address issue.    Update to care planning 6/29/2023: Patient seen today for post pump start.  She currently reports issues related to her dexcom system.  Has not been able to sync with pump nor her phone.  States has lost 3 sensors and has not had a chance to call Dexcom. We deleted the current lynn on her phone.  She started a new sensor and transmitter.  The new transmitter number [id number] was entered in both phone and in Omnipod PDM.  Explained 2 hour warm up period and that she will still be getting insulin via her pump. She also started a new pod at today's visit    Instructed patient how to utilize the History menu in her pump.  She has short term deficits and cannot remember when she takes insulin.  Currently has been using the pump in manual mode since she was not able to sync with Dexcom.  Per Dexcom download, number appear better than before using the phone.  Unable to suggest changes with no actual pump data. Advised that she  can enter events in her Dexcom phone lynn to record carbs and glucose.  Unsure if she will be able to do this.     We were able to get her [employer] account set up.  No data as of this writing.          Follow Up Plan     F/u in about 2 weeks    Today's care plan and follow up schedule was discussed with patient.  Leida verbalized understanding of the care plan, goals, and agrees to follow up plan.        The patient was encouraged to communicate with his/her health care provider/physician and care team regarding his/her condition(s) and treatment.  I provided the patient with my contact information today and encouraged to contact me via phone or [facility] as needed.     Length of Visit   Total Time: 60 Minutes

## 2023-07-11 ENCOUNTER — PATIENT MESSAGE (OUTPATIENT)
Dept: DIABETES | Facility: CLINIC | Age: 53
End: 2023-07-11
Payer: COMMERCIAL

## 2023-07-12 ENCOUNTER — CLINICAL SUPPORT (OUTPATIENT)
Dept: DIABETES | Facility: CLINIC | Age: 53
End: 2023-07-12
Payer: COMMERCIAL

## 2023-07-12 ENCOUNTER — PATIENT MESSAGE (OUTPATIENT)
Dept: HEPATOLOGY | Facility: CLINIC | Age: 53
End: 2023-07-12
Payer: COMMERCIAL

## 2023-07-12 DIAGNOSIS — E13.9 LADA (LATENT AUTOIMMUNE DIABETES IN ADULTS), MANAGED AS TYPE 1: Primary | ICD-10-CM

## 2023-07-12 PROCEDURE — G0108 PR DIAB MANAGE TRN  PER INDIV: ICD-10-PCS | Mod: S$GLB,,, | Performed by: INTERNAL MEDICINE

## 2023-07-12 PROCEDURE — 99999 PR PBB SHADOW E&M-EST. PATIENT-LVL I: CPT | Mod: PBBFAC,,,

## 2023-07-12 PROCEDURE — 99999 PR PBB SHADOW E&M-EST. PATIENT-LVL I: ICD-10-PCS | Mod: PBBFAC,,,

## 2023-07-12 PROCEDURE — G0108 DIAB MANAGE TRN  PER INDIV: HCPCS | Mod: S$GLB,,, | Performed by: INTERNAL MEDICINE

## 2023-07-12 NOTE — PROGRESS NOTES
Diabetes Care Specialist Progress Note  Author: Sheeba Ortiz RN, CDE  Date: 7/12/2023    Program Intake  Reason for Diabetes Program Visit:: Intervention  Type of Intervention:: Individual  Individual: Device Training  Education: Pattern Management  Current diabetes risk level:: low  In the last 12 months, have you:: none    Lab Results   Component Value Date    HGBA1C 6.6 (H) 03/27/2023           Clinical    Problem Review  Reviewed Problem List with Patient: yes  Active comorbidities affecting diabetes self-care.: yes  Comorbidities: Hypertension    Clinical Assessment  Have you ever experienced hypoglycemia (low blood sugar)?: yes  In the last month, how often have you experienced low blood sugar?: more than once a week  What symptoms do you experience?: Shaky  Have you ever been hospitalized because your blood sugar was too low?: no  Have you ever experienced hyperglycemia (high blood sugar)?: no    Nutritional Status  Meal Plan 24 Hour Recall: Breakfast, Lunch, Dinner, Snack  Meal Plan 24 Hour Recall - Breakfast: waffle 19 gram with peanut butter 7 g  Meal Plan 24 Hour Recall - Lunch: leftovers  Meal Plan 24 Hour Recall - Dinner: chicken, veggies  Meal Plan 24 Hour Recall - Snack: occasional    Additional Social History    Support  Does anyone support you with your diabetes care?: yes    Access to Mass Media & Technology  Does the patient have access to any of the following devices or technologies?: Smart phone, Internet Access    Health Literacy  Preferred Learning Method: Face to Face, Reading Materials      Diabetes Self-Management Skills Assessment    Diabetes Disease Process/Treatment Options  Patient/caregiver able to state what happens when someone has diabetes.: yes  Patient/caregiver knows what type of diabetes they have.: yes  Diabetes Type : Other (CHEMA)  Assessment indicates:: Adequate understanding  Area of need?: No    Nutrition/Healthy Eating  Challenges to healthy eating:: other (see comments)  (does not know how to calculate carbs in foods without labels)  Patient can identify foods that impact blood sugar.: yes  Patient-identified foods:: fruit/fruit juice, starches (bread, pasta, rice, cereal), milk, soda, starchy vegetables (corn, peas, beans), sweets, yogurt  Assessment indicates:: Knowledge deficit  Area of need?: Yes    Physical Activity/Exercise  Deffered due to:: Time    Medications  Patient is able to describe current diabetes management routine.: yes  Diabetes management routine:: insulin  Patient understands the purpose of the medications taken for diabetes.: yes  Patient reports problems or concerns with current medication regimen.: no  Assessment indicates:: Instruction Needed  Area of need?: Yes    Home Blood Glucose Monitoring  Patient states that blood sugar is checked at home daily.: yes  Monitoring Method:: personal continuous glucose monitor  Personal CGM type:: Dexcom G6 Clarity code obtained today  Area of need?: No    Acute Complications  Assessment indicates:: Knowledge deficit  Area of need?: Yes    Chronic Complications  Deferred due to:: Time    Psychosocial/Coping  Deffered due to:: Time        Assessment Summary and Plan    Based on today's diabetes care assessment, the following areas of need were identified:      Social 5/25/2023   Support No   Access to Mass Media/Tech No   Cognitive/Behavioral Health No   Communication No   Health Literacy Yes        Clinical 5/25/2023   Medication Adherence No   Nutritional Status No        Diabetes Self-Management Skills 7/12/2023   Diabetes Disease Process/Treatment Options No   Nutrition/Healthy Eating Yes, see care planning   Medication Yes, see care planning   Home Blood Glucose Monitoring No   Acute Complications Yes          Today's interventions were provided through individual discussion, instruction, and written materials were provided.      Patient verbalized understanding of instruction and written materials.  Pt was able to  "return back demonstration of instructions today. Patient understood key points, needs reinforcement and further instruction.     Diabetes Self-Management Care Plan:    Today's Diabetes Self-Management Care Plan was developed with Leida's input. Leida has agreed to work toward the following goal(s) to improve his/her overall diabetes control.      Care Plan: Diabetes Management   Updates made since 6/12/2023 12:00 AM        Problem: Healthy Eating         Goal: Patient will begin Advanced carb counting by measuring  her foods more accurately in order to better utilize her I:C ratios    Start Date: 11/4/2022   Expected End Date: 9/19/2023   This Visit's Progress: On track   Priority: Medium   Barriers: Knowledge deficit   Note:    Patient seen in clinic today for advanced carb counting.  Has been trying but still struggles with "homemade" foods such as veggies, cassaroles etc.  Reviewed label reading today.  She is not currently using measuring tools, We reviewed these using food models. Suggest keeping carbs at around 2-3 servings per meal.  She has been elevated after breakfast.  Advised to count her coffee as 15 grams.  Also encouraged to test I:C ratios.  Provided guidelines.  We briefly talked about Omnipod 5, she will check with her insurance pharmacy regarding this. Food logs provided as well as suggested apps to use.          Problem: Medications         Goal: Patient Agrees to take Diabetes Medication(s) as prescribed using the Omnipod 5 for all insulin administration    Start Date: 5/25/2023   Expected End Date: 9/19/2023   This Visit's Progress: On track   Priority: High   Barriers: Cognitive Deficits; No Barriers Identified   Note:    Update to care planning 05/25/2023: Pt was seen today for Omnipod 5 start.  However, due to insurance issues she does not have kit yet.  We reviewed carb counting principles.  Explained that when she begins to use the pump, she just needs to input the carbs she is eating.  " Explained how the autofeature works with the Dexcom to keep her in target range.  Used a demo controller to show Leida the features.  Instructed that she will need to enter the number of carbs being consumed and the pump automatically will enter the current glucose per Dexcom reading to calculate her final dose. Explained that the site is changed every 3 days. Performed a mock filling of demo cartridge. Attempted to set up a [employer] account, apparently she had a glooko set up some years ago.  Advised to call the number provided to get this changed prior to the pump start.     Dexcom was downloaded and reviewed.  Bgs are quite labile at times.  May be due to to timing of her meal insulin.  Advised to give insulin at least 10-15 mins prior to meal OR at the time of the meal.  She sometimes gives insulin after she eats which results in a low glucose later. She will work on timing of her doses and continue taking her injections until the pump start.     Update to care planning 6/14/2023:  Patient seen in clinic for insulin pump start.  She will be transitioning from [facility]    OMNI POD 5 INSULIN PUMP START    Pump training was provided per Omni Pod protocol.  Patient is new to insulin pump therapy.    Initial settings derived based on weight and current I:C ratios     Basal:  12AM: 0.6 units/hr     Max basal rate: 3 u/hr     Bolus:  CHO ratio: 12 am 10; 11 am 8; 5 pm 8  ISF: 12 am 50; 10 pm 60  Glucose target : 120 mg/dL  Correct  over: 130 mg/dl  Active insulin time: 4 hours  Max bolus: 15 units     Low reservoir insulin alarm: 10 units  Change pod alarm: every 3 days       Details of pump therapy were covered included following controller features and programming, pod activation, pod site selection and rotation, automatic pod priming and insertion, setting & editing basal rates in manual mode, giving bolus and other features in the set-up menu.  The following regarding the Omnipod 5 was covered:  During your first Pod  wear, since no recent history is available, the Omnipod 5 System uses only your active Basal Program from Manual Mode as a starting point to adjust your insulin. After 48 hours of history is collected, which usually happens with the first Pod wear, [employer] technology stops adjusting insulin against your active Basal Program and starts using the Adaptive Basal Rate for your automated insulin delivery with your next Pod change. With each Pod change, insulin delivery information is sent and saved in the Omnipod 5 [place] so that the next Pod that is started is updated with the new Adaptive Basal Rate. With each new Pod activation, the system adapts insulin delivery based on physiological needs and total daily insulin (TDI) delivered. After 2-3 Pod changes, adaptation generally stabilizes and automated insulin delivery is based on this adaptation.  Patient demonstrated ability to program controller, activate and insert pod using aseptic technique.  Patient demonstrated ability to program Dexcom transmitter into controller and start automated limited mode.    Instructed pt on use of basic pump features ie...give a bolus, pause insulin, switch from manual to automated mode.  Reviewed features available in manual mode verses automated mode.   Reviewed when and how to use activity function in automated mode.  Reviewed site selection of pods, rotation of sites and hard stop to change pod every 72 hrs.   Instructed that insulin vial is good out of refrigeration for up to 28-30 days.   Reviewed treatment of hypoglycemia, hyperglycemia; sick day care, DKA, and troubleshooting of pump.  Omni Pod 24-hour support line provided.       Podder username: Divine  Podder password: [email or web address]     Glooko username: [email or web address]  Glooko password: [email or web address]     We were unable to set up her [employer] account due to tech issues.  Unable to resolve issue in clinic.  Spoke to Omnipod 5 customer support and [employer] will reach  out to patient to address issue.    Update to care planning 6/29/2023: Patient seen today for post pump start.  She currently reports issues related to her dexcom system.  Has not been able to sync with pump nor her phone.  States has lost 3 sensors and has not had a chance to call Dexcom. We deleted the current lynn on her phone.  She started a new sensor and transmitter.  The new transmitter number [id number] was entered in both phone and in Omnipod PDM.  Explained 2 hour warm up period and that she will still be getting insulin via her pump. She also started a new pod at today's visit    Instructed patient how to utilize the History menu in her pump.  She has short term deficits and cannot remember when she takes insulin.  Currently has been using the pump in manual mode since she was not able to sync with Dexcom.  Per Dexcom download, number appear better than before using the phone.  Unable to suggest changes with no actual pump data. Advised that she can enter events in her Dexcom phone lynn to record carbs and glucose.  Unsure if she will be able to do this.     We were able to get her [employer] account set up.  No data as of this writing.     Update to are planning 07/12/2023:  Pt seen for pump f/u.  Able to view her data in Managed Systems.  Report sent to Dr. Kurtz for review.  Suggested no changes at this time.  We reviewed timing of her boluses which is contributing to some issues.  She did confirm that sometimes she gives a bolus after she eats and this is resulting in a low later.  Explained that she is likely giving a correction bolus on a glucose that is elevated from no food bolus.  Stressed that the timing of bolus and food is essential in preventing lows later. We set up some bolus reminders in her pump.  She was instructed that IF she is late with the bolus, to only bolus for the food, not enter a bg as this is already artificially elevated.  Understanding verbalized.  No other issues reports.  Her number have  improved significantly since starting the pump.     We discussed troubleshooting aspects today.  What to do in the event of elevated numbers without explanation,  Advised it is better to go ahead and change the pod if she suspects a problem.  Understanding verbalized. She has a back up of insulin pens available.  All other questions addressed.          Follow Up Plan     F/u made for 9/19/2023    Today's care plan and follow up schedule was discussed with patient.  Ledia verbalized understanding of the care plan, goals, and agrees to follow up plan.        The patient was encouraged to communicate with his/her health care provider/physician and care team regarding his/her condition(s) and treatment.  I provided the patient with my contact information today and encouraged to contact me via phone or [facility] Patient Portal as needed.     Length of Visit   Total Time: 60 Minutes

## 2023-08-21 NOTE — ASSESSMENT & PLAN NOTE
59yo female with PMH DM1, HTN, autoimmune hepatitis who presented to the ED with  for c/o AMS. Per  patient was c/o of headache last night and vomited. She went to sleep around 9pm then woke up this morning and he noticed that she was confused. She is a type 1 diabetic patient and blood glucose was 300.  states that last time she was having similar symptoms was a couple of years ago when she was in DKA. SBP 150s. On exam very drowsy with difficulty arousing patient, however intermittently follows commands with  no focal weakness. Patient determined to be a candidate for the wake up protocol. MRI IP showed  an acute intraventricular and subarachnoid hemorrhage with early obstructive hydrocephalus and transependymal CSF egress. Also with a noted   21.5-2 mm laterally directed outpouching at the proximal right A2 QUIQUE could represent infundibulum/tortuous origin of a proximal QUIQUE branch, noting that a small aneurysm is difficult to exclude given motion. Patient will be admitted to the Neuro ICU for closer monitoring with a neurosurgery consultation for further recommendations.    Patient remains in NCC, intubated, now off of sedation, EVD in place @20. NIHSS 25, exam limited by intubation and drowsiness. Cerebral angiogram completed yesterday without evidence of abnormalities to account for the SAH; no aneurysm, no AVM, no fistula. Patient will likely need repeat angio in 7 days. MRI W/WO pending, scheduled for this afternoon.       Antithrombotics for secondary stroke prevention: Antiplatelets: None: Hemorrhage any type      Aggressive risk factor modification: HTN     Rehab efforts: The patient has been evaluated by a stroke team provider and the therapy needs have been fully considered based off the presenting complaints and exam findings. The following therapy evaluations are needed: PT evaluate and treat, OT evaluate and treat, SLP evaluate and treat, PM&R evaluate for appropriate  placement    Diagnostics ordered/pending: CTA Head to assess vasculature , CTA Neck/Arch to assess vasculature, TTE to assess cardiac function/status ; MRI W/WO Pending    VTE prophylaxis: Mechanical prophylaxis: Place SCDs  None: Reason for No Pharmacological VTE Prophylaxis: History of systemic or intracranial bleeding    BP parameters: SAH: Unsecured aneurysm, SBP <140       12.19

## 2023-08-29 ENCOUNTER — PATIENT MESSAGE (OUTPATIENT)
Dept: PRIMARY CARE CLINIC | Facility: CLINIC | Age: 53
End: 2023-08-29
Payer: COMMERCIAL

## 2023-09-19 ENCOUNTER — LAB VISIT (OUTPATIENT)
Dept: LAB | Facility: HOSPITAL | Age: 53
End: 2023-09-19
Attending: NURSE PRACTITIONER
Payer: COMMERCIAL

## 2023-09-19 ENCOUNTER — OFFICE VISIT (OUTPATIENT)
Dept: OBSTETRICS AND GYNECOLOGY | Facility: CLINIC | Age: 53
End: 2023-09-19
Payer: COMMERCIAL

## 2023-09-19 ENCOUNTER — CLINICAL SUPPORT (OUTPATIENT)
Dept: DIABETES | Facility: CLINIC | Age: 53
End: 2023-09-19
Payer: COMMERCIAL

## 2023-09-19 VITALS
BODY MASS INDEX: 25.36 KG/M2 | SYSTOLIC BLOOD PRESSURE: 140 MMHG | DIASTOLIC BLOOD PRESSURE: 78 MMHG | WEIGHT: 138.69 LBS

## 2023-09-19 VITALS — WEIGHT: 139.44 LBS | BODY MASS INDEX: 25.5 KG/M2

## 2023-09-19 DIAGNOSIS — Z12.31 ENCOUNTER FOR SCREENING MAMMOGRAM FOR BREAST CANCER: ICD-10-CM

## 2023-09-19 DIAGNOSIS — N92.1 MENORRHAGIA WITH IRREGULAR CYCLE: ICD-10-CM

## 2023-09-19 DIAGNOSIS — E13.9 LADA (LATENT AUTOIMMUNE DIABETES IN ADULTS), MANAGED AS TYPE 1: Primary | ICD-10-CM

## 2023-09-19 DIAGNOSIS — Z01.419 WOMEN'S ANNUAL ROUTINE GYNECOLOGICAL EXAMINATION: Primary | ICD-10-CM

## 2023-09-19 DIAGNOSIS — E11.649 HYPOGLYCEMIA ASSOCIATED WITH DIABETES: Primary | ICD-10-CM

## 2023-09-19 DIAGNOSIS — Z12.4 ENCOUNTER FOR PAPANICOLAOU SMEAR FOR CERVICAL CANCER SCREENING: ICD-10-CM

## 2023-09-19 DIAGNOSIS — E55.9 VITAMIN D INSUFFICIENCY: ICD-10-CM

## 2023-09-19 DIAGNOSIS — Z11.51 SCREENING FOR HPV (HUMAN PAPILLOMAVIRUS): ICD-10-CM

## 2023-09-19 LAB
ESTRADIOL SERPL-MCNC: 290 PG/ML
FSH SERPL-ACNC: 8.42 MIU/ML
T4 FREE SERPL-MCNC: 0.8 NG/DL (ref 0.71–1.51)
TSH SERPL DL<=0.005 MIU/L-ACNC: 2.94 UIU/ML (ref 0.4–4)

## 2023-09-19 PROCEDURE — 99386 PREV VISIT NEW AGE 40-64: CPT | Mod: S$GLB,,, | Performed by: NURSE PRACTITIONER

## 2023-09-19 PROCEDURE — 99999 PR PBB SHADOW E&M-EST. PATIENT-LVL II: CPT | Mod: PBBFAC,,,

## 2023-09-19 PROCEDURE — 3008F PR BODY MASS INDEX (BMI) DOCUMENTED: ICD-10-PCS | Mod: CPTII,S$GLB,, | Performed by: NURSE PRACTITIONER

## 2023-09-19 PROCEDURE — 3008F BODY MASS INDEX DOCD: CPT | Mod: CPTII,S$GLB,, | Performed by: NURSE PRACTITIONER

## 2023-09-19 PROCEDURE — 99999 PR PBB SHADOW E&M-EST. PATIENT-LVL IV: ICD-10-PCS | Mod: PBBFAC,,, | Performed by: NURSE PRACTITIONER

## 2023-09-19 PROCEDURE — 3044F HG A1C LEVEL LT 7.0%: CPT | Mod: CPTII,S$GLB,, | Performed by: NURSE PRACTITIONER

## 2023-09-19 PROCEDURE — 3078F PR MOST RECENT DIASTOLIC BLOOD PRESSURE < 80 MM HG: ICD-10-PCS | Mod: CPTII,S$GLB,, | Performed by: NURSE PRACTITIONER

## 2023-09-19 PROCEDURE — 84439 ASSAY OF FREE THYROXINE: CPT | Performed by: NURSE PRACTITIONER

## 2023-09-19 PROCEDURE — 36415 COLL VENOUS BLD VENIPUNCTURE: CPT | Mod: PN | Performed by: NURSE PRACTITIONER

## 2023-09-19 PROCEDURE — 88175 CYTOPATH C/V AUTO FLUID REDO: CPT | Performed by: NURSE PRACTITIONER

## 2023-09-19 PROCEDURE — 3078F DIAST BP <80 MM HG: CPT | Mod: CPTII,S$GLB,, | Performed by: NURSE PRACTITIONER

## 2023-09-19 PROCEDURE — G0108 DIAB MANAGE TRN  PER INDIV: HCPCS | Mod: S$GLB,,, | Performed by: INTERNAL MEDICINE

## 2023-09-19 PROCEDURE — 87624 HPV HI-RISK TYP POOLED RSLT: CPT | Performed by: NURSE PRACTITIONER

## 2023-09-19 PROCEDURE — 3077F SYST BP >= 140 MM HG: CPT | Mod: CPTII,S$GLB,, | Performed by: NURSE PRACTITIONER

## 2023-09-19 PROCEDURE — 99999 PR PBB SHADOW E&M-EST. PATIENT-LVL II: ICD-10-PCS | Mod: PBBFAC,,,

## 2023-09-19 PROCEDURE — 84443 ASSAY THYROID STIM HORMONE: CPT | Performed by: NURSE PRACTITIONER

## 2023-09-19 PROCEDURE — 83001 ASSAY OF GONADOTROPIN (FSH): CPT | Performed by: NURSE PRACTITIONER

## 2023-09-19 PROCEDURE — 3044F PR MOST RECENT HEMOGLOBIN A1C LEVEL <7.0%: ICD-10-PCS | Mod: CPTII,S$GLB,, | Performed by: NURSE PRACTITIONER

## 2023-09-19 PROCEDURE — 1159F MED LIST DOCD IN RCRD: CPT | Mod: CPTII,S$GLB,, | Performed by: NURSE PRACTITIONER

## 2023-09-19 PROCEDURE — G0108 PR DIAB MANAGE TRN  PER INDIV: ICD-10-PCS | Mod: S$GLB,,, | Performed by: INTERNAL MEDICINE

## 2023-09-19 PROCEDURE — 99386 PR PREVENTIVE VISIT,NEW,40-64: ICD-10-PCS | Mod: S$GLB,,, | Performed by: NURSE PRACTITIONER

## 2023-09-19 PROCEDURE — 3077F PR MOST RECENT SYSTOLIC BLOOD PRESSURE >= 140 MM HG: ICD-10-PCS | Mod: CPTII,S$GLB,, | Performed by: NURSE PRACTITIONER

## 2023-09-19 PROCEDURE — 99999 PR PBB SHADOW E&M-EST. PATIENT-LVL IV: CPT | Mod: PBBFAC,,, | Performed by: NURSE PRACTITIONER

## 2023-09-19 PROCEDURE — 1159F PR MEDICATION LIST DOCUMENTED IN MEDICAL RECORD: ICD-10-PCS | Mod: CPTII,S$GLB,, | Performed by: NURSE PRACTITIONER

## 2023-09-19 PROCEDURE — 82670 ASSAY OF TOTAL ESTRADIOL: CPT | Performed by: NURSE PRACTITIONER

## 2023-09-19 NOTE — PROGRESS NOTES
Diabetes Care Specialist Follow-up Note  Author: Sheeba Ortiz RN, CDE  Date: 9/19/2023    Program Intake  Reason for Diabetes Program Visit:: Intervention  Type of Intervention:: Individual  Education: Self-Management Skill Review, Pattern Management  In the last 12 months, have you:: none  Permission to speak with others about care:: no    Lab Results   Component Value Date    HGBA1C 6.6 (H) 03/27/2023       Clinical    Weight: 63.3 kg (139 lb 7.1 oz)       Body mass index is 25.5 kg/m².         Problem Review  Reviewed Problem List with Patient: yes  Active comorbidities affecting diabetes self-care.: yes  Comorbidities: Hypertension    Clinical Assessment  Have you ever experienced hypoglycemia (low blood sugar)?: yes  In the last month, how often have you experienced low blood sugar?: more than once a week  What symptoms do you experience?: Shaky  Have you ever been hospitalized because your blood sugar was too low?: no  Have you ever experienced hyperglycemia (high blood sugar)?: no       Nutritional Status  Meal Plan 24 Hour Recall: Breakfast, Lunch, Dinner, Snack  Meal Plan 24 Hour Recall - Breakfast: waffle 19 gram with peanut butter 7 g  Meal Plan 24 Hour Recall - Lunch: leftovers  Meal Plan 24 Hour Recall - Dinner: chicken, veggies  Meal Plan 24 Hour Recall - Snack: occasional      Additional Social History    Support  Does anyone support you with your diabetes care?: yes    Access to Mass Media & Technology  Does the patient have access to any of the following devices or technologies?: Smart phone, Internet Access    Health Literacy  Preferred Learning Method: Face to Face, Reading Materials      Diabetes Self-Management Skills Assessment     Diabetes Disease Process/Treatment Options  Patient/caregiver able to state what happens when someone has diabetes.: yes  Patient/caregiver knows what type of diabetes they have.: yes  Diabetes Type : Other (CHEMA)  Assessment indicates:: Adequate  understanding  Area of need?: No    Nutrition/Healthy Eating  Challenges to healthy eating:: other (see comments) (does not know how to calculate carbs in foods without labels)  Patient can identify foods that impact blood sugar.: yes  Patient-identified foods:: fruit/fruit juice, starches (bread, pasta, rice, cereal), milk, soda, starchy vegetables (corn, peas, beans), sweets, yogurt  Assessment indicates:: Knowledge deficit  Area of need?: Yes    Physical Activity/Exercise  Deffered due to:: Time    Medications  Patient is able to describe current diabetes management routine.: yes  Diabetes management routine:: insulin  Patient understands the purpose of the medications taken for diabetes.: yes  Patient reports problems or concerns with current medication regimen.: no  Assessment indicates:: Instruction Needed  Area of need?: Yes    Home Blood Glucose Monitoring  Patient states that blood sugar is checked at home daily.: yes  Monitoring Method:: personal continuous glucose monitor  Personal CGM type:: Dexcom G6 Clarity code obtained today  Area of need?: No    Acute Complications  Assessment indicates:: Knowledge deficit  Area of need?: Yes    Chronic Complications  Deferred due to:: Time    Psychosocial/Coping  Deffered due to:: Time      During today's follow-up visit,  the following areas required further assessment and content was provided/reviewed.    Based on today's diabetes care assessment, the following areas of need were identified:            9/19/2023    12:01 AM   Diabetes Self-Management Skills   Diabetes Disease Process/Treatment Options No   Nutrition/Healthy Eating Yes, see care planning   Medication Yes, see care planning   Home Blood Glucose Monitoring No        Today's interventions were provided through individual discussion, instruction, and written materials were provided.    Patient verbalized understanding of instruction and written materials.  Pt was able to return back demonstration of  "instructions today. Patient understood key points, needs reinforcement and further instruction.     Diabetes Self-Management Care Plan Review and Evaluation of Progress:    During today's follow-up Leida's Diabetes Self-Management Care Plan progress was reviewed and progress was evaluated including his/her input. Leida has agreed to continue his/her journey to improve/maintain overall diabetes control by continuing to set health goals. See care plan progress below.      Care Plan: Diabetes Management   Updates made since 8/20/2023 12:00 AM        Problem: Healthy Eating         Goal: Patient will begin Advanced carb counting by measuring  her foods more accurately in order to better utilize her I:C ratios    Start Date: 11/4/2022   Expected End Date: 11/14/2023   This Visit's Progress: On track   Recent Progress: On track   Priority: Medium   Barriers: Knowledge deficit   Note:    Patient seen in clinic today for advanced carb counting.  Has been trying but still struggles with "homemade" foods such as veggies, cassaroles etc.  Reviewed label reading today.  She is not currently using measuring tools, We reviewed these using food models. Suggest keeping carbs at around 2-3 servings per meal.  She has been elevated after breakfast.  Advised to count her coffee as 15 grams.  Also encouraged to test I:C ratios.  Provided guidelines.  We briefly talked about Omnipod 5, she will check with her insurance pharmacy regarding this. Food logs provided as well as suggested apps to use.     Update to care planning 9/19/2023:  continues to carb count and enters all carbs in pump.         Problem: Medications         Goal: Patient Agrees to take Diabetes Medication(s) as prescribed using the Omnipod 5 for all insulin administration    Start Date: 5/25/2023   Expected End Date: 11/14/2023   This Visit's Progress: On track   Recent Progress: On track   Priority: High   Barriers: Cognitive Deficits; No Barriers Identified   Note:  "   Update to care planning 05/25/2023: Pt was seen today for Omnipod 5 start.  However, due to insurance issues she does not have kit yet.  We reviewed carb counting principles.  Explained that when she begins to use the pump, she just needs to input the carbs she is eating.  Explained how the autofeature works with the Dexcom to keep her in target range.  Used a [employer] controller to show Leida the features.  Instructed that she will need to enter the number of carbs being consumed and the pump automatically will enter the current glucose per Dexcom reading to calculate her final dose. Explained that the site is changed every 3 days. Performed a mock filling of demo cartridge. Attempted to set up a [email or web address] account, apparently she had a [employer] set up some years ago.  Advised to call the number provided to get this changed prior to the pump start.     Dexcom was downloaded and reviewed.  Bgs are quite labile at times.  May be due to to timing of her meal insulin.  Advised to give insulin at least 10-15 mins prior to meal OR at the time of the meal.  She sometimes gives insulin after she eats which results in a low glucose later. She will work on timing of her doses and continue taking her injections until the pump start.     Update to care planning 6/14/2023:  Patient seen in clinic for insulin pump start.  She will be transitioning from MDI    OMNI POD 5 INSULIN PUMP START    Pump training was provided per Omni Pod protocol.  Patient is new to insulin pump therapy.    Initial settings derived based on weight and current I:C ratios     Basal:  12AM: 0.6 units/hr     Max basal rate: 3 u/hr     Bolus:  CHO ratio: 12 am 10; 11 am 8; 5 pm 8  ISF: 12 am 50; 10 pm 60  Glucose target : 120 mg/dL  Correct  over: 130 mg/dl  Active insulin time: 4 hours  Max bolus: 15 units     Low reservoir insulin alarm: 10 units  Change pod alarm: every 3 days       Details of pump therapy were covered included following controller features and  programming, pod activation, pod site selection and rotation, automatic pod priming and insertion, setting & editing basal rates in manual mode, giving bolus and other features in the set-up menu.  The following regarding the Omnipod 5 was covered:  During your first Pod wear, since no recent history is available, the Omnipod 5 System uses only your active Basal Program from Manual Mode as a starting point to adjust your insulin. After 48 hours of history is collected, which usually happens with the first Pod wear, SmartUZwan technology stops adjusting insulin against your active Basal Program and starts using the Adaptive Basal Rate for your automated insulin delivery with your next Pod change. With each Pod change, insulin delivery information is sent and saved in the Omnipod 5 [place] so that the next Pod that is started is updated with the new Adaptive Basal Rate. With each new Pod activation, the system adapts insulin delivery based on physiological needs and total daily insulin (TDI) delivered. After 2-3 Pod changes, adaptation generally stabilizes and automated insulin delivery is based on this adaptation.  Patient demonstrated ability to program controller, activate and insert pod using aseptic technique.  Patient demonstrated ability to program Dexcom transmitter into controller and start automated limited mode.    Instructed pt on use of basic pump features ie...give a bolus, pause insulin, switch from manual to automated mode.  Reviewed features available in manual mode verses automated mode.   Reviewed when and how to use activity function in automated mode.  Reviewed site selection of pods, rotation of sites and hard stop to change pod every 72 hrs.   Instructed that insulin vial is good out of refrigeration for up to 28-30 days.   Reviewed treatment of hypoglycemia, hyperglycemia; sick day care, DKA, and troubleshooting of pump.  Omni Pod 24-hour support line provided.       Podder username:  [place] password: [email or web address]     [employer] username: [email or web address] [email or web address]     We were unable to set up her [employer] account due to tech issues.  Unable to resolve issue in clinic.  Spoke to Omnipod 5 customer support and [employer]igen will reach out to patient to address issue.    Update to care planning 6/29/2023: Patient seen today for post pump start.  She currently reports issues related to her dexcom system.  Has not been able to sync with pump nor her phone.  States has lost 3 sensors and has not had a chance to call Dexcom. We deleted the current lynn on her phone.  She started a new sensor and transmitter.  The new transmitter number [id number] was entered in both phone and in Omnipod PDM.  Explained 2 hour warm up period and that she will still be getting insulin via her pump. She also started a new pod at today's visit    Instructed patient how to utilize the History menu in her pump.  She has short term deficits and cannot remember when she takes insulin.  Currently has been using the pump in manual mode since she was not able to sync with Dexcom.  Per Dexcom download, number appear better than before using the phone.  Unable to suggest changes with no actual pump data. Advised that she can enter events in her Dexcom phone lynn to record carbs and glucose.  Unsure if she will be able to do this.     We were able to get her [employer] account set up.  No data as of this writing.     Update to are planning 07/12/2023:  Pt seen for pump f/u.  Able to view her data in [employer].  Report sent to Dr. Kurtz for review.  Suggested no changes at this time.  We reviewed timing of her boluses which is contributing to some issues.  She did confirm that sometimes she gives a bolus after she eats and this is resulting in a low later.  Explained that she is likely giving a correction bolus on a glucose that is elevated from no food bolus.  Stressed that the timing of bolus and food is essential  in preventing lows later. We set up some bolus reminders in her pump.  She was instructed that IF she is late with the bolus, to only bolus for the food, not enter a bg as this is already artificially elevated.  Understanding verbalized.  No other issues reports.  Her number have improved significantly since starting the pump.     We discussed troubleshooting aspects today.  What to do in the event of elevated numbers without explanation,  Advised it is better to go ahead and change the pod if she suspects a problem.  Understanding verbalized. She has a back up of insulin pens available.  All other questions addressed.     Update to care planning 9/19/2023: Leida was seen for 2 mth f/u.  Doing very well with pump.  Noted to have some post meal excursions in the afternoon.  Will adjust her 11 am I:C from 8 to 7.5.  Explained this will give her a little more insulin with meals.  Also adjusted the IOB from 4 to 3.5 hrs.  She feels that the pump was not giving corrections often enough.  Reviewed back up plan.  Advised she needs to always wear the pod and if not, needs a source of long acting insulin.  Explained that the pod will continue to function even if the Dexcom is in warm up.  Per Dexcom she is in 75% range.           Follow Up Plan     Will f/u with Dr. Kurtz in November and DM educator as needed.     Today's care plan and follow up schedule was discussed with patient.  Leida verbalized understanding of the care plan, goals, and agrees to follow up plan.        The patient was encouraged to communicate with his/her health care provider/physician and care team regarding his/her condition(s) and treatment.  I provided the patient with my contact information today and encouraged to contact me via phone or [facility] Patient Portal as needed.     Length of Visit   Total Time: 45 Minutes

## 2023-09-19 NOTE — PROGRESS NOTES
CC: Annual  HPI: Pt is a 53 y.o.  female who presents for routine annual exam. Previous GYN care with Dr. Mock. She is an . She has 23 yo triplets (2 girls/ 1 boy).  She is not using  contraception. She does not want STD screening.  Reports heavy and painful cycles- cycles coming every 4-6 weeks.   The patient participates in regular exercise: yes.  The patient does not smoke. .   Pt denies any domestic violence. Screening MMG is due in 12/23.  Colonoscopy is UTD - due in  2029.   She is s/p ischemic stroke and brain bleed. She has DM - diagnosed at age 42 on insulin.          ROS:  GENERAL: Feeling well overall. Denies fever or chills.   SKIN: Denies rash or lesions.   HEAD: Denies head injury or headache.   NODES: Denies enlarged lymph nodes.   CHEST: Denies chest pain or shortness of breath.   CARDIOVASCULAR: Denies palpitations or left sided chest pain.   ABDOMEN: No abdominal pain, constipation, diarrhea, nausea, vomiting or rectal bleeding.   URINARY: No dysuria, hematuria, or burning on urination.  REPRODUCTIVE: See HPI.   BREASTS: Denies pain, lumps, or nipple discharge.   HEMATOLOGIC: No easy bruisability or excessive bleeding.   MUSCULOSKELETAL: Denies joint pain or swelling.   NEUROLOGIC: Denies syncope or weakness.   PSYCHIATRIC: Denies depression, anxiety or mood swings.    PE:   APPEARANCE: Well nourished, well developed, White female in no acute distress.  NODES: no cervical, supraclavicular, or inguinal lymphadenopathy  BREASTS: Symmetrical, no skin changes or visible lesions. No palpable masses, nipple discharge or adenopathy bilaterally.  ABDOMEN: Soft. No tenderness or masses. No distention. No hernias palpated. No CVA tenderness.  VULVA: No lesions. Normal external female genitalia.  URETHRAL MEATUS: Normal size and location, no lesions, no prolapse.  URETHRA: No masses, tenderness, or prolapse.  VAGINA: Moist. No lesions or lacerations noted. No abnormal discharge present. No odor  present.   CERVIX: No lesions or discharge. No cervical motion tenderness.   UTERUS: 10 week size, regular shape, mobile, non-tender.  ADNEXA: No tenderness. No fullness or masses palpated in the adnexal regions.   ANUS PERINEUM: Normal.      Diagnosis:  1. Women's annual routine gynecological examination    2. Encounter for Papanicolaou smear for cervical cancer screening    3. Screening for HPV (human papillomavirus)    4. Encounter for screening mammogram for breast cancer    5. Menorrhagia with irregular cycle        Plan:   Pap/  HPV  MMG in 12/23  Pelvic US for eval of heavy cycles  Labs for eval      Orders Placed This Encounter    HPV High Risk Genotypes, PCR    Mammo Digital Screening Bilat w/ Coy    US Pelvis Comp with Transvag NON-OB (xpd    Follicle Stimulating Hormone    Estradiol    TSH    T4, FREE    Liquid-Based Pap Smear, Screening       Patient was counseled today on the new ACS guidelines for cervical cytology screening as well as the current recommendations for breast cancer screening. She was counseled to follow up with her PCP for other routine health maintenance. Counseling session lasted approximately 10 minutes, and all her questions were answered.    Follow-up with me in 1 year for routine exam    JEFF Stone

## 2023-09-19 NOTE — TELEPHONE ENCOUNTER
----- Message from Sheeba Ortiz RN, CDE sent at 9/19/2023 10:14 AM CDT -----  Regarding: RX for One touch verio test strips and future labs  Pt requesting a RX for One Touch verio test strips to be called into pharmacy.    Has lab appt for 10/23/23.  Please order any additional labs pt may need for 11/14 visit with Dr. Kurtz.    Thanks, Sheeba

## 2023-09-20 ENCOUNTER — PATIENT MESSAGE (OUTPATIENT)
Dept: OBSTETRICS AND GYNECOLOGY | Facility: CLINIC | Age: 53
End: 2023-09-20
Payer: COMMERCIAL

## 2023-09-21 ENCOUNTER — PATIENT MESSAGE (OUTPATIENT)
Dept: OBSTETRICS AND GYNECOLOGY | Facility: CLINIC | Age: 53
End: 2023-09-21
Payer: COMMERCIAL

## 2023-09-25 LAB
FINAL PATHOLOGIC DIAGNOSIS: NORMAL
HPV HR 12 DNA SPEC QL NAA+PROBE: NEGATIVE
HPV16 AG SPEC QL: NEGATIVE
HPV18 DNA SPEC QL NAA+PROBE: NEGATIVE
Lab: NORMAL

## 2023-10-12 ENCOUNTER — PATIENT MESSAGE (OUTPATIENT)
Dept: PRIMARY CARE CLINIC | Facility: CLINIC | Age: 53
End: 2023-10-12
Payer: COMMERCIAL

## 2023-10-23 DIAGNOSIS — R74.8 ELEVATED LIVER ENZYMES: Primary | ICD-10-CM

## 2023-11-14 ENCOUNTER — OFFICE VISIT (OUTPATIENT)
Dept: ENDOCRINOLOGY | Facility: CLINIC | Age: 53
End: 2023-11-14
Payer: COMMERCIAL

## 2023-11-14 VITALS
OXYGEN SATURATION: 99 % | BODY MASS INDEX: 25.5 KG/M2 | HEIGHT: 62 IN | SYSTOLIC BLOOD PRESSURE: 142 MMHG | DIASTOLIC BLOOD PRESSURE: 82 MMHG | HEART RATE: 100 BPM | WEIGHT: 138.56 LBS

## 2023-11-14 DIAGNOSIS — E11.649 HYPOGLYCEMIA ASSOCIATED WITH DIABETES: ICD-10-CM

## 2023-11-14 DIAGNOSIS — E13.9 LADA (LATENT AUTOIMMUNE DIABETES IN ADULTS), MANAGED AS TYPE 1: Primary | ICD-10-CM

## 2023-11-14 PROCEDURE — 3079F PR MOST RECENT DIASTOLIC BLOOD PRESSURE 80-89 MM HG: ICD-10-PCS | Mod: CPTII,S$GLB,, | Performed by: INTERNAL MEDICINE

## 2023-11-14 PROCEDURE — 99999 PR PBB SHADOW E&M-EST. PATIENT-LVL IV: ICD-10-PCS | Mod: PBBFAC,,, | Performed by: INTERNAL MEDICINE

## 2023-11-14 PROCEDURE — 3079F DIAST BP 80-89 MM HG: CPT | Mod: CPTII,S$GLB,, | Performed by: INTERNAL MEDICINE

## 2023-11-14 PROCEDURE — 3044F HG A1C LEVEL LT 7.0%: CPT | Mod: CPTII,S$GLB,, | Performed by: INTERNAL MEDICINE

## 2023-11-14 PROCEDURE — 99999 PR PBB SHADOW E&M-EST. PATIENT-LVL IV: CPT | Mod: PBBFAC,,, | Performed by: INTERNAL MEDICINE

## 2023-11-14 PROCEDURE — 3044F PR MOST RECENT HEMOGLOBIN A1C LEVEL <7.0%: ICD-10-PCS | Mod: CPTII,S$GLB,, | Performed by: INTERNAL MEDICINE

## 2023-11-14 PROCEDURE — 3008F PR BODY MASS INDEX (BMI) DOCUMENTED: ICD-10-PCS | Mod: CPTII,S$GLB,, | Performed by: INTERNAL MEDICINE

## 2023-11-14 PROCEDURE — 1159F MED LIST DOCD IN RCRD: CPT | Mod: CPTII,S$GLB,, | Performed by: INTERNAL MEDICINE

## 2023-11-14 PROCEDURE — 95251 CONT GLUC MNTR ANALYSIS I&R: CPT | Mod: S$GLB,,, | Performed by: INTERNAL MEDICINE

## 2023-11-14 PROCEDURE — 3077F PR MOST RECENT SYSTOLIC BLOOD PRESSURE >= 140 MM HG: ICD-10-PCS | Mod: CPTII,S$GLB,, | Performed by: INTERNAL MEDICINE

## 2023-11-14 PROCEDURE — 99214 PR OFFICE/OUTPT VISIT, EST, LEVL IV, 30-39 MIN: ICD-10-PCS | Mod: 25,S$GLB,, | Performed by: INTERNAL MEDICINE

## 2023-11-14 PROCEDURE — 3008F BODY MASS INDEX DOCD: CPT | Mod: CPTII,S$GLB,, | Performed by: INTERNAL MEDICINE

## 2023-11-14 PROCEDURE — 1159F PR MEDICATION LIST DOCUMENTED IN MEDICAL RECORD: ICD-10-PCS | Mod: CPTII,S$GLB,, | Performed by: INTERNAL MEDICINE

## 2023-11-14 PROCEDURE — 3077F SYST BP >= 140 MM HG: CPT | Mod: CPTII,S$GLB,, | Performed by: INTERNAL MEDICINE

## 2023-11-14 PROCEDURE — 99214 OFFICE O/P EST MOD 30 MIN: CPT | Mod: 25,S$GLB,, | Performed by: INTERNAL MEDICINE

## 2023-11-14 PROCEDURE — 95251 PR GLUCOSE MONITOR, 72 HOUR, PHYS INTERP: ICD-10-PCS | Mod: S$GLB,,, | Performed by: INTERNAL MEDICINE

## 2023-11-14 NOTE — PROGRESS NOTES
"Subjective:      Patient ID: Leida Hoyos is a 53 y.o. female.    Chief Complaint:  Diabetes      History of Present Illness  Endocrinology Return Visit     53 y.o. female with CHEMA managed as T1DM diagnosed at age 47.  History of subarachnoid hemorrhage in 1/2023    DM 1  Interval History  Doing generally well.   OMNIPOD 5 system doing generally well   Still having meal time excursions with A1C of 6.3%  Looking up her food/carb intake   Still snacks without entering number or carbs     Current Diabetes Regimen:  Currently on humalog     Glucose Monitoring:  Meter/CGM:  G6              Last Hgb A1C:  Lab Results   Component Value Date    HGBA1C 6.3 (H) 11/07/2023       Hypoglycemic Episodes: Denies     DKA: denies     Screening / DM Complications:  Nephropathy:   Lab Results   Component Value Date    MICALBCREAT 4.8 09/20/2022       Last Lipid Panel:  Lab Results   Component Value Date    LDLCALC 74.6 03/14/2023       TSH:   Lab Results   Component Value Date    TSH 2.936 09/19/2023       Neuropathy: denies   Diabetic Retinopathy: 1/2024 denies no laser surgery or DR  CAD/MI: denies       Diet/Exercise:  No changes     MEDICATIONS:    ALLERGIES:  Review of patient's allergies indicates:  No Known Allergies    Review of Systems  No recent illness     Objective:   Physical Exam  Vitals:    11/14/23 0840   BP: (!) 142/82   BP Location: Left arm   Patient Position: Sitting   BP Method: Medium (Manual)   Pulse: 100   SpO2: 99%   Weight: 62.9 kg (138 lb 9 oz)   Height: 5' 2" (1.575 m)       BP Readings from Last 3 Encounters:   11/14/23 (!) 142/82   09/19/23 (!) 140/78   06/20/23 138/82     Wt Readings from Last 1 Encounters:   11/14/23 0840 62.9 kg (138 lb 9 oz)         Body mass index is 25.34 kg/m².    Lab Review:   Lab Results   Component Value Date    HGBA1C 6.3 (H) 11/07/2023     Lab Results   Component Value Date    CHOL 161 03/14/2023    HDL 64 03/14/2023    LDLCALC 74.6 03/14/2023    TRIG 112 03/14/2023 "    CHOLHDL 39.8 03/14/2023     Lab Results   Component Value Date     07/06/2023    K 4.6 07/06/2023     07/06/2023    CO2 25 07/06/2023     (H) 07/06/2023    BUN 13 07/06/2023    CREATININE 0.67 07/06/2023    CALCIUM 9.0 07/06/2023    PROT 7.2 10/23/2023    ALBUMIN 3.8 10/23/2023    BILITOT 0.5 10/23/2023    ALKPHOS 120 10/23/2023    AST 50 (H) 10/23/2023    ALT 64 (H) 10/23/2023    ANIONGAP 8 07/06/2023    ESTGFRAFRICA >60.0 04/14/2022    EGFRNONAA >60.0 04/14/2022    TSH 2.936 09/19/2023         Assessment and Plan     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), managed as type 1    CHEMA patient with hemorrhagic stroke in 1/2023  Currently on omnipod 5 with Dexcom G6 sensor   Currently on humalog insulin   Reviewed sensor data  Target range slightly below goal of 75%  Low and very low blood sugars: 0%    Continues to struggle with snacks - in the evening without bolus    Administer insulin before meals, carb counting for meals at dinner/lunch (lean cuisine etc)    The patient:  - Has been seen in clinic within the last 6 months  - Has CHEMA managed as type 1 diabetes  - Performs frequent blood glucose testing with sensor, calibrations and confirmatory testing when low  - Requires frequent adjustments to their treatment regimen based on BGM or CGM testing results        Hypoglycemia associated with diabetes  No hypoglycemia

## 2023-11-14 NOTE — ASSESSMENT & PLAN NOTE
CHEMA patient with hemorrhagic stroke in 1/2023  Currently on omnipod 5 with Dexcom G6 sensor   Currently on humalog insulin   Reviewed sensor data  Target range slightly below goal of 75%  Low and very low blood sugars: 0%    Continues to struggle with snacks - in the evening without bolus    Administer insulin before meals, carb counting for meals at dinner/lunch (lean cuisine etc)    The patient:  - Has been seen in clinic within the last 6 months  - Has CHEMA managed as type 1 diabetes  - Performs frequent blood glucose testing with sensor, calibrations and confirmatory testing when low  - Requires frequent adjustments to their treatment regimen based on BGM or CGM testing results

## 2023-12-06 ENCOUNTER — PATIENT MESSAGE (OUTPATIENT)
Dept: OBSTETRICS AND GYNECOLOGY | Facility: CLINIC | Age: 53
End: 2023-12-06
Payer: COMMERCIAL

## 2023-12-06 ENCOUNTER — TELEPHONE (OUTPATIENT)
Dept: OBSTETRICS AND GYNECOLOGY | Facility: CLINIC | Age: 53
End: 2023-12-06
Payer: COMMERCIAL

## 2023-12-06 DIAGNOSIS — Z12.31 SCREENING MAMMOGRAM, ENCOUNTER FOR: Primary | ICD-10-CM

## 2024-01-11 ENCOUNTER — CLINICAL SUPPORT (OUTPATIENT)
Dept: OTHER | Facility: CLINIC | Age: 54
End: 2024-01-11
Payer: COMMERCIAL

## 2024-01-11 DIAGNOSIS — Z00.8 ENCOUNTER FOR OTHER GENERAL EXAMINATION: ICD-10-CM

## 2024-01-12 VITALS
BODY MASS INDEX: 24.84 KG/M2 | WEIGHT: 135 LBS | SYSTOLIC BLOOD PRESSURE: 135 MMHG | DIASTOLIC BLOOD PRESSURE: 87 MMHG | HEIGHT: 62 IN

## 2024-01-12 LAB
HDLC SERPL-MCNC: 77 MG/DL
POC CHOLESTEROL, LDL (DOCK): 94 MG/DL
POC CHOLESTEROL, TOTAL: 184 MG/DL
POC GLUCOSE, FASTING: 137 MG/DL (ref 60–110)
TRIGL SERPL-MCNC: 71 MG/DL

## 2024-01-24 ENCOUNTER — OFFICE VISIT (OUTPATIENT)
Dept: PRIMARY CARE CLINIC | Facility: CLINIC | Age: 54
End: 2024-01-24
Payer: COMMERCIAL

## 2024-01-24 VITALS
BODY MASS INDEX: 25.84 KG/M2 | DIASTOLIC BLOOD PRESSURE: 78 MMHG | OXYGEN SATURATION: 97 % | SYSTOLIC BLOOD PRESSURE: 132 MMHG | HEIGHT: 62 IN | WEIGHT: 140.44 LBS | HEART RATE: 83 BPM

## 2024-01-24 DIAGNOSIS — R41.841 COGNITIVE COMMUNICATION DEFICIT: ICD-10-CM

## 2024-01-24 DIAGNOSIS — K75.4 AUTOIMMUNE HEPATITIS: ICD-10-CM

## 2024-01-24 DIAGNOSIS — I69.00 UNSPECIFIED SEQUELAE OF NONTRAUMATIC SUBARACHNOID HEMORRHAGE: ICD-10-CM

## 2024-01-24 DIAGNOSIS — E11.59 HYPERTENSION ASSOCIATED WITH DIABETES: ICD-10-CM

## 2024-01-24 DIAGNOSIS — E13.9 LADA (LATENT AUTOIMMUNE DIABETES IN ADULTS), MANAGED AS TYPE 1: Primary | ICD-10-CM

## 2024-01-24 DIAGNOSIS — Z86.79 HISTORY OF SUBARACHNOID HEMORRHAGE: ICD-10-CM

## 2024-01-24 DIAGNOSIS — Z79.4 LONG TERM CURRENT USE OF INSULIN: ICD-10-CM

## 2024-01-24 DIAGNOSIS — I15.2 HYPERTENSION ASSOCIATED WITH DIABETES: ICD-10-CM

## 2024-01-24 DIAGNOSIS — E21.3 HYPERPARATHYROIDISM: ICD-10-CM

## 2024-01-24 PROCEDURE — 99999 PR PBB SHADOW E&M-EST. PATIENT-LVL III: CPT | Mod: PBBFAC,,, | Performed by: INTERNAL MEDICINE

## 2024-01-24 PROCEDURE — 3078F DIAST BP <80 MM HG: CPT | Mod: CPTII,S$GLB,, | Performed by: INTERNAL MEDICINE

## 2024-01-24 PROCEDURE — 3072F LOW RISK FOR RETINOPATHY: CPT | Mod: CPTII,S$GLB,, | Performed by: INTERNAL MEDICINE

## 2024-01-24 PROCEDURE — 99214 OFFICE O/P EST MOD 30 MIN: CPT | Mod: S$GLB,,, | Performed by: INTERNAL MEDICINE

## 2024-01-24 PROCEDURE — 3075F SYST BP GE 130 - 139MM HG: CPT | Mod: CPTII,S$GLB,, | Performed by: INTERNAL MEDICINE

## 2024-01-24 PROCEDURE — 1159F MED LIST DOCD IN RCRD: CPT | Mod: CPTII,S$GLB,, | Performed by: INTERNAL MEDICINE

## 2024-01-24 PROCEDURE — 3008F BODY MASS INDEX DOCD: CPT | Mod: CPTII,S$GLB,, | Performed by: INTERNAL MEDICINE

## 2024-01-24 NOTE — ASSESSMENT & PLAN NOTE
No evidence of AIH on recent biopsy, questionable dx. Seeing [facility] hepatology NP. previously seeing Dr. Russell. LFT's stable.

## 2024-01-24 NOTE — ASSESSMENT & PLAN NOTE
Dx'ed in 2013.  Sees Dr. Kurtz, A1C 6.3 in 1/2024.  On insulin pump.   Still exercises 4-5 times per week.

## 2024-01-24 NOTE — ASSESSMENT & PLAN NOTE
Admitted 1/27/23 at [facility] with SAH, required EVD.  No aneurysm.    Back at work.

## 2024-01-24 NOTE — PROGRESS NOTES
[facility] Note    Chief Complaint      Chief Complaint   Patient presents with    Follow-up     History of Present Illness      Leida Hoyos is a 53 y.o. female who presents today for DM f/u.  Patient comes to appointment with spouse.  GYN: Laurie Menjivar, Endo: Dr. Kurtz, GI: Dr. Russell, Optho: Dr. Ferguson, Derm: Paddock    Problem List Items Addressed This Visit       Autoimmune hepatitis    Current Assessment & Plan     No evidence of AIH on recent biopsy, questionable dx. Seeing [facility] hepatology NP. previously seeing Dr. Russell. LFT's stable.         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  Sees Dr. Kurtz, A1C 6.3 in 1/2024.  On insulin pump.   Still exercises 4-5 times per week.         Hypertension associated with diabetes    Current Assessment & Plan     BP controlled on no meds.  No CP/SOB/HA.  Doesn't check at home.         Hyperparathyroidism    Current Assessment & Plan     PTH normal in 4/2022.         History of subarachnoid hemorrhage    Current Assessment & Plan     Admitted 1/27/23 at [facility] with SAH, required EVD.  No aneurysm.    Back at work.         Cognitive communication deficit    Current Assessment & Plan     Finished with ST, 2/2 SAH         Long term current use of insulin    Unspecified sequelae of nontraumatic subarachnoid hemorrhage    Current Assessment & Plan     Still gets tired easier than she should but doing better than prior.                  Health Maintenance   Topic Date Due    Eye Exam  01/18/2024    Hemoglobin A1c  05/07/2024    Mammogram  12/28/2024    Lipid Panel  01/11/2025    Foot Exam  01/24/2025    TETANUS VACCINE  09/23/2026    Colorectal Cancer Screening  07/18/2029    Hepatitis C Screening  Completed    Shingles Vaccine  Completed    High Dose Statin  Discontinued       Past Medical History:   Diagnosis Date    Autoimmune hepatitis     managed by   Drew on mercaptopurine    Brain bleed 2023    Diabetes mellitus type II     Hypertension        Past Surgical History:   Procedure Laterality Date    CEREBRAL ANGIOGRAM N/A 2023    Procedure: ANGIOGRAM-CEREBRAL;  Surgeon: Yue Surgeon;  Location: [facility];  Service: Anesthesiology;  Laterality: N/A;     SECTION, CLASSIC      COLONOSCOPY N/A 2019    Procedure: COLONOSCOPY;  Surgeon: Dipesh Victoria MD;  Location: [facility] ([address] [place] [address]);  Service: Endoscopy;  Laterality: N/A;    FRACTURE SURGERY Left 2013    leg     MOUTH SURGERY         family history includes Alcohol abuse in her maternal grandfather and maternal grandmother; Arthritis in her mother; Asthma in her father; Diabetes in her maternal grandfather and paternal grandfather; Heart attack (age of onset: 60) in her father; Heart disease in her father; Hyperlipidemia in her father; Hypertension in her father and sister; Learning disabilities in her daughter and son; No Known Problems in her daughter, sister, and son; Ulcerative colitis in her mother.    Social History     Tobacco Use    Smoking status: Never    Smokeless tobacco: Never   Substance Use Topics    Alcohol use: Yes     Alcohol/week: 2.0 standard drinks of alcohol     Types: 2 Glasses of wine per week     Comment: social    Drug use: Never       Review of Systems   Constitutional:  Negative for chills and fever.   Respiratory:  Negative for cough and shortness of breath.    Cardiovascular:  Negative for chest pain and palpitations.   Gastrointestinal:  Negative for constipation, diarrhea, nausea and vomiting.   Genitourinary:  Negative for dysuria and hematuria.   Musculoskeletal:  Negative for falls.   Neurological:  Negative for headaches.        Outpatient Encounter Medications as of 2024   Medication Sig Dispense Refill    cholecalciferol, vitamin D3, (VITAMIN D3) 1,000 unit capsule Take 1 capsule (1,000 Units total) by mouth once daily.  0    DEXCOM G6  "SENSOR Melissa MONITOR BLOOD SUGARS DAILY 9 each 2    DEXCOM G6 TRANSMITTER Melissa USE AS DIRECTED FOR DAILY USE TO TEST BLOOD GLUCOSE. 1 each 3    insulin lispro (HUMALOG U-100 INSULIN) 100 unit/mL injection For use with insulin pump, TDD 45 units daily 20 mL 11    insulin pump cart,auto,BT-cntr (OMNIPOD 5 G6 INTRO KIT, GEN 5,) Crtg Inject 1 kit into the skin once daily. 1 each 0    insulin pump cart,auto,BT-cntr (OMNIPOD 5 G6 INTRO KIT, GEN 5,) Crtg Inject 1 Cartridge into the skin every 72 hours. 5 each 0    insulin pump cart,automated,BT (OMNIPOD 5 G6 PODS, GEN 5,) Crtg Inject 10 each into the skin once daily. 1 each 0    insulin pump cart,automated,BT (OMNIPOD 5 G6 PODS, GEN 5,) Crtg Inject 1 Cartridge into the skin every 72 hours. 1 each 11    multivitamin (THERAGRAN) per tablet Take 1 tablet by mouth once daily.      ONETOUCH DELICA PLUS LANCET 33 gauge Misc USE AS DIRECTED TO TEST BLOOD GLUCOSE. 300 each 8    pen needle, diabetic (BD ULTRA-FINE NICK PEN NEEDLE) 32 gauge x 5/32" Ndle USE FOUR TIMES A DAY FOR INSULIN INJECTIONS 360 each 4    pravastatin (PRAVACHOL) 10 MG tablet Take 1 tablet (10 mg total) by mouth once daily. 90 tablet 3    [DISCONTINUED] blood sugar diagnostic Strp For one touch verio IQ meter (Patient not taking: Reported on 1/24/2024) 100 each 3    [DISCONTINUED] blood-glucose meter (ONETOUCH VERIO IQ METER MISC) OneTouch Verio IQ Meter      [DISCONTINUED] hydrOXYzine HCL (ATARAX) 25 MG tablet Take 1 tablet (25 mg total) by mouth nightly as needed (Insomnia). (Patient not taking: Reported on 1/24/2024) 30 tablet 1     No facility-administered encounter medications on file as of 1/24/2024.        Review of patient's allergies indicates:  No Known Allergies    Physical Exam      Vital Signs  Pulse: 83  SpO2: 97 %  BP: 132/78  Pain Score: 0-No pain  Height and Weight  Height: 5' 2" (157.5 cm)  Weight: 63.7 kg (140 lb 6.9 oz)  BSA (Calculated - sq m): 1.67 sq meters  BMI (Calculated): 25.7  Weight " "in (lb) to have BMI = 25: 136.4]    Physical Exam  Constitutional:       Appearance: She is well-developed.   HENT:      Head: Normocephalic and atraumatic.   Cardiovascular:      Rate and Rhythm: Normal rate and regular rhythm.      Heart sounds: Normal heart sounds. No murmur heard.  Pulmonary:      Effort: Pulmonary effort is normal. No respiratory distress.      Breath sounds: Normal breath sounds.   Abdominal:      General: There is no distension.      Palpations: Abdomen is soft.      Tenderness: There is no abdominal tenderness. There is no guarding.   Skin:     General: Skin is warm and dry.   Neurological:      Mental Status: She is alert. Mental status is at baseline.   Psychiatric:         Behavior: Behavior normal.          Laboratory:  CBC:  No results for input(s): "WBC", "RBC", "HGB", "HCT", "PLT", "MCV", "MCH", "MCHC" in the last 2160 hours.    CMP:  Recent Labs   Lab Result Units 01/08/24  0758   Albumin g/dL 4.3   Total Protein g/dL 7.4   Alkaline Phosphatase U/L 117   ALT U/L 50*   AST U/L 38   Total Bilirubin mg/dL 0.6     URINALYSIS:  No results for input(s): "COLORU", "CLARITYU", "SPECGRAV", "PHUR", "PROTEINUA", "GLUCOSEU", "BILIRUBINCON", "BLOODU", "WBCU", "RBCU", "BACTERIA", "MUCUS", "NITRITE", "LEUKOCYTESUR", "UROBILINOGEN", "HYALINECASTS" in the last 2160 hours.     LIPIDS:  No results for input(s): "TSH", "HDL", "CHOL", "TRIG", "LDLCALC", "CHOLHDL", "NONHDLCHOL", "TOTALCHOLEST" in the last 2160 hours.    TSH:  No results for input(s): "TSH" in the last 2160 hours.    A1C:  Recent Labs   Lab Result Units 11/07/23  0938   Hemoglobin A1C % 6.3*       Radiology:  No results found in the last 30 days.     Assessment/Plan     Leida Hoyos is a 53 y.o.female with:    1. 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 Hypertension associated with diabetes    3. Unspecified sequelae of nontraumatic subarachnoid hemorrhage    4. Cognitive communication deficit    5. " History of subarachnoid hemorrhage    6. Autoimmune hepatitis    7. Hyperparathyroidism    8. Long term current use of insulin        -check BP at home  -Continue current medications and maintain follow up with specialists.    -Follow up in about 6 months (around 7/24/2024) for follow up of medical problems.       Frieda Mera MD  [facility]

## 2024-01-30 DIAGNOSIS — E13.9 LADA (LATENT AUTOIMMUNE DIABETES IN ADULTS), MANAGED AS TYPE 1: ICD-10-CM

## 2024-01-30 RX ORDER — BLOOD-GLUCOSE SENSOR
EACH MISCELLANEOUS
Qty: 9 EACH | Refills: 3 | Status: SHIPPED | OUTPATIENT
Start: 2024-01-30 | End: 2024-03-11 | Stop reason: SDUPTHER

## 2024-03-11 ENCOUNTER — OFFICE VISIT (OUTPATIENT)
Dept: ENDOCRINOLOGY | Facility: CLINIC | Age: 54
End: 2024-03-11
Payer: COMMERCIAL

## 2024-03-11 VITALS
SYSTOLIC BLOOD PRESSURE: 130 MMHG | WEIGHT: 141 LBS | DIASTOLIC BLOOD PRESSURE: 82 MMHG | HEIGHT: 62 IN | BODY MASS INDEX: 25.95 KG/M2

## 2024-03-11 DIAGNOSIS — E11.649 HYPOGLYCEMIA ASSOCIATED WITH DIABETES: ICD-10-CM

## 2024-03-11 DIAGNOSIS — E13.9 LADA (LATENT AUTOIMMUNE DIABETES IN ADULTS), MANAGED AS TYPE 1: Primary | ICD-10-CM

## 2024-03-11 DIAGNOSIS — E55.9 VITAMIN D DEFICIENCY: ICD-10-CM

## 2024-03-11 PROCEDURE — 3066F NEPHROPATHY DOC TX: CPT | Mod: CPTII,S$GLB,, | Performed by: INTERNAL MEDICINE

## 2024-03-11 PROCEDURE — 1160F RVW MEDS BY RX/DR IN RCRD: CPT | Mod: CPTII,S$GLB,, | Performed by: INTERNAL MEDICINE

## 2024-03-11 PROCEDURE — 3044F HG A1C LEVEL LT 7.0%: CPT | Mod: CPTII,S$GLB,, | Performed by: INTERNAL MEDICINE

## 2024-03-11 PROCEDURE — 3061F NEG MICROALBUMINURIA REV: CPT | Mod: CPTII,S$GLB,, | Performed by: INTERNAL MEDICINE

## 2024-03-11 PROCEDURE — 3079F DIAST BP 80-89 MM HG: CPT | Mod: CPTII,S$GLB,, | Performed by: INTERNAL MEDICINE

## 2024-03-11 PROCEDURE — 99214 OFFICE O/P EST MOD 30 MIN: CPT | Mod: 25,S$GLB,, | Performed by: INTERNAL MEDICINE

## 2024-03-11 PROCEDURE — 3072F LOW RISK FOR RETINOPATHY: CPT | Mod: CPTII,S$GLB,, | Performed by: INTERNAL MEDICINE

## 2024-03-11 PROCEDURE — 3008F BODY MASS INDEX DOCD: CPT | Mod: CPTII,S$GLB,, | Performed by: INTERNAL MEDICINE

## 2024-03-11 PROCEDURE — 99999 PR PBB SHADOW E&M-EST. PATIENT-LVL III: CPT | Mod: PBBFAC,,, | Performed by: INTERNAL MEDICINE

## 2024-03-11 PROCEDURE — 3075F SYST BP GE 130 - 139MM HG: CPT | Mod: CPTII,S$GLB,, | Performed by: INTERNAL MEDICINE

## 2024-03-11 PROCEDURE — 1159F MED LIST DOCD IN RCRD: CPT | Mod: CPTII,S$GLB,, | Performed by: INTERNAL MEDICINE

## 2024-03-11 PROCEDURE — 95251 CONT GLUC MNTR ANALYSIS I&R: CPT | Mod: S$GLB,,, | Performed by: INTERNAL MEDICINE

## 2024-03-11 RX ORDER — BLOOD-GLUCOSE TRANSMITTER
EACH MISCELLANEOUS
Qty: 1 EACH | Refills: 3 | Status: SHIPPED | OUTPATIENT
Start: 2024-03-11

## 2024-03-11 RX ORDER — INSULIN PMP CART,AUT,G6/7,CNTR
1 EACH SUBCUTANEOUS
Qty: 30 EACH | Refills: 3 | Status: SHIPPED | OUTPATIENT
Start: 2024-03-11

## 2024-03-11 RX ORDER — BLOOD-GLUCOSE SENSOR
EACH MISCELLANEOUS
Qty: 9 EACH | Refills: 3 | Status: SHIPPED | OUTPATIENT
Start: 2024-03-11

## 2024-03-11 RX ORDER — INSULIN LISPRO 100 [IU]/ML
INJECTION, SOLUTION INTRAVENOUS; SUBCUTANEOUS
Qty: 60 ML | Refills: 3 | Status: SHIPPED | OUTPATIENT
Start: 2024-03-11

## 2024-03-11 NOTE — PROGRESS NOTES
Subjective:      Patient ID: Leida Hoyos is a 54 y.o. female.    Chief Complaint:  Diabetes      History of Present Illness  Endocrinology Return Visit     54 y.o. female with CHEMA managed as T1DM diagnosed at age 47.  History of subarachnoid hemorrhage in 1/2023     DM 1  Interval History  Doing generally well.   OMNIPOD 5 system doing generally well   Meal time excursions with A1C of 6.3%, due to late bolusing or missed bolusing (snacks)  Underestimating carbs for certain meals      Current Diabetes Regimen:                        Glucose Monitoring:  Meter/CGM: G6      Last Hgb A1C:  Lab Results   Component Value Date    HGBA1C 6.3 (H) 03/04/2024       Hypoglycemic Episodes:  denies    DKA: denies     Screening / DM Complications:  Nephropathy:   Lab Results   Component Value Date    MICALBCREAT 13.0 03/04/2024       Last Lipid Panel:  Lab Results   Component Value Date    LDLCALC 74.6 03/14/2023       TSH:   Lab Results   Component Value Date    TSH 2.936 09/19/2023       Neuropathy: denies   Diabetic Retinopathy:  upcoming appt 3/2024, no laser surgery or DR  CAD/MI: denies   Stroke: see HPI    Diet/Exercise:  No major changes  Walking five times weekly     Has fibroid   Still has menstrual cycles but not regular. Heavy.      MEDICATIONS:    ALLERGIES:  Review of patient's allergies indicates:  No Known Allergies    Review of Systems  Denies recent illness     Objective:   Physical Exam  Constitutional:       General: She is not in acute distress.     Appearance: Normal appearance. She is well-developed.   Eyes:      General: No scleral icterus.     Extraocular Movements: Extraocular movements intact.      Conjunctiva/sclera: Conjunctivae normal.      Pupils: Pupils are equal, round, and reactive to light.      Comments: No proptosis.    Neck:      Thyroid: No thyromegaly.      Trachea: No tracheal deviation.   Cardiovascular:      Rate and Rhythm: Normal rate.   Pulmonary:      Effort: Pulmonary  "effort is normal.      Breath sounds: Normal breath sounds.   Musculoskeletal:      Cervical back: Normal range of motion and neck supple.   Lymphadenopathy:      Cervical: No cervical adenopathy.   Skin:     General: Skin is warm and dry.      Findings: No rash.   Neurological:      Mental Status: She is alert.      Deep Tendon Reflexes: Reflexes are normal and symmetric.      Comments: No tremor.     Protective Sensation (w/ 10 gram monofilament):  Right: Intact  Left: Intact    Visual Inspection:  Normal -  Bilateral    Pedal Pulses:   Right: Present  Left: Present    Posterior Tibialis Pulses:   Right:Present  Left: Present    Vitals:    03/11/24 1307   BP: 130/82   BP Location: Right arm   Patient Position: Sitting   BP Method: Medium (Manual)   Weight: 64 kg (140 lb 15.8 oz)   Height: 5' 2" (1.575 m)       BP Readings from Last 3 Encounters:   03/11/24 130/82   01/24/24 132/78   01/11/24 135/87     Wt Readings from Last 1 Encounters:   03/11/24 1307 64 kg (140 lb 15.8 oz)         Body mass index is 25.79 kg/m².    Lab Review:   Lab Results   Component Value Date    HGBA1C 6.3 (H) 03/04/2024     Lab Results   Component Value Date    CHOL 161 03/14/2023    HDL 64 03/14/2023    LDLCALC 74.6 03/14/2023    TRIG 112 03/14/2023    CHOLHDL 39.8 03/14/2023     Lab Results   Component Value Date     07/06/2023    K 4.6 07/06/2023     07/06/2023    CO2 25 07/06/2023     (H) 07/06/2023    BUN 13 07/06/2023    CREATININE 0.67 07/06/2023    CALCIUM 9.0 07/06/2023    PROT 7.4 01/08/2024    ALBUMIN 4.3 01/08/2024    BILITOT 0.6 01/08/2024    ALKPHOS 117 01/08/2024    AST 38 01/08/2024    ALT 50 (H) 01/08/2024    ANIONGAP 8 07/06/2023    ESTGFRAFRICA >60.0 04/14/2022    EGFRNONAA >60.0 04/14/2022    TSH 2.936 09/19/2023         Assessment and Plan     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), managed as type 1  CHEMA patient with hemorrhagic stroke in 1/2023  Currently on omnipod 5 with Dexcom G6 sensor "   Currently on humalog insulin   Reviewed sensor data  Target range has improved to 70%  Very high improved from 10% to 8%  High 25% to 21%  Low blood sugars: 1%    Continues to struggle with snacks - in the evening without bolus    Administer insulin before meals, carb counting for meals at dinner/lunch (lean cuisine etc)    The patient:  - Has been seen in clinic within the last 6 months  - Has CHEMA managed as type 1 diabetes  - Performs frequent blood glucose testing with sensor, calibrations and confirmatory testing when low  - Requires frequent adjustments to their treatment regimen based on BGM or CGM testing results        Vitamin D deficiency  increased dose to 2000 IUs daily   MVI daily     Hypoglycemia associated with diabetes  No severe hypoglycemia

## 2024-03-11 NOTE — ASSESSMENT & PLAN NOTE
CHEMA patient with hemorrhagic stroke in 1/2023  Currently on omnipod 5 with Dexcom G6 sensor   Currently on humalog insulin   Reviewed sensor data  Target range has improved to 70%  Very high improved from 10% to 8%  High 25% to 21%  Low blood sugars: 1%    Continues to struggle with snacks - in the evening without bolus    Administer insulin before meals, carb counting for meals at dinner/lunch (lean cuisine etc)    The patient:  - Has been seen in clinic within the last 6 months  - Has CHEMA managed as type 1 diabetes  - Performs frequent blood glucose testing with sensor, calibrations and confirmatory testing when low  - Requires frequent adjustments to their treatment regimen based on BGM or CGM testing results

## 2024-03-27 ENCOUNTER — OFFICE VISIT (OUTPATIENT)
Dept: OPTOMETRY | Facility: CLINIC | Age: 54
End: 2024-03-27
Payer: COMMERCIAL

## 2024-03-27 DIAGNOSIS — H25.13 NUCLEAR SCLEROSIS OF BOTH EYES: ICD-10-CM

## 2024-03-27 DIAGNOSIS — E13.9 LADA (LATENT AUTOIMMUNE DIABETES IN ADULTS), MANAGED AS TYPE 1: Primary | ICD-10-CM

## 2024-03-27 DIAGNOSIS — E10.9 TYPE 1 DIABETES MELLITUS WITHOUT RETINOPATHY: ICD-10-CM

## 2024-03-27 DIAGNOSIS — H52.7 REFRACTIVE ERROR: ICD-10-CM

## 2024-03-27 DIAGNOSIS — H40.013 OAG (OPEN ANGLE GLAUCOMA) SUSPECT, LOW RISK, BILATERAL: ICD-10-CM

## 2024-03-27 PROCEDURE — 3066F NEPHROPATHY DOC TX: CPT | Mod: CPTII,S$GLB,, | Performed by: OPTOMETRIST

## 2024-03-27 PROCEDURE — 3061F NEG MICROALBUMINURIA REV: CPT | Mod: CPTII,S$GLB,, | Performed by: OPTOMETRIST

## 2024-03-27 PROCEDURE — 1159F MED LIST DOCD IN RCRD: CPT | Mod: CPTII,S$GLB,, | Performed by: OPTOMETRIST

## 2024-03-27 PROCEDURE — 3044F HG A1C LEVEL LT 7.0%: CPT | Mod: CPTII,S$GLB,, | Performed by: OPTOMETRIST

## 2024-03-27 PROCEDURE — 92014 COMPRE OPH EXAM EST PT 1/>: CPT | Mod: S$GLB,,, | Performed by: OPTOMETRIST

## 2024-03-27 PROCEDURE — 92015 DETERMINE REFRACTIVE STATE: CPT | Mod: S$GLB,,, | Performed by: OPTOMETRIST

## 2024-03-27 PROCEDURE — 92133 CPTRZD OPH DX IMG PST SGM ON: CPT | Mod: S$GLB,,, | Performed by: OPTOMETRIST

## 2024-03-27 PROCEDURE — 99999 PR PBB SHADOW E&M-EST. PATIENT-LVL II: CPT | Mod: PBBFAC,,, | Performed by: OPTOMETRIST

## 2024-03-27 PROCEDURE — 2023F DILAT RTA XM W/O RTNOPTHY: CPT | Mod: CPTII,S$GLB,, | Performed by: OPTOMETRIST

## 2024-03-27 NOTE — PROGRESS NOTES
HPI    CC: Pt presents today for diabetic eye exam. Pt reports no vision   complaints. She wears OTC readers for near. Denies eye pain or irritation.       MIREYA: 1/18/2023, Dr. Valentine    (-) Changes in vision   (-) Pain  (-) Irritation   (-) Itching   (-) Flashes  (-) Floaters  (+) Glasses wearer, OTC readers for near  (-) CL wearer  (-) Uses eye gtts    Does patient want a refraction today? yes    (-) Eye injury  (-) Eye surgery   (-)POHx  (-)FOHx    (+)DM  Hemoglobin A1C       Date                     Value               Ref Range             Status                03/04/2024               6.3 (H)             4.0 - 5.6 %           Final                  11/07/2023               6.3 (H)             4.0 - 5.6 %           Final                  03/27/2023               6.6 (H)             4.0 - 5.6 %           Final                   Last edited by Promise Valentine, OD on 3/27/2024  2:41 PM.            Assessment /Plan     For exam results, see Encounter Report.    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 No retinopathy noted, OU. Continue proper BS control and annual diabetic eye exams. Monitor yearly.      3. Educated pt on findings. C/d asymmetry OD>OS. (-)Fhx. IOP WNL OU. OCT WNL OU today. Okay to monitor yearly with DFE + RNFL OCT.     4. Educated pt on findings. Not visually significant. No need for removal at this time. Monitor yearly.      5. Updated SRx. Given option of FTW specs vs OTC reading glasses only prn for near work. Monitor yearly.        RTC in 1 year for annual DM eye exam + RNFL OCT or sooner if needed.

## 2024-03-28 ENCOUNTER — TELEPHONE (OUTPATIENT)
Dept: OPTOMETRY | Facility: CLINIC | Age: 54
End: 2024-03-28
Payer: COMMERCIAL

## 2024-04-08 ENCOUNTER — PATIENT MESSAGE (OUTPATIENT)
Dept: PRIMARY CARE CLINIC | Facility: CLINIC | Age: 54
End: 2024-04-08
Payer: COMMERCIAL

## 2024-04-08 ENCOUNTER — E-VISIT (OUTPATIENT)
Dept: INTERNAL MEDICINE | Facility: CLINIC | Age: 54
End: 2024-04-08
Payer: COMMERCIAL

## 2024-04-08 DIAGNOSIS — R30.0 DYSURIA: Primary | ICD-10-CM

## 2024-04-08 PROCEDURE — 99421 OL DIG E/M SVC 5-10 MIN: CPT | Mod: ,,, | Performed by: NURSE PRACTITIONER

## 2024-04-08 RX ORDER — NITROFURANTOIN 25; 75 MG/1; MG/1
100 CAPSULE ORAL 2 TIMES DAILY
Qty: 10 CAPSULE | Refills: 0 | Status: SHIPPED | OUTPATIENT
Start: 2024-04-08 | End: 2024-04-13

## 2024-04-08 NOTE — PROGRESS NOTES
Patient ID: Leida Hoyos is a 54 y.o. female.    Chief Complaint: Urinary Tract Infection (Entered automatically based on patient selection in Patient Portal.)    The patient initiated a request through [employer] on 4/8/2024 for evaluation and management with a chief complaint of Urinary Tract Infection (Entered automatically based on patient selection in Patient Portal.)     I evaluated the questionnaire submission on 4/8/24.    [facility] Peq Evisit Uti Questionnaire    4/8/2024  8:46 AM CDT - Filed by Patient   Do you agree to participate in an E-Visit? Yes   If you have any of the following problems, please present to your local ER or call 911:  I acknowledge   What is the main issue you would like addressed today? pain when urinating   Are you able to take your vital signs? No   Are you pregnant, could you be pregnant, or are you breast feeding? None of the above   What symptoms do you currently have? Pain while passing urine   When did your symptoms first appear? 3/6/2024   List what you have done or taken to help your symptoms. drank more water to stay hydrated   Please indicate whether you have had the following symptoms during the past 24 hours     Urgent urination (a sudden and uncontrollable urge to urinate) Mild   Frequent urination of small amounts of urine (going to the toilet very often) Mild   Burning pain when urinating Severe   Incomplete bladder emptying (still feel like you need to urinate again after urination) Mild   Pain not associated with urination in the lower abdomen below the belly button) None   What does your urine look like? Clear   Blood seen in the urine None   Flank pain (pain in one or both sides of the lower back) None   Abnormal Vaginal Discharge (abnormal amount, color and/or odor) None   Discharge from the urethra (urinary opening) without urination None   High body temperature/fever? None-<99.5   Please rate how much discomfort you have experience because of the symptoms in the  past 24 hours: Moderate   Please indicate how the symptoms have interfered with your every day activities/work in the past 24 hours: None   Please indicate how these symptoms have interfered with your social activities (visiting people, meeting with friends, etc.) in the past 24 hours? None   Are you a diabetic? Yes   Please indicate whether you have the following at the time of completion of this questionnaire: None of the above   Provide any additional information you feel is important. I am leaving town tomorrow morning for a week and I worry about not treating this.   Please attach any relevant images or files (if you have performed a home test for UTI, please submit a photo of results)          Encounter Diagnosis   Name Primary?    Dysuria Yes        Orders Placed This Encounter   Procedures    Urinalysis, Reflex to Urine Culture Urine, Clean Catch     Standing Status:   Future     Standing Expiration Date:   6/7/2025     Order Specific Question:   Preferred Collection Type     Answer:   Urine, Clean Catch     Order Specific Question:   Specimen Source     Answer:   Urine      Medications Ordered This Encounter   Medications    nitrofurantoin, macrocrystal-monohydrate, (MACROBID) 100 MG capsule     Sig: Take 1 capsule (100 mg total) by mouth 2 (two) times daily. for 5 days     Dispense:  10 capsule     Refill:  0        Follow up if symptoms worsen or fail to improve.      E-Visit Time Tracking:    Day 1 Time (in minutes): 7    Total Time (in minutes): 7

## 2024-04-22 ENCOUNTER — PATIENT MESSAGE (OUTPATIENT)
Dept: PRIMARY CARE CLINIC | Facility: CLINIC | Age: 54
End: 2024-04-22
Payer: COMMERCIAL

## 2024-04-22 ENCOUNTER — OFFICE VISIT (OUTPATIENT)
Dept: HEPATOLOGY | Facility: CLINIC | Age: 54
End: 2024-04-22
Payer: COMMERCIAL

## 2024-04-22 VITALS — HEIGHT: 62 IN | BODY MASS INDEX: 26.17 KG/M2 | WEIGHT: 142.19 LBS

## 2024-04-22 DIAGNOSIS — R76.8 POSITIVE ANA (ANTINUCLEAR ANTIBODY): ICD-10-CM

## 2024-04-22 DIAGNOSIS — R74.8 ELEVATED LIVER ENZYMES: Primary | ICD-10-CM

## 2024-04-22 PROBLEM — T50.905A: Status: RESOLVED | Noted: 2023-05-02 | Resolved: 2024-04-22

## 2024-04-22 PROBLEM — K76.89: Status: RESOLVED | Noted: 2023-05-02 | Resolved: 2024-04-22

## 2024-04-22 PROCEDURE — 3008F BODY MASS INDEX DOCD: CPT | Mod: CPTII,S$GLB,, | Performed by: NURSE PRACTITIONER

## 2024-04-22 PROCEDURE — 1159F MED LIST DOCD IN RCRD: CPT | Mod: CPTII,S$GLB,, | Performed by: NURSE PRACTITIONER

## 2024-04-22 PROCEDURE — 3061F NEG MICROALBUMINURIA REV: CPT | Mod: CPTII,S$GLB,, | Performed by: NURSE PRACTITIONER

## 2024-04-22 PROCEDURE — 3044F HG A1C LEVEL LT 7.0%: CPT | Mod: CPTII,S$GLB,, | Performed by: NURSE PRACTITIONER

## 2024-04-22 PROCEDURE — 1160F RVW MEDS BY RX/DR IN RCRD: CPT | Mod: CPTII,S$GLB,, | Performed by: NURSE PRACTITIONER

## 2024-04-22 PROCEDURE — 3066F NEPHROPATHY DOC TX: CPT | Mod: CPTII,S$GLB,, | Performed by: NURSE PRACTITIONER

## 2024-04-22 PROCEDURE — 99999 PR PBB SHADOW E&M-EST. PATIENT-LVL III: CPT | Mod: PBBFAC,,, | Performed by: NURSE PRACTITIONER

## 2024-04-22 PROCEDURE — 99215 OFFICE O/P EST HI 40 MIN: CPT | Mod: S$GLB,,, | Performed by: NURSE PRACTITIONER

## 2024-04-22 NOTE — PATIENT INSTRUCTIONS
1.Liver biopsy in the past did not show autoimmune hepatitis. Suspected possibly some inflammation from a medication ?  2. Since no autoimmune hepatitis on your biopsy, you can see a rheumatologist (autoimmune specialist) if you wish to investigate other autoimmune causes   3. Labs in June and October to make sure that they remain normal   4.  Follow up in 1 year with labs a few days before, fibroscan same day

## 2024-04-22 NOTE — PROGRESS NOTES
"[facility] Established Patient Visit    Reason for Visit:  previously elevated liver enzymes     PCP: Frieda Mera    HPI:  This is a 54 y.o. female with PMH noted below, here for follow up of above    Of note per Prashant's previous note "Reports she was diagnosed with AIH w Dr. Russell, outside GI, about 10 years ago, maintained on 6-mmp until 2021, unclear why this agent was started but she recalls no liver biopsy and recalls wanting to avoid prednisone due to T1DM diagnosis. Per EMR likely stopped 01/2021 or prior (6mmp)"    Previous serologic w/u negative for  Cuong's, alpha-1 antitrypsin deficiency, hemochromatosis,  and viral hepatitis   + AROLDO in the past 1:320, normal IgG and ASMA    Prior serologic workup:   Lab Results   Component Value Date    SMOOTHMUSCAB Negative 1:40 03/27/2023    AMAIFA Negative 1:40 03/27/2023    IGGSERUM 1464 03/27/2023    ANASCREEN Positive (A) 03/27/2023    FERRITIN 47 03/27/2023    FESATURATED 20 03/27/2023    CERULOPLSM 36.0 03/27/2023    HEPBSAG Non-reactive 03/27/2023    HEPCAB Non-reactive 01/27/2023       Liver fibrosis staging:  -- liver biopsy 5/2023 not suggestive of AIH, suspected possible DILI (?statin vs antiHTN - stopped and liver enzymes improved significantly).  Overall, the histopathologic findings are very mild and nonspecific.    Lipofuscin pigment accumulation is nonspecific, but may be secondary to drug/medication effect on the liver.  Please correlate with clinical findings.     PATH conference 4/2024 noted   5/11/23 Path Conference   4/18/23 biopsy:  bile duct ok; some zone 3 sinusoidal congestion.  No fibrosis.      Interval HPI: Presents today alone. Previously seen by Prashant LUNA, new to me today. Per previous notes, labs improved after stopping meds (Losartan?), remains on pravastatin since 2018  Recent labs normal   No herbal meds now or in the past, no supplements     Labs done 4/2024 show normal transaminase levels (previously elevated " 2023-2024)    Lab Results   Component Value Date    ALT 35 2024    AST 28 2024    ALKPHOS 106 2024    BILITOT 0.5 2024    ALBUMIN 3.4 (L) 2024    INR 1.0 2023     2023       Abd U/S done 2023 showed normal liver     Denies family history of liver disease . Denies significant alcohol consumption currently or in the past-   Social History     Substance and Sexual Activity   Alcohol Use Not Currently    Comment: 3 times per month         Immunity to Hep A and B - will check with next labs       PMHX:  has a past medical history of Autoimmune hepatitis, Brain bleed (2023), Diabetes mellitus type II, and Hypertension.    PSHX:  has a past surgical history that includes  section, classic (); Mouth surgery; Fracture surgery (Left, ); Colonoscopy (N/A, 2019); and Cerebral angiogram (N/A, 2023).    The patient's social and family histories were reviewed by me and updated in the appropriate section of the electronic medical record.    Review of patient's allergies indicates:  No Known Allergies    Current Outpatient Medications on File Prior to Visit   Medication Sig Dispense Refill    blood-glucose sensor (DEXCOM G6 SENSOR) Melissa For daily blood glucose monitoring to use with transmitter 9 each 3    blood-glucose transmitter (DEXCOM G6 TRANSMITTER) Melissa For daily use with dexcom sensor and omnipod insulin pump 1 each 3    cholecalciferol, vitamin D3, (VITAMIN D3) 1,000 unit capsule Take 1 capsule (1,000 Units total) by mouth once daily.  0    insulin lispro (HUMALOG U-100 INSULIN) 100 unit/mL injection For use with insulin pump, TDD 45 units daily 60 mL 3    insulin pump cart,automated,BT (OMNIPOD 5 G6 PODS, GEN 5,) Crtg Inject 1 Cartridge into the skin every 72 hours. 30 each 3    multivitamin (THERAGRAN) per tablet Take 1 tablet by mouth once daily.      ONETOUCH DELICA PLUS LANCET 33 gauge Misc USE AS DIRECTED TO TEST BLOOD GLUCOSE.  "300 each 8    pen needle, diabetic (BD ULTRA-FINE NICK PEN NEEDLE) 32 gauge x 5/32" Ndle USE FOUR TIMES A DAY FOR INSULIN INJECTIONS 360 each 4    pravastatin (PRAVACHOL) 10 MG tablet Take 1 tablet (10 mg total) by mouth once daily. 90 tablet 3     No current facility-administered medications on file prior to visit.         ROS:   GENERAL: Denies fatigue  CARDIOVASCULAR: Denies edema  GI: Denies abdominal pain  SKIN: Denies rash, itching   NEURO: Denies confusion, memory loss, or mood changes    Objective Findings:    PHYSICAL EXAM:   Friendly  female, in no acute distress; alert and oriented to person, place and time  VITALS: Ht 5' 2" (1.575 m)   Wt 64.5 kg (142 lb 3.2 oz)   BMI 26.01 kg/m²   EYES: Sclerae anicteric  GI: Soft, non-tender, non-distended. No ascites.  EXTREMITIES:  No edema.  SKIN: Warm and dry. No jaundice. No telangectasias noted. No palmar erythema.  NEURO:  No asterixis.  PSYCH:  Thought and speech pattern appropriate. Behavior normal        EDUCATION:  See instructions discussed with patient in Instructions section of the After Visit Summary       ASSESSMENT & PLAN:  54 y.o. female with:  1. Previously elevated liver enzymes   -- Labs note normal liver enzymes   --- synthetic liver function WNL  --- Abd US done 2/2023 showed normal liver   -- medications possibly contributing: Losartan (?), stopped after biopsy and liver enzymes trended down, rare cases of such in [facility] Liver tox. Unlikely statin since liver enzymes now normal   --- previous serological work up : see HPI  --- Hep A and B immunity: see HPI  -- labs before RTC  Orders Placed This Encounter   Procedures    FibroScan Transplant Hepatology(Vibration Controlled Transient Elastography)    Hepatic Function Panel    Hepatic Function Panel    Hepatic Function Panel    AROLDO Screen w/Reflex    IgG    Anti-Smooth Muscle Antibody    Antimitochondrial Antibody    Hepatitis A antibody, IgG    Hepatitis B Core Antibody, Total    Hepatitis B " Surface Ab, Qualitative      -- fibroscan with RTC to compare to liver biopsy before possible d/c    2. H/o AIH?  Unsure if that is the case. No AIH on 2023 biopsy, liver enzymes currently normal   Will monitor closely for next year, then can d/c if liver enzymes remain WNL    3. + AROLDO  Rheum if interested for other Autoimmune eval          Follow up in about 1 year (around 4/22/2025). with lab and fibroscan before  Orders Placed This Encounter   Procedures    FibroScan Transplant Hepatology(Vibration Controlled Transient Elastography)    Hepatic Function Panel    Hepatic Function Panel    Hepatic Function Panel    AROLDO Screen w/Reflex    IgG    Anti-Smooth Muscle Antibody    Antimitochondrial Antibody    Hepatitis A antibody, IgG    Hepatitis B Core Antibody, Total    Hepatitis B Surface Ab, Qualitative        Thank you for allowing me to participate in the care of MINA Fitzgerald    I spent a total of 40 minutes on the day of the visit.This includes face to face time and non-face to face time preparing to see the patient (eg, review of tests), obtaining and/or reviewing separately obtained history, documenting clinical information in the electronic or other health record, independently interpreting results and communicating results to the patient/family/caregiver, and coordinating care.       CC'ed note to:   Frieda Mera MD

## 2024-05-01 ENCOUNTER — PATIENT MESSAGE (OUTPATIENT)
Dept: PRIMARY CARE CLINIC | Facility: CLINIC | Age: 54
End: 2024-05-01
Payer: COMMERCIAL

## 2024-07-30 ENCOUNTER — OFFICE VISIT (OUTPATIENT)
Dept: PRIMARY CARE CLINIC | Facility: CLINIC | Age: 54
End: 2024-07-30
Payer: COMMERCIAL

## 2024-07-30 VITALS
HEART RATE: 91 BPM | DIASTOLIC BLOOD PRESSURE: 76 MMHG | WEIGHT: 140.19 LBS | HEIGHT: 62 IN | SYSTOLIC BLOOD PRESSURE: 118 MMHG | BODY MASS INDEX: 25.8 KG/M2 | OXYGEN SATURATION: 99 %

## 2024-07-30 DIAGNOSIS — E21.3 HYPERPARATHYROIDISM: ICD-10-CM

## 2024-07-30 DIAGNOSIS — Z79.4 LONG TERM CURRENT USE OF INSULIN: ICD-10-CM

## 2024-07-30 DIAGNOSIS — I69.00 UNSPECIFIED SEQUELAE OF NONTRAUMATIC SUBARACHNOID HEMORRHAGE: ICD-10-CM

## 2024-07-30 DIAGNOSIS — E10.69 HYPERLIPIDEMIA DUE TO TYPE 1 DIABETES MELLITUS: ICD-10-CM

## 2024-07-30 DIAGNOSIS — E11.59 HYPERTENSION ASSOCIATED WITH DIABETES: ICD-10-CM

## 2024-07-30 DIAGNOSIS — K75.4 AUTOIMMUNE HEPATITIS: ICD-10-CM

## 2024-07-30 DIAGNOSIS — E78.5 HYPERLIPIDEMIA DUE TO TYPE 1 DIABETES MELLITUS: ICD-10-CM

## 2024-07-30 DIAGNOSIS — I15.2 HYPERTENSION ASSOCIATED WITH DIABETES: ICD-10-CM

## 2024-07-30 DIAGNOSIS — Z86.79 HISTORY OF SUBARACHNOID HEMORRHAGE: ICD-10-CM

## 2024-07-30 DIAGNOSIS — R41.841 COGNITIVE COMMUNICATION DEFICIT: ICD-10-CM

## 2024-07-30 DIAGNOSIS — E13.9 LADA (LATENT AUTOIMMUNE DIABETES IN ADULTS), MANAGED AS TYPE 1: Primary | ICD-10-CM

## 2024-07-30 PROCEDURE — 99999 PR PBB SHADOW E&M-EST. PATIENT-LVL III: CPT | Mod: PBBFAC,,, | Performed by: NURSE PRACTITIONER

## 2024-07-30 PROCEDURE — 99214 OFFICE O/P EST MOD 30 MIN: CPT | Mod: S$GLB,,, | Performed by: NURSE PRACTITIONER

## 2024-07-30 NOTE — PROGRESS NOTES
[facility] Note    Chief Complaint      Chief Complaint   Patient presents with    Follow-up     6m f/u from Dr Mera        History of Present Illness      Leida Hoyso is a 54 y.o. female with chronic conditions of htn who presents today for: check up for chronic conditions. Generally has been feeling well.     Autoimmune hepatitis: seeing Karsten hepatology NP ([facility]), previously saw Dr. Russell.  Liver enzymes normal. Ordered a Fibroscan previously next visit. No AIH on biopsy .   CHEMA (dx'ed in ): on insulin pump.  Sees Dr. Kurtz, A1C 6.6%. still exercising 4-5x a week.   Htn: bp controlled, not currently on any medications. Denies chest pain/sob.   Hyperparathyroidism: PTH normal (75), in . Calcium normal .  Hld: on pravastatin 10 mg, no myalgias. LDL 94.   Hx of subarachnoid hemorrhage () required EVD: no aneurysm.   Cognitive communication deficit: finished with ST, 2/2 SAH    UTD pap smear (due in 4 years). Mammogram due this year (). Cscope due in .     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    Family History:  family history includes Alcohol abuse in her maternal grandfather and maternal grandmother; Arthritis in her mother; Asthma in her father; Diabetes in her maternal grandfather and paternal grandfather; Heart attack (age of onset: 60) in her father; Heart disease in her father; Hyperlipidemia in her father; Hypertension in her father and sister; Learning disabilities in her daughter and son; No Known Problems in her daughter, sister, and son; Ulcerative colitis in her mother.     Social  "History:  Social History     Tobacco Use    Smoking status: Never    Smokeless tobacco: Never   Substance Use Topics    Alcohol use: Not Currently     Comment: 3 times per month    Drug use: Never       Review of Systems   Constitutional:  Negative for chills and fever.   Respiratory:  Negative for cough and shortness of breath.    Cardiovascular:  Negative for chest pain and palpitations.   Gastrointestinal:  Negative for constipation, diarrhea, nausea and vomiting.   Genitourinary:  Negative for dysuria and hematuria.   Musculoskeletal:  Negative for falls.   Neurological:  Negative for headaches.        Medications:  Outpatient Encounter Medications as of 7/30/2024   Medication Sig Dispense Refill    blood-glucose sensor (DEXCOM G6 SENSOR) Melissa For daily blood glucose monitoring to use with transmitter 9 each 3    blood-glucose transmitter (DEXCOM G6 TRANSMITTER) Melissa For daily use with dexcom sensor and omnipod insulin pump 1 each 3    cholecalciferol, vitamin D3, (VITAMIN D3) 1,000 unit capsule Take 1 capsule (1,000 Units total) by mouth once daily.  0    insulin pump cart,automated,BT (OMNIPOD 5 G6 PODS, GEN 5,) Crtg Inject 1 Cartridge into the skin every 72 hours. 30 each 3    multivitamin (THERAGRAN) per tablet Take 1 tablet by mouth once daily.      pravastatin (PRAVACHOL) 10 MG tablet Take 1 tablet (10 mg total) by mouth once daily. 90 tablet 3    [DISCONTINUED] insulin lispro (HUMALOG U-100 INSULIN) 100 unit/mL injection For use with insulin pump, TDD 45 units daily 60 mL 3    [DISCONTINUED] ONETOUCH DELICA PLUS LANCET 33 gauge Misc USE AS DIRECTED TO TEST BLOOD GLUCOSE. (Patient not taking: Reported on 7/30/2024) 300 each 8    [DISCONTINUED] pen needle, diabetic (BD ULTRA-FINE NICK PEN NEEDLE) 32 gauge x 5/32" Ndle USE FOUR TIMES A DAY FOR INSULIN INJECTIONS 360 each 4     No facility-administered encounter medications on file as of 7/30/2024.       Allergies:  Review of patient's allergies indicates: " "  Allergen Reactions    Losartan      Possible drug induced liver inflammation        Health Maintenance:  Immunization History   Administered Date(s) Administered    COVID-19, MRNA, LN-S, PF (Pfizer) (Purple Cap) 03/12/2021, 04/02/2021, 10/26/2021, 10/12/2022, 10/19/2022    Influenza 10/03/2018, 09/29/2020, 10/12/2021, 09/23/2022, 09/20/2023    Influenza - Trivalent (ADULT) 01/01/2011, 10/01/2017, 10/01/2018, 10/01/2019, 09/29/2020    Influenza A (H1N1) 2009 Monovalent - IM 11/24/2009    Pneumococcal Conjugate - 13 Valent 09/28/2016    Pneumococcal Polysaccharide - 23 Valent 02/20/2018    Td - Not Adsorbed (Adult) 01/01/1991, 01/01/2005    Tdap 09/23/2016    Zoster Recombinant 10/27/2020, 01/01/2021, 01/26/2021      Health Maintenance   Topic Date Due    Hemoglobin A1c  12/12/2024    Mammogram  12/28/2024    Lipid Panel  01/11/2025    Foot Exam  03/11/2025    Eye Exam  03/27/2025    TETANUS VACCINE  09/23/2026    Colorectal Cancer Screening  07/18/2029    Hepatitis C Screening  Completed    Shingles Vaccine  Completed        Physical Exam      Vital Signs  Pulse: 91  SpO2: 99 %  BP: 118/76  Pain Score: 0-No pain  Height and Weight  Height: 5' 2" (157.5 cm)  Weight: 63.6 kg (140 lb 3.4 oz)  BSA (Calculated - sq m): 1.67 sq meters  BMI (Calculated): 25.6  Weight in (lb) to have BMI = 25: 136.4]    Physical Exam  Constitutional:       Appearance: She is well-developed.   HENT:      Head: Normocephalic and atraumatic.      Right Ear: Tympanic membrane normal.      Left Ear: Tympanic membrane normal.      Nose: Nose normal.      Mouth/Throat:      Mouth: Mucous membranes are moist.   Eyes:      Pupils: Pupils are equal, round, and reactive to light.   Cardiovascular:      Rate and Rhythm: Normal rate and regular rhythm.      Pulses: Normal pulses.      Heart sounds: Normal heart sounds. No murmur heard.  Pulmonary:      Effort: Pulmonary effort is normal. No respiratory distress.      Breath sounds: Normal breath " sounds.   Abdominal:      General: There is no distension.      Palpations: Abdomen is soft.      Tenderness: There is no abdominal tenderness. There is no guarding.   Musculoskeletal:         General: Normal range of motion.      Cervical back: Normal range of motion.   Skin:     General: Skin is warm and dry.   Neurological:      General: No focal deficit present.      Mental Status: She is alert. Mental status is at baseline.   Psychiatric:         Behavior: Behavior normal.          Laboratory:  CBC:  Recent Labs   Lab 02/15/23  0451 03/14/23  0726 04/18/23  0813   WBC 4.97 6.82 5.61   RBC 3.94 L 4.09 4.14   Hemoglobin 12.0 12.6 12.8   Hematocrit 38.2 38.6 37.7   Platelets 325 229 229   MCV 97 94 91   MCH 30.5 30.8 30.9   MCHC 31.4 L 32.6 34.0     CMP:  Recent Labs   Lab 07/06/23  0748 10/23/23  0800 01/08/24  0758 04/17/24  0759 06/12/24  0740   Glucose 134 H  --   --   --   --    Calcium 9.0  --   --   --   --    Albumin 3.9   < > 4.3 3.4 L 3.6   Total Protein 7.3   < > 7.4 6.3 6.7   Sodium 137  --   --   --   --    Potassium 4.6  --   --   --   --    CO2 25  --   --   --   --    Chloride 104  --   --   --   --    BUN 13  --   --   --   --    Alkaline Phosphatase 108   < > 117 106 102   ALT 45 H   < > 50 H 35 38   AST 39   < > 38 28 31   Total Bilirubin 0.6   < > 0.6 0.5 0.4    < > = values in this interval not displayed.     URINALYSIS:  Recent Labs   Lab 01/27/23  1118 03/16/23  1516   Color, UA Yellow Yellow   Specific Gravity, UA 1.020 >=1.030 A   pH, UA 6.0 6.0   Protein, UA Negative 2+ A   Bacteria Rare Few A   Nitrite, UA Negative Positive A   Leukocytes, UA Negative 1+ A   Urobilinogen, UA  --  Negative   Hyaline Casts, UA 1 0      LIPIDS:  Recent Labs   Lab 09/20/22  0720 01/27/23  0949 03/14/23  0726 09/19/23  1636   TSH 3.630 0.972  --  2.936   HDL 74 82 H 64  --    Cholesterol 176 181 161  --    Triglycerides 61 49 112  --    LDL Cholesterol 89.8 89.2 74.6  --    HDL/Cholesterol Ratio 42.0 45.3  39.8  --    Non-HDL Cholesterol 102 99 97  --    Total Cholesterol/HDL Ratio 2.4 2.2 2.5  --      TSH:  Recent Labs   Lab 09/20/22  0720 01/27/23  0949 09/19/23  1636   TSH 3.630 0.972 2.936     A1C:  Recent Labs   Lab 09/24/21  0733 01/11/22  0713 04/14/22  0720 09/20/22  0720 01/27/23  1531 03/14/23  0726 03/27/23  1345 11/07/23  0938 03/04/24  0740 06/12/24  0740   Hemoglobin A1C 6.4 H 7.0 H 7.2 H 6.6 H 6.5 H 6.4 H 6.6 H 6.3 H 6.3 H 6.6 H       Assessment/Plan     Leida Hoyos is a 54 y.o.female with:    1. 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 Continue current meds.  Continue follow up with specialists.     2. Hypertension associated with diabetes  Stable. Continue current meds.      3. Unspecified sequelae of nontraumatic subarachnoid hemorrhage  Stable. Continue to monitor     4. Cognitive communication deficit  Resolved.     5. History of subarachnoid hemorrhage  Stable. Continue to monitor.     6. Autoimmune hepatitis  Stable. Continue follow up with Hepatology     7. Hyperparathyroidism  Stable. Continue to monitor labs.     8. Long term current use of insulin  Stable. Continue current meds.      9. Hyperlipidemia due to type 1 diabetes mellitus  Stable. Continue current meds.         Chronic conditions status updated as per HPI.  Other than changes above, cont current medications and maintain follow up with specialists.  No follow-ups on file.    Future Appointments   Date Time Provider Department Center   9/11/2024  8:00 AM PUMP CLINIC, DIPP [facility] ENDODIA [facility] mirian   10/21/2024  7:40 AM LAB, [facility] [place]   1/30/2025  8:15 AM Frieda Mera MD [facility] PRICRE [place]   4/14/2025  7:40 AM LAB, [facility] [place]   4/23/2025  8:45 AM [facility] HEPATOLOGY, FIBROSCAN [facility] HEPAT [facility]   4/23/2025  9:00 AM Janine Desai NP [facility] HEPAT [facility], NADIAP  [facility] [place]

## 2024-10-22 NOTE — PROGRESS NOTES
"Subjective:      Patient ID: Leida Hoyos is a 54 y.o. female.    Chief Complaint:  Diabetes      History of Present Illness  Endocrinology Return Visit     54 y.o. female with CHEMA managed as T1DM diagnosed at age 47.  History of subarachnoid hemorrhage in 1/2023     DM 1  LOV 3/11/2024  70% TIR, 100% in automated mode, GMI 7.1%    Interval History  Doing generally well.   OMNIPOD 5 system doing generally well   Meal time excursions with A1C of 6.3%, due to late bolusing or missed bolusing (snacks)  Underestimating carbs for certain meals    Current Diabetes Regimen  Basal  12:00 AM 0.6 u/hr    ICR  12:00 AM 10 g/u  11:00 AM 7.5 g/u  5:00 PM 8 g/u    ISF  12:00 AM 50 mg/dL  10:00 PM 60 mg/dL    Target:  120 mg/dL    Glucose Monitoring:  Meter/CGM: G6              Last Hgb A1C:  Lab Results   Component Value Date    HGBA1C 6.7 (H) 10/21/2024       Hypoglycemic Episodes:  denies    DKA: denies     Screening / DM Complications:  Nephropathy:   Lab Results   Component Value Date    MICALBCREAT 13.0 03/04/2024       Last Lipid Panel:  Lab Results   Component Value Date    LDLCALC 74.6 03/14/2023       TSH:   Lab Results   Component Value Date    TSH 2.936 09/19/2023       Neuropathy: denies   Diabetic Retinopathy:  upcoming appt 3/2024, no laser surgery or DR  CAD/MI: denies   Stroke: see HPI    Diet/Exercise:  No major changes  Walking five times weekly     Has fibroid   Still has menstrual cycles but not regular. Heavy.      MEDICATIONS:    ALLERGIES:  Review of patient's allergies indicates:   Allergen Reactions    Losartan      Possible drug induced liver inflammation        Review of Systems  Denies recent illness     Objective:       Vitals:    10/23/24 0908   BP: 132/84   BP Location: Right arm   Patient Position: Sitting   Pulse: 91   SpO2: 99%   Weight: 64.9 kg (143 lb 1.3 oz)   Height: 5' 2" (1.575 m)         BP Readings from Last 3 Encounters:   10/23/24 132/84   07/30/24 118/76   03/11/24 130/82 "     Wt Readings from Last 1 Encounters:   10/23/24 0908 64.9 kg (143 lb 1.3 oz)         Body mass index is 26.17 kg/m².    Lab Review:   Lab Results   Component Value Date    HGBA1C 6.7 (H) 10/21/2024     Lab Results   Component Value Date    CHOL 161 03/14/2023    HDL 64 03/14/2023    LDLCALC 74.6 03/14/2023    TRIG 112 03/14/2023    CHOLHDL 39.8 03/14/2023     Lab Results   Component Value Date     07/06/2023    K 4.6 07/06/2023     07/06/2023    CO2 25 07/06/2023     (H) 07/06/2023    BUN 13 07/06/2023    CREATININE 0.67 07/06/2023    CALCIUM 9.0 07/06/2023    PROT 7.1 10/21/2024    ALBUMIN 3.6 10/21/2024    BILITOT 0.5 10/21/2024    ALKPHOS 109 10/21/2024    AST 35 10/21/2024    ALT 47 (H) 10/21/2024    ANIONGAP 8 07/06/2023    ESTGFRAFRICA >60.0 04/14/2022    EGFRNONAA >60.0 04/14/2022    TSH 2.936 09/19/2023         Assessment and Plan     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), managed as type 1  CHEMA patient with hemorrhagic stroke in 1/2023  Currently on omnipod 5 with Dexcom G6 sensor   Currently on humalog insulin   CGM and pump download reviewed and significant for:  Target range has remained at 70%  Very high improved from 10% to 8%  TAR: 29%  Low blood sugars: 0%    Better with bolusing with snacks and also trying not to snack in the evenings.   Administer insulin before meals, carb counting for meals at dinner/lunch (lean cuisine etc)    The patient:  - Has been seen in clinic within the last 6 months  - Has CHEMA managed as type 1 diabetes  - Performs frequent blood glucose testing with sensor, calibrations and confirmatory testing when low  - Requires frequent adjustments to their treatment regimen based on BGM or CGM testing results        Vitamin D deficiency  increased dose to 2000 IUs daily   MVI daily     Insulin pump status  Pump backup plan  If the insulin pump is non functional and discontinued for anticipated more than 20 hours, please give daily injections of:  Long  acting insulin 17  units daily  Short acting insulin Breakfast 1:10, lunch 1:7.5, and dinner 1:8 for meals according to carb ratios and sensitivity factor 50 during the day.   When the insulin pump is restarted, do not restart basal rates until at least 22 hours after the last long acting insulin injection. You can set a 0% temporary basal setting that will last until this time and use your pump to bolus for meals and correction.    For any technical insulin pump issues, please contact the insulin pump company; the toll free number is printed on the label on the back of the insulin pump.

## 2024-10-23 ENCOUNTER — OFFICE VISIT (OUTPATIENT)
Dept: ENDOCRINOLOGY | Facility: CLINIC | Age: 54
End: 2024-10-23
Payer: COMMERCIAL

## 2024-10-23 VITALS
OXYGEN SATURATION: 99 % | BODY MASS INDEX: 26.33 KG/M2 | HEIGHT: 62 IN | SYSTOLIC BLOOD PRESSURE: 132 MMHG | WEIGHT: 143.06 LBS | DIASTOLIC BLOOD PRESSURE: 84 MMHG | HEART RATE: 91 BPM

## 2024-10-23 DIAGNOSIS — E55.9 VITAMIN D DEFICIENCY: ICD-10-CM

## 2024-10-23 DIAGNOSIS — E13.9 LADA (LATENT AUTOIMMUNE DIABETES IN ADULTS), MANAGED AS TYPE 1: Primary | ICD-10-CM

## 2024-10-23 DIAGNOSIS — Z96.41 INSULIN PUMP STATUS: ICD-10-CM

## 2024-10-23 PROCEDURE — 99999 PR PBB SHADOW E&M-EST. PATIENT-LVL IV: CPT | Mod: PBBFAC,,, | Performed by: INTERNAL MEDICINE

## 2024-10-23 RX ORDER — BLOOD-GLUCOSE SENSOR
EACH MISCELLANEOUS
Qty: 9 EACH | Refills: 3 | Status: SHIPPED | OUTPATIENT
Start: 2024-10-23

## 2024-10-23 RX ORDER — INSULIN PMP CART,AUT,G6/7,CNTR
1 EACH SUBCUTANEOUS
Qty: 30 EACH | Refills: 3 | Status: SHIPPED | OUTPATIENT
Start: 2024-10-23

## 2024-10-23 RX ORDER — INSULIN LISPRO 100 [IU]/ML
INJECTION, SOLUTION INTRAVENOUS; SUBCUTANEOUS
Qty: 40 ML | Refills: 3 | Status: SHIPPED | OUTPATIENT
Start: 2024-10-23

## 2024-10-23 RX ORDER — INSULIN GLARGINE 100 [IU]/ML
17 INJECTION, SOLUTION SUBCUTANEOUS NIGHTLY
Qty: 15 ML | Refills: 1 | Status: SHIPPED | OUTPATIENT
Start: 2024-10-23 | End: 2025-10-23

## 2024-10-23 RX ORDER — BLOOD-GLUCOSE TRANSMITTER
EACH MISCELLANEOUS
Qty: 1 EACH | Refills: 3 | Status: SHIPPED | OUTPATIENT
Start: 2024-10-23

## 2024-10-23 NOTE — PATIENT INSTRUCTIONS
Consider downloading [employer] lynn    Pump backup plan  If the insulin pump is non functional and discontinued for anticipated more than 20 hours, please give daily injections of:  Long acting insulin 17 units daily  Short acting insulin humalog for meals according to carb ratios and sensitivity factor in the pump.    When the insulin pump is restarted, do not restart basal rates until at least 22 hours after the last long acting insulin injection. You can set a 0% temporary basal setting that will last until this time and use your pump to bolus for meals and correction.    For any technical insulin pump issues, please contact the insulin pump company; the toll free number is printed on the label on the back of the insulin pump.

## 2024-10-23 NOTE — ASSESSMENT & PLAN NOTE
Pump backup plan  If the insulin pump is non functional and discontinued for anticipated more than 20 hours, please give daily injections of:  Long acting insulin 17  units daily  Short acting insulin Breakfast 1:10, lunch 1:7.5, and dinner 1:8 for meals according to carb ratios and sensitivity factor 50 during the day.   When the insulin pump is restarted, do not restart basal rates until at least 22 hours after the last long acting insulin injection. You can set a 0% temporary basal setting that will last until this time and use your pump to bolus for meals and correction.    For any technical insulin pump issues, please contact the insulin pump company; the toll free number is printed on the label on the back of the insulin pump.

## 2024-10-23 NOTE — ASSESSMENT & PLAN NOTE
CHEMA patient with hemorrhagic stroke in 1/2023  Currently on omnipod 5 with Dexcom G6 sensor   Currently on humalog insulin   CGM and pump download reviewed and significant for:  Target range has remained at 70%  Very high improved from 10% to 8%  TAR: 29%  Low blood sugars: 0%    Better with bolusing with snacks and also trying not to snack in the evenings.   Administer insulin before meals, carb counting for meals at dinner/lunch (lean cuisine etc)    The patient:  - Has been seen in clinic within the last 6 months  - Has CHEMA managed as type 1 diabetes  - Performs frequent blood glucose testing with sensor, calibrations and confirmatory testing when low  - Requires frequent adjustments to their treatment regimen based on BGM or CGM testing results

## 2024-11-08 ENCOUNTER — OFFICE VISIT (OUTPATIENT)
Dept: OBSTETRICS AND GYNECOLOGY | Facility: CLINIC | Age: 54
End: 2024-11-08
Payer: COMMERCIAL

## 2024-11-08 VITALS
BODY MASS INDEX: 25.92 KG/M2 | HEIGHT: 62 IN | WEIGHT: 140.88 LBS | SYSTOLIC BLOOD PRESSURE: 128 MMHG | DIASTOLIC BLOOD PRESSURE: 92 MMHG

## 2024-11-08 DIAGNOSIS — N92.0 MENORRHAGIA WITH REGULAR CYCLE: ICD-10-CM

## 2024-11-08 DIAGNOSIS — Z01.419 WOMEN'S ANNUAL ROUTINE GYNECOLOGICAL EXAMINATION: Primary | ICD-10-CM

## 2024-11-08 DIAGNOSIS — Z12.31 ENCOUNTER FOR SCREENING MAMMOGRAM FOR BREAST CANCER: ICD-10-CM

## 2024-11-08 DIAGNOSIS — Z01.818 PRE-PROCEDURAL EXAMINATION: ICD-10-CM

## 2024-11-08 PROCEDURE — 99999 PR PBB SHADOW E&M-EST. PATIENT-LVL III: CPT | Mod: PBBFAC,,, | Performed by: NURSE PRACTITIONER

## 2024-11-08 RX ORDER — MISOPROSTOL 200 UG/1
200 TABLET ORAL ONCE
Qty: 2 TABLET | Refills: 0 | Status: SHIPPED | OUTPATIENT
Start: 2024-11-08 | End: 2024-11-08

## 2024-11-08 NOTE — PROGRESS NOTES
CC: Annual  HPI: Pt is a 54 y.o.  female who presents for routine annual exam. She still works in [employer]. Her triplets all living together in [facility]. She is still having cycles at least every 9 weeks. Reports heavy flow and pain for 3 days. She is interested in surgical management with endometrial ablation. She does not want STD screening.    The patient participates in regular exercise: yes.  The patient does not smoke.    Pt denies any domestic violence. MMG due 12/24. Colonscopy due 2029.  She is s/p stroke with brain bleed.            ROS:  GENERAL: Feeling well overall. Denies fever or chills.   SKIN: Denies rash or lesions.   HEAD: Denies head injury or headache.   NODES: Denies enlarged lymph nodes.   CHEST: Denies chest pain or shortness of breath.   CARDIOVASCULAR: Denies palpitations or left sided chest pain.   ABDOMEN: No abdominal pain, constipation, diarrhea, nausea, vomiting or rectal bleeding.   URINARY: No dysuria, hematuria, or burning on urination.  REPRODUCTIVE: See HPI.   BREASTS: Denies pain, lumps, or nipple discharge.   HEMATOLOGIC: No easy bruisability or excessive bleeding.   MUSCULOSKELETAL: Denies joint pain or swelling.   NEUROLOGIC: Denies syncope or weakness.   PSYCHIATRIC: Denies depression, anxiety or mood swings.    PE:   APPEARANCE: Well nourished, well developed, White female in no acute distress.  NODES: no cervical, supraclavicular, or inguinal lymphadenopathy  BREASTS: Symmetrical, no skin changes or visible lesions. No palpable masses, nipple discharge or adenopathy bilaterally.  ABDOMEN: Soft. No tenderness or masses. No distention. No hernias palpated. No CVA tenderness.  VULVA: No lesions. Normal external female genitalia.  URETHRAL MEATUS: Normal size and location, no lesions, no prolapse.  URETHRA: No masses, tenderness, or prolapse.  VAGINA: Moist. No lesions or lacerations noted. No abnormal discharge present. No odor present.   CERVIX: No lesions or discharge.  No cervical motion tenderness.   UTERUS: Normal size, regular shape, mobile, non-tender.  ADNEXA: No tenderness. No fullness or masses palpated in the adnexal regions.   ANUS PERINEUM: Normal.      Diagnosis:  1. Women's annual routine gynecological examination    2. Encounter for screening mammogram for breast cancer    3. Menorrhagia with regular cycle    4. Pre-procedural examination        Plan:   Pap not indicated- last pap 9/23 WNL/ HPV neg  MMG   Discussed will schedule EMBX with MD for eval of cycles and can discuss options for surgical interventions for heavy cycles   Cytotec sent to dilate cervix pre procedure       Orders Placed This Encounter    Mammo Digital Screening Bilat w/ Coy    miSOPROStoL (CYTOTEC) 200 MCG Tab       Patient was counseled today on the new ACS guidelines for cervical cytology screening as well as the current recommendations for breast cancer screening. She was counseled to follow up with her PCP for other routine health maintenance. Counseling session lasted approximately 10 minutes, and all her questions were answered.    Follow-up with me in 1 year for routine exam    JEFF Stone

## 2024-12-03 ENCOUNTER — PROCEDURE VISIT (OUTPATIENT)
Dept: OBSTETRICS AND GYNECOLOGY | Facility: CLINIC | Age: 54
End: 2024-12-03
Attending: OBSTETRICS & GYNECOLOGY
Payer: COMMERCIAL

## 2024-12-03 VITALS
WEIGHT: 142.44 LBS | BODY MASS INDEX: 26.21 KG/M2 | SYSTOLIC BLOOD PRESSURE: 130 MMHG | DIASTOLIC BLOOD PRESSURE: 80 MMHG | HEIGHT: 62 IN

## 2024-12-03 DIAGNOSIS — Z01.812 PRE-PROCEDURE LAB EXAM: ICD-10-CM

## 2024-12-03 DIAGNOSIS — N93.9 ABNORMAL UTERINE BLEEDING (AUB): Primary | ICD-10-CM

## 2024-12-03 DIAGNOSIS — D21.9 FIBROID: ICD-10-CM

## 2024-12-03 LAB
B-HCG UR QL: NEGATIVE
CTP QC/QA: YES

## 2024-12-03 PROCEDURE — 58100 BIOPSY OF UTERUS LINING: CPT | Mod: S$GLB,,, | Performed by: OBSTETRICS & GYNECOLOGY

## 2024-12-03 PROCEDURE — 88305 TISSUE EXAM BY PATHOLOGIST: CPT | Performed by: PATHOLOGY

## 2024-12-03 PROCEDURE — 81025 URINE PREGNANCY TEST: CPT | Mod: S$GLB,,, | Performed by: OBSTETRICS & GYNECOLOGY

## 2024-12-03 NOTE — PROCEDURES
CC: ENDOMETRIAL BIOPSPY    Leida Hoyos is a 54 y.o. female  presents for an endometrial biopsy, referred by Radha Craven, secondary to abnormal bleeding.  She has been off of OCPs for about 8 years.  She finds that her periods have become progressively heavier with more cramping.  Now, menses occur every 1-2 months, lasting 8-9 days in duration with 4 days of very heavy flow. At times, she requires double protection with pads and tampons, changing every 2 hours.  Pelvic ultrasound 2023 revealed a 2.9 cm uterine fibroid.    UPT is negative    23 Pelvic sono:  FINDINGS:  Uterus:  Size: 8.2 x 4.1 x 6.2 cm  Masses: 2.9 cm posterior fundus fibroid with submucosal component suspected.  Endometrium: Normal in this pre menopausal patient, measuring 7 mm.  Right ovary:  Not visualized  Left ovary:  Size: 3 x 2.5 x 3 cm  Appearance: 2.6 cm simple dominant follicle.  Vascular Flow: Normal.  Free Fluid:  None.  Impression:  Uterine fibroid    PRE ENDOMETRIAL BIOPSY COUNSELING:  The patient was informed of the risk of bleeding, infection, uterine perforation and pain and that the test will rule-out endometrial cancer with accuracy greater than 95%. She was counseled on the alternatives to endometrial biopsy and agrees to proceed.    TIME OUT PERFORMED.  The cervix was visualized with a speculum and prepped with betadine  Cervix gently dilated with blue os finder  A sterile endometrial pipelle was passed without difficulty to a depth of  7.5  cm.  Endometrial tissue was obtained.  The specimen was placed in formalyn and sent to Pathology for histology evaluation. The patient tolerated the procedure well.    ASSESSMENT and PLAN    1. Abnormal uterine bleeding (AUB)    2. Fibroid    3. Pre-procedure lab exam          POST ENDOMETRIAL BIOPSY COUNSELING:  Manage post biopsy cramping with NSAIDs or Tylenol.  Expect spotting or light bleeding for a few days.  Report bleeding heavier than a period, fever >  101.0 F, worsening pain or a foul smelling vaginal discharge.    Patient was counseled today on hypermenorrhea and fibroids.  We reviewed the various treatment options:  1) no treatment  2) medical treatment (POPs, Provera, Depo-Provera,  Mirena IUD, Lysteda)  3) Surgical treatment: D&C, hysteroscopy, endometrial ablation, Acessa, myomectomy, hysterectomy.      FOLLOW-UP: Pending biopsy results.  We will contact her to discuss biopsy results and treatment plan.  She will need a follow-up ultrasound for evaluation of the fibroid.

## 2024-12-06 ENCOUNTER — TELEPHONE (OUTPATIENT)
Dept: OBSTETRICS AND GYNECOLOGY | Facility: CLINIC | Age: 54
End: 2024-12-06
Payer: COMMERCIAL

## 2024-12-06 LAB
FINAL PATHOLOGIC DIAGNOSIS: NORMAL
GROSS: NORMAL
Lab: NORMAL

## 2024-12-12 ENCOUNTER — TELEPHONE (OUTPATIENT)
Dept: OBSTETRICS AND GYNECOLOGY | Facility: CLINIC | Age: 54
End: 2024-12-12
Payer: COMMERCIAL

## 2024-12-12 ENCOUNTER — PATIENT MESSAGE (OUTPATIENT)
Dept: OBSTETRICS AND GYNECOLOGY | Facility: CLINIC | Age: 54
End: 2024-12-12
Payer: COMMERCIAL

## 2024-12-12 RX ORDER — TRANEXAMIC ACID 650 MG/1
1300 TABLET ORAL 3 TIMES DAILY
Qty: 30 TABLET | Refills: 4 | Status: SHIPPED | OUTPATIENT
Start: 2024-12-12 | End: 2024-12-17

## 2024-12-12 NOTE — TELEPHONE ENCOUNTER
Dr Cortez will resend it today. Shaneka  ===View-only below this line===      ----- Message -----       From:Leida Hoyos       Sent:12/12/2024  4:13 PM CST         To:Dipesh Cortez MD    Subject:Medication    At my recent appointment, we discussed whether I should have a ablation, hysterectomy or try medication to reduce my flow.  I decided to try medication first and you were going to call in prescription.  I went to the [facility] (which is in my profile) and they did not receive it.  Can you please check on this as I am anxious to have this prescription in hand before my next cycle starts.  Of course, I have no idea when that will be as I am so irregular.

## 2024-12-12 NOTE — TELEPHONE ENCOUNTER
Called patient:    Discussed results of EMBX:    Scant fragments of inactive endometrium admixed with blood clot, no atypical hyperplasia or malignancy identified this tissue sample.     We reviewed management options for her abnormal bleeding: medical vs surgical.    She would like a trial of Lysteda.  We discussed Lysteda: r / b / correct usage.    To let us know her progress after several months.

## 2025-01-09 ENCOUNTER — CLINICAL SUPPORT (OUTPATIENT)
Dept: OTHER | Facility: CLINIC | Age: 55
End: 2025-01-09

## 2025-01-09 DIAGNOSIS — Z00.8 ENCOUNTER FOR OTHER GENERAL EXAMINATION: ICD-10-CM

## 2025-01-11 VITALS
WEIGHT: 140 LBS | SYSTOLIC BLOOD PRESSURE: 137 MMHG | DIASTOLIC BLOOD PRESSURE: 82 MMHG | BODY MASS INDEX: 26.43 KG/M2 | HEIGHT: 61 IN

## 2025-01-11 LAB
HBA1C MFR BLD: 6.1 %
HDLC SERPL-MCNC: 82 MG/DL
POC CHOLESTEROL, LDL (DOCK): 77 MG/DL
POC CHOLESTEROL, TOTAL: 170 MG/DL
POC GLUCOSE, FASTING: 115 MG/DL (ref 60–110)
TRIGL SERPL-MCNC: 53 MG/DL

## 2025-01-24 ENCOUNTER — PATIENT MESSAGE (OUTPATIENT)
Dept: PRIMARY CARE CLINIC | Facility: CLINIC | Age: 55
End: 2025-01-24
Payer: COMMERCIAL

## 2025-01-30 ENCOUNTER — OFFICE VISIT (OUTPATIENT)
Dept: PRIMARY CARE CLINIC | Facility: CLINIC | Age: 55
End: 2025-01-30
Payer: COMMERCIAL

## 2025-01-30 VITALS
WEIGHT: 140.19 LBS | HEIGHT: 61 IN | HEART RATE: 81 BPM | BODY MASS INDEX: 26.47 KG/M2 | OXYGEN SATURATION: 99 % | DIASTOLIC BLOOD PRESSURE: 80 MMHG | SYSTOLIC BLOOD PRESSURE: 136 MMHG

## 2025-01-30 DIAGNOSIS — E13.9 LADA (LATENT AUTOIMMUNE DIABETES IN ADULTS), MANAGED AS TYPE 1: ICD-10-CM

## 2025-01-30 DIAGNOSIS — Z96.41 INSULIN PUMP STATUS: ICD-10-CM

## 2025-01-30 DIAGNOSIS — R74.8 ELEVATED LIVER ENZYMES: ICD-10-CM

## 2025-01-30 DIAGNOSIS — E11.59 HYPERTENSION ASSOCIATED WITH DIABETES: ICD-10-CM

## 2025-01-30 DIAGNOSIS — Z00.00 ANNUAL PHYSICAL EXAM: Primary | ICD-10-CM

## 2025-01-30 DIAGNOSIS — Z86.79 HISTORY OF SUBARACHNOID HEMORRHAGE: ICD-10-CM

## 2025-01-30 DIAGNOSIS — Z79.4 LONG TERM CURRENT USE OF INSULIN: ICD-10-CM

## 2025-01-30 DIAGNOSIS — E10.69 HYPERLIPIDEMIA DUE TO TYPE 1 DIABETES MELLITUS: ICD-10-CM

## 2025-01-30 DIAGNOSIS — I15.2 HYPERTENSION ASSOCIATED WITH DIABETES: ICD-10-CM

## 2025-01-30 DIAGNOSIS — R41.841 COGNITIVE COMMUNICATION DEFICIT: ICD-10-CM

## 2025-01-30 DIAGNOSIS — E78.5 HYPERLIPIDEMIA DUE TO TYPE 1 DIABETES MELLITUS: ICD-10-CM

## 2025-01-30 PROBLEM — K75.4 AUTOIMMUNE HEPATITIS: Status: ACTIVE | Noted: 2025-01-30

## 2025-01-30 PROCEDURE — 3008F BODY MASS INDEX DOCD: CPT | Mod: CPTII,S$GLB,, | Performed by: INTERNAL MEDICINE

## 2025-01-30 PROCEDURE — 3044F HG A1C LEVEL LT 7.0%: CPT | Mod: CPTII,S$GLB,, | Performed by: INTERNAL MEDICINE

## 2025-01-30 PROCEDURE — 99999 PR PBB SHADOW E&M-EST. PATIENT-LVL III: CPT | Mod: PBBFAC,,, | Performed by: INTERNAL MEDICINE

## 2025-01-30 PROCEDURE — 3075F SYST BP GE 130 - 139MM HG: CPT | Mod: CPTII,S$GLB,, | Performed by: INTERNAL MEDICINE

## 2025-01-30 PROCEDURE — 3079F DIAST BP 80-89 MM HG: CPT | Mod: CPTII,S$GLB,, | Performed by: INTERNAL MEDICINE

## 2025-01-30 PROCEDURE — 3072F LOW RISK FOR RETINOPATHY: CPT | Mod: CPTII,S$GLB,, | Performed by: INTERNAL MEDICINE

## 2025-01-30 PROCEDURE — 99396 PREV VISIT EST AGE 40-64: CPT | Mod: S$GLB,,, | Performed by: INTERNAL MEDICINE

## 2025-01-30 PROCEDURE — 1159F MED LIST DOCD IN RCRD: CPT | Mod: CPTII,S$GLB,, | Performed by: INTERNAL MEDICINE

## 2025-01-30 RX ORDER — PRAVASTATIN SODIUM 10 MG/1
10 TABLET ORAL DAILY
Qty: 90 TABLET | Refills: 3 | Status: SHIPPED | OUTPATIENT
Start: 2025-01-30 | End: 2026-01-30

## 2025-01-30 NOTE — ASSESSMENT & PLAN NOTE
Finished with ST, 2/2 SAH.  No issues with speech, memory is fine. Writes everything down out of caution.

## 2025-01-30 NOTE — ASSESSMENT & PLAN NOTE
Dx'ed in 2013.  Sees Dr. Kurtz, A1C 6.7 in 10/2024..  On insulin pump.   Still exercises 4-5 times per week.

## 2025-01-30 NOTE — PROGRESS NOTES
[facility] Note    Chief Complaint      Chief Complaint   Patient presents with    Follow-up     History of Present Illness      Leida Hoyos is a 54 y.o. female who presents today for Annual preventative visit.  Patient comes to appointment with spouse.  GYN: Laurie Menjivar, Endo: Dr. Kurtz, GI: Dr. Russell, Optho: Dr. Ferguson, Derm: Paddock    Problem List Items Addressed This Visit       Cognitive communication deficit    Current Assessment & Plan     Finished with ST, 2/2 SAH.  No issues with speech, memory is fine. Writes everything down out of caution.         Elevated liver enzymes    Current Assessment & Plan     No evidence of AIH on previous biopsy, questionable dx. Seeing hepatology NP.         History of subarachnoid hemorrhage    Current Assessment & Plan     Admitted 1/27/23 at [facility] with SAH, required EVD.  No aneurysm.    Back at work.         Hypertension associated with diabetes    Current Assessment & Plan     BP controlled on no meds.  No CP/SOB/HA.         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  Sees Dr. Kurtz, A1C 6.7 in 10/2024..  On insulin pump.   Still exercises 4-5 times per week.         Long term current use of insulin     Other Visit Diagnoses       Annual physical exam    -  Primary    Hyperlipidemia due to type 1 diabetes mellitus        Relevant Medications    pravastatin (PRAVACHOL) 10 MG tablet                    Health Maintenance   Topic Date Due    Pneumococcal Vaccines (Age 50+) (3 of 3 - PCV20 or PCV21) 02/20/2023    COVID-19 Vaccine (6 - 2024-25 season) 09/01/2024    Diabetic Eye Exam  03/27/2025    Diabetes Urine Screening  03/04/2025    Hemoglobin A1c  07/09/2025    Mammogram  12/30/2025    Lipid Panel  01/09/2026    Foot Exam  01/30/2026    TETANUS VACCINE  09/23/2026    Cervical Cancer Screening  09/19/2028    Colorectal Cancer Screening  07/18/2029     RSV Vaccine (Age 60+ and Pregnant patients) (1 - 1-dose 75+ series) 2044    Hepatitis C Screening  Completed    Shingles Vaccine  Completed    Influenza Vaccine  Completed    HIV Screening  Completed       Past Medical History:   Diagnosis Date    Brain bleed 2023    Hypertension     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), managed as type 1        Past Surgical History:   Procedure Laterality Date    CEREBRAL ANGIOGRAM N/A 2023    Procedure: ANGIOGRAM-CEREBRAL;  Surgeon: Yue Surgeon;  Location: [facility];  Service: Anesthesiology;  Laterality: N/A;     SECTION, CLASSIC      COLONOSCOPY N/A 2019    Procedure: COLONOSCOPY;  Surgeon: Dipesh Victoria MD;  Location: [facility] ([address] [place] [address]);  Service: Endoscopy;  Laterality: N/A;    FRACTURE SURGERY Left     leg     MOUTH SURGERY         family history includes Alcohol abuse in her maternal grandfather and maternal grandmother; Arthritis in her mother; Asthma in her father; Diabetes in her maternal grandfather and paternal grandfather; Heart attack (age of onset: 60) in her father; Heart disease in her father; Hyperlipidemia in her father; Hypertension in her father and sister; Learning disabilities in her daughter and son; No Known Problems in her daughter, sister, and son; Ulcerative colitis in her mother.    Social History     Tobacco Use    Smoking status: Never    Smokeless tobacco: Never   Substance Use Topics    Alcohol use: Yes     Alcohol/week: 2.0 standard drinks of alcohol     Types: 2 Glasses of wine per week     Comment: 3 times per month    Drug use: Never       Review of Systems   Constitutional:  Negative for chills and fever.   Respiratory:  Negative for cough and shortness of breath.    Cardiovascular:  Negative for chest pain and palpitations.   Gastrointestinal:  Negative for constipation, diarrhea, nausea and vomiting.   Genitourinary:  Negative for dysuria and hematuria.   Musculoskeletal:  Negative for  "falls.   Neurological:  Negative for headaches.        Outpatient Encounter Medications as of 1/30/2025   Medication Sig Dispense Refill    blood-glucose sensor (DEXCOM G6 SENSOR) Melissa For daily blood glucose monitoring to use with transmitter 9 each 3    blood-glucose transmitter (DEXCOM G6 TRANSMITTER) Melissa For daily use with dexcom sensor and omnipod insulin pump 1 each 3    cholecalciferol, vitamin D3, (VITAMIN D3) 1,000 unit capsule Take 1 capsule (1,000 Units total) by mouth once daily.  0    insulin lispro 100 unit/mL injection For use with insulin pump, TDD 45 units 40 mL 3    insulin pump cart,automated,BT (OMNIPOD 5 G6 PODS, GEN 5,) Crtg Inject 1 Cartridge into the skin every 72 hours. 30 each 3    multivitamin (THERAGRAN) per tablet Take 1 tablet by mouth once daily.      [DISCONTINUED] pravastatin (PRAVACHOL) 10 MG tablet Take 1 tablet (10 mg total) by mouth once daily. 90 tablet 3    pravastatin (PRAVACHOL) 10 MG tablet Take 1 tablet (10 mg total) by mouth once daily. 90 tablet 3    [DISCONTINUED] miSOPROStoL (CYTOTEC) 200 MCG Tab Take 1 tablet (200 mcg total) by mouth once. Take 1 tab the PM before the procedure, then take 1 tab the AM of the procedure for 1 dose 2 tablet 0     No facility-administered encounter medications on file as of 1/30/2025.        Review of patient's allergies indicates:   Allergen Reactions    Losartan      Possible drug induced liver inflammation        Physical Exam      Vital Signs  Pulse: 81  SpO2: 99 %  BP: 136/80  Pain Score: 0-No pain  Height and Weight  Height: 5' 1" (154.9 cm)  Weight: 63.6 kg (140 lb 3.4 oz)  BSA (Calculated - sq m): 1.65 sq meters  BMI (Calculated): 26.5  Weight in (lb) to have BMI = 25: 132]    Physical Exam  Constitutional:       Appearance: She is well-developed.   HENT:      Head: Normocephalic and atraumatic.   Cardiovascular:      Rate and Rhythm: Normal rate and regular rhythm.      Heart sounds: Normal heart sounds. No murmur " "heard.  Pulmonary:      Effort: Pulmonary effort is normal. No respiratory distress.      Breath sounds: Normal breath sounds.   Abdominal:      General: There is no distension.      Palpations: Abdomen is soft.      Tenderness: There is no abdominal tenderness. There is no guarding.   Skin:     General: Skin is warm and dry.   Neurological:      Mental Status: She is alert. Mental status is at baseline.   Psychiatric:         Behavior: Behavior normal.          Laboratory:  CBC:  No results for input(s): "WBC", "RBC", "HGB", "HCT", "PLT", "MCV", "MCH", "MCHC" in the last 2160 hours.    CMP:  No results for input(s): "GLU", "CALCIUM", "ALBUMIN", "PROT", "NA", "K", "CO2", "CL", "BUN", "ALKPHOS", "ALT", "AST", "BILITOT" in the last 2160 hours.    Invalid input(s): "CREATININ"    URINALYSIS:  No results for input(s): "COLORU", "CLARITYU", "SPECGRAV", "PHUR", "PROTEINUA", "GLUCOSEU", "BILIRUBINCON", "BLOODU", "WBCU", "RBCU", "BACTERIA", "MUCUS", "NITRITE", "LEUKOCYTESUR", "UROBILINOGEN", "HYALINECASTS" in the last 2160 hours.     LIPIDS:  No results for input(s): "TSH", "HDL", "CHOL", "TRIG", "LDLCALC", "CHOLHDL", "NONHDLCHOL", "TOTALCHOLEST" in the last 2160 hours.    TSH:  No results for input(s): "TSH" in the last 2160 hours.    A1C:  No results for input(s): "HGBA1C" in the last 2160 hours.      Radiology:  No results found in the last 30 days.     Assessment/Plan     Leida Hoyos is a 54 y.o.female with:    1. Annual physical exam    2. 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 Hyperlipidemia due to type 1 diabetes mellitus  - pravastatin (PRAVACHOL) 10 MG tablet; Take 1 tablet (10 mg total) by mouth once daily.  Dispense: 90 tablet; Refill: 3    4. Hypertension associated with diabetes    5. Cognitive communication deficit    6. History of subarachnoid hemorrhage    7. Long term current use of insulin    8. Insulin pump status    9. Elevated liver enzymes          -check BP at " home  -Continue current medications and maintain follow up with specialists.    -No follow-ups on file.       Frieda Mera MD  [facility]

## 2025-03-16 ENCOUNTER — PATIENT MESSAGE (OUTPATIENT)
Dept: OBSTETRICS AND GYNECOLOGY | Facility: CLINIC | Age: 55
End: 2025-03-16
Payer: COMMERCIAL

## 2025-03-17 ENCOUNTER — TELEPHONE (OUTPATIENT)
Dept: OBSTETRICS AND GYNECOLOGY | Facility: CLINIC | Age: 55
End: 2025-03-17
Payer: COMMERCIAL

## 2025-03-17 RX ORDER — TRANEXAMIC ACID 650 MG/1
1300 TABLET ORAL 3 TIMES DAILY
Qty: 30 TABLET | Refills: 4 | Status: SHIPPED | OUTPATIENT
Start: 2025-03-17 | End: 2025-03-22

## 2025-03-17 NOTE — TELEPHONE ENCOUNTER
I will send your request to Dr Cortez. Please check with the pharmacy later today, Shaneka  ===View-only below this line===      ----- Message -----       From:Leida Hoyos       Sent:3/16/2025  7:43 PM CDT         To:Dipesh Cortez MD    Subject:Medication refill    When I saw you in December you prescribed a medicine to reduce my flow and pain during my cycle.  I took it during my last period and it worked perfectly, but I cannot remember the name.  I?d like to keep it on hand since I am so irregular.  Going out of town on Thurs.  can you prescribe again?

## 2025-04-15 ENCOUNTER — OFFICE VISIT (OUTPATIENT)
Dept: ENDOCRINOLOGY | Facility: CLINIC | Age: 55
End: 2025-04-15
Payer: COMMERCIAL

## 2025-04-15 VITALS
SYSTOLIC BLOOD PRESSURE: 138 MMHG | DIASTOLIC BLOOD PRESSURE: 80 MMHG | HEART RATE: 90 BPM | WEIGHT: 141.56 LBS | OXYGEN SATURATION: 98 % | HEIGHT: 61 IN | BODY MASS INDEX: 26.73 KG/M2

## 2025-04-15 DIAGNOSIS — E13.9 LADA (LATENT AUTOIMMUNE DIABETES IN ADULTS), MANAGED AS TYPE 1: Primary | ICD-10-CM

## 2025-04-15 DIAGNOSIS — E13.9 LADA (LATENT AUTOIMMUNE DIABETES IN ADULTS), MANAGED AS TYPE 2: ICD-10-CM

## 2025-04-15 DIAGNOSIS — E78.49 OTHER HYPERLIPIDEMIA: ICD-10-CM

## 2025-04-15 PROCEDURE — 99999 PR PBB SHADOW E&M-EST. PATIENT-LVL IV: CPT | Mod: PBBFAC,,, | Performed by: INTERNAL MEDICINE

## 2025-04-15 RX ORDER — ATORVASTATIN CALCIUM 10 MG/1
10 TABLET, FILM COATED ORAL DAILY
Qty: 90 TABLET | Refills: 3 | Status: SHIPPED | OUTPATIENT
Start: 2025-04-15 | End: 2026-04-15

## 2025-04-15 RX ORDER — INSULIN LISPRO 100 [IU]/ML
INJECTION, SOLUTION INTRAVENOUS; SUBCUTANEOUS
Qty: 15 ML | Refills: 0 | Status: SHIPPED | OUTPATIENT
Start: 2025-04-15

## 2025-04-15 RX ORDER — TIRZEPATIDE 2.5 MG/.5ML
2.5 INJECTION, SOLUTION SUBCUTANEOUS
Qty: 2 ML | Refills: 5 | Status: SHIPPED | OUTPATIENT
Start: 2025-04-15

## 2025-04-15 NOTE — ASSESSMENT & PLAN NOTE
CHEMA patient with hemorrhagic stroke in 1/2023  Currently on omnipod 5 with Dexcom G6 sensor   Currently on humalog insulin   CGM and pump download reviewed and significant for:  Late bolusing, missed boluses which we discussed   Has been able to give manual bolus to get back into automode which is good. (Overnight missed one episode and was in manual mode during the night with good control)  Discussed that in the case of late bolus, enter less carbs to avoid lows 1 - 2 hours after meals  Target range has decreased 69%  Very high improved from 12%  Low blood sugars: 0%      The patient:  - Has been seen in clinic within the last 6 months  - Has CHEMA managed as type 1 diabetes  - Performs frequent blood glucose testing with sensor, calibrations and confirmatory testing when low  - Requires frequent adjustments to their treatment regimen based on BGM or CGM testing results

## 2025-04-15 NOTE — PATIENT INSTRUCTIONS
You may have more lows as you lose weight.   We may need to adjust your insulin to carb ratios.   Avoid late bolusing   If you are not able to complete meals, enter in half the amount of carbs   Please contact us if you notice lows <70s      Mounjaro start  We will start Mounjaro at 2.5 mg weekly. After 4 weeks if you tolerate it well, we typically increase to 5 mg weekly for improved blood sugar control as well as added weight loss benefit or we can continue 2.5 mg longer to help adjust to the medication. Please send me a message when you take the 4th dose to let me know if we should increase or keep dose the same for the following month. We will increase dose as we are able until we reach the maximum dose of 15 mg or the highest dose that you tolerate. We can increase the dose as often as every month or increase more slowly if needed    The pen is good out of the refrigerator for up to 21 days. Please remove the pen from the refrigerator for 10 minutes before taking injection    Potential Side Effects of Mounjaro:   One of the ways this medication works is by decreasing how fast your stomach empties, which makes you feel full faster after you eat and should help you lose weight. The main side-effects are related to GI upset, such as nausea, bloating and abdominal cramps. You can usually avoid this by eating slowly (take half of your normal portion and eat it over 30 minutes). If you do experience nausea, it does tend to get better after the first few weeks. The most severe reaction is acute pancreatitis which can cause severe abdominal pain radiating to your back. If you experience this, stop the medication and go to the ER. Fortunately this is very rare.    The mechanism of action of these weight loss medicines (including ozempic, mounjaro, wegovy, zepbound, trulicity)  is delayed gastric emptying, they may have an impact on oral contraceptive efficacy. Meaning you may not absorb the birth control well,  especially while we are increasing the dose. This may lead to ineffective levels or birth control hormones and increased risk for pregnancy. It is recommended that you use barrier contraception while we are escalating the dose of these medicines.   The other option is to switch to a non oral form of contraception including vaginal rings or IUDs.

## 2025-04-15 NOTE — PROGRESS NOTES
"Subjective:      Patient ID: Leida Hoyos is a 55 y.o. female.    Chief Complaint:  Diabetes      History of Present Illness  Endocrinology Return Visit     54 y.o. female with CHEMA managed as T1DM diagnosed at age 47.  History of subarachnoid hemorrhage in 1/2023     DM 1  LOV 10/23/2024    Interval History  Doing generally well. Traveling to [place] every other weekend to help her mom move.  Currently on OMNIPOD 5 with G6 on lispro  Meal time excursions with A1C of 6.5%, due to late bolusing or missed bolusing (meals and snacks)  Underestimating carbs for certain meals    Current Diabetes Regimen    Glucose Monitoring:  Meter/CGM: G6            Last Hgb A1C:  Lab Results   Component Value Date    HGBA1C 6.5 (H) 04/14/2025       Hypoglycemic Episodes:  denies    DKA: denies     Screening / DM Complications:  Nephropathy:   Lab Results   Component Value Date    MICALBCREAT 10.3 04/14/2025       Last Lipid Panel:  Lab Results   Component Value Date    LDL 95.0 04/14/2025       TSH:   Lab Results   Component Value Date    TSH 2.936 09/19/2023     Neuropathy: denies   Diabetic Retinopathy:  upcoming appt 5/2025, no laser surgery or DR  CAD/MI: denies   Stroke: see HPI    Diet/Exercise:  No major changes  Walking five times weekly     Has fibroid   Still has menstrual cycles but not regular. Heavy.      MEDICATIONS:    ALLERGIES:  Review of patient's allergies indicates:   Allergen Reactions    Losartan      Possible drug induced liver inflammation        Review of Systems  Denies recent illness     Objective:       Vitals:    04/15/25 0832   BP: 138/80   BP Location: Left arm   Patient Position: Sitting   Pulse: 90   SpO2: 98%   Weight: 64.2 kg (141 lb 8.6 oz)   Height: 5' 1" (1.549 m)         BP Readings from Last 3 Encounters:   04/15/25 138/80   01/30/25 136/80   01/09/25 137/82     Wt Readings from Last 1 Encounters:   04/15/25 0832 64.2 kg (141 lb 8.6 oz)         Body mass index is 26.74 kg/m².    Lab " Review:   Lab Results   Component Value Date    HGBA1C 6.5 (H) 04/14/2025     Lab Results   Component Value Date    CHOL 182 04/14/2025    HDL 71 04/14/2025    LDLCALC 74.6 03/14/2023    TRIG 80 04/14/2025    CHOLHDL 39.0 04/14/2025     Lab Results   Component Value Date     04/14/2025    K 4.2 04/14/2025     04/14/2025    CO2 27 04/14/2025     (H) 07/06/2023    BUN 14 04/14/2025    CREATININE 0.7 04/14/2025    CALCIUM 9.5 04/14/2025    PROT 7.1 10/21/2024    ALBUMIN 3.7 04/14/2025    BILITOT 0.8 04/14/2025    ALKPHOS 143 04/14/2025    AST 40 04/14/2025    ALT 52 (H) 04/14/2025    ANIONGAP 6 (L) 04/14/2025    ESTGFRAFRICA >60.0 04/14/2022    EGFRNONAA >60.0 04/14/2022    TSH 2.936 09/19/2023         Assessment and Plan     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 (Overnight missed one episode and was in manual mode during the night with good control)  Discussed that in the case of late bolus, enter less carbs to avoid lows 1 - 2 hours after meals  Target range has decreased 69%  Very high improved from 12%  Low blood sugars: 0%      The patient:  - Has been seen in clinic within the last 6 months  - Has CHEMA managed as type 1 diabetes  - Performs frequent blood glucose testing with sensor, calibrations and confirmatory testing when low  - Requires frequent adjustments to their treatment regimen based on BGM or CGM testing results        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), managed as type 2  Weight has increased, BMI 26.7  Start mounjaro 2.5 mg weekly

## 2025-04-20 ENCOUNTER — RESULTS FOLLOW-UP (OUTPATIENT)
Dept: HEPATOLOGY | Facility: CLINIC | Age: 55
End: 2025-04-20

## 2025-04-22 PROBLEM — E11.65 TYPE 2 DIABETES MELLITUS WITH HYPERGLYCEMIA, WITH LONG-TERM CURRENT USE OF INSULIN: Status: ACTIVE | Noted: 2025-04-15

## 2025-04-22 PROBLEM — Z79.4 TYPE 2 DIABETES MELLITUS WITH HYPERGLYCEMIA, WITH LONG-TERM CURRENT USE OF INSULIN: Status: ACTIVE | Noted: 2025-04-15

## 2025-04-23 ENCOUNTER — PROCEDURE VISIT (OUTPATIENT)
Dept: HEPATOLOGY | Facility: CLINIC | Age: 55
End: 2025-04-23
Payer: COMMERCIAL

## 2025-04-23 ENCOUNTER — OFFICE VISIT (OUTPATIENT)
Dept: HEPATOLOGY | Facility: CLINIC | Age: 55
End: 2025-04-23
Payer: COMMERCIAL

## 2025-04-23 VITALS — HEIGHT: 61 IN | BODY MASS INDEX: 26.43 KG/M2 | WEIGHT: 140 LBS

## 2025-04-23 DIAGNOSIS — E13.9 LADA (LATENT AUTOIMMUNE DIABETES IN ADULTS), MANAGED AS TYPE 1: ICD-10-CM

## 2025-04-23 DIAGNOSIS — R76.8 POSITIVE ANA (ANTINUCLEAR ANTIBODY): ICD-10-CM

## 2025-04-23 DIAGNOSIS — R74.8 ELEVATED LIVER ENZYMES: Primary | ICD-10-CM

## 2025-04-23 DIAGNOSIS — R74.8 ELEVATED LIVER ENZYMES: ICD-10-CM

## 2025-04-23 PROBLEM — E11.65 TYPE 2 DIABETES MELLITUS WITH HYPERGLYCEMIA, WITH LONG-TERM CURRENT USE OF INSULIN: Status: RESOLVED | Noted: 2025-04-15 | Resolved: 2025-04-23

## 2025-04-23 PROBLEM — Z79.4 TYPE 2 DIABETES MELLITUS WITH HYPERGLYCEMIA, WITH LONG-TERM CURRENT USE OF INSULIN: Status: RESOLVED | Noted: 2025-04-15 | Resolved: 2025-04-23

## 2025-04-23 PROCEDURE — 3072F LOW RISK FOR RETINOPATHY: CPT | Mod: CPTII,S$GLB,, | Performed by: NURSE PRACTITIONER

## 2025-04-23 PROCEDURE — 1159F MED LIST DOCD IN RCRD: CPT | Mod: CPTII,S$GLB,, | Performed by: NURSE PRACTITIONER

## 2025-04-23 PROCEDURE — 3066F NEPHROPATHY DOC TX: CPT | Mod: CPTII,S$GLB,, | Performed by: NURSE PRACTITIONER

## 2025-04-23 PROCEDURE — 99214 OFFICE O/P EST MOD 30 MIN: CPT | Mod: S$GLB,,, | Performed by: NURSE PRACTITIONER

## 2025-04-23 PROCEDURE — 91200 LIVER ELASTOGRAPHY: CPT | Mod: S$GLB,,, | Performed by: NURSE PRACTITIONER

## 2025-04-23 PROCEDURE — 3061F NEG MICROALBUMINURIA REV: CPT | Mod: CPTII,S$GLB,, | Performed by: NURSE PRACTITIONER

## 2025-04-23 PROCEDURE — 3008F BODY MASS INDEX DOCD: CPT | Mod: CPTII,S$GLB,, | Performed by: NURSE PRACTITIONER

## 2025-04-23 PROCEDURE — 1160F RVW MEDS BY RX/DR IN RCRD: CPT | Mod: CPTII,S$GLB,, | Performed by: NURSE PRACTITIONER

## 2025-04-23 PROCEDURE — 99999 PR PBB SHADOW E&M-EST. PATIENT-LVL III: CPT | Mod: PBBFAC,,, | Performed by: NURSE PRACTITIONER

## 2025-04-23 PROCEDURE — 3044F HG A1C LEVEL LT 7.0%: CPT | Mod: CPTII,S$GLB,, | Performed by: NURSE PRACTITIONER

## 2025-04-23 RX ORDER — TRANEXAMIC ACID 650 MG/1
TABLET ORAL
COMMUNITY
Start: 2024-12-03

## 2025-04-23 RX ORDER — INSULIN PMP CART,AUT,G6/7,CNTR
EACH SUBCUTANEOUS
COMMUNITY
Start: 2025-01-29

## 2025-04-23 NOTE — PROGRESS NOTES
"[facility] Established Patient Visit    Reason for Visit:  elevated liver enzymes, + AROLDO    PCP: Frieda Mera    HPI:  This is a 55 y.o. female with PMH noted below, here for follow up of above    Of note per Cerda's previous note "Reports she was diagnosed with AIH w Dr. Russell, outside GI, about 10 years ago, maintained on 6-mmp until 2021, unclear why this agent was started but she recalls no liver biopsy and recalls wanting to avoid prednisone due to T1DM diagnosis. Per EMR likely stopped 01/2021 or prior (6mmp)"    Previous serologic w/u negative for  Cuong's, alpha-1 antitrypsin deficiency, hemochromatosis,  and viral hepatitis   + AROLDO 1:160, normal IgG and ASMA    Prior serologic workup:   Lab Results   Component Value Date    SMOOTHMUSCAB Negative 1:40 04/14/2025    AMAIFA Negative 1:40 04/14/2025    IGGSERUM 1,259 04/14/2025    ANASCREEN Positive (A) 03/27/2023    FERRITIN 47 03/27/2023    FESATURATED 20 03/27/2023    CERULOPLSM 36.0 03/27/2023    HEPBSAG Non-reactive 03/27/2023    HEPCAB Non-reactive 01/27/2023       Liver fibrosis staging:  -- liver biopsy 5/2023 not suggestive of AIH, suspected possible DILI (?statin vs antiHTN - stopped and liver enzymes improved significantly).  Overall, the histopathologic findings are very mild and nonspecific.    Lipofuscin pigment accumulation is nonspecific, but may be secondary to drug/medication effect on the liver.  Please correlate with clinical findings.     PATH conference 4/2024 noted   5/11/23 Path Conference   4/18/23 biopsy:  bile duct ok; some zone 3 sinusoidal congestion.  No fibrosis.      Interval HPI: Presents today alone.   Per previous notes, labs improved after stopping meds (Losartan?), was on pravastatin since 2018 - 2025, changed to lipitor 10 mg 4/2025  Recent labs elevated again, AROLDO mildly positive  No herbal meds now or in the past, no supplements   Son also with elevated liver enzymes and + AROLDO, in eval     Labs done " 2025 show elevated transaminase levels ( elevated intermittently since )    Lab Results   Component Value Date    ALT 52 (H) 2025    AST 40 2025    ALKPHOS 143 2025    BILITOT 0.8 2025    ALBUMIN 3.7 2025    INR 1.0 2023     2023       Abd U/S done 2023 showed normal liver - will repeat with RTC    Denies family history of liver disease . 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    PSHX:  has a past surgical history that includes  section, classic (); Mouth surgery; Fracture surgery (Left, ); Colonoscopy (N/A, 2019); and Cerebral angiogram (N/A, 2023).    The patient's social and family histories were reviewed by me and updated in the appropriate section of the electronic medical record.    Review of patient's allergies indicates:   Allergen Reactions    Losartan      Possible drug induced liver inflammation        Current Outpatient Medications on File Prior to Visit   Medication Sig Dispense Refill    atorvastatin (LIPITOR) 10 MG tablet Take 1 tablet (10 mg total) by mouth once daily. 90 tablet 3    blood-glucose sensor (DEXCOM G6 SENSOR) Melissa For daily blood glucose monitoring to use with transmitter 9 each 3    blood-glucose transmitter (DEXCOM G6 TRANSMITTER) Melissa For daily use with dexcom sensor and omnipod insulin pump 1 each 3    cholecalciferol, vitamin D3, (VITAMIN D3) 1,000 unit capsule Take 1 capsule (1,000 Units total) by mouth once daily.  0    HUMALOG KWIKPEN INSULIN 100 unit/mL pen Plus low dose  "correction. Max TDD 20 units/day. 15 mL 0    insulin lispro 100 unit/mL injection For use with insulin pump, TDD 45 units 40 mL 3    insulin pump cart,automated,BT (OMNIPOD 5 G6 PODS, GEN 5,) Crtg Inject 1 Cartridge into the skin every 72 hours. 30 each 3    multivitamin (THERAGRAN) per tablet Take 1 tablet by mouth once daily.      OMNIPOD 5 G6-G7 PODS, GEN 5, Crtg       tirzepatide (MOUNJARO) 2.5 mg/0.5 mL PnIj Inject 2.5 mg into the skin every 7 days. 2 mL 5    tranexamic acid (LYSTEDA) 650 mg tablet        No current facility-administered medications on file prior to visit.         ROS:   GENERAL: Denies fatigue  CARDIOVASCULAR: Denies edema  GI: Denies abdominal pain  SKIN: Denies rash, itching   NEURO: Denies confusion, memory loss, or mood changes    Objective Findings:    PHYSICAL EXAM:   Friendly  female, in no acute distress; alert and oriented to person, place and time  VITALS: Ht 5' 1" (1.549 m)   Wt 63.5 kg (140 lb)   BMI 26.45 kg/m²   EYES: Sclerae anicteric  GI: Soft, non-tender, non-distended. No ascites.  EXTREMITIES:  No edema.  SKIN: Warm and dry. No jaundice. No telangectasias noted. No palmar erythema.  NEURO:  No asterixis.  PSYCH:  Thought and speech pattern appropriate. Behavior normal        EDUCATION:  See instructions discussed with patient in Instructions section of the After Visit Summary       ASSESSMENT & PLAN:  55 y.o. female with:  1. Elevated liver enzymes   -- Labs note elevated liver enzymes, intermittent since 2023  --- synthetic liver function WNL  --- Abd US done 2/2023 showed normal liver - will repeat with RTC  -- medications possibly contributing: Losartan (?), stopped after biopsy and liver enzymes trended down, rare cases of such in [facility] Liver tox. Unlikely Simvistatin since liver enzymes had normalized taking it   --- previous serological work up : see HPI  --- Hep A and B immunity: see HPI  -- labs q6 months given continued elevation   Orders Placed This Encounter "   Procedures    FibroScan Transplant Hepatology(Vibration Controlled Transient Elastography)    US Abdomen Complete    Hepatic Function Panel    Hepatic Function Panel    AROLDO Screen w/Reflex    IgG    Anti-Smooth Muscle Antibody    CK      -- fibroscan 4/2025 normal     2. H/o AIH?  Unsure if that is the case. No AIH on 2023 biopsy  Will monitor labs q6 months, can consider re-biopsy in the future if labs worsen or do not improve but currently not indicated     3. + AROLDO  Will monitor for need for biopsy in the future, last biopsy without AIH  Offered rheum but currently asymptomatic, can consider in future if anything changes    4. CHEMA  Can increase risk for other autoimmune disorders, will monitor for AIH    Lab in 6 months then   Follow up in about 1 year (around 4/23/2026). with US, lab and fibroscan before  Orders Placed This Encounter   Procedures    FibroScan Transplant Hepatology(Vibration Controlled Transient Elastography)    US Abdomen Complete    Hepatic Function Panel    Hepatic Function Panel    AROLDO Screen w/Reflex    IgG    Anti-Smooth Muscle Antibody    CK        Thank you for allowing me to participate in the care of MINA Fitzgerald    I spent a total of 30 minutes on the day of the visit.This includes face to face time and non-face to face time preparing to see the patient (eg, review of tests), obtaining and/or reviewing separately obtained history, documenting clinical information in the electronic or other health record, independently interpreting results and communicating results to the patient/family/caregiver, and coordinating care.       CC'ed note to:   Frieda Mera MD

## 2025-04-23 NOTE — PATIENT INSTRUCTIONS
1. Fibroscan to look for fat or scar tissue in the liver showed no fatty liver or scar tissue. Normal scan   2. Recommend vaccines for Hepatitis  A and B if you have not completed them in the past, see below   3. Labs in 6 months to check liver enzymes   4. Follow up in 1 year with US, labs and fibroscan before    HEP A/B VACCINE  The CDC recommends that all adults complete the combination Hepatitis A and B vaccine called TwinRix. This will protect your liver from these viruses, which can make your liver very sick.     Hep A can be transmitted through food and water and can cause significant liver injury. There was previously a significant Hepatitis A outbreak in [place]. Hep B vaccine is transmitted through blood or bodily fluids (there are no symptoms typically) and can develop longstanding (chronic) Hep B and there is no cure, so many people have it for life. Therefore, the vaccines provide immunity against these viruses that can cause harm to the liver.     The vaccine series is 3 vaccines: one now, one at 4 weeks and one 6 months after the 1st one. You can get it from any pharmacy but any [facility] pharmacy typically stocks it and administers it frequently      If the vaccine is not covered at the pharmacy level with your insurance, you may be able to get it with your PCP or in the infectious disease department at [facility] or from your [facility].

## 2025-04-23 NOTE — PROCEDURES
FibroScan Transplant Hepatology(Vibration Controlled Transient Elastography)    Date/Time: 4/23/2025 8:45 AM    Performed by: Janine Desai NP  Authorized by: Janine Desai NP    Diagnosis:  Other    Probe:  M    Universal Protocol: Patient's identity, procedure and site were verified, confirmatory pause was performed.  Discussed procedure including risks and potential complications.  Questions answered.  Patient verbalizes understanding and wishes to proceed with VCTE.     Procedure: After providing explanations of the procedure, patient was placed in the supine position with right arm in maximum abduction to allow optimal exposure of right lateral abdomen.  Patient was briefly assessed, Testing was performed in the mid-axillary location, 50Hz Shear Wave pulses were applied and the resulting Shear Wave and Propagation Speed detected with a 3.5 MHz ultrasonic signal, using the FibroScan probe, Skin to liver capsule distance and liver parenchyma were accessed during the entire examination with the FibroScan probe, Patient was instructed to breathe normally and to abstain from sudden movements during the procedure, allowing for random measurements of liver stiffness. At least 10 Shear Waves were produced, Individual measurements of each Shear Wave were calculated.  Patient tolerated the procedure well with no complications.  Meets discharge criteria as was dismissed.  Rates pain 0 out of 10.  Patient will follow up with ordering provider to review results.    Findings  Median liver stiffness score:  4.7  CAP Reading: dB/m:  184    IQR/med %:  9  Interpretation  Fibrosis interpretation is based on medial liver stiffness - Kilopascal (kPa).    Fibrosis Stage:  F 0-1  Steatosis interpretation is based on controlled attenuation parameter - (dB/m).    Steatosis Grade:  <S1

## 2025-05-01 ENCOUNTER — PATIENT MESSAGE (OUTPATIENT)
Dept: ENDOCRINOLOGY | Facility: CLINIC | Age: 55
End: 2025-05-01
Payer: COMMERCIAL

## 2025-05-15 ENCOUNTER — OFFICE VISIT (OUTPATIENT)
Dept: OPTOMETRY | Facility: CLINIC | Age: 55
End: 2025-05-15
Payer: COMMERCIAL

## 2025-05-15 DIAGNOSIS — H25.13 NUCLEAR SCLEROSIS OF BOTH EYES: ICD-10-CM

## 2025-05-15 DIAGNOSIS — E10.9 TYPE 1 DIABETES MELLITUS WITHOUT RETINOPATHY: Primary | ICD-10-CM

## 2025-05-15 DIAGNOSIS — E13.9 LADA (LATENT AUTOIMMUNE DIABETES IN ADULTS), MANAGED AS TYPE 1: ICD-10-CM

## 2025-05-15 DIAGNOSIS — H40.013 OAG (OPEN ANGLE GLAUCOMA) SUSPECT, LOW RISK, BILATERAL: ICD-10-CM

## 2025-05-15 DIAGNOSIS — H52.4 PRESBYOPIA: ICD-10-CM

## 2025-05-15 PROCEDURE — 99999 PR PBB SHADOW E&M-EST. PATIENT-LVL III: CPT | Mod: PBBFAC,,, | Performed by: OPTOMETRIST

## 2025-05-15 NOTE — PROGRESS NOTES
HPI    CC: Pt here for diabetic eye exam.      MIREYA: 3/27/24    (-) Changes in vision   (-) Pain  (-) Irritation   (-) Itching   (-) Flashes  (-) Floaters  (+) Glasses wearer-readers  (-) CL wearer  (-) Uses eye gtts    Does patient want a refraction today? no    (-) Eye injury  (-) Eye surgery   (+)POHx (abrasion)  (-)FOHx    (+)DM  Hemoglobin A1C       Date                     Value               Ref Range             Status                10/21/2024               6.7 (H)             4.0 - 5.6 %           Final                  06/12/2024               6.6 (H)             4.0 - 5.6 %           Final                 03/04/2024               6.3 (H)             4.0 - 5.6 %           Final                          04/14/2025               6.5 (H)             4.0 - 5.6 %           Final                Last edited by Promise Valentine, OD on 5/15/2025  9:20 AM.            Assessment /Plan     For exam results, see Encounter Report.    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 No retinopathy noted, OU. Continue proper BS control and annual diabetic eye exams. Monitor yearly.      3.  Educated pt on findings. C/d asymmetry OD>OS, however, very stable in appearance. (-)Fhx. IOP WNL OU. OCT WNL OU in the past. No OCT available at [facility] today. Okay to monitor yearly with [facility]FE. Complete RNFL OCT with next annual.     4. Educated pt on findings. Not visually significant. No need for removal at this time. Monitor yearly.      5. Continue use of OTC reading glasses prn. Monitor yearly.        RTC in 1 year for annual eye exam + RNFL OCT or sooner if needed.

## 2025-07-30 ENCOUNTER — OFFICE VISIT (OUTPATIENT)
Dept: PRIMARY CARE CLINIC | Facility: CLINIC | Age: 55
End: 2025-07-30
Payer: COMMERCIAL

## 2025-07-30 VITALS
HEIGHT: 61 IN | HEART RATE: 81 BPM | WEIGHT: 120.38 LBS | SYSTOLIC BLOOD PRESSURE: 120 MMHG | OXYGEN SATURATION: 99 % | BODY MASS INDEX: 22.73 KG/M2 | DIASTOLIC BLOOD PRESSURE: 78 MMHG

## 2025-07-30 DIAGNOSIS — R76.8 POSITIVE ANA (ANTINUCLEAR ANTIBODY): ICD-10-CM

## 2025-07-30 DIAGNOSIS — E78.5 HYPERLIPIDEMIA DUE TO TYPE 1 DIABETES MELLITUS: ICD-10-CM

## 2025-07-30 DIAGNOSIS — I15.2 HYPERTENSION ASSOCIATED WITH DIABETES: ICD-10-CM

## 2025-07-30 DIAGNOSIS — Z96.41 INSULIN PUMP STATUS: ICD-10-CM

## 2025-07-30 DIAGNOSIS — R74.8 ELEVATED LIVER ENZYMES: ICD-10-CM

## 2025-07-30 DIAGNOSIS — E13.9 LADA (LATENT AUTOIMMUNE DIABETES IN ADULTS), MANAGED AS TYPE 1: Primary | ICD-10-CM

## 2025-07-30 DIAGNOSIS — Z78.0 ASYMPTOMATIC POSTMENOPAUSAL STATE: ICD-10-CM

## 2025-07-30 DIAGNOSIS — Z12.31 ENCOUNTER FOR SCREENING MAMMOGRAM FOR MALIGNANT NEOPLASM OF BREAST: ICD-10-CM

## 2025-07-30 DIAGNOSIS — Z79.4 LONG TERM CURRENT USE OF INSULIN: ICD-10-CM

## 2025-07-30 DIAGNOSIS — E10.69 HYPERLIPIDEMIA DUE TO TYPE 1 DIABETES MELLITUS: ICD-10-CM

## 2025-07-30 DIAGNOSIS — E11.59 HYPERTENSION ASSOCIATED WITH DIABETES: ICD-10-CM

## 2025-07-30 PROCEDURE — 3008F BODY MASS INDEX DOCD: CPT | Mod: CPTII,S$GLB,, | Performed by: INTERNAL MEDICINE

## 2025-07-30 PROCEDURE — 3078F DIAST BP <80 MM HG: CPT | Mod: CPTII,S$GLB,, | Performed by: INTERNAL MEDICINE

## 2025-07-30 PROCEDURE — 3061F NEG MICROALBUMINURIA REV: CPT | Mod: CPTII,S$GLB,, | Performed by: INTERNAL MEDICINE

## 2025-07-30 PROCEDURE — 3044F HG A1C LEVEL LT 7.0%: CPT | Mod: CPTII,S$GLB,, | Performed by: INTERNAL MEDICINE

## 2025-07-30 PROCEDURE — 3074F SYST BP LT 130 MM HG: CPT | Mod: CPTII,S$GLB,, | Performed by: INTERNAL MEDICINE

## 2025-07-30 PROCEDURE — 99999 PR PBB SHADOW E&M-EST. PATIENT-LVL IV: CPT | Mod: PBBFAC,,, | Performed by: INTERNAL MEDICINE

## 2025-07-30 PROCEDURE — 99214 OFFICE O/P EST MOD 30 MIN: CPT | Mod: S$GLB,,, | Performed by: INTERNAL MEDICINE

## 2025-07-30 PROCEDURE — 1159F MED LIST DOCD IN RCRD: CPT | Mod: CPTII,S$GLB,, | Performed by: INTERNAL MEDICINE

## 2025-07-30 PROCEDURE — 3066F NEPHROPATHY DOC TX: CPT | Mod: CPTII,S$GLB,, | Performed by: INTERNAL MEDICINE

## 2025-07-30 NOTE — ASSESSMENT & PLAN NOTE
On lipitor 10 mg, no myalgias.  The 10-year ASCVD risk score (Mane HORTA, et al., 2019) is: 2.7%    Values used to calculate the score:      Age: 55 years      Sex: Female      Is Non- : No      Diabetic: Yes      Tobacco smoker: No      Systolic Blood Pressure: 120 mmHg      Is BP treated: No      HDL Cholesterol: 71 mg/dL      Total Cholesterol: 182 mg/dL

## 2025-07-30 NOTE — ASSESSMENT & PLAN NOTE
Dx'ed in 2013.  Sees Dr. Kurtz, A1C 6.5 in 4/2025.  On insulin pump and mounjaro 2.5 mg.   Still exercises 4-5 times per week.

## 2025-07-30 NOTE — ASSESSMENT & PLAN NOTE
No evidence of AIH on previous biopsy, questionable dx. Seeing hepatology NP.  AROLDO positive but no other symptoms, not seeing rheum.

## 2025-07-30 NOTE — PROGRESS NOTES
[facility] Note    Chief Complaint      Chief Complaint   Patient presents with    Follow-up     History of Present Illness      Leida Hoyos is a 55 y.o. female who presents today for f/u of DM1.  Patient comes to appointment with spouse.  GYN: Laurie Menjivar, Endo: Dr. Kurtz, GI: Dr. Russell, Optho: Dr. Ferguson, Derm: Paddock    Problem List Items Addressed This Visit       Elevated liver enzymes    Current Assessment & Plan   No evidence of AIH on previous biopsy, questionable dx. Seeing hepatology NP.  AROLDO positive but no other symptoms, not seeing rheum.         Hyperlipidemia due to type 1 diabetes mellitus    Current Assessment & Plan   On lipitor 10 mg, no myalgias.  The 10-year ASCVD risk score (Mane DK, et al., 2019) is: 2.7%    Values used to calculate the score:      Age: 55 years      Sex: Female      Is Non- : No      Diabetic: Yes      Tobacco smoker: No      Systolic Blood Pressure: 120 mmHg      Is BP treated: No      HDL Cholesterol: 71 mg/dL      Total Cholesterol: 182 mg/dL           Relevant Orders    Lipid Panel    Hypertension associated with diabetes    Current Assessment & Plan   BP controlled on no meds.  No CP/SOB/HA.         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  Sees Dr. Kurtz, A1C 6.5 in 4/2025.  On insulin pump and mounjaro 2.5 mg.   Still exercises 4-5 times per week.         Long term current use of insulin    Positive AROLDO (antinuclear antibody)     Other Visit Diagnoses         Encounter for screening mammogram for malignant neoplasm of breast        Relevant Orders    Mammo Digital Screening Bilat w/ Coy (XPD)      Asymptomatic postmenopausal state        Relevant Orders    DXA Bone Density Axial Skeleton 1 or more sites                      Health Maintenance   Topic Date Due    DEXA Scan  Never done    COVID-19 Vaccine (6 -  -25 season) 2026 (Originally 2024)    Influenza Vaccine (1) 2025    Hemoglobin A1c  10/14/2025    Mammogram  2025    Foot Exam  2026    Diabetes Urine Screening  2026    Lipid Panel  2026    Diabetic Eye Exam  05/15/2026    TETANUS VACCINE  2026    Cervical Cancer Screening  2028    Colorectal Cancer Screening  2029    RSV Vaccine (Age 60+ and Pregnant patients) (1 - 1-dose 75+ series) 2044    Hepatitis C Screening  Completed    Shingles Vaccine  Completed    HIV Screening  Completed    Pneumococcal Vaccines (Age 50+)  Completed       Past Medical History:   Diagnosis Date    Brain bleed 2023    Fibroid 10/2023    Hypertension     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), managed as type 1     Liver disease        Past Surgical History:   Procedure Laterality Date    CEREBRAL ANGIOGRAM N/A 2023    Procedure: ANGIOGRAM-CEREBRAL;  Surgeon: Yue Surgeon;  Location: [facility];  Service: Anesthesiology;  Laterality: N/A;     SECTION, CLASSIC      COLONOSCOPY N/A 2019    Procedure: COLONOSCOPY;  Surgeon: Dipesh Victoria MD;  Location: [facility] ([address] [place] [address]);  Service: Endoscopy;  Laterality: N/A;    FRACTURE SURGERY Left     leg     MOUTH SURGERY         family history includes Alcohol abuse in her maternal grandfather and maternal grandmother; Arthritis in her mother; Asthma in her father; Diabetes in her maternal grandfather and paternal grandfather; Heart attack (age of onset: 60) in her father; Heart disease in her father; Hyperlipidemia in her father; Hypertension in her father and sister; Learning disabilities in her daughter and son; No Known Problems in her daughter, sister, and son; Ulcerative colitis in her mother.    Social History     Tobacco Use    Smoking status: Never    Smokeless tobacco: Never   Substance Use Topics    Alcohol use: Yes     Alcohol/week: 2.0 standard drinks of alcohol     Types: 2  "Glasses of wine per week     Comment: 3 times per month    Drug use: Never       Review of Systems   Constitutional:  Negative for chills and fever.   Respiratory:  Negative for cough and shortness of breath.    Cardiovascular:  Negative for chest pain and palpitations.   Gastrointestinal:  Negative for constipation, diarrhea, nausea and vomiting.   Genitourinary:  Negative for dysuria and hematuria.   Musculoskeletal:  Negative for falls.   Neurological:  Negative for headaches.        Outpatient Encounter Medications as of 7/30/2025   Medication Sig Dispense Refill    atorvastatin (LIPITOR) 10 MG tablet Take 1 tablet (10 mg total) by mouth once daily. 90 tablet 3    blood-glucose sensor (DEXCOM G6 SENSOR) Melissa For daily blood glucose monitoring to use with transmitter 9 each 3    blood-glucose transmitter (DEXCOM G6 TRANSMITTER) Melissa For daily use with dexcom sensor and omnipod insulin pump 1 each 3    cholecalciferol, vitamin D3, (VITAMIN D3) 1,000 unit capsule Take 1 capsule (1,000 Units total) by mouth once daily.  0    HUMALOG KWIKPEN INSULIN 100 unit/mL pen Plus low dose correction. Max TDD 20 units/day. 15 mL 0    insulin lispro 100 unit/mL injection For use with insulin pump, TDD 45 units 40 mL 3    insulin pump cart,automated,BT (OMNIPOD 5 G6 PODS, GEN 5,) Crtg Inject 1 Cartridge into the skin every 72 hours. 30 each 3    multivitamin (THERAGRAN) per tablet Take 1 tablet by mouth once daily.      OMNIPOD 5 G6-G7 PODS, GEN 5, Crtg       tirzepatide (MOUNJARO) 2.5 mg/0.5 mL PnIj Inject 2.5 mg into the skin every 7 days. 2 mL 5    tranexamic acid (LYSTEDA) 650 mg tablet        No facility-administered encounter medications on file as of 7/30/2025.        Review of patient's allergies indicates:   Allergen Reactions    Losartan      Possible drug induced liver inflammation        Physical Exam      Vital Signs  Pulse: 81  SpO2: 99 %  BP: 120/78  Pain Score: 0-No pain  Height and Weight  Height: 5' 1" (154.9 " "cm)  Weight: 54.6 kg (120 lb 5.9 oz)  BSA (Calculated - sq m): 1.53 sq meters  BMI (Calculated): 22.8  Weight in (lb) to have BMI = 25: 132]    Physical Exam  Constitutional:       Appearance: She is well-developed.   HENT:      Head: Normocephalic and atraumatic.   Cardiovascular:      Rate and Rhythm: Normal rate and regular rhythm.      Heart sounds: Normal heart sounds. No murmur heard.  Pulmonary:      Effort: Pulmonary effort is normal. No respiratory distress.      Breath sounds: Normal breath sounds.   Abdominal:      General: There is no distension.      Palpations: Abdomen is soft.      Tenderness: There is no abdominal tenderness. There is no guarding.   Skin:     General: Skin is warm and dry.   Neurological:      Mental Status: She is alert. Mental status is at baseline.   Psychiatric:         Behavior: Behavior normal.          Laboratory:  CBC:  No results for input(s): "WBC", "RBC", "HGB", "HCT", "PLT", "MCV", "MCH", "MCHC" in the last 2160 hours.    CMP:  No results for input(s): "GLU", "CALCIUM", "ALBUMIN", "PROT", "NA", "K", "CO2", "CL", "BUN", "ALKPHOS", "ALT", "AST", "BILITOT" in the last 2160 hours.    Invalid input(s): "CREATININ"    URINALYSIS:  No results for input(s): "COLORU", "CLARITYU", "SPECGRAV", "PHUR", "PROTEINUA", "GLUCOSEU", "BILIRUBINCON", "BLOODU", "WBCU", "RBCU", "BACTERIA", "MUCUS", "NITRITE", "LEUKOCYTESUR", "UROBILINOGEN", "HYALINECASTS" in the last 2160 hours.     LIPIDS:  No results for input(s): "TSH", "HDL", "CHOL", "TRIG", "LDLCALC", "CHOLHDL", "NONHDLCHOL", "TOTALCHOLEST" in the last 2160 hours.    TSH:  No results for input(s): "TSH" in the last 2160 hours.    A1C:  No results for input(s): "HGBA1C" in the last 2160 hours.      Radiology:  No results found in the last 30 days.     Assessment/Plan     Leida Hoyos is a 55 y.o.female with:    1. 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 Hypertension associated with diabetes    3. " Hyperlipidemia due to type 1 diabetes mellitus  - Lipid Panel; Future    4. Long term current use of insulin    5. Insulin pump status    6. Elevated liver enzymes    7. Encounter for screening mammogram for malignant neoplasm of breast  - Mammo Digital Screening Bilat w/ Coy (XPD); Future    8. Asymptomatic postmenopausal state  - DXA Bone Density Axial Skeleton 1 or more sites; Future    9. Positive AROLDO (antinuclear antibody)          -repeat lipid panel  -will discuss with Dr. Kurtz about stopping vs spacing out mounjaro since she is at goal weight  -Continue current medications and maintain follow up with specialists.    -Follow up in about 6 months (around 1/30/2026) for follow up of medical problems.       Frieda Mera MD  [facility]

## 2025-08-12 ENCOUNTER — OFFICE VISIT (OUTPATIENT)
Dept: ENDOCRINOLOGY | Facility: CLINIC | Age: 55
End: 2025-08-12
Payer: COMMERCIAL

## 2025-08-12 VITALS
WEIGHT: 121.94 LBS | SYSTOLIC BLOOD PRESSURE: 122 MMHG | HEART RATE: 89 BPM | BODY MASS INDEX: 23.02 KG/M2 | OXYGEN SATURATION: 100 % | HEIGHT: 61 IN | DIASTOLIC BLOOD PRESSURE: 73 MMHG

## 2025-08-12 DIAGNOSIS — E13.9 LADA (LATENT AUTOIMMUNE DIABETES IN ADULTS), MANAGED AS TYPE 1: Primary | ICD-10-CM

## 2025-08-12 DIAGNOSIS — Z96.41 INSULIN PUMP STATUS: ICD-10-CM

## 2025-08-12 DIAGNOSIS — E13.9 LADA (LATENT AUTOIMMUNE DIABETES IN ADULTS), MANAGED AS TYPE 2: ICD-10-CM

## 2025-08-12 PROCEDURE — 3066F NEPHROPATHY DOC TX: CPT | Mod: CPTII,S$GLB,, | Performed by: INTERNAL MEDICINE

## 2025-08-12 PROCEDURE — 1159F MED LIST DOCD IN RCRD: CPT | Mod: CPTII,S$GLB,, | Performed by: INTERNAL MEDICINE

## 2025-08-12 PROCEDURE — 3044F HG A1C LEVEL LT 7.0%: CPT | Mod: CPTII,S$GLB,, | Performed by: INTERNAL MEDICINE

## 2025-08-12 PROCEDURE — 99999 PR PBB SHADOW E&M-EST. PATIENT-LVL IV: CPT | Mod: PBBFAC,,, | Performed by: INTERNAL MEDICINE

## 2025-08-12 PROCEDURE — 95251 CONT GLUC MNTR ANALYSIS I&R: CPT | Mod: S$GLB,,, | Performed by: INTERNAL MEDICINE

## 2025-08-12 PROCEDURE — 3074F SYST BP LT 130 MM HG: CPT | Mod: CPTII,S$GLB,, | Performed by: INTERNAL MEDICINE

## 2025-08-12 PROCEDURE — 3078F DIAST BP <80 MM HG: CPT | Mod: CPTII,S$GLB,, | Performed by: INTERNAL MEDICINE

## 2025-08-12 PROCEDURE — 3008F BODY MASS INDEX DOCD: CPT | Mod: CPTII,S$GLB,, | Performed by: INTERNAL MEDICINE

## 2025-08-12 PROCEDURE — 99214 OFFICE O/P EST MOD 30 MIN: CPT | Mod: S$GLB,,, | Performed by: INTERNAL MEDICINE

## 2025-08-12 PROCEDURE — 3061F NEG MICROALBUMINURIA REV: CPT | Mod: CPTII,S$GLB,, | Performed by: INTERNAL MEDICINE

## 2025-08-12 RX ORDER — TIRZEPATIDE 2.5 MG/.5ML
2.5 INJECTION, SOLUTION SUBCUTANEOUS
Qty: 2 ML | Refills: 11 | Status: SHIPPED | OUTPATIENT
Start: 2025-08-12